# Patient Record
Sex: FEMALE | Race: WHITE | NOT HISPANIC OR LATINO | Employment: OTHER | URBAN - METROPOLITAN AREA
[De-identification: names, ages, dates, MRNs, and addresses within clinical notes are randomized per-mention and may not be internally consistent; named-entity substitution may affect disease eponyms.]

---

## 2017-05-18 ENCOUNTER — GENERIC CONVERSION - ENCOUNTER (OUTPATIENT)
Dept: OTHER | Facility: OTHER | Age: 72
End: 2017-05-18

## 2017-06-02 ENCOUNTER — GENERIC CONVERSION - ENCOUNTER (OUTPATIENT)
Dept: INTERNAL MEDICINE CLINIC | Facility: CLINIC | Age: 72
End: 2017-06-02

## 2017-11-09 ENCOUNTER — GENERIC CONVERSION - ENCOUNTER (OUTPATIENT)
Dept: OTHER | Facility: OTHER | Age: 72
End: 2017-11-09

## 2017-12-01 ENCOUNTER — GENERIC CONVERSION - ENCOUNTER (OUTPATIENT)
Dept: INTERNAL MEDICINE CLINIC | Facility: CLINIC | Age: 72
End: 2017-12-01

## 2017-12-08 ENCOUNTER — ALLSCRIPTS OFFICE VISIT (OUTPATIENT)
Dept: OTHER | Facility: OTHER | Age: 72
End: 2017-12-08

## 2017-12-08 DIAGNOSIS — E78.5 HYPERLIPIDEMIA: ICD-10-CM

## 2017-12-08 DIAGNOSIS — R73.01 IMPAIRED FASTING GLUCOSE: ICD-10-CM

## 2017-12-08 DIAGNOSIS — E03.9 HYPOTHYROIDISM: ICD-10-CM

## 2017-12-08 DIAGNOSIS — N39.0 URINARY TRACT INFECTION: ICD-10-CM

## 2017-12-09 NOTE — PROGRESS NOTES
Assessment    1  Impaired fasting glucose (790 21) (R73 01)   2  Poor short-term memory (780 93) (R41 3)   3  Hyperlipidemia (272 4) (E78 5)   4  Hypertension (401 9) (I10)   5  Hypothyroidism (244 9) (E03 9)    Plan  Health Maintenance    · *VB - Fall Risk Assessment  (Dx Z13 89 Screen for Neurologic Disorder);Status:Complete - Retrospective Authorization;   Done: 32GXK2978 03:22PM   · *VB - Urinary Incontinence Screen (Dx Z13 89 Screen for UI); Status:Complete -Retrospective Authorization;   Done: 35EIK4133 03:23PM  Hyperlipidemia    · (1) LIPID PANEL, FASTING; Status:Active; Requested for:81Quh1513;   Hypertension    · Follow-up visit in 6 weeks Evaluation and Treatment  Follow-up  Status: Complete  Done:07Ied5447  Hypothyroidism    · (1) T4, FREE; Status:Active; Requested for:44Hjw9416;    · (1) TSH; Status:Active; Requested for:74Sqd0860;   Impaired fasting glucose    · (1) COMPREHENSIVE METABOLIC PANEL; Status:Active; Requested for:84Ekf7053;    · (1) HEMOGLOBIN A1C; Status:Active; Requested for:70Jhg8794;   Urinary tract infection    · (1) URINE CULTURE; Source:Urine, Clean Catch; Status:Active; Requestedfor:29Wdv6414; Unlinked    · Phenazopyridine HCl TABS    Discussion/Summary  Discussion Summary:   Will check labs  Continue current medications at this time  Have repeat urine culture done  Will try to obtain old records  Follow up in 6 weeks  Counseling Documentation With Imm: The patient, patient's family was counseled regarding diagnostic results,-- instructions for management,-- risk factor reductions,-- impressions,-- risks and benefits of treatment options,-- importance of compliance with treatment  Medication SE Review and Pt Understands Tx: Possible side effects of new medications were reviewed with the patient/guardian today  The treatment plan was reviewed with the patient/guardian   The patient/guardian understands and agrees with the treatment plan   Self Referrals:   Self Referrals: No Chief Complaint  Chief Complaint Free Text Note Form: Pt in office to establish with practice  History of Present Illness  HPI: 24-year-old white female presents accompanied with her  to establish with this practice  She is stating she is just unhappy with her previous provider  The patient describes having a history of frequent urinary tract infections  Apparently she has had a recent infection and has been on antibiotic  She states she continues to not feel just right  Does not have any burning, but still has pressure  She is not aware of what antibiotic she had taken but thinks she has the bottle at home and will call us with the information  blood work done in November was available to review  Noted an elevated fasting blood sugar  is noting that her short-term memory does not seem to be as good as it had been, but her long-term memory is very good  as documented in the EMR  Review of Systems  Complete-Female:  Constitutional: no fever,-- not feeling poorly,-- no chills-- and-- not feeling tired  Eyes: no eyesight problems  ENT: no complaints of earache, no loss of hearing, no nose bleeds, no nasal discharge, no sore throat, no hoarseness  Cardiovascular: No complaints of slow heart rate, no fast heart rate, no chest pain, no palpitations, no leg claudication, no lower extremity edema  Respiratory: No complaints of shortness of breath, no wheezing, no cough, no SOB on exertion, no orthopnea, no PND  Gastrointestinal: no abdominal pain,-- no nausea,-- no vomiting,-- no constipation,-- no diarrhea-- and-- no blood in stools  Genitourinary: incontinence, but-- no dysuria,-- no pelvic pain-- and-- no vaginal discharge  Musculoskeletal: no arthralgias-- and-- no myalgias  Integumentary: no rashes  Neurological: no headache,-- no dizziness,-- no fainting-- and-- no difficulty walking  Psychiatric: not suicidal,-- no anxiety-- and-- no depression    Hematologic/Lymphatic: No complaints of swollen glands, no swollen glands in the neck, does not bleed easily, does not bruise easily  Active Problems  1  Depression (311) (F32 9)   2  Esophageal reflux (530 81) (K21 9)   3  Hyperlipidemia (272 4) (E78 5)   4  Hypertension (401 9) (I10)   5  Hypothyroidism (244 9) (E03 9)   6  Lumbago (724 2) (M54 5)   7  Lymphoma (202 80) (C85 90)   8  Urinary tract infection (599 0) (N39 0)    Past Medical History    1  History of Abdominal pain (789 00) (R10 9)   2  History of Abdominal pain, left upper quadrant (789 02) (R10 12)   3  History of Acute Lymphoma (V10 7)   4  History of Anxiety disorder (300 00) (F41 9)   5  History of Bronchitis (490) (J40)   6  History of Depression (311) (F32 9)   7  History of Encounter for screening mammogram for malignant neoplasm of breast (V76 12) (Z12 31)   8  History of Fainting (780 2) (R55)   9  History of abdominal pain (V13 89) (Z87 898)   10  History of aortic valve disorder (V12 59) (Z86 79)   11  History of bursitis (V13 59) (Z87 39)   12  History of gastroesophageal reflux (GERD) (V12 79) (Z87 19)   13  History of gastroesophageal reflux (GERD) (V12 79) (Z87 19)   14  History of hyperlipidemia (V12 29) (Z86 39)   15  History of hypertension (V12 59) (Z86 79)   16  History of hypothyroidism (V12 29) (Z86 39)   17  History of low back pain (V13 59) (Z87 39)   18  History of syncope (V15 89) (Z87 898)   19  History of Kidney Cancer (V10 52)   20  History of Lymphoma in remission (202 80) (C85 90)  Active Problems And Past Medical History Reviewed: The active problems and past medical history were reviewed and updated today  Surgical History  1  History of Esophagogastric Fundoplasty Nissen Fundoplication  Surgical History Reviewed: The surgical history was reviewed and updated today  Family History  Father    1  Family history of cerebrovascular accident (CVA) (V17 1) (Z82 3)  Sister    2  Family history of leukemia (V16 6) (Z80 6)  Brother    3   Family history of alcoholism (V17 0) (Z81 1)   4  Family history of malignant neoplasm of kidney (V16 51) (Z80 51)  Family History    5  Denied: Family history of mental disorder  Family History Reviewed: The family history was reviewed and updated today  Social History     ·    · Never A Smoker   · No alcohol use   · No illicit drug use   · Two children  Social History Reviewed: The social history was reviewed and updated today  The social history was reviewed and is unchanged  Current Meds   1  AmLODIPine Besylate 2 5 MG Oral Tablet; TAKE 1 TABLET DAILY (WITH 5MG TO TOTAL 7 5MG DAILY); Therapy: 20UJJ3513 to (Evaluate:30Jun2017)  Requested for: 76SLM8943; Last KN:06ZOC2277 Ordered   2  AmLODIPine Besylate 5 MG Oral Tablet; take 1 tablet every day; Therapy: 02Tzy1089 to (Evaluate:30Jun2017)  Requested for: 24NAA2436; Last QJ:55ZGD5565; Status: ACTIVE - Renewal Denied Ordered   3  Cranberry Concentrate CAPS; Take as directed Recorded   4  Levothyroxine Sodium 100 MCG Oral Tablet; TAKE 1 TABLET DAILY; Therapy: 02EIX3400 to ([facility])  Requested for: 07UVR3215; Last Rx:22Gfu5631; Status: ACTIVE - Renewal Denied Ordered   5  Myrbetriq 25 MG Oral Tablet Extended Release 24 Hour; Therapy: (Recorded:98Tbr6785) to Recorded   6  Omeprazole 20 MG Oral Capsule Delayed Release; TAKE 1 CAPSULE EVERY DAY AS NEEDED; Therapy: 00JPA3360 to (Evaluate:30Apr2018)  Requested for: 23IXE7858; Last Rx:01Nov2017 Ordered   7  PARoxetine HCl - 20 MG Oral Tablet; take 1 tablet by mouth every day; Therapy: 05BKH7907 to (Evaluate:30Jun2017)  Requested for: 94VNC3974; Last CH:29HIP9574; Status: ACTIVE - Renewal Denied Ordered   8  Phenazopyridine HCl TABS; TAKE 1 TABLET 3 TIMES DAILY AFTER MEALS Recorded    Allergies    1  Clindamycin HCl CAPS   2  Penicillins   3   Sulfacetamide Sodium OINT    Vitals  Signs   Recorded: 76DBU3072 02:57PM   Heart Rate: 86  Respiration: 16  Systolic: 362  Diastolic: 86  Height: 5 ft 1 in  Weight: 165 lb 2 oz  BMI Calculated: 31 2  BSA Calculated: 1 74  O2 Saturation: 97    Physical Exam   Constitutional  General appearance: No acute distress, well appearing and well nourished  Head and Face  Head and face: Normal    Palpation of the face and sinuses: No sinus tenderness  Eyes  Conjunctiva and lids: No swelling, erythema or discharge  Pupils and irises: Equal, round, reactive to light  Ears, Nose, Mouth, and Throat  External inspection of ears and nose: Normal    Otoscopic examination: Tympanic membranes translucent with normal light reflex  Canals patent without erythema  Hearing: Normal    Nasal mucosa, septum, and turbinates: Normal without edema or erythema  Lips, teeth, and gums: Normal, good dentition  Oropharynx: Normal with no erythema, edema, exudate or lesions  Neck  Neck: Supple, symmetric, trachea midline, no masses  Thyroid: Normal, no thyromegaly  Pulmonary  Respiratory effort: No increased work of breathing or signs of respiratory distress  Auscultation of lungs: Clear to auscultation  Cardiovascular  Auscultation of heart: Normal rate and rhythm, normal S1 and S2, no murmurs  Carotid pulses: 2+ bilaterally  Abdominal aorta: Normal    Femoral pulses: 2+ bilaterally  Pedal pulses: 2+ bilaterally  Examination of extremities for edema and/or varicosities: Normal    Abdomen  Abdomen: Non-tender, no masses  Liver and spleen: No hepatomegaly or splenomegaly  Lymphatic  Palpation of lymph nodes in neck: No lymphadenopathy  Palpation of lymph nodes in axillae: No lymphadenopathy  Palpation of lymph nodes in groin: No lymphadenopathy  Palpation of lymph nodes in other areas: No lymphadenopathy  Musculoskeletal  Gait and station: Normal    Digits and nails: Normal without clubbing or cyanosis     Joints, bones, and muscles: Normal    Range of motion: Normal    Stability: Normal    Muscle strength/tone: Normal    Skin  Skin and subcutaneous tissue: Normal without rashes or lesions  Palpation of skin and subcutaneous tissue: Normal turgor  Neurologic  Cranial nerves: Cranial nerves II-XII intact  Reflexes: 2+ and symmetric  Sensation: No sensory loss  Coordination: Normal finger to nose and heel to shin  Psychiatric  Judgment and insight: Normal    Orientation to person, place, and time: Normal    Recent and remote memory: Intact     Mood and affect: Normal        Results/Data  *VB - Urinary Incontinence Screen (Dx Z13 89 Screen for UI) 50TNI1395 03:23PM Benito Calender     Test Name Result Flag Reference   Urinary Incontinence Assessment 39BQO4229       PHQ-9 Adult Depression Screening 34ZTN0521 03:22PM Benito Calender     Test Name Result Flag Reference   PHQ-9 Adult Depression Score 0     PHQ-9 Adult Depression Screening Negative       *VB - Fall Risk Assessment  (Dx Z13 89 Screen for Neurologic Disorder) 06ZTP9919 03:22PM Benito Calender     Test Name Result Flag Reference   Falls Risk      No falls in the past year         Signatures   Electronically signed by : Arias Castillo, [facility]; Dec  8 2017  3:45PM EST                       (Author)    Electronically signed by : Yenny Moreno MD; Dec  8 2017  4:35PM EST

## 2018-01-19 ENCOUNTER — ALLSCRIPTS OFFICE VISIT (OUTPATIENT)
Dept: OTHER | Facility: OTHER | Age: 73
End: 2018-01-19

## 2018-01-19 DIAGNOSIS — E03.9 HYPOTHYROIDISM: ICD-10-CM

## 2018-01-19 DIAGNOSIS — M79.10 MYALGIA: ICD-10-CM

## 2018-01-20 NOTE — PROGRESS NOTES
Assessment   1  Hypothyroidism (244 9) (E03 9)   2  Hypertension (401 9) (I10)   3  Hyperlipidemia (272 4) (E78 5)    Plan   Hypertension, Hypothyroidism    · Follow-up visit in 4 Months Evaluation and Treatment  Follow-up  Status: Complete  Done: 40WJD9985  Hypothyroidism    · (1) T4, FREE; Status:Active; Requested ECB:14XSW9689;    · (1) TSH; Status:Active; Requested ISE:40OEO0807;   Myalgia    · (1) VITAMIN D 25-HYDROXY; Status:Active; Requested MSR:81IDK6806;   Poor short-term memory    · 1 Radha Gonzalez MD, [facility]  (Neurology) Co-Management  *  Status: Active  Requested for: 65AHH2118  Care Summary provided  : Yes  Urinary tract infection    · Ciprofloxacin HCl - 250 MG Oral Tablet    Discussion/Summary   Discussion Summary:    Will check thyroid hormone levels again  Also check Vit D level  Follow up in 4 months  Will discuss results when available and adjust medications as needed  Continue current medications at this time  Counseling Documentation With Imm: The patient, patient's family was counseled regarding diagnostic results,-- instructions for management,-- risk factor reductions,-- impressions,-- risks and benefits of treatment options,-- importance of compliance with treatment  Medication SE Review and Pt Understands Tx: Possible side effects of new medications were reviewed with the patient/guardian today  The treatment plan was reviewed with the patient/guardian  The patient/guardian understands and agrees with the treatment plan      Chief Complaint   Chief Complaint Free Text Note Form: Patient here for follow up of UTI  History of Present Illness   HPI: Follow-up did do her labs in mid December  Blood sugar was normal as was her hemoglobin A1c of 5 5  Lipids show good control with high HDLs on no medication  on replacement  Her most recent thyroid levels done in December show elevated TSH and low free T4   Patient is not sure if she had been taking her thyroid medicine regularly at the same time  did obtain results of CT of chest abdomen and pelvis done by her hematologist Oncology team  Report does indicate there are hypodense lesions in both kidneys  Patient is aware of this and in the past patient states the had been found to be renal cysts  again discussed her poor short-term memory  We discussed neurologic evaluation and both patient and  feel this would be a good idea  secondary to large hiatal hernia  Symptoms controlled with omeprazole  Review of Systems   Complete-Female:      Constitutional: no fever,-- not feeling poorly,-- no chills-- and-- not feeling tired  Cardiovascular: No complaints of slow heart rate, no fast heart rate, no chest pain, no palpitations, no leg claudication, no lower extremity edema  Respiratory: No complaints of shortness of breath, no wheezing, no cough, no SOB on exertion, no orthopnea, no PND  Gastrointestinal: no abdominal pain  Genitourinary: no dysuria  Musculoskeletal: myalgias, but-- no arthralgias  Integumentary: no rashes  Neurological: as noted in HPI,-- no headache,-- no dizziness-- and-- no fainting  Hematologic/Lymphatic: No complaints of swollen glands, no swollen glands in the neck, does not bleed easily, does not bruise easily  Active Problems   1  Depression (311) (F32 9)   2  Esophageal reflux (530 81) (K21 9)   3  Hyperlipidemia (272 4) (E78 5)   4  Hypertension (401 9) (I10)   5  Hypothyroidism (244 9) (E03 9)   6  Impaired fasting glucose (790 21) (R73 01)   7  Lumbago (724 2) (M54 5)   8  Lymphoma (202 80) (C85 90)   9  Poor short-term memory (780 93) (R41 3)   10  Recurrent UTI (599 0) (N39 0)   11  Urinary tract infection (599 0) (N39 0)    Past Medical History   1  History of Abdominal pain (789 00) (R10 9)   2  History of Abdominal pain, left upper quadrant (789 02) (R10 12)   3  History of Acute Lymphoma (V10 7)   4  History of Anxiety disorder (300 00) (F41 9)   5   History of Bronchitis (490) (J40)   6  History of Depression (311) (F32 9)   7  History of Encounter for screening mammogram for malignant neoplasm of breast (V76 12) (Z12 31)   8  History of Fainting (780 2) (R55)   9  History of abdominal pain (V13 89) (Z87 898)   10  History of aortic valve disorder (V12 59) (Z86 79)   11  History of bursitis (V13 59) (Z87 39)   12  History of gastroesophageal reflux (GERD) (V12 79) (Z87 19)   13  History of gastroesophageal reflux (GERD) (V12 79) (Z87 19)   14  History of hyperlipidemia (V12 29) (Z86 39)   15  History of hypertension (V12 59) (Z86 79)   16  History of hypothyroidism (V12 29) (Z86 39)   17  History of low back pain (V13 59) (Z87 39)   18  History of syncope (V15 89) (Z87 898)   19  History of Kidney Cancer (V10 52)   20  History of Lymphoma in remission (202 80) (C85 90)  Active Problems And Past Medical History Reviewed: The active problems and past medical history were reviewed and updated today  Surgical History   1  History of Esophagogastric Fundoplasty Nissen Fundoplication  Surgical History Reviewed: The surgical history was reviewed and updated today  Family History   Father    1  Family history of cerebrovascular accident (CVA) (V17 1) (Z82 3)  Sister    2  Family history of leukemia (V16 6) (Z80 6)  Brother    3  Family history of alcoholism (V17 0) (Z81 1)   4  Family history of malignant neoplasm of kidney (V16 51) (Z80 51)  Family History    5  Denied: Family history of mental disorder  Family History Reviewed: The family history was reviewed and updated today  Social History    ·    · Never A Smoker   · No alcohol use   · No illicit drug use   · Two children  Social History Reviewed: The social history was reviewed and updated today  The social history was reviewed and is unchanged  Current Meds    1  AmLODIPine Besylate 2 5 MG Oral Tablet; TAKE 1 TABLET DAILY (WITH 5MG TO TOTAL 7 5MG     DAILY);      Therapy: 08SBY7691 to (Evaluate:2017)  Requested for: 22KJS3578; Last E66CIC2845     Ordered   2  AmLODIPine Besylate 5 MG Oral Tablet; take 1 tablet every day; Therapy: 28Ldp4675 to (Evaluate:2017)  Requested for: 01PBV0158; Last YH:90NVH1910;     Status: ACTIVE - Renewal Denied Ordered   3  Ciprofloxacin HCl - 250 MG Oral Tablet; TAKE 1 TABLET EVERY 12 HOURS DAILY; Therapy: 65WGF8695 to (Evaluate:65Tte1690)  Requested for: 18CWQ1691; Last Rx:11Rol3733     Ordered   4  Cranberry Concentrate CAPS; Take as directed Recorded   5  Levothyroxine Sodium 100 MCG Oral Tablet; TAKE 1 TABLET DAILY; Therapy: 44GIG2865 to ([facility])  Requested for: 21UFZ6560; Last Rx:69Wuw1124;     Status: ACTIVE - Renewal Denied Ordered   6  Myrbetriq 25 MG Oral Tablet Extended Release 24 Hour; Therapy: (Recorded:07Pfi7345) to Recorded   7  Omeprazole 20 MG Oral Capsule Delayed Release; TAKE 1 CAPSULE EVERY DAY AS NEEDED; Therapy: 15RMF8639 to (Evaluate:2018)  Requested for: 66VOM5732; Last Rx:2017     Ordered   8  PARoxetine HCl - 20 MG Oral Tablet; take 1 tablet by mouth every day; Therapy: 82EWE1942 to (Evaluate:2017)  Requested for: 24ALO5923; Last UW:38QTO8331;     Status: ACTIVE - Renewal Denied Ordered  Medication List Reviewed: The medication list was reviewed and updated today  Allergies   1  Clindamycin HCl CAPS   2  Penicillins   3  Sulfacetamide Sodium OINT    Vitals   Vital Signs    Recorded: 86ZUM9209 01:56PM   Temperature 97 7 F   Heart Rate 76   Systolic 840   Diastolic 88   Height 5 ft 1 in   Weight 166 lb    BMI Calculated 31 37   BSA Calculated 1 75   O2 Saturation 97     Physical Exam        Constitutional      General appearance: No acute distress, well appearing and well nourished  obese  Pulmonary      Respiratory effort: No increased work of breathing or signs of respiratory distress  Auscultation of lungs: Clear to auscultation         Cardiovascular Auscultation of heart: Normal rate and rhythm, normal S1 and S2, without murmurs  Examination of extremities for edema and/or varicosities: Normal        Lymphatic      Palpation of lymph nodes in neck: No lymphadenopathy  Musculoskeletal      Gait and station: Normal        Skin      Skin and subcutaneous tissue: Normal without rashes or lesions  Neurologic      Cranial nerves: Cranial nerves 2-12 intact  Psychiatric      Orientation to person, place, and time: Normal        Mood and affect: Normal           Health Management   Health Maintenance   Digital Bilateral Diagnostic Mammogram With CAD; every 1 year; Next Due: 56HPQ8698;  Overdue    Signatures    Electronically signed by : Omkar Thomson, [facility]; Jan 19 2018  2:47PM EST                       (Author)     Electronically signed by : Toby Galvez MD; Jan 19 2018  5:22PM EST

## 2018-01-23 VITALS
DIASTOLIC BLOOD PRESSURE: 86 MMHG | SYSTOLIC BLOOD PRESSURE: 138 MMHG | HEART RATE: 86 BPM | WEIGHT: 165.13 LBS | RESPIRATION RATE: 16 BRPM | BODY MASS INDEX: 31.18 KG/M2 | OXYGEN SATURATION: 97 % | HEIGHT: 61 IN

## 2018-01-23 VITALS
SYSTOLIC BLOOD PRESSURE: 138 MMHG | OXYGEN SATURATION: 97 % | TEMPERATURE: 97.7 F | DIASTOLIC BLOOD PRESSURE: 88 MMHG | HEIGHT: 61 IN | BODY MASS INDEX: 31.34 KG/M2 | WEIGHT: 166 LBS | HEART RATE: 76 BPM

## 2018-01-26 ENCOUNTER — TRANSCRIBE ORDERS (OUTPATIENT)
Dept: NEUROLOGY | Facility: CLINIC | Age: 73
End: 2018-01-26

## 2018-01-26 DIAGNOSIS — R41.3 MEMORY LOSS: Primary | ICD-10-CM

## 2018-02-20 ENCOUNTER — TELEPHONE (OUTPATIENT)
Dept: INTERNAL MEDICINE CLINIC | Facility: CLINIC | Age: 73
End: 2018-02-20

## 2018-02-23 ENCOUNTER — OFFICE VISIT (OUTPATIENT)
Dept: INTERNAL MEDICINE CLINIC | Facility: CLINIC | Age: 73
End: 2018-02-23
Payer: COMMERCIAL

## 2018-02-23 VITALS
WEIGHT: 170.6 LBS | HEIGHT: 60 IN | HEART RATE: 78 BPM | RESPIRATION RATE: 16 BRPM | BODY MASS INDEX: 33.49 KG/M2 | DIASTOLIC BLOOD PRESSURE: 76 MMHG | TEMPERATURE: 97.9 F | SYSTOLIC BLOOD PRESSURE: 142 MMHG | OXYGEN SATURATION: 94 %

## 2018-02-23 DIAGNOSIS — E03.9 HYPOTHYROIDISM, UNSPECIFIED TYPE: Primary | ICD-10-CM

## 2018-02-23 PROBLEM — R73.01 IMPAIRED FASTING GLUCOSE: Status: ACTIVE | Noted: 2017-12-08

## 2018-02-23 PROBLEM — N39.0 RECURRENT UTI: Status: ACTIVE | Noted: 2017-12-08

## 2018-02-23 PROCEDURE — 99213 OFFICE O/P EST LOW 20 MIN: CPT | Performed by: INTERNAL MEDICINE

## 2018-02-23 RX ORDER — LEVOTHYROXINE SODIUM 0.1 MG/1
1 TABLET ORAL DAILY
COMMUNITY
Start: 2014-03-03 | End: 2018-02-23 | Stop reason: SDUPTHER

## 2018-02-23 RX ORDER — HYDROCHLOROTHIAZIDE 25 MG/1
TABLET ORAL
COMMUNITY
Start: 2018-01-31 | End: 2018-03-28 | Stop reason: ALTCHOICE

## 2018-02-23 RX ORDER — OMEPRAZOLE 20 MG/1
CAPSULE, DELAYED RELEASE ORAL
COMMUNITY
Start: 2018-01-08 | End: 2018-03-28 | Stop reason: ALTCHOICE

## 2018-02-23 RX ORDER — LEVOTHYROXINE SODIUM 0.12 MG/1
125 TABLET ORAL DAILY
Qty: 30 TABLET | Refills: 5 | Status: SHIPPED | OUTPATIENT
Start: 2018-02-23 | End: 2018-08-12 | Stop reason: SDUPTHER

## 2018-02-23 RX ORDER — CIPROFLOXACIN 250 MG/1
250 TABLET, FILM COATED ORAL EVERY 12 HOURS
Refills: 0 | COMMUNITY
Start: 2017-12-19 | End: 2018-02-23 | Stop reason: ALTCHOICE

## 2018-02-23 RX ORDER — PAROXETINE HYDROCHLORIDE 20 MG/1
TABLET, FILM COATED ORAL
COMMUNITY
Start: 2018-01-08 | End: 2018-03-28 | Stop reason: SDUPTHER

## 2018-02-23 RX ORDER — AMLODIPINE BESYLATE 2.5 MG/1
2.5 TABLET ORAL DAILY
Refills: 3 | COMMUNITY
Start: 2017-12-31 | End: 2018-03-28 | Stop reason: ALTCHOICE

## 2018-02-23 NOTE — PROGRESS NOTES
Assessment/Plan:     I told her she could continue to take her thyroid medicine with her other medications  Something is probably interfering with the absorption partly  Will increase her dose and recheck levels in 6 weeks  Diagnoses and all orders for this visit:    Hypothyroidism, unspecified type  -     levothyroxine 125 mcg tablet; Take 1 tablet (125 mcg total) by mouth daily for 180 days  -     T4, free; Future  -     TSH, 3rd generation; Future    Other orders  -     amLODIPine (NORVASC) 2 5 mg tablet; Take 2 5 mg by mouth daily  -     hydrochlorothiazide (HYDRODIURIL) 25 mg tablet;   -     Discontinue: ciprofloxacin (CIPRO) 250 mg tablet; Take 250 mg by mouth every 12 (twelve) hours  -     Discontinue: Cranberry-Vitamin C-Vitamin E (CRANBERRY CONCENTRATE) 140-100-3 MG-MG-UNIT CAPS; Take by mouth  -     Discontinue: levothyroxine 100 mcg tablet; Take 1 tablet by mouth daily  -     Mirabegron ER (MYRBETRIQ) 25 MG TB24; Take by mouth  -     omeprazole (PriLOSEC) 20 mg delayed release capsule;   -     PARoxetine (PAXIL) 20 mg tablet;           Subjective:     Patient ID: Trent Saunders is a 67 y o  female  Patient comes in today for follow-up with her  because her TSH has been going up  She is taking her medicine  She takes her thyroid medicine with her other medications when she 1st gets up in the morning but she has always done this  Last year her levels were normal and she was taking it that way as well  She does not think she is taking any new medicines or vitamins  Her  has noticed some more fatigue lately  So the level probably is correct  Patient's current medications and problem list were reviewed and updated today  Review of Systems   Respiratory: Negative for shortness of breath  Cardiovascular: Negative for chest pain  Gastrointestinal: Negative for abdominal pain           Objective:     Physical Exam   Constitutional: She appears well-developed and well-nourished  Nursing note and vitals reviewed

## 2018-03-06 ENCOUNTER — OFFICE VISIT (OUTPATIENT)
Dept: INTERNAL MEDICINE CLINIC | Facility: CLINIC | Age: 73
End: 2018-03-06
Payer: COMMERCIAL

## 2018-03-06 VITALS
BODY MASS INDEX: 33.38 KG/M2 | HEART RATE: 80 BPM | WEIGHT: 170 LBS | OXYGEN SATURATION: 97 % | TEMPERATURE: 97.9 F | DIASTOLIC BLOOD PRESSURE: 82 MMHG | HEIGHT: 60 IN | SYSTOLIC BLOOD PRESSURE: 128 MMHG

## 2018-03-06 DIAGNOSIS — R68.83 CHILLS: ICD-10-CM

## 2018-03-06 DIAGNOSIS — R41.0 MENTAL CONFUSION: ICD-10-CM

## 2018-03-06 DIAGNOSIS — R06.02 SHORTNESS OF BREATH: Primary | ICD-10-CM

## 2018-03-06 DIAGNOSIS — I10 ESSENTIAL HYPERTENSION: ICD-10-CM

## 2018-03-06 PROCEDURE — 99213 OFFICE O/P EST LOW 20 MIN: CPT | Performed by: PHYSICIAN ASSISTANT

## 2018-03-06 RX ORDER — AMLODIPINE BESYLATE 5 MG/1
5 TABLET ORAL DAILY
Qty: 30 TABLET | Refills: 5
Start: 2018-03-06 | End: 2018-03-28 | Stop reason: ALTCHOICE
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
## 2018-03-08 ENCOUNTER — HOSPITAL ENCOUNTER (EMERGENCY)
Facility: HOSPITAL | Age: 73
Discharge: HOME | End: 2018-03-08
Attending: EMERGENCY MEDICINE | Admitting: EMERGENCY MEDICINE
Payer: COMMERCIAL

## 2018-03-08 ENCOUNTER — TELEPHONE (OUTPATIENT)
Dept: INTERNAL MEDICINE CLINIC | Facility: CLINIC | Age: 73
End: 2018-03-08

## 2018-03-08 ENCOUNTER — APPOINTMENT (EMERGENCY)
Dept: RADIOLOGY | Facility: HOSPITAL | Age: 73
End: 2018-03-08
Payer: COMMERCIAL

## 2018-03-08 VITALS
HEART RATE: 79 BPM | SYSTOLIC BLOOD PRESSURE: 145 MMHG | BODY MASS INDEX: 33.2 KG/M2 | TEMPERATURE: 98 F | RESPIRATION RATE: 16 BRPM | OXYGEN SATURATION: 94 % | DIASTOLIC BLOOD PRESSURE: 74 MMHG | WEIGHT: 170 LBS

## 2018-03-08 DIAGNOSIS — R06.02 SHORTNESS OF BREATH: Primary | ICD-10-CM

## 2018-03-08 LAB
ALBUMIN SERPL BCP-MCNC: 3.2 G/DL (ref 3.5–5)
ALP SERPL-CCNC: 70 U/L (ref 46–116)
ALT SERPL W P-5'-P-CCNC: 17 U/L (ref 12–78)
ANION GAP SERPL CALCULATED.3IONS-SCNC: 8 MMOL/L (ref 4–13)
AST SERPL W P-5'-P-CCNC: 16 U/L (ref 5–45)
ATRIAL RATE: 77 BPM
BASOPHILS # BLD AUTO: 0.05 THOUSANDS/ΜL (ref 0–0.1)
BASOPHILS NFR BLD AUTO: 1 % (ref 0–1)
BILIRUB SERPL-MCNC: 0.3 MG/DL (ref 0.2–1)
BUN SERPL-MCNC: 17 MG/DL (ref 5–25)
CALCIUM SERPL-MCNC: 8.7 MG/DL (ref 8.3–10.1)
CHLORIDE SERPL-SCNC: 106 MMOL/L (ref 100–108)
CO2 SERPL-SCNC: 27 MMOL/L (ref 21–32)
CREAT SERPL-MCNC: 0.68 MG/DL (ref 0.6–1.3)
DEPRECATED D DIMER PPP: 326 NG/ML (FEU) (ref 0–424)
EOSINOPHIL # BLD AUTO: 0.26 THOUSAND/ΜL (ref 0–0.61)
EOSINOPHIL NFR BLD AUTO: 4 % (ref 0–6)
ERYTHROCYTE [DISTWIDTH] IN BLOOD BY AUTOMATED COUNT: 12.9 % (ref 11.6–15.1)
GFR SERPL CREATININE-BSD FRML MDRD: 87 ML/MIN/1.73SQ M
GLUCOSE SERPL-MCNC: 97 MG/DL (ref 65–140)
HCT VFR BLD AUTO: 36 % (ref 34.8–46.1)
HGB BLD-MCNC: 12.2 G/DL (ref 11.5–15.4)
LYMPHOCYTES # BLD AUTO: 0.93 THOUSANDS/ΜL (ref 0.6–4.47)
LYMPHOCYTES NFR BLD AUTO: 16 % (ref 14–44)
MCH RBC QN AUTO: 33.9 PG (ref 26.8–34.3)
MCHC RBC AUTO-ENTMCNC: 33.9 G/DL (ref 31.4–37.4)
MCV RBC AUTO: 100 FL (ref 82–98)
MONOCYTES # BLD AUTO: 0.59 THOUSAND/ΜL (ref 0.17–1.22)
MONOCYTES NFR BLD AUTO: 10 % (ref 4–12)
NEUTROPHILS # BLD AUTO: 4.02 THOUSANDS/ΜL (ref 1.85–7.62)
NEUTS SEG NFR BLD AUTO: 68 % (ref 43–75)
NRBC BLD AUTO-RTO: 0 /100 WBCS
NT-PROBNP SERPL-MCNC: 81 PG/ML
P AXIS: 38 DEGREES
PLATELET # BLD AUTO: 207 THOUSANDS/UL (ref 149–390)
PMV BLD AUTO: 11.4 FL (ref 8.9–12.7)
POTASSIUM SERPL-SCNC: 3.7 MMOL/L (ref 3.5–5.3)
PR INTERVAL: 156 MS
PROT SERPL-MCNC: 6.8 G/DL (ref 6.4–8.2)
QRS AXIS: 20 DEGREES
QRSD INTERVAL: 72 MS
QT INTERVAL: 370 MS
QTC INTERVAL: 418 MS
RBC # BLD AUTO: 3.6 MILLION/UL (ref 3.81–5.12)
SODIUM SERPL-SCNC: 141 MMOL/L (ref 136–145)
T WAVE AXIS: 22 DEGREES
TROPONIN I SERPL-MCNC: <0.02 NG/ML
VENTRICULAR RATE: 77 BPM
WBC # BLD AUTO: 5.89 THOUSAND/UL (ref 4.31–10.16)

## 2018-03-08 PROCEDURE — 93005 ELECTROCARDIOGRAM TRACING: CPT

## 2018-03-08 PROCEDURE — 84484 ASSAY OF TROPONIN QUANT: CPT | Performed by: EMERGENCY MEDICINE

## 2018-03-08 PROCEDURE — 85379 FIBRIN DEGRADATION QUANT: CPT | Performed by: EMERGENCY MEDICINE

## 2018-03-08 PROCEDURE — 83880 ASSAY OF NATRIURETIC PEPTIDE: CPT | Performed by: EMERGENCY MEDICINE

## 2018-03-08 PROCEDURE — 93010 ELECTROCARDIOGRAM REPORT: CPT | Performed by: INTERNAL MEDICINE

## 2018-03-08 PROCEDURE — 36415 COLL VENOUS BLD VENIPUNCTURE: CPT | Performed by: EMERGENCY MEDICINE

## 2018-03-08 PROCEDURE — 99285 EMERGENCY DEPT VISIT HI MDM: CPT

## 2018-03-08 PROCEDURE — 85025 COMPLETE CBC W/AUTO DIFF WBC: CPT | Performed by: EMERGENCY MEDICINE

## 2018-03-08 PROCEDURE — 71046 X-RAY EXAM CHEST 2 VIEWS: CPT

## 2018-03-08 PROCEDURE — 80053 COMPREHEN METABOLIC PANEL: CPT | Performed by: EMERGENCY MEDICINE

## 2018-03-08 NOTE — ED NOTES
Pt ambulated around entire emergency department  Pt starting pulse ox 94% HR 89   Pt pulse ox steady 95% while ambulating  [address], [address] [place] [address]  03/08/18 6850

## 2018-03-08 NOTE — TELEPHONE ENCOUNTER
Patient left a message that she is still experiencing SOB and not feeling any better from when she saw you a few days ago   She would like you to be aware

## 2018-03-08 NOTE — DISCHARGE INSTRUCTIONS
Shortness of Breath   WHAT YOU NEED TO KNOW:   Shortness of breath is a feeling that you cannot get enough air when you breathe in  You may have this feeling only during activity, or all the time  Your symptoms can range from mild to severe  Shortness of breath may be a sign of a serious health condition that needs immediate care  DISCHARGE INSTRUCTIONS:   Return to the emergency department if:   · Your signs and symptoms are the same or worse within 24 hours of treatment  · The skin over your ribs or on your neck sinks in when you breathe  · You feel confused or dizzy  Contact your healthcare provider if:   · You have new or worsening symptoms  · You have questions or concerns about your condition or care  Medicines:   · Medicines  may be used to treat the cause of your symptoms  You may need medicine to treat a bacterial infection or reduce anxiety  Other medicines may be used to open your airway, reduce swelling, or remove extra fluid  If you have a heart condition, you may need medicine to help your heart beat more strongly or regularly  · Take your medicine as directed  Contact your healthcare provider if you think your medicine is not helping or if you have side effects  Tell him or her if you are allergic to any medicine  Keep a list of the medicines, vitamins, and herbs you take  Include the amounts, and when and why you take them  Bring the list or the pill bottles to follow-up visits  Carry your medicine list with you in case of an emergency  Manage shortness of breath:   · Create an action plan  You and your healthcare provider can work together to create a plan for how to handle shortness of breath  The plan can include daily activities, treatment changes, and what to do if you have severe breathing problems  · Lean forward on your elbows when you sit  This helps your lungs expand and may make it easier to breathe  · Use pursed-lip breathing any time you feel short of breath  Breathe in through your nose and then slowly breathe out through your mouth with your lips slightly puckered  It should take you twice as long to breathe out as it did to breathe in  · Do not smoke  Nicotine and other chemicals in cigarettes and cigars can cause lung damage and make shortness of breath worse  Ask your healthcare provider for information if you currently smoke and need help to quit  E-cigarettes or smokeless tobacco still contain nicotine  Talk to your healthcare provider before you use these products  · Reach or maintain a healthy weight  Your healthcare provider can help you create a safe weight loss plan if you are overweight  · Exercise as directed  Exercise can help your lungs work more easily  Exercise can also help you lose weight if needed  Try to get at least 30 minutes of exercise most days of the week  Follow up with your healthcare provider or specialist as directed:  Write down your questions so you remember to ask them during your visits  © 2017 [address] [employer] Information is for End User's use only and may not be sold, redistributed or otherwise used for commercial purposes  All illustrations and images included in CareNotes® are the copyrighted property of [employer]  or [employer]  The above information is an  only  It is not intended as medical advice for individual conditions or treatments  Talk to your doctor, nurse or pharmacist before following any medical regimen to see if it is safe and effective for you
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
## 2018-03-12 ENCOUNTER — TELEPHONE (OUTPATIENT)
Dept: INTERNAL MEDICINE CLINIC | Facility: CLINIC | Age: 73
End: 2018-03-12

## 2018-03-12 NOTE — TELEPHONE ENCOUNTER
Pt called to state she went to the ER for her symptoms and she said they didn't find any dx or answers for her  She stating she is still experiencing anxiety and SOB at night time and doesn't know what else to do

## 2018-03-14 ENCOUNTER — VBI (OUTPATIENT)
Dept: ADMINISTRATIVE | Facility: OTHER | Age: 73
End: 2018-03-14

## 2018-03-14 NOTE — TELEPHONE ENCOUNTER
Alden Hernandez    ED Visit Information     Ed visit date: 3/8/18  Diagnosis Description: Shortness of breath  In Network? Yes [facility]  Discharge status: Home  Discharged with meds ? NA  Number of ED visits to date: 1  ED Severity:3     Outreach Information    Outreach successful: N/A  Date letter mailed:3/13/18  Date Finalized:3/13/18    Care Coordination    Follow up appointment with pcp: yes 3/12/18  Transportation issues ?  NA    [facility] [employer] type:  3 Day Outreach  [facility] PCP:  Yes  Practice Contacted Patient ?:  Yes  Pt had ED follow up with pcp/staff ?:  Yes Call with NO visit scheduled

## 2018-03-20 NOTE — TELEPHONE ENCOUNTER
She should make an appointment with us, to review her admission there, as what I can see in the EMR she had several studies done  And it looks as if she is seen Neurology today

## 2018-03-20 NOTE — TELEPHONE ENCOUNTER
Patients daughter called saying she was in the [facility] this past week and was just discharged on Saturday  They told her that Ashley Werner should be on 100mg on levothyroxine instead of the 125mg

## 2018-03-28 ENCOUNTER — OFFICE VISIT (OUTPATIENT)
Dept: INTERNAL MEDICINE CLINIC | Facility: CLINIC | Age: 73
End: 2018-03-28
Payer: COMMERCIAL

## 2018-03-28 VITALS
OXYGEN SATURATION: 96 % | DIASTOLIC BLOOD PRESSURE: 88 MMHG | BODY MASS INDEX: 33.22 KG/M2 | TEMPERATURE: 98 F | RESPIRATION RATE: 18 BRPM | HEIGHT: 60 IN | HEART RATE: 87 BPM | WEIGHT: 169.2 LBS | SYSTOLIC BLOOD PRESSURE: 130 MMHG

## 2018-03-28 DIAGNOSIS — E03.9 ACQUIRED HYPOTHYROIDISM: ICD-10-CM

## 2018-03-28 DIAGNOSIS — I67.1 CEREBRAL ANEURYSM, NONRUPTURED: Primary | ICD-10-CM

## 2018-03-28 DIAGNOSIS — R53.83 FATIGUE, UNSPECIFIED TYPE: ICD-10-CM

## 2018-03-28 DIAGNOSIS — E87.6 HYPOKALEMIA: ICD-10-CM

## 2018-03-28 PROCEDURE — 1101F PT FALLS ASSESS-DOCD LE1/YR: CPT | Performed by: INTERNAL MEDICINE

## 2018-03-28 PROCEDURE — 99214 OFFICE O/P EST MOD 30 MIN: CPT | Performed by: INTERNAL MEDICINE

## 2018-03-28 PROCEDURE — 3725F SCREEN DEPRESSION PERFORMED: CPT | Performed by: INTERNAL MEDICINE

## 2018-03-28 RX ORDER — POTASSIUM CHLORIDE 1.5 G/1.77G
20 POWDER, FOR SOLUTION ORAL
COMMUNITY
End: 2018-03-28 | Stop reason: ALTCHOICE

## 2018-03-28 RX ORDER — FUROSEMIDE 20 MG/1
TABLET ORAL
COMMUNITY
Start: 2018-03-12 | End: 2018-03-28 | Stop reason: ALTCHOICE

## 2018-03-28 RX ORDER — DIPHENOXYLATE HYDROCHLORIDE AND ATROPINE SULFATE 2.5; .025 MG/1; MG/1
1 TABLET ORAL
COMMUNITY

## 2018-03-28 RX ORDER — PAROXETINE HYDROCHLORIDE 20 MG/1
20 TABLET, FILM COATED ORAL
COMMUNITY
Start: 2018-03-19 | End: 2018-04-27 | Stop reason: SDUPTHER

## 2018-03-28 RX ORDER — ASPIRIN 325 MG
325 TABLET ORAL DAILY
COMMUNITY
Start: 2018-03-19

## 2018-03-28 RX ORDER — MIRABEGRON 50 MG/1
TABLET, FILM COATED, EXTENDED RELEASE ORAL
COMMUNITY
Start: 2018-03-13 | End: 2018-03-28

## 2018-03-28 RX ORDER — NICOTINE POLACRILEX 4 MG/1
20 GUM, CHEWING ORAL
COMMUNITY
Start: 2018-03-12 | End: 2018-06-25

## 2018-03-28 NOTE — PROGRESS NOTES
Assessment/Plan:     Plan will be to continue current medications  Repeat labs in 2 weeks for the potassium and thyroid  Suggested monitoring her weight daily and if the weight goes up suddenly, contact her cardiologist   With the fatigue, we are going to try and lower her Paxil dose slightly  Suggested she take a full tablet, alternating with a half tablet daily  Followup scheduled per orders  No problem-specific Assessment & Plan notes found for this encounter  Diagnoses and all orders for this visit:    Cerebral aneurysm, nonruptured    Fatigue, unspecified type    Hypokalemia  -     Basic metabolic panel; Future    Acquired hypothyroidism  -     TSH, 3rd generation; Future  -     T4, free; Future    Other orders  -     Discontinue: potassium chloride (KLOR-CON) 20 mEq packet; Take 20 mEq by mouth  -     aspirin 325 mg tablet; Take 325 mg by mouth  -     Discontinue: MYRBETRIQ 50 MG TB24;   -     multivitamin (THERAGRAN) TABS; Take 1 tablet by mouth  -     Omeprazole 20 MG TBEC; Take 20 mg by mouth  -     Discontinue: furosemide (LASIX) 20 mg tablet;   -     PARoxetine (PAXIL) 20 mg tablet; Take 20 mg by mouth  -     Mirabegron ER 50 MG TB24; Take by mouth          Subjective:      Patient ID: Aleks Hernández is a 67 y o  female  Patient comes in today for hospital follow-up with her  and daughter  She had passed out and was taken to the hospital   She was found to have very low potassium which was corrected  Other testing in the hospital was normal except for incidental finding of a brain aneurysm  She has seen neurosurgery in follow-up and they are only going to monitor it at this point  There is no intervention required  She has done okay since coming home from the hospital   She has had repeat labs which show her potassium is now normal   Her diuretics are on hold because of the potassium    She has already seen her cardiologist and if she needs the water pills back, she will probably be placed on spironolactone or similar  They note that she still has the fatigue  Even after correcting her thyroid levels  Reviewing her medications  She is on Paxil  She has been on this a long time  ALLERGIES:  Allergies   Allergen Reactions    Ciprofloxacin     Other     Sulfa Antibiotics     Penicillins Rash     Category: Allergy;     Sulfacetamide Rash     Category:  Allergy;        CURRENT MEDICATIONS:    Current Outpatient Prescriptions:     aspirin 325 mg tablet, Take 325 mg by mouth, Disp: , Rfl:     levothyroxine 125 mcg tablet, Take 1 tablet (125 mcg total) by mouth daily for 180 days, Disp: 30 tablet, Rfl: 5    Mirabegron ER 50 MG TB24, Take by mouth, Disp: , Rfl:     multivitamin (THERAGRAN) TABS, Take 1 tablet by mouth, Disp: , Rfl:     Omeprazole 20 MG TBEC, Take 20 mg by mouth, Disp: , Rfl:     PARoxetine (PAXIL) 20 mg tablet, Take 20 mg by mouth, Disp: , Rfl:     ACTIVE PROBLEM LIST:  Patient Active Problem List   Diagnosis    Depression    Esophageal reflux    Mixed hyperlipidemia    Essential hypertension    Acquired hypothyroidism    Impaired fasting glucose    Lymphoma (HCC)    Recurrent UTI    Cerebral aneurysm, nonruptured       PAST MEDICAL HISTORY:  Past Medical History:   Diagnosis Date    Anxiety disorder     Aortic valve disorder     Depression     History of gastroesophageal reflux (GERD)     History of kidney cancer 03/06/2014    Hyperlipidemia 03/06/2014    Hypertension 03/06/2014    Hypothyroidism 03/06/2014    Shortness of breath        PAST SURGICAL HISTORY:  Past Surgical History:   Procedure Laterality Date    ESOPHAGOGASTRIC FUNDOPLASTY      Nissen Fundoplication       FAMILY HISTORY:  Family History   Problem Relation Age of Onset    Stroke Father     Leukemia Sister     Alcohol abuse Brother     Kidney cancer Brother        SOCIAL HISTORY:  Social History     Social History    Marital status: /Civil Union     Spouse name: N/A    Number of children: 2    Years of education: N/A     Occupational History    Not on file  Social History Main Topics    Smoking status: Never Smoker    Smokeless tobacco: Never Used    Alcohol use No    Drug use: No    Sexual activity: Not on file     Other Topics Concern    Not on file     Social History Narrative    No narrative on file       Review of Systems   Respiratory: Negative for shortness of breath  Cardiovascular: Negative for chest pain  Gastrointestinal: Negative for abdominal pain  Objective:  Vitals:    03/28/18 1140   BP: 130/88   BP Location: Left arm   Patient Position: Sitting   Cuff Size: Adult   Pulse: 87   Resp: 18   Temp: 98 °F (36 7 °C)   TempSrc: Oral   SpO2: 96%   Weight: 76 7 kg (169 lb 3 2 oz)   Height: 5' (1 524 m)        Physical Exam   Constitutional: She is oriented to person, place, and time  She appears well-developed and well-nourished  Cardiovascular: Normal rate, regular rhythm and normal heart sounds  Pulmonary/Chest: Effort normal and breath sounds normal    Abdominal: Soft  There is no tenderness  Neurological: She is alert and oriented to person, place, and time  Nursing note and vitals reviewed          RESULTS:    Recent Results (from the past 1008 hour(s))   ECG 12 lead    Collection Time: 03/08/18 11:21 AM   Result Value Ref Range    Ventricular Rate 77 BPM    Atrial Rate 77 BPM    WV Interval 156 ms    QRSD Interval 72 ms    QT Interval 370 ms    QTC Interval 418 ms    P Axis 38 degrees    QRS Axis 20 degrees    T Wave Axis 22 degrees   CBC and differential    Collection Time: 03/08/18 11:24 AM   Result Value Ref Range    WBC 5 89 4 31 - 10 16 Thousand/uL    RBC 3 60 (L) 3 81 - 5 12 Million/uL    Hemoglobin 12 2 11 5 - 15 4 g/dL    Hematocrit 36 0 34 8 - 46 1 %     (H) 82 - 98 fL    MCH 33 9 26 8 - 34 3 pg    MCHC 33 9 31 4 - 37 4 g/dL    RDW 12 9 11 6 - 15 1 %    MPV 11 4 8 9 - 12 7 fL    Platelets 416 177 - 619 Thousands/uL    nRBC 0 /100 WBCs    Neutrophils Relative 68 43 - 75 %    Lymphocytes Relative 16 14 - 44 %    Monocytes Relative 10 4 - 12 %    Eosinophils Relative 4 0 - 6 %    Basophils Relative 1 0 - 1 %    Neutrophils Absolute 4 02 1 85 - 7 62 Thousands/µL    Lymphocytes Absolute 0 93 0 60 - 4 47 Thousands/µL    Monocytes Absolute 0 59 0 17 - 1 22 Thousand/µL    Eosinophils Absolute 0 26 0 00 - 0 61 Thousand/µL    Basophils Absolute 0 05 0 00 - 0 10 Thousands/µL   B-type natriuretic peptide    Collection Time: 03/08/18 11:24 AM   Result Value Ref Range    NT-proBNP 81 <125 pg/mL   Comprehensive metabolic panel    Collection Time: 03/08/18 11:24 AM   Result Value Ref Range    Sodium 141 136 - 145 mmol/L    Potassium 3 7 3 5 - 5 3 mmol/L    Chloride 106 100 - 108 mmol/L    CO2 27 21 - 32 mmol/L    Anion Gap 8 4 - 13 mmol/L    BUN 17 5 - 25 mg/dL    Creatinine 0 68 0 60 - 1 30 mg/dL    Glucose 97 65 - 140 mg/dL    Calcium 8 7 8 3 - 10 1 mg/dL    AST 16 5 - 45 U/L    ALT 17 12 - 78 U/L    Alkaline Phosphatase 70 46 - 116 U/L    Total Protein 6 8 6 4 - 8 2 g/dL    Albumin 3 2 (L) 3 5 - 5 0 g/dL    Total Bilirubin 0 30 0 20 - 1 00 mg/dL    eGFR 87 ml/min/1 73sq m   Troponin I    Collection Time: 03/08/18 11:24 AM   Result Value Ref Range    Troponin I <0 02 <=0 04 ng/mL   D-Dimer    Collection Time: 03/08/18 12:29 PM   Result Value Ref Range    D-Dimer, Quant 326 0 - 424 ng/ml (FEU)

## 2018-04-18 ENCOUNTER — TELEPHONE (OUTPATIENT)
Dept: INTERNAL MEDICINE CLINIC | Facility: CLINIC | Age: 73
End: 2018-04-18

## 2018-04-18 DIAGNOSIS — N30.00 ACUTE CYSTITIS WITHOUT HEMATURIA: Primary | ICD-10-CM

## 2018-04-18 RX ORDER — NITROFURANTOIN 25; 75 MG/1; MG/1
100 CAPSULE ORAL 2 TIMES DAILY
Qty: 10 CAPSULE | Refills: 0 | Status: SHIPPED | OUTPATIENT
Start: 2018-04-18 | End: 2018-04-23

## 2018-04-18 NOTE — TELEPHONE ENCOUNTER
Patient called and thinks she has a UTI  Can you prescribe her something for a UTI? If not I Will let her know       If you can pharmacy in chart

## 2018-04-19 ENCOUNTER — TELEPHONE (OUTPATIENT)
Dept: INTERNAL MEDICINE CLINIC | Facility: CLINIC | Age: 73
End: 2018-04-19

## 2018-04-19 NOTE — TELEPHONE ENCOUNTER
Spoke to Patient daughter Abran Jesus and advised that Pearl Zhong does not see any medication that can cause urinary track infections

## 2018-04-27 ENCOUNTER — OFFICE VISIT (OUTPATIENT)
Dept: INTERNAL MEDICINE CLINIC | Facility: CLINIC | Age: 73
End: 2018-04-27
Payer: COMMERCIAL

## 2018-04-27 VITALS
SYSTOLIC BLOOD PRESSURE: 122 MMHG | HEART RATE: 100 BPM | WEIGHT: 172 LBS | HEIGHT: 60 IN | DIASTOLIC BLOOD PRESSURE: 78 MMHG | OXYGEN SATURATION: 95 % | BODY MASS INDEX: 33.77 KG/M2

## 2018-04-27 DIAGNOSIS — F32.A DEPRESSION, UNSPECIFIED DEPRESSION TYPE: ICD-10-CM

## 2018-04-27 DIAGNOSIS — E55.9 VITAMIN D DEFICIENCY: ICD-10-CM

## 2018-04-27 DIAGNOSIS — E03.9 ACQUIRED HYPOTHYROIDISM: ICD-10-CM

## 2018-04-27 DIAGNOSIS — I10 ESSENTIAL HYPERTENSION: Primary | ICD-10-CM

## 2018-04-27 PROBLEM — N30.00 ACUTE CYSTITIS WITHOUT HEMATURIA: Status: RESOLVED | Noted: 2018-04-18 | Resolved: 2018-04-27

## 2018-04-27 PROCEDURE — 3074F SYST BP LT 130 MM HG: CPT | Performed by: INTERNAL MEDICINE

## 2018-04-27 PROCEDURE — 3078F DIAST BP <80 MM HG: CPT | Performed by: INTERNAL MEDICINE

## 2018-04-27 PROCEDURE — 99214 OFFICE O/P EST MOD 30 MIN: CPT | Performed by: INTERNAL MEDICINE

## 2018-04-27 RX ORDER — PAROXETINE HYDROCHLORIDE 20 MG/1
20 TABLET, FILM COATED ORAL DAILY
Qty: 90 TABLET | Refills: 1 | Status: SHIPPED | OUTPATIENT
Start: 2018-04-27 | End: 2018-12-22 | Stop reason: SDUPTHER

## 2018-04-27 NOTE — PROGRESS NOTES
Assessment/Plan:     Will keep her off the blood pressure medicine  Call for any problems  Recheck thyroid levels  It does not appear that was done  Her daughter will add vitamin D supplementation to mom's meds and continue to slowly wean the Paxil  Follow up with Neurology as planned  Followup scheduled per orders  No problem-specific Assessment & Plan notes found for this encounter  Diagnoses and all orders for this visit:    Essential hypertension    Acquired hypothyroidism  -     T4, free; Future  -     TSH, 3rd generation; Future    Vitamin D deficiency    Depression, unspecified depression type  -     PARoxetine (PAXIL) 20 mg tablet; Take 1 tablet (20 mg total) by mouth daily          Subjective:      Patient ID: Otto Sutton is a 67 y o  female  Patient comes in today for follow-up with her daughter  Her blood work shows her potassium remains normal but it does not appear the lab did the thyroid testing  Her blood pressure is controlled off of the medicine  She seems to be doing okay with the continued taper of the Paxil  There were also lab results in there that showed her vitamin-D level was low as well  ALLERGIES:  Allergies   Allergen Reactions    Ciprofloxacin     Other     Sulfa Antibiotics     Penicillins Rash     Category: Allergy;     Sulfacetamide Rash     Category:  Allergy;        CURRENT MEDICATIONS:    Current Outpatient Prescriptions:     aspirin 325 mg tablet, Take 325 mg by mouth, Disp: , Rfl:     levothyroxine 125 mcg tablet, Take 1 tablet (125 mcg total) by mouth daily for 180 days, Disp: 30 tablet, Rfl: 5    Mirabegron ER 50 MG TB24, Take by mouth, Disp: , Rfl:     multivitamin (THERAGRAN) TABS, Take 1 tablet by mouth, Disp: , Rfl:     Omeprazole 20 MG TBEC, Take 20 mg by mouth, Disp: , Rfl:     PARoxetine (PAXIL) 20 mg tablet, Take 1 tablet (20 mg total) by mouth daily, Disp: 90 tablet, Rfl: 1    ACTIVE PROBLEM LIST:  Patient Active Problem List Diagnosis    Depression    Esophageal reflux    Mixed hyperlipidemia    Essential hypertension    Acquired hypothyroidism    Impaired fasting glucose    Lymphoma (HCC)    Recurrent UTI    Cerebral aneurysm, nonruptured       PAST MEDICAL HISTORY:  Past Medical History:   Diagnosis Date    Anxiety disorder     Aortic valve disorder     Depression     History of gastroesophageal reflux (GERD)     History of kidney cancer 03/06/2014    Hyperlipidemia 03/06/2014    Hypertension 03/06/2014    Hypothyroidism 03/06/2014    Shortness of breath        PAST SURGICAL HISTORY:  Past Surgical History:   Procedure Laterality Date    ESOPHAGOGASTRIC FUNDOPLASTY      Nissen Fundoplication       FAMILY HISTORY:  Family History   Problem Relation Age of Onset    Stroke Father     Leukemia Sister     Alcohol abuse Brother     Kidney cancer Brother        SOCIAL HISTORY:  Social History     Social History    Marital status: /Civil Union     Spouse name: N/A    Number of children: 2    Years of education: N/A     Occupational History    Not on file  Social History Main Topics    Smoking status: Never Smoker    Smokeless tobacco: Never Used    Alcohol use No    Drug use: No    Sexual activity: Not on file     Other Topics Concern    Not on file     Social History Narrative    No narrative on file       Review of Systems   Respiratory: Negative for shortness of breath  Cardiovascular: Negative for chest pain  Gastrointestinal: Negative for abdominal pain  Objective:  Vitals:    04/27/18 1143   BP: 122/78   BP Location: Left arm   Patient Position: Sitting   Pulse: 100   SpO2: 95%   Weight: 78 kg (172 lb)   Height: 5' (1 524 m)        Physical Exam   Constitutional: She is oriented to person, place, and time  She appears well-developed and well-nourished  Cardiovascular: Normal rate, regular rhythm and normal heart sounds      Pulmonary/Chest: Effort normal and breath sounds normal    Abdominal: Soft  There is no tenderness  Neurological: She is alert and oriented to person, place, and time  Nursing note and vitals reviewed  RESULTS:    No results found for this or any previous visit (from the past 1008 hour(s))  This note was created with voice recognition software  Phonic, grammatical and spelling errors may be present within the note as a result

## 2018-05-16 DIAGNOSIS — N39.0 RECURRENT UTI: ICD-10-CM

## 2018-05-17 RX ORDER — MIRABEGRON 50 MG/1
TABLET, FILM COATED, EXTENDED RELEASE ORAL
Qty: 30 TABLET | Refills: 7 | Status: SHIPPED | OUTPATIENT
Start: 2018-05-17 | End: 2019-01-21 | Stop reason: SDUPTHER

## 2018-05-18 ENCOUNTER — OFFICE VISIT (OUTPATIENT)
Dept: INTERNAL MEDICINE CLINIC | Facility: CLINIC | Age: 73
End: 2018-05-18
Payer: COMMERCIAL

## 2018-05-18 VITALS
DIASTOLIC BLOOD PRESSURE: 84 MMHG | WEIGHT: 171 LBS | BODY MASS INDEX: 33.57 KG/M2 | HEIGHT: 60 IN | OXYGEN SATURATION: 95 % | HEART RATE: 93 BPM | SYSTOLIC BLOOD PRESSURE: 124 MMHG

## 2018-05-18 DIAGNOSIS — E03.9 ACQUIRED HYPOTHYROIDISM: ICD-10-CM

## 2018-05-18 DIAGNOSIS — I10 ESSENTIAL HYPERTENSION: Primary | ICD-10-CM

## 2018-05-18 DIAGNOSIS — R41.3 MEMORY LOSS: ICD-10-CM

## 2018-05-18 PROCEDURE — 99214 OFFICE O/P EST MOD 30 MIN: CPT | Performed by: INTERNAL MEDICINE

## 2018-05-18 NOTE — PROGRESS NOTES
Assessment/Plan:      Pressure and thyroid are controlled  Continue current medications  Ordered blood work for next visit  Patient Instructions   Consider ARICEPT for the memory loss  Followup scheduled per orders  No problem-specific Assessment & Plan notes found for this encounter  Diagnoses and all orders for this visit:    Essential hypertension  -     CBC and differential; Future  -     Comprehensive metabolic panel; Future    Acquired hypothyroidism  -     T4, free; Future  -     TSH, 3rd generation; Future    Memory loss          Subjective:      Patient ID: Fei Chicas is a 67 y o  female  Patient comes in today for follow-up with her daughter  Her pressure is still controlled off of medication  Thyroid levels are also still controlled with the present dosing  Her daughter continues to try and taper her Paxil down  They are asking today about medication for memory loss  ALLERGIES:  Allergies   Allergen Reactions    Ciprofloxacin     Other     Sulfa Antibiotics     Penicillins Rash     Category: Allergy;     Sulfacetamide Rash     Category:  Allergy;        CURRENT MEDICATIONS:    Current Outpatient Prescriptions:     aspirin 325 mg tablet, Take 325 mg by mouth, Disp: , Rfl:     levothyroxine 125 mcg tablet, Take 1 tablet (125 mcg total) by mouth daily for 180 days, Disp: 30 tablet, Rfl: 5    multivitamin (THERAGRAN) TABS, Take 1 tablet by mouth, Disp: , Rfl:     MYRBETRIQ 50 MG TB24, 1 TAB(S) ONCE A DAY ORALLY 30 DAYS, Disp: 30 tablet, Rfl: 7    Omeprazole 20 MG TBEC, Take 20 mg by mouth, Disp: , Rfl:     PARoxetine (PAXIL) 20 mg tablet, Take 1 tablet (20 mg total) by mouth daily, Disp: 90 tablet, Rfl: 1    ACTIVE PROBLEM LIST:  Patient Active Problem List   Diagnosis    Depression    Esophageal reflux    Mixed hyperlipidemia    Essential hypertension    Acquired hypothyroidism    Impaired fasting glucose    Lymphoma (HCC)    Recurrent UTI    Cerebral aneurysm, nonruptured    Memory loss       PAST MEDICAL HISTORY:  Past Medical History:   Diagnosis Date    Anxiety disorder     Aortic valve disorder     Depression     History of gastroesophageal reflux (GERD)     History of kidney cancer 03/06/2014    Hyperlipidemia 03/06/2014    Hypertension 03/06/2014    Hypothyroidism 03/06/2014    Shortness of breath        PAST SURGICAL HISTORY:  Past Surgical History:   Procedure Laterality Date    ESOPHAGOGASTRIC FUNDOPLASTY      Nissen Fundoplication       FAMILY HISTORY:  Family History   Problem Relation Age of Onset    Stroke Father     Leukemia Sister     Alcohol abuse Brother     Kidney cancer Brother        SOCIAL HISTORY:  Social History     Social History    Marital status: /Civil Union     Spouse name: N/A    Number of children: 2    Years of education: N/A     Occupational History    Not on file  Social History Main Topics    Smoking status: Never Smoker    Smokeless tobacco: Never Used    Alcohol use No    Drug use: No    Sexual activity: Not on file     Other Topics Concern    Not on file     Social History Narrative    No narrative on file       Review of Systems   Respiratory: Negative for shortness of breath  Cardiovascular: Negative for chest pain  Gastrointestinal: Negative for abdominal pain  Objective:  Vitals:    05/18/18 1432   BP: 124/84   BP Location: Left arm   Patient Position: Sitting   Pulse: 93   SpO2: 95%   Weight: 77 6 kg (171 lb)   Height: 5' (1 524 m)        Physical Exam   Constitutional: She is oriented to person, place, and time  She appears well-developed and well-nourished  Cardiovascular: Normal rate, regular rhythm and normal heart sounds  Pulmonary/Chest: Effort normal and breath sounds normal    Abdominal: Soft  There is no tenderness  Neurological: She is alert and oriented to person, place, and time  Nursing note and vitals reviewed          RESULTS:    No results found for this or any previous visit (from the past 1008 hour(s))  This note was created with voice recognition software  Phonic, grammatical and spelling errors may be present within the note as a result

## 2018-05-30 ENCOUNTER — TELEPHONE (OUTPATIENT)
Dept: INTERNAL MEDICINE CLINIC | Facility: CLINIC | Age: 73
End: 2018-05-30

## 2018-05-30 DIAGNOSIS — R41.3 MEMORY LOSS: Primary | ICD-10-CM

## 2018-05-30 RX ORDER — DONEPEZIL HYDROCHLORIDE 5 MG/1
5 TABLET, FILM COATED ORAL
Qty: 30 TABLET | Refills: 3 | Status: SHIPPED | OUTPATIENT
Start: 2018-05-30 | End: 2018-07-17 | Stop reason: SDUPTHER

## 2018-05-30 NOTE — TELEPHONE ENCOUNTER
Pt's daughter called office stating that they will try the Aricept for the memory loss RX can be sent to [facility] in [place]

## 2018-06-23 DIAGNOSIS — K21.9 GASTROESOPHAGEAL REFLUX DISEASE WITHOUT ESOPHAGITIS: Primary | ICD-10-CM

## 2018-06-25 RX ORDER — OMEPRAZOLE 20 MG/1
CAPSULE, DELAYED RELEASE ORAL
Qty: 30 CAPSULE | Refills: 5 | Status: SHIPPED | OUTPATIENT
Start: 2018-06-25 | End: 2018-12-22 | Stop reason: SDUPTHER

## 2018-07-17 ENCOUNTER — APPOINTMENT (OUTPATIENT)
Dept: LAB | Facility: HOSPITAL | Age: 73
End: 2018-07-17
Attending: INTERNAL MEDICINE
Payer: COMMERCIAL

## 2018-07-17 ENCOUNTER — HOSPITAL ENCOUNTER (OUTPATIENT)
Dept: RADIOLOGY | Facility: HOSPITAL | Age: 73
Discharge: HOME | End: 2018-07-17
Attending: INTERNAL MEDICINE
Payer: COMMERCIAL

## 2018-07-17 ENCOUNTER — OFFICE VISIT (OUTPATIENT)
Dept: INTERNAL MEDICINE CLINIC | Facility: CLINIC | Age: 73
End: 2018-07-17
Payer: COMMERCIAL

## 2018-07-17 VITALS
WEIGHT: 173 LBS | OXYGEN SATURATION: 95 % | HEIGHT: 60 IN | HEART RATE: 93 BPM | BODY MASS INDEX: 33.96 KG/M2 | SYSTOLIC BLOOD PRESSURE: 120 MMHG | DIASTOLIC BLOOD PRESSURE: 88 MMHG

## 2018-07-17 DIAGNOSIS — E03.9 ACQUIRED HYPOTHYROIDISM: ICD-10-CM

## 2018-07-17 DIAGNOSIS — R06.00 DYSPNEA ON EXERTION: ICD-10-CM

## 2018-07-17 DIAGNOSIS — R41.3 MEMORY LOSS: ICD-10-CM

## 2018-07-17 DIAGNOSIS — F32.A DEPRESSION, UNSPECIFIED DEPRESSION TYPE: Primary | ICD-10-CM

## 2018-07-17 DIAGNOSIS — I10 ESSENTIAL HYPERTENSION: ICD-10-CM

## 2018-07-17 DIAGNOSIS — E87.6 HYPOKALEMIA: ICD-10-CM

## 2018-07-17 LAB
ALBUMIN SERPL BCP-MCNC: 3.5 G/DL (ref 3.5–5)
ALP SERPL-CCNC: 76 U/L (ref 46–116)
ALT SERPL W P-5'-P-CCNC: 17 U/L (ref 12–78)
ANION GAP SERPL CALCULATED.3IONS-SCNC: 7 MMOL/L (ref 4–13)
AST SERPL W P-5'-P-CCNC: 18 U/L (ref 5–45)
BASOPHILS # BLD AUTO: 0.06 THOUSANDS/ΜL (ref 0–0.1)
BASOPHILS NFR BLD AUTO: 1 % (ref 0–1)
BILIRUB SERPL-MCNC: 0.3 MG/DL (ref 0.2–1)
BUN SERPL-MCNC: 22 MG/DL (ref 5–25)
CALCIUM SERPL-MCNC: 9.8 MG/DL (ref 8.3–10.1)
CHLORIDE SERPL-SCNC: 104 MMOL/L (ref 100–108)
CO2 SERPL-SCNC: 30 MMOL/L (ref 21–32)
CREAT SERPL-MCNC: 0.9 MG/DL (ref 0.6–1.3)
EOSINOPHIL # BLD AUTO: 0.18 THOUSAND/ΜL (ref 0–0.61)
EOSINOPHIL NFR BLD AUTO: 4 % (ref 0–6)
ERYTHROCYTE [DISTWIDTH] IN BLOOD BY AUTOMATED COUNT: 12.9 % (ref 11.6–15.1)
GFR SERPL CREATININE-BSD FRML MDRD: 64 ML/MIN/1.73SQ M
GLUCOSE SERPL-MCNC: 141 MG/DL (ref 65–140)
HCT VFR BLD AUTO: 39.8 % (ref 34.8–46.1)
HGB BLD-MCNC: 13.4 G/DL (ref 11.5–15.4)
IMM GRANULOCYTES # BLD AUTO: 0.07 THOUSAND/UL (ref 0–0.2)
IMM GRANULOCYTES NFR BLD AUTO: 1 % (ref 0–2)
LYMPHOCYTES # BLD AUTO: 0.91 THOUSANDS/ΜL (ref 0.6–4.47)
LYMPHOCYTES NFR BLD AUTO: 18 % (ref 14–44)
MCH RBC QN AUTO: 34.3 PG (ref 26.8–34.3)
MCHC RBC AUTO-ENTMCNC: 33.7 G/DL (ref 31.4–37.4)
MCV RBC AUTO: 102 FL (ref 82–98)
MONOCYTES # BLD AUTO: 0.42 THOUSAND/ΜL (ref 0.17–1.22)
MONOCYTES NFR BLD AUTO: 8 % (ref 4–12)
NEUTROPHILS # BLD AUTO: 3.53 THOUSANDS/ΜL (ref 1.85–7.62)
NEUTS SEG NFR BLD AUTO: 68 % (ref 43–75)
NRBC BLD AUTO-RTO: 0 /100 WBCS
PLATELET # BLD AUTO: 257 THOUSANDS/UL (ref 149–390)
PMV BLD AUTO: 11.6 FL (ref 8.9–12.7)
POTASSIUM SERPL-SCNC: 4.2 MMOL/L (ref 3.5–5.3)
PROT SERPL-MCNC: 7.4 G/DL (ref 6.4–8.2)
RBC # BLD AUTO: 3.91 MILLION/UL (ref 3.81–5.12)
SODIUM SERPL-SCNC: 141 MMOL/L (ref 136–145)
T4 FREE SERPL-MCNC: 1.29 NG/DL (ref 0.76–1.46)
TSH SERPL DL<=0.05 MIU/L-ACNC: 5.03 UIU/ML (ref 0.36–3.74)
WBC # BLD AUTO: 5.23 THOUSAND/UL (ref 4.31–10.16)

## 2018-07-17 PROCEDURE — 99214 OFFICE O/P EST MOD 30 MIN: CPT | Performed by: INTERNAL MEDICINE

## 2018-07-17 PROCEDURE — 84439 ASSAY OF FREE THYROXINE: CPT

## 2018-07-17 PROCEDURE — 36415 COLL VENOUS BLD VENIPUNCTURE: CPT

## 2018-07-17 PROCEDURE — 80053 COMPREHEN METABOLIC PANEL: CPT

## 2018-07-17 PROCEDURE — 71046 X-RAY EXAM CHEST 2 VIEWS: CPT

## 2018-07-17 PROCEDURE — 85025 COMPLETE CBC W/AUTO DIFF WBC: CPT

## 2018-07-17 PROCEDURE — 84443 ASSAY THYROID STIM HORMONE: CPT

## 2018-07-17 RX ORDER — DONEPEZIL HYDROCHLORIDE 10 MG/1
10 TABLET, FILM COATED ORAL
Qty: 30 TABLET | Refills: 3 | Status: SHIPPED | OUTPATIENT
Start: 2018-07-17 | End: 2018-10-01 | Stop reason: SDUPTHER

## 2018-07-17 NOTE — PROGRESS NOTES
Assessment/Plan: We are going to make several adjustments  She is going to take the Paxil daily at a full tablet  Go for her blood work now with a chest x-ray  Suggested Flonase nasal spray in case the headaches are sinus related  Continue the Claritin  Also increase the Aricept to 10 mg   Keep her regular appointment in a week or 2  Return for Next scheduled follow up  No problem-specific Assessment & Plan notes found for this encounter  Diagnoses and all orders for this visit:    Depression, unspecified depression type    Memory loss  -     donepezil (ARICEPT) 10 mg tablet; Take 1 tablet (10 mg total) by mouth daily at bedtime    Dyspnea on exertion  -     XR chest pa & lateral; Future          Subjective:      Patient ID: Conchita Mooney is a 67 y o  female  Patient comes in today with her  complaining of 1-2 weeks of frontal headache  Usually starts in the morning  She is taking all of her medicine  Her blood pressure appears controlled  They have not been checking it at home  They have been continuing the wean of the Paxil but she is not sure if she thinks she should be back on the full tablet  Family reports that she is sleeping a lot  Memory seems to be worsening  When I was asking her about allergies, she admitted that she hears some gurgling when she lays down at night  She then admitted to some dyspnea on exertion and her  states he noticed that as well  No chest pain  ALLERGIES:  Allergies   Allergen Reactions    Ciprofloxacin     Other     Sulfa Antibiotics     Penicillins Rash     Category: Allergy;     Sulfacetamide Rash     Category:  Allergy;        CURRENT MEDICATIONS:    Current Outpatient Prescriptions:     aspirin 325 mg tablet, Take 325 mg by mouth, Disp: , Rfl:     donepezil (ARICEPT) 10 mg tablet, Take 1 tablet (10 mg total) by mouth daily at bedtime, Disp: 30 tablet, Rfl: 3    levothyroxine 125 mcg tablet, Take 1 tablet (125 mcg total) by mouth daily for 180 days, Disp: 30 tablet, Rfl: 5    multivitamin (THERAGRAN) TABS, Take 1 tablet by mouth, Disp: , Rfl:     MYRBETRIQ 50 MG TB24, 1 TAB(S) ONCE A DAY ORALLY 30 DAYS, Disp: 30 tablet, Rfl: 7    omeprazole (PriLOSEC) 20 mg delayed release capsule, TAKE 1 CAPSULE EVERY DAY AS NEEDED, Disp: 30 capsule, Rfl: 5    PARoxetine (PAXIL) 20 mg tablet, Take 1 tablet (20 mg total) by mouth daily, Disp: 90 tablet, Rfl: 1    ACTIVE PROBLEM LIST:  Patient Active Problem List   Diagnosis    Depression    Esophageal reflux    Mixed hyperlipidemia    Essential hypertension    Acquired hypothyroidism    Impaired fasting glucose    Lymphoma (HCC)    Recurrent UTI    Cerebral aneurysm, nonruptured    Memory loss       PAST MEDICAL HISTORY:  Past Medical History:   Diagnosis Date    Anxiety disorder     Aortic valve disorder     Depression     History of gastroesophageal reflux (GERD)     History of kidney cancer 03/06/2014    Hyperlipidemia 03/06/2014    Hypertension 03/06/2014    Hypothyroidism 03/06/2014    Shortness of breath        PAST SURGICAL HISTORY:  Past Surgical History:   Procedure Laterality Date    ESOPHAGOGASTRIC FUNDOPLASTY      Nissen Fundoplication       FAMILY HISTORY:  Family History   Problem Relation Age of Onset    Stroke Father     Leukemia Sister     Alcohol abuse Brother     Kidney cancer Brother        SOCIAL HISTORY:  Social History     Social History    Marital status: /Civil Union     Spouse name: N/A    Number of children: 2    Years of education: N/A     Occupational History    Not on file  Social History Main Topics    Smoking status: Never Smoker    Smokeless tobacco: Never Used    Alcohol use No    Drug use: No    Sexual activity: Not on file     Other Topics Concern    Not on file     Social History Narrative    No narrative on file       Review of Systems   Respiratory: Positive for shortness of breath      Cardiovascular: Negative for chest pain  Gastrointestinal: Negative for abdominal pain  Objective:  Vitals:    07/17/18 1059   BP: 120/88   BP Location: Left arm   Patient Position: Sitting   Pulse: 93   SpO2: 95%   Weight: 78 5 kg (173 lb)   Height: 5' (1 524 m)        Physical Exam   Constitutional: She is oriented to person, place, and time  She appears well-developed and well-nourished  Cardiovascular: Normal rate, regular rhythm and normal heart sounds  Pulmonary/Chest: Effort normal and breath sounds normal    Abdominal: Soft  There is no tenderness  Neurological: She is alert and oriented to person, place, and time  Nursing note and vitals reviewed  RESULTS:    No results found for this or any previous visit (from the past 1008 hour(s))  This note was created with voice recognition software  Phonic, grammatical and spelling errors may be present within the note as a result

## 2018-07-30 ENCOUNTER — HOSPITAL ENCOUNTER (EMERGENCY)
Facility: HOSPITAL | Age: 73
Discharge: HOME | End: 2018-07-30
Attending: EMERGENCY MEDICINE | Admitting: EMERGENCY MEDICINE
Payer: COMMERCIAL

## 2018-07-30 ENCOUNTER — OFFICE VISIT (OUTPATIENT)
Dept: INTERNAL MEDICINE CLINIC | Facility: CLINIC | Age: 73
End: 2018-07-30
Payer: COMMERCIAL

## 2018-07-30 ENCOUNTER — APPOINTMENT (EMERGENCY)
Dept: RADIOLOGY | Facility: HOSPITAL | Age: 73
End: 2018-07-30
Payer: COMMERCIAL

## 2018-07-30 VITALS
HEIGHT: 60 IN | SYSTOLIC BLOOD PRESSURE: 124 MMHG | BODY MASS INDEX: 34.2 KG/M2 | HEART RATE: 68 BPM | WEIGHT: 174.2 LBS | OXYGEN SATURATION: 95 % | DIASTOLIC BLOOD PRESSURE: 68 MMHG

## 2018-07-30 VITALS
OXYGEN SATURATION: 94 % | BODY MASS INDEX: 34.96 KG/M2 | WEIGHT: 179.01 LBS | HEART RATE: 80 BPM | SYSTOLIC BLOOD PRESSURE: 165 MMHG | TEMPERATURE: 97.8 F | RESPIRATION RATE: 17 BRPM | DIASTOLIC BLOOD PRESSURE: 75 MMHG

## 2018-07-30 DIAGNOSIS — R06.02 SHORTNESS OF BREATH: Primary | ICD-10-CM

## 2018-07-30 DIAGNOSIS — R06.00 DYSPNEA, UNSPECIFIED TYPE: Primary | ICD-10-CM

## 2018-07-30 LAB
ANION GAP SERPL CALCULATED.3IONS-SCNC: 10 MMOL/L (ref 4–13)
ATRIAL RATE: 78 BPM
BASOPHILS # BLD MANUAL: 0 THOUSAND/UL (ref 0–0.1)
BASOPHILS NFR MAR MANUAL: 0 % (ref 0–1)
BUN SERPL-MCNC: 19 MG/DL (ref 5–25)
CALCIUM SERPL-MCNC: 9.3 MG/DL (ref 8.3–10.1)
CHLORIDE SERPL-SCNC: 104 MMOL/L (ref 100–108)
CO2 SERPL-SCNC: 26 MMOL/L (ref 21–32)
CREAT SERPL-MCNC: 0.88 MG/DL (ref 0.6–1.3)
EOSINOPHIL # BLD MANUAL: 0.07 THOUSAND/UL (ref 0–0.4)
EOSINOPHIL NFR BLD MANUAL: 1 % (ref 0–6)
ERYTHROCYTE [DISTWIDTH] IN BLOOD BY AUTOMATED COUNT: 13 % (ref 11.6–15.1)
GFR SERPL CREATININE-BSD FRML MDRD: 65 ML/MIN/1.73SQ M
GLUCOSE SERPL-MCNC: 100 MG/DL (ref 65–140)
HCT VFR BLD AUTO: 38.4 % (ref 34.8–46.1)
HGB BLD-MCNC: 12.8 G/DL (ref 11.5–15.4)
LG PLATELETS BLD QL SMEAR: PRESENT
LYMPHOCYTES # BLD AUTO: 1.1 THOUSAND/UL (ref 0.6–4.47)
LYMPHOCYTES # BLD AUTO: 15 % (ref 14–44)
MCH RBC QN AUTO: 33.3 PG (ref 26.8–34.3)
MCHC RBC AUTO-ENTMCNC: 33.3 G/DL (ref 31.4–37.4)
MCV RBC AUTO: 100 FL (ref 82–98)
MONOCYTES # BLD AUTO: 0.51 THOUSAND/UL (ref 0–1.22)
MONOCYTES NFR BLD: 7 % (ref 4–12)
NEUTROPHILS # BLD MANUAL: 5.63 THOUSAND/UL (ref 1.85–7.62)
NEUTS BAND NFR BLD MANUAL: 1 % (ref 0–8)
NEUTS SEG NFR BLD AUTO: 76 % (ref 43–75)
NRBC BLD AUTO-RTO: 0 /100 WBCS
NT-PROBNP SERPL-MCNC: 114 PG/ML
P AXIS: 34 DEGREES
PLATELET # BLD AUTO: 231 THOUSANDS/UL (ref 149–390)
PLATELET BLD QL SMEAR: ADEQUATE
PMV BLD AUTO: 11.7 FL (ref 8.9–12.7)
POTASSIUM SERPL-SCNC: 4.2 MMOL/L (ref 3.5–5.3)
PR INTERVAL: 144 MS
QRS AXIS: 15 DEGREES
QRSD INTERVAL: 76 MS
QT INTERVAL: 376 MS
QTC INTERVAL: 428 MS
RBC # BLD AUTO: 3.84 MILLION/UL (ref 3.81–5.12)
SODIUM SERPL-SCNC: 140 MMOL/L (ref 136–145)
T WAVE AXIS: 34 DEGREES
TOTAL CELLS COUNTED SPEC: 100
TROPONIN I SERPL-MCNC: <0.02 NG/ML
VENTRICULAR RATE: 78 BPM
WBC # BLD AUTO: 7.31 THOUSAND/UL (ref 4.31–10.16)

## 2018-07-30 PROCEDURE — 99214 OFFICE O/P EST MOD 30 MIN: CPT | Performed by: INTERNAL MEDICINE

## 2018-07-30 PROCEDURE — 80048 BASIC METABOLIC PNL TOTAL CA: CPT | Performed by: EMERGENCY MEDICINE

## 2018-07-30 PROCEDURE — 85027 COMPLETE CBC AUTOMATED: CPT | Performed by: EMERGENCY MEDICINE

## 2018-07-30 PROCEDURE — 71046 X-RAY EXAM CHEST 2 VIEWS: CPT

## 2018-07-30 PROCEDURE — 83880 ASSAY OF NATRIURETIC PEPTIDE: CPT | Performed by: EMERGENCY MEDICINE

## 2018-07-30 PROCEDURE — 85007 BL SMEAR W/DIFF WBC COUNT: CPT | Performed by: EMERGENCY MEDICINE

## 2018-07-30 PROCEDURE — 36415 COLL VENOUS BLD VENIPUNCTURE: CPT | Performed by: EMERGENCY MEDICINE

## 2018-07-30 PROCEDURE — 93005 ELECTROCARDIOGRAM TRACING: CPT

## 2018-07-30 PROCEDURE — 99285 EMERGENCY DEPT VISIT HI MDM: CPT

## 2018-07-30 PROCEDURE — 93010 ELECTROCARDIOGRAM REPORT: CPT | Performed by: INTERNAL MEDICINE

## 2018-07-30 PROCEDURE — 84484 ASSAY OF TROPONIN QUANT: CPT | Performed by: EMERGENCY MEDICINE

## 2018-07-30 NOTE — DISCHARGE INSTRUCTIONS
Continue to take your medications as prescribed  Follow up with her primary care doctor for further evaluation of your symptoms, as well as with the pulmonologist for further evaluation of your recurrent trouble breathing  Return for worsening or changing symptoms, or for any other concerns  Shortness of Breath   WHAT YOU NEED TO KNOW:   Shortness of breath is a feeling that you cannot get enough air when you breathe in  You may have this feeling only during activity, or all the time  Your symptoms can range from mild to severe  Shortness of breath may be a sign of a serious health condition that needs immediate care  DISCHARGE INSTRUCTIONS:   Return to the emergency department if:   · Your signs and symptoms are the same or worse within 24 hours of treatment  · The skin over your ribs or on your neck sinks in when you breathe  · You feel confused or dizzy  Contact your healthcare provider if:   · You have new or worsening symptoms  · You have questions or concerns about your condition or care  Medicines:   · Medicines  may be used to treat the cause of your symptoms  You may need medicine to treat a bacterial infection or reduce anxiety  Other medicines may be used to open your airway, reduce swelling, or remove extra fluid  If you have a heart condition, you may need medicine to help your heart beat more strongly or regularly  · Take your medicine as directed  Contact your healthcare provider if you think your medicine is not helping or if you have side effects  Tell him or her if you are allergic to any medicine  Keep a list of the medicines, vitamins, and herbs you take  Include the amounts, and when and why you take them  Bring the list or the pill bottles to follow-up visits  Carry your medicine list with you in case of an emergency  Manage shortness of breath:   · Create an action plan    You and your healthcare provider can work together to create a plan for how to handle shortness of breath  The plan can include daily activities, treatment changes, and what to do if you have severe breathing problems  · Lean forward on your elbows when you sit  This helps your lungs expand and may make it easier to breathe  · Use pursed-lip breathing any time you feel short of breath  Breathe in through your nose and then slowly breathe out through your mouth with your lips slightly puckered  It should take you twice as long to breathe out as it did to breathe in  · Do not smoke  Nicotine and other chemicals in cigarettes and cigars can cause lung damage and make shortness of breath worse  Ask your healthcare provider for information if you currently smoke and need help to quit  E-cigarettes or smokeless tobacco still contain nicotine  Talk to your healthcare provider before you use these products  · Reach or maintain a healthy weight  Your healthcare provider can help you create a safe weight loss plan if you are overweight  · Exercise as directed  Exercise can help your lungs work more easily  Exercise can also help you lose weight if needed  Try to get at least 30 minutes of exercise most days of the week  Follow up with your healthcare provider or specialist as directed:  Write down your questions so you remember to ask them during your visits  © 2017 [address] [employer] Information is for End User's use only and may not be sold, redistributed or otherwise used for commercial purposes  All illustrations and images included in CareNotes® are the copyrighted property of [employer]  or [employer]  The above information is an  only  It is not intended as medical advice for individual conditions or treatments  Talk to your doctor, nurse or pharmacist before following any medical regimen to see if it is safe and effective for you

## 2018-07-30 NOTE — ED PROVIDER NOTES
History  Chief Complaint   Patient presents with    Shortness of Breath     Pt presents to ER on advice of her PCP where she went today for c/o's shortness of breath that started yesterday  Healthy appearing elderly female presents in mild respiratory distress - dyspnea at rest & tachypneic  HPI    Prior to Admission Medications   Prescriptions Last Dose Informant Patient Reported? Taking? MYRBETRIQ 50 MG TB24   No No   Si TAB(S) ONCE A DAY ORALLY 30 DAYS   PARoxetine (PAXIL) 20 mg tablet   No No   Sig: Take 1 tablet (20 mg total) by mouth daily   aspirin 325 mg tablet   Yes No   Sig: Take 325 mg by mouth   donepezil (ARICEPT) 10 mg tablet   No No   Sig: Take 1 tablet (10 mg total) by mouth daily at bedtime   levothyroxine 125 mcg tablet   No No   Sig: Take 1 tablet (125 mcg total) by mouth daily for 180 days   multivitamin (THERAGRAN) TABS   Yes No   Sig: Take 1 tablet by mouth   omeprazole (PriLOSEC) 20 mg delayed release capsule   No No   Sig: TAKE 1 CAPSULE EVERY DAY AS NEEDED      Facility-Administered Medications: None       Past Medical History:   Diagnosis Date    Anxiety disorder     Aortic valve disorder     Depression     History of gastroesophageal reflux (GERD)     History of kidney cancer 2014    Hyperlipidemia 2014    Hypertension 2014    Hypothyroidism 2014    Shortness of breath        Past Surgical History:   Procedure Laterality Date    ESOPHAGOGASTRIC FUNDOPLASTY      Nissen Fundoplication       Family History   Problem Relation Age of Onset    Stroke Father     Leukemia Sister     Alcohol abuse Brother     Kidney cancer Brother      I have reviewed and agree with the history as documented  Social History   Substance Use Topics    Smoking status: Never Smoker    Smokeless tobacco: Never Used    Alcohol use No        Review of Systems    Physical Exam  Physical Exam   Constitutional: She is oriented to person, place, and time   She appears well-developed and well-nourished  No distress  HENT:   Head: Normocephalic and atraumatic  Mouth/Throat: Oropharynx is clear and moist    Eyes: Conjunctivae are normal  Pupils are equal, round, and reactive to light  Neck: Normal range of motion  No tracheal deviation present  Cardiovascular: Normal rate, regular rhythm, normal heart sounds and intact distal pulses  Pulmonary/Chest: Effort normal and breath sounds normal  No respiratory distress  Abdominal: Soft  Bowel sounds are normal  She exhibits no distension  There is no tenderness  Musculoskeletal: She exhibits no edema  Neurological: She is alert and oriented to person, place, and time  She has normal strength  GCS eye subscore is 4  GCS verbal subscore is 5  GCS motor subscore is 6  Skin: Skin is warm and dry  Psychiatric: She has a normal mood and affect  Her behavior is normal    Nursing note and vitals reviewed        Vital Signs  ED Triage Vitals [07/30/18 1424]   Temperature Pulse Respirations Blood Pressure SpO2   97 8 °F (36 6 °C) 73 (!) 24 (!) 141/103 97 %      Temp Source Heart Rate Source Patient Position - Orthostatic VS BP Location FiO2 (%)   Oral Monitor Lying Right arm --      Pain Score       --           Vitals:    07/30/18 1446 07/30/18 1530 07/30/18 1600 07/30/18 1700   BP: 152/80 151/85 165/84 165/75   Pulse: 75 75 77 80   Patient Position - Orthostatic VS: Sitting Lying Lying Lying       Visual Acuity      ED Medications  Medications - No data to display    Diagnostic Studies  Results Reviewed     Procedure Component Value Units Date/Time    CBC and differential [[id number]]  (Abnormal) Collected:  07/30/18 1456    Lab Status:  Final result Specimen:  Blood from Arm, Left Updated:  07/30/18 1546     WBC 7 31 Thousand/uL      RBC 3 84 Million/uL      Hemoglobin 12 8 g/dL      Hematocrit 38 4 %       (H) fL      MCH 33 3 pg      MCHC 33 3 g/dL      RDW 13 0 %      MPV 11 7 fL      Platelets 464 Thousands/uL nRBC 0 /100 WBCs     Basic metabolic panel [[id number]] Collected:  07/30/18 1456    Lab Status:  Final result Specimen:  Blood from Arm, Left Updated:  07/30/18 1527     Sodium 140 mmol/L      Potassium 4 2 mmol/L      Chloride 104 mmol/L      CO2 26 mmol/L      Anion Gap 10 mmol/L      BUN 19 mg/dL      Creatinine 0 88 mg/dL      Glucose 100 mg/dL      Calcium 9 3 mg/dL      eGFR 65 ml/min/1 73sq m     Narrative:         National Kidney Disease Education Program recommendations are as follows:  GFR calculation is accurate only with a steady state creatinine  Chronic Kidney disease less than 60 ml/min/1 73 sq  meters  Kidney failure less than 15 ml/min/1 73 sq  meters  B-type natriuretic peptide [[id number]]  (Normal) Collected:  07/30/18 1456    Lab Status:  Final result Specimen:  Blood from Arm, Left Updated:  07/30/18 1527     NT-proBNP 114 pg/mL     Troponin I [[id number]]  (Normal) Collected:  07/30/18 1456    Lab Status:  Final result Specimen:  Blood from Arm, Left Updated:  07/30/18 1523     Troponin I <0 02 ng/mL                  XR chest 2 views   Final Result by Richmond Kim MD (07/30 1553)      No acute cardiopulmonary disease              Workstation performed: [id number]                    Procedures  ECG 12 Lead Documentation  Date/Time: 7/30/2018 3:01 PM  Performed by: Bairon Hoover  Authorized by: Bairon Hoover     Indications / Diagnosis:  SOB  ECG reviewed by me, the ED Provider: yes    Patient location:  ED  Previous ECG:     Previous ECG:  Compared to current    Comparison ECG info:  3/8/18    Similarity:  No change  Interpretation:     Interpretation: normal    Rate:     ECG rate:  78    ECG rate assessment: normal    Rhythm:     Rhythm: sinus rhythm    Ectopy:     Ectopy: none    QRS:     QRS axis:  Normal    QRS intervals:  Normal  Conduction:     Conduction: normal    ST segments:     ST segments:  Normal  T waves:     T waves: non-specific and inverted Inverted:  III             Phone Contacts  ED Phone Contact    ED Course           Identification of Seniors at Risk      Most Recent Value   (ISAR) Identification of Seniors at Risk   Before the illness or injury that brought you to the Emergency, did you need someone to help you on a regular basis? 0 Filed at: 07/30/2018 1426   In the last 24 hours, have you needed more help than usual?  1 Filed at: 07/30/2018 1426   Have you been hospitalized for one or more nights during the past 6 months? 1 Filed at: 07/30/2018 1426   In general, do you see well?  0 Filed at: 07/30/2018 1426   In general, do you have serious problems with your memory? 0 Filed at: 07/30/2018 1426   Do you take more than three different medications every day? 1 Filed at: 07/30/2018 1426   ISAR Score  3 Filed at: 07/30/2018 1426                          MDM  Number of Diagnoses or Management Options  Shortness of breath: new and requires workup  Diagnosis management comments: This is a 69-year-old female who presents here today with shortness of breath  The patient states it started yesterday, and is worse today  She states she is more short of breath when walking around the normal  She was unable toSleep last night because she cannot breathe while lying flat  She normally only sleeps with one pillow  She has had a cough recently, which is nonproductive, though she did have an episode of posttussive emesis  She denies any nasal congestion or fevers  She denies any lower extremity edema or weight gain  She denies any known sick contacts  She denies any chest pain or palpitations  Her  states she has actually been short of breath at home for the past couple of days, and has been gradually getting worse  She has not been able to sleep the past few nights, because of her trouble breathing  They went to the PCP today, who sent her here for evaluation    The patient does have a history of shortness of breath listed in her past medical history, without any specific diagnosis  She has had several visits for this previously, without an identifiable etiology  She denies any known underlying problems with her lungs, including COPD or asthma, or any problems with her heart, including CHF     ROS: Otherwise negative, unless stated as above  She is well-appearing, in no acute distress  She is speaking easily without respiratory distress  She has mild rhonchi the bilateral bases  She has mild confusion, but this is at her baseline per family  The remainder of her exam is unremarkable  She has been seen for shortness of breath previously, but there is no obvious etiology for this  We will check chest x-ray and lab work to evaluate for underlying signs of ACS, CHF, pneumonia contributing to her symptoms  The family does state that she has had occasional problems lying flat before in the setting of shortness of breath, but this usually lasts for a day or two and resolves  Her chest x-ray is unremarkable  Her lab work shows no acute abnormalities  She feels fine while being here  She was able to ambulate around the department  At one point, she was reportedly complaining of worsening shortness of breath and did have to slow down slightly, though did not appear in distress, and her oxygen saturations increased as she was complaining of worsening symptoms  She had no hypoxia either at rest or with ambulation  I discussed with patient and family findings, possible underlying chronic lung disease that has not yet been diagnosed, treatment at home, follow-up with her primary care doctor and with pulmonology, and indications for return, and they expressed understanding with this plan         Amount and/or Complexity of Data Reviewed  Clinical lab tests: ordered and reviewed  Tests in the radiology section of CPT®: reviewed and ordered  Obtain history from someone other than the patient: yes  Independent visualization of images, tracings, or specimens: yes      CritCare Time    Disposition  Final diagnoses:   Shortness of breath     Time reflects when diagnosis was documented in both MDM as applicable and the Disposition within this note     Time User Action Codes Description Comment    7/30/2018  4:55 PM Camron Sonya Eye Add [R06 02] Shortness of breath       ED Disposition     ED Disposition Condition Comment    Discharge  Josiane Mcneill discharge to home/self care  Condition at discharge: Good        Follow-up Information     Follow up With Specialties Details Why Juliann Quinones MD Internal Medicine Schedule an appointment as soon as possible for a visit in 3 days to follow up on your symptoms [address]      Tracy Eller MD Pulmonology, Neurology, Pulmonary Disease, Sleep Medicine Schedule an appointment as soon as possible for a visit to follow up with a lung specialist for your recurrent symptoms [address] [id number] [phone]            Discharge Medication List as of 7/30/2018  5:00 PM      CONTINUE these medications which have NOT CHANGED    Details   aspirin 325 mg tablet Take 325 mg by mouth, Starting Mon 3/19/2018, Historical Med      donepezil (ARICEPT) 10 mg tablet Take 1 tablet (10 mg total) by mouth daily at bedtime, Starting Tue 7/17/2018, Normal      levothyroxine 125 mcg tablet Take 1 tablet (125 mcg total) by mouth daily for 180 days, Starting Fri 2/23/2018, Until Wed 8/22/2018, Normal      multivitamin (THERAGRAN) TABS Take 1 tablet by mouth, Historical Med      MYRBETRIQ 50 MG TB24 1 TAB(S) ONCE A DAY ORALLY 30 DAYS, Normal      omeprazole (PriLOSEC) 20 mg delayed release capsule TAKE 1 CAPSULE EVERY DAY AS NEEDED, Normal      PARoxetine (PAXIL) 20 mg tablet Take 1 tablet (20 mg total) by mouth daily, Starting Fri 4/27/2018, Normal           No discharge procedures on file      ED Provider  Electronically Signed by           Pastor Payal MD  07/31/18 0002

## 2018-07-30 NOTE — PROGRESS NOTES
Assessment/Plan:      We discussed this at length with the patient and her family  She seemed to get more short of breath and anxious, the more we discussed things  Her  then added that this is what happened last time  She became gradually more short of breath over 3 days and then was hospitalized  The last time though, it was from low-potassium from a water pill  Staff here noted she looks short of breath walking back to the exam room  She had a normal chest x-ray on the 17th  I did order a chest x-ray but her daughter is most likely going to take her to the emergency room instead  Total time, 25 minutes, greater than 50% spent face to face in counseling and coordination of care  No Follow-up on file  No problem-specific Assessment & Plan notes found for this encounter  Diagnoses and all orders for this visit:    Dyspnea, unspecified type  -     XR chest pa & lateral; Future          Subjective:      Patient ID: Cisco Avila is a 67 y o  female  Patient comes in today for follow-up with her  and daughter  She moved her appointment up because she has not been feeling well  They note that she appears short of breath for the last 2 days  Having trouble sleeping at night  But her daughter also notes head congestion and the patient was afraid to use the nasal spray  She is also getting upset because of her memory loss  She did increase the Aricept as instructed  No fevers  She denies cough but family said she is coughing  Not productive  ALLERGIES:  Allergies   Allergen Reactions    Ciprofloxacin     Other     Sulfa Antibiotics     Penicillins Rash     Category: Allergy;     Sulfacetamide Rash     Category:  Allergy;        CURRENT MEDICATIONS:    Current Outpatient Prescriptions:     aspirin 325 mg tablet, Take 325 mg by mouth, Disp: , Rfl:     donepezil (ARICEPT) 10 mg tablet, Take 1 tablet (10 mg total) by mouth daily at bedtime, Disp: 30 tablet, Rfl: 3   levothyroxine 125 mcg tablet, Take 1 tablet (125 mcg total) by mouth daily for 180 days, Disp: 30 tablet, Rfl: 5    multivitamin (THERAGRAN) TABS, Take 1 tablet by mouth, Disp: , Rfl:     MYRBETRIQ 50 MG TB24, 1 TAB(S) ONCE A DAY ORALLY 30 DAYS, Disp: 30 tablet, Rfl: 7    omeprazole (PriLOSEC) 20 mg delayed release capsule, TAKE 1 CAPSULE EVERY DAY AS NEEDED, Disp: 30 capsule, Rfl: 5    PARoxetine (PAXIL) 20 mg tablet, Take 1 tablet (20 mg total) by mouth daily, Disp: 90 tablet, Rfl: 1    ACTIVE PROBLEM LIST:  Patient Active Problem List   Diagnosis    Depression    Esophageal reflux    Mixed hyperlipidemia    Essential hypertension    Acquired hypothyroidism    Impaired fasting glucose    Lymphoma (HCC)    Recurrent UTI    Cerebral aneurysm, nonruptured    Memory loss       PAST MEDICAL HISTORY:  Past Medical History:   Diagnosis Date    Anxiety disorder     Aortic valve disorder     Depression     History of gastroesophageal reflux (GERD)     History of kidney cancer 03/06/2014    Hyperlipidemia 03/06/2014    Hypertension 03/06/2014    Hypothyroidism 03/06/2014    Shortness of breath        PAST SURGICAL HISTORY:  Past Surgical History:   Procedure Laterality Date    ESOPHAGOGASTRIC FUNDOPLASTY      Nissen Fundoplication       FAMILY HISTORY:  Family History   Problem Relation Age of Onset    Stroke Father     Leukemia Sister     Alcohol abuse Brother     Kidney cancer Brother        SOCIAL HISTORY:  Social History     Social History    Marital status: /Civil Union     Spouse name: N/A    Number of children: 2    Years of education: N/A     Occupational History    Not on file       Social History Main Topics    Smoking status: Never Smoker    Smokeless tobacco: Never Used    Alcohol use No    Drug use: No    Sexual activity: Not on file     Other Topics Concern    Not on file     Social History Narrative    No narrative on file       Review of Systems   Constitutional: Negative for fever  Respiratory: Positive for shortness of breath  Cardiovascular: Negative for chest pain  Gastrointestinal: Negative for abdominal pain  Objective:  Vitals:    07/30/18 1317   BP: 124/68   BP Location: Left arm   Patient Position: Sitting   Pulse: 68   SpO2: 95%   Weight: 79 kg (174 lb 3 2 oz)   Height: 5' (1 524 m)        Physical Exam   Constitutional: She appears well-developed and well-nourished  Cardiovascular: Normal rate, regular rhythm and normal heart sounds  Pulmonary/Chest: Effort normal    Questionable rales left base   Abdominal: Soft  There is no tenderness  Neurological: She is alert  Psychiatric:   Anxious   Nursing note and vitals reviewed          RESULTS:    Recent Results (from the past 1008 hour(s))   TSH, 3rd generation    Collection Time: 07/17/18 12:27 PM   Result Value Ref Range    TSH 3RD GENERATON 5 029 (H) 0 358 - 3 740 uIU/mL   T4, free    Collection Time: 07/17/18 12:27 PM   Result Value Ref Range    Free T4 1 29 0 76 - 1 46 ng/dL   CBC and differential    Collection Time: 07/17/18 12:27 PM   Result Value Ref Range    WBC 5 23 4 31 - 10 16 Thousand/uL    RBC 3 91 3 81 - 5 12 Million/uL    Hemoglobin 13 4 11 5 - 15 4 g/dL    Hematocrit 39 8 34 8 - 46 1 %     (H) 82 - 98 fL    MCH 34 3 26 8 - 34 3 pg    MCHC 33 7 31 4 - 37 4 g/dL    RDW 12 9 11 6 - 15 1 %    MPV 11 6 8 9 - 12 7 fL    Platelets 692 015 - 177 Thousands/uL    nRBC 0 /100 WBCs    Neutrophils Relative 68 43 - 75 %    Immat GRANS % 1 0 - 2 %    Lymphocytes Relative 18 14 - 44 %    Monocytes Relative 8 4 - 12 %    Eosinophils Relative 4 0 - 6 %    Basophils Relative 1 0 - 1 %    Neutrophils Absolute 3 53 1 85 - 7 62 Thousands/µL    Immature Grans Absolute 0 07 0 00 - 0 20 Thousand/uL    Lymphocytes Absolute 0 91 0 60 - 4 47 Thousands/µL    Monocytes Absolute 0 42 0 17 - 1 22 Thousand/µL    Eosinophils Absolute 0 18 0 00 - 0 61 Thousand/µL    Basophils Absolute 0 06 0 00 - 0 10 Thousands/µL   Comprehensive metabolic panel    Collection Time: 07/17/18 12:27 PM   Result Value Ref Range    Sodium 141 136 - 145 mmol/L    Potassium 4 2 3 5 - 5 3 mmol/L    Chloride 104 100 - 108 mmol/L    CO2 30 21 - 32 mmol/L    Anion Gap 7 4 - 13 mmol/L    BUN 22 5 - 25 mg/dL    Creatinine 0 90 0 60 - 1 30 mg/dL    Glucose 141 (H) 65 - 140 mg/dL    Calcium 9 8 8 3 - 10 1 mg/dL    AST 18 5 - 45 U/L    ALT 17 12 - 78 U/L    Alkaline Phosphatase 76 46 - 116 U/L    Total Protein 7 4 6 4 - 8 2 g/dL    Albumin 3 5 3 5 - 5 0 g/dL    Total Bilirubin 0 30 0 20 - 1 00 mg/dL    eGFR 64 ml/min/1 73sq m       This note was created with voice recognition software  Phonic, grammatical and spelling errors may be present within the note as a result

## 2018-07-31 ENCOUNTER — APPOINTMENT (OUTPATIENT)
Dept: LAB | Facility: CLINIC | Age: 73
End: 2018-07-31
Payer: COMMERCIAL

## 2018-07-31 ENCOUNTER — OFFICE VISIT (OUTPATIENT)
Dept: PULMONOLOGY | Facility: CLINIC | Age: 73
End: 2018-07-31
Payer: COMMERCIAL

## 2018-07-31 VITALS
WEIGHT: 180 LBS | HEART RATE: 92 BPM | SYSTOLIC BLOOD PRESSURE: 128 MMHG | BODY MASS INDEX: 35.34 KG/M2 | HEIGHT: 60 IN | DIASTOLIC BLOOD PRESSURE: 80 MMHG | OXYGEN SATURATION: 94 %

## 2018-07-31 DIAGNOSIS — E78.5 HYPERLIPIDEMIA: ICD-10-CM

## 2018-07-31 DIAGNOSIS — R73.01 IMPAIRED FASTING GLUCOSE: ICD-10-CM

## 2018-07-31 DIAGNOSIS — E66.9 OBESITY (BMI 30-39.9): ICD-10-CM

## 2018-07-31 DIAGNOSIS — M79.10 MYALGIA: ICD-10-CM

## 2018-07-31 DIAGNOSIS — R06.02 SOB (SHORTNESS OF BREATH): Primary | ICD-10-CM

## 2018-07-31 DIAGNOSIS — J45.30 MILD PERSISTENT REACTIVE AIRWAY DISEASE WITHOUT COMPLICATION: ICD-10-CM

## 2018-07-31 DIAGNOSIS — N39.0 URINARY TRACT INFECTION: ICD-10-CM

## 2018-07-31 DIAGNOSIS — R06.02 SOB (SHORTNESS OF BREATH): ICD-10-CM

## 2018-07-31 LAB
25(OH)D3 SERPL-MCNC: 30.1 NG/ML (ref 30–100)
CHOLEST SERPL-MCNC: 197 MG/DL (ref 50–200)
EST. AVERAGE GLUCOSE BLD GHB EST-MCNC: 120 MG/DL
HBA1C MFR BLD: 5.8 % (ref 4.2–6.3)
HDLC SERPL-MCNC: 74 MG/DL (ref 40–60)
LDLC SERPL CALC-MCNC: 106 MG/DL (ref 0–100)
NONHDLC SERPL-MCNC: 123 MG/DL
TRIGL SERPL-MCNC: 87 MG/DL

## 2018-07-31 PROCEDURE — 99203 OFFICE O/P NEW LOW 30 MIN: CPT | Performed by: INTERNAL MEDICINE

## 2018-07-31 PROCEDURE — 87086 URINE CULTURE/COLONY COUNT: CPT

## 2018-07-31 PROCEDURE — 82306 VITAMIN D 25 HYDROXY: CPT

## 2018-07-31 PROCEDURE — 86003 ALLG SPEC IGE CRUDE XTRC EA: CPT

## 2018-07-31 PROCEDURE — 82785 ASSAY OF IGE: CPT

## 2018-07-31 PROCEDURE — 80061 LIPID PANEL: CPT

## 2018-07-31 PROCEDURE — 36415 COLL VENOUS BLD VENIPUNCTURE: CPT

## 2018-07-31 PROCEDURE — 83036 HEMOGLOBIN GLYCOSYLATED A1C: CPT

## 2018-07-31 NOTE — PROGRESS NOTES
Assessment/Plan:   Diagnoses and all orders for this visit:    SOB (shortness of breath)  -     [facility], Adult; Future  -     Complete pulmonary function test; Future    Mild persistent reactive airway disease without complication    Obesity (BMI 30-39  9)        Shortness of breath and dyspnea on exertion likely multifactorial secondary to reactive airway disease  We will get respiratory allergy panel with IgE  Complete PFT with bronchodilators lung volumes and DLCO reviewed recent chest x-ray done in the ER which was normal   Also discussed with the patient to follow-up with cardiology for ruling out cardiac causes for the shortness of breath  Will follow up after the above testing and see if he needs an echocardiogram to see if there is any evidence of pulmonary hypertension as a likely cause for the shortness of breath and dyspnea on exertion    Return in about 3 weeks (around 8/21/2018)  All questions are answered to the patient's satisfaction and understanding  She verbalizes understanding  She is encouraged to call with any further questions or concerns  Portions of the record may have been created with voice recognition software  Occasional wrong word or "sound a like" substitutions may have occurred due to the inherent limitations of voice recognition software  Read the chart carefully and recognize, using context, where substitutions have occurred  Electronically Signed by Ricky Farah MD    ______________________________________________________________________    Chief Complaint: No chief complaint on file  Patient ID: Cici Maynard is a 67 y o  y o  female has a past medical history of Anxiety disorder; Aortic valve disorder; Depression; History of gastroesophageal reflux (GERD); History of kidney cancer (03/06/2014); Hyperlipidemia (03/06/2014); Hypertension (03/06/2014);  Hypothyroidism (03/06/2014); and Shortness of breath     7/31/2018  The patient is a very pleasant 58-year-old lady, who has never smoked, with history of shortness of Breath for which she was recently in the ER  EKG and troponins were her have all been negative  She does complain of significant cough with seasonal allergies, chest tightness shortness of breath and dyspnea on exertion and wheezing  Especially at night  They have 3-4 cats at home she states, and 2 cats are in the bedroom at night  Review of Systems   Constitutional: Negative for appetite change, chills, diaphoresis, fever and unexpected weight change  HENT: Positive for postnasal drip  Negative for congestion, ear discharge, ear pain, nosebleeds, rhinorrhea, sinus pain, sore throat and voice change  Eyes: Negative for pain, discharge and visual disturbance  Respiratory: Positive for cough, shortness of breath and wheezing  Negative for apnea, choking, chest tightness and stridor  Cardiovascular: Negative for chest pain, palpitations and leg swelling  Gastrointestinal: Negative for abdominal pain, blood in stool, constipation, diarrhea and vomiting  Endocrine: Negative for cold intolerance, heat intolerance, polydipsia, polyphagia and polyuria  Genitourinary: Negative for difficulty urinating and dysuria  Musculoskeletal: Negative for arthralgias and neck pain  Skin: Negative for pallor and rash  Allergic/Immunologic: Negative for environmental allergies and food allergies  Neurological: Negative for dizziness, speech difficulty, weakness and light-headedness  Hematological: Negative for adenopathy  Does not bruise/bleed easily  Psychiatric/Behavioral: Negative for agitation, confusion and sleep disturbance  The patient is not nervous/anxious  Smoking history: She reports that she has never smoked   She has never used smokeless tobacco     The following portions of the patient's history were reviewed and updated as appropriate: allergies, current medications, past family history, past medical history, past social history, past surgical history and problem list     Immunization History   Administered Date(s) Administered    Influenza 11/03/2010, 11/12/2012    Influenza Split High Dose Preservative Free IM 01/19/2018    Pneumococcal Polysaccharide PPV23 09/16/2011     Current Outpatient Prescriptions   Medication Sig Dispense Refill    aspirin 325 mg tablet Take 325 mg by mouth      donepezil (ARICEPT) 10 mg tablet Take 1 tablet (10 mg total) by mouth daily at bedtime 30 tablet 3    levothyroxine 125 mcg tablet Take 1 tablet (125 mcg total) by mouth daily for 180 days 30 tablet 5    multivitamin (THERAGRAN) TABS Take 1 tablet by mouth      MYRBETRIQ 50 MG TB24 1 TAB(S) ONCE A DAY ORALLY 30 DAYS 30 tablet 7    omeprazole (PriLOSEC) 20 mg delayed release capsule TAKE 1 CAPSULE EVERY DAY AS NEEDED 30 capsule 5    PARoxetine (PAXIL) 20 mg tablet Take 1 tablet (20 mg total) by mouth daily 90 tablet 1     No current facility-administered medications for this visit  Allergies: Ciprofloxacin; Other; Sulfa antibiotics; Penicillins; and Sulfacetamide    Objective:  Vitals:    07/31/18 1150   BP: 128/80   Pulse: 92   SpO2: 94%   Weight: 81 6 kg (180 lb)   Height: 5' (1 524 m)   Oxygen Therapy  SpO2: 94 %    Wt Readings from Last 3 Encounters:   07/31/18 81 6 kg (180 lb)   07/30/18 81 2 kg (179 lb 0 2 oz)   07/30/18 79 kg (174 lb 3 2 oz)     Body mass index is 35 15 kg/m²  Physical Exam   Constitutional: She is oriented to person, place, and time  She appears well-developed and well-nourished  HENT:   Head: Normocephalic and atraumatic  Eyes: Conjunctivae are normal  Pupils are equal, round, and reactive to light  Neck: Normal range of motion  Neck supple  No JVD present  No thyromegaly present  Cardiovascular: Normal rate, regular rhythm and normal heart sounds  Exam reveals no gallop and no friction rub  No murmur heard  Pulmonary/Chest: Effort normal  No respiratory distress   She has no wheezes  She has no rales  She exhibits no tenderness  Diminished breath sounds bilaterally no rhonchi   Abdominal: Soft  Bowel sounds are normal    Musculoskeletal: Normal range of motion  She exhibits no edema, tenderness or deformity  Lymphadenopathy:     She has no cervical adenopathy  Neurological: She is alert and oriented to person, place, and time  Skin: Skin is warm and dry  Psychiatric: She has a normal mood and affect  Nursing note and vitals reviewed  ESS:    Xr Chest 2 Views    Result Date: 7/30/2018  Narrative: CHEST INDICATION:   progressive SOB  COMPARISON:  7/17/2018 EXAM PERFORMED/VIEWS:  XR CHEST PA & LATERAL FINDINGS: Cardiomediastinal silhouette appears unremarkable  The lungs are clear  No pneumothorax or pleural effusion  Osseous structures appear within normal limits for patient age  Stable hiatal hernia  Impression: No acute cardiopulmonary disease  Workstation performed: [id number]     Xr Chest Pa & Lateral    Result Date: 7/18/2018  Narrative: CHEST INDICATION:   R06 09: Other forms of dyspnea  COMPARISON:  Chest x-ray dated March 8, 2018  EXAM PERFORMED/VIEWS:  XR CHEST PA & LATERAL FINDINGS: Cardiomediastinal silhouette appears unremarkable  Again noted is a moderate to large sized hiatal hernia  The lungs are clear  No pneumothorax or pleural effusion  Age-appropriate degenerative changes are noted in the spine  Impression: No acute cardiopulmonary disease   Workstation performed: [id number]

## 2018-08-02 LAB
A ALTERNATA IGE QN: <0.1 KUA/I
A FUMIGATUS IGE QN: <0.1 KUA/I
ALLERGEN COMMENT: NORMAL
BACTERIA UR CULT: NORMAL
BERMUDA GRASS IGE QN: <0.1 KUA/I
BOXELDER IGE QN: <0.1 KUA/I
C HERBARUM IGE QN: <0.1 KUA/I
CAT DANDER IGE QN: <0.1 KUA/I
CMN PIGWEED IGE QN: <0.1 KUA/I
COMMON RAGWEED IGE QN: <0.1 KUA/I
COTTONWOOD IGE QN: <0.1 KUA/I
D FARINAE IGE QN: <0.1 KUA/I
D PTERONYSS IGE QN: <0.1 KUA/I
DOG DANDER IGE QN: <0.1 KUA/I
LONDON PLANE IGE QN: <0.1 KUA/I
MOUSE URINE PROT IGE QN: <0.1 KUA/I
MT JUNIPER IGE QN: <0.1 KUA/I
MUGWORT IGE QN: <0.1 KUA/I
P NOTATUM IGE QN: <0.1 KUA/I
ROACH IGE QN: <0.1 KUA/I
SHEEP SORREL IGE QN: <0.1 KUA/I
SILVER BIRCH IGE QN: <0.1 KUA/I
TIMOTHY IGE QN: <0.1 KUA/I
TOTAL IGE SMQN RAST: 35.3 KU/L (ref 0–113)
WALNUT IGE QN: <0.1 KUA/I
WHITE ASH IGE QN: <0.1 KUA/I
WHITE ELM IGE QN: <0.1 KUA/I
WHITE MULBERRY IGE QN: <0.1 KUA/I
WHITE OAK IGE QN: <0.1 KUA/I

## 2018-08-03 DIAGNOSIS — N39.0 URINARY TRACT INFECTION WITHOUT HEMATURIA, SITE UNSPECIFIED: Primary | ICD-10-CM

## 2018-08-08 ENCOUNTER — OFFICE VISIT (OUTPATIENT)
Dept: INTERNAL MEDICINE CLINIC | Facility: CLINIC | Age: 73
End: 2018-08-08
Payer: COMMERCIAL

## 2018-08-08 VITALS
HEIGHT: 60 IN | WEIGHT: 167 LBS | RESPIRATION RATE: 18 BRPM | DIASTOLIC BLOOD PRESSURE: 80 MMHG | SYSTOLIC BLOOD PRESSURE: 140 MMHG | BODY MASS INDEX: 32.79 KG/M2 | HEART RATE: 88 BPM

## 2018-08-08 DIAGNOSIS — M54.50 ACUTE LEFT-SIDED LOW BACK PAIN WITHOUT SCIATICA: Primary | ICD-10-CM

## 2018-08-08 PROCEDURE — 3008F BODY MASS INDEX DOCD: CPT | Performed by: PHYSICIAN ASSISTANT

## 2018-08-08 PROCEDURE — 99213 OFFICE O/P EST LOW 20 MIN: CPT | Performed by: PHYSICIAN ASSISTANT

## 2018-08-08 NOTE — PROGRESS NOTES
Assessment/Plan:   Patient Instructions   Instructed patient to apply ice packs topically to painful area for 20 min at a time 3-4 times daily for next 2 days  After that start with topical moist heat or a heating pad area again on 20 min for 3-4 times daily  Also can try over-the-counter Advil 200 mg 2 tablets after breakfast, after lunch, after dinner for only 5 days  Also instructed they can obtain over-the-counter salon pause with lidocaine and apply the patch directly over painful area and follow instructions per package  Return for Next scheduled follow up  Diagnoses and all orders for this visit:    Acute left-sided low back pain without sciatica          Subjective:      Patient ID: Silvina Cuevas is a 67 y o  female  Acute visit    Low back pain:  Started yesterday, not sure of any precipitating event  Located lower left side and moved slightly into her left hip  Hurts to the point that she is using a cane to help her ambulate  Hurt sitting, standing, or walking  Patient has history of frequent urinary tract infections however she states that this time it does not feel like this  No dysuria, no cloudy urine or other UTI symptoms  Denies GI symptomatology  ALLERGIES:  Allergies   Allergen Reactions    Ciprofloxacin     Other     Sulfa Antibiotics     Penicillins Rash     Category: Allergy;     Sulfacetamide Rash     Category:  Allergy;        CURRENT MEDICATIONS:    Current Outpatient Prescriptions:     aspirin 325 mg tablet, Take 325 mg by mouth, Disp: , Rfl:     donepezil (ARICEPT) 10 mg tablet, Take 1 tablet (10 mg total) by mouth daily at bedtime, Disp: 30 tablet, Rfl: 3    levothyroxine 125 mcg tablet, Take 1 tablet (125 mcg total) by mouth daily for 180 days, Disp: 30 tablet, Rfl: 5    multivitamin (THERAGRAN) TABS, Take 1 tablet by mouth, Disp: , Rfl:     MYRBETRIQ 50 MG TB24, 1 TAB(S) ONCE A DAY ORALLY 30 DAYS, Disp: 30 tablet, Rfl: 7    omeprazole (PriLOSEC) 20 mg delayed release capsule, TAKE 1 CAPSULE EVERY DAY AS NEEDED, Disp: 30 capsule, Rfl: 5    PARoxetine (PAXIL) 20 mg tablet, Take 1 tablet (20 mg total) by mouth daily, Disp: 90 tablet, Rfl: 1    ACTIVE PROBLEM LIST:  Patient Active Problem List   Diagnosis    Depression    Esophageal reflux    Mixed hyperlipidemia    Essential hypertension    Acquired hypothyroidism    Impaired fasting glucose    Lymphoma (HCC)    Recurrent UTI    Cerebral aneurysm, nonruptured    Memory loss       PAST MEDICAL HISTORY:  Past Medical History:   Diagnosis Date    Anxiety disorder     Aortic valve disorder     Depression     History of gastroesophageal reflux (GERD)     History of kidney cancer 03/06/2014    Hyperlipidemia 03/06/2014    Hypertension 03/06/2014    Hypothyroidism 03/06/2014    Shortness of breath        PAST SURGICAL HISTORY:  Past Surgical History:   Procedure Laterality Date    ESOPHAGOGASTRIC FUNDOPLASTY      Nissen Fundoplication       FAMILY HISTORY:  Family History   Problem Relation Age of Onset    Stroke Father     Leukemia Sister     Alcohol abuse Brother     Kidney cancer Brother        SOCIAL HISTORY:  Social History     Social History    Marital status: /Civil Union     Spouse name: N/A    Number of children: 2    Years of education: N/A     Occupational History    Not on file  Social History Main Topics    Smoking status: Never Smoker    Smokeless tobacco: Never Used    Alcohol use No    Drug use: No    Sexual activity: Not on file     Other Topics Concern    Not on file     Social History Narrative    No narrative on file       Review of Systems   Constitutional: Negative for activity change, chills, fatigue and fever  HENT: Negative for congestion  Eyes: Negative for discharge  Respiratory: Negative for cough, chest tightness and shortness of breath  Cardiovascular: Negative for chest pain, palpitations and leg swelling  Gastrointestinal: Negative for abdominal pain  Genitourinary: Negative for difficulty urinating  Musculoskeletal: Positive for back pain and gait problem  Negative for arthralgias and myalgias  Skin: Negative for rash  Allergic/Immunologic: Negative for immunocompromised state  Neurological: Negative for dizziness, syncope, weakness, light-headedness and headaches  Hematological: Negative for adenopathy  Does not bruise/bleed easily  Psychiatric/Behavioral: Negative for dysphoric mood  The patient is not nervous/anxious  Objective:  Vitals:    08/08/18 1251 08/08/18 1323   BP: 168/94 140/80   BP Location: Left arm Left arm   Patient Position: Sitting Sitting   Cuff Size: Adult    Pulse: 88    Resp: 18    Weight: 75 8 kg (167 lb)    Height: 5' (1 524 m)         Physical Exam   Constitutional: She is oriented to person, place, and time  She appears well-developed and well-nourished  No distress  Neck: Neck supple  Cardiovascular: Normal rate, regular rhythm and normal heart sounds  Pulmonary/Chest: Effort normal and breath sounds normal    Abdominal: There is no CVA tenderness  Musculoskeletal: She exhibits no edema  Back:  Palpable tenderness and spasm left para lumbar and sacral area  Negative straight leg raising, strength and reflexes are equal   Complained of tenderness on active flexion and extension of left hip  Limitation motion to forward flexion with complaint of tenderness, limitation motion of extension with complaint of tenderness  Tenderness on right lateral flexion and bilateral rotation  No CVA tenderness   Neurological: She is alert and oriented to person, place, and time  Nursing note and vitals reviewed          RESULTS:    Recent Results (from the past 1008 hour(s))   TSH, 3rd generation    Collection Time: 07/17/18 12:27 PM   Result Value Ref Range    TSH 3RD GENERATON 5 029 (H) 0 358 - 3 740 uIU/mL   T4, free    Collection Time: 07/17/18 12:27 PM   Result Value Ref Range    Free T4 1 29 0 76 - 1 46 ng/dL   CBC and differential    Collection Time: 07/17/18 12:27 PM   Result Value Ref Range    WBC 5 23 4 31 - 10 16 Thousand/uL    RBC 3 91 3 81 - 5 12 Million/uL    Hemoglobin 13 4 11 5 - 15 4 g/dL    Hematocrit 39 8 34 8 - 46 1 %     (H) 82 - 98 fL    MCH 34 3 26 8 - 34 3 pg    MCHC 33 7 31 4 - 37 4 g/dL    RDW 12 9 11 6 - 15 1 %    MPV 11 6 8 9 - 12 7 fL    Platelets 304 318 - 406 Thousands/uL    nRBC 0 /100 WBCs    Neutrophils Relative 68 43 - 75 %    Immat GRANS % 1 0 - 2 %    Lymphocytes Relative 18 14 - 44 %    Monocytes Relative 8 4 - 12 %    Eosinophils Relative 4 0 - 6 %    Basophils Relative 1 0 - 1 %    Neutrophils Absolute 3 53 1 85 - 7 62 Thousands/µL    Immature Grans Absolute 0 07 0 00 - 0 20 Thousand/uL    Lymphocytes Absolute 0 91 0 60 - 4 47 Thousands/µL    Monocytes Absolute 0 42 0 17 - 1 22 Thousand/µL    Eosinophils Absolute 0 18 0 00 - 0 61 Thousand/µL    Basophils Absolute 0 06 0 00 - 0 10 Thousands/µL   Comprehensive metabolic panel    Collection Time: 07/17/18 12:27 PM   Result Value Ref Range    Sodium 141 136 - 145 mmol/L    Potassium 4 2 3 5 - 5 3 mmol/L    Chloride 104 100 - 108 mmol/L    CO2 30 21 - 32 mmol/L    Anion Gap 7 4 - 13 mmol/L    BUN 22 5 - 25 mg/dL    Creatinine 0 90 0 60 - 1 30 mg/dL    Glucose 141 (H) 65 - 140 mg/dL    Calcium 9 8 8 3 - 10 1 mg/dL    AST 18 5 - 45 U/L    ALT 17 12 - 78 U/L    Alkaline Phosphatase 76 46 - 116 U/L    Total Protein 7 4 6 4 - 8 2 g/dL    Albumin 3 5 3 5 - 5 0 g/dL    Total Bilirubin 0 30 0 20 - 1 00 mg/dL    eGFR 64 ml/min/1 73sq m   ECG 12 lead    Collection Time: 07/30/18  2:26 PM   Result Value Ref Range    Ventricular Rate 78 BPM    Atrial Rate 78 BPM    VA Interval 144 ms    QRSD Interval 76 ms    QT Interval 376 ms    QTC Interval 428 ms    P Axis 34 degrees    QRS Axis 15 degrees    T Wave Axis 34 degrees   CBC and differential    Collection Time: 07/30/18  2:56 PM   Result Value Ref Range    WBC 7 31 4 31 - 10 16 Thousand/uL    RBC 3 84 3 81 - 5 12 Million/uL    Hemoglobin 12 8 11 5 - 15 4 g/dL    Hematocrit 38 4 34 8 - 46 1 %     (H) 82 - 98 fL    MCH 33 3 26 8 - 34 3 pg    MCHC 33 3 31 4 - 37 4 g/dL    RDW 13 0 11 6 - 15 1 %    MPV 11 7 8 9 - 12 7 fL    Platelets 501 435 - 676 Thousands/uL    nRBC 0 /100 WBCs   Basic metabolic panel    Collection Time: 07/30/18  2:56 PM   Result Value Ref Range    Sodium 140 136 - 145 mmol/L    Potassium 4 2 3 5 - 5 3 mmol/L    Chloride 104 100 - 108 mmol/L    CO2 26 21 - 32 mmol/L    Anion Gap 10 4 - 13 mmol/L    BUN 19 5 - 25 mg/dL    Creatinine 0 88 0 60 - 1 30 mg/dL    Glucose 100 65 - 140 mg/dL    Calcium 9 3 8 3 - 10 1 mg/dL    eGFR 65 ml/min/1 73sq m   B-type natriuretic peptide    Collection Time: 07/30/18  2:56 PM   Result Value Ref Range    NT-proBNP 114 <125 pg/mL   Troponin I    Collection Time: 07/30/18  2:56 PM   Result Value Ref Range    Troponin I <0 02 <=0 04 ng/mL   Manual Differential(PHLEBS Do Not Order)    Collection Time: 07/30/18  2:56 PM   Result Value Ref Range    Segmented % 76 (H) 43 - 75 %    Bands % 1 0 - 8 %    Lymphocytes % 15 14 - 44 %    Monocytes % 7 4 - 12 %    Eosinophils % 1 0 - 6 %    Basophils % 0 0 - 1 %    Absolute Neutrophils 5 63 1 85 - 7 62 Thousand/uL    Lymphocytes Absolute 1 10 0 60 - 4 47 Thousand/uL    Monocytes Absolute 0 51 0 00 - 1 22 Thousand/uL    Eosinophils Absolute 0 07 0 00 - 0 40 Thousand/uL    Basophils Absolute 0 00 0 00 - 0 10 Thousand/uL    Total Counted 100     Platelet Estimate Adequate Adequate    Large Platelet Present    [facility] Allergy Panel, Adult    Collection Time: 07/31/18 12:55 PM   Result Value Ref Range    A  ALTERNATA <0 10 0 00 - 0 09 kUA/I    A  FUMIGATUS <0 10 0 00 - 0 09 kUA/I    [place] Grass <0 10 0 00 - 0 09 kUA/I    Price  <0 10 0 00 - 0 09 kUA/I    Cat Epithellium-Dander <0 10 0 00 - 0 09 kUA/I    C HERBARUM <0 10 0 00 - 0 09 kUA/I    Cockroach <0 10 0 00 - 0 09 kUA/I    Common Silver Jihan Corrente <0 10 0 00 - 0 09 kUA/I    [place] <0 10 0 00 - 0 09 kUA/I    D  farinae <0 10 0 00 - 0 09 kUA/I    D  pteronyssinus <0 10 0 00 - 0 09 kUA/I    Dog Dander <0 10 0 00 - 0 09 kUA/I    Elm IgE <0 10 0 00 - 0 09 kUA/I    [place] Tree <0 10 0 00 - 0 09 kUA/I    Mugwort <0 10 0 00 - 0 09 kUA/I    [place] Tree <0 10 0 00 - 0 09 kUA/I    Oak <0 10 0 00 - 0 09 kUA/I    P CHRYSOGENUM <0 10 0 00 - 0 09 kUA/I    Rough Pigweed  IgE <0 10 0 00 - 0 09 kUA/I    Common Ragweed <0 10 0 00 - 0 09 kUA/I    Sheep Sorrel IgE <0 10 0 00 - 0 09 kUA/I    [place] Tree <0 10 0 00 - 0 09 kUA/I    [place] Grass <0 10 0 00 - 0 09 kUA/I    [place] Tree <0 10 0 00 - 0 09 kUA/I    White Edy Tree <0 10 0 00 - 0 09 kUA/I    IgE 35 3 0 - 113 kU/l    Allergen Comment See Below     MOUSE URINE <0 10 0 00 - 0 09 kUA/I   Hemoglobin A1c    Collection Time: 07/31/18 12:55 PM   Result Value Ref Range    Hemoglobin A1C 5 8 4 2 - 6 3 %     mg/dl   Lipid panel    Collection Time: 07/31/18 12:55 PM   Result Value Ref Range    Cholesterol 197 50 - 200 mg/dL    Triglycerides 87 <=150 mg/dL    HDL, Direct 74 (H) 40 - 60 mg/dL    LDL Calculated 106 (H) 0 - 100 mg/dL    Non-HDL-Chol (CHOL-HDL) 123 mg/dl   Urine culture    Collection Time: 07/31/18 12:55 PM   Result Value Ref Range    Urine Culture >100,000 cfu/ml     Vitamin D 25 hydroxy    Collection Time: 07/31/18 12:55 PM   Result Value Ref Range    Vit D, 25-Hydroxy 30 1 30 0 - 100 0 ng/mL       This note was created with voice recognition software  Phonic, grammatical and spelling errors may be present within the note as a result

## 2018-08-08 NOTE — PATIENT INSTRUCTIONS
Instructed patient to apply ice packs topically to painful area for 20 min at a time 3-4 times daily for next 2 days  After that start with topical moist heat or a heating pad area again on 20 min for 3-4 times daily  Also can try over-the-counter Advil 200 mg 2 tablets after breakfast, after lunch, after dinner for only 5 days  Also instructed they can obtain over-the-counter salon pause with lidocaine and apply the patch directly over painful area and follow instructions per package  Status call in 1 week if not steadily improving

## 2018-08-12 DIAGNOSIS — E03.9 HYPOTHYROIDISM, UNSPECIFIED TYPE: ICD-10-CM

## 2018-08-12 RX ORDER — LEVOTHYROXINE SODIUM 0.12 MG/1
125 TABLET ORAL DAILY
Qty: 30 TABLET | Refills: 5 | Status: SHIPPED | OUTPATIENT
Start: 2018-08-12 | End: 2018-08-20 | Stop reason: SDUPTHER

## 2018-08-17 ENCOUNTER — HOSPITAL ENCOUNTER (OUTPATIENT)
Dept: PULMONOLOGY | Facility: HOSPITAL | Age: 73
Discharge: HOME | End: 2018-08-17
Attending: INTERNAL MEDICINE
Payer: COMMERCIAL

## 2018-08-17 DIAGNOSIS — R06.02 SOB (SHORTNESS OF BREATH): ICD-10-CM

## 2018-08-17 PROCEDURE — 94726 PLETHYSMOGRAPHY LUNG VOLUMES: CPT | Performed by: INTERNAL MEDICINE

## 2018-08-17 PROCEDURE — 94060 EVALUATION OF WHEEZING: CPT | Performed by: INTERNAL MEDICINE

## 2018-08-17 PROCEDURE — 94729 DIFFUSING CAPACITY: CPT | Performed by: INTERNAL MEDICINE

## 2018-08-17 PROCEDURE — 94060 EVALUATION OF WHEEZING: CPT

## 2018-08-17 PROCEDURE — 94729 DIFFUSING CAPACITY: CPT

## 2018-08-17 PROCEDURE — 94760 N-INVAS EAR/PLS OXIMETRY 1: CPT

## 2018-08-17 PROCEDURE — 94727 GAS DIL/WSHOT DETER LNG VOL: CPT

## 2018-08-17 RX ORDER — ALBUTEROL SULFATE 2.5 MG/3ML
2.5 SOLUTION RESPIRATORY (INHALATION) ONCE
Status: COMPLETED | OUTPATIENT
Start: 2018-08-17 | End: 2018-08-17

## 2018-08-17 RX ADMIN — ALBUTEROL SULFATE 2.5 MG: 2.5 SOLUTION RESPIRATORY (INHALATION) at 10:42

## 2018-08-18 DIAGNOSIS — E03.9 HYPOTHYROIDISM, UNSPECIFIED TYPE: ICD-10-CM

## 2018-08-19 RX ORDER — LEVOTHYROXINE SODIUM 0.12 MG/1
125 TABLET ORAL DAILY
Qty: 30 TABLET | Refills: 5 | Status: SHIPPED | OUTPATIENT
Start: 2018-08-19 | End: 2019-01-21 | Stop reason: SDUPTHER

## 2018-08-20 ENCOUNTER — OFFICE VISIT (OUTPATIENT)
Dept: PULMONOLOGY | Facility: CLINIC | Age: 73
End: 2018-08-20
Payer: COMMERCIAL

## 2018-08-20 VITALS
HEIGHT: 60 IN | WEIGHT: 167 LBS | BODY MASS INDEX: 32.79 KG/M2 | DIASTOLIC BLOOD PRESSURE: 80 MMHG | SYSTOLIC BLOOD PRESSURE: 130 MMHG | HEART RATE: 87 BPM | OXYGEN SATURATION: 96 %

## 2018-08-20 DIAGNOSIS — R06.02 SOB (SHORTNESS OF BREATH): Primary | ICD-10-CM

## 2018-08-20 DIAGNOSIS — M25.552 LEFT HIP PAIN: ICD-10-CM

## 2018-08-20 PROCEDURE — 99213 OFFICE O/P EST LOW 20 MIN: CPT | Performed by: PHYSICIAN ASSISTANT

## 2018-08-20 NOTE — PROGRESS NOTES
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 in 2012 who presents for follow-up of her blood work/breathing test       1   Shortness of breath - reviewed PFTs in detail with patient and   No evidence of obstruction or restriction on spirometry  Normal lung volumes  Mildly decreased DLCO of 75%  Discussed obtaining echocardiogram to assess for any pulmonary hypertension and assess RV/LV function  However, patient's  states that she recently had an echocardiogram within the last year  Will attempt to obtain records from cardiologist at [facility]  If not done, could consider obtaining at this time  However, patient does state that shortness of breath has resolved and she is feeling 100% of normal  Allergy testing was negative  Unclear etiology for acute period of shortness of Breath  Reactive Airway? Fluid retention? 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 completed in 2012 who presents for follow-up of her blood work/breathing test   Patient states that her breathing has gotten 100 % better  She is no longer having wheezing when she lies down flat  She is no longer having dyspnea on exertion  She denies any cough, fever, chills, chest pain, mucus production  She states that her biggest limiting factor is now hip pain on the left that started over the last several weeks    She states that this prevents her from walking long distances  Patient went and saw her PCP recently and was told to treat with ice packs/heat and use Advil  Patient states this is not helped significantly and is planning on following up again with PCP   also expressed concern for lack of interest in activities  Patient spends most of her time in a chair at home or in bed  She is currently on Paxil for depression  Review of Systems  Review of Systems   Constitution: Positive for malaise/fatigue  Negative for chills, decreased appetite, fever and weight gain  HENT: Negative for congestion  Eyes: Negative for visual disturbance  Cardiovascular: Negative for dyspnea on exertion, leg swelling and orthopnea  Respiratory: Negative for cough, shortness of breath, sleep disturbances due to breathing and wheezing  Hematologic/Lymphatic: Negative for adenopathy  Skin: Negative for rash  Musculoskeletal: Positive for joint pain  Negative for muscle weakness  Gastrointestinal: Negative for abdominal pain, diarrhea, nausea and vomiting  Neurological: Negative for excessive daytime sleepiness  Psychiatric/Behavioral: Negative for altered mental status and hallucinations  Allergic/Immunologic: Negative for environmental allergies  Objective:  /80   Pulse 87   Ht 5' (1 524 m)   Wt 75 8 kg (167 lb)   SpO2 96%   BMI 32 61 kg/m²     Physical Exam   Constitutional: She is oriented to person, place, and time  She appears well-developed and well-nourished  No distress  HENT:   Head: Normocephalic and atraumatic  Neck: Normal range of motion  Cardiovascular: Normal rate and regular rhythm  No murmur heard  Pulmonary/Chest: Effort normal and breath sounds normal  No respiratory distress  She has no wheezes  She has no rales  She exhibits no tenderness  Abdominal: Soft  There is no tenderness  Musculoskeletal: Normal range of motion  She exhibits no edema or tenderness     Lymphadenopathy:     She has no cervical adenopathy  Neurological: She is alert and oriented to person, place, and time  Skin: Skin is warm and dry  She is not diaphoretic  Psychiatric: She has a normal mood and affect  Her behavior is normal    Nursing note and vitals reviewed  Lab/Image Review: Reviewed all pertinent labs/radiology results       Past Medical History:   Diagnosis Date    Anxiety disorder     Aortic valve disorder     Depression     History of gastroesophageal reflux (GERD)     History of kidney cancer 03/06/2014    Hyperlipidemia 03/06/2014    Hypertension 03/06/2014    Hypothyroidism 03/06/2014    Shortness of breath        Social History   Substance Use Topics    Smoking status: Never Smoker    Smokeless tobacco: Never Used    Alcohol use No       Family History   Problem Relation Age of Onset    Stroke Father     Leukemia Sister     Alcohol abuse Brother     Kidney cancer Brother        Patient Active Problem List   Diagnosis    Depression    Esophageal reflux    Mixed hyperlipidemia    Essential hypertension    Acquired hypothyroidism    Impaired fasting glucose    Lymphoma (HCC)    Recurrent UTI    Cerebral aneurysm, nonruptured    Memory loss    SOB (shortness of breath)       Current Outpatient Prescriptions on File Prior to Visit   Medication Sig Dispense Refill    aspirin 325 mg tablet Take 325 mg by mouth      donepezil (ARICEPT) 10 mg tablet Take 1 tablet (10 mg total) by mouth daily at bedtime 30 tablet 3    levothyroxine 125 mcg tablet TAKE 1 TABLET (125 MCG TOTAL) BY MOUTH DAILY  DAYS 30 tablet 5    multivitamin (THERAGRAN) TABS Take 1 tablet by mouth      MYRBETRIQ 50 MG TB24 1 TAB(S) ONCE A DAY ORALLY 30 DAYS 30 tablet 7    omeprazole (PriLOSEC) 20 mg delayed release capsule TAKE 1 CAPSULE EVERY DAY AS NEEDED 30 capsule 5    PARoxetine (PAXIL) 20 mg tablet Take 1 tablet (20 mg total) by mouth daily 90 tablet 1    [DISCONTINUED] levothyroxine 125 mcg tablet TAKE 1 TABLET (125 MCG TOTAL) BY MOUTH DAILY  DAYS 30 tablet 5     No current facility-administered medications on file prior to visit  Allergies   Allergen Reactions    Ciprofloxacin     Other     Sulfa Antibiotics     Penicillins Rash     Category: Allergy;     Sulfacetamide Rash     Category:  Allergy;

## 2018-10-01 DIAGNOSIS — R41.3 MEMORY LOSS: ICD-10-CM

## 2018-10-01 RX ORDER — DONEPEZIL HYDROCHLORIDE 10 MG/1
10 TABLET, FILM COATED ORAL
Qty: 30 TABLET | Refills: 3 | Status: SHIPPED | OUTPATIENT
Start: 2018-10-01 | End: 2018-11-10

## 2018-11-10 ENCOUNTER — HOSPITAL ENCOUNTER (INPATIENT)
Facility: HOSPITAL | Age: 73
LOS: 5 days | Discharge: HOME | DRG: 603 | End: 2018-11-15
Attending: EMERGENCY MEDICINE | Admitting: INTERNAL MEDICINE
Payer: COMMERCIAL

## 2018-11-10 ENCOUNTER — APPOINTMENT (EMERGENCY)
Dept: RADIOLOGY | Facility: HOSPITAL | Age: 73
DRG: 603 | End: 2018-11-10
Payer: COMMERCIAL
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
## 2018-11-10 DIAGNOSIS — M65.9 FLEXOR TENOSYNOVITIS OF FINGER: Primary | ICD-10-CM

## 2018-11-10 DIAGNOSIS — S61.452A CAT BITE OF LEFT HAND: ICD-10-CM
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
## 2018-11-10 DIAGNOSIS — L03.114 CELLULITIS OF LEFT HAND: ICD-10-CM

## 2018-11-10 DIAGNOSIS — S61.459A CAT BITE OF HAND: ICD-10-CM
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
## 2018-11-10 DIAGNOSIS — S61.452A CAT BITE OF LEFT HAND, INITIAL ENCOUNTER: ICD-10-CM

## 2018-11-10 DIAGNOSIS — E03.9 ACQUIRED HYPOTHYROIDISM: ICD-10-CM

## 2018-11-10 PROBLEM — S61.419A HAND LACERATION: Status: ACTIVE | Noted: 2018-11-10

## 2018-11-10 LAB
ABO GROUP BLD: NORMAL
ANION GAP SERPL CALCULATED.3IONS-SCNC: 12 MMOL/L (ref 4–13)
APTT PPP: 30 SECONDS (ref 24–36)
BASOPHILS # BLD AUTO: 0.09 THOUSANDS/ΜL (ref 0–0.1)
BASOPHILS NFR BLD AUTO: 1 % (ref 0–1)
BLD GP AB SCN SERPL QL: NEGATIVE
BUN SERPL-MCNC: 17 MG/DL (ref 5–25)
CALCIUM SERPL-MCNC: 9.8 MG/DL (ref 8.3–10.1)
CHLORIDE SERPL-SCNC: 102 MMOL/L (ref 100–108)
CO2 SERPL-SCNC: 26 MMOL/L (ref 21–32)
CREAT SERPL-MCNC: 0.87 MG/DL (ref 0.6–1.3)
EOSINOPHIL # BLD AUTO: 0.08 THOUSAND/ΜL (ref 0–0.61)
EOSINOPHIL NFR BLD AUTO: 1 % (ref 0–6)
ERYTHROCYTE [DISTWIDTH] IN BLOOD BY AUTOMATED COUNT: 13.6 % (ref 11.6–15.1)
GFR SERPL CREATININE-BSD FRML MDRD: 66 ML/MIN/1.73SQ M
GLUCOSE SERPL-MCNC: 99 MG/DL (ref 65–140)
HCT VFR BLD AUTO: 39.2 % (ref 34.8–46.1)
HGB BLD-MCNC: 13.6 G/DL (ref 11.5–15.4)
IMM GRANULOCYTES # BLD AUTO: 0.05 THOUSAND/UL (ref 0–0.2)
IMM GRANULOCYTES NFR BLD AUTO: 1 % (ref 0–2)
INR PPP: 0.94 (ref 0.86–1.17)
LYMPHOCYTES # BLD AUTO: 1.26 THOUSANDS/ΜL (ref 0.6–4.47)
LYMPHOCYTES NFR BLD AUTO: 14 % (ref 14–44)
MCH RBC QN AUTO: 35 PG (ref 26.8–34.3)
MCHC RBC AUTO-ENTMCNC: 34.7 G/DL (ref 31.4–37.4)
MCV RBC AUTO: 101 FL (ref 82–98)
MONOCYTES # BLD AUTO: 0.99 THOUSAND/ΜL (ref 0.17–1.22)
MONOCYTES NFR BLD AUTO: 11 % (ref 4–12)
NEUTROPHILS # BLD AUTO: 6.7 THOUSANDS/ΜL (ref 1.85–7.62)
NEUTS SEG NFR BLD AUTO: 72 % (ref 43–75)
NRBC BLD AUTO-RTO: 0 /100 WBCS
PLATELET # BLD AUTO: 270 THOUSANDS/UL (ref 149–390)
PMV BLD AUTO: 11.9 FL (ref 8.9–12.7)
POTASSIUM SERPL-SCNC: 3.7 MMOL/L (ref 3.5–5.3)
PROTHROMBIN TIME: 12.5 SECONDS (ref 11.8–14.2)
RBC # BLD AUTO: 3.89 MILLION/UL (ref 3.81–5.12)
RH BLD: POSITIVE
SODIUM SERPL-SCNC: 140 MMOL/L (ref 136–145)
SPECIMEN EXPIRATION DATE: NORMAL
WBC # BLD AUTO: 9.17 THOUSAND/UL (ref 4.31–10.16)

## 2018-11-10 PROCEDURE — 85730 THROMBOPLASTIN TIME PARTIAL: CPT | Performed by: EMERGENCY MEDICINE

## 2018-11-10 PROCEDURE — 90715 TDAP VACCINE 7 YRS/> IM: CPT | Performed by: EMERGENCY MEDICINE

## 2018-11-10 PROCEDURE — 36415 COLL VENOUS BLD VENIPUNCTURE: CPT | Performed by: EMERGENCY MEDICINE

## 2018-11-10 PROCEDURE — 87040 BLOOD CULTURE FOR BACTERIA: CPT | Performed by: EMERGENCY MEDICINE

## 2018-11-10 PROCEDURE — 90675 RABIES VACCINE IM: CPT | Performed by: EMERGENCY MEDICINE

## 2018-11-10 PROCEDURE — 85025 COMPLETE CBC W/AUTO DIFF WBC: CPT | Performed by: EMERGENCY MEDICINE

## 2018-11-10 PROCEDURE — 90471 IMMUNIZATION ADMIN: CPT

## 2018-11-10 PROCEDURE — 96372 THER/PROPH/DIAG INJ SC/IM: CPT

## 2018-11-10 PROCEDURE — 99222 1ST HOSP IP/OBS MODERATE 55: CPT | Performed by: INTERNAL MEDICINE

## 2018-11-10 PROCEDURE — 86901 BLOOD TYPING SEROLOGIC RH(D): CPT | Performed by: EMERGENCY MEDICINE

## 2018-11-10 PROCEDURE — 73130 X-RAY EXAM OF HAND: CPT

## 2018-11-10 PROCEDURE — 85610 PROTHROMBIN TIME: CPT | Performed by: EMERGENCY MEDICINE

## 2018-11-10 PROCEDURE — 90376 RABIES IG HEAT TREATED: CPT | Performed by: EMERGENCY MEDICINE

## 2018-11-10 PROCEDURE — 80048 BASIC METABOLIC PNL TOTAL CA: CPT | Performed by: EMERGENCY MEDICINE

## 2018-11-10 PROCEDURE — 90472 IMMUNIZATION ADMIN EACH ADD: CPT

## 2018-11-10 PROCEDURE — 86900 BLOOD TYPING SEROLOGIC ABO: CPT | Performed by: EMERGENCY MEDICINE

## 2018-11-10 PROCEDURE — 86850 RBC ANTIBODY SCREEN: CPT | Performed by: EMERGENCY MEDICINE

## 2018-11-10 PROCEDURE — 99284 EMERGENCY DEPT VISIT MOD MDM: CPT

## 2018-11-10 RX ORDER — CLINDAMYCIN PHOSPHATE 600 MG/50ML
600 INJECTION INTRAVENOUS ONCE
Status: COMPLETED | OUTPATIENT
Start: 2018-11-10 | End: 2018-11-10

## 2018-11-10 RX ORDER — LEVOTHYROXINE SODIUM 0.12 MG/1
125 TABLET ORAL DAILY
Status: DISCONTINUED | OUTPATIENT
Start: 2018-11-11 | End: 2018-11-15 | Stop reason: HOSPADM

## 2018-11-10 RX ORDER — SODIUM CHLORIDE 9 MG/ML
100 INJECTION, SOLUTION INTRAVENOUS ONCE
Status: COMPLETED | OUTPATIENT
Start: 2018-11-10 | End: 2018-11-10

## 2018-11-10 RX ORDER — DIPHENHYDRAMINE HYDROCHLORIDE 50 MG/ML
12.5 INJECTION INTRAMUSCULAR; INTRAVENOUS ONCE
Status: COMPLETED | OUTPATIENT
Start: 2018-11-10 | End: 2018-11-10

## 2018-11-10 RX ORDER — ASPIRIN 325 MG
325 TABLET ORAL DAILY
Status: DISCONTINUED | OUTPATIENT
Start: 2018-11-11 | End: 2018-11-15 | Stop reason: HOSPADM

## 2018-11-10 RX ORDER — ACETAMINOPHEN 325 MG/1
650 TABLET ORAL EVERY 6 HOURS PRN
Status: DISCONTINUED | OUTPATIENT
Start: 2018-11-10 | End: 2018-11-15 | Stop reason: HOSPADM

## 2018-11-10 RX ORDER — CLINDAMYCIN PHOSPHATE 600 MG/50ML
600 INJECTION INTRAVENOUS EVERY 8 HOURS
Status: DISCONTINUED | OUTPATIENT
Start: 2018-11-11 | End: 2018-11-10

## 2018-11-10 RX ORDER — PANTOPRAZOLE SODIUM 20 MG/1
20 TABLET, DELAYED RELEASE ORAL
Status: DISCONTINUED | OUTPATIENT
Start: 2018-11-11 | End: 2018-11-15 | Stop reason: HOSPADM

## 2018-11-10 RX ORDER — PAROXETINE HYDROCHLORIDE 20 MG/1
20 TABLET, FILM COATED ORAL DAILY
Status: DISCONTINUED | OUTPATIENT
Start: 2018-11-11 | End: 2018-11-15 | Stop reason: HOSPADM

## 2018-11-10 RX ADMIN — RABIES IMMUNE GLOBULIN (HUMAN) 1560 UNITS: 300 INJECTION, SOLUTION INFILTRATION; INTRAMUSCULAR at 14:08

## 2018-11-10 RX ADMIN — RABIES VIRUS STRAIN PM-1503-3M ANTIGEN (PROPIOLACTONE INACTIVATED) AND WATER 1 ML: KIT at 13:51

## 2018-11-10 RX ADMIN — SODIUM CHLORIDE 100 ML/HR: 0.9 INJECTION, SOLUTION INTRAVENOUS at 18:49

## 2018-11-10 RX ADMIN — DOXYCYCLINE 100 MG: 100 INJECTION, POWDER, LYOPHILIZED, FOR SOLUTION INTRAVENOUS at 15:55

## 2018-11-10 RX ADMIN — DIPHENHYDRAMINE HYDROCHLORIDE 12.5 MG: 50 INJECTION, SOLUTION INTRAMUSCULAR; INTRAVENOUS at 19:47

## 2018-11-10 RX ADMIN — CLINDAMYCIN IN 5 PERCENT DEXTROSE 600 MG: 12 INJECTION, SOLUTION INTRAVENOUS at 15:28

## 2018-11-10 RX ADMIN — VANCOMYCIN HYDROCHLORIDE 1250 MG: 5 INJECTION, POWDER, LYOPHILIZED, FOR SOLUTION INTRAVENOUS at 23:47

## 2018-11-10 RX ADMIN — TETANUS TOXOID, REDUCED DIPHTHERIA TOXOID AND ACELLULAR PERTUSSIS VACCINE, ADSORBED 0.5 ML: 5; 2.5; 8; 8; 2.5 SUSPENSION INTRAMUSCULAR at 13:52

## 2018-11-10 NOTE — H&P
History and Physical - [facility]    Patient Information: Elisa Elliott 67 y o  female MRN: [id number]  Unit/Bed#: ED 17 Encounter: [id number]  Admitting Physician: David Tomlinson MD  PCP: Andres Lyon MD  Date of Admission:  11/10/18    Assessment/Plan:    Hospital Problem List:     Principal Problem:    Hand laceration  Active Problems:    Depression    Essential hypertension    Acquired hypothyroidism    Lymphoma ([facility] [address] )      Plan for the Primary Problem(s):  · 1  Hand cellulitis secondary to stray cat bite- will place on clindamycin  Hand surgery consulted  · 2  Depression- on paxil  · 3  Hypothyroidism- on levothyroxine  ·     Plan for Additional Problems:   ·     VTE Prophylaxis: Enoxaparin (Lovenox)  / reason for no mechanical VTE prophylaxis declined   Code Status: Full  POLST: There is no POLST form on file for this patient (pre-hospital)    Anticipated Length of Stay:  Patient will be admitted on an Inpatient basis with an anticipated length of stay of  More than 2 midnights  Justification for Hospital Stay: Cat bite to hand    Total Time for Visit, including Counseling / Coordination of Care: 30 minutes  Greater than 50% of this total time spent on direct patient counseling and coordination of care  Chief Complaint:   Hand swelling    History of Present Illness:    Elisa Elliott is a 67 y o  female who presents with pmhx of depression, hypothyroidism come with complaints of increased left hand swelling and erythema  Patient states that a stray cat came into her house yesterday and it bit her and when she tried to get it out  Patient states had her hand has gotten more swollen  It hurts when she tries to close her fists  She has some erythema  Patient denies any fevers or chills  Patient will be admitted or further evaluation  Review of Systems:    Review of Systems   Constitutional: Negative  HENT: Negative  Respiratory: Negative  Cardiovascular: Negative  Gastrointestinal: Negative  Genitourinary: Negative  Musculoskeletal: Positive for joint swelling  Negative for arthralgias, back pain, gait problem, myalgias, neck pain and neck stiffness  Skin: Positive for color change and wound  Negative for pallor and rash  Neurological: Negative  Past Medical and Surgical History:     Past Medical History:   Diagnosis Date    Anxiety disorder     Aortic valve disorder     Depression     History of gastroesophageal reflux (GERD)     History of kidney cancer 03/06/2014    Hyperlipidemia 03/06/2014    Hypertension 03/06/2014    Hypothyroidism 03/06/2014    Shortness of breath     TIA (transient ischemic attack)        Past Surgical History:   Procedure Laterality Date    ESOPHAGOGASTRIC FUNDOPLASTY      Nissen Fundoplication       Meds/Allergies:    Prior to Admission medications    Medication Sig Start Date End Date Taking? Authorizing Provider   aspirin 325 mg tablet Take 325 mg by mouth 3/19/18  Yes Historical Provider, MD   levothyroxine 125 mcg tablet TAKE 1 TABLET (125 MCG TOTAL) BY MOUTH DAILY  DAYS 8/19/18 2/15/19 Yes Rafael Espinal PA-C   multivitamin (THERAGRAN) TABS Take 1 tablet by mouth   Yes Historical Provider, MD   MYRBETRIQ 50 MG TB24 1 TAB(S) ONCE A DAY ORALLY 30 DAYS 5/17/18  Yes Frederic Ramon MD   omeprazole (PriLOSEC) 20 mg delayed release capsule TAKE 1 CAPSULE EVERY DAY AS NEEDED 6/25/18  Yes Frederic Ramon MD   PARoxetine (PAXIL) 20 mg tablet Take 1 tablet (20 mg total) by mouth daily 4/27/18  Yes Frederic Ramon MD   donepezil (ARICEPT) 10 mg tablet Take 1 tablet (10 mg total) by mouth daily at bedtime 10/1/18 11/10/18  Frederic Ramon MD     I have reviewed home medications with patient personally  Allergies: Allergies   Allergen Reactions    Ciprofloxacin     Other     Sulfa Antibiotics     Penicillins Rash     Category: Allergy;     Sulfacetamide Rash     Category:  Allergy;        Social History:     Marital Status: /Civil [employer]   Occupation:   Patient Pre-hospital Living Situation: home  Patient Pre-hospital Level of Mobility: walks  Patient Pre-hospital Diet Restrictions: none  Substance Use History:   History   Alcohol Use No     History   Smoking Status    Never Smoker   Smokeless Tobacco    Never Used     History   Drug Use No       Family History:    non-contributory    Physical Exam:     Vitals:   Blood Pressure: 154/84 (11/10/18 1241)  Pulse: 75 (11/10/18 1241)  Temperature: 98 5 °F (36 9 °C) (11/10/18 1241)  Temp Source: Oral (11/10/18 1241)  Respirations: 16 (11/10/18 1241)  Weight - Scale: 78 7 kg (173 lb 8 oz) (11/10/18 1241)  SpO2: 93 % (11/10/18 1241)    Physical Exam   Constitutional: She is oriented to person, place, and time  She appears well-developed and well-nourished  HENT:   Head: Normocephalic and atraumatic  Eyes: Pupils are equal, round, and reactive to light  Conjunctivae and EOM are normal    Neck: Normal range of motion  Neck supple  No JVD present  No tracheal deviation present  No thyromegaly present  Cardiovascular: Normal rate, regular rhythm and normal heart sounds  Exam reveals no gallop and no friction rub  No murmur heard  Pulmonary/Chest: Effort normal and breath sounds normal  No respiratory distress  She has no wheezes  She has no rales  Abdominal: Soft  Bowel sounds are normal  She exhibits no distension  There is no tenderness  There is no rebound  Musculoskeletal: She exhibits no edema  Neurological: She is alert and oriented to person, place, and time  Skin: There is erythema  Left hand erythema and swelling   Vitals reviewed  Additional Data:     Lab Results: I have personally reviewed pertinent reports          Results from last 7 days  Lab Units 11/10/18  1511   WBC Thousand/uL 9 17   HEMOGLOBIN g/dL 13 6   HEMATOCRIT % 39 2   PLATELETS Thousands/uL 270   NEUTROS PCT % 72   LYMPHS PCT % 14   MONOS PCT % 11   EOS PCT % 1       Results from last 7 days  Lab Units 11/10/18  1511   POTASSIUM mmol/L 3 7   CHLORIDE mmol/L 102   CO2 mmol/L 26   BUN mg/dL 17   CREATININE mg/dL 0 87   CALCIUM mg/dL 9 8       Results from last 7 days  Lab Units 11/10/18  1511   INR  0 94       Imaging: I have personally reviewed pertinent reports  No results found  EKG, Pathology, and Other Studies Reviewed on Admission:   · EKG:     Allscripts / Epic Records Reviewed: Yes     ** Please Note: This note has been constructed using a voice recognition system   **

## 2018-11-10 NOTE — ED PROVIDER NOTES
History  Chief Complaint   Patient presents with    Cat Bite     pt w/ L hand stray car bites and scratches, occured yesterday, + swelling, redness      69-year-old female history of hypothyroid hand dominant presenting with chief complaint of cat bite to her left hand  A stray cat bit her left 3rd finger and hand it also grabbed onto her right medial elbow, she washed out the wound woke up this morning with significant swelling and pain localized to her left hand and presents today for further evaluation  Patient's area of concern is primarily the dorsum of her left hand as well as her left 3rd finger, she notes that her left 3rd finger is difficulty swollen painful there is a large amount of redness and swelling localized to her distal hand she notes that her rings are difficulty get off of her 4th finger, she also notes a small bruise to her right elbow  Otherwise she has no history of poor wound healing diabetes easy bleeding or bruising her tetanus is not up-to-date she has no other injuries complaints or concerns otherwise denies a complete review systems is noted            Prior to Admission Medications   Prescriptions Last Dose Informant Patient Reported? Taking?    MYRBETRIQ 50 MG TB24   No Yes   Si TAB(S) ONCE A DAY ORALLY 30 DAYS   PARoxetine (PAXIL) 20 mg tablet   No Yes   Sig: Take 1 tablet (20 mg total) by mouth daily   aspirin 325 mg tablet   Yes Yes   Sig: Take 325 mg by mouth   levothyroxine 125 mcg tablet   No Yes   Sig: TAKE 1 TABLET (125 MCG TOTAL) BY MOUTH DAILY  DAYS   multivitamin (THERAGRAN) TABS   Yes Yes   Sig: Take 1 tablet by mouth   omeprazole (PriLOSEC) 20 mg delayed release capsule   No Yes   Sig: TAKE 1 CAPSULE EVERY DAY AS NEEDED      Facility-Administered Medications: None       Past Medical History:   Diagnosis Date    Anxiety disorder     Aortic valve disorder     Depression     History of gastroesophageal reflux (GERD)     History of kidney cancer 2014  Hyperlipidemia 03/06/2014    Hypertension 03/06/2014    Hypothyroidism 03/06/2014    Shortness of breath     TIA (transient ischemic attack)        Past Surgical History:   Procedure Laterality Date    ESOPHAGOGASTRIC FUNDOPLASTY      Nissen Fundoplication       Family History   Problem Relation Age of Onset    Stroke Father     Leukemia Sister     Alcohol abuse Brother     Kidney cancer Brother      I have reviewed and agree with the history as documented  Social History   Substance Use Topics    Smoking status: Never Smoker    Smokeless tobacco: Never Used    Alcohol use No        Review of Systems   Constitutional: Negative for activity change, appetite change, fatigue and fever  Gastrointestinal: Negative for nausea and vomiting  Endocrine: Negative for polydipsia and polyphagia  Musculoskeletal: Positive for joint swelling  Negative for arthralgias and myalgias  Right elbow pain left hand pain   Skin: Positive for color change and wound  Negative for pallor  Allergic/Immunologic: Negative for immunocompromised state  Neurological: Negative for weakness and numbness  Hematological: Negative for adenopathy  Does not bruise/bleed easily  Psychiatric/Behavioral: Negative for agitation and behavioral problems  All other systems reviewed and are negative  Physical Exam  Physical Exam   Constitutional: She is oriented to person, place, and time  She appears well-developed and well-nourished  No distress  Well-appearing in no acute distress   HENT:   Head: Normocephalic and atraumatic  Eyes: Pupils are equal, round, and reactive to light  EOM are normal    Neck: Normal range of motion  Neck supple  No tracheal deviation present  Cardiovascular: Normal rate, regular rhythm and normal heart sounds  Exam reveals no gallop and no friction rub  No murmur heard  Pulmonary/Chest: Effort normal and breath sounds normal  She has no wheezes  She has no rales     Abdominal: Soft  Bowel sounds are normal  She exhibits no distension  There is no tenderness  There is no rebound and no guarding  Musculoskeletal:   Patient has moderate circumferential swelling of her entire left 3rd finger there are 2 small puncture wounds on the flexor surface her fingers held partially flex with pain with passive extension, she is tender over the flexor sheath she has significant swelling proximally with erythema tenderness, she also has small ecchymosis on her right medial elbow does not appear secondarily infection there are no puncture wounds, clinical exam is concerning for flexor tenosynovitis   Neurological: She is alert and oriented to person, place, and time  No cranial nerve deficit  She exhibits normal muscle tone  Coordination normal    Skin: Skin is warm and dry  No rash noted  Psychiatric: She has a normal mood and affect  Her behavior is normal    Nursing note and vitals reviewed        Vital Signs  ED Triage Vitals [11/10/18 1241]   Temperature Pulse Respirations Blood Pressure SpO2   98 5 °F (36 9 °C) 75 16 154/84 93 %      Temp Source Heart Rate Source Patient Position - Orthostatic VS BP Location FiO2 (%)   Oral Monitor Sitting Right arm --      Pain Score       5           Vitals:    11/10/18 1614 11/10/18 2338 11/11/18 0700 11/11/18 1508   BP: 143/76 160/90 123/68 125/60   Pulse: 78 83 78 65   Patient Position - Orthostatic VS: Lying Lying Lying Lying       Visual Acuity      ED Medications  Medications   aspirin tablet 325 mg (325 mg Oral Given 11/11/18 1011)   levothyroxine tablet 125 mcg (125 mcg Oral Given 11/11/18 0602)   pantoprazole (PROTONIX) EC tablet 20 mg (20 mg Oral Given 11/11/18 0602)   PARoxetine (PAXIL) tablet 20 mg (20 mg Oral Given 11/11/18 1011)   enoxaparin (LOVENOX) subcutaneous injection 40 mg (40 mg Subcutaneous Given 11/11/18 1012)   acetaminophen (TYLENOL) tablet 650 mg (650 mg Oral Given 11/11/18 0602)   vancomycin (VANCOCIN) 1,250 mg in sodium chloride 0 9 % 250 mL IVPB (1,250 mg Intravenous New Bag 11/10/18 2347)   docusate sodium (COLACE) capsule 100 mg (100 mg Oral Given 11/11/18 1300)   rabies vaccine, human diploid (IMOVAX RABIES) IM injection 1 mL (1 mL Intramuscular Given 11/10/18 1351)   rabies immune globulin, human (HyperRAB) injection 1,560 Units (1,560 Units Infiltration Given 11/10/18 1408)   tetanus-diphtheria-acellular pertussis (BOOSTRIX) IM injection 0 5 mL (0 5 mL Intramuscular Given 11/10/18 1352)   clindamycin (CLEOCIN) IVPB (premix) 600 mg (0 mg Intravenous Stopped 11/10/18 1555)   doxycycline (VIBRAMYCIN) 100 mg in sodium chloride 0 9 % 100 mL IVPB (100 mg Intravenous New Bag 11/10/18 1555)   sodium chloride 0 9 % infusion (100 mL/hr Intravenous New Bag 11/10/18 1849)   diphenhydrAMINE (BENADRYL) injection 12 5 mg (12 5 mg Intravenous Given 11/10/18 1947)       Diagnostic Studies  Results Reviewed     Procedure Component Value Units Date/Time    Protime-INR [[id number]]  (Normal) Collected:  11/10/18 1511    Lab Status:  Final result Specimen:  Blood from Arm, Left Updated:  11/10/18 1533     Protime 12 5 seconds      INR 0 94    APTT [[id number]]  (Normal) Collected:  11/10/18 1511    Lab Status:  Final result Specimen:  Blood from Arm, Left Updated:  11/10/18 1533     PTT 30 seconds     Basic metabolic panel [[id number]] Collected:  11/10/18 1511    Lab Status:  Final result Specimen:  Blood from Arm, Left Updated:  11/10/18 1533     Sodium 140 mmol/L      Potassium 3 7 mmol/L      Chloride 102 mmol/L      CO2 26 mmol/L      ANION GAP 12 mmol/L      BUN 17 mg/dL      Creatinine 0 87 mg/dL      Glucose 99 mg/dL      Calcium 9 8 mg/dL      eGFR 66 ml/min/1 73sq m     Narrative:         National Kidney Disease Education Program recommendations are as follows:  GFR calculation is accurate only with a steady state creatinine  Chronic Kidney disease less than 60 ml/min/1 73 sq  meters  Kidney failure less than 15 ml/min/1 73 sq  meters      Blood culture #2 [[id number]] Collected:  11/10/18 1528    Lab Status: In process Specimen:  Blood from Arm, Right Updated:  11/10/18 1530    CBC and differential [[id number]]  (Abnormal) Collected:  11/10/18 1511    Lab Status:  Final result Specimen:  Blood from Arm, Left Updated:  11/10/18 1522     WBC 9 17 Thousand/uL      RBC 3 89 Million/uL      Hemoglobin 13 6 g/dL      Hematocrit 39 2 %       (H) fL      MCH 35 0 (H) pg      MCHC 34 7 g/dL      RDW 13 6 %      MPV 11 9 fL      Platelets 524 Thousands/uL      nRBC 0 /100 WBCs      Neutrophils Relative 72 %      Immat GRANS % 1 %      Lymphocytes Relative 14 %      Monocytes Relative 11 %      Eosinophils Relative 1 %      Basophils Relative 1 %      Neutrophils Absolute 6 70 Thousands/µL      Immature Grans Absolute 0 05 Thousand/uL      Lymphocytes Absolute 1 26 Thousands/µL      Monocytes Absolute 0 99 Thousand/µL      Eosinophils Absolute 0 08 Thousand/µL      Basophils Absolute 0 09 Thousands/µL     Blood culture #1 [[id number]] Collected:  11/10/18 1511    Lab Status: In process Specimen:  Blood from Arm, Left Updated:  11/10/18 1517                 XR hand 3+ views LEFT   ED Interpretation by Fredi Medrano DO (11/10 1440)   Soft tissue swelling no acute bony abnormality      Final Result by Elmira Finch DO (11/11 0587)      No acute osseous abnormality  Soft tissue swelling dorsal to the distal metacarpals           Workstation performed: [id number]                    Procedures  Procedures       Phone Contacts  ED Phone Contact    ED Course  ED Course as of Nov 11 1817   Sat Nov 10, 2018   1450 Patient clinically has circumferential swelling for left 3rd finger she is significantly swollen, tender along the flexor surface, held in passive flexion pain with passive extension she is all 4 Knaeval signs concerning for flexor tenosynovitis, 2 puncture wounds over the flexor sheath, with surrounding cellulitis rapidly progressive over 12 hours, discussed with Dr Sasha Crump on for Hand surgery, recommended admission for IV antibiotics if not improving will take to the operating room on Tuesday            Identification of Seniors at Risk      Most Recent Value   (ISAR) Identification of Seniors at Risk   Before the illness or injury that brought you to the Emergency, did you need someone to help you on a regular basis? 0 Filed at: 11/10/2018 1243   In the last 24 hours, have you needed more help than usual?  0 Filed at: 11/10/2018 1243   Have you been hospitalized for one or more nights during the past 6 months? 0 Filed at: 11/10/2018 1243   In general, do you see well?  0 Filed at: 11/10/2018 1243   In general, do you have serious problems with your memory? 0 Filed at: 11/10/2018 1243   Do you take more than three different medications every day?   1 Filed at: 11/10/2018 1243   ISAR Score  1 Filed at: 11/10/2018 1243                          MDM  Number of Diagnoses or Management Options  Cat bite of hand:   Cellulitis of left hand:   Flexor tenosynovitis of finger:   Diagnosis management comments: 71-year-old female with history of hypothyroid bit by a straight cat yesterday evening woke up with significant swelling to her left hand and 3rd finger, significant pain, she has no history of poor wound healing easy bleeding or bruising, she requires tetanus shot rabies prophylaxis, the cat cannot be observed, reporting paperwork, patient clinically has findings concerning for flexor tenosynovitis with 2 puncture wounds on the flexor surface, will discuss with Hand surgery, disposition pending further evaluation reassessment    CritCare Time    Disposition  Final diagnoses:   Flexor tenosynovitis of finger   Cat bite of hand   Cellulitis of left hand     Time reflects when diagnosis was documented in both MDM as applicable and the Disposition within this note     Time User Action Codes Description Comment    11/10/2018  2:59 PM Juice Phlegm Add [M65 9] Flexor tenosynovitis of finger     11/10/2018  2:59 PM Jossie Diaz Add [B96 [id number],  W55 01XA] Cat bite of hand     11/10/2018  2:59 PM Jimena Ambrocio Add [L03 114] Cellulitis of left hand       ED Disposition     ED Disposition Condition Comment    Admit  Case was discussed with Carmelo Umanzor and the patient's admission status was agreed to be Admission Status: inpatient status to the service of Dr Carmelo Umanzor   Follow-up Information    None         Current Discharge Medication List      CONTINUE these medications which have NOT CHANGED    Details   aspirin 325 mg tablet Take 325 mg by mouth      levothyroxine 125 mcg tablet TAKE 1 TABLET (125 MCG TOTAL) BY MOUTH DAILY  DAYS  Qty: 30 tablet, Refills: 5    Associated Diagnoses: Hypothyroidism, unspecified type      multivitamin (THERAGRAN) TABS Take 1 tablet by mouth      MYRBETRIQ 50 MG TB24 1 TAB(S) ONCE A DAY ORALLY 30 DAYS  Qty: 30 tablet, Refills: 7    Associated Diagnoses: Recurrent UTI      omeprazole (PriLOSEC) 20 mg delayed release capsule TAKE 1 CAPSULE EVERY DAY AS NEEDED  Qty: 30 capsule, Refills: 5    Associated Diagnoses: Gastroesophageal reflux disease without esophagitis      PARoxetine (PAXIL) 20 mg tablet Take 1 tablet (20 mg total) by mouth daily  Qty: 90 tablet, Refills: 1    Associated Diagnoses: Depression, unspecified depression type           No discharge procedures on file      ED Provider  Electronically Signed by           Jovi Sales DO  11/11/18 2192

## 2018-11-11 LAB
ANION GAP SERPL CALCULATED.3IONS-SCNC: 9 MMOL/L (ref 4–13)
BUN SERPL-MCNC: 16 MG/DL (ref 5–25)
CALCIUM SERPL-MCNC: 8.2 MG/DL (ref 8.3–10.1)
CHLORIDE SERPL-SCNC: 105 MMOL/L (ref 100–108)
CO2 SERPL-SCNC: 24 MMOL/L (ref 21–32)
CREAT SERPL-MCNC: 0.74 MG/DL (ref 0.6–1.3)
ERYTHROCYTE [DISTWIDTH] IN BLOOD BY AUTOMATED COUNT: 14 % (ref 11.6–15.1)
GFR SERPL CREATININE-BSD FRML MDRD: 81 ML/MIN/1.73SQ M
GLUCOSE SERPL-MCNC: 117 MG/DL (ref 65–140)
HCT VFR BLD AUTO: 37.7 % (ref 34.8–46.1)
HGB BLD-MCNC: 13 G/DL (ref 11.5–15.4)
MCH RBC QN AUTO: 34.8 PG (ref 26.8–34.3)
MCHC RBC AUTO-ENTMCNC: 34.5 G/DL (ref 31.4–37.4)
MCV RBC AUTO: 101 FL (ref 82–98)
PLATELET # BLD AUTO: 231 THOUSANDS/UL (ref 149–390)
PMV BLD AUTO: 12.4 FL (ref 8.9–12.7)
POTASSIUM SERPL-SCNC: 3.7 MMOL/L (ref 3.5–5.3)
RBC # BLD AUTO: 3.74 MILLION/UL (ref 3.81–5.12)
SODIUM SERPL-SCNC: 138 MMOL/L (ref 136–145)
WBC # BLD AUTO: 9.51 THOUSAND/UL (ref 4.31–10.16)

## 2018-11-11 PROCEDURE — 85027 COMPLETE CBC AUTOMATED: CPT | Performed by: INTERNAL MEDICINE

## 2018-11-11 PROCEDURE — 80048 BASIC METABOLIC PNL TOTAL CA: CPT | Performed by: INTERNAL MEDICINE

## 2018-11-11 PROCEDURE — 99232 SBSQ HOSP IP/OBS MODERATE 35: CPT | Performed by: INTERNAL MEDICINE

## 2018-11-11 RX ORDER — DOCUSATE SODIUM 100 MG/1
100 CAPSULE, LIQUID FILLED ORAL 2 TIMES DAILY
Status: DISCONTINUED | OUTPATIENT
Start: 2018-11-11 | End: 2018-11-15 | Stop reason: HOSPADM

## 2018-11-11 RX ADMIN — DOCUSATE SODIUM 100 MG: 100 CAPSULE, LIQUID FILLED ORAL at 13:00

## 2018-11-11 RX ADMIN — PANTOPRAZOLE SODIUM 20 MG: 20 TABLET, DELAYED RELEASE ORAL at 06:02

## 2018-11-11 RX ADMIN — ENOXAPARIN SODIUM 40 MG: 40 INJECTION SUBCUTANEOUS at 10:12

## 2018-11-11 RX ADMIN — ACETAMINOPHEN 650 MG: 325 TABLET, FILM COATED ORAL at 06:02

## 2018-11-11 RX ADMIN — PAROXETINE HYDROCHLORIDE 20 MG: 20 TABLET, FILM COATED ORAL at 10:11

## 2018-11-11 RX ADMIN — ASPIRIN 325 MG: 325 TABLET ORAL at 10:11

## 2018-11-11 RX ADMIN — DOCUSATE SODIUM 100 MG: 100 CAPSULE, LIQUID FILLED ORAL at 18:26

## 2018-11-11 RX ADMIN — VANCOMYCIN HYDROCHLORIDE 1250 MG: 5 INJECTION, POWDER, LYOPHILIZED, FOR SOLUTION INTRAVENOUS at 23:03

## 2018-11-11 RX ADMIN — LEVOTHYROXINE SODIUM 125 MCG: 125 TABLET ORAL at 06:02

## 2018-11-11 NOTE — PROGRESS NOTES
Was paged regarding patient with erythema and pruritis after administration of clindamycin vs  doxycycline, will give dose of IV benadryl for now and monitor  May need to consider different antibiotic choice  Addendum: Pt was seen and evaluated at bedside, does have pruritis and erythema with hives on the left arm and part of the left chest  No rash on the abdomen  As we are unable to determine if the reaction was from the clindamycin or doxycycline would d/c both abx  Discussed with pharmacy, only option at this point would be to trial vancomycin  Will place order, monitor patient very closely with administration   Pt denies any previous reaction or being given vancomycin in the past      Sanju Eldridge PA-C

## 2018-11-11 NOTE — PROGRESS NOTES
[facility] Progress Note  Patient: Flores Ceils 67 y o  female   MRN: [id number]  PCP: Tyrese Spencer MD  Unit/Bed#: [facility] Encounter: [id number]  Date Of Visit: 18    Assessment:    Principal Problem:    Hand laceration  Active Problems:    Depression    Essential hypertension    Acquired hypothyroidism    Lymphoma ([facility] [address] )      Plan:  Hand cellulitis   Clinically improving as per patient  Continue IV vancomycin  Patient did have an allergic reaction to clindamycin  Id consult pending  Monitor CBC and temps    Hypothyroidism  Continue levothyroxine    Depression  Continue Paxil    VTE Pharmacologic Prophylaxis:   Pharmacologic: Heparin  Mechanical VTE Prophylaxis in Place: Yes    Patient Centered Rounds: I have performed bedside rounds with nursing staff today  Discussions with Specialists or Other Care Team Provider:     Education and Discussions with Family / Patient:patient    Time Spent for Care: 30 minutes  More than 50% of total time spent on counseling and coordination of care as described above  Current Length of Stay: 1 day(s)    Current Patient Status: Inpatient   Certification Statement: The patient will continue to require additional inpatient hospital stay due to Antibiotics, id consult    Discharge Plan:  Likely tomorrow    Code Status: Level 1 - Full Code      Subjective:   Patient seen and examined  No complaints at this time  Notes her swelling has improved  Objective:     Vitals:   Temp (24hrs), Av 6 °F (37 °C), Min:97 5 °F (36 4 °C), Max:99 9 °F (37 7 °C)    Temp:  [97 5 °F (36 4 °C)-99 9 °F (37 7 °C)] 98 4 °F (36 9 °C)  HR:  [78-83] 78  Resp:  [15-18] 18  BP: (123-160)/(68-90) 123/68  SpO2:  [91 %] 91 %  Body mass index is 35 31 kg/m²  Input and Output Summary (last 24 hours):        Intake/Output Summary (Last 24 hours) at 18 1425  Last data filed at 11/10/18 1555   Gross per 24 hour   Intake               50 ml   Output                0 ml   Net 50 ml       Physical Exam:   Alert, oriented x3  Mucous membranes are moist  Lungs are clear to auscultation  Heart sounds are regular S1-S2  Abdomen soft nontender  Extremities left hand area of cellulitis seen  Slightly tender to touch  Swelling seen more in middle finger and index finge extending to the palm  Additional Data:     Labs:      Results from last 7 days  Lab Units 11/11/18  0532 11/10/18  1511   WBC Thousand/uL 9 51 9 17   HEMOGLOBIN g/dL 13 0 13 6   HEMATOCRIT % 37 7 39 2   PLATELETS Thousands/uL 231 270   NEUTROS PCT %  --  72   LYMPHS PCT %  --  14   MONOS PCT %  --  11   EOS PCT %  --  1       Results from last 7 days  Lab Units 11/11/18  0532   POTASSIUM mmol/L 3 7   CHLORIDE mmol/L 105   CO2 mmol/L 24   BUN mg/dL 16   CREATININE mg/dL 0 74   CALCIUM mg/dL 8 2*       Results from last 7 days  Lab Units 11/10/18  1511   INR  0 94       * I Have Reviewed All Lab Data Listed Above  * Additional Pertinent Lab Tests Reviewed: All Labs Within Last 24 Hours Reviewed    Imaging:    Imaging Reports Reviewed Today Include:   Imaging Personally Reviewed by Myself Includes:      Recent Cultures (last 7 days):           Last 24 Hours Medication List:     Current Facility-Administered Medications:  acetaminophen 650 mg Oral Q6H PRN Johann Carcamo MD    aspirin 325 mg Oral Daily Johann Carcamo MD    docusate sodium 100 mg Oral BID Shelbi Mclaughlin MD    enoxaparin 40 mg Subcutaneous Daily Johann Carcamo MD    levothyroxine 125 mcg Oral Daily Johann Carcamo MD    pantoprazole 20 mg Oral Early Morning Johann Carcamo MD    PARoxetine 20 mg Oral Daily Johann Carcamo MD    vancomycin 20 mg/kg (Adjusted) Intravenous Q24H Bluford Hashimoto, PA-C Last Rate: 1,250 mg (11/10/18 4367)        Today, Patient Was Seen By: Shelbi Mclaughlin MD    ** Please Note: Dragon 360 Dictation voice to text software may have been used in the creation of this document   **

## 2018-11-11 NOTE — SOCIAL WORK
LOS: 1 CM met with pt at bedside  Pt lives in [facility] with her  and daughter  [place]  Pt uses cane and completes ADL's independently  Pt denies hx of rehab and had Michelle 78 years ago but cannot recall agency  Pt uses [facility]  Pt denies hx of MH and SA  Pt reports  and daughter are POA and she is unsure if she has AD  Pt reports she can drive but her  and daughter usually do instead  CM reviewed discharge planning process including the following: identifying help at home, patient preference for discharge planning needs, pharmacy preference, and availability of treatment team to discuss questions or concerns patient and/or family may have regarding understanding medications and recognizing signs and symptoms once discharged  CM also encouraged patient to follow up with all recommended appointments after discharge  Patient advised of importance for patient and family to participate in managing patients medical well being  CM name and role reviewed  Discharge Checklist reviewed and CM will continue to monitor for progress toward discharge goals in nursing and provider rounds  CM discussed HRR  Pt is open to late morning appointment with PCP, Dr Cristhian Driscoll  Pt has no other needs at this time  CM department will follow pt through dc and schedule HRR

## 2018-11-11 NOTE — PROGRESS NOTES
Pt with red itchy rash to l arm, torso and b/l LE  Pt states "I felt itchy after anitibiotics"  SLIM notified, benadryl given as ordered

## 2018-11-11 NOTE — UTILIZATION REVIEW
Initial Clinical Review    Admission: Date/Time/Statement: 11/10/18 @ 1501     Orders Placed This Encounter   Procedures    Inpatient Admission (expected length of stay for this patient is greater than two midnights)     Standing Status:   Standing     Number of Occurrences:   1     Order Specific Question:   Admitting Physician     Answer:   Alyssa Shin [[id number]]     Order Specific Question:   Level of Care     Answer:   Med Surg [[id number]]     Order Specific Question:   Estimated length of stay     Answer:   More than 2 Midnights     Order Specific Question:   Certification     Answer:   I certify that inpatient services are medically necessary for this patient for a duration of greater than two midnights  See H&P and MD Progress Notes for additional information about the patient's course of treatment  ED: Date/Time/Mode of Arrival:   ED Arrival Information     Expected Arrival Acuity Means of Arrival Escorted By Service Admission Type    - 11/10/2018 12:22 Less Urgent Walk-In Family Member General Medicine Urgent    Arrival Complaint    CAT BITE            Chief Complaint   Patient presents with    Cat Bite     pt w/ L hand stray car bites and scratches, occured yesterday, + swelling, redness        History of Illness:     67year old female  Presents to ed with increasd left hand swelling and erythema  A stray cat entered her home yesterday and bit her when she tried to shoo it outdoors  Her hand is got more swollen  Erythema developing          ED Triage Vitals [11/10/18 1241]   98 5 °F (36 9 °C) 75 16 154/84 93 %      Pain Score       5        Wt Readings from Last 1 Encounters:   11/10/18 82 kg (180 lb 12 4 oz)       Vital Signs (abnormal):     11/10/18 2338  99 9 °F (37 7 °C)  83  15  160/90         Abnormal Labs/Diagnostic Test Results:     Blood culture x 2      ED Treatment:   Medication Administration from 11/10/2018 1222 to 11/10/2018 1606       Date/Time Order Dose Route     11/10/2018 1351 rabies vaccine, human diploid (IMOVAX RABIES) IM injection 1 mL 1 mL Intramuscular     11/10/2018 1408 rabies immune globulin, human (HyperRAB) injection 1,560 Units 1,560 Units Infiltration     11/10/2018 1352 tetanus-diphtheria-acellular pertussis (BOOSTRIX) IM injection 0 5 mL 0 5 mL Intramuscular     11/10/2018 1528 clindamycin (CLEOCIN) IVPB (premix) 600 mg 600 mg Intravenous     11/10/2018 1555 doxycycline (VIBRAMYCIN) 100 mg in sodium chloride 0 9 % 100 mL IVPB 100 mg Intravenous          Past Medical/Surgical History:      Anxiety disorder     Aortic valve disorder     Depression     History of gastroesophageal reflux (GERD)     History of kidney cancer 03/06/2014    Hyperlipidemia 03/06/2014    Hypertension 03/06/2014    Hypothyroidism 03/06/2014    Shortness of breath     TIA (transient ischemic attack)        Admitting Diagnosis: Hand laceration [S61 419A]  Flexor tenosynovitis of finger [M65 9]  Cellulitis of left hand [L03 114]  Cat bite of hand [S61 459A, W55 01XA]    Age/Sex: 67 y o  female  does have pruritis and erythema with hives on the left arm and part of the left chest  No rash on the abdomen  As we are unable to determine if the reaction was from the clindamycin or doxycycline would d/c both abx  Discussed with pharmacy, only option at this point would be to trial vancomycin  Will place order, monitor patient very closely with administration        Assessment/Plan:  · 1  Hand cellulitis secondary to stray cat bite- will place on clindamycin    Hand surgery consulted      Admission Orders:    Consult acute care surgery  Cconsult hand surgery         Scheduled Meds:     acetaminophen 650 mg Oral Q6H PRN   aspirin 325 mg Oral Daily   docusate sodium 100 mg Oral BID   enoxaparin 40 mg Subcutaneous Daily   levothyroxine 125 mcg Oral Daily   pantoprazole 20 mg Oral Early Morning   PARoxetine 20 mg Oral Daily   vancomycin 20 mg/kg (Adjusted) Intravenous Q24H

## 2018-11-12 PROCEDURE — 99222 1ST HOSP IP/OBS MODERATE 55: CPT | Performed by: PHYSICIAN ASSISTANT

## 2018-11-12 PROCEDURE — 99223 1ST HOSP IP/OBS HIGH 75: CPT | Performed by: INTERNAL MEDICINE

## 2018-11-12 PROCEDURE — 99232 SBSQ HOSP IP/OBS MODERATE 35: CPT | Performed by: INTERNAL MEDICINE

## 2018-11-12 RX ADMIN — SODIUM CHLORIDE 1.5 G: 9 INJECTION, SOLUTION INTRAVENOUS at 23:22

## 2018-11-12 RX ADMIN — SODIUM CHLORIDE 1.5 G: 9 INJECTION, SOLUTION INTRAVENOUS at 16:48

## 2018-11-12 RX ADMIN — SODIUM CHLORIDE 1.5 G: 9 INJECTION, SOLUTION INTRAVENOUS at 13:30

## 2018-11-12 RX ADMIN — ASPIRIN 325 MG: 325 TABLET ORAL at 08:15

## 2018-11-12 RX ADMIN — DOCUSATE SODIUM 100 MG: 100 CAPSULE, LIQUID FILLED ORAL at 08:15

## 2018-11-12 RX ADMIN — ENOXAPARIN SODIUM 40 MG: 40 INJECTION SUBCUTANEOUS at 08:15

## 2018-11-12 RX ADMIN — LEVOTHYROXINE SODIUM 125 MCG: 125 TABLET ORAL at 06:05

## 2018-11-12 RX ADMIN — PANTOPRAZOLE SODIUM 20 MG: 20 TABLET, DELAYED RELEASE ORAL at 06:06

## 2018-11-12 RX ADMIN — PAROXETINE HYDROCHLORIDE 20 MG: 20 TABLET, FILM COATED ORAL at 08:15

## 2018-11-12 NOTE — CONSULTS
Consultation - Infectious Disease   Tomas Chicas 67 y o  female MRN: [id number]  Unit/Bed#: -01 Encounter: [id number]    IMPRESSION & RECOMMENDATIONS:   1  Cellulitis of the left hand  Secondary to cat bite  Patient reports the cat was a stray and she is unaware if it has any chronic disease  She received the rabies vaccine and a tetanus shot in the ED  I reviewed patient's x-ray of the left hand from 11/11/2018 which showed soft tissue swelling dorsal to the distal metacarpals  She may need additional MRI imaging to search for an abscess if she does not improve  Despite the pain and swelling in her left hand the patient has remained clinically stable and nontoxic  Her blood cultures are negative after 24 hours  She may require I and D, a surgical consult is pending  Patient has been started on IV vancomycin given her extensive list of antibiotic allergies  Discussed with patient that pasteurella may be the offensive organism in this situation and that it will likely respond well to IV Unasyn  Patient is willing to go try the Unasyn as she reports her reaction to penicillin was only a rash in the past   Will monitor patient closely and administer Benadryl for rash as needed   -stop IV vancomycin  -start IV Unasyn, 1 5g q6 hours  -monitor for side effects and adverse reaction from antibiotics  -monitor CBCD and BMP  -follow up blood cultures  -monitor vitals  -consider MRI imaging if patient does not have clinical improvement  -serial skin exams  -supportive care    2  Cat bite  Patient with bite from a stray cat on 11/09/2018  Since her admission she has received rabies vaccine as well as Tetanus shot  No osseous abnormality noted on x-ray of the left hand  Blood cultures are negative after 24 hours  Patient with only mild improvement in pain and swelling since admission but fortunately she is clinically stable and nontoxic   Patient may need I&D of the hand, surgery is consult is pending   -antibiotic as above  -monitor CBCD and BMP  -follow up blood cultures  -serial skin exams  -local wound care as needed  -follow up surgery consult    3  Multiple antibiotic allergies  Patient with history of rash to multiple antibiotics  She once again experienced rash after administration of clindamycin and doxycycline  It is unclear which medication lead the rash  Discussed likely bacterial organisms from cat bites  Patient willing to try Augmentin again while in the hospital so that she can be treated right away if a rash occurs    -antibiotic as above  -monitor patient closely for side effects and adverse reaction    Discussed in detail with [facility] attending, Dr Hidalgo Husbands  HISTORY OF PRESENT ILLNESS:  Reason for Consult:  Flexor tenosynovitis of finger, cellulitis of left hand  HPI: Tracy Kathleen is a 67y o  year old female who presented to the [facility] Emergency Department on 11/10/2018 after getting a CAT bite from a stray cat  She reports the cat bit the left dorsal hand as well as her right medial elbow  She cleaned the wounds at home and went to bed but woke up in the morning with a very swollen left 3rd finger  She reports very difficult to move her left hand  Upon arrival to the ED the patient's temperature was 98 5° with a heart rate of 75  Patient's white blood cell count was 9 17  Her creatinine was 0 87 with a GFR of 66  Patient was taken for an x-ray of her left hand which showed no bony abnormalities, soft tissue swelling to the to the dorsal hand towards the distal metacarpals  Patient was given a rabies shot and tetanus vaccine  Blood cultures were sent  She was given a dose of IV vancomycin, clindamycin, and doxycycline  She was admitted for additional medical management  Shortly after admission the patient broke out in hives  It is unclear if the reaction was to the clindamycin and the doxycycline    Since then she has only continued on IV vancomycin has been tolerating without difficulty  Internal Medicine has consulted with a hand surgeon and she will possibly go to the OR on Tuesday for washout  We have been asked for formal consult for flexor tenosynovitis of the finger and cellulitis of the left hand  Patient has a past medical and surgical history significant for shortness of breath, hyperlipidemia, TIA, anxiety, hypertension, kidney cancer, hypothyroid, GERD, aortic valve disorder, depression, and Nissen fundoplication  Patient has allergies to Cipro, sulfa, penicillins, and sulfacetamide  There is now concern she may have allergy to clindamycin or doxycycline as well  REVIEW OF SYSTEMS:  Patient reports that other than her swollen hand she is feeling very well  She notes that she has only noticed a small change in the pain and swelling since her arrival   She is not sure for antibiotics are working  She denies nausea, vomiting, diarrhea, chest pain, shortness of breath, cough, dizziness, neck stiffness, headache  She reports that the pain is only across the top of her left hand and upper 3rd finger  She reports it is very hard for her to bend her 3rd finger  The pain is not worse when people press on the hand, it is only worse when she tries to move the finger  A complete 12 point system-based review of systems is otherwise negative      PAST MEDICAL HISTORY:  Past Medical History:   Diagnosis Date    Anxiety disorder     Aortic valve disorder     Depression     History of gastroesophageal reflux (GERD)     History of kidney cancer 03/06/2014    Hyperlipidemia 03/06/2014    Hypertension 03/06/2014    Hypothyroidism 03/06/2014    Shortness of breath     TIA (transient ischemic attack)      Past Surgical History:   Procedure Laterality Date    ESOPHAGOGASTRIC FUNDOPLASTY      Nissen Fundoplication     FAMILY HISTORY:  Non-contributory    SOCIAL HISTORY:  Social History   History   Alcohol Use No     History   Drug Use No     History Smoking Status    Never Smoker   Smokeless Tobacco    Never Used     ALLERGIES:  Allergies   Allergen Reactions    Ciprofloxacin     Other     Sulfa Antibiotics     Penicillins Rash     Category: Allergy;     Sulfacetamide Rash     Category: Allergy;      MEDICATIONS:  All current active medications have been reviewed  ANTIBIOTICS:  Vancomycin 3  Antibiotics 3    PHYSICAL EXAM:  Temp:  [97 7 °F (36 5 °C)-98 4 °F (36 9 °C)] 98 4 °F (36 9 °C)  HR:  [65-72] 68  Resp:  [18] 18  BP: (123-128)/(57-62) 123/62  SpO2:  [91 %-95 %] 91 %  Temp (24hrs), Av 1 °F (36 7 °C), Min:97 7 °F (36 5 °C), Max:98 4 °F (36 9 °C)  Current: Temperature: 98 4 °F (36 9 °C)    Intake/Output Summary (Last 24 hours) at 18 0950  Last data filed at 18 1807   Gross per 24 hour   Intake              240 ml   Output                0 ml   Net              240 ml     General Appearance:  Appearing well, nontoxic, and in no distress   Head:  Normocephalic, without obvious abnormality, atraumatic   Eyes:  Conjunctiva pink and sclera anicteric, both eyes   Nose: Nares normal, mucosa normal, no drainage   Throat: Oropharynx moist without lesions   Neck: Supple, symmetrical, no adenopathy, no tenderness/mass/nodules   Back:   Symmetric, no curvature, ROM normal, no CVA tenderness   Lungs:   Clear to auscultation bilaterally, respirations unlabored   Chest Wall:  No tenderness or deformity   Heart:  RRR; no murmur, rub or gallop   Abdomen:   Soft, non-tender, non-distended, positive bowel sounds    Extremities: No cyanosis or clubbing; mild edema of the left hand with moderate edema to the left 3rd finger   Skin: No rashes or lesions   Patient with erythema and purplish discoloration to the left 3rd finger with a trace discoloration across the left distal hand, nail is intact, there is slow but present capillary refill   Lymph nodes: Cervical, supraclavicular nodes normal   Neurologic: Alert and oriented times 3, follows all commands but left 3rd hand grasp is limited by pain and swelling of the left 3rd finger     LABS, IMAGING, & OTHER STUDIES:  Lab Results:  I have personally reviewed pertinent labs  Results from last 7 days  Lab Units 11/11/18  0532 11/10/18  1511   WBC Thousand/uL 9 51 9 17   HEMOGLOBIN g/dL 13 0 13 6   PLATELETS Thousands/uL 231 270       Results from last 7 days  Lab Units 11/11/18  0532   POTASSIUM mmol/L 3 7   CHLORIDE mmol/L 105   CO2 mmol/L 24   BUN mg/dL 16   CREATININE mg/dL 0 74   EGFR ml/min/1 73sq m 81   CALCIUM mg/dL 8 2*       Results from last 7 days  Lab Units 11/10/18  1528 11/10/18  1511   BLOOD CULTURE  No Growth at 24 hrs  No Growth at 24 hrs  Imaging Studies:   I have personally reviewed pertinent imaging study reports and images in PACS  XR HAND 3+ VIEWS LEFT 11/11/8  FINDINGS:  There is no acute fracture or dislocation  Mild osteoarthritis of the DIP joints  No lytic or blastic lesions are seen  Soft tissue swelling dorsal to the distal metacarpals   No discernible subcutaneous gas or radiopaque foreign bodies  Impression:     No acute osseous abnormality  Soft tissue swelling dorsal to the distal metacarpals

## 2018-11-12 NOTE — PROGRESS NOTES
[address] Internal Medicine Progress Note  Patient: Savanah Junior 67 y o  female   MRN: [id number]  PCP: Caty Honeycutt MD  Unit/Bed#: -01 Encounter: [id number]  Date Of Visit: 18    Assessment:    Principal Problem:    Hand laceration  Active Problems:    Depression    Essential hypertension    Acquired hypothyroidism    Lymphoma ([address] )      Plan:  Hand cellulitis secondary to stray cat bite  Patient developed allergy to clindamycin/doxycycline  Continue IV vancomycin  Questionable coverage for cat bite  X-ray of the hand showed no acute osseous abnormality  Soft tissue swelling dorsal to distal metacarpals  Discussed with ID, pending consult  Hand surgery consult pending  Monitor CBC and temps     Hypothyroidism  Continue levothyroxine     Depression  Continue Paxil     VTE Pharmacologic Prophylaxis:   Pharmacologic: Heparin  Mechanical VTE Prophylaxis in Place: Yes    Patient Centered Rounds: I have performed bedside rounds with nursing staff today  Discussions with Specialists or Other Care Team Provider:  Id    Education and Discussions with Family / Patient:  Patient    Time Spent for Care: 30 minutes  More than 50% of total time spent on counseling and coordination of care as described above  Current Length of Stay: 2 day(s)    Current Patient Status: Inpatient   Certification Statement: The patient will continue to require additional inpatient hospital stay due to Id consult    Discharge Plan:  Id consult pending    Code Status: Level 1 - Full Code      Subjective:   Patient seen and examined  Continues to have swelling and pain in the left hand  Objective:     Vitals:   Temp (24hrs), Av 1 °F (36 7 °C), Min:97 7 °F (36 5 °C), Max:98 4 °F (36 9 °C)    Temp:  [97 7 °F (36 5 °C)-98 4 °F (36 9 °C)] 98 4 °F (36 9 °C)  HR:  [65-72] 68  Resp:  [18] 18  BP: (123-128)/(57-62) 123/62  SpO2:  [91 %-95 %] 91 %  Body mass index is 35 31 kg/m²       Input and Output Summary (last 24 hours): Intake/Output Summary (Last 24 hours) at 11/12/18 1119  Last data filed at 11/11/18 1807   Gross per 24 hour   Intake              240 ml   Output                0 ml   Net              240 ml       Physical Exam:     Alert, oriented x3  Mucous membranes are moist  Lungs are clear to auscultation  Heart sounds are regular S1-S2  Abdomen soft nontender  Extremities-left hand swollen slightly tender to touch  Swelling seen in middle and index finger as well    Additional Data:     Labs:      Results from last 7 days  Lab Units 11/11/18  0532 11/10/18  1511   WBC Thousand/uL 9 51 9 17   HEMOGLOBIN g/dL 13 0 13 6   HEMATOCRIT % 37 7 39 2   PLATELETS Thousands/uL 231 270   NEUTROS PCT %  --  72   LYMPHS PCT %  --  14   MONOS PCT %  --  11   EOS PCT %  --  1       Results from last 7 days  Lab Units 11/11/18  0532   POTASSIUM mmol/L 3 7   CHLORIDE mmol/L 105   CO2 mmol/L 24   BUN mg/dL 16   CREATININE mg/dL 0 74   CALCIUM mg/dL 8 2*       Results from last 7 days  Lab Units 11/10/18  1511   INR  0 94       * I Have Reviewed All Lab Data Listed Above  * Additional Pertinent Lab Tests Reviewed: [address] Admission Reviewed    Imaging:    Imaging Reports Reviewed Today Include:  Imaging Personally Reviewed by Myself Includes:      Recent Cultures (last 7 days):       Results from last 7 days  Lab Units 11/10/18  1528 11/10/18  1511   BLOOD CULTURE  No Growth at 24 hrs  No Growth at 24 hrs         Last 24 Hours Medication List:     Current Facility-Administered Medications:  acetaminophen 650 mg Oral Q6H PRN Gayathri Burk MD    aspirin 325 mg Oral Daily Gayathri Burk MD    docusate sodium 100 mg Oral BID Jesika Márquez MD    enoxaparin 40 mg Subcutaneous Daily Gayathri Burk MD    levothyroxine 125 mcg Oral Daily Gayathri Burk MD    pantoprazole 20 mg Oral Early Morning Gayathri Burk MD    PARoxetine 20 mg Oral Daily Gayathri Burk MD    vancomycin 20 mg/kg (Adjusted) Intravenous Q24H Marilou Landaverde BUBBA Mcwilliams Last Rate: 1,250 mg (11/11/18 8551)        Today, Patient Was Seen By: Byron Velez MD    ** Please Note: Dragon 360 Dictation voice to text software may have been used in the creation of this document   **

## 2018-11-12 NOTE — CONSULTS
Orthopedics   Sanju Joseluis 67 y o  female MRN: [id number]  Unit/Bed#: -01      Chief Complaint:   left long finger pain and swelling    HPI:   67 y  o female complaining of left long finger erythema and pain  Patient had cat bite this past Saturday  Was admitted for IV antibiotics and hand surgery was consulted  Since being on antibiotics there has been some improvement clinically  Patient in bed in no acute distress  Feels her pain is at a 5-6  On pain scale of 1-10  Denies any numbness in upper extremity  She has limited ROM left middle finger secondary to swelling and some pain  Most of her pain is dorsal aspect of the long finger  Patient is actively able to extend finger and flexion is limited mail  y from swelling       Review Of Systems:   · Skin: See HPI  · Neuro: See HPI  · Musculoskeletal: See HPI  · 14 point review of systems negative except as stated above     Past Medical History:   Past Medical History:   Diagnosis Date    Anxiety disorder     Aortic valve disorder     Depression     History of gastroesophageal reflux (GERD)     History of kidney cancer 03/06/2014    Hyperlipidemia 03/06/2014    Hypertension 03/06/2014    Hypothyroidism 03/06/2014    Shortness of breath     TIA (transient ischemic attack)        Past Surgical History:   Past Surgical History:   Procedure Laterality Date    ESOPHAGOGASTRIC FUNDOPLASTY      Nissen Fundoplication       Family History:  Family history reviewed and non-contributory  Family History   Problem Relation Age of Onset    Stroke Father     Leukemia Sister     Alcohol abuse Brother     Kidney cancer Brother        Social History:  Social History     Social History    Marital status: /Civil Union     Spouse name: N/A    Number of children: 2    Years of education: N/A     Social History Main Topics    Smoking status: Never Smoker    Smokeless tobacco: Never Used    Alcohol use No    Drug use: No    Sexual activity: Not Asked Other Topics Concern    None     Social History Narrative    None       Allergies: Allergies   Allergen Reactions    Ciprofloxacin     Other     Sulfa Antibiotics     Penicillins Rash     Category: Allergy;     Sulfacetamide Rash     Category: Allergy;            Labs:    0  Lab Value Date/Time   HCT 37 7 11/11/2018 0532   HCT 39 2 11/10/2018 1511   HCT 38 4 07/30/2018 1456   HCT 42 3 07/17/2014 0840   HGB 13 0 11/11/2018 0532   HGB 13 6 11/10/2018 1511   HGB 12 8 07/30/2018 1456   HGB 14 2 07/17/2014 0840   INR 0 94 11/10/2018 1511   WBC 9 51 11/11/2018 0532   WBC 9 17 11/10/2018 1511   WBC 7 31 07/30/2018 1456   WBC 4 6 07/17/2014 0840       Meds:    Current Facility-Administered Medications:     acetaminophen (TYLENOL) tablet 650 mg, 650 mg, Oral, Q6H PRN, Hillary Marie MD, 650 mg at 11/11/18 0602    ampicillin-sulbactam (UNASYN) 1 5 g in sodium chloride 0 9 % 50 mL IVPB, 1 5 g, Intravenous, Q6H, Patricia Whalen DO    aspirin tablet 325 mg, 325 mg, Oral, Daily, Hillary Marie MD, 325 mg at 11/12/18 0815    docusate sodium (COLACE) capsule 100 mg, 100 mg, Oral, BID, Kaya Espinoza MD, 100 mg at 11/12/18 0815    enoxaparin (LOVENOX) subcutaneous injection 40 mg, 40 mg, Subcutaneous, Daily, Hillary Marie MD, 40 mg at 11/12/18 0815    levothyroxine tablet 125 mcg, 125 mcg, Oral, Daily, Hillary Marie MD, 125 mcg at 11/12/18 0605    pantoprazole (PROTONIX) EC tablet 20 mg, 20 mg, Oral, Early Morning, Hillary Marie MD, 20 mg at 11/12/18 0606    PARoxetine (PAXIL) tablet 20 mg, 20 mg, Oral, Daily, Hillary Marie MD, 20 mg at 11/12/18 0815    Blood Culture:   Lab Results   Component Value Date    BLOODCX No Growth at 24 hrs  11/10/2018       Wound Culture:   No results found for: WOUNDCULT    Ins and Outs:  I/O last 24 hours:   In: 440 [P O :440]  Out: 600 [Urine:600]          Physical Exam:   /62 (BP Location: Right arm)   Pulse 68   Temp 98 4 °F (36 9 °C) (Oral)   Resp 18   Ht 5' (1 524 m)   Wt 82 kg (180 lb 12 4 oz)   SpO2 91%   BMI 35 31 kg/m²   Gen: Alert and oriented to person, place, time  HEENT: EOMI, eyes clear, moist mucus membranes, hearing intact  Respiratory: Bilateral chest rise  No audible wheezing found  Cardiovascular: Regular Rate and Rhythm  Abdomen: soft nontender/nondistended  · Musculoskeletal: left hand   · Skin: Erythema over the long finger dorsal and valderrama aspect  · Full ROM of the wrist with no pain  Full pain free ROM of all fingers with exception long finger  · Limited ROM long finger secondary to swelling and some pain  · patient able to actively extend middle finger with little discomfort, limited active flexion secondary to swelling   · Sensation intact Radial, Ulna and Median  · 5/5 motor to Radial, Ulna and Median                    Radiology:   I personally reviewed the films  X-rays of left hand show no fracture ST swelling noted dorsal aspect just distal to metacarpals     _*_*_*_*_*_*_*_*_*_*_*_*_*_*_*_*_*_*_*_*_*_*_*_*_*_*_*_*_*_*_*_*_*_*_*_*_*_*_*_*_*    Assessment:  67 y  o female with  left long finger cellulitis  Patient currently has no leukocytosis and is afebrile  Seems to be responding to the conservative treatment of Abx  Dr Jossie Pacheco will see if patient keeps improving tomorrow and if not patient understands she may need I and D  Plan:   · Cont  abx per ID recommendations  · Analgesics for pain  · WBAT left hand  · No surgical intervention needed at this time, will re-evaluate tomorrow  Dispo: Hand surgery will follow patient      [employer] BUBBA Husain

## 2018-11-13 ENCOUNTER — APPOINTMENT (INPATIENT)
Dept: MRI IMAGING | Facility: HOSPITAL | Age: 73
DRG: 603 | End: 2018-11-13
Payer: COMMERCIAL

## 2018-11-13 PROBLEM — L03.114 CELLULITIS OF LEFT HAND: Status: ACTIVE | Noted: 2018-11-10

## 2018-11-13 PROCEDURE — 73220 MRI UPPR EXTREMITY W/O&W/DYE: CPT

## 2018-11-13 PROCEDURE — 99232 SBSQ HOSP IP/OBS MODERATE 35: CPT | Performed by: INTERNAL MEDICINE

## 2018-11-13 PROCEDURE — 99232 SBSQ HOSP IP/OBS MODERATE 35: CPT | Performed by: PHYSICIAN ASSISTANT

## 2018-11-13 PROCEDURE — A9585 GADOBUTROL INJECTION: HCPCS | Performed by: PHYSICIAN ASSISTANT

## 2018-11-13 RX ADMIN — PANTOPRAZOLE SODIUM 20 MG: 20 TABLET, DELAYED RELEASE ORAL at 06:01

## 2018-11-13 RX ADMIN — SODIUM CHLORIDE 1.5 G: 9 INJECTION, SOLUTION INTRAVENOUS at 06:01

## 2018-11-13 RX ADMIN — GADOBUTROL 8 ML: 604.72 INJECTION INTRAVENOUS at 19:47

## 2018-11-13 RX ADMIN — ENOXAPARIN SODIUM 40 MG: 40 INJECTION SUBCUTANEOUS at 08:19

## 2018-11-13 RX ADMIN — DOCUSATE SODIUM 100 MG: 100 CAPSULE, LIQUID FILLED ORAL at 08:20

## 2018-11-13 RX ADMIN — PAROXETINE HYDROCHLORIDE 20 MG: 20 TABLET, FILM COATED ORAL at 08:19

## 2018-11-13 RX ADMIN — ASPIRIN 325 MG: 325 TABLET ORAL at 08:19

## 2018-11-13 RX ADMIN — SODIUM CHLORIDE 1.5 G: 9 INJECTION, SOLUTION INTRAVENOUS at 12:14

## 2018-11-13 RX ADMIN — LEVOTHYROXINE SODIUM 125 MCG: 125 TABLET ORAL at 06:01

## 2018-11-13 RX ADMIN — SODIUM CHLORIDE 1.5 G: 9 INJECTION, SOLUTION INTRAVENOUS at 23:47

## 2018-11-13 RX ADMIN — SODIUM CHLORIDE 1.5 G: 9 INJECTION, SOLUTION INTRAVENOUS at 16:59

## 2018-11-13 NOTE — PROGRESS NOTES
Progress Note - Infectious Disease   Haritha Duarte 67 y o  female MRN: [id number]  Unit/Bed#: -01 Encounter: [id number]      Impression/Plan:  1  Cellulitis of the left hand  Secondary to cat bite  Patient reports the cat was a stray and she is unaware if it has any chronic disease  She received the rabies vaccine and a tetanus shot in the ED  Xray of the left hand from 11/11/2018 showed soft tissue swelling dorsal to the distal metacarpals  She may need additional MRI imaging to search for an abscess if she does not improve  Despite the pain and swelling in her left hand the patient has remained clinically stable and nontoxic  Her blood cultures are negative after 48 hours  She has made some minor progress but still may require I and D  She is following closely with Orthopedic surgery  Patient was transitioned to IV Unasyn yesterday and is tolerating without difficulty  -continue IV Unasyn  -monitor for side effects and adverse reaction from antibiotics  -monitor CBCD and BMP  -follow up blood cultures  -monitor vitals  -consider MRI imaging if patient does not have clinical improvement  -serial skin exams  -supportive care  -continue close follow-up with Orthopedic surgery     2  Cat bite  Patient with bite from a stray cat on 11/09/2018  Since her admission she has received rabies vaccine as well as Tetanus shot  No osseous abnormality noted on x-ray of the left hand  Blood cultures are negative after 48 hours  Patient continues to have only mild improvement in pain and swelling but fortunately she is clinically stable and nontoxic  Patient may need I&D of the hand  She is following with Orthopedic surgery   -antibiotic as above  -monitor CBCD and BMP  -follow up blood cultures  -serial skin exams  -local wound care as needed  -continue close follow-up with Orthopedic surgery     3  Multiple antibiotic allergies  Patient with history of rash to multiple antibiotics   She once again experienced rash after administration of clindamycin and doxycycline  It is unclear which medication lead the rash  Discussed pasteurella as a likely bacterial organism and a cat bite  Patient was willing to try IV Unasyn yesterday as her previous reaction was not anaphylaxis  She is tolerating without difficulty   -antibiotic as above  -monitor patient closely for side effects and adverse reaction    4  Depression  Discussed in detail with JULIANNA attending, Dr Elvis Cueva  Antibiotics:  Unasyn 2  Antibiotics 4    Subjective:  Patient has no fever, chills, sweats; no nausea, vomiting, diarrhea; no cough, shortness of breath; she is still having a lot of pain in L hand and stiffness in L 3rd finger but feels it is slightly better than yesterday  No new symptoms  Objective:  Vitals:  Temp:  [98 °F (36 7 °C)-98 7 °F (37 1 °C)] 98 1 °F (36 7 °C)  HR:  [69-71] 71  Resp:  [18] 18  BP: (121-147)/(63-81) 136/63  SpO2:  [91 %-93 %] 91 %  Temp (24hrs), Av 3 °F (36 8 °C), Min:98 °F (36 7 °C), Max:98 7 °F (37 1 °C)  Current: Temperature: 98 1 °F (36 7 °C)    Physical Exam:   General Appearance:  Alert, interactive, nontoxic, no acute distress  Throat: Oropharynx moist without lesions  Lungs:   Clear to auscultation bilaterally; no wheezes, rhonchi or rales; respirations unlabored   Heart:  RRR; no murmur, rub or gallop   Abdomen:   Soft, non-tender, non-distended, positive bowel sounds  Extremities: No clubbing or cyanosis; mild left hand edema, moderate in L 3rd finger   Skin: No new rashes or lesions   Erythema and purplish discoloration of the left 3rd finger is less dense today, trace erythema remains on the the left dorsal distal hand, patient's left 3rd fingernail remains intact with brisk capillary refill     Labs, Imaging, & Other studies:   All pertinent labs and imaging studies were personally reviewed    Results from last 7 days  Lab Units 18  0532 11/10/18  1511   WBC Thousand/uL 9 51 9 17   HEMOGLOBIN g/dL 13 0 13 6   PLATELETS Thousands/uL 231 270       Results from last 7 days  Lab Units 11/11/18  0532   POTASSIUM mmol/L 3 7   CHLORIDE mmol/L 105   CO2 mmol/L 24   BUN mg/dL 16   CREATININE mg/dL 0 74   EGFR ml/min/1 73sq m 81   CALCIUM mg/dL 8 2*       Results from last 7 days  Lab Units 11/10/18  1528 11/10/18  1511   BLOOD CULTURE  No Growth at 48 hrs  No Growth at 48 hrs

## 2018-11-13 NOTE — PLAN OF CARE
Potential for Falls     Patient will remain free of falls Adequate for Discharge        SAFETY ADULT     Maintain or return to baseline ADL function Adequate for Discharge     Maintain or return mobility status to optimal level Adequate for Discharge          DISCHARGE PLANNING     Discharge to home or other facility with appropriate resources Progressing        DISCHARGE PLANNING - CARE MANAGEMENT     Discharge to post-acute care or home with appropriate resources Progressing        INFECTION - ADULT     Absence or prevention of progression during hospitalization Progressing     Absence of fever/infection during neutropenic period Progressing        Knowledge Deficit     Patient/family/caregiver demonstrates understanding of disease process, treatment plan, medications, and discharge instructions Progressing        PAIN - ADULT     Verbalizes/displays adequate comfort level or baseline comfort level Progressing        SAFETY ADULT     Patient will remain free of falls Progressing        SKIN/TISSUE INTEGRITY - ADULT     Skin integrity remains intact Progressing     Incision(s), wounds(s) or drain site(s) healing without S/S of infection Progressing

## 2018-11-13 NOTE — PROGRESS NOTES
[facility] Progress Note  Patient: Jaime Field 67 y o  female   MRN: [id number]  PCP: Iesha Cleveland MD  Unit/Bed#: [facility] Encounter: [id number]  Date Of Visit: 18    Assessment:    Principal Problem:    Hand laceration  Active Problems:    Depression    Essential hypertension    Acquired hypothyroidism    Lymphoma ([address] )      Plan:    Left Hand cellulitis secondary to stray cat bite  Patient developed allergy to clindamycin/doxycycline  X-ray of the hand showed no acute osseous abnormality  Soft tissue swelling dorsal to distal metacarpals  ID recommendations appreciated  Continue IV Unasyn  Hand surgery following, recommendations appreciated no surgical intervention at this time  Monitor CBC and temps     Hypothyroidism  Continue levothyroxine     Depression  Continue Paxil    VTE Pharmacologic Prophylaxis:   Pharmacologic: Heparin  Mechanical VTE Prophylaxis in Place: Yes    Patient Centered Rounds: I have performed bedside rounds with nursing staff today  Discussions with Specialists or Other Care Team Provider:  Infectious Disease    Education and Discussions with Family / Patient:  Patient  Time Spent for Care: 20 minutes  More than 50% of total time spent on counseling and coordination of care as described above  Current Length of Stay: 3 day(s)    Current Patient Status: Inpatient   Certification Statement: The patient will continue to require additional inpatient hospital stay due to Cellulitis    Discharge Plan:  Likely discharge tomorrow    Code Status: Level 1 - Full Code      Subjective:   Patient seen and examined  She feels subjectively better    Denies any complaints at this    Objective:     Vitals:   Temp (24hrs), Av 2 °F (36 8 °C), Min:97 9 °F (36 6 °C), Max:98 7 °F (37 1 °C)    Temp:  [97 9 °F (36 6 °C)-98 7 °F (37 1 °C)] 97 9 °F (36 6 °C)  HR:  [68-71] 68  Resp:  [18] 18  BP: (132-147)/(63-69) 132/65  SpO2:  [91 %-95 %] 95 %  Body mass index is 35 31 kg/m²  Input and Output Summary (last 24 hours): Intake/Output Summary (Last 24 hours) at 11/13/18 1629  Last data filed at 11/13/18 1500   Gross per 24 hour   Intake             1080 ml   Output              850 ml   Net              230 ml       Physical Exam:     Alert, oriented x3  Mucous membranes are moist  Lungs are clear to auscultation  Heart sounds are regular S1-S2  Abdomen soft nontender  Extremities-area of cellulitis left hand improved    Additional Data:     Labs:      Results from last 7 days  Lab Units 11/11/18  0532 11/10/18  1511   WBC Thousand/uL 9 51 9 17   HEMOGLOBIN g/dL 13 0 13 6   HEMATOCRIT % 37 7 39 2   PLATELETS Thousands/uL 231 270   NEUTROS PCT %  --  72   LYMPHS PCT %  --  14   MONOS PCT %  --  11   EOS PCT %  --  1       Results from last 7 days  Lab Units 11/11/18  0532   POTASSIUM mmol/L 3 7   CHLORIDE mmol/L 105   CO2 mmol/L 24   BUN mg/dL 16   CREATININE mg/dL 0 74   CALCIUM mg/dL 8 2*       Results from last 7 days  Lab Units 11/10/18  1511   INR  0 94       * I Have Reviewed All Lab Data Listed Above  * Additional Pertinent Lab Tests Reviewed: [address] Admission Reviewed    Imaging:    Imaging Reports Reviewed Today Include:   Imaging Personally Reviewed by Myself Includes:      Recent Cultures (last 7 days):       Results from last 7 days  Lab Units 11/10/18  1528 11/10/18  1511   BLOOD CULTURE  No Growth at 48 hrs  No Growth at 48 hrs         Last 24 Hours Medication List:     Current Facility-Administered Medications:  acetaminophen 650 mg Oral Q6H PRN Erick Mckay MD    ampicillin-sulbactam 1 5 g Intravenous Q6H Patricia Whalen DO Last Rate: 1 5 g (11/13/18 1214)   aspirin 325 mg Oral Daily Erick Mckay MD    docusate sodium 100 mg Oral BID Lino Musa MD    enoxaparin 40 mg Subcutaneous Daily Erick Mckay MD    levothyroxine 125 mcg Oral Daily Erick Mckay MD    pantoprazole 20 mg Oral Early Morning Erick Mckay MD    PARoxetine 20 mg Oral Daily Jordi Soler MD         Today, Patient Was Seen By: Byron Velez MD    ** Please Note: Dragon 360 Dictation voice to text software may have been used in the creation of this document   **

## 2018-11-13 NOTE — PROGRESS NOTES
Progress Note - Orthopedics   Cyndi Schlatter 67 y o  female MRN: [id number]  Unit/Bed#: -01      Subjective:    67 y  o female left hand cellulitis of her long finger after a cat bite  Patient denies any new complaints  She does state she is able to slightly flex her finger but notes some pain  Pain controlled  Denies fevers chills, CP, SOB  New pictures were added to the media page for comparison from yesterday to today's evaluation      Labs:    0  Lab Value Date/Time   HCT 37 7 11/11/2018 0532   HCT 39 2 11/10/2018 1511   HCT 38 4 07/30/2018 1456   HCT 42 3 07/17/2014 0840   HGB 13 0 11/11/2018 0532   HGB 13 6 11/10/2018 1511   HGB 12 8 07/30/2018 1456   HGB 14 2 07/17/2014 0840   INR 0 94 11/10/2018 1511   WBC 9 51 11/11/2018 0532   WBC 9 17 11/10/2018 1511   WBC 7 31 07/30/2018 1456   WBC 4 6 07/17/2014 0840       Meds:    Current Facility-Administered Medications:     acetaminophen (TYLENOL) tablet 650 mg, 650 mg, Oral, Q6H PRN, Jesika Conway MD, 650 mg at 11/11/18 0602    ampicillin-sulbactam (UNASYN) 1 5 g in sodium chloride 0 9 % 50 mL IVPB, 1 5 g, Intravenous, Q6H, Patricia Whalen DO, Last Rate: 100 mL/hr at 11/13/18 1659, 1 5 g at 11/13/18 1659    aspirin tablet 325 mg, 325 mg, Oral, Daily, Jesika Conway MD, 325 mg at 11/13/18 0819    docusate sodium (COLACE) capsule 100 mg, 100 mg, Oral, BID, Kaya Espinoza MD, 100 mg at 11/13/18 0820    enoxaparin (LOVENOX) subcutaneous injection 40 mg, 40 mg, Subcutaneous, Daily, Jesika Conway MD, 40 mg at 11/13/18 1823    levothyroxine tablet 125 mcg, 125 mcg, Oral, Daily, Jesika Conway MD, 125 mcg at 11/13/18 0601    pantoprazole (PROTONIX) EC tablet 20 mg, 20 mg, Oral, Early Morning, Jesika Conway MD, 20 mg at 11/13/18 0601    PARoxetine (PAXIL) tablet 20 mg, 20 mg, Oral, Daily, Jesika Conway MD, 20 mg at 11/13/18 9667    Blood Culture:   Lab Results   Component Value Date    BLOODCX No Growth at 48 hrs  11/10/2018       Wound Culture:   No results found for: WOUNDCULT    Ins and Outs:  I/O last 24 hours: In: 1560 [P O :1560]  Out: 1050 [Urine:1050]          Physical:  Vitals:    11/13/18 1511   BP: 132/65   Pulse: 68   Resp: 18   Temp: 97 9 °F (36 6 °C)   SpO2: 95%       Heart:  Regular rate and rhythm  Pulmonary:  Clear to auscultation  Musculoskeletal: left Upper Extremity  · Skin erythema noted around the long finger and 2 small puncture wounds appreciated:  1 on the dorsal aspect of the hand the other on the volar aspect of the hand  · TTP diffusely over the long finger  · Pain with active extension and flexion of the long finger  · SILT m/r/u  Motor intact ain/pin/m/r/u, 2+ rad pulse    _*_*_*_*_*_*_*_*_*_*_*_*_*_*_*_*_*_*_*_*_*_*_*_*_*_*_*_*_*_*_*_*_*_*_*_*_*_*_*_*_*    Assessment:    67 y  o female left hand long finger cellulitis     Plan:  · Nonweightbearing left upper extremity  · Pain control  · DVT ppx  · Dispo: Ortho will follow   · Case was discussed with Dr Juan Diego Jameson, we will order an MRI of the left hand to see if there is any abscess or fluid collection  The patient will be NPO at midnight tonight  We will get her scheduled for a left hand long finger I and D  Patricia Hall PA-C

## 2018-11-14 ENCOUNTER — ANESTHESIA EVENT (OUTPATIENT)
Dept: MEDSURG UNIT | Facility: HOSPITAL | Age: 73
End: 2018-11-14

## 2018-11-14 ENCOUNTER — ANESTHESIA (OUTPATIENT)
Dept: MEDSURG UNIT | Facility: HOSPITAL | Age: 73
End: 2018-11-14

## 2018-11-14 PROCEDURE — 99232 SBSQ HOSP IP/OBS MODERATE 35: CPT | Performed by: INTERNAL MEDICINE

## 2018-11-14 PROCEDURE — 99231 SBSQ HOSP IP/OBS SF/LOW 25: CPT | Performed by: PHYSICIAN ASSISTANT

## 2018-11-14 RX ADMIN — SODIUM CHLORIDE 1.5 G: 9 INJECTION, SOLUTION INTRAVENOUS at 12:56

## 2018-11-14 RX ADMIN — SODIUM CHLORIDE 1.5 G: 9 INJECTION, SOLUTION INTRAVENOUS at 23:30

## 2018-11-14 RX ADMIN — LEVOTHYROXINE SODIUM 125 MCG: 125 TABLET ORAL at 05:47

## 2018-11-14 RX ADMIN — DEXTRAN 70 AND HYPROMELLOSE 2910 1 DROP: 1; 3 SOLUTION/ DROPS OPHTHALMIC at 17:35

## 2018-11-14 RX ADMIN — DOCUSATE SODIUM 100 MG: 100 CAPSULE, LIQUID FILLED ORAL at 08:59

## 2018-11-14 RX ADMIN — DOCUSATE SODIUM 100 MG: 100 CAPSULE, LIQUID FILLED ORAL at 17:35

## 2018-11-14 RX ADMIN — SODIUM CHLORIDE 1.5 G: 9 INJECTION, SOLUTION INTRAVENOUS at 05:47

## 2018-11-14 RX ADMIN — SODIUM CHLORIDE 1.5 G: 9 INJECTION, SOLUTION INTRAVENOUS at 17:38

## 2018-11-14 RX ADMIN — ASPIRIN 325 MG: 325 TABLET ORAL at 08:59

## 2018-11-14 RX ADMIN — PAROXETINE HYDROCHLORIDE 20 MG: 20 TABLET, FILM COATED ORAL at 08:59

## 2018-11-14 RX ADMIN — PANTOPRAZOLE SODIUM 20 MG: 20 TABLET, DELAYED RELEASE ORAL at 05:47

## 2018-11-14 RX ADMIN — ENOXAPARIN SODIUM 40 MG: 40 INJECTION SUBCUTANEOUS at 08:58

## 2018-11-14 NOTE — PLAN OF CARE
SAFETY ADULT     Patient will remain free of falls Adequate for Discharge     Maintain or return to baseline ADL function Adequate for Discharge     Maintain or return mobility status to optimal level Adequate for Discharge          DISCHARGE PLANNING     Discharge to home or other facility with appropriate resources Progressing        DISCHARGE PLANNING - CARE MANAGEMENT     Discharge to post-acute care or home with appropriate resources Progressing        HEMATOLOGIC - ADULT     Maintains hematologic stability Progressing        INFECTION - ADULT     Absence or prevention of progression during hospitalization Progressing     Absence of fever/infection during neutropenic period Progressing        Knowledge Deficit     Patient/family/caregiver demonstrates understanding of disease process, treatment plan, medications, and discharge instructions Progressing        PAIN - ADULT     Verbalizes/displays adequate comfort level or baseline comfort level Progressing        Potential for Falls     Patient will remain free of falls Progressing        SKIN/TISSUE INTEGRITY - ADULT     Skin integrity remains intact Progressing     Incision(s), wounds(s) or drain site(s) healing without S/S of infection Progressing

## 2018-11-14 NOTE — PROGRESS NOTES
Progress Note - Infectious Disease   Autumn Brooks 67 y o  female MRN: [id number]  Unit/Bed#: -01 Encounter: [id number]      Impression/Plan:  1   Cellulitis of the left hand   Secondary to cat bite   Patient reports the cat was a stray and she is unaware if it has any chronic disease  She received the rabies vaccine and a tetanus shot in the ED  Xray of the left hand from 11/11/2018 showed soft tissue swelling dorsal to the distal metacarpals  MRI of the hand from 11/14/2018 showed a minimal fluid collection with thick enhancement of the 3rd flexor tendon sheath compatible with tenosynovitis  She will be having an I and D in the OR later today  Despite the pain and swelling in her left hand the patient has remained clinically stable and nontoxic  Her blood cultures are negative after 72 hours  Patient is currently taking IV Unasyn and is tolerating without difficulty  -continue IV Unasyn  -monitor for side effects and adverse reaction from antibiotics  -monitor CBCD and BMP  -follow up blood cultures  -monitor vitals  -serial skin exams  -supportive care  -continue close follow-up with Orthopedic surgery     2   Cat bite   Patient with bite from a stray cat on 11/09/2018  [facility] her admission she has received rabies vaccine as well as Tetanus shot  Blood cultures are negative after 72 hours  Patient continues to have only mild improvement in pain and swelling but fortunately she is clinically stable and nontoxic    No osseous abnormality noted on x-ray of the left hand  I reviewed patient's new MRI of the left hand from 11/14/2018 which showed minimal fluid collection, no osteomyelitis, and thick enhancement 3rd flexor tendon sheath compatible with tenosynovitis  Patient be going to the OR later today for I and D   She continues following closely with the orthopedic surgery team   -antibiotic as above  -monitor CBCD and BMP  -follow up blood cultures  -serial skin exams  -local wound care as needed  -continue close follow-up with Orthopedic surgery     3   Multiple antibiotic allergies  Patient with history of rash to multiple antibiotics  She again experienced rash after administration of clindamycin and doxycycline after admission  It is unclear which medication lead the rash  Discussed pasteurella as a likely bacterial organism from a cat bite  Patient was willing to try IV Unasyn as her previous reaction was not anaphylaxis  She continues to tolerate the IV Unasyn without difficulty   -antibiotic as above  -monitor patient closely for side effects and adverse reaction     4  Depression  Antibiotics:  Unasyn 3  Antibiotics 5    Subjective:  Patient reports that she does not feel her left hand has gotten any better since yesterday  She continues to have some pain in the L distal hand but it is worst in the L 3rd finger  She is moving the finger more than yesterday  The patient tells me that she will be going to the operating room sometime today because her surgeon wants to wash her finger out  Patient has no fever, chills, sweats; no nausea, vomiting, diarrhea; no cough, shortness of breath; No new symptoms  Objective:  Vitals:  Temp:  [97 9 °F (36 6 °C)-98 6 °F (37 °C)] 98 6 °F (37 °C)  HR:  [68-74] 74  Resp:  [18] 18  BP: (132-174)/(65-74) 174/74  SpO2:  [91 %-98 %] 91 %  Temp (24hrs), Av 3 °F (36 8 °C), Min:97 9 °F (36 6 °C), Max:98 6 °F (37 °C)  Current: Temperature: 98 6 °F (37 °C)    Physical Exam:   General Appearance:  Alert, interactive, nontoxic, no acute distress  Throat: Oropharynx moist without lesions  Lungs:   Clear to auscultation bilaterally; no wheezes, rhonchi or rales; respirations unlabored   Heart:  RRR; no murmur, rub or gallop   Abdomen:   Soft, non-tender, non-distended, positive bowel sounds  Extremities: No clubbing or cyanosis; mild edema of the left distal hand, moderate edema in the left 3rd finger   Skin: No new rashes or lesions   No draining wounds noted  Purplish discoloration to the left 3rd finger is slightly less dense today, a trace discoloration remains on the left distal dorsal hand; left 3rd finger with intact fingernail and brisk capillary refill     Labs, Imaging, & Other studies:   All pertinent labs and imaging studies were personally reviewed    Results from last 7 days  Lab Units 11/11/18  0532 11/10/18  1511   WBC Thousand/uL 9 51 9 17   HEMOGLOBIN g/dL 13 0 13 6   PLATELETS Thousands/uL 231 270       Results from last 7 days  Lab Units 11/11/18  0532   POTASSIUM mmol/L 3 7   CHLORIDE mmol/L 105   CO2 mmol/L 24   BUN mg/dL 16   CREATININE mg/dL 0 74   EGFR ml/min/1 73sq m 81   CALCIUM mg/dL 8 2*       Results from last 7 days  Lab Units 11/10/18  1528 11/10/18  1511   BLOOD CULTURE  No Growth at 72 hrs  No Growth at 72 hrs  IMAGING:  MRI HAND LEFT W WO CONTRAST 1/14/18  FINDINGS:  SUBCUTANEOUS TISSUES: There is soft tissue edema at the dorsum of the hand suggests a cellulitis    A small fluid collection is seen in the volar soft tissue at the level of the mid palm, seen in image 23 of series 10, measuring 3 mm  FLEXOR/EXTENSOR TENDONS: Extensor tendons appear unremarkable   There is thick enhancement of the 3rd flexor tendon tendon sheath on the postcontrast images compatible with tenosynovitis     MUSCULATURE: Normal   ARTICULAR SURFACES: Small amount of for fluid noted within the pisotriquetral recess which is on arthritic basis, nonspecific  BONES: There is no medullary marrow edema to suggest any osteomyelitis   SOFT TISSUES: Normal    Impression:     No osteomyelitis seen cellulitis at the dorsal aspect of the hand  3rd flexor tendon tenosynovitis with thick enhancement of the tendon sheath  Small superficial 3 mm fluid collection in the volar aspect of the midpalm, seen in image 23 of series 10

## 2018-11-14 NOTE — PROGRESS NOTES
[facility] Progress Note  Patient: Falguni Hernandez 67 y o  female   MRN: [id number]  PCP: Yuli Larson MD  Unit/Bed#: [facility] Encounter: [id number]  Date Of Visit: 18    Assessment:    Principal Problem:    Hand laceration  Active Problems:    Depression    Essential hypertension    Acquired hypothyroidism    Lymphoma ([address] )    Flexor tenosynovitis of finger    Cellulitis of left hand      Plan:    Left Hand cellulitis secondary to stray cat bite  MRI revealed small superficial fluid collection  No joint involvement  Orthopedic surgery following, patient may require local I and D  X-ray of the hand showed no acute osseous abnormality   Soft tissue swelling dorsal to distal metacarpals  ID recommendations appreciated  Continue IV Unasyn  Monitor CBC and temps     Hypothyroidism  Continue levothyroxine     Depression  Continue Paxil       VTE Pharmacologic Prophylaxis:   Pharmacologic: Heparin  Mechanical VTE Prophylaxis in Place: Yes    Patient Centered Rounds: I have performed bedside rounds with nursing staff today  Discussions with Specialists or Other Care Team Provider:  Id    Education and Discussions with Family / Patient:  Patient    Time Spent for Care: 30 minutes  More than 50% of total time spent on counseling and coordination of care as described above  Current Length of Stay: 4 day(s)    Current Patient Status: Inpatient   Certification Statement: The patient will continue to require additional inpatient hospital stay due to I&D    Discharge Plan likely tomorrow if improving    Code Status: Level 1 - Full Code      Subjective:   Patient seen and examined  Denies any acute complaints  She feels slightly better    Still continues to have swelling    Objective:     Vitals:   Temp (24hrs), Av 3 °F (36 8 °C), Min:97 9 °F (36 6 °C), Max:98 6 °F (37 °C)    Temp:  [97 9 °F (36 6 °C)-98 6 °F (37 °C)] 98 6 °F (37 °C)  HR:  [68-74] 74  Resp:  [18] 18  BP: (132-174)/(65-74) 174/74  SpO2:  [91 %-98 %] 91 %  Body mass index is 35 31 kg/m²  Input and Output Summary (last 24 hours): Intake/Output Summary (Last 24 hours) at 11/14/18 0910  Last data filed at 11/13/18 2201   Gross per 24 hour   Intake              680 ml   Output              650 ml   Net               30 ml       Physical Exam:     Alert, oriented x3  Mucous membranes are moist  Lungs are clear to auscultation  Heart sounds are regular S1-S2  Abdomen soft nontender  Extremities left hand tender to touch over the long finger  Erythema and swelling present on the long finger as well as index finger and volar aspect of the hand    Additional Data:     Labs:      Results from last 7 days  Lab Units 11/11/18  0532 11/10/18  1511   WBC Thousand/uL 9 51 9 17   HEMOGLOBIN g/dL 13 0 13 6   HEMATOCRIT % 37 7 39 2   PLATELETS Thousands/uL 231 270   NEUTROS PCT %  --  72   LYMPHS PCT %  --  14   MONOS PCT %  --  11   EOS PCT %  --  1       Results from last 7 days  Lab Units 11/11/18  0532   POTASSIUM mmol/L 3 7   CHLORIDE mmol/L 105   CO2 mmol/L 24   BUN mg/dL 16   CREATININE mg/dL 0 74   CALCIUM mg/dL 8 2*       Results from last 7 days  Lab Units 11/10/18  1511   INR  0 94       * I Have Reviewed All Lab Data Listed Above  * Additional Pertinent Lab Tests Reviewed: [address] Admission Reviewed    Imaging:    Imaging Reports Reviewed Today Include:   Imaging Personally Reviewed by Myself Includes:     Recent Cultures (last 7 days):       Results from last 7 days  Lab Units 11/10/18  1528 11/10/18  1511   BLOOD CULTURE  No Growth at 72 hrs  No Growth at 72 hrs         Last 24 Hours Medication List:     Current Facility-Administered Medications:  acetaminophen 650 mg Oral Q6H PRN Tyson Ling MD    ampicillin-sulbactam 1 5 g Intravenous Q6H Patricia Whalen DO Last Rate: 1 5 g (11/14/18 0547)   aspirin 325 mg Oral Daily Tyson Ling MD    bacitracin 53544 units in sodium chloride 0 9% 500 mL  Irrigation Once Meagan Pichardo MD    dextran 70-hypromellose 1 drop Both Eyes PRN Cheri Osman MD    docusate sodium 100 mg Oral BID Cheri Osman MD    enoxaparin 40 mg Subcutaneous Daily Ester Leigh MD    levothyroxine 125 mcg Oral Daily Ester Leigh MD    pantoprazole 20 mg Oral Early Morning Ester Leigh MD    PARoxetine 20 mg Oral Daily Ester Leigh MD         Today, Patient Was Seen By: Cheri Osman MD    ** Please Note: Dragon 360 Dictation voice to text software may have been used in the creation of this document   **

## 2018-11-14 NOTE — PLAN OF CARE
DISCHARGE PLANNING     Discharge to home or other facility with appropriate resources Progressing        DISCHARGE PLANNING - CARE MANAGEMENT     Discharge to post-acute care or home with appropriate resources Progressing        HEMATOLOGIC - ADULT     Maintains hematologic stability Progressing        INFECTION - ADULT     Absence or prevention of progression during hospitalization Progressing     Absence of fever/infection during neutropenic period Progressing        Knowledge Deficit     Patient/family/caregiver demonstrates understanding of disease process, treatment plan, medications, and discharge instructions Progressing        PAIN - ADULT     Verbalizes/displays adequate comfort level or baseline comfort level Progressing        Potential for Falls     Patient will remain free of falls Progressing        SAFETY ADULT     Patient will remain free of falls Progressing     Maintain or return to baseline ADL function Progressing     Maintain or return mobility status to optimal level Progressing        SKIN/TISSUE INTEGRITY - ADULT     Skin integrity remains intact Progressing     Incision(s), wounds(s) or drain site(s) healing without S/S of infection Progressing

## 2018-11-14 NOTE — PROGRESS NOTES
Orthopedics   Raul Burgess 67 y o  female MRN: [id number]  Unit/Bed#: -01          HPI:   67 y  o female complaining of left long finger erythema and pain after a cat bite  The patient was seen early this morning  She noted improvement in the swelling  Pain is only with motion  She has been receiving IV antibiotics  Denies fever or chills  Denies CP or SOB  Review Of Systems:   · Skin: Normal  · Neuro: See HPI  · Musculoskeletal: See HPI  · 14 point review of systems negative except as stated above     Past Medical History:   Past Medical History:   Diagnosis Date    Anxiety disorder     Aortic valve disorder     Depression     History of gastroesophageal reflux (GERD)     History of kidney cancer 03/06/2014    Hyperlipidemia 03/06/2014    Hypertension 03/06/2014    Hypothyroidism 03/06/2014    Shortness of breath     TIA (transient ischemic attack)        Past Surgical History:   Past Surgical History:   Procedure Laterality Date    ESOPHAGOGASTRIC FUNDOPLASTY      Nissen Fundoplication       Family History:  Family history reviewed and non-contributory  Family History   Problem Relation Age of Onset    Stroke Father     Leukemia Sister     Alcohol abuse Brother     Kidney cancer Brother        Social History:  Social History     Social History    Marital status: /Civil Union     Spouse name: N/A    Number of children: 2    Years of education: N/A     Social History Main Topics    Smoking status: Never Smoker    Smokeless tobacco: Never Used    Alcohol use No    Drug use: No    Sexual activity: Not Asked     Other Topics Concern    None     Social History Narrative    None       Allergies: Allergies   Allergen Reactions    Ciprofloxacin     Other     Sulfa Antibiotics     Penicillins Rash     Category: Allergy;     Sulfacetamide Rash     Category:  Allergy;            Labs:    0  Lab Value Date/Time   HCT 37 7 11/11/2018 0532   HCT 39 2 11/10/2018 1511   HCT 38 4 07/30/2018 1456   HCT 42 3 07/17/2014 0840   HGB 13 0 11/11/2018 0532   HGB 13 6 11/10/2018 1511   HGB 12 8 07/30/2018 1456   HGB 14 2 07/17/2014 0840   INR 0 94 11/10/2018 1511   WBC 9 51 11/11/2018 0532   WBC 9 17 11/10/2018 1511   WBC 7 31 07/30/2018 1456   WBC 4 6 07/17/2014 0840       Meds:    Current Facility-Administered Medications:     acetaminophen (TYLENOL) tablet 650 mg, 650 mg, Oral, Q6H PRN, Shilpa Ferraro MD, 650 mg at 11/11/18 0602    ampicillin-sulbactam (UNASYN) 1 5 g in sodium chloride 0 9 % 50 mL IVPB, 1 5 g, Intravenous, Q6H, Patricia Whalen DO, Last Rate: 100 mL/hr at 11/14/18 1256, 1 5 g at 11/14/18 1256    aspirin tablet 325 mg, 325 mg, Oral, Daily, Shilpa Ferraro MD, 325 mg at 11/14/18 0859    bacitracin 50,000 Units in sodium chloride 0 9 % 500 mL irrigation bag, , Irrigation, Once, Mannie Jerome MD    dextran 70-hypromellose (GENTEAL TEARS) 0 1-0 3 % ophthalmic solution 1 drop, 1 drop, Both Eyes, PRN, Jeff Herron MD    docusate sodium (COLACE) capsule 100 mg, 100 mg, Oral, BID, Kaya Espinoza MD, 100 mg at 11/14/18 0859    enoxaparin (LOVENOX) subcutaneous injection 40 mg, 40 mg, Subcutaneous, Daily, Shilpa Ferraro MD, 40 mg at 11/14/18 9592    levothyroxine tablet 125 mcg, 125 mcg, Oral, Daily, Shilpa Ferraro MD, 125 mcg at 11/14/18 0547    pantoprazole (PROTONIX) EC tablet 20 mg, 20 mg, Oral, Early Morning, Shilpa Ferraro MD, 20 mg at 11/14/18 0547    PARoxetine (PAXIL) tablet 20 mg, 20 mg, Oral, Daily, Shilpa Ferraro MD, 20 mg at 11/14/18 0859    Blood Culture:   Lab Results   Component Value Date    BLOODCX No Growth at 72 hrs  11/10/2018       Wound Culture:   No results found for: WOUNDCULT    Ins and Outs:  I/O last 24 hours:   In: 1250 [P O :1250]  Out: 850 [Urine:850]          Physical Exam:   /61 (BP Location: Left arm)   Pulse 67   Temp 98 °F (36 7 °C) (Oral)   Resp 16   Ht 5' (1 524 m)   Wt 82 kg (180 lb 12 4 oz)   SpO2 91%   BMI 35 31 kg/m²   Gen: Alert and oriented to person, place, time  HEENT: EOMI, eyes clear, moist mucus membranes, hearing intact  Respiratory: Bilateral chest rise  No audible wheezing found  Cardiovascular: Regular Rate and Rhythm  Abdomen: soft nontender/nondistended  · Musculoskeletal: leftUpper Extremity: left long finger  · Skin: Mild erythema over the volar and dorsal aspect of the finger  One puncture wound on the volar side and one of the dorsal side  · Mildly tender to palpation over both the volar and dorsal side  Patient is able to fully extend all digits without difficulty  No significant pain over the flexor tendon  · Sensation intact throughout the left hand      MRI of the left hand showed no signs of osteomyelitis  The 3rd flexor tendon was thick  There was a small superficial fluid collection of the volar aspect midpalm       _*_*_*_*_*_*_*_*_*_*_*_*_*_*_*_*_*_*_*_*_*_*_*_*_*_*_*_*_*_*_*_*_*_*_*_*_*_*_*_*_*    Assessment:  67 y  o female with left long finger cellulitis    Plan:   · Cont  abx per primary team   She has had improvement of her symptoms and swelling  · Pictures taken today and uploaded in media  · I discussed the case with Dr Phylicia Smith and showed him the media images  Dr Phylicia Smith spoke to the patient today and examined her  No surgery is indicated at this time, but we will closely monitor  We will give the patient and diet and let her eat today  NPO after midnight tonight  We will check on her in the morning to see if we need to schedule her for an I&D  · Analgesics for pain  · MRI was obtained yesterday      · Dispo: Ortho will follow      Dave Carter PA-C

## 2018-11-15 ENCOUNTER — TELEPHONE (OUTPATIENT)
Dept: OBGYN CLINIC | Facility: CLINIC | Age: 73
End: 2018-11-15

## 2018-11-15 VITALS
HEIGHT: 60 IN | SYSTOLIC BLOOD PRESSURE: 159 MMHG | BODY MASS INDEX: 35.49 KG/M2 | RESPIRATION RATE: 18 BRPM | WEIGHT: 180.78 LBS | HEART RATE: 72 BPM | TEMPERATURE: 97.6 F | OXYGEN SATURATION: 94 % | DIASTOLIC BLOOD PRESSURE: 88 MMHG

## 2018-11-15 LAB
BACTERIA BLD CULT: NORMAL
BACTERIA BLD CULT: NORMAL
ERYTHROCYTE [DISTWIDTH] IN BLOOD BY AUTOMATED COUNT: 13.7 % (ref 11.6–15.1)
HCT VFR BLD AUTO: 33.5 % (ref 34.8–46.1)
HGB BLD-MCNC: 11.3 G/DL (ref 11.5–15.4)
MCH RBC QN AUTO: 34.5 PG (ref 26.8–34.3)
MCHC RBC AUTO-ENTMCNC: 33.7 G/DL (ref 31.4–37.4)
MCV RBC AUTO: 102 FL (ref 82–98)
PLATELET # BLD AUTO: 233 THOUSANDS/UL (ref 149–390)
PMV BLD AUTO: 11.7 FL (ref 8.9–12.7)
RBC # BLD AUTO: 3.28 MILLION/UL (ref 3.81–5.12)
WBC # BLD AUTO: 5.53 THOUSAND/UL (ref 4.31–10.16)

## 2018-11-15 PROCEDURE — 85027 COMPLETE CBC AUTOMATED: CPT | Performed by: INTERNAL MEDICINE

## 2018-11-15 PROCEDURE — 99232 SBSQ HOSP IP/OBS MODERATE 35: CPT | Performed by: INTERNAL MEDICINE

## 2018-11-15 PROCEDURE — 99231 SBSQ HOSP IP/OBS SF/LOW 25: CPT | Performed by: PHYSICIAN ASSISTANT

## 2018-11-15 PROCEDURE — 99239 HOSP IP/OBS DSCHRG MGMT >30: CPT | Performed by: INTERNAL MEDICINE

## 2018-11-15 RX ORDER — AMOXICILLIN AND CLAVULANATE POTASSIUM 875; 125 MG/1; MG/1
1 TABLET, FILM COATED ORAL EVERY 12 HOURS SCHEDULED
Qty: 20 TABLET | Refills: 0 | Status: SHIPPED | OUTPATIENT
Start: 2018-11-15 | End: 2018-11-25

## 2018-11-15 RX ORDER — AMOXICILLIN AND CLAVULANATE POTASSIUM 875; 125 MG/1; MG/1
1 TABLET, FILM COATED ORAL EVERY 12 HOURS SCHEDULED
Status: DISCONTINUED | OUTPATIENT
Start: 2018-11-15 | End: 2018-11-15 | Stop reason: HOSPADM

## 2018-11-15 RX ORDER — DOCUSATE SODIUM 100 MG/1
100 CAPSULE, LIQUID FILLED ORAL 2 TIMES DAILY
Qty: 10 CAPSULE | Refills: 0 | Status: SHIPPED | OUTPATIENT
Start: 2018-11-15 | End: 2018-12-17 | Stop reason: ALTCHOICE

## 2018-11-15 RX ADMIN — ENOXAPARIN SODIUM 40 MG: 40 INJECTION SUBCUTANEOUS at 10:41

## 2018-11-15 RX ADMIN — PAROXETINE HYDROCHLORIDE 20 MG: 20 TABLET, FILM COATED ORAL at 10:41

## 2018-11-15 RX ADMIN — AMOXICILLIN AND CLAVULANATE POTASSIUM 1 TABLET: 875; 125 TABLET, FILM COATED ORAL at 10:43

## 2018-11-15 RX ADMIN — DOCUSATE SODIUM 100 MG: 100 CAPSULE, LIQUID FILLED ORAL at 10:41

## 2018-11-15 RX ADMIN — LEVOTHYROXINE SODIUM 125 MCG: 125 TABLET ORAL at 06:13

## 2018-11-15 RX ADMIN — PANTOPRAZOLE SODIUM 20 MG: 20 TABLET, DELAYED RELEASE ORAL at 06:13

## 2018-11-15 RX ADMIN — SODIUM CHLORIDE 1.5 G: 9 INJECTION, SOLUTION INTRAVENOUS at 06:13

## 2018-11-15 RX ADMIN — ASPIRIN 325 MG: 325 TABLET ORAL at 10:41

## 2018-11-15 NOTE — DISCHARGE SUMMARY
Discharge Summary - [address] [facility]    Patient Information: Moris Mendoza 67 y o  female MRN: [id number]  Unit/Bed#: -01 Encounter: [id number]    Discharging Physician / Practitioner: Javid Henderson MD  PCP: Diogo Edmond MD  Admission Date: 11/10/2018  Discharge Date: 11/15/18    Disposition:     Home    Reason for Admission:     Discharge Diagnoses:     Principal Problem:    Left Hand Cellulitis with Flexor tendon Tenosynoviotis  Active Problems:    Depression    Essential hypertension    Acquired hypothyroidism    Lymphoma ([facility] [address] )    Cat bite of left hand  Resolved Problems:    * No resolved hospital problems  *      Consultations During Hospital Stay:  · Orthopedics  · Infectious Diseases    Procedures Performed:   None    Significant Findings / Test Results:   MRI of the head  No osteomyelitis seen cellulitis at the dorsal aspect of the hand   3rd flexor tendon tenosynovitis with thick enhancement of the tendon sheath   Small superficial 3 mm fluid collection in the volar aspect of the midpalm, seen in image 23 of series 10     Incidental Findings:   · none    Test Results Pending at Discharge (will require follow up):   · none     Outpatient Tests Requested:  · none    Complications:  None    Hospital Course:     Moris Mendoza is a 67 y o  female patient who originally presented to the hospital on 11/10/2018 due to concern for left 3rd swollen finger after suffering a cat bite  Patient was admitted for management of left hand cellulitis and was initially started on clindamycin and doxycycline, but she had an episode of hives on the chest and antibiotic was changed to IV Vancomycin due to multiple antibiotic allergies  ID was consulted to assist in antibiotic management and it was later changed to Unasyn  MRI left hand showed a superficial small fluid collection in mid palm and thickening of tendon sheath  Ortho evaluated patient as well, recommended no surgical intervention at this time  Patient improved clinically, was discharged home with Augmentin to complete the course and recommended to have close follow up with ortho after discharge  Condition at Discharge: stable     Discharge Day Visit / Exam:     Subjective:  Patient seen and examined  No complaints at this time  He is better  Vitals: Blood Pressure: 159/88 (11/15/18 0700)  Pulse: 72 (11/15/18 0700)  Temperature: 97 6 °F (36 4 °C) (11/15/18 0700)  Temp Source: Oral (11/15/18 0700)  Respirations: 18 (11/15/18 0700)  Height: 5' (152 4 cm) (11/10/18 1614)  Weight - Scale: 82 kg (180 lb 12 4 oz) (11/10/18 1614)  SpO2: 94 % (11/15/18 0700)  Exam:   Physical Exam   Constitutional: She is oriented to person, place, and time  She appears well-developed and well-nourished  HENT:   Head: Normocephalic and atraumatic  Eyes: Pupils are equal, round, and reactive to light  EOM are normal    Neck: Normal range of motion  Neck supple  Pulmonary/Chest: Effort normal and breath sounds normal    Abdominal: Soft  Bowel sounds are normal    Musculoskeletal: Normal range of motion  She exhibits edema  Neurological: She is alert and oriented to person, place, and time  Skin: Skin is warm and dry  Discussion with Family: patient    Discharge instructions/Information to patient and family:   See after visit summary for information provided to patient and family  Provisions for Follow-Up Care:  See after visit summary for information related to follow-up care and any pertinent home health orders  Planned Readmission: none     Discharge Statement:  I spent 35 minutes discharging the patient  This time was spent on the day of discharge  I had direct contact with the patient on the day of discharge  Greater than 50% of the total time was spent examining patient, answering all patient questions, arranging and discussing plan of care with patient as well as directly providing post-discharge instructions    Additional time then spent on discharge activities  Discharge Medications:  See after visit summary for reconciled discharge medications provided to patient and family        ** Please Note: This note has been constructed using a voice recognition system **

## 2018-11-15 NOTE — PROGRESS NOTES
Progress Note - Infectious Disease   Sanju Huggins 67 y o  female MRN: [id number]  Unit/Bed#: -01 Encounter: [id number]    Impression/Plan:  1   Cellulitis of the left hand   Secondary to cat bite   Patient reports the cat was a stray and she is unaware if it has any chronic disease  She received the rabies vaccine and a tetanus shot in the ED  Xray of the left hand from 11/11/2018 showed soft tissue swelling dorsal to the distal metacarpals  MRI of the hand from 11/14/2018 showed a minimal fluid collection with thick enhancement of the 3rd flexor tendon sheath compatible with tenosynovitis  Patient was initially scheduled for I and D but this is currently on hold  She is once again and NPO and will be assessed by the orthopedic surgery team later today for possible surgery later today  However, given that patient has made significant clinical improvement, I do not suspect she will need surgical intervention at this time  Patient has remained clinically stable and nontoxic  Her blood cultures are negative after five days  Will transition patient to PO antibiotics today   -stop IV Unasyn  -transition to p o  Augmentin, 825/125mg BID through 11/25/18 for a total of 14 days of antibiotic treatment  -monitor for side effects and adverse reaction from antibiotics  -monitor CBCD and BMP  -monitor vitals  -serial skin exams  -continue close follow-up with Orthopedic surgery, will also follow up with them as an outpatient once discharged     2   Cat bite   Patient with bite from a stray cat on 11/09/2018  [facility] her admission she has received rabies vaccine as well as Tetanus shot  Blood cultures are negative after five days  Patient has made good improvement with both the pain and swelling and has continued improvement in ROM of her left 3rd finger  No osseous abnormality noted on x-ray of the left hand   MRI of the left hand from 11/14/2018 showed minimal fluid collection, no osteomyelitis, and thick enhancement 3rd flexor tendon sheath compatible with tenosynovitis     -antibiotic as above  -monitor CBCD and BMP  -serial skin exams  -continue close follow-up with Orthopedic surgery     3   Multiple antibiotic allergies  Patient with history of rash to multiple antibiotics  She again experienced rash after administration of clindamycin and doxycycline after admission  It is unclear which medication lead the rash  Discussed pasteurella as a likely bacterial organism from a cat bite  Patient was willing to try IV Unasyn as her previous reaction was not anaphylaxis  Lennox Moon has tolerated the IV Unasyn without difficulty and will transition to PO augmentin today   -antibiotic as above  -monitor patient closely for side effects and adverse reaction     4  Depression  Discussed in detail with [facility] attending, Dr Conrad Kilpatrick  Antibiotics:  Unasyn 4  Antibiotics 6    Subjective:  Patient reports she has still having pain and tightness of her left 3rd finger but she is able to bend it further than before  She feels the discoloration of her left hand is much better today  Patient denies fever, chills, sweats; no nausea, vomiting, diarrhea; no cough, shortness of breath; No new symptoms  Objective:  Vitals:  Temp:  [97 6 °F (36 4 °C)-98 °F (36 7 °C)] 97 6 °F (36 4 °C)  HR:  [67-72] 72  Resp:  [16-18] 18  BP: (115-171)/(61-88) 159/88  SpO2:  [91 %-94 %] 94 %  Temp (24hrs), Av 9 °F (36 6 °C), Min:97 6 °F (36 4 °C), Max:98 °F (36 7 °C)  Current: Temperature: 97 6 °F (36 4 °C)    Physical Exam:   General Appearance:  Alert, interactive, nontoxic, no acute distress  Throat: Oropharynx moist without lesions  Lungs:   Clear to auscultation bilaterally; no wheezes, rhonchi or rales; respirations unlabored   Heart:  RRR; no murmur, rub or gallop   Abdomen:   Soft, non-tender, non-distended, positive bowel sounds       Extremities: No clubbing or cyanosis; no remaining edema to the left distal hand, mild edema of the left 3rd finger Skin: No new rashes or lesions  No draining wounds noted  Purplish discoloration of the left 3rd finger is much less dense today, trace erythema remains of the left distal dorsal hand; left 3rd finger with intact fingernail and brisk capillary refill     Labs, Imaging, & Other studies:   All pertinent labs and imaging studies were personally reviewed    Results from last 7 days  Lab Units 11/15/18  0438 11/11/18  0532 11/10/18  1511   WBC Thousand/uL 5 53 9 51 9 17   HEMOGLOBIN g/dL 11 3* 13 0 13 6   PLATELETS Thousands/uL 233 231 270       Results from last 7 days  Lab Units 11/11/18  0532   POTASSIUM mmol/L 3 7   CHLORIDE mmol/L 105   CO2 mmol/L 24   BUN mg/dL 16   CREATININE mg/dL 0 74   EGFR ml/min/1 73sq m 81   CALCIUM mg/dL 8 2*       Results from last 7 days  Lab Units 11/10/18  1528 11/10/18  1511   BLOOD CULTURE  No Growth After 4 Days  No Growth After 4 Days

## 2018-11-15 NOTE — TELEPHONE ENCOUNTER
----- Message from Ana Rosa Rubin PA-C sent at 11/15/2018  9:16 AM EST -----  Please schedule this patient for a follow up on Tuesday 11-20 for left long finger cat bite  No xrays needed  She had an infection so we really need to see her Tuesday    Thank you

## 2018-11-15 NOTE — PROGRESS NOTES
Progress Note - Orthopedics   Lu Counts 67 y o  female MRN: [id number]  Unit/Bed#: -01      Subjective:    67 y  o female we are following for an infection in the left long finger due to a cat bite  She has been on IV antibiotics and reports that she feels better and has improved motion of the finger  No acute events, no complaints  Pt doing well  Pain controlled  She only has a small amount of pain with palpation and motion     Denies fevers chills, CP, SOB    Labs:    0  Lab Value Date/Time   HCT 33 5 (L) 11/15/2018 0438   HCT 37 7 11/11/2018 0532   HCT 39 2 11/10/2018 1511   HCT 42 3 07/17/2014 0840   HGB 11 3 (L) 11/15/2018 0438   HGB 13 0 11/11/2018 0532   HGB 13 6 11/10/2018 1511   HGB 14 2 07/17/2014 0840   INR 0 94 11/10/2018 1511   WBC 5 53 11/15/2018 0438   WBC 9 51 11/11/2018 0532   WBC 9 17 11/10/2018 1511   WBC 4 6 07/17/2014 0840       Meds:    Current Facility-Administered Medications:     acetaminophen (TYLENOL) tablet 650 mg, 650 mg, Oral, Q6H PRN, Luisa Crump MD, 650 mg at 11/11/18 0602    ampicillin-sulbactam (UNASYN) 1 5 g in sodium chloride 0 9 % 50 mL IVPB, 1 5 g, Intravenous, Q6H, Patricia Whalen DO, Last Rate: 100 mL/hr at 11/15/18 0613, 1 5 g at 11/15/18 8232    aspirin tablet 325 mg, 325 mg, Oral, Daily, Luisa Crump MD, 325 mg at 11/14/18 0859    bacitracin 50,000 Units in sodium chloride 0 9 % 500 mL irrigation bag, , Irrigation, Once, Eddie Carlisle MD    dextran 70-hypromellose (GENTEAL TEARS) 0 1-0 3 % ophthalmic solution 1 drop, 1 drop, Both Eyes, PRN, Saundra Salgado MD, 1 drop at 11/14/18 1735    docusate sodium (COLACE) capsule 100 mg, 100 mg, Oral, BID, Kaya Espinoza MD, 100 mg at 11/14/18 1735    enoxaparin (LOVENOX) subcutaneous injection 40 mg, 40 mg, Subcutaneous, Daily, Luisa Crump MD, 40 mg at 11/14/18 0858    levothyroxine tablet 125 mcg, 125 mcg, Oral, Daily, Luisa Crump MD, 125 mcg at 11/15/18 0613    pantoprazole (PROTONIX) EC tablet 20 mg, 20 mg, Oral, Early Morning, Eldon Arellano MD, 20 mg at 11/15/18 1136    PARoxetine (PAXIL) tablet 20 mg, 20 mg, Oral, Daily, Eldon Arellano MD, 20 mg at 11/14/18 0859    Blood Culture:   Lab Results   Component Value Date    BLOODCX No Growth After 4 Days  11/10/2018       Wound Culture:   No results found for: WOUNDCULT    Ins and Outs:  I/O last 24 hours: In: 450 [P O :450]  Out: -           Physical:  Vitals:    11/15/18 0700   BP: 159/88   Pulse: 72   Resp: 18   Temp: 97 6 °F (36 4 °C)   SpO2: 94%     · Musculoskeletal:left long finger  · Skin: erythema almost resolved  Swelling continues to improve   · Patient has full extension and flexion is improving  · TTP on both the volar and dorsal aspect of the finger, which are both equal and only mild in tenderness  · Images were uploaded into media      Imaging  MRI of the left hand showed a superficial small fluid collection in the midpalm and thickening of the tendon sheath  MRI was reviewed with Dr Kvng Delatorre         _*_*_*_*_*_*_*_*_*_*_*_*_*_*_*_*_*_*_*_*_*_*_*_*_*_*_*_*_*_*_*_*_*_*_*_*_*_*_*_*_*    Assessment:    67 y  o female with a left long finger infection due to a cat bite with IV antibiotics since Monday 11/12/18    Plan:  · Continue antibiotics as per ID  · No surgical intervention needed at this time  I will place an order for diet  · Patient will need outpatient OT  This order was placed  · She will need a f/u with Dr Kvng Delatorre next week (Tuesday) for re-evaluation  Call us if anything worsens over the weekend  · Pain control  · Dispo: Ortho will sign off  Ok to d/c today        Marysol Ames PA-C

## 2018-11-20 ENCOUNTER — OFFICE VISIT (OUTPATIENT)
Dept: OBGYN CLINIC | Facility: CLINIC | Age: 73
End: 2018-11-20
Payer: COMMERCIAL

## 2018-11-20 VITALS
HEART RATE: 69 BPM | BODY MASS INDEX: 35.34 KG/M2 | HEIGHT: 60 IN | WEIGHT: 180 LBS | SYSTOLIC BLOOD PRESSURE: 130 MMHG | DIASTOLIC BLOOD PRESSURE: 82 MMHG

## 2018-11-20 DIAGNOSIS — L03.012 CELLULITIS OF FINGER OF LEFT HAND: Primary | ICD-10-CM

## 2018-11-20 PROCEDURE — 99212 OFFICE O/P EST SF 10 MIN: CPT | Performed by: ORTHOPAEDIC SURGERY

## 2018-11-20 PROCEDURE — 1111F DSCHRG MED/CURRENT MED MERGE: CPT | Performed by: ORTHOPAEDIC SURGERY

## 2018-11-20 NOTE — PROGRESS NOTES
CHIEF COMPLAINT:  Chief Complaint   Patient presents with    Left Middle Finger - Follow-up       SUBJECTIVE:  Joan Zhong is a 67y o  year old RHD female who presents for follow up to left long finger cat bite with cellulitis  The cat bite occurred on 11/9/18 and we was seen in the ED the following day  She was admitted for IV antibitoics and we rounded on her in the hospital, as well as Infectious disease  She is taking Augmentin, as per Infectious disease  She is on it until the end of the week  She has not started hand therapy yet  She states that her finger is not hurting her and the swelling has improved a lot          PAST MEDICAL HISTORY:  Past Medical History:   Diagnosis Date    Anxiety disorder     Aortic valve disorder     Depression     History of gastroesophageal reflux (GERD)     History of kidney cancer 03/06/2014    Hyperlipidemia 03/06/2014    Hypertension 03/06/2014    Hypothyroidism 03/06/2014    Shortness of breath     TIA (transient ischemic attack)        PAST SURGICAL HISTORY:  Past Surgical History:   Procedure Laterality Date    ESOPHAGOGASTRIC FUNDOPLASTY      Nissen Fundoplication       FAMILY HISTORY:  Family History   Problem Relation Age of Onset    Stroke Father     Leukemia Sister     Alcohol abuse Brother     Kidney cancer Brother        SOCIAL HISTORY:  Social History   Substance Use Topics    Smoking status: Never Smoker    Smokeless tobacco: Never Used    Alcohol use No       MEDICATIONS:    Current Outpatient Prescriptions:     amoxicillin-clavulanate (AUGMENTIN) 875-125 mg per tablet, Take 1 tablet by mouth every 12 (twelve) hours for 10 days, Disp: 20 tablet, Rfl: 0    aspirin 325 mg tablet, Take 325 mg by mouth, Disp: , Rfl:     docusate sodium (COLACE) 100 mg capsule, Take 1 capsule (100 mg total) by mouth 2 (two) times a day, Disp: 10 capsule, Rfl: 0    levothyroxine 125 mcg tablet, TAKE 1 TABLET (125 MCG TOTAL) BY MOUTH DAILY  DAYS, Disp: 30 tablet, Rfl: 5    multivitamin (THERAGRAN) TABS, Take 1 tablet by mouth, Disp: , Rfl:     MYRBETRIQ 50 MG TB24, 1 TAB(S) ONCE A DAY ORALLY 30 DAYS, Disp: 30 tablet, Rfl: 7    omeprazole (PriLOSEC) 20 mg delayed release capsule, TAKE 1 CAPSULE EVERY DAY AS NEEDED, Disp: 30 capsule, Rfl: 5    PARoxetine (PAXIL) 20 mg tablet, Take 1 tablet (20 mg total) by mouth daily, Disp: 90 tablet, Rfl: 1    ALLERGIES:  Allergies   Allergen Reactions    Ciprofloxacin     Other     Sulfa Antibiotics     Penicillins Rash     Category: Allergy;   --  Pt received unasyn 1 5 mg IV 11-12-18 to 11-15-18    Sulfacetamide Rash     Category: Allergy;        REVIEW OF SYSTEMS:  Review of Systems   Constitutional: Negative for chills, fever and unexpected weight change  HENT: Negative for hearing loss, nosebleeds and sore throat  Eyes: Negative for pain, redness and visual disturbance  Respiratory: Negative for cough, shortness of breath and wheezing  Cardiovascular: Negative for chest pain, palpitations and leg swelling  Gastrointestinal: Negative for abdominal pain, nausea and vomiting  Endocrine: Negative for polydipsia and polyuria  Genitourinary: Negative for dysuria and hematuria  Musculoskeletal: Positive for arthralgias and joint swelling  Negative for myalgias  Skin: Negative for rash and wound  Neurological: Negative for dizziness, numbness and headaches  Psychiatric/Behavioral: Negative for decreased concentration and suicidal ideas  The patient is not nervous/anxious          VITALS:  Vitals:    11/20/18 1004   BP: 130/82   Pulse: 69       LABS:  HgA1c:   Lab Results   Component Value Date    HGBA1C 5 8 07/31/2018     BMP:   Lab Results   Component Value Date    GLUCOSE 85 07/17/2014    CALCIUM 8 2 (L) 11/11/2018     07/17/2014    K 3 7 11/11/2018    CO2 24 11/11/2018     11/11/2018    BUN 16 11/11/2018    CREATININE 0 74 11/11/2018 _____________________________________________________  PHYSICAL EXAMINATION:  General: well developed and well nourished, alert, oriented times 3 and appears comfortable  Psychiatric: Normal  HEENT: Trachea Midline, No torticollis  Pulmonary: No audible wheezing or respiratory distress   Skin: No masses, erthema, lacerations, fluctation, ulcerations  Neurovascular: Sensation Intact to the Median, Ulnar, Radial Nerve, Motor Intact to the Median, Ulnar, Radial Nerve and Pulses Intact    MUSCULOSKELETAL EXAMINATION:  Left long finger  Mild swelling, but greatly improved  No erythema or warmth  No drainage  Mild stiffness with flexion at the MCP, PIP and DIP joints     ___________________________________________________  STUDIES REVIEWED:  No studies reviewed  PROCEDURES PERFORMED:  Procedures  No Procedures performed today    _____________________________________________________  ASSESSMENT/PLAN:    Left long finger cellulitis due to a cat bite  * Continue antibiotics   * Watch for any worsening symptoms after the antibiotics are completed  If the symptoms get worse, she should call the ortho on call over the weekend  We will re-evaluate early next week  * HEP with ROM was encouraged  We will help her schedule her hand therapy appointment  Follow Up:  Return in about 7 days (around 11/27/2018)        To Do Next Visit:  Re-evaluation of current issue    Scribe Attestation    I,:   Zoë Andrade PA-C am acting as a scribe while in the presence of the attending physician :        I,:   Sydnee Tran MD personally performed the services described in this documentation    as scribed in my presence :

## 2018-11-27 ENCOUNTER — OFFICE VISIT (OUTPATIENT)
Dept: OBGYN CLINIC | Facility: CLINIC | Age: 73
End: 2018-11-27
Payer: COMMERCIAL

## 2018-11-27 VITALS
WEIGHT: 180 LBS | HEIGHT: 60 IN | DIASTOLIC BLOOD PRESSURE: 79 MMHG | SYSTOLIC BLOOD PRESSURE: 142 MMHG | BODY MASS INDEX: 35.34 KG/M2 | HEART RATE: 79 BPM

## 2018-11-27 DIAGNOSIS — L03.012 CELLULITIS OF FINGER OF LEFT HAND: Primary | ICD-10-CM

## 2018-11-27 PROCEDURE — 99213 OFFICE O/P EST LOW 20 MIN: CPT | Performed by: ORTHOPAEDIC SURGERY

## 2018-11-27 NOTE — PROGRESS NOTES
CHIEF COMPLAINT:  Chief Complaint   Patient presents with    Left Middle Finger - Follow-up       SUBJECTIVE:  Eugene Gomez is a 67y o  year old RHD female who presents to the office for follow-up with her left long finger following a cat bite with cellulitis that occurred on 11/09/2018  Patient was seen in the emergency department the following day  Patient was admitted for IV antibiotics and we rounded on her in the hospital as well as Infectious Disease  Patient has finished her Augmentin that was prescribed by Infectious Disease  Patient has been doing home exercise program with range of motion  Pt has OT evaluation scheduled for today but they might have to reschedule  Today pt states that she is not having any pain  Pt's only complaint is stiffness at the PIP joint         PAST MEDICAL HISTORY:  Past Medical History:   Diagnosis Date    Anxiety disorder     Aortic valve disorder     Depression     History of gastroesophageal reflux (GERD)     History of kidney cancer 03/06/2014    Hyperlipidemia 03/06/2014    Hypertension 03/06/2014    Hypothyroidism 03/06/2014    Shortness of breath     TIA (transient ischemic attack)        PAST SURGICAL HISTORY:  Past Surgical History:   Procedure Laterality Date    ESOPHAGOGASTRIC FUNDOPLASTY      Nissen Fundoplication       FAMILY HISTORY:  Family History   Problem Relation Age of Onset    Stroke Father     Leukemia Sister     Alcohol abuse Brother     Kidney cancer Brother        SOCIAL HISTORY:  Social History   Substance Use Topics    Smoking status: Never Smoker    Smokeless tobacco: Never Used    Alcohol use No       MEDICATIONS:    Current Outpatient Prescriptions:     aspirin 325 mg tablet, Take 325 mg by mouth, Disp: , Rfl:     docusate sodium (COLACE) 100 mg capsule, Take 1 capsule (100 mg total) by mouth 2 (two) times a day, Disp: 10 capsule, Rfl: 0    levothyroxine 125 mcg tablet, TAKE 1 TABLET (125 MCG TOTAL) BY MOUTH DAILY  DAYS, Disp: 30 tablet, Rfl: 5    multivitamin (THERAGRAN) TABS, Take 1 tablet by mouth, Disp: , Rfl:     MYRBETRIQ 50 MG TB24, 1 TAB(S) ONCE A DAY ORALLY 30 DAYS, Disp: 30 tablet, Rfl: 7    omeprazole (PriLOSEC) 20 mg delayed release capsule, TAKE 1 CAPSULE EVERY DAY AS NEEDED, Disp: 30 capsule, Rfl: 5    PARoxetine (PAXIL) 20 mg tablet, Take 1 tablet (20 mg total) by mouth daily, Disp: 90 tablet, Rfl: 1    ALLERGIES:  Allergies   Allergen Reactions    Ciprofloxacin     Other     Sulfa Antibiotics     Penicillins Rash     Category: Allergy;   --  Pt received unasyn 1 5 mg IV 11-12-18 to 11-15-18    Sulfacetamide Rash     Category: Allergy;        REVIEW OF SYSTEMS:  Review of Systems   Constitutional: Negative for chills, fever and unexpected weight change  HENT: Negative for hearing loss, nosebleeds and sore throat  Eyes: Negative for pain, redness and visual disturbance  Respiratory: Negative for cough, shortness of breath and wheezing  Cardiovascular: Negative for chest pain, palpitations and leg swelling  Gastrointestinal: Negative for abdominal pain, nausea and vomiting  Endocrine: Negative for polydipsia and polyuria  Genitourinary: Negative for dysuria and hematuria  Musculoskeletal: Negative for arthralgias, joint swelling and myalgias  Skin: Negative for rash and wound  Neurological: Negative for dizziness, numbness and headaches  Psychiatric/Behavioral: Negative for decreased concentration, dysphoric mood and suicidal ideas  The patient is not nervous/anxious          VITALS:  Vitals:    11/27/18 0909   BP: 142/79   Pulse: 79       LABS:  HgA1c:   Lab Results   Component Value Date    HGBA1C 5 8 07/31/2018     BMP:   Lab Results   Component Value Date    GLUCOSE 85 07/17/2014    CALCIUM 8 2 (L) 11/11/2018     07/17/2014    K 3 7 11/11/2018    CO2 24 11/11/2018     11/11/2018    BUN 16 11/11/2018    CREATININE 0 74 11/11/2018 _____________________________________________________  PHYSICAL EXAMINATION:  General: well developed and well nourished, alert, oriented times 3 and appears comfortable  Psychiatric: Normal  HEENT: Trachea Midline, No torticollis  Pulmonary: No audible wheezing or respiratory distress   Skin: No masses, erthema, lacerations, fluctation, ulcerations  Neurovascular: Sensation Intact to the Median, Ulnar, Radial Nerve, Motor Intact to the Median, Ulnar, Radial Nerve and Pulses Intact    MUSCULOSKELETAL EXAMINATION:  Left long finger  Fullness at pip joint  No erythema or ecchymosis  Skin intact  Stiffness noted at PIP joint  ___________________________________________________  STUDIES REVIEWED:  No studies reviewed  PROCEDURES PERFORMED:  Procedures  No Procedures performed today    _____________________________________________________  ASSESSMENT/PLAN:    Left long finger cellulitis (resolved) due to cat bite  * Pt was advised to continue to work on ROM with her HEP, pt does not need to attend formal therapy  * Pt was advised to monitor her finger for recurrence (redness or increased pain)  * Pt will call the office if she has a return of any of these symptoms or any concers        Follow Up:  Return if symptoms worsen or fail to improve        To Do Next Visit:  Re-evaluation of current issue      Scribe Attestation    I,:   Enoc Li am acting as a scribe while in the presence of the attending physician :        I,:   Sydnee Tran MD personally performed the services described in this documentation    as scribed in my presence :

## 2018-12-17 ENCOUNTER — OFFICE VISIT (OUTPATIENT)
Dept: INTERNAL MEDICINE CLINIC | Facility: CLINIC | Age: 73
End: 2018-12-17
Payer: COMMERCIAL

## 2018-12-17 VITALS
WEIGHT: 180 LBS | SYSTOLIC BLOOD PRESSURE: 140 MMHG | HEIGHT: 60 IN | OXYGEN SATURATION: 98 % | BODY MASS INDEX: 35.34 KG/M2 | HEART RATE: 76 BPM | DIASTOLIC BLOOD PRESSURE: 92 MMHG

## 2018-12-17 DIAGNOSIS — I10 ESSENTIAL HYPERTENSION: ICD-10-CM

## 2018-12-17 DIAGNOSIS — M65.9 FLEXOR TENOSYNOVITIS OF FINGER: Primary | ICD-10-CM

## 2018-12-17 DIAGNOSIS — G31.84 MILD COGNITIVE IMPAIRMENT: ICD-10-CM

## 2018-12-17 PROCEDURE — 1160F RVW MEDS BY RX/DR IN RCRD: CPT | Performed by: INTERNAL MEDICINE

## 2018-12-17 PROCEDURE — 3008F BODY MASS INDEX DOCD: CPT | Performed by: INTERNAL MEDICINE

## 2018-12-17 PROCEDURE — 4040F PNEUMOC VAC/ADMIN/RCVD: CPT | Performed by: INTERNAL MEDICINE

## 2018-12-17 PROCEDURE — 99214 OFFICE O/P EST MOD 30 MIN: CPT | Performed by: INTERNAL MEDICINE

## 2018-12-17 RX ORDER — RIVASTIGMINE 4.6 MG/24H
1 PATCH, EXTENDED RELEASE TRANSDERMAL DAILY
Qty: 30 PATCH | Refills: 0 | Status: SHIPPED | OUTPATIENT
Start: 2018-12-17 | End: 2019-01-21 | Stop reason: SDUPTHER

## 2018-12-17 RX ORDER — DONEPEZIL HYDROCHLORIDE 10 MG/1
10 TABLET, FILM COATED ORAL
Refills: 0
Start: 2018-12-17 | End: 2019-01-21 | Stop reason: SDUPTHER

## 2018-12-17 NOTE — PROGRESS NOTES
Assessment/Plan:      Continue follow-up with Ortho for her finger but it looks like that has healed up  Blood pressure seems okay  She is due for labs  She is probably still depressed but understandable with her brother's illness  We also discussed adding more medicine for her memory loss  She was willing to try that so an Exelon patch was added  Her daughter will watch  Also consider increasing the Paxil if the depression is not clear  Return in about 4 weeks (around 1/14/2019)  No problem-specific Assessment & Plan notes found for this encounter  Diagnoses and all orders for this visit:    Flexor tenosynovitis of finger    Essential hypertension  -     CBC and differential; Future  -     Comprehensive metabolic panel; Future  -     TSH, 3rd generation; Future  -     T4, free; Future    Mild cognitive impairment  -     donepezil (ARICEPT) 10 mg tablet; Take 1 tablet (10 mg total) by mouth daily at bedtime  -     rivastigmine (EXELON) 4 6 mg/24 hr TD 24 hr patch; Place 1 patch on the skin daily          Subjective:      Patient ID: Artur Timmons is a 75 y o  female  Patient comes in today for follow-up with her daughter  She was recently in the hospital with a cat scratch/bite on her finger that became infected  Luckily, she responded to the antibiotics and did not require surgical intervention  Her blood pressure has been doing okay  They think her depression is a little worse but 2 of her older brothers are ill and probably slowly dying  She has 9 siblings and if her brothers go, that will just leave her and 1 sister  That has been weighing on her mind  She is tolerating the Aricept but the mild dementia continues  ALLERGIES:  Allergies   Allergen Reactions    Ciprofloxacin     Other     Sulfa Antibiotics     Penicillins Rash     Category: Allergy;   --  Pt received unasyn 1 5 mg IV 11-12-18 to 11-15-18    Sulfacetamide Rash     Category:  Allergy;        CURRENT MEDICATIONS:    Current Outpatient Prescriptions:     aspirin 325 mg tablet, Take 325 mg by mouth, Disp: , Rfl:     levothyroxine 125 mcg tablet, TAKE 1 TABLET (125 MCG TOTAL) BY MOUTH DAILY  DAYS, Disp: 30 tablet, Rfl: 5    multivitamin (THERAGRAN) TABS, Take 1 tablet by mouth, Disp: , Rfl:     MYRBETRIQ 50 MG TB24, 1 TAB(S) ONCE A DAY ORALLY 30 DAYS, Disp: 30 tablet, Rfl: 7    omeprazole (PriLOSEC) 20 mg delayed release capsule, TAKE 1 CAPSULE EVERY DAY AS NEEDED, Disp: 30 capsule, Rfl: 5    PARoxetine (PAXIL) 20 mg tablet, Take 1 tablet (20 mg total) by mouth daily, Disp: 90 tablet, Rfl: 1    donepezil (ARICEPT) 10 mg tablet, Take 1 tablet (10 mg total) by mouth daily at bedtime, Disp: , Rfl: 0    rivastigmine (EXELON) 4 6 mg/24 hr TD 24 hr patch, Place 1 patch on the skin daily, Disp: 30 patch, Rfl: 0    ACTIVE PROBLEM LIST:  Patient Active Problem List   Diagnosis    Depression    Esophageal reflux    Mixed hyperlipidemia    Essential hypertension    Acquired hypothyroidism    Impaired fasting glucose    Lymphoma (HCC)    Recurrent UTI    Cerebral aneurysm, nonruptured    Mild cognitive impairment    SOB (shortness of breath)    Hand laceration    Flexor tenosynovitis of finger    Cellulitis of left hand    Cat bite of left hand    Cellulitis of finger of left hand       PAST MEDICAL HISTORY:  Past Medical History:   Diagnosis Date    Anxiety disorder     Aortic valve disorder     Depression     History of gastroesophageal reflux (GERD)     History of kidney cancer 03/06/2014    Hyperlipidemia 03/06/2014    Hypertension 03/06/2014    Hypothyroidism 03/06/2014    Shortness of breath     TIA (transient ischemic attack)        PAST SURGICAL HISTORY:  Past Surgical History:   Procedure Laterality Date    ESOPHAGOGASTRIC FUNDOPLASTY      Nissen Fundoplication       FAMILY HISTORY:  Family History   Problem Relation Age of Onset    Stroke Father     Leukemia Sister    Logan Moyer Alcohol abuse Brother     Kidney cancer Brother        SOCIAL HISTORY:  Social History     Social History    Marital status: /Civil Union     Spouse name: N/A    Number of children: 2    Years of education: N/A     Occupational History    Not on file  Social History Main Topics    Smoking status: Never Smoker    Smokeless tobacco: Never Used    Alcohol use No    Drug use: No    Sexual activity: Not on file     Other Topics Concern    Not on file     Social History Narrative    No narrative on file       Review of Systems   Respiratory: Negative for shortness of breath  Cardiovascular: Negative for chest pain  Gastrointestinal: Negative for abdominal pain  Objective:  Vitals:    12/17/18 1544   BP: 140/92   BP Location: Left arm   Patient Position: Sitting   Pulse: 76   SpO2: 98%   Weight: 81 6 kg (180 lb)   Height: 5' (1 524 m)     Body mass index is 35 15 kg/m²  Physical Exam   Constitutional: She is oriented to person, place, and time  She appears well-developed and well-nourished  Cardiovascular: Normal rate, regular rhythm and normal heart sounds  Pulmonary/Chest: Effort normal and breath sounds normal    Abdominal: Soft  There is no tenderness  Neurological: She is alert and oriented to person, place, and time  Nursing note and vitals reviewed          RESULTS:    Recent Results (from the past 1008 hour(s))   CBC and differential    Collection Time: 11/10/18  3:11 PM   Result Value Ref Range    WBC 9 17 4 31 - 10 16 Thousand/uL    RBC 3 89 3 81 - 5 12 Million/uL    Hemoglobin 13 6 11 5 - 15 4 g/dL    Hematocrit 39 2 34 8 - 46 1 %     (H) 82 - 98 fL    MCH 35 0 (H) 26 8 - 34 3 pg    MCHC 34 7 31 4 - 37 4 g/dL    RDW 13 6 11 6 - 15 1 %    MPV 11 9 8 9 - 12 7 fL    Platelets 671 154 - 750 Thousands/uL    nRBC 0 /100 WBCs    Neutrophils Relative 72 43 - 75 %    Immat GRANS % 1 0 - 2 %    Lymphocytes Relative 14 14 - 44 %    Monocytes Relative 11 4 - 12 % Eosinophils Relative 1 0 - 6 %    Basophils Relative 1 0 - 1 %    Neutrophils Absolute 6 70 1 85 - 7 62 Thousands/µL    Immature Grans Absolute 0 05 0 00 - 0 20 Thousand/uL    Lymphocytes Absolute 1 26 0 60 - 4 47 Thousands/µL    Monocytes Absolute 0 99 0 17 - 1 22 Thousand/µL    Eosinophils Absolute 0 08 0 00 - 0 61 Thousand/µL    Basophils Absolute 0 09 0 00 - 0 10 Thousands/µL   Protime-INR    Collection Time: 11/10/18  3:11 PM   Result Value Ref Range    Protime 12 5 11 8 - 14 2 seconds    INR 0 94 0 86 - 1 17   APTT    Collection Time: 11/10/18  3:11 PM   Result Value Ref Range    PTT 30 24 - 36 seconds   Type and screen    Collection Time: 11/10/18  3:11 PM   Result Value Ref Range    ABO Grouping O     Rh Factor Positive     Antibody Screen Negative     Specimen Expiration Date [id number]    Blood culture #1    Collection Time: 11/10/18  3:11 PM   Result Value Ref Range    Blood Culture No Growth After 5 Days  Basic metabolic panel    Collection Time: 11/10/18  3:11 PM   Result Value Ref Range    Sodium 140 136 - 145 mmol/L    Potassium 3 7 3 5 - 5 3 mmol/L    Chloride 102 100 - 108 mmol/L    CO2 26 21 - 32 mmol/L    ANION GAP 12 4 - 13 mmol/L    BUN 17 5 - 25 mg/dL    Creatinine 0 87 0 60 - 1 30 mg/dL    Glucose 99 65 - 140 mg/dL    Calcium 9 8 8 3 - 10 1 mg/dL    eGFR 66 ml/min/1 73sq m   Blood culture #2    Collection Time: 11/10/18  3:28 PM   Result Value Ref Range    Blood Culture No Growth After 5 Days      Basic metabolic panel    Collection Time: 11/11/18  5:32 AM   Result Value Ref Range    Sodium 138 136 - 145 mmol/L    Potassium 3 7 3 5 - 5 3 mmol/L    Chloride 105 100 - 108 mmol/L    CO2 24 21 - 32 mmol/L    ANION GAP 9 4 - 13 mmol/L    BUN 16 5 - 25 mg/dL    Creatinine 0 74 0 60 - 1 30 mg/dL    Glucose 117 65 - 140 mg/dL    Calcium 8 2 (L) 8 3 - 10 1 mg/dL    eGFR 81 ml/min/1 73sq m   CBC (With Platelets)    Collection Time: 11/11/18  5:32 AM   Result Value Ref Range    WBC 9 51 4 31 - 10 16 Thousand/uL    RBC 3 74 (L) 3 81 - 5 12 Million/uL    Hemoglobin 13 0 11 5 - 15 4 g/dL    Hematocrit 37 7 34 8 - 46 1 %     (H) 82 - 98 fL    MCH 34 8 (H) 26 8 - 34 3 pg    MCHC 34 5 31 4 - 37 4 g/dL    RDW 14 0 11 6 - 15 1 %    Platelets 964 529 - 277 Thousands/uL    MPV 12 4 8 9 - 12 7 fL   CBC    Collection Time: 11/15/18  4:38 AM   Result Value Ref Range    WBC 5 53 4 31 - 10 16 Thousand/uL    RBC 3 28 (L) 3 81 - 5 12 Million/uL    Hemoglobin 11 3 (L) 11 5 - 15 4 g/dL    Hematocrit 33 5 (L) 34 8 - 46 1 %     (H) 82 - 98 fL    MCH 34 5 (H) 26 8 - 34 3 pg    MCHC 33 7 31 4 - 37 4 g/dL    RDW 13 7 11 6 - 15 1 %    Platelets 458 747 - 963 Thousands/uL    MPV 11 7 8 9 - 12 7 fL       This note was created with voice recognition software  Phonic, grammatical and spelling errors may be present within the note as a result

## 2018-12-22 DIAGNOSIS — K21.9 GASTROESOPHAGEAL REFLUX DISEASE WITHOUT ESOPHAGITIS: ICD-10-CM

## 2018-12-22 DIAGNOSIS — F32.A DEPRESSION, UNSPECIFIED DEPRESSION TYPE: ICD-10-CM

## 2018-12-23 RX ORDER — OMEPRAZOLE 20 MG/1
CAPSULE, DELAYED RELEASE ORAL
Qty: 90 CAPSULE | Refills: 1 | Status: SHIPPED | OUTPATIENT
Start: 2018-12-23 | End: 2019-01-21 | Stop reason: SDUPTHER

## 2018-12-23 RX ORDER — PAROXETINE HYDROCHLORIDE 20 MG/1
TABLET, FILM COATED ORAL
Qty: 90 TABLET | Refills: 1 | Status: SHIPPED | OUTPATIENT
Start: 2018-12-23 | End: 2019-01-21 | Stop reason: SDUPTHER

## 2019-01-21 ENCOUNTER — APPOINTMENT (OUTPATIENT)
Dept: LAB | Facility: CLINIC | Age: 74
End: 2019-01-21
Payer: COMMERCIAL

## 2019-01-21 ENCOUNTER — TRANSCRIBE ORDERS (OUTPATIENT)
Dept: LAB | Facility: CLINIC | Age: 74
End: 2019-01-21

## 2019-01-21 ENCOUNTER — OFFICE VISIT (OUTPATIENT)
Dept: INTERNAL MEDICINE CLINIC | Facility: CLINIC | Age: 74
End: 2019-01-21
Payer: COMMERCIAL

## 2019-01-21 VITALS
OXYGEN SATURATION: 95 % | TEMPERATURE: 97.5 F | RESPIRATION RATE: 16 BRPM | WEIGHT: 179 LBS | SYSTOLIC BLOOD PRESSURE: 128 MMHG | DIASTOLIC BLOOD PRESSURE: 80 MMHG | HEIGHT: 60 IN | BODY MASS INDEX: 35.14 KG/M2 | HEART RATE: 75 BPM

## 2019-01-21 DIAGNOSIS — E03.9 HYPOTHYROIDISM, UNSPECIFIED TYPE: ICD-10-CM

## 2019-01-21 DIAGNOSIS — G31.84 MILD COGNITIVE IMPAIRMENT: Primary | ICD-10-CM

## 2019-01-21 DIAGNOSIS — Z23 ENCOUNTER FOR IMMUNIZATION: ICD-10-CM

## 2019-01-21 DIAGNOSIS — F32.A DEPRESSION, UNSPECIFIED DEPRESSION TYPE: ICD-10-CM

## 2019-01-21 DIAGNOSIS — I10 ESSENTIAL HYPERTENSION: ICD-10-CM

## 2019-01-21 DIAGNOSIS — K21.9 GASTROESOPHAGEAL REFLUX DISEASE WITHOUT ESOPHAGITIS: ICD-10-CM

## 2019-01-21 DIAGNOSIS — N39.0 URINARY TRACT INFECTION WITHOUT HEMATURIA, SITE UNSPECIFIED: ICD-10-CM

## 2019-01-21 DIAGNOSIS — N39.0 RECURRENT UTI: ICD-10-CM

## 2019-01-21 LAB
ALBUMIN SERPL BCP-MCNC: 3.6 G/DL (ref 3.5–5)
ALP SERPL-CCNC: 72 U/L (ref 46–116)
ALT SERPL W P-5'-P-CCNC: 18 U/L (ref 12–78)
ANION GAP SERPL CALCULATED.3IONS-SCNC: 5 MMOL/L (ref 4–13)
AST SERPL W P-5'-P-CCNC: 15 U/L (ref 5–45)
BACTERIA UR QL AUTO: ABNORMAL /HPF
BASOPHILS # BLD MANUAL: 0.21 THOUSAND/UL (ref 0–0.1)
BASOPHILS NFR MAR MANUAL: 4 % (ref 0–1)
BILIRUB SERPL-MCNC: 0.38 MG/DL (ref 0.2–1)
BILIRUB UR QL STRIP: NEGATIVE
BUN SERPL-MCNC: 20 MG/DL (ref 5–25)
CALCIUM SERPL-MCNC: 9.3 MG/DL (ref 8.3–10.1)
CAOX CRY URNS QL MICRO: ABNORMAL /HPF
CHLORIDE SERPL-SCNC: 105 MMOL/L (ref 100–108)
CLARITY UR: ABNORMAL
CO2 SERPL-SCNC: 27 MMOL/L (ref 21–32)
COLOR UR: YELLOW
CREAT SERPL-MCNC: 0.79 MG/DL (ref 0.6–1.3)
EOSINOPHIL # BLD MANUAL: 0.11 THOUSAND/UL (ref 0–0.4)
EOSINOPHIL NFR BLD MANUAL: 2 % (ref 0–6)
ERYTHROCYTE [DISTWIDTH] IN BLOOD BY AUTOMATED COUNT: 13.5 % (ref 11.6–15.1)
GFR SERPL CREATININE-BSD FRML MDRD: 74 ML/MIN/1.73SQ M
GLUCOSE SERPL-MCNC: 94 MG/DL (ref 65–140)
GLUCOSE UR STRIP-MCNC: NEGATIVE MG/DL
HCT VFR BLD AUTO: 39.7 % (ref 34.8–46.1)
HGB BLD-MCNC: 13.5 G/DL (ref 11.5–15.4)
HGB UR QL STRIP.AUTO: NEGATIVE
KETONES UR STRIP-MCNC: NEGATIVE MG/DL
LEUKOCYTE ESTERASE UR QL STRIP: ABNORMAL
LYMPHOCYTES # BLD AUTO: 0.74 THOUSAND/UL (ref 0.6–4.47)
LYMPHOCYTES # BLD AUTO: 14 % (ref 14–44)
MACROCYTES BLD QL AUTO: PRESENT
MCH RBC QN AUTO: 34.9 PG (ref 26.8–34.3)
MCHC RBC AUTO-ENTMCNC: 34 G/DL (ref 31.4–37.4)
MCV RBC AUTO: 103 FL (ref 82–98)
METAMYELOCYTES NFR BLD MANUAL: 1 % (ref 0–1)
MONOCYTES # BLD AUTO: 0.53 THOUSAND/UL (ref 0–1.22)
MONOCYTES NFR BLD: 10 % (ref 4–12)
NEUTROPHILS # BLD MANUAL: 3.19 THOUSAND/UL (ref 1.85–7.62)
NEUTS SEG NFR BLD AUTO: 60 % (ref 43–75)
NITRITE UR QL STRIP: NEGATIVE
NON-SQ EPI CELLS URNS QL MICRO: ABNORMAL /HPF
NRBC BLD AUTO-RTO: 0 /100 WBCS
PH UR STRIP.AUTO: 6.5 [PH] (ref 4.5–8)
PLATELET # BLD AUTO: 251 THOUSANDS/UL (ref 149–390)
PLATELET BLD QL SMEAR: ADEQUATE
PMV BLD AUTO: 12.2 FL (ref 8.9–12.7)
POTASSIUM SERPL-SCNC: 3.9 MMOL/L (ref 3.5–5.3)
PROT SERPL-MCNC: 6.9 G/DL (ref 6.4–8.2)
PROT UR STRIP-MCNC: NEGATIVE MG/DL
RBC # BLD AUTO: 3.87 MILLION/UL (ref 3.81–5.12)
RBC #/AREA URNS AUTO: ABNORMAL /HPF
RBC MORPH BLD: PRESENT
SODIUM SERPL-SCNC: 137 MMOL/L (ref 136–145)
SP GR UR STRIP.AUTO: 1.02 (ref 1–1.03)
T4 FREE SERPL-MCNC: 1.24 NG/DL (ref 0.76–1.46)
TSH SERPL DL<=0.05 MIU/L-ACNC: 6.99 UIU/ML (ref 0.36–3.74)
UROBILINOGEN UR QL STRIP.AUTO: 0.2 E.U./DL
VARIANT LYMPHS # BLD AUTO: 9 %
WBC # BLD AUTO: 5.31 THOUSAND/UL (ref 4.31–10.16)
WBC #/AREA URNS AUTO: ABNORMAL /HPF

## 2019-01-21 PROCEDURE — 87086 URINE CULTURE/COLONY COUNT: CPT

## 2019-01-21 PROCEDURE — G0008 ADMIN INFLUENZA VIRUS VAC: HCPCS | Performed by: INTERNAL MEDICINE

## 2019-01-21 PROCEDURE — 85027 COMPLETE CBC AUTOMATED: CPT

## 2019-01-21 PROCEDURE — 84439 ASSAY OF FREE THYROXINE: CPT

## 2019-01-21 PROCEDURE — 36415 COLL VENOUS BLD VENIPUNCTURE: CPT

## 2019-01-21 PROCEDURE — 81001 URINALYSIS AUTO W/SCOPE: CPT

## 2019-01-21 PROCEDURE — 85007 BL SMEAR W/DIFF WBC COUNT: CPT

## 2019-01-21 PROCEDURE — 84443 ASSAY THYROID STIM HORMONE: CPT

## 2019-01-21 PROCEDURE — 80053 COMPREHEN METABOLIC PANEL: CPT

## 2019-01-21 PROCEDURE — 1160F RVW MEDS BY RX/DR IN RCRD: CPT | Performed by: INTERNAL MEDICINE

## 2019-01-21 PROCEDURE — 90662 IIV NO PRSV INCREASED AG IM: CPT | Performed by: INTERNAL MEDICINE

## 2019-01-21 PROCEDURE — 99214 OFFICE O/P EST MOD 30 MIN: CPT | Performed by: INTERNAL MEDICINE

## 2019-01-21 RX ORDER — PAROXETINE HYDROCHLORIDE 20 MG/1
20 TABLET, FILM COATED ORAL DAILY
Qty: 30 TABLET | Refills: 5 | Status: SHIPPED | OUTPATIENT
Start: 2019-01-21 | End: 2019-10-25 | Stop reason: SDUPTHER

## 2019-01-21 RX ORDER — LEVOTHYROXINE SODIUM 0.12 MG/1
125 TABLET ORAL DAILY
Qty: 30 TABLET | Refills: 5 | Status: SHIPPED | OUTPATIENT
Start: 2019-01-21 | End: 2020-02-02

## 2019-01-21 RX ORDER — OMEPRAZOLE 20 MG/1
20 CAPSULE, DELAYED RELEASE ORAL DAILY
Qty: 30 CAPSULE | Refills: 5 | Status: SHIPPED | OUTPATIENT
Start: 2019-01-21 | End: 2019-12-12 | Stop reason: SDUPTHER

## 2019-01-21 RX ORDER — DONEPEZIL HYDROCHLORIDE 10 MG/1
10 TABLET, FILM COATED ORAL
Qty: 30 TABLET | Refills: 5 | Status: SHIPPED | OUTPATIENT
Start: 2019-01-21 | End: 2019-10-25 | Stop reason: SDUPTHER

## 2019-01-21 RX ORDER — RIVASTIGMINE 4.6 MG/24H
1 PATCH, EXTENDED RELEASE TRANSDERMAL DAILY
Qty: 30 PATCH | Refills: 5 | Status: SHIPPED | OUTPATIENT
Start: 2019-01-21 | End: 2019-08-02 | Stop reason: SDUPTHER

## 2019-01-21 NOTE — PROGRESS NOTES
Assessment/Plan:          BMI Counseling: Body mass index is 34 96 kg/m²  Discussed the patient's BMI with her  The BMI is above average  No BMI follow-up plan is appropriate  Patient is 71 years old and weight reduction/weight gain would further complicate their underlying dementia  Return in about 3 months (around 4/21/2019)  No problem-specific Assessment & Plan notes found for this encounter  Diagnoses and all orders for this visit:    Mild cognitive impairment  -     rivastigmine (EXELON) 4 6 mg/24 hr TD 24 hr patch; Place 1 patch on the skin daily  -     donepezil (ARICEPT) 10 mg tablet; Take 1 tablet (10 mg total) by mouth daily at bedtime    Depression, unspecified depression type  -     PARoxetine (PAXIL) 20 mg tablet; Take 1 tablet (20 mg total) by mouth daily    Gastroesophageal reflux disease without esophagitis  -     omeprazole (PriLOSEC) 20 mg delayed release capsule; Take 1 capsule (20 mg total) by mouth daily    Hypothyroidism, unspecified type  -     levothyroxine 125 mcg tablet; Take 1 tablet (125 mcg total) by mouth daily for 180 days    Recurrent UTI  -     Mirabegron ER (MYRBETRIQ) 50 MG TB24; Take 1 tablet (50 mg total) by mouth daily    Encounter for immunization  -     influenza vaccine, [id number], high-dose, PF 0 5 mL, for patients 65 yr+ (FLUZONE HIGH-DOSE)          Subjective:      Patient ID: Haritha Duarte is a 75 y o  female  Patient comes in today for follow-up  Her daughter noted a difference on the Exelon patch  No side effects  They were actually quite happy with the results  She then ran out of the patches for a few days and her daughter saw her becoming more forgetful  She is also taking the Aricept  They did not get her blood work done yet because various issues  She can take her today  Taking all of her medicines as directed  But needs refills          ALLERGIES:  Allergies   Allergen Reactions    Ciprofloxacin     Other     Sulfa Antibiotics     Penicillins Rash     Category: Allergy;   --  Pt received unasyn 1 5 mg IV 11-12-18 to 11-15-18    Sulfacetamide Rash     Category:  Allergy;        CURRENT MEDICATIONS:    Current Outpatient Prescriptions:     aspirin 325 mg tablet, Take 325 mg by mouth, Disp: , Rfl:     donepezil (ARICEPT) 10 mg tablet, Take 1 tablet (10 mg total) by mouth daily at bedtime, Disp: 30 tablet, Rfl: 5    levothyroxine 125 mcg tablet, Take 1 tablet (125 mcg total) by mouth daily for 180 days, Disp: 30 tablet, Rfl: 5    Mirabegron ER (MYRBETRIQ) 50 MG TB24, Take 1 tablet (50 mg total) by mouth daily, Disp: 30 tablet, Rfl: 5    multivitamin (THERAGRAN) TABS, Take 1 tablet by mouth, Disp: , Rfl:     omeprazole (PriLOSEC) 20 mg delayed release capsule, Take 1 capsule (20 mg total) by mouth daily, Disp: 30 capsule, Rfl: 5    PARoxetine (PAXIL) 20 mg tablet, Take 1 tablet (20 mg total) by mouth daily, Disp: 30 tablet, Rfl: 5    rivastigmine (EXELON) 4 6 mg/24 hr TD 24 hr patch, Place 1 patch on the skin daily, Disp: 30 patch, Rfl: 5    ACTIVE PROBLEM LIST:  Patient Active Problem List   Diagnosis    Depression    Esophageal reflux    Mixed hyperlipidemia    Essential hypertension    Acquired hypothyroidism    Impaired fasting glucose    Lymphoma (HCC)    Recurrent UTI    Cerebral aneurysm, nonruptured    Mild cognitive impairment    SOB (shortness of breath)    Hand laceration    Flexor tenosynovitis of finger    Cellulitis of left hand    Cat bite of left hand    Cellulitis of finger of left hand       PAST MEDICAL HISTORY:  Past Medical History:   Diagnosis Date    Anxiety disorder     Aortic valve disorder     Depression     History of gastroesophageal reflux (GERD)     History of kidney cancer 03/06/2014    Hyperlipidemia 03/06/2014    Hypertension 03/06/2014    Hypothyroidism 03/06/2014    Shortness of breath     TIA (transient ischemic attack)        PAST SURGICAL HISTORY:  Past Surgical History: Procedure Laterality Date    ESOPHAGOGASTRIC FUNDOPLASTY      Nissen Fundoplication       FAMILY HISTORY:  Family History   Problem Relation Age of Onset    Stroke Father     Leukemia Sister     Alcohol abuse Brother     Kidney cancer Brother        SOCIAL HISTORY:  Social History     Social History    Marital status: /Civil Union     Spouse name: N/A    Number of children: 2    Years of education: N/A     Occupational History    Not on file  Social History Main Topics    Smoking status: Never Smoker    Smokeless tobacco: Never Used    Alcohol use No    Drug use: No    Sexual activity: Not on file     Other Topics Concern    Not on file     Social History Narrative    No narrative on file       Review of Systems   Respiratory: Negative for shortness of breath  Cardiovascular: Negative for chest pain  Gastrointestinal: Negative for abdominal pain  Objective:  Vitals:    01/21/19 1040   BP: 128/80   Pulse: 75   Resp: 16   Temp: 97 5 °F (36 4 °C)   SpO2: 95%   Weight: 81 2 kg (179 lb)   Height: 5' (1 524 m)     Body mass index is 34 96 kg/m²  Physical Exam   Constitutional: She appears well-developed and well-nourished  Cardiovascular: Normal rate, regular rhythm and normal heart sounds  Pulmonary/Chest: Effort normal and breath sounds normal    Abdominal: Soft  There is no tenderness  Neurological: She is alert  Psychiatric: Her behavior is normal    Nursing note and vitals reviewed  RESULTS:    No results found for this or any previous visit (from the past 1008 hour(s))  This note was created with voice recognition software  Phonic, grammatical and spelling errors may be present within the note as a result

## 2019-01-22 LAB — BACTERIA UR CULT: NORMAL

## 2019-01-23 DIAGNOSIS — R79.89 ABNORMAL CBC: Primary | ICD-10-CM

## 2019-04-08 ENCOUNTER — TELEPHONE (OUTPATIENT)
Dept: INTERNAL MEDICINE CLINIC | Facility: CLINIC | Age: 74
End: 2019-04-08

## 2019-04-08 DIAGNOSIS — F32.A DEPRESSION, UNSPECIFIED DEPRESSION TYPE: ICD-10-CM

## 2019-04-09 RX ORDER — PAROXETINE HYDROCHLORIDE 20 MG/1
TABLET, FILM COATED ORAL
Qty: 90 TABLET | Refills: 1 | Status: SHIPPED | OUTPATIENT
Start: 2019-04-09 | End: 2019-04-16 | Stop reason: SDUPTHER

## 2019-04-16 ENCOUNTER — OFFICE VISIT (OUTPATIENT)
Dept: INTERNAL MEDICINE CLINIC | Facility: CLINIC | Age: 74
End: 2019-04-16
Payer: COMMERCIAL

## 2019-04-16 VITALS
BODY MASS INDEX: 33.38 KG/M2 | HEART RATE: 84 BPM | DIASTOLIC BLOOD PRESSURE: 88 MMHG | TEMPERATURE: 98.8 F | SYSTOLIC BLOOD PRESSURE: 126 MMHG | OXYGEN SATURATION: 98 % | HEIGHT: 60 IN | RESPIRATION RATE: 16 BRPM | WEIGHT: 170 LBS

## 2019-04-16 DIAGNOSIS — F43.21 GRIEF REACTION: ICD-10-CM

## 2019-04-16 DIAGNOSIS — J01.00 ACUTE NON-RECURRENT MAXILLARY SINUSITIS: Primary | ICD-10-CM

## 2019-04-16 PROCEDURE — 3725F SCREEN DEPRESSION PERFORMED: CPT | Performed by: INTERNAL MEDICINE

## 2019-04-16 PROCEDURE — 99213 OFFICE O/P EST LOW 20 MIN: CPT | Performed by: INTERNAL MEDICINE

## 2019-04-16 PROCEDURE — 1101F PT FALLS ASSESS-DOCD LE1/YR: CPT | Performed by: INTERNAL MEDICINE

## 2019-04-16 RX ORDER — FLUTICASONE PROPIONATE 50 MCG
1 SPRAY, SUSPENSION (ML) NASAL DAILY
Qty: 16 G | Refills: 1 | Status: SHIPPED | OUTPATIENT
Start: 2019-04-16 | End: 2019-09-01 | Stop reason: SDUPTHER

## 2019-04-16 RX ORDER — AZITHROMYCIN 250 MG/1
TABLET, FILM COATED ORAL
Qty: 6 TABLET | Refills: 0 | Status: SHIPPED | OUTPATIENT
Start: 2019-04-16 | End: 2019-04-20

## 2019-06-06 ENCOUNTER — OFFICE VISIT (OUTPATIENT)
Dept: INTERNAL MEDICINE CLINIC | Facility: CLINIC | Age: 74
End: 2019-06-06
Payer: COMMERCIAL

## 2019-06-06 VITALS
DIASTOLIC BLOOD PRESSURE: 96 MMHG | SYSTOLIC BLOOD PRESSURE: 154 MMHG | RESPIRATION RATE: 16 BRPM | HEART RATE: 86 BPM | WEIGHT: 170 LBS | BODY MASS INDEX: 33.38 KG/M2 | OXYGEN SATURATION: 98 % | HEIGHT: 60 IN | TEMPERATURE: 98.9 F

## 2019-06-06 DIAGNOSIS — R31.9 HEMATURIA, UNSPECIFIED TYPE: ICD-10-CM

## 2019-06-06 DIAGNOSIS — R35.0 FREQUENT URINATION: Primary | ICD-10-CM

## 2019-06-06 DIAGNOSIS — N39.0 ACUTE URINARY TRACT INFECTION: ICD-10-CM

## 2019-06-06 LAB
SL AMB  POCT GLUCOSE, UA: ABNORMAL
SL AMB LEUKOCYTE ESTERASE,UA: 70
SL AMB POCT BILIRUBIN,UA: ABNORMAL
SL AMB POCT BLOOD,UA: ABNORMAL
SL AMB POCT CLARITY,UA: ABNORMAL
SL AMB POCT COLOR,UA: ABNORMAL
SL AMB POCT KETONES,UA: 1.5
SL AMB POCT NITRITE,UA: ABNORMAL
SL AMB POCT PH,UA: 6
SL AMB POCT SPECIFIC GRAVITY,UA: 1.02
SL AMB POCT URINE PROTEIN: 0.3
SL AMB POCT UROBILINOGEN: 3.2

## 2019-06-06 PROCEDURE — 1160F RVW MEDS BY RX/DR IN RCRD: CPT | Performed by: INTERNAL MEDICINE

## 2019-06-06 PROCEDURE — 1036F TOBACCO NON-USER: CPT | Performed by: INTERNAL MEDICINE

## 2019-06-06 PROCEDURE — 87086 URINE CULTURE/COLONY COUNT: CPT | Performed by: INTERNAL MEDICINE

## 2019-06-06 PROCEDURE — 81002 URINALYSIS NONAUTO W/O SCOPE: CPT | Performed by: INTERNAL MEDICINE

## 2019-06-06 PROCEDURE — 99213 OFFICE O/P EST LOW 20 MIN: CPT | Performed by: INTERNAL MEDICINE

## 2019-06-06 RX ORDER — NITROFURANTOIN 25; 75 MG/1; MG/1
100 CAPSULE ORAL 2 TIMES DAILY
Qty: 10 CAPSULE | Refills: 0 | Status: SHIPPED | OUTPATIENT
Start: 2019-06-06 | End: 2019-06-11

## 2019-06-07 LAB — BACTERIA UR CULT: NORMAL

## 2019-07-26 DIAGNOSIS — N39.0 RECURRENT UTI: ICD-10-CM

## 2019-07-26 RX ORDER — MIRABEGRON 50 MG/1
TABLET, FILM COATED, EXTENDED RELEASE ORAL
Qty: 90 TABLET | Refills: 1 | Status: SHIPPED | OUTPATIENT
Start: 2019-07-26 | End: 2020-01-28

## 2019-08-02 DIAGNOSIS — G31.84 MILD COGNITIVE IMPAIRMENT: ICD-10-CM

## 2019-08-02 RX ORDER — RIVASTIGMINE 4.6 MG/24H
PATCH, EXTENDED RELEASE TRANSDERMAL
Qty: 30 PATCH | Refills: 5 | Status: SHIPPED | OUTPATIENT
Start: 2019-08-02 | End: 2019-09-06 | Stop reason: SDUPTHER

## 2019-08-09 ENCOUNTER — OFFICE VISIT (OUTPATIENT)
Dept: INTERNAL MEDICINE CLINIC | Facility: CLINIC | Age: 74
End: 2019-08-09
Payer: COMMERCIAL

## 2019-08-09 VITALS
DIASTOLIC BLOOD PRESSURE: 74 MMHG | OXYGEN SATURATION: 98 % | RESPIRATION RATE: 16 BRPM | HEIGHT: 60 IN | TEMPERATURE: 98 F | BODY MASS INDEX: 32.98 KG/M2 | HEART RATE: 80 BPM | WEIGHT: 168 LBS | SYSTOLIC BLOOD PRESSURE: 126 MMHG

## 2019-08-09 DIAGNOSIS — J20.9 ACUTE BRONCHITIS WITH BRONCHOSPASM: Primary | ICD-10-CM

## 2019-08-09 DIAGNOSIS — D84.9 IMMUNOCOMPROMISED (HCC): ICD-10-CM

## 2019-08-09 DIAGNOSIS — Z12.39 SCREENING FOR BREAST CANCER: ICD-10-CM

## 2019-08-09 PROBLEM — L03.114 CELLULITIS OF LEFT HAND: Status: RESOLVED | Noted: 2018-11-10 | Resolved: 2019-08-09

## 2019-08-09 PROBLEM — L03.012 CELLULITIS OF FINGER OF LEFT HAND: Status: RESOLVED | Noted: 2018-11-20 | Resolved: 2019-08-09

## 2019-08-09 PROCEDURE — 1036F TOBACCO NON-USER: CPT | Performed by: PHYSICIAN ASSISTANT

## 2019-08-09 PROCEDURE — 99213 OFFICE O/P EST LOW 20 MIN: CPT | Performed by: PHYSICIAN ASSISTANT

## 2019-08-09 PROCEDURE — 1160F RVW MEDS BY RX/DR IN RCRD: CPT | Performed by: PHYSICIAN ASSISTANT

## 2019-08-09 RX ORDER — AZITHROMYCIN 250 MG/1
TABLET, FILM COATED ORAL
Qty: 6 TABLET | Refills: 0 | Status: SHIPPED | OUTPATIENT
Start: 2019-08-09 | End: 2019-08-13

## 2019-08-09 RX ORDER — BENZONATATE 200 MG/1
200 CAPSULE ORAL 3 TIMES DAILY PRN
COMMUNITY
End: 2019-09-18 | Stop reason: ALTCHOICE

## 2019-08-09 NOTE — PATIENT INSTRUCTIONS
Rest, increase fluids  Tylenol for fever or aches as per package instructions  Start and finished antibiotic  Always wise to take a probiotic and or eat yogurt daily when on an antibiotic  Can continue use of cough Perles as needed  Start your inhaler 1-2 puffs 3 to 4 times a day as needed for wheezing and coughing  Status call on Monday

## 2019-08-09 NOTE — PROGRESS NOTES
Assessment/Plan:   Patient Instructions   Rest, increase fluids  Tylenol for fever or aches as per package instructions  Start and finished antibiotic  Always wise to take a probiotic and or eat yogurt daily when on an antibiotic  Can continue use of cough Perles as needed  Start your inhaler 1-2 puffs 3 to 4 times a day as needed for wheezing and coughing  Status call on Monday  Patient and daughter were instructed that if there is any worsening of symptoms with fever, shortness of breath, or fatigue she should go to the ER  BMI Counseling: Body mass index is 32 81 kg/m²  Discussed the patient's BMI with her  The BMI is above average  BMI counseling and education was provided to the patient  Nutrition recommendations include reducing portion sizes and decreasing overall calorie intake  Exercise recommendations include exercising 3-5 times per week  Return for Next scheduled follow up  Diagnoses and all orders for this visit:    Acute bronchitis with bronchospasm  -     azithromycin (ZITHROMAX) 250 mg tablet; Take 2 tablets today then 1 tablet daily x 4 days    Immunocompromised ([address] )    Screening for breast cancer  -     Mammo screening bilateral w 3d & cad; Future    Other orders  -     PROAIR  (90 Base) MCG/ACT inhaler  -     benzonatate (TESSALON) 200 MG capsule; Take 200 mg by mouth 3 (three) times a day as needed for cough          Subjective:      Patient ID: Yong Zamora is a 75 y o  female  Acute visit    Patient started about 1 week ago with the onset of some nasal congestion, postnasal drip, then followed by the onset of a cough  She continues to have a cough which is definitely worse when she is supine  She has noted initially nocturnal wheezing but now notices wheezing during the day  She is starting to feel feverish but has not taken her temperature  She has had some mild chills the last 2 days    Two days ago she had gone to urgent care where an apparent x-ray was done, however the reading is not present  She was started on [employer] and an albuterol inhaler which she had not picked up as of this visit  She reports she is feeling worse  She is present today with her daughter  ALLERGIES:  Allergies   Allergen Reactions    Ciprofloxacin     Other     Sulfa Antibiotics     Penicillins Rash     Category: Allergy;   --  Pt received unasyn 1 5 mg IV 11-12-18 to 11-15-18    Sulfacetamide Rash     Category:  Allergy;        CURRENT MEDICATIONS:    Current Outpatient Medications:     aspirin 325 mg tablet, Take 325 mg by mouth, Disp: , Rfl:     benzonatate (TESSALON) 200 MG capsule, Take 200 mg by mouth 3 (three) times a day as needed for cough, Disp: , Rfl:     donepezil (ARICEPT) 10 mg tablet, Take 1 tablet (10 mg total) by mouth daily at bedtime, Disp: 30 tablet, Rfl: 5    fluticasone (FLONASE) 50 mcg/act nasal spray, 1 spray into each nostril daily, Disp: 16 g, Rfl: 1    multivitamin (THERAGRAN) TABS, Take 1 tablet by mouth, Disp: , Rfl:     MYRBETRIQ 50 MG TB24, TAKE 1 TABLET BY MOUTH EVERY DAY, Disp: 90 tablet, Rfl: 1    omeprazole (PriLOSEC) 20 mg delayed release capsule, Take 1 capsule (20 mg total) by mouth daily, Disp: 30 capsule, Rfl: 5    PARoxetine (PAXIL) 20 mg tablet, Take 1 tablet (20 mg total) by mouth daily, Disp: 30 tablet, Rfl: 5    rivastigmine (EXELON) 4 6 mg/24 hr TD 24 hr patch, APPLY 1 PATCH EVERY DAY, Disp: 30 patch, Rfl: 5    azithromycin (ZITHROMAX) 250 mg tablet, Take 2 tablets today then 1 tablet daily x 4 days, Disp: 6 tablet, Rfl: 0    levothyroxine 125 mcg tablet, Take 1 tablet (125 mcg total) by mouth daily for 180 days, Disp: 30 tablet, Rfl: 5    PROAIR  (90 Base) MCG/ACT inhaler, , Disp: , Rfl:     ACTIVE PROBLEM LIST:  Patient Active Problem List   Diagnosis    Depression    Esophageal reflux    Mixed hyperlipidemia    Essential hypertension    Acquired hypothyroidism    Impaired fasting glucose    [facility]oma [facility] [address]    Cerebral aneurysm, nonruptured    Mild cognitive impairment    SOB (shortness of breath)    Hand laceration    Flexor tenosynovitis of finger       PAST MEDICAL HISTORY:  Past Medical History:   Diagnosis Date    Anxiety disorder     Aortic valve disorder     Cellulitis of finger of left hand 11/20/2018    Cellulitis of left hand 11/10/2018    Added automatically from request for surgery [id number]    Depression     History of gastroesophageal reflux (GERD)     History of kidney cancer 03/06/2014    Hyperlipidemia 03/06/2014    Hypertension 03/06/2014    Hypothyroidism 03/06/2014    Shortness of breath     TIA (transient ischemic attack)        PAST SURGICAL HISTORY:  Past Surgical History:   Procedure Laterality Date    ESOPHAGOGASTRIC FUNDOPLASTY      Nissen Fundoplication       FAMILY HISTORY:  Family History   Problem Relation Age of Onset    Stroke Father     Leukemia Sister     Alcohol abuse Brother     Kidney cancer Brother        SOCIAL HISTORY:  Social History     Socioeconomic History    Marital status: /Civil Union     Spouse name: Not on file    Number of children: 2    Years of education: Not on file    Highest education level: Not on file   Occupational History    Not on file   Social Needs    Financial resource strain: Not on file    Food insecurity:     Worry: Not on file     Inability: Not on file    Transportation needs:     Medical: Not on file     Non-medical: Not on file   Tobacco Use    Smoking status: Never Smoker    Smokeless tobacco: Never Used   Substance and Sexual Activity    Alcohol use: No    Drug use: No    Sexual activity: Not on file   Lifestyle    Physical activity:     Days per week: Not on file     Minutes per session: Not on file    Stress: Not on file   Relationships    Social connections:     Talks on phone: Not on file     Gets together: Not on file     Attends Mandaen service: Not on file     Active member of club or organization: Not on file     Attends meetings of clubs or organizations: Not on file     Relationship status: Not on file    Intimate partner violence:     Fear of current or ex partner: Not on file     Emotionally abused: Not on file     Physically abused: Not on file     Forced sexual activity: Not on file   Other Topics Concern    Not on file   Social History Narrative    Not on file       Review of Systems   Constitutional: Positive for chills and fatigue  Negative for activity change and fever  HENT: Positive for postnasal drip, rhinorrhea, sinus pressure and sore throat  Negative for congestion, ear pain, sneezing and trouble swallowing  Eyes: Negative for discharge and redness  Respiratory: Positive for cough, chest tightness and wheezing  Negative for shortness of breath  Cardiovascular: Negative for chest pain, palpitations and leg swelling  Gastrointestinal: Negative for abdominal pain, blood in stool, constipation, diarrhea, nausea and vomiting  Genitourinary: Negative for difficulty urinating, dysuria, frequency, hematuria and urgency  Musculoskeletal: Negative for arthralgias and myalgias  Skin: Negative for rash  Allergic/Immunologic: Positive for immunocompromised state  Neurological: Negative for dizziness, syncope, weakness, light-headedness and headaches  Hematological: Negative for adenopathy  Does not bruise/bleed easily  Psychiatric/Behavioral: Negative for dysphoric mood  The patient is not nervous/anxious  Objective:  Vitals:    08/09/19 1455   BP: 126/74   BP Location: Right arm   Patient Position: Sitting   Cuff Size: Large   Pulse: 80   Resp: 16   Temp: 98 °F (36 7 °C)   TempSrc: Oral   SpO2: 98%   Weight: 76 2 kg (168 lb)   Height: 5' (1 524 m)     Body mass index is 32 81 kg/m²  Physical Exam   Constitutional: She is oriented to person, place, and time  She appears well-developed and well-nourished  No distress     HENT:   HEENT-injected conjunctivae, TMs dull with fluid behind, nasal mucosa hyperemic with yellow/green mucoid drainage, injected red posterior pharynx with yellow/green post nasal drainage, mildly tender frontal sinuses  Neck: Neck supple  Carotid bruit is not present  Cardiovascular: Normal rate, regular rhythm and normal heart sounds  Pulmonary/Chest: Effort normal  No respiratory distress  She has no wheezes  She exhibits no tenderness  Crackles audible in the right upper lobe and right upper base  Forced expiration causes coughing  Abdominal: Soft  There is no tenderness  Musculoskeletal: She exhibits no edema  Lymphadenopathy:     She has no cervical adenopathy  Neurological: She is alert and oriented to person, place, and time  She displays normal reflexes  No cranial nerve deficit  She exhibits normal muscle tone  Coordination normal    Skin: Skin is warm and dry  No rash noted  Psychiatric: She has a normal mood and affect  Her behavior is normal    Nursing note and vitals reviewed  RESULTS:    No results found for this or any previous visit (from the past 1008 hour(s))  This note was created with voice recognition software  Phonic, grammatical and spelling errors may be present within the note as a result

## 2019-08-12 ENCOUNTER — TELEPHONE (OUTPATIENT)
Dept: OTHER | Facility: OTHER | Age: 74
End: 2019-08-12

## 2019-08-13 NOTE — TELEPHONE ENCOUNTER
Spoke with [facility], she says he mom is still taking the zpack and if starting to feel better  She will contact us if her symptoms get any worse

## 2019-08-28 DIAGNOSIS — E03.9 HYPOTHYROIDISM, UNSPECIFIED TYPE: ICD-10-CM

## 2019-08-28 RX ORDER — LEVOTHYROXINE SODIUM 0.12 MG/1
TABLET ORAL
Qty: 30 TABLET | Refills: 5 | Status: SHIPPED | OUTPATIENT
Start: 2019-08-28 | End: 2019-11-11 | Stop reason: SDUPTHER

## 2019-09-01 DIAGNOSIS — J01.00 ACUTE NON-RECURRENT MAXILLARY SINUSITIS: ICD-10-CM

## 2019-09-03 RX ORDER — FLUTICASONE PROPIONATE 50 MCG
SPRAY, SUSPENSION (ML) NASAL
Qty: 16 ML | Refills: 1 | Status: SHIPPED | OUTPATIENT
Start: 2019-09-03

## 2019-09-06 DIAGNOSIS — G31.84 MILD COGNITIVE IMPAIRMENT: ICD-10-CM

## 2019-09-06 RX ORDER — RIVASTIGMINE 4.6 MG/24H
1 PATCH, EXTENDED RELEASE TRANSDERMAL DAILY
Qty: 30 PATCH | Refills: 5 | Status: SHIPPED | OUTPATIENT
Start: 2019-09-06 | End: 2020-04-08

## 2019-09-16 ENCOUNTER — HOSPITAL ENCOUNTER (EMERGENCY)
Facility: HOSPITAL | Age: 74
Discharge: HOME | End: 2019-09-16
Attending: EMERGENCY MEDICINE
Payer: COMMERCIAL

## 2019-09-16 VITALS
BODY MASS INDEX: 32.98 KG/M2 | HEIGHT: 60 IN | SYSTOLIC BLOOD PRESSURE: 181 MMHG | RESPIRATION RATE: 20 BRPM | HEART RATE: 72 BPM | TEMPERATURE: 97.5 F | OXYGEN SATURATION: 95 % | WEIGHT: 167.99 LBS | DIASTOLIC BLOOD PRESSURE: 79 MMHG

## 2019-09-16 DIAGNOSIS — K14.8 TONGUE LESION: Primary | ICD-10-CM

## 2019-09-16 PROCEDURE — 99283 EMERGENCY DEPT VISIT LOW MDM: CPT | Performed by: EMERGENCY MEDICINE

## 2019-09-16 PROCEDURE — 99283 EMERGENCY DEPT VISIT LOW MDM: CPT

## 2019-09-17 NOTE — ED PROVIDER NOTES
History  Chief Complaint   Patient presents with    Tongue Swelling     Per Pt " my tongue is swollen and tender  I do not recall hurting it "      69-year-old female presents with a lesion on her tongue that she noticed earlier today  The patient and denies any trauma or biting of her tongue  Patient states that the lesion is tender but not painful  She denies any clear inciting event  Patient has a history of lymphoma that was treated successfully and is monitored by oncology  She denies any night sweats, fever, or unintentional weight loss  Impression and plan:  Lesion of the tongue with a broad differential   There is surrounding ecchymosis of the area so it is unclear if it is a traumatic lesions that patient does not recall the incident but considering patient's age and history, concerns for potential underlying malignancy which I discussed with the patient  Patient does not have systemic symptoms at this point so I recommended close follow-up with ENT and provided information on this for likely biopsy and evaluation further  Mouth Lesions   Location:  Tongue  Quality:  Red and blue  Onset quality:  Sudden  Severity:  Mild  Progression:  Unchanged  Relieved by:  None tried  Worsened by:  Nothing  Ineffective treatments:  None tried  Associated symptoms: no congestion, no dental pain, no ear pain, no fever, no malaise, no neck pain, no rash, no rhinorrhea, no sore throat and no swollen glands        Prior to Admission Medications   Prescriptions Last Dose Informant Patient Reported? Taking?    MYRBETRIQ 50 MG TB24   No No   Sig: TAKE 1 TABLET BY MOUTH EVERY DAY   PARoxetine (PAXIL) 20 mg tablet   No No   Sig: Take 1 tablet (20 mg total) by mouth daily   PROAIR  (90 Base) MCG/ACT inhaler   Yes No   aspirin 325 mg tablet  Self Yes No   Sig: Take 325 mg by mouth   benzonatate (TESSALON) 200 MG capsule   Yes No   Sig: Take 200 mg by mouth 3 (three) times a day as needed for cough   donepezil (ARICEPT) 10 mg tablet   No No   Sig: Take 1 tablet (10 mg total) by mouth daily at bedtime   fluticasone (FLONASE) 50 mcg/act nasal spray   No No   Sig: SPRAY 1 SPRAY INTO EACH NOSTRIL EVERY DAY   levothyroxine 125 mcg tablet   No No   Sig: Take 1 tablet (125 mcg total) by mouth daily for 180 days   levothyroxine 125 mcg tablet   No No   Sig: TAKE 1 TABLET BY MOUTH DAILY   multivitamin (THERAGRAN) TABS  Self Yes No   Sig: Take 1 tablet by mouth   omeprazole (PriLOSEC) 20 mg delayed release capsule   No No   Sig: Take 1 capsule (20 mg total) by mouth daily   rivastigmine (EXELON) 4 6 mg/24 hr TD 24 hr patch   No No   Sig: Place 1 patch on the skin daily      Facility-Administered Medications: None       Past Medical History:   Diagnosis Date    Anxiety disorder     Aortic valve disorder     Cellulitis of finger of left hand 11/20/2018    Cellulitis of left hand 11/10/2018    Added automatically from request for surgery [id number]    Depression     History of gastroesophageal reflux (GERD)     History of kidney cancer 03/06/2014    Hyperlipidemia 03/06/2014    Hypertension 03/06/2014    Hypothyroidism 03/06/2014    Shortness of breath     TIA (transient ischemic attack)        Past Surgical History:   Procedure Laterality Date    ESOPHAGOGASTRIC FUNDOPLASTY      Nissen Fundoplication       Family History   Problem Relation Age of Onset    Stroke Father     Leukemia Sister     Alcohol abuse Brother     Kidney cancer Brother      I have reviewed and agree with the history as documented  Social History     Tobacco Use    Smoking status: Never Smoker    Smokeless tobacco: Never Used   Substance Use Topics    Alcohol use: No    Drug use: No        Review of Systems   Constitutional: Negative for fever  HENT: Positive for mouth sores  Negative for congestion, ear pain, rhinorrhea and sore throat  Musculoskeletal: Negative for neck pain  Skin: Negative for rash     All other systems reviewed and are negative  Physical Exam  Physical Exam   Constitutional: She appears well-developed and well-nourished  No distress  HENT:   Head: Normocephalic and atraumatic  Mouth/Throat:       Eyes: Pupils are equal, round, and reactive to light  Neck: Neck supple  Cardiovascular: Normal rate  Pulmonary/Chest: Effort normal    Abdominal: Soft  She exhibits no distension  There is no tenderness  Musculoskeletal: She exhibits no deformity  Neurological: She is alert  Skin: Skin is warm and dry  She is not diaphoretic  Psychiatric: She has a normal mood and affect  Vitals reviewed  Vital Signs  ED Triage Vitals [09/16/19 2057]   Temperature Pulse Respirations Blood Pressure SpO2   97 5 °F (36 4 °C) 72 20 (!) 181/79 95 %      Temp src Heart Rate Source Patient Position - Orthostatic VS BP Location FiO2 (%)   -- Monitor Sitting Left arm --      Pain Score       --           Vitals:    09/16/19 2057   BP: (!) 181/79   Pulse: 72   Patient Position - Orthostatic VS: Sitting         Visual Acuity      ED Medications  Medications - No data to display    Diagnostic Studies  Results Reviewed     None                 No orders to display              Procedures  Procedures       ED Course                               MDM    Disposition  Final diagnoses:   Tongue lesion     Time reflects when diagnosis was documented in both MDM as applicable and the Disposition within this note     Time User Action Codes Description Comment    9/16/2019  9:31 PM Gatito Rojas Add [K14 8] Tongue lesion       ED Disposition     ED Disposition Condition Date/Time Comment    Discharge Stable Mon Sep 16, 2019  9:31 PM Sage Maza discharge to home/self care  Follow-up Information     Follow up With Specialties Details Why Contact Info Additional Information    Mauri Hodge MD Internal Medicine Schedule an appointment as soon as possible for a visit  if unable to follow closely with ENT   [address] [place] [address] 9[address] [facility] Th[facility]at Otolaryngology Schedule an appointment as soon as possible for a visit  Evaluate lesion on tongue  [facility], [address] Throat, [address], [address] [place] [address] Emergency Department Emergency Medicine Go to  If symptoms worsen [address] [facility] [address] ED, [address] [place], [place], [address]          Discharge Medication List as of 9/16/2019  9:33 PM      CONTINUE these medications which have NOT CHANGED    Details   aspirin 325 mg tablet Take 325 mg by mouth, Starting Mon 3/19/2018, Historical Med      benzonatate (TESSALON) 200 MG capsule Take 200 mg by mouth 3 (three) times a day as needed for cough, Historical Med      donepezil (ARICEPT) 10 mg tablet Take 1 tablet (10 mg total) by mouth daily at bedtime, Starting Mon 1/21/2019, Normal      fluticasone (FLONASE) 50 mcg/act nasal spray SPRAY 1 SPRAY INTO EACH NOSTRIL EVERY DAY, Normal      levothyroxine 125 mcg tablet TAKE 1 TABLET BY MOUTH DAILY, Normal      multivitamin (THERAGRAN) TABS Take 1 tablet by mouth, Historical Med      MYRBETRIQ 50 MG TB24 TAKE 1 TABLET BY MOUTH EVERY DAY, Normal      omeprazole (PriLOSEC) 20 mg delayed release capsule Take 1 capsule (20 mg total) by mouth daily, Starting Mon 1/21/2019, Normal      PARoxetine (PAXIL) 20 mg tablet Take 1 tablet (20 mg total) by mouth daily, Starting Mon 1/21/2019, Normal      PROAIR  (90 Base) MCG/ACT inhaler Starting Wed 8/7/2019, Historical Med      rivastigmine (EXELON) 4 6 mg/24 hr TD 24 hr patch Place 1 patch on the skin daily, Starting Fri 9/6/2019, Normal           No discharge procedures on file      ED Provider  Electronically Signed by           Danielle Jean MD  09/18/19 9052

## 2019-09-18 ENCOUNTER — OFFICE VISIT (OUTPATIENT)
Dept: INTERNAL MEDICINE CLINIC | Facility: CLINIC | Age: 74
End: 2019-09-18
Payer: COMMERCIAL

## 2019-09-18 ENCOUNTER — APPOINTMENT (OUTPATIENT)
Dept: LAB | Facility: CLINIC | Age: 74
End: 2019-09-18
Payer: COMMERCIAL

## 2019-09-18 VITALS
OXYGEN SATURATION: 94 % | DIASTOLIC BLOOD PRESSURE: 106 MMHG | RESPIRATION RATE: 18 BRPM | WEIGHT: 174 LBS | HEART RATE: 82 BPM | SYSTOLIC BLOOD PRESSURE: 180 MMHG | BODY MASS INDEX: 34.16 KG/M2 | HEIGHT: 60 IN

## 2019-09-18 DIAGNOSIS — C85.90 LYMPHOMA, UNSPECIFIED BODY REGION, UNSPECIFIED LYMPHOMA TYPE (HCC): ICD-10-CM

## 2019-09-18 DIAGNOSIS — R73.01 IMPAIRED FASTING GLUCOSE: ICD-10-CM

## 2019-09-18 DIAGNOSIS — E03.9 ACQUIRED HYPOTHYROIDISM: ICD-10-CM

## 2019-09-18 DIAGNOSIS — I10 ESSENTIAL HYPERTENSION: ICD-10-CM

## 2019-09-18 DIAGNOSIS — I10 ESSENTIAL HYPERTENSION: Primary | ICD-10-CM

## 2019-09-18 DIAGNOSIS — R79.89 ABNORMAL CBC: ICD-10-CM

## 2019-09-18 DIAGNOSIS — E78.2 MIXED HYPERLIPIDEMIA: ICD-10-CM

## 2019-09-18 LAB
ALBUMIN SERPL BCP-MCNC: 3.5 G/DL (ref 3.5–5)
ALP SERPL-CCNC: 85 U/L (ref 46–116)
ALT SERPL W P-5'-P-CCNC: 24 U/L (ref 12–78)
ANION GAP SERPL CALCULATED.3IONS-SCNC: 4 MMOL/L (ref 4–13)
AST SERPL W P-5'-P-CCNC: 20 U/L (ref 5–45)
BASOPHILS # BLD MANUAL: 0.15 THOUSAND/UL (ref 0–0.1)
BASOPHILS NFR MAR MANUAL: 3 % (ref 0–1)
BILIRUB SERPL-MCNC: 0.38 MG/DL (ref 0.2–1)
BUN SERPL-MCNC: 25 MG/DL (ref 5–25)
CALCIUM SERPL-MCNC: 9 MG/DL (ref 8.3–10.1)
CHLORIDE SERPL-SCNC: 105 MMOL/L (ref 100–108)
CHOLEST SERPL-MCNC: 222 MG/DL (ref 50–200)
CO2 SERPL-SCNC: 29 MMOL/L (ref 21–32)
CREAT SERPL-MCNC: 0.8 MG/DL (ref 0.6–1.3)
EOSINOPHIL # BLD MANUAL: 0.05 THOUSAND/UL (ref 0–0.4)
EOSINOPHIL NFR BLD MANUAL: 1 % (ref 0–6)
ERYTHROCYTE [DISTWIDTH] IN BLOOD BY AUTOMATED COUNT: 13.5 % (ref 11.6–15.1)
EST. AVERAGE GLUCOSE BLD GHB EST-MCNC: 117 MG/DL
GFR SERPL CREATININE-BSD FRML MDRD: 73 ML/MIN/1.73SQ M
GLUCOSE P FAST SERPL-MCNC: 87 MG/DL (ref 65–99)
HBA1C MFR BLD: 5.7 % (ref 4.2–6.3)
HCT VFR BLD AUTO: 40.2 % (ref 34.8–46.1)
HDLC SERPL-MCNC: 86 MG/DL (ref 40–60)
HGB BLD-MCNC: 13.1 G/DL (ref 11.5–15.4)
LDLC SERPL CALC-MCNC: 122 MG/DL (ref 0–100)
LYMPHOCYTES # BLD AUTO: 0.53 THOUSAND/UL (ref 0.6–4.47)
LYMPHOCYTES # BLD AUTO: 11 % (ref 14–44)
MACROCYTES BLD QL AUTO: PRESENT
MCH RBC QN AUTO: 34.4 PG (ref 26.8–34.3)
MCHC RBC AUTO-ENTMCNC: 32.6 G/DL (ref 31.4–37.4)
MCV RBC AUTO: 106 FL (ref 82–98)
MONOCYTES # BLD AUTO: 0.49 THOUSAND/UL (ref 0–1.22)
MONOCYTES NFR BLD: 10 % (ref 4–12)
NEUTROPHILS # BLD MANUAL: 3.64 THOUSAND/UL (ref 1.85–7.62)
NEUTS SEG NFR BLD AUTO: 75 % (ref 43–75)
NONHDLC SERPL-MCNC: 136 MG/DL
NRBC BLD AUTO-RTO: 0 /100 WBCS
PLATELET # BLD AUTO: 246 THOUSANDS/UL (ref 149–390)
PLATELET BLD QL SMEAR: ADEQUATE
PMV BLD AUTO: 11.8 FL (ref 8.9–12.7)
POIKILOCYTOSIS BLD QL SMEAR: PRESENT
POTASSIUM SERPL-SCNC: 3.7 MMOL/L (ref 3.5–5.3)
PROT SERPL-MCNC: 7.3 G/DL (ref 6.4–8.2)
RBC # BLD AUTO: 3.81 MILLION/UL (ref 3.81–5.12)
RBC MORPH BLD: PRESENT
SODIUM SERPL-SCNC: 138 MMOL/L (ref 136–145)
T4 FREE SERPL-MCNC: 1.14 NG/DL (ref 0.76–1.46)
TRIGL SERPL-MCNC: 68 MG/DL
TSH SERPL DL<=0.05 MIU/L-ACNC: 7.14 UIU/ML (ref 0.36–3.74)
WBC # BLD AUTO: 4.85 THOUSAND/UL (ref 4.31–10.16)

## 2019-09-18 PROCEDURE — 80061 LIPID PANEL: CPT

## 2019-09-18 PROCEDURE — 83036 HEMOGLOBIN GLYCOSYLATED A1C: CPT

## 2019-09-18 PROCEDURE — 85027 COMPLETE CBC AUTOMATED: CPT

## 2019-09-18 PROCEDURE — 84443 ASSAY THYROID STIM HORMONE: CPT

## 2019-09-18 PROCEDURE — 99214 OFFICE O/P EST MOD 30 MIN: CPT | Performed by: PHYSICIAN ASSISTANT

## 2019-09-18 PROCEDURE — 84439 ASSAY OF FREE THYROXINE: CPT

## 2019-09-18 PROCEDURE — 80053 COMPREHEN METABOLIC PANEL: CPT

## 2019-09-18 PROCEDURE — 85007 BL SMEAR W/DIFF WBC COUNT: CPT

## 2019-09-18 PROCEDURE — 36415 COLL VENOUS BLD VENIPUNCTURE: CPT

## 2019-09-18 RX ORDER — AMLODIPINE BESYLATE 2.5 MG/1
5 TABLET ORAL DAILY
Qty: 30 TABLET | Refills: 1 | Status: SHIPPED | OUTPATIENT
Start: 2019-09-18 | End: 2019-11-10 | Stop reason: SDUPTHER

## 2019-09-18 NOTE — PROGRESS NOTES
Assessment/Plan:      Patient Instructions   Will keep an eye on the tongue lesion  Blood pressure is again up so will restart low-dose amlodipine 2 5 mg daily  Will have patient do screening labs return in 2 weeks to reassess blood pressure and tongue lesion  BMI Counseling: Body mass index is 33 98 kg/m²  Discussed the patient's BMI with her  The BMI is above normal  Nutrition recommendations include reducing portion sizes and decreasing overall calorie intake  Return in about 2 weeks (around 10/2/2019) for Next scheduled follow up  Diagnoses and all orders for this visit:    Essential hypertension  -     CBC and differential; Future  -     Comprehensive metabolic panel; Future  -     Lipid panel; Future  -     amLODIPine (NORVASC) 2 5 mg tablet; Take 2 tablets (5 mg total) by mouth daily    Impaired fasting glucose  -     Hemoglobin A1C; Future    Acquired hypothyroidism  -     TSH, 3rd generation; Future  -     T4, free; Future    Lymphoma, unspecified body region, unspecified lymphoma type ([facility] [address] )  -     CBC and differential; Future          Subjective:      Patient ID: Elvie Day is a 75 y o  female  ER follow-up    Patient was seen in the emergency room on 09/16 for a swollen area on the right side of her tongue that was slightly tender  Patient denied any trauma to the area or could not remember biting her tongue  Since that ER visit patient is stating the the area has decreased in size and is not tender  History of hypertension: In past had been on Norvasc, and a diuretic  Diuretic had caused hypokalemia, her blood pressure had normalized and medications were stopped  Her blood pressure is now elevated again, she is complaining of some lightheadedness and at times feeling off balance  No chest pain or palpitations  Also history of hypothyroidism on replacement:  No recent labs  History of hyperlipidemia:  Will need to recheck lipids      Past history of lymphoma:  Had reported that she follows up with Oncology  ALLERGIES:  Allergies   Allergen Reactions    Ciprofloxacin     Other     Sulfa Antibiotics     Penicillins Rash     Category: Allergy;   --  Pt received unasyn 1 5 mg IV 11-12-18 to 11-15-18    Sulfacetamide Rash     Category:  Allergy;        CURRENT MEDICATIONS:    Current Outpatient Medications:     aspirin 325 mg tablet, Take 325 mg by mouth, Disp: , Rfl:     donepezil (ARICEPT) 10 mg tablet, Take 1 tablet (10 mg total) by mouth daily at bedtime, Disp: 30 tablet, Rfl: 5    fluticasone (FLONASE) 50 mcg/act nasal spray, SPRAY 1 SPRAY INTO EACH NOSTRIL EVERY DAY, Disp: 16 mL, Rfl: 1    levothyroxine 125 mcg tablet, TAKE 1 TABLET BY MOUTH DAILY, Disp: 30 tablet, Rfl: 5    multivitamin (THERAGRAN) TABS, Take 1 tablet by mouth, Disp: , Rfl:     MYRBETRIQ 50 MG TB24, TAKE 1 TABLET BY MOUTH EVERY DAY, Disp: 90 tablet, Rfl: 1    omeprazole (PriLOSEC) 20 mg delayed release capsule, Take 1 capsule (20 mg total) by mouth daily, Disp: 30 capsule, Rfl: 5    PARoxetine (PAXIL) 20 mg tablet, Take 1 tablet (20 mg total) by mouth daily, Disp: 30 tablet, Rfl: 5    PROAIR  (90 Base) MCG/ACT inhaler, , Disp: , Rfl:     rivastigmine (EXELON) 4 6 mg/24 hr TD 24 hr patch, Place 1 patch on the skin daily, Disp: 30 patch, Rfl: 5    amLODIPine (NORVASC) 2 5 mg tablet, Take 2 tablets (5 mg total) by mouth daily, Disp: 30 tablet, Rfl: 1    levothyroxine 125 mcg tablet, Take 1 tablet (125 mcg total) by mouth daily for 180 days, Disp: 30 tablet, Rfl: 5    ACTIVE PROBLEM LIST:  Patient Active Problem List   Diagnosis    Depression    Esophageal reflux    Mixed hyperlipidemia    Essential hypertension    Acquired hypothyroidism    Impaired fasting glucose    Lymphoma (HCC)    Cerebral aneurysm, nonruptured    Mild cognitive impairment    Hand laceration    Flexor tenosynovitis of finger       PAST MEDICAL HISTORY:  Past Medical History:   Diagnosis Date    Anxiety disorder     Aortic valve disorder     Cellulitis of finger of left hand 11/20/2018    Cellulitis of left hand 11/10/2018    Added automatically from request for surgery [id number]    Depression     History of gastroesophageal reflux (GERD)     History of kidney cancer 03/06/2014    Hyperlipidemia 03/06/2014    Hypertension 03/06/2014    Hypothyroidism 03/06/2014    Shortness of breath     TIA (transient ischemic attack)        PAST SURGICAL HISTORY:  Past Surgical History:   Procedure Laterality Date    ESOPHAGOGASTRIC FUNDOPLASTY      Nissen Fundoplication       FAMILY HISTORY:  Family History   Problem Relation Age of Onset    Stroke Father     Leukemia Sister     Alcohol abuse Brother     Kidney cancer Brother        SOCIAL HISTORY:  Social History     Socioeconomic History    Marital status: /Civil Union     Spouse name: Not on file    Number of children: 2    Years of education: Not on file    Highest education level: Not on file   Occupational History    Not on file   Social Needs    Financial resource strain: Not on file    Food insecurity:     Worry: Not on file     Inability: Not on file    Transportation needs:     Medical: Not on file     Non-medical: Not on file   Tobacco Use    Smoking status: Never Smoker    Smokeless tobacco: Never Used   Substance and Sexual Activity    Alcohol use: No    Drug use: No    Sexual activity: Not on file   Lifestyle    Physical activity:     Days per week: Not on file     Minutes per session: Not on file    Stress: Not on file   Relationships    Social connections:     Talks on phone: Not on file     Gets together: Not on file     Attends Religion service: Not on file     Active member of club or organization: Not on file     Attends meetings of clubs or organizations: Not on file     Relationship status: Not on file    Intimate partner violence:     Fear of current or ex partner: Not on file     Emotionally abused: Not on file     Physically abused: Not on file     Forced sexual activity: Not on file   Other Topics Concern    Not on file   Social History Narrative    Not on file       Review of Systems   Constitutional: Negative for activity change, chills, fatigue and fever  HENT: Negative for congestion  Eyes: Negative for discharge  Respiratory: Negative for cough, chest tightness and shortness of breath  Cardiovascular: Negative for chest pain, palpitations and leg swelling  Gastrointestinal: Negative for abdominal pain  Genitourinary: Negative for difficulty urinating  Musculoskeletal: Negative for arthralgias and myalgias  Skin: Negative for rash  Allergic/Immunologic: Negative for immunocompromised state  Neurological: Positive for dizziness  Negative for seizures, syncope, speech difficulty, weakness, light-headedness and headaches  Hematological: Negative for adenopathy  Does not bruise/bleed easily  Psychiatric/Behavioral: Negative for dysphoric mood, sleep disturbance and suicidal ideas  The patient is not nervous/anxious  Objective:  Vitals:    09/18/19 1129   BP: (!) 180/106   BP Location: Left arm   Patient Position: Sitting   Cuff Size: Adult   Pulse: 82   Resp: 18   SpO2: 94%   Weight: 78 9 kg (174 lb)   Height: 5' (1 524 m)     Body mass index is 33 98 kg/m²  Physical Exam   Constitutional: She is oriented to person, place, and time  She appears well-developed and well-nourished  No distress  HENT:   Small 4 millimeter slightly raised ecchymotic area right side of tongue  Looks smaller than picture provided from her ER encounter   Neck: Neck supple  No JVD present  No thyromegaly present  Cardiovascular: Normal rate, regular rhythm and normal heart sounds  Pulmonary/Chest: Effort normal and breath sounds normal    Musculoskeletal: She exhibits no edema  Lymphadenopathy:     She has no cervical adenopathy     Neurological: She is alert and oriented to person, place, and time    Skin: Skin is warm and dry  No rash noted  Psychiatric: She has a normal mood and affect  Her behavior is normal    Nursing note and vitals reviewed  RESULTS:    No results found for this or any previous visit (from the past 1008 hour(s))  This note was created with voice recognition software  Phonic, grammatical and spelling errors may be present within the note as a result

## 2019-09-18 NOTE — PATIENT INSTRUCTIONS
Will keep an eye on the tongue lesion  Blood pressure is again up so will restart low-dose amlodipine 2 5 mg daily  Will have patient do screening labs return in 2 weeks to reassess blood pressure and tongue lesion

## 2019-10-03 ENCOUNTER — APPOINTMENT (EMERGENCY)
Dept: CT IMAGING | Facility: HOSPITAL | Age: 74
End: 2019-10-03
Payer: COMMERCIAL

## 2019-10-03 ENCOUNTER — HOSPITAL ENCOUNTER (EMERGENCY)
Facility: HOSPITAL | Age: 74
Discharge: HOME | End: 2019-10-03
Attending: EMERGENCY MEDICINE
Payer: COMMERCIAL

## 2019-10-03 ENCOUNTER — APPOINTMENT (EMERGENCY)
Dept: RADIOLOGY | Facility: HOSPITAL | Age: 74
End: 2019-10-03
Payer: COMMERCIAL

## 2019-10-03 VITALS
TEMPERATURE: 98.2 F | DIASTOLIC BLOOD PRESSURE: 68 MMHG | HEART RATE: 71 BPM | RESPIRATION RATE: 18 BRPM | SYSTOLIC BLOOD PRESSURE: 158 MMHG | OXYGEN SATURATION: 95 %

## 2019-10-03 DIAGNOSIS — S90.30XA FOOT CONTUSION: ICD-10-CM
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
## 2019-10-03 PROBLEM — D75.89 MACROCYTOSIS WITHOUT ANEMIA: Status: ACTIVE | Noted: 2019-10-03

## 2019-10-03 PROCEDURE — 73610 X-RAY EXAM OF ANKLE: CPT

## 2019-10-03 PROCEDURE — 70450 CT HEAD/BRAIN W/O DYE: CPT

## 2019-10-03 PROCEDURE — 73630 X-RAY EXAM OF FOOT: CPT

## 2019-10-03 PROCEDURE — 72125 CT NECK SPINE W/O DYE: CPT

## 2019-10-03 PROCEDURE — 99284 EMERGENCY DEPT VISIT MOD MDM: CPT | Performed by: EMERGENCY MEDICINE

## 2019-10-03 PROCEDURE — 99284 EMERGENCY DEPT VISIT MOD MDM: CPT

## 2019-10-04 NOTE — ED PROVIDER NOTES
H&P Exam - Trauma   Farrukh Joaquin 75 y o  female MRN: [id number]  Unit/Bed#: ED 21/ED 21 Encounter: [id number]    Assessment/Plan   Trauma Alert: Trauma Acuity: Trauma Evaluation  Model of Arrival: Mode of Arrival: Other (Comment)(Patient arrived at ED with daughter) via    Trauma Team: Current Providers  Attending Provider: Ragini Sultana DO  ED Technician: Katia Lara  Registered Nurse: Mitchell Harp RN  Consultants: None    Trauma Active Problems:  69-year-old female patient with mild dementia presents to the emergency department for injuries sustained in a fall on Sunday  Trauma Plan:  CT of the head and cervical spine  X-rays of the right foot and ankle    Chief Complaint:   Chief Complaint   Patient presents with    Fall     pt states she fell down on her knees getting up from picnic table, denies hitting her haed or LOC, c/o right foot pain        History of Present Illness   HPI:  Farrukh Joaquin is a 75 y o  female who presents with injuries from a fall    Mechanism:Details of Incident: Patient states she fell down on her knees getting up from picnic table, denies hitting her haed or LOC, c/o right foot pain Injury Date: 10/03/19        HPI  Review of Systems    Historical Information     Immunizations:   Immunization History   Administered Date(s) Administered    INFLUENZA 11/03/2010, 11/12/2012    Influenza Split High Dose Preservative Free IM 01/19/2018    Influenza, high dose seasonal 0 5 mL 01/21/2019    Pneumococcal Polysaccharide PPV23 09/16/2011    Rabies-IM Human Diploid Cell Culture 11/10/2018    Tdap 11/10/2018       Past Medical History:   Diagnosis Date    Anxiety disorder     Aortic valve disorder     Cellulitis of finger of left hand 11/20/2018    Cellulitis of left hand 11/10/2018    Added automatically from request for surgery [id number]    Depression     History of gastroesophageal reflux (GERD)     History of kidney cancer 03/06/2014    Hyperlipidemia 03/06/2014    Hypertension 03/06/2014    Hypothyroidism 03/06/2014    Shortness of breath     TIA (transient ischemic attack)        Family History   Problem Relation Age of Onset    Stroke Father     Leukemia Sister     Alcohol abuse Brother     Kidney cancer Brother      Past Surgical History:   Procedure Laterality Date    ESOPHAGOGASTRIC FUNDOPLASTY      Nissen Fundoplication    HERNIA REPAIR         Social History     Socioeconomic History    Marital status: /Civil Union     Spouse name: None    Number of children: 2    Years of education: None    Highest education level: None   Occupational History    None   Social Needs    Financial resource strain: None    Food insecurity:     Worry: None     Inability: None    Transportation needs:     Medical: None     Non-medical: None   Tobacco Use    Smoking status: Never Smoker    Smokeless tobacco: Never Used   Substance and Sexual Activity    Alcohol use: No    Drug use: No    Sexual activity: None   Lifestyle    Physical activity:     Days per week: None     Minutes per session: None    Stress: None   Relationships    Social connections:     Talks on phone: None     Gets together: None     Attends Confucianist service: None     Active member of club or organization: None     Attends meetings of clubs or organizations: None     Relationship status: None    Intimate partner violence:     Fear of current or ex partner: None     Emotionally abused: None     Physically abused: None     Forced sexual activity: None   Other Topics Concern    None   Social History Narrative    None       Family History:   Family History   Problem Relation Age of Onset    Stroke Father     Leukemia Sister     Alcohol abuse Brother     Kidney cancer Brother        Meds/Allergies   Prior to Admission Medications   Prescriptions Last Dose Informant Patient Reported? Taking?    MYRBETRIQ 50 MG TB24   No No   Sig: TAKE 1 TABLET BY MOUTH EVERY DAY   PARoxetine (PAXIL) 20 mg tablet   No No   Sig: Take 1 tablet (20 mg total) by mouth daily   PROAIR  (90 Base) MCG/ACT inhaler   Yes No   amLODIPine (NORVASC) 2 5 mg tablet   No No   Sig: Take 2 tablets (5 mg total) by mouth daily   aspirin 325 mg tablet  Self Yes No   Sig: Take 325 mg by mouth   donepezil (ARICEPT) 10 mg tablet   No No   Sig: Take 1 tablet (10 mg total) by mouth daily at bedtime   fluticasone (FLONASE) 50 mcg/act nasal spray   No No   Sig: SPRAY 1 SPRAY INTO EACH NOSTRIL EVERY DAY   levothyroxine 125 mcg tablet   No No   Sig: Take 1 tablet (125 mcg total) by mouth daily for 180 days   levothyroxine 125 mcg tablet   No No   Sig: TAKE 1 TABLET BY MOUTH DAILY   multivitamin (THERAGRAN) TABS  Self Yes No   Sig: Take 1 tablet by mouth   omeprazole (PriLOSEC) 20 mg delayed release capsule   No No   Sig: Take 1 capsule (20 mg total) by mouth daily   rivastigmine (EXELON) 4 6 mg/24 hr TD 24 hr patch   No No   Sig: Place 1 patch on the skin daily      Facility-Administered Medications: None       Allergies   Allergen Reactions    Ciprofloxacin     Other     Sulfa Antibiotics     Penicillins Rash     Category: Allergy;   --  Pt received unasyn 1 5 mg IV 11-12-18 to 11-15-18    Sulfacetamide Rash     Category: Allergy;        PHYSICAL EXAM    PE limited by: nothing    Objective   Vitals:   First set: Temperature: 98 2 °F (36 8 °C) (10/03/19 2002)  Pulse: 87 (10/03/19 2002)  Respirations: 18 (10/03/19 2002)  Blood Pressure: 142/63 (10/03/19 2002)  SpO2: 96 % (10/03/19 2002)    Primary Survey:   (A) Airway:  Patent    (B) Breathing: without use of accessory muscles  (C) Circulation: Pulses:   normal  (D) Disabliity:  GCS Total:  15  (E) Expose:  Completed    Secondary Survey: (Click on Physical Exam tab above)  Physical Exam   Constitutional: She is oriented to person, place, and time  She appears well-developed and well-nourished  HENT:   Head: Normocephalic and atraumatic     Right Ear: External ear normal    Left Ear: External ear normal    Eyes: Conjunctivae and EOM are normal    Neck: No JVD present  No tracheal deviation present  No thyromegaly present  Cardiovascular: Normal rate  Pulmonary/Chest: Effort normal and breath sounds normal  No stridor  Abdominal: Soft  She exhibits no distension and no mass  There is no tenderness  There is no guarding  No hernia  Musculoskeletal: Normal range of motion  She exhibits no edema, tenderness or deformity  Lymphadenopathy:     She has no cervical adenopathy  Neurological: She is alert and oriented to person, place, and time  Skin: Skin is warm  No rash noted  No erythema  No pallor  Psychiatric: She has a normal mood and affect  Her behavior is normal    Nursing note and vitals reviewed  Invasive Devices     None                 Lab Results:   Results Reviewed     None                 Imaging Studies:   Direct to CT: Yes  CT head without contrast   Final Result by Isai Lozano MD (10/03 2120)      No acute intracranial CT abnormality                  Workstation performed: [id number]         CT spine cervical without contrast   Final Result by Isai Lozano MD (10/03 2123)      1  No acute osseous injury in the cervical spine  2   Degenerative changes as above  Workstation performed: [id number]         XR ankle 3+ views RIGHT   ED Interpretation by Giorgio Glez DO (10/03 2131)   No acute fracture seen on xrays for the foot or ankle  Final Result by Alonso Rinne, MD (10/04 8114)      No acute osseous abnormality  This report is in agreement with the preliminary interpretation  Workstation performed: [id number]         XR foot 3+ views RIGHT   Final Result by Alonso Rinne, MD (10/04 0372)      No acute osseous abnormality              Workstation performed: [id number]             Other Studies: none      Code Status: Prior  Advance Directive and Living Will:      Power of :    POLST: Procedures  Procedures         ED Course         MDM  Number of Diagnoses or Management Options  Fall, initial encounter: new and requires workup  Foot contusion: new and requires workup     Amount and/or Complexity of Data Reviewed  Tests in the radiology section of CPT®: reviewed and ordered  Decide to obtain previous medical records or to obtain history from someone other than the patient: yes  Review and summarize past medical records: yes    Patient Progress  Patient progress: stable      Disposition  [facility] Transfer: Yes  Final diagnoses:   Fall, initial encounter   Foot contusion     Time reflects when diagnosis was documented in both MDM as applicable and the Disposition within this note     Time User Action Codes Description Comment    10/3/2019  9:31 PM Lucila Bryan Add [N30  RIAT] Fall, initial encounter     10/3/2019  9:31 PM Carlyle Morse0 [address] contusion       ED Disposition     ED Disposition Condition Date/Time Comment    Discharge Stable Thu Oct 3, 2019  9:31 PM [facility] discharge to home/self care              Follow-up Information     Follow up With Specialties Details Why [address], MD Orthopedic Surgery   [address] [facility]  [address]  [place] [address]  [phone]          Discharge Medication List as of 10/3/2019  9:32 PM      CONTINUE these medications which have NOT CHANGED    Details   amLODIPine (NORVASC) 2 5 mg tablet Take 2 tablets (5 mg total) by mouth daily, Starting Wed 9/18/2019, Normal      aspirin 325 mg tablet Take 325 mg by mouth, Starting Mon 3/19/2018, Historical Med      donepezil (ARICEPT) 10 mg tablet Take 1 tablet (10 mg total) by mouth daily at bedtime, Starting Mon 1/21/2019, Normal      fluticasone (FLONASE) 50 mcg/act nasal spray SPRAY 1 SPRAY INTO EACH NOSTRIL EVERY DAY, Normal      levothyroxine 125 mcg tablet TAKE 1 TABLET BY MOUTH DAILY, Normal      multivitamin (THERAGRAN) TABS Take 1 tablet by mouth, Historical Med      MYRBETRIQ 50 MG TB24 TAKE 1 TABLET BY MOUTH EVERY DAY, Normal      omeprazole (PriLOSEC) 20 mg delayed release capsule Take 1 capsule (20 mg total) by mouth daily, Starting Mon 1/21/2019, Normal      PARoxetine (PAXIL) 20 mg tablet Take 1 tablet (20 mg total) by mouth daily, Starting Mon 1/21/2019, Normal      PROAIR  (90 Base) MCG/ACT inhaler Starting Wed 8/7/2019, Historical Med      rivastigmine (EXELON) 4 6 mg/24 hr TD 24 hr patch Place 1 patch on the skin daily, Starting Fri 9/6/2019, Normal           No discharge procedures on file        ED Provider  Electronically Signed by         Lisa Major DO  10/05/19 8736

## 2019-10-04 NOTE — ED NOTES
Discharged reviewed with pt  Pt verbalized understanding and has no further questions at this time  Pt ambulatory off unit with steady gait       Nelson Nickerson RN  10/03/19 4480

## 2019-10-04 NOTE — ED NOTES
X-ray at [facility], [address] [place] [address]  10/03/19 2022

## 2019-10-07 ENCOUNTER — VBI (OUTPATIENT)
Dept: ADMINISTRATIVE | Facility: OTHER | Age: 74
End: 2019-10-07

## 2019-10-07 NOTE — TELEPHONE ENCOUNTER
[facility]    ED Visit Information     Ed visit date: 10/3/19  Diagnosis Description: Fall, initial encounter; Foot contusion  In Network? Yes [place]  Discharge status: Home  Discharged with meds ? No  Number of ED visits to date: 2  ED Severity:4     Outreach Information    Outreach successful: Yes 1   letter mailed:0  Date Finalized:10/7/19    Care Coordination    Follow up appointment with pcp: no to f/u  with Ortho  Transportation issues ? No    [facility] [employer] type:  3 Day Outreach  Emergent necessity warranted by diagnosis:  Yes  [facility] PCP:  Yes  Transportation:  Friend/Family Transport  Called PCP first?:  No  Feels able to call PCP for urgent problems ?:  Yes  Understands what emergencies can be handled by PCP ?:  Yes  Ever any problems getting appointment with PCP for minor emergency/urgency problems?:  No  Practice Contacted Patient ?:  No  Pt had ED follow up with pcp/staff ?:  No    Reason Patient went to ED instead of Urgent Care or PCP?:  Perceived Severity of Illness  Urgent care Education?:  No  10/07/2019 12:30 PM Phone (Lor Richter) Paul Thomson (Self) [phone] (H) Remove  Att x1 lmom    By Abad Ellsworth to Luis Felipe Bradford today she stated she is doing ok since her recent fall, she declined follow up with her pcp, the Ed told her to f/u with the orthopedic which she hasn't called yet  Luis Felipe Bradford stated she has her daughter who takes her to her appt  No transportation issues, and is aware of St urgent care [facility] to her

## 2019-10-25 DIAGNOSIS — F32.A DEPRESSION, UNSPECIFIED DEPRESSION TYPE: ICD-10-CM

## 2019-10-25 DIAGNOSIS — G31.84 MILD COGNITIVE IMPAIRMENT: ICD-10-CM

## 2019-10-25 RX ORDER — DONEPEZIL HYDROCHLORIDE 10 MG/1
10 TABLET, FILM COATED ORAL
Qty: 30 TABLET | Refills: 5 | Status: SHIPPED | OUTPATIENT
Start: 2019-10-25 | End: 2020-04-15 | Stop reason: ALTCHOICE

## 2019-10-25 RX ORDER — PAROXETINE HYDROCHLORIDE 20 MG/1
TABLET, FILM COATED ORAL
Qty: 30 TABLET | Refills: 5 | Status: SHIPPED | OUTPATIENT
Start: 2019-10-25 | End: 2020-04-26 | Stop reason: SDUPTHER

## 2019-11-10 DIAGNOSIS — I10 ESSENTIAL HYPERTENSION: ICD-10-CM

## 2019-11-11 ENCOUNTER — OFFICE VISIT (OUTPATIENT)
Dept: INTERNAL MEDICINE CLINIC | Facility: CLINIC | Age: 74
End: 2019-11-11
Payer: COMMERCIAL

## 2019-11-11 VITALS
WEIGHT: 175 LBS | OXYGEN SATURATION: 95 % | BODY MASS INDEX: 34.36 KG/M2 | SYSTOLIC BLOOD PRESSURE: 134 MMHG | HEIGHT: 60 IN | HEART RATE: 64 BPM | DIASTOLIC BLOOD PRESSURE: 84 MMHG

## 2019-11-11 DIAGNOSIS — G31.84 MILD COGNITIVE IMPAIRMENT: ICD-10-CM

## 2019-11-11 DIAGNOSIS — F32.A DEPRESSION, UNSPECIFIED DEPRESSION TYPE: ICD-10-CM

## 2019-11-11 DIAGNOSIS — C85.90 LYMPHOMA, UNSPECIFIED BODY REGION, UNSPECIFIED LYMPHOMA TYPE (HCC): ICD-10-CM

## 2019-11-11 DIAGNOSIS — I10 ESSENTIAL HYPERTENSION: ICD-10-CM

## 2019-11-11 DIAGNOSIS — Z23 NEED FOR INFLUENZA VACCINATION: ICD-10-CM

## 2019-11-11 DIAGNOSIS — Z23 NEED FOR PNEUMOCOCCAL VACCINATION: ICD-10-CM

## 2019-11-11 DIAGNOSIS — E03.9 ACQUIRED HYPOTHYROIDISM: ICD-10-CM

## 2019-11-11 DIAGNOSIS — E78.2 MIXED HYPERLIPIDEMIA: ICD-10-CM

## 2019-11-11 DIAGNOSIS — Z00.00 ENCOUNTER FOR MEDICARE ANNUAL WELLNESS EXAM: Primary | ICD-10-CM

## 2019-11-11 PROCEDURE — 90662 IIV NO PRSV INCREASED AG IM: CPT | Performed by: PHYSICIAN ASSISTANT

## 2019-11-11 PROCEDURE — 4040F PNEUMOC VAC/ADMIN/RCVD: CPT | Performed by: PHYSICIAN ASSISTANT

## 2019-11-11 PROCEDURE — 3725F SCREEN DEPRESSION PERFORMED: CPT | Performed by: PHYSICIAN ASSISTANT

## 2019-11-11 PROCEDURE — 90732 PPSV23 VACC 2 YRS+ SUBQ/IM: CPT | Performed by: PHYSICIAN ASSISTANT

## 2019-11-11 PROCEDURE — 99214 OFFICE O/P EST MOD 30 MIN: CPT | Performed by: PHYSICIAN ASSISTANT

## 2019-11-11 PROCEDURE — G0009 ADMIN PNEUMOCOCCAL VACCINE: HCPCS | Performed by: PHYSICIAN ASSISTANT

## 2019-11-11 PROCEDURE — 1036F TOBACCO NON-USER: CPT | Performed by: PHYSICIAN ASSISTANT

## 2019-11-11 PROCEDURE — 1160F RVW MEDS BY RX/DR IN RCRD: CPT | Performed by: PHYSICIAN ASSISTANT

## 2019-11-11 PROCEDURE — G0439 PPPS, SUBSEQ VISIT: HCPCS | Performed by: PHYSICIAN ASSISTANT

## 2019-11-11 PROCEDURE — G0008 ADMIN INFLUENZA VIRUS VAC: HCPCS | Performed by: PHYSICIAN ASSISTANT

## 2019-11-11 RX ORDER — AMLODIPINE BESYLATE 2.5 MG/1
TABLET ORAL
Qty: 30 TABLET | Refills: 5 | Status: SHIPPED | OUTPATIENT
Start: 2019-11-11 | End: 2020-02-17

## 2019-11-11 NOTE — PATIENT INSTRUCTIONS
No change in current medications  Will schedule follow-up in 4 months with repeat labs at that time  Medicare Preventive Visit Patient Instructions  Thank you for completing your Welcome to Medicare Visit or Medicare Annual Wellness Visit today  Your next wellness visit will be due in one year (11/11/2020)  The screening/preventive services that you may require over the next 5-10 years are detailed below  Some tests may not apply to you based off risk factors and/or age  Screening tests ordered at today's visit but not completed yet may show as past due  Also, please note that scanned in results may not display below  Preventive Screenings:  Service Recommendations Previous Testing/Comments   Colorectal Cancer Screening  * Colonoscopy    * Fecal Occult Blood Test (FOBT)/Fecal Immunochemical Test (FIT)  * Fecal DNA/Cologuard Test  * Flexible Sigmoidoscopy Age: 54-65 years old   Colonoscopy: every 10 years (may be performed more frequently if at higher risk)  OR  FOBT/FIT: every 1 year  OR  Cologuard: every 3 years  OR  Sigmoidoscopy: every 5 years  Screening may be recommended earlier than age 48 if at higher risk for colorectal cancer  Also, an individualized decision between you and your healthcare provider will decide whether screening between the ages of 74-80 would be appropriate  Colonoscopy: 06/02/2017  FOBT/FIT: Not on file  Cologuard: Not on file  Sigmoidoscopy: Not on file    Screening Current     Breast Cancer Screening Age: 36 years old  Frequency: every 1-2 years  Not required if history of left and right mastectomy Mammogram: 12/01/2017    Screening Current   Cervical Cancer Screening Between the ages of 21-29, pap smear recommended once every 3 years  Between the ages of 33-67, can perform pap smear with HPV co-testing every 5 years     Recommendations may differ for women with a history of total hysterectomy, cervical cancer, or abnormal pap smears in past  Pap Smear: Not on file    Screening Not Indicated   Hepatitis C Screening Once for adults born between 1945 and 1965  More frequently in patients at high risk for Hepatitis C Hep C Antibody: Not on file       Diabetes Screening 1-2 times per year if you're at risk for diabetes or have pre-diabetes Fasting glucose: 87 mg/dL   A1C: 5 7 %    Screening Current   Cholesterol Screening Once every 5 years if you don't have a lipid disorder  May order more often based on risk factors  Lipid panel: 09/18/2019    Screening Not Indicated  History Lipid Disorder     Other Preventive Screenings Covered by Medicare:  1  Abdominal Aortic Aneurysm (AAA) Screening: covered once if your at risk  You're considered to be at risk if you have a family history of AAA  2  Lung Cancer Screening: covers low dose CT scan once per year if you meet all of the following conditions: (1) Age 50-69; (2) No signs or symptoms of lung cancer; (3) Current smoker or have quit smoking within the last 15 years; (4) You have a tobacco smoking history of at least 30 pack years (packs per day multiplied by number of years you smoked); (5) You get a written order from a healthcare provider  3  Glaucoma Screening: covered annually if you're considered high risk: (1) You have diabetes OR (2) Family history of glaucoma OR (3)  aged 48 and older OR (3)  American aged 72 and older  3  Osteoporosis Screening: covered every 2 years if you meet one of the following conditions: (1) You're estrogen deficient and at risk for osteoporosis based off medical history and other findings; (2) Have a vertebral abnormality; (3) On glucocorticoid therapy for more than 3 months; (4) Have primary hyperparathyroidism; (5) On osteoporosis medications and need to assess response to drug therapy  · Last bone density test (DXA Scan): 03/18/2016   5  HIV Screening: covered annually if you're between the age of 15-65   Also covered annually if you are younger than 13 and older than 65 with risk factors for HIV infection  For pregnant patients, it is covered up to 3 times per pregnancy  Immunizations:  Immunization Recommendations   Influenza Vaccine Annual influenza vaccination during flu season is recommended for all persons aged >= 6 months who do not have contraindications   Pneumococcal Vaccine (Prevnar and Pneumovax)  * Prevnar = PCV13  * Pneumovax = PPSV23   Adults 25-60 years old: 1-3 doses may be recommended based on certain risk factors  Adults 72 years old: Prevnar (PCV13) vaccine recommended followed by Pneumovax (PPSV23) vaccine  If already received PPSV23 since turning 65, then PCV13 recommended at least one year after PPSV23 dose  Hepatitis B Vaccine 3 dose series if at intermediate or high risk (ex: diabetes, end stage renal disease, liver disease)   Tetanus (Td) Vaccine - COST NOT COVERED BY MEDICARE PART B Following completion of primary series, a booster dose should be given every 10 years to maintain immunity against tetanus  Td may also be given as tetanus wound prophylaxis  Tdap Vaccine - COST NOT COVERED BY MEDICARE PART B Recommended at least once for all adults  For pregnant patients, recommended with each pregnancy  Shingles Vaccine (Shingrix) - COST NOT COVERED BY MEDICARE PART B  2 shot series recommended in those aged 48 and above     Health Maintenance Due:      Topic Date Due    MAMMOGRAM  12/01/2018    CRC Screening: Colonoscopy  06/02/2027     Immunizations Due:      Topic Date Due    Pneumococcal Vaccine: 65+ Years (2 of 2 - PCV13) 09/16/2012    INFLUENZA VACCINE  07/01/2019     Advance Directives   What are advance directives? Advance directives are legal documents that state your wishes and plans for medical care  These plans are made ahead of time in case you lose your ability to make decisions for yourself  Advance directives can apply to any medical decision, such as the treatments you want, and if you want to donate organs     What are the types of advance directives? There are many types of advance directives, and each state has rules about how to use them  You may choose a combination of any of the following:  · Living will: This is a written record of the treatment you want  You can also choose which treatments you do not want, which to limit, and which to stop at a certain time  This includes surgery, medicine, IV fluid, and tube feedings  · Durable power of  for healthcare [facility]): This is a written record that states who you want to make healthcare choices for you when you are unable to make them for yourself  This person, called a proxy, is usually a family member or a friend  You may choose more than 1 proxy  · Do not resuscitate (DNR) order:  A DNR order is used in case your heart stops beating or you stop breathing  It is a request not to have certain forms of treatment, such as CPR  A DNR order may be included in other types of advance directives  · Medical directive: This covers the care that you want if you are in a coma, near death, or unable to make decisions for yourself  You can list the treatments you want for each condition  Treatment may include pain medicine, surgery, blood transfusions, dialysis, IV or tube feedings, and a ventilator (breathing machine)  · Values history: This document has questions about your views, beliefs, and how you feel and think about life  This information can help others choose the care that you would choose  Why are advance directives important? An advance directive helps you control your care  Although spoken wishes may be used, it is better to have your wishes written down  Spoken wishes can be misunderstood, or not followed  Treatments may be given even if you do not want them  An advance directive may make it easier for your family to make difficult choices about your care  Fall Prevention    Fall prevention  includes ways to make your home and other areas safer   It also includes ways you can move more carefully to prevent a fall  Health conditions that cause changes in your blood pressure, vision, or muscle strength and coordination may increase your risk for falls  Medicines may also increase your risk for falls if they make you dizzy, weak, or sleepy  Fall prevention tips:   · Stand or sit up slowly  · Use assistive devices as directed  · Wear shoes that fit well and have soles that   · Wear a personal alarm  · Stay active  · Manage your medical conditions  Home Safety Tips:  · Add items to prevent falls in the bathroom  · Keep paths clear  · Install bright lights in your home  · Keep items you use often on shelves within reach  · Paint or place reflective tape on the edges of your stairs  Urinary Incontinence   Urinary incontinence (UI)  is when you lose control of your bladder  UI develops because your bladder cannot store or empty urine properly  The 3 most common types of UI are stress incontinence, urge incontinence, or both  Medicines:   · May be given to help strengthen your bladder control  Report any side effects of medication to your healthcare provider  Do pelvic muscle exercises often:  Your pelvic muscles help you stop urinating  Squeeze these muscles tight for 5 seconds, then relax for 5 seconds  Gradually work up to squeezing for 10 seconds  Do 3 sets of 15 repetitions a day, or as directed  This will help strengthen your pelvic muscles and improve bladder control  Train your bladder:  Go to the bathroom at set times, such as every 2 hours, even if you do not feel the urge to go  You can also try to hold your urine when you feel the urge to go  For example, hold your urine for 5 minutes when you feel the urge to go  As that becomes easier, hold your urine for 10 minutes  Self-care:   · Keep a UI record  Write down how often you leak urine and how much you leak   Make a note of what you were doing when you leaked urine   · Drink liquids as directed  You may need to limit the amount of liquid you drink to help control your urine leakage  Do not drink any liquid right before you go to bed  Limit or do not have drinks that contain caffeine or alcohol  · Prevent constipation  Eat a variety of high-fiber foods  Good examples are high-fiber cereals, beans, vegetables, and whole-grain breads  Walking is the best way to trigger your intestines to have a bowel movement  · Exercise regularly and maintain a healthy weight  Weight loss and exercise will decrease pressure on your bladder and help you control your leakage  · Use a catheter as directed  to help empty your bladder  A catheter is a tiny, plastic tube that is put into your bladder to drain your urine  · Go to behavior therapy as directed  Behavior therapy may be used to help you learn to control your urge to urinate  Weight Management   Why it is important to manage your weight:  Being overweight increases your risk of health conditions such as heart disease, high blood pressure, type 2 diabetes, and certain types of cancer  It can also increase your risk for osteoarthritis, sleep apnea, and other respiratory problems  Aim for a slow, steady weight loss  Even a small amount of weight loss can lower your risk of health problems  How to lose weight safely:  A safe and healthy way to lose weight is to eat fewer calories and get regular exercise  You can lose up about 1 pound a week by decreasing the number of calories you eat by 500 calories each day  Healthy meal plan for weight management:  A healthy meal plan includes a variety of foods, contains fewer calories, and helps you stay healthy  A healthy meal plan includes the following:  · Eat whole-grain foods more often  A healthy meal plan should contain fiber  Fiber is the part of grains, fruits, and vegetables that is not broken down by your body   Whole-grain foods are healthy and provide extra fiber in your diet  Some examples of whole-grain foods are whole-wheat breads and pastas, oatmeal, brown rice, and bulgur  · Eat a variety of vegetables every day  Include dark, leafy greens such as spinach, kale, neil greens, and mustard greens  Eat yellow and orange vegetables such as carrots, sweet potatoes, and winter squash  · Eat a variety of fruits every day  Choose fresh or canned fruit (canned in its own juice or light syrup) instead of juice  Fruit juice has very little or no fiber  · Eat low-fat dairy foods  Drink fat-free (skim) milk or 1% milk  Eat fat-free yogurt and low-fat cottage cheese  Try low-fat cheeses such as mozzarella and other reduced-fat cheeses  · Choose meat and other protein foods that are low in fat  Choose beans or other legumes such as split peas or lentils  Choose fish, skinless poultry (chicken or turkey), or lean cuts of red meat (beef or pork)  Before you cook meat or poultry, cut off any visible fat  · Use less fat and oil  Try baking foods instead of frying them  Add less fat, such as margarine, sour cream, regular salad dressing and mayonnaise to foods  Eat fewer high-fat foods  Some examples of high-fat foods include french fries, doughnuts, ice cream, and cakes  · Eat fewer sweets  Limit foods and drinks that are high in sugar  This includes candy, cookies, regular soda, and sweetened drinks  Exercise:  Exercise at least 30 minutes per day on most days of the week  Some examples of exercise include walking, biking, dancing, and swimming  You can also fit in more physical activity by taking the stairs instead of the elevator or parking farther away from stores  Ask your healthcare provider about the best exercise plan for you  © Copyright [employer] 2018 Information is for End User's use only and may not be sold, redistributed or otherwise used for commercial purposes   All illustrations and images included in CareNotes® are the copyrighted property of [employer]  or [address] [facility]

## 2019-11-11 NOTE — PROGRESS NOTES
Assessment/Plan:   No change in current medications  Will schedule follow-up in 4 months with repeat labs at that time  Quality Measures:       Return in about 4 months (around 3/11/2020) for Next scheduled follow up-Dr Todd Kendall  Diagnoses and all orders for this visit:    Encounter for Medicare annual wellness exam    Essential hypertension  -     Comprehensive metabolic panel; Future    Acquired hypothyroidism  -     TSH, 3rd generation; Future  -     T4, free; Future    Mixed hyperlipidemia  -     Lipid panel; Future    Need for influenza vaccination  -     influenza vaccine, 1009-1722, high-dose, PF 0 5 mL (FLUZONE HIGH-DOSE)    Lymphoma, unspecified body region, unspecified lymphoma type ([facility] [address] )  -     CBC and differential; Future    Need for pneumococcal vaccination  -     PNEUMOCOCCAL POLYSACCHARIDE VACCINE 23-VALENT =>1YO SQ IM    Other orders  -     diphenhydrAMINE HCl (BENADRYL ALLERGY PO); Take by mouth          Subjective:      Patient ID: Claudette Villegas is a 75 y o  female  Follow-up    Hypertension: At last visit low-dose amlodipine was added  Systolic blood pressure better today  Denies any swelling, or headache  Juani cp, palp, sob, edema, HA, dizziness, diaphoresis, syncope, visual disturbance  Hypothyroidism:  Elevated TSH with normal free T4  Asymptomatic  On replacement  Hyperlipidemia:  Discussed her ASCVD risk score  Discussed initiating a statin  She is not in disagreement  Past history of lymphoma:  Recent CBCs are stable  Patient reports she has not seen Hematology in many years  She is not noting any swollen nodes  Mild cognitive impairment:  Daughter has not noticed any progression  She is on Exelon and Aricept  Notes mild depression as holidays are approaching and this will be the 1st holiday without her   Her daughter is trying to keep her busy        ALLERGIES:  Allergies   Allergen Reactions    Ciprofloxacin     Other     Sulfa Antibiotics  Penicillins Rash     Category: Allergy;   --  Pt received unasyn 1 5 mg IV 11-12-18 to 11-15-18    Sulfacetamide Rash     Category:  Allergy;        CURRENT MEDICATIONS:    Current Outpatient Medications:     amLODIPine (NORVASC) 2 5 mg tablet, Take 2 tablets (5 mg total) by mouth daily, Disp: 30 tablet, Rfl: 1    aspirin 325 mg tablet, Take 325 mg by mouth, Disp: , Rfl:     diphenhydrAMINE HCl (BENADRYL ALLERGY PO), Take by mouth, Disp: , Rfl:     donepezil (ARICEPT) 10 mg tablet, TAKE 1 TABLET (10 MG TOTAL) BY MOUTH DAILY AT BEDTIME, Disp: 30 tablet, Rfl: 5    fluticasone (FLONASE) 50 mcg/act nasal spray, SPRAY 1 SPRAY INTO EACH NOSTRIL EVERY DAY, Disp: 16 mL, Rfl: 1    multivitamin (THERAGRAN) TABS, Take 1 tablet by mouth, Disp: , Rfl:     MYRBETRIQ 50 MG TB24, TAKE 1 TABLET BY MOUTH EVERY DAY, Disp: 90 tablet, Rfl: 1    omeprazole (PriLOSEC) 20 mg delayed release capsule, Take 1 capsule (20 mg total) by mouth daily, Disp: 30 capsule, Rfl: 5    PARoxetine (PAXIL) 20 mg tablet, TAKE 1 TABLET BY MOUTH EVERY DAY, Disp: 30 tablet, Rfl: 5    PROAIR  (90 Base) MCG/ACT inhaler, , Disp: , Rfl:     rivastigmine (EXELON) 4 6 mg/24 hr TD 24 hr patch, Place 1 patch on the skin daily, Disp: 30 patch, Rfl: 5    levothyroxine 125 mcg tablet, Take 1 tablet (125 mcg total) by mouth daily for 180 days, Disp: 30 tablet, Rfl: 5    ACTIVE PROBLEM LIST:  Patient Active Problem List   Diagnosis    Depression    Esophageal reflux    Mixed hyperlipidemia    Essential hypertension    Acquired hypothyroidism    Impaired fasting glucose    Lymphoma (HCC)    Cerebral aneurysm, nonruptured    Mild cognitive impairment    Flexor tenosynovitis of finger    Macrocytosis without anemia       PAST MEDICAL HISTORY:  Past Medical History:   Diagnosis Date    Anxiety disorder     Aortic valve disorder     Cellulitis of finger of left hand 11/20/2018    Cellulitis of left hand 11/10/2018    Added automatically from request for surgery [id number]    Depression     History of gastroesophageal reflux (GERD)     History of kidney cancer 03/06/2014    Hyperlipidemia 03/06/2014    Hypertension 03/06/2014    Hypothyroidism 03/06/2014    Shortness of breath     TIA (transient ischemic attack)        PAST SURGICAL HISTORY:  Past Surgical History:   Procedure Laterality Date    ESOPHAGOGASTRIC FUNDOPLASTY      Nissen Fundoplication    HERNIA REPAIR         FAMILY HISTORY:  Family History   Problem Relation Age of Onset    Stroke Father     Leukemia Sister     Alcohol abuse Brother     Kidney cancer Brother        SOCIAL HISTORY:  Social History     Socioeconomic History    Marital status: /Civil Union     Spouse name: Not on file    Number of children: 2    Years of education: Not on file    Highest education level: Not on file   Occupational History    Not on file   Social Needs    Financial resource strain: Not on file    Food insecurity:     Worry: Not on file     Inability: Not on file    Transportation needs:     Medical: Not on file     Non-medical: Not on file   Tobacco Use    Smoking status: Never Smoker    Smokeless tobacco: Never Used   Substance and Sexual Activity    Alcohol use: No    Drug use: No    Sexual activity: Not on file   Lifestyle    Physical activity:     Days per week: Not on file     Minutes per session: Not on file    Stress: Not on file   Relationships    Social connections:     Talks on phone: Not on file     Gets together: Not on file     Attends [employer] service: Not on file     Active member of club or organization: Not on file     Attends meetings of clubs or organizations: Not on file     Relationship status: Not on file    Intimate partner violence:     Fear of current or ex partner: Not on file     Emotionally abused: Not on file     Physically abused: Not on file     Forced sexual activity: Not on file   Other Topics Concern    Not on file   Social History Narrative    Not on file       Review of Systems   Constitutional: Negative for activity change, chills, fatigue and fever  HENT: Negative for congestion  Eyes: Negative for discharge  Respiratory: Negative for cough, chest tightness and shortness of breath  Cardiovascular: Negative for chest pain, palpitations and leg swelling  Gastrointestinal: Negative for abdominal pain  Endocrine: Negative for cold intolerance and heat intolerance  Genitourinary: Negative for difficulty urinating  Musculoskeletal: Negative for arthralgias and myalgias  Skin: Negative for rash  Allergic/Immunologic: Negative for immunocompromised state  Neurological: Negative for dizziness, syncope, weakness, light-headedness and headaches  Hematological: Negative for adenopathy  Does not bruise/bleed easily  Psychiatric/Behavioral: Positive for decreased concentration  Negative for dysphoric mood, sleep disturbance and suicidal ideas  The patient is not nervous/anxious  Objective:  Vitals:    11/11/19 1420   BP: 142/84   BP Location: Left arm   Patient Position: Sitting   Cuff Size: Standard   Pulse: 64   SpO2: 95%   Weight: 79 4 kg (175 lb)   Height: 5' (1 524 m)     Body mass index is 34 18 kg/m²  Physical Exam   Constitutional: She is oriented to person, place, and time  She appears well-developed and well-nourished  No distress  HENT:   Head: Normocephalic  Neck: No JVD present  Carotid bruit is not present  No thyromegaly present  Cardiovascular: Normal rate and regular rhythm  Murmur (Soft 1/6 systolic murmur best audible in the aortic area) heard  Pulmonary/Chest: Effort normal and breath sounds normal  No respiratory distress  She exhibits no tenderness  Abdominal: Soft  There is no tenderness  Musculoskeletal: She exhibits no edema  Lymphadenopathy:     She has no cervical adenopathy  Neurological: She is alert and oriented to person, place, and time     Skin: Skin is warm and dry  No rash noted  Psychiatric: She has a normal mood and affect  Her behavior is normal    Nursing note and vitals reviewed  RESULTS:    No results found for this or any previous visit (from the past 1008 hour(s))  This note was created with voice recognition software  Phonic, grammatical and spelling errors may be present within the note as a result

## 2019-11-11 NOTE — PROGRESS NOTES
Assessment and Plan:   As per office visit note same day  Problem List Items Addressed This Visit     None      Visit Diagnoses     Need for influenza vaccination    -  Primary    Relevant Orders    influenza vaccine, [id number], high-dose, PF 0 5 mL (FLUZONE HIGH-DOSE)           Preventive health issues were discussed with patient, and age appropriate screening tests were ordered as noted in patient's After Visit Summary  Personalized health advice and appropriate referrals for health education or preventive services given if needed, as noted in patient's After Visit Summary  History of Present Illness:     Patient presents for Medicare annual wellness visit  Questionnaire has been reviewed, clarified, and updated with the patient and her daughter who lives with her  Patient Care Team:  Ramila Pinon MD as PCP - General     Review of Systems:     Review of Systems    As per office visit note same day     Problem List:     Patient Active Problem List   Diagnosis    Depression    Esophageal reflux    Mixed hyperlipidemia    Essential hypertension    Acquired hypothyroidism    Impaired fasting glucose    Lymphoma (HCC)    Cerebral aneurysm, nonruptured    Mild cognitive impairment    Flexor tenosynovitis of finger    Macrocytosis without anemia      Past Medical and Surgical History:     Past Medical History:   Diagnosis Date    Anxiety disorder     Aortic valve disorder     Cellulitis of finger of left hand 11/20/2018    Cellulitis of left hand 11/10/2018    Added automatically from request for surgery [id number]    Depression     History of gastroesophageal reflux (GERD)     History of kidney cancer 03/06/2014    Hyperlipidemia 03/06/2014    Hypertension 03/06/2014    Hypothyroidism 03/06/2014    Shortness of breath     TIA (transient ischemic attack)      Past Surgical History:   Procedure Laterality Date    ESOPHAGOGASTRIC FUNDOPLASTY      Nissen Fundoplication    HERNIA REPAIR Family History:     Family History   Problem Relation Age of Onset    Stroke Father     Leukemia Sister     Alcohol abuse Brother     Kidney cancer Brother       Social History:     Social History     Socioeconomic History    Marital status: /Civil Union     Spouse name: None    Number of children: 2    Years of education: None    Highest education level: None   Occupational History    None   Social Needs    Financial resource strain: None    Food insecurity:     Worry: None     Inability: None    Transportation needs:     Medical: None     Non-medical: None   Tobacco Use    Smoking status: Never Smoker    Smokeless tobacco: Never Used   Substance and Sexual Activity    Alcohol use: No    Drug use: No    Sexual activity: None   Lifestyle    Physical activity:     Days per week: None     Minutes per session: None    Stress: None   Relationships    Social connections:     Talks on phone: None     Gets together: None     Attends Zoroastrianism service: None     Active member of club or organization: None     Attends meetings of clubs or organizations: None     Relationship status: None    Intimate partner violence:     Fear of current or ex partner: None     Emotionally abused: None     Physically abused: None     Forced sexual activity: None   Other Topics Concern    None   Social History Narrative    None      Medications and Allergies:     Current Outpatient Medications   Medication Sig Dispense Refill    amLODIPine (NORVASC) 2 5 mg tablet Take 2 tablets (5 mg total) by mouth daily 30 tablet 1    aspirin 325 mg tablet Take 325 mg by mouth      diphenhydrAMINE HCl (BENADRYL ALLERGY PO) Take by mouth      donepezil (ARICEPT) 10 mg tablet TAKE 1 TABLET (10 MG TOTAL) BY MOUTH DAILY AT BEDTIME 30 tablet 5    fluticasone (FLONASE) 50 mcg/act nasal spray SPRAY 1 SPRAY INTO EACH NOSTRIL EVERY DAY 16 mL 1    levothyroxine 125 mcg tablet TAKE 1 TABLET BY MOUTH DAILY 30 tablet 5    multivitamin (THERAGRAN) TABS Take 1 tablet by mouth      MYRBETRIQ 50 MG TB24 TAKE 1 TABLET BY MOUTH EVERY DAY 90 tablet 1    omeprazole (PriLOSEC) 20 mg delayed release capsule Take 1 capsule (20 mg total) by mouth daily 30 capsule 5    PARoxetine (PAXIL) 20 mg tablet TAKE 1 TABLET BY MOUTH EVERY DAY 30 tablet 5    PROAIR  (90 Base) MCG/ACT inhaler       rivastigmine (EXELON) 4 6 mg/24 hr TD 24 hr patch Place 1 patch on the skin daily 30 patch 5    levothyroxine 125 mcg tablet Take 1 tablet (125 mcg total) by mouth daily for 180 days 30 tablet 5     No current facility-administered medications for this visit  Allergies   Allergen Reactions    Ciprofloxacin     Other     Sulfa Antibiotics     Penicillins Rash     Category: Allergy;   --  Pt received unasyn 1 5 mg IV 11-12-18 to 11-15-18    Sulfacetamide Rash     Category: Allergy;       Immunizations:     Immunization History   Administered Date(s) Administered    INFLUENZA 11/03/2010, 11/12/2012    Influenza Split High Dose Preservative Free IM 01/19/2018    Influenza, high dose seasonal 0 5 mL 01/21/2019    Pneumococcal Polysaccharide PPV23 09/16/2011    Rabies-IM Human Diploid Cell Culture 11/10/2018    Tdap 11/10/2018      Health Maintenance:         Topic Date Due    MAMMOGRAM  12/01/2018    CRC Screening: Colonoscopy  06/02/2027         Topic Date Due    Pneumococcal Vaccine: 65+ Years (2 of 2 - PCV13) 09/16/2012    INFLUENZA VACCINE  07/01/2019      Medicare Screening Tests and Risk Assessments:     Issa Michel is here for her Subsequent Wellness visit  Health Risk Assessment:   Patient rates overall health as good  Patient feels that their physical health rating is same  Eyesight was rated as same  Hearing was rated as same  Patient feels that their emotional and mental health rating is slightly worse  Pain experienced in the last 7 days has been some  Patient's pain rating has been 6/10   Patient states that she has experienced no weight loss or gain in last 6 months  Recent loss of   Morning stiffness    Depression Screening:   PHQ-2 Score: 6      Fall Risk Screening: In the past year, patient has experienced: history of falling in past year    Number of falls: 1  Injured during fall?: No      Urinary Incontinence Screening:   Patient has leaked urine accidently in the last six months  Stress and urgency incontinence-wears pad    Home Safety:  Patient does not have trouble with stairs inside or outside of their home  Patient has working smoke alarms and has working carbon monoxide detector  Home safety hazards include: none  Nutrition:   Current diet is Regular and Frequent junk food  Medications:   Patient is currently taking over-the-counter supplements  OTC medications include: see medication list  Patient is able to manage medications  Activities of Daily Living (ADLs)/Instrumental Activities of Daily Living (IADLs):   Walk and transfer into and out of bed and chair?: Yes  Dress and groom yourself?: Yes    Bathe or shower yourself?: Yes    Feed yourself?  Yes  Do your laundry/housekeeping?: Yes  Manage your money, pay your bills and track your expenses?: Yes  Make your own meals?: Yes    Do your own shopping?: Yes    Previous Hospitalizations:   Any hospitalizations or ED visits within the last 12 months?: Yes    How many hospitalizations have you had in the last year?: 1-2    Hospitalization Comments: ER after a fall    Advance Care Planning:   Living will: No    Durable POA for healthcare: No    Advanced directive: No    Advanced directive counseling given: Yes    Five wishes given: Yes    Patient declined ACP directive: No    End of Life Decisions reviewed with patient: Yes    Provider agrees with end of life decisions: Yes      Cognitive Screening:   Provider or family/friend/caregiver concerned regarding cognition?: Yes    Cognition Comments: Patient already known to have mild cognitive impairment and is currently on Aricept and Exelon  PREVENTIVE SCREENINGS      Cardiovascular Screening:    General: Screening Not Indicated, History Lipid Disorder, Risks and Benefits Discussed and Screening Current      Diabetes Screening:     General: Screening Current and Risks and Benefits Discussed      Colorectal Cancer Screening:     General: Screening Current      Breast Cancer Screening:     General: Screening Current and Risks and Benefits Discussed      Cervical Cancer Screening:    General: Screening Not Indicated      Osteoporosis Screening:    General: Risks and Benefits Discussed      Abdominal Aortic Aneurysm (AAA) Screening:        General: Risks and Benefits Discussed and Screening Not Indicated      Lung Cancer Screening:     General: Risks and Benefits Discussed and Screening Not Indicated      Hepatitis C Screening:    General: Risks and Benefits Discussed    No exam data present     Physical Exam:     /84 (BP Location: Left arm, Patient Position: Sitting, Cuff Size: Standard)   Pulse 64   Ht 5' (1 524 m)   Wt 79 4 kg (175 lb)   SpO2 95%   BMI 34 18 kg/m²     Physical Exam  As per office visit note same day    Lyric Alexander PA-C

## 2019-12-12 DIAGNOSIS — K21.9 GASTROESOPHAGEAL REFLUX DISEASE WITHOUT ESOPHAGITIS: ICD-10-CM

## 2019-12-12 RX ORDER — OMEPRAZOLE 20 MG/1
CAPSULE, DELAYED RELEASE ORAL
Qty: 90 CAPSULE | Refills: 1 | Status: SHIPPED | OUTPATIENT
Start: 2019-12-12 | End: 2020-06-17

## 2020-01-22 ENCOUNTER — HOSPITAL ENCOUNTER (OUTPATIENT)
Dept: RADIOLOGY | Facility: HOSPITAL | Age: 75
Discharge: HOME | End: 2020-01-22
Payer: COMMERCIAL

## 2020-01-22 ENCOUNTER — TELEPHONE (OUTPATIENT)
Dept: OTHER | Facility: OTHER | Age: 75
End: 2020-01-22

## 2020-01-22 ENCOUNTER — OFFICE VISIT (OUTPATIENT)
Dept: INTERNAL MEDICINE CLINIC | Facility: CLINIC | Age: 75
End: 2020-01-22
Payer: COMMERCIAL

## 2020-01-22 VITALS
HEART RATE: 82 BPM | BODY MASS INDEX: 34.36 KG/M2 | OXYGEN SATURATION: 96 % | HEIGHT: 60 IN | DIASTOLIC BLOOD PRESSURE: 78 MMHG | WEIGHT: 175 LBS | SYSTOLIC BLOOD PRESSURE: 132 MMHG

## 2020-01-22 DIAGNOSIS — R06.00 DYSPNEA, UNSPECIFIED TYPE: ICD-10-CM

## 2020-01-22 DIAGNOSIS — H61.92 SKIN LESION OF LEFT EXTERNAL EAR: ICD-10-CM

## 2020-01-22 DIAGNOSIS — L73.9 FOLLICULITIS: Primary | ICD-10-CM

## 2020-01-22 PROCEDURE — 1036F TOBACCO NON-USER: CPT | Performed by: PHYSICIAN ASSISTANT

## 2020-01-22 PROCEDURE — 71046 X-RAY EXAM CHEST 2 VIEWS: CPT

## 2020-01-22 PROCEDURE — 99214 OFFICE O/P EST MOD 30 MIN: CPT | Performed by: PHYSICIAN ASSISTANT

## 2020-01-22 PROCEDURE — 1160F RVW MEDS BY RX/DR IN RCRD: CPT | Performed by: PHYSICIAN ASSISTANT

## 2020-01-22 PROCEDURE — 93000 ELECTROCARDIOGRAM COMPLETE: CPT | Performed by: PHYSICIAN ASSISTANT

## 2020-01-22 RX ORDER — DOXYCYCLINE 50 MG/1
50 TABLET ORAL 2 TIMES DAILY
Qty: 14 TABLET | Refills: 0 | Status: SHIPPED | OUTPATIENT
Start: 2020-01-22 | End: 2020-01-29

## 2020-01-22 NOTE — PROGRESS NOTES
Assessment/Plan:   Patient Instructions   Will treat local skin folliculitis with short course of antibiotics  Will refer to Dermatology for left ear lesion  Patient is to get in touch with her previous provider for lymphoma and get a follow-up visit  Otherwise follow-up here as scheduled with labs to be done prior to that visit  Will also have chest x-ray done due to complaint of dyspnea  Patient will initially try prune juice daily for mild constipation symptoms  Quality Measures: BMI Counseling: Body mass index is 34 18 kg/m²  The BMI is above normal  Nutrition recommendations include decreasing portion sizes, encouraging healthy choices of fruits and vegetables and moderation in carbohydrate intake  Exercise recommendations include exercising 3-5 times per week  No follow-ups on file  Diagnoses and all orders for this visit:    Folliculitis  -     doxycycline (ADOXA) 50 MG tablet; Take 1 tablet (50 mg total) by mouth 2 (two) times a day for 7 days    Skin lesion of left external ear  -     Ambulatory referral to Dermatology; Future    Dyspnea, unspecified type  -     POCT ECG          Subjective:      Patient ID: James Morse is a 75 y o  female  Acute visit    Patient initially presented accompanied by her daughter with complaint of rash on her face  Patient states starts out as little bumps, she then picks at them and then they turn red and crusted  No fever or chills  Also noted a lesion on her left ear which she has been picking  Patient then complained of shortness of breath on exertion and even on walking on the level  No complaint of chest pain, palpitation, lightheadedness, diaphoresis or visual disturbance  No cough for congestion symptoms  No leg swelling  Apparent history in the past of lymphoma for which she was seeing [facility] however has not been back in some time  She stopped going when her provider moved out of the area      Also complaining recently that her bowels are not moving is easily is a had in the past   She stills moving once a day but has to be more forceful  She and her daughter sure me that she is drinking plenty of fluids daily  Patient also complaining of intermittent tingling of her hands  States he can be present any time of the day  Generally does not awaken her at night  ALLERGIES:  Allergies   Allergen Reactions    Ciprofloxacin     Other     Sulfa Antibiotics     Penicillins Rash     Category: Allergy;   --  Pt received unasyn 1 5 mg IV 11-12-18 to 11-15-18    Sulfacetamide Rash     Category:  Allergy;        CURRENT MEDICATIONS:    Current Outpatient Medications:     amLODIPine (NORVASC) 2 5 mg tablet, TAKE 2 TABLETS BY MOUTH DAILY, Disp: 30 tablet, Rfl: 5    aspirin 325 mg tablet, Take 325 mg by mouth, Disp: , Rfl:     diphenhydrAMINE HCl (BENADRYL ALLERGY PO), Take by mouth, Disp: , Rfl:     donepezil (ARICEPT) 10 mg tablet, TAKE 1 TABLET (10 MG TOTAL) BY MOUTH DAILY AT BEDTIME, Disp: 30 tablet, Rfl: 5    fluticasone (FLONASE) 50 mcg/act nasal spray, SPRAY 1 SPRAY INTO EACH NOSTRIL EVERY DAY, Disp: 16 mL, Rfl: 1    levothyroxine 125 mcg tablet, Take 1 tablet (125 mcg total) by mouth daily for 180 days, Disp: 30 tablet, Rfl: 5    multivitamin (THERAGRAN) TABS, Take 1 tablet by mouth, Disp: , Rfl:     MYRBETRIQ 50 MG TB24, TAKE 1 TABLET BY MOUTH EVERY DAY, Disp: 90 tablet, Rfl: 1    omeprazole (PriLOSEC) 20 mg delayed release capsule, TAKE 1 CAPSULE BY MOUTH EVERY DAY, Disp: 90 capsule, Rfl: 1    PARoxetine (PAXIL) 20 mg tablet, TAKE 1 TABLET BY MOUTH EVERY DAY, Disp: 30 tablet, Rfl: 5    PROAIR  (90 Base) MCG/ACT inhaler, , Disp: , Rfl:     rivastigmine (EXELON) 4 6 mg/24 hr TD 24 hr patch, Place 1 patch on the skin daily, Disp: 30 patch, Rfl: 5    doxycycline (ADOXA) 50 MG tablet, Take 1 tablet (50 mg total) by mouth 2 (two) times a day for 7 days, Disp: 14 tablet, Rfl: 0    ACTIVE PROBLEM LIST:  Patient Active Problem List   Diagnosis    Depression    Esophageal reflux    Mixed hyperlipidemia    Essential hypertension    Acquired hypothyroidism    Impaired fasting glucose    Lymphoma (HCC)    Cerebral aneurysm, nonruptured    Mild cognitive impairment    Flexor tenosynovitis of finger    Macrocytosis without anemia       PAST MEDICAL HISTORY:  Past Medical History:   Diagnosis Date    Anxiety disorder     Aortic valve disorder     Cellulitis of finger of left hand 11/20/2018    Cellulitis of left hand 11/10/2018    Added automatically from request for surgery [id number]    Depression     History of gastroesophageal reflux (GERD)     History of kidney cancer 03/06/2014    Hyperlipidemia 03/06/2014    Hypertension 03/06/2014    Hypothyroidism 03/06/2014    Shortness of breath     TIA (transient ischemic attack)        PAST SURGICAL HISTORY:  Past Surgical History:   Procedure Laterality Date    ESOPHAGOGASTRIC FUNDOPLASTY      Nissen Fundoplication    HERNIA REPAIR         FAMILY HISTORY:  Family History   Problem Relation Age of Onset    Stroke Father     Leukemia Sister     Alcohol abuse Brother     Kidney cancer Brother        SOCIAL HISTORY:  Social History     Socioeconomic History    Marital status: /Civil Union     Spouse name: Not on file    Number of children: 2    Years of education: Not on file    Highest education level: Not on file   Occupational History    Not on file   Social Needs    Financial resource strain: Not on file    Food insecurity:     Worry: Not on file     Inability: Not on file    Transportation needs:     Medical: Not on file     Non-medical: Not on file   Tobacco Use    Smoking status: Never Smoker    Smokeless tobacco: Never Used   Substance and Sexual Activity    Alcohol use: No    Drug use: No    Sexual activity: Not on file   Lifestyle    Physical activity:     Days per week: Not on file     Minutes per session: Not on file    Stress: Not on file   Relationships    Social connections:     Talks on phone: Not on file     Gets together: Not on file     Attends Evangelical service: Not on file     Active member of club or organization: Not on file     Attends meetings of clubs or organizations: Not on file     Relationship status: Not on file    Intimate partner violence:     Fear of current or ex partner: Not on file     Emotionally abused: Not on file     Physically abused: Not on file     Forced sexual activity: Not on file   Other Topics Concern    Not on file   Social History Narrative    Not on file       Review of Systems   Constitutional: Negative for activity change, chills, fatigue and fever  HENT: Negative for congestion  Eyes: Negative for discharge  Respiratory: Positive for shortness of breath  Negative for cough, chest tightness and wheezing  Cardiovascular: Negative for chest pain, palpitations and leg swelling  Gastrointestinal: Negative for abdominal pain  Genitourinary: Negative for difficulty urinating  Musculoskeletal: Negative for arthralgias and myalgias  Skin: Positive for rash  Allergic/Immunologic: Negative for immunocompromised state  Neurological: Positive for numbness  Negative for dizziness, syncope, weakness, light-headedness and headaches  Hematological: Negative for adenopathy  Does not bruise/bleed easily  Psychiatric/Behavioral: Negative for dysphoric mood  The patient is not nervous/anxious  Objective:  Vitals:    01/22/20 1449   BP: 132/78   BP Location: Left arm   Patient Position: Sitting   Cuff Size: Adult   Pulse: 82   SpO2: 96%   Weight: 79 4 kg (175 lb)   Height: 5' (1 524 m)     Body mass index is 34 18 kg/m²  Physical Exam   Constitutional: She is oriented to person, place, and time  She appears well-developed and well-nourished  No distress  Obese     HENT:   Head: Normocephalic  Eyes: Pupils are equal, round, and reactive to light  Neck: Neck supple  No JVD present  Carotid bruit is not present  No thyromegaly present  Cardiovascular: Normal rate and regular rhythm  Murmur (2/6 systolic murmur in aortic area) heard  Pulmonary/Chest: Effort normal and breath sounds normal  No respiratory distress  She has no wheezes  She has no rales  She exhibits no tenderness  Musculoskeletal: She exhibits no edema  Lymphadenopathy:     She has no cervical adenopathy  Neurological: She is alert and oriented to person, place, and time  Skin: Skin is warm and dry  No rash noted  Psychiatric: She has a normal mood and affect  Her behavior is normal    Nursing note and vitals reviewed  RESULTS:    No results found for this or any previous visit (from the past 1008 hour(s))  This note was created with voice recognition software  Phonic, grammatical and spelling errors may be present within the note as a result

## 2020-01-22 NOTE — PATIENT INSTRUCTIONS
Will treat local skin folliculitis with short course of antibiotics  Will refer to Dermatology for left ear lesion  Patient is to get in touch with her previous provider for lymphoma and get a follow-up visit  Otherwise follow-up here as scheduled with labs to be done prior to that visit  Will also have chest x-ray done due to complaint of dyspnea

## 2020-01-28 DIAGNOSIS — N39.0 RECURRENT UTI: ICD-10-CM

## 2020-01-28 RX ORDER — MIRABEGRON 50 MG/1
TABLET, FILM COATED, EXTENDED RELEASE ORAL
Qty: 30 TABLET | Refills: 5 | Status: SHIPPED | OUTPATIENT
Start: 2020-01-28 | End: 2020-07-27

## 2020-02-02 DIAGNOSIS — E03.9 HYPOTHYROIDISM, UNSPECIFIED TYPE: ICD-10-CM

## 2020-02-02 RX ORDER — LEVOTHYROXINE SODIUM 0.12 MG/1
TABLET ORAL
Qty: 90 TABLET | Refills: 1 | Status: SHIPPED | OUTPATIENT
Start: 2020-02-02 | End: 2020-07-27

## 2020-02-17 DIAGNOSIS — I10 ESSENTIAL HYPERTENSION: ICD-10-CM

## 2020-02-17 RX ORDER — AMLODIPINE BESYLATE 2.5 MG/1
TABLET ORAL
Qty: 30 TABLET | Refills: 5 | Status: SHIPPED | OUTPATIENT
Start: 2020-02-17 | End: 2020-06-01

## 2020-03-05 ENCOUNTER — HOSPITAL ENCOUNTER (OUTPATIENT)
Facility: HOSPITAL | Age: 75
Setting detail: OBSERVATION
Discharge: HOME | End: 2020-03-07
Attending: EMERGENCY MEDICINE | Admitting: INTERNAL MEDICINE
Payer: COMMERCIAL

## 2020-03-05 ENCOUNTER — APPOINTMENT (EMERGENCY)
Dept: CT IMAGING | Facility: HOSPITAL | Age: 75
End: 2020-03-05
Payer: COMMERCIAL

## 2020-03-05 DIAGNOSIS — E87.6 HYPOKALEMIA: ICD-10-CM

## 2020-03-05 DIAGNOSIS — R41.0 CONFUSION: ICD-10-CM

## 2020-03-05 DIAGNOSIS — R20.2 PARESTHESIAS: Primary | ICD-10-CM

## 2020-03-05 PROBLEM — R19.7 DIARRHEA OF PRESUMED INFECTIOUS ORIGIN: Status: ACTIVE | Noted: 2020-03-05

## 2020-03-05 PROBLEM — R11.2 NON-INTRACTABLE VOMITING WITH NAUSEA: Status: ACTIVE | Noted: 2020-03-05

## 2020-03-05 LAB
ALBUMIN SERPL BCP-MCNC: 3.6 G/DL (ref 3.5–5)
ALP SERPL-CCNC: 78 U/L (ref 46–116)
ALT SERPL W P-5'-P-CCNC: 20 U/L (ref 12–78)
AMORPH PHOS CRY URNS QL MICRO: ABNORMAL /HPF
ANION GAP SERPL CALCULATED.3IONS-SCNC: 11 MMOL/L (ref 4–13)
AST SERPL W P-5'-P-CCNC: 18 U/L (ref 5–45)
BACTERIA UR QL AUTO: ABNORMAL /HPF
BASOPHILS # BLD MANUAL: 0 THOUSAND/UL (ref 0–0.1)
BASOPHILS NFR MAR MANUAL: 0 % (ref 0–1)
BILIRUB SERPL-MCNC: 0.4 MG/DL (ref 0.2–1)
BILIRUB UR QL STRIP: NEGATIVE
BUN SERPL-MCNC: 17 MG/DL (ref 5–25)
CALCIUM SERPL-MCNC: 9 MG/DL (ref 8.3–10.1)
CHLORIDE SERPL-SCNC: 104 MMOL/L (ref 100–108)
CLARITY UR: ABNORMAL
CO2 SERPL-SCNC: 26 MMOL/L (ref 21–32)
COLOR UR: YELLOW
CREAT SERPL-MCNC: 0.88 MG/DL (ref 0.6–1.3)
EOSINOPHIL # BLD MANUAL: 0.08 THOUSAND/UL (ref 0–0.4)
EOSINOPHIL NFR BLD MANUAL: 1 % (ref 0–6)
ERYTHROCYTE [DISTWIDTH] IN BLOOD BY AUTOMATED COUNT: 13.1 % (ref 11.6–15.1)
GFR SERPL CREATININE-BSD FRML MDRD: 64 ML/MIN/1.73SQ M
GLUCOSE SERPL-MCNC: 115 MG/DL (ref 65–140)
GLUCOSE UR STRIP-MCNC: NEGATIVE MG/DL
HCT VFR BLD AUTO: 41.6 % (ref 34.8–46.1)
HGB BLD-MCNC: 13.8 G/DL (ref 11.5–15.4)
HGB UR QL STRIP.AUTO: ABNORMAL
KETONES UR STRIP-MCNC: ABNORMAL MG/DL
LEUKOCYTE ESTERASE UR QL STRIP: NEGATIVE
LYMPHOCYTES # BLD AUTO: 0.83 THOUSAND/UL (ref 0.6–4.47)
LYMPHOCYTES # BLD AUTO: 11 % (ref 14–44)
MCH RBC QN AUTO: 34.7 PG (ref 26.8–34.3)
MCHC RBC AUTO-ENTMCNC: 33.2 G/DL (ref 31.4–37.4)
MCV RBC AUTO: 105 FL (ref 82–98)
MONOCYTES # BLD AUTO: 0.23 THOUSAND/UL (ref 0–1.22)
MONOCYTES NFR BLD: 3 % (ref 4–12)
NEUTROPHILS # BLD MANUAL: 6.38 THOUSAND/UL (ref 1.85–7.62)
NEUTS BAND NFR BLD MANUAL: 1 % (ref 0–8)
NEUTS SEG NFR BLD AUTO: 84 % (ref 43–75)
NITRITE UR QL STRIP: NEGATIVE
NON-SQ EPI CELLS URNS QL MICRO: ABNORMAL /HPF
NRBC BLD AUTO-RTO: 0 /100 WBCS
PH UR STRIP.AUTO: 7.5 [PH]
PLATELET # BLD AUTO: 256 THOUSANDS/UL (ref 149–390)
PLATELET BLD QL SMEAR: ADEQUATE
PMV BLD AUTO: 11.8 FL (ref 8.9–12.7)
POTASSIUM SERPL-SCNC: 3.3 MMOL/L (ref 3.5–5.3)
PROT SERPL-MCNC: 7.6 G/DL (ref 6.4–8.2)
PROT UR STRIP-MCNC: NEGATIVE MG/DL
RBC # BLD AUTO: 3.98 MILLION/UL (ref 3.81–5.12)
RBC #/AREA URNS AUTO: ABNORMAL /HPF
SODIUM SERPL-SCNC: 141 MMOL/L (ref 136–145)
SP GR UR STRIP.AUTO: <=1.005 (ref 1–1.03)
TOTAL CELLS COUNTED SPEC: 100
TROPONIN I SERPL-MCNC: <0.02 NG/ML
UROBILINOGEN UR QL STRIP.AUTO: 0.2 E.U./DL
WBC # BLD AUTO: 7.51 THOUSAND/UL (ref 4.31–10.16)
WBC #/AREA URNS AUTO: ABNORMAL /HPF

## 2020-03-05 PROCEDURE — 80053 COMPREHEN METABOLIC PANEL: CPT | Performed by: PHYSICIAN ASSISTANT

## 2020-03-05 PROCEDURE — 85027 COMPLETE CBC AUTOMATED: CPT | Performed by: PHYSICIAN ASSISTANT

## 2020-03-05 PROCEDURE — 93005 ELECTROCARDIOGRAM TRACING: CPT

## 2020-03-05 PROCEDURE — 74177 CT ABD & PELVIS W/CONTRAST: CPT

## 2020-03-05 PROCEDURE — 81001 URINALYSIS AUTO W/SCOPE: CPT | Performed by: PHYSICIAN ASSISTANT

## 2020-03-05 PROCEDURE — 36415 COLL VENOUS BLD VENIPUNCTURE: CPT | Performed by: PHYSICIAN ASSISTANT

## 2020-03-05 PROCEDURE — 99285 EMERGENCY DEPT VISIT HI MDM: CPT

## 2020-03-05 PROCEDURE — 70450 CT HEAD/BRAIN W/O DYE: CPT

## 2020-03-05 PROCEDURE — 99285 EMERGENCY DEPT VISIT HI MDM: CPT | Performed by: PHYSICIAN ASSISTANT

## 2020-03-05 PROCEDURE — 85007 BL SMEAR W/DIFF WBC COUNT: CPT | Performed by: PHYSICIAN ASSISTANT

## 2020-03-05 PROCEDURE — 84484 ASSAY OF TROPONIN QUANT: CPT | Performed by: PHYSICIAN ASSISTANT

## 2020-03-05 RX ORDER — POTASSIUM CHLORIDE 20 MEQ/1
40 TABLET, EXTENDED RELEASE ORAL ONCE
Status: COMPLETED | OUTPATIENT
Start: 2020-03-05 | End: 2020-03-05

## 2020-03-05 RX ORDER — ONDANSETRON 2 MG/ML
4 INJECTION INTRAMUSCULAR; INTRAVENOUS ONCE
Status: DISCONTINUED | OUTPATIENT
Start: 2020-03-05 | End: 2020-03-06

## 2020-03-05 RX ADMIN — IOHEXOL 100 ML: 350 INJECTION, SOLUTION INTRAVENOUS at 21:39

## 2020-03-05 RX ADMIN — POTASSIUM CHLORIDE 40 MEQ: 1500 TABLET, EXTENDED RELEASE ORAL at 20:25

## 2020-03-06 ENCOUNTER — APPOINTMENT (OUTPATIENT)
Dept: ULTRASOUND IMAGING | Facility: HOSPITAL | Age: 75
End: 2020-03-06
Payer: COMMERCIAL

## 2020-03-06 ENCOUNTER — APPOINTMENT (OUTPATIENT)
Dept: MRI IMAGING | Facility: HOSPITAL | Age: 75
End: 2020-03-06
Payer: COMMERCIAL

## 2020-03-06 LAB
ANION GAP SERPL CALCULATED.3IONS-SCNC: 8 MMOL/L (ref 4–13)
ATRIAL RATE: 74 BPM
BASOPHILS # BLD AUTO: 0.05 THOUSANDS/ΜL (ref 0–0.1)
BASOPHILS NFR BLD AUTO: 1 % (ref 0–1)
BUN SERPL-MCNC: 21 MG/DL (ref 5–25)
CALCIUM SERPL-MCNC: 8.9 MG/DL (ref 8.3–10.1)
CHLORIDE SERPL-SCNC: 105 MMOL/L (ref 100–108)
CHOLEST SERPL-MCNC: 187 MG/DL (ref 50–200)
CO2 SERPL-SCNC: 27 MMOL/L (ref 21–32)
CREAT SERPL-MCNC: 0.86 MG/DL (ref 0.6–1.3)
EOSINOPHIL # BLD AUTO: 0.07 THOUSAND/ΜL (ref 0–0.61)
EOSINOPHIL NFR BLD AUTO: 1 % (ref 0–6)
ERYTHROCYTE [DISTWIDTH] IN BLOOD BY AUTOMATED COUNT: 13.3 % (ref 11.6–15.1)
GFR SERPL CREATININE-BSD FRML MDRD: 66 ML/MIN/1.73SQ M
GLUCOSE SERPL-MCNC: 102 MG/DL (ref 65–140)
HCT VFR BLD AUTO: 37.1 % (ref 34.8–46.1)
HDLC SERPL-MCNC: 77 MG/DL
HGB BLD-MCNC: 12.4 G/DL (ref 11.5–15.4)
IMM GRANULOCYTES # BLD AUTO: 0.07 THOUSAND/UL (ref 0–0.2)
IMM GRANULOCYTES NFR BLD AUTO: 1 % (ref 0–2)
LDLC SERPL CALC-MCNC: 88 MG/DL (ref 0–100)
LYMPHOCYTES # BLD AUTO: 1.16 THOUSANDS/ΜL (ref 0.6–4.47)
LYMPHOCYTES NFR BLD AUTO: 20 % (ref 14–44)
MAGNESIUM SERPL-MCNC: 2 MG/DL (ref 1.6–2.6)
MCH RBC QN AUTO: 35 PG (ref 26.8–34.3)
MCHC RBC AUTO-ENTMCNC: 33.4 G/DL (ref 31.4–37.4)
MCV RBC AUTO: 105 FL (ref 82–98)
MONOCYTES # BLD AUTO: 0.92 THOUSAND/ΜL (ref 0.17–1.22)
MONOCYTES NFR BLD AUTO: 16 % (ref 4–12)
NEUTROPHILS # BLD AUTO: 3.57 THOUSANDS/ΜL (ref 1.85–7.62)
NEUTS SEG NFR BLD AUTO: 61 % (ref 43–75)
NONHDLC SERPL-MCNC: 110 MG/DL
NRBC BLD AUTO-RTO: 0 /100 WBCS
P AXIS: 35 DEGREES
PLATELET # BLD AUTO: 226 THOUSANDS/UL (ref 149–390)
PMV BLD AUTO: 11.9 FL (ref 8.9–12.7)
POTASSIUM SERPL-SCNC: 3.3 MMOL/L (ref 3.5–5.3)
PR INTERVAL: 140 MS
QRS AXIS: 4 DEGREES
QRSD INTERVAL: 84 MS
QT INTERVAL: 410 MS
QTC INTERVAL: 455 MS
RBC # BLD AUTO: 3.54 MILLION/UL (ref 3.81–5.12)
SODIUM SERPL-SCNC: 140 MMOL/L (ref 136–145)
T WAVE AXIS: 25 DEGREES
T4 FREE SERPL-MCNC: 1.31 NG/DL (ref 0.76–1.46)
TRIGL SERPL-MCNC: 112 MG/DL
TSH SERPL DL<=0.05 MIU/L-ACNC: 4.45 UIU/ML (ref 0.36–3.74)
VENTRICULAR RATE: 74 BPM
WBC # BLD AUTO: 5.84 THOUSAND/UL (ref 4.31–10.16)

## 2020-03-06 PROCEDURE — 85025 COMPLETE CBC W/AUTO DIFF WBC: CPT | Performed by: PHYSICIAN ASSISTANT

## 2020-03-06 PROCEDURE — 97166 OT EVAL MOD COMPLEX 45 MIN: CPT

## 2020-03-06 PROCEDURE — 99204 OFFICE O/P NEW MOD 45 MIN: CPT | Performed by: PSYCHIATRY & NEUROLOGY

## 2020-03-06 PROCEDURE — 99220 PR INITIAL OBSERVATION CARE/DAY 70 MINUTES: CPT | Performed by: INTERNAL MEDICINE

## 2020-03-06 PROCEDURE — 70551 MRI BRAIN STEM W/O DYE: CPT

## 2020-03-06 PROCEDURE — 97163 PT EVAL HIGH COMPLEX 45 MIN: CPT

## 2020-03-06 PROCEDURE — 93010 ELECTROCARDIOGRAM REPORT: CPT | Performed by: INTERNAL MEDICINE

## 2020-03-06 PROCEDURE — 80048 BASIC METABOLIC PNL TOTAL CA: CPT | Performed by: PHYSICIAN ASSISTANT

## 2020-03-06 PROCEDURE — 80061 LIPID PANEL: CPT | Performed by: PHYSICIAN ASSISTANT

## 2020-03-06 PROCEDURE — 83735 ASSAY OF MAGNESIUM: CPT | Performed by: PHYSICIAN ASSISTANT

## 2020-03-06 PROCEDURE — 84443 ASSAY THYROID STIM HORMONE: CPT | Performed by: PHYSICIAN ASSISTANT

## 2020-03-06 PROCEDURE — 84439 ASSAY OF FREE THYROXINE: CPT | Performed by: PHYSICIAN ASSISTANT

## 2020-03-06 PROCEDURE — 93880 EXTRACRANIAL BILAT STUDY: CPT

## 2020-03-06 PROCEDURE — 87505 NFCT AGENT DETECTION GI: CPT | Performed by: INTERNAL MEDICINE

## 2020-03-06 RX ORDER — ASPIRIN 325 MG
325 TABLET ORAL DAILY
Status: DISCONTINUED | OUTPATIENT
Start: 2020-03-06 | End: 2020-03-07 | Stop reason: HOSPADM

## 2020-03-06 RX ORDER — POTASSIUM CHLORIDE 20 MEQ/1
40 TABLET, EXTENDED RELEASE ORAL ONCE
Status: COMPLETED | OUTPATIENT
Start: 2020-03-06 | End: 2020-03-06

## 2020-03-06 RX ORDER — POTASSIUM CHLORIDE 20 MEQ/1
20 TABLET, EXTENDED RELEASE ORAL DAILY
Status: DISCONTINUED | OUTPATIENT
Start: 2020-03-07 | End: 2020-03-07 | Stop reason: HOSPADM

## 2020-03-06 RX ORDER — RIVASTIGMINE 4.6 MG/24H
1 PATCH, EXTENDED RELEASE TRANSDERMAL DAILY
Status: DISCONTINUED | OUTPATIENT
Start: 2020-03-06 | End: 2020-03-06

## 2020-03-06 RX ORDER — ACETAMINOPHEN 325 MG/1
650 TABLET ORAL EVERY 6 HOURS PRN
Status: DISCONTINUED | OUTPATIENT
Start: 2020-03-06 | End: 2020-03-07 | Stop reason: HOSPADM

## 2020-03-06 RX ORDER — LEVOTHYROXINE SODIUM 0.12 MG/1
125 TABLET ORAL DAILY
Status: DISCONTINUED | OUTPATIENT
Start: 2020-03-06 | End: 2020-03-07 | Stop reason: HOSPADM

## 2020-03-06 RX ORDER — ONDANSETRON 2 MG/ML
4 INJECTION INTRAMUSCULAR; INTRAVENOUS EVERY 6 HOURS PRN
Status: DISCONTINUED | OUTPATIENT
Start: 2020-03-06 | End: 2020-03-07 | Stop reason: HOSPADM

## 2020-03-06 RX ORDER — SODIUM CHLORIDE, SODIUM GLUCONATE, SODIUM ACETATE, POTASSIUM CHLORIDE, MAGNESIUM CHLORIDE, SODIUM PHOSPHATE, DIBASIC, AND POTASSIUM PHOSPHATE .53; .5; .37; .037; .03; .012; .00082 G/100ML; G/100ML; G/100ML; G/100ML; G/100ML; G/100ML; G/100ML
75 INJECTION, SOLUTION INTRAVENOUS CONTINUOUS
Status: DISPENSED | OUTPATIENT
Start: 2020-03-06 | End: 2020-03-06

## 2020-03-06 RX ORDER — OXYBUTYNIN CHLORIDE 5 MG/1
10 TABLET, EXTENDED RELEASE ORAL DAILY
Status: DISCONTINUED | OUTPATIENT
Start: 2020-03-06 | End: 2020-03-07 | Stop reason: HOSPADM

## 2020-03-06 RX ORDER — BISACODYL 10 MG
10 SUPPOSITORY, RECTAL RECTAL DAILY PRN
Status: DISCONTINUED | OUTPATIENT
Start: 2020-03-06 | End: 2020-03-06

## 2020-03-06 RX ORDER — LOPERAMIDE HYDROCHLORIDE 2 MG/1
2 CAPSULE ORAL EVERY 4 HOURS PRN
Status: DISCONTINUED | OUTPATIENT
Start: 2020-03-06 | End: 2020-03-07 | Stop reason: HOSPADM

## 2020-03-06 RX ORDER — PANTOPRAZOLE SODIUM 20 MG/1
20 TABLET, DELAYED RELEASE ORAL
Status: DISCONTINUED | OUTPATIENT
Start: 2020-03-06 | End: 2020-03-07 | Stop reason: HOSPADM

## 2020-03-06 RX ORDER — AMLODIPINE BESYLATE 5 MG/1
5 TABLET ORAL DAILY
Status: DISCONTINUED | OUTPATIENT
Start: 2020-03-06 | End: 2020-03-07 | Stop reason: HOSPADM

## 2020-03-06 RX ORDER — PAROXETINE HYDROCHLORIDE 20 MG/1
20 TABLET, FILM COATED ORAL DAILY
Status: DISCONTINUED | OUTPATIENT
Start: 2020-03-06 | End: 2020-03-07 | Stop reason: HOSPADM

## 2020-03-06 RX ORDER — DONEPEZIL HYDROCHLORIDE 5 MG/1
10 TABLET, FILM COATED ORAL
Status: DISCONTINUED | OUTPATIENT
Start: 2020-03-06 | End: 2020-03-07 | Stop reason: HOSPADM

## 2020-03-06 RX ORDER — CALCIUM CARBONATE 200(500)MG
1000 TABLET,CHEWABLE ORAL DAILY PRN
Status: DISCONTINUED | OUTPATIENT
Start: 2020-03-06 | End: 2020-03-07 | Stop reason: HOSPADM

## 2020-03-06 RX ORDER — DIPHENHYDRAMINE HCL 25 MG
12.5 TABLET ORAL
Status: DISCONTINUED | OUTPATIENT
Start: 2020-03-06 | End: 2020-03-07 | Stop reason: HOSPADM

## 2020-03-06 RX ADMIN — OXYBUTYNIN CHLORIDE 10 MG: 5 TABLET, EXTENDED RELEASE ORAL at 08:30

## 2020-03-06 RX ADMIN — SODIUM CHLORIDE, SODIUM GLUCONATE, SODIUM ACETATE, POTASSIUM CHLORIDE AND MAGNESIUM CHLORIDE 75 ML/HR: 526; 502; 368; 37; 30 INJECTION, SOLUTION INTRAVENOUS at 01:53

## 2020-03-06 RX ADMIN — PANTOPRAZOLE SODIUM 20 MG: 20 TABLET, DELAYED RELEASE ORAL at 05:34

## 2020-03-06 RX ADMIN — DIPHENHYDRAMINE HCL 12.5 MG: 25 TABLET, COATED ORAL at 01:56

## 2020-03-06 RX ADMIN — RIVASTIGMINE 1 PATCH: 4.6 PATCH TRANSDERMAL at 08:32

## 2020-03-06 RX ADMIN — PAROXETINE 20 MG: 20 TABLET, FILM COATED ORAL at 08:30

## 2020-03-06 RX ADMIN — LEVOTHYROXINE SODIUM 125 MCG: 125 TABLET ORAL at 05:34

## 2020-03-06 RX ADMIN — LOPERAMIDE HYDROCHLORIDE 2 MG: 2 CAPSULE ORAL at 15:21

## 2020-03-06 RX ADMIN — AMLODIPINE BESYLATE 5 MG: 5 TABLET ORAL at 08:29

## 2020-03-06 RX ADMIN — DONEPEZIL HYDROCHLORIDE 10 MG: 5 TABLET ORAL at 01:53

## 2020-03-06 RX ADMIN — ASPIRIN 325 MG ORAL TABLET 325 MG: 325 PILL ORAL at 08:30

## 2020-03-06 RX ADMIN — ENOXAPARIN SODIUM 40 MG: 40 INJECTION SUBCUTANEOUS at 08:30

## 2020-03-06 RX ADMIN — DIPHENHYDRAMINE HCL 12.5 MG: 25 TABLET, COATED ORAL at 21:52

## 2020-03-06 RX ADMIN — DONEPEZIL HYDROCHLORIDE 10 MG: 5 TABLET ORAL at 21:51

## 2020-03-06 RX ADMIN — POTASSIUM CHLORIDE 40 MEQ: 1500 TABLET, EXTENDED RELEASE ORAL at 08:29

## 2020-03-06 NOTE — ASSESSMENT & PLAN NOTE
· States was having N/V and diarrhea last night with generalized abdominal pains  This has now resolved  · CT abd/pelvis obtained in ED revealed "Moderate hiatal hernia  Mild diffuse thickening of the colon may be due to underdistention versus colitis    No bowel obstruction "  · Zofran PRN  · Isolyte @ 75mL/hr x 12 hours

## 2020-03-06 NOTE — ED NOTES
Pt ambulated to the restroom with the use of her cane and accompanied by an ED Tech   Pt had a steady gait      Ariel Fong RN  03/05/20 [id number]

## 2020-03-06 NOTE — PLAN OF CARE
Problem: Potential for Falls  Goal: Patient will remain free of falls  Description  INTERVENTIONS:  - Assess patient frequently for physical needs  -  Identify cognitive and physical deficits and behaviors that affect risk of falls    -  [place] fall precautions as indicated by assessment   - Educate patient/family on patient safety including physical limitations  - Instruct patient to call for assistance with activity based on assessment  - Modify environment to reduce risk of injury  - Consider OT/PT consult to assist with strengthening/mobility  Outcome: Progressing     Problem: PAIN - ADULT  Goal: Verbalizes/displays adequate comfort level or baseline comfort level  Description  Interventions:  - Encourage patient to monitor pain and request assistance  - Assess pain using appropriate pain scale  - Administer analgesics based on type and severity of pain and evaluate response  - Implement non-pharmacological measures as appropriate and evaluate response  - Consider cultural and social influences on pain and pain management  - Notify physician/advanced practitioner if interventions unsuccessful or patient reports new pain  Outcome: Progressing     Problem: INFECTION - ADULT  Goal: Absence or prevention of progression during hospitalization  Description  INTERVENTIONS:  - Assess and monitor for signs and symptoms of infection  - Monitor lab/diagnostic results  - Monitor all insertion sites, i e  indwelling lines, tubes, and drains  - Monitor endotracheal if appropriate and nasal secretions for changes in amount and color  - [place] appropriate cooling/warming therapies per order  - Administer medications as ordered  - Instruct and encourage patient and family to use good hand hygiene technique  - Identify and instruct in appropriate isolation precautions for identified infection/condition  Outcome: Progressing  Goal: Absence of fever/infection during neutropenic period  Description  INTERVENTIONS:  - Monitor WBC    Outcome: Progressing     Problem: SAFETY ADULT  Goal: Maintain or return to baseline ADL function  Description  INTERVENTIONS:  -  Assess patient's ability to carry out ADLs; assess patient's baseline for ADL function and identify physical deficits which impact ability to perform ADLs (bathing, care of mouth/teeth, toileting, grooming, dressing, etc )  - Assess/evaluate cause of self-care deficits   - Assess range of motion  - Assess patient's mobility; develop plan if impaired  - Assess patient's need for assistive devices and provide as appropriate  - Encourage maximum independence but intervene and supervise when necessary  - Involve family in performance of ADLs  - Assess for home care needs following discharge   - Consider OT consult to assist with ADL evaluation and planning for discharge  - Provide patient education as appropriate  Outcome: Progressing  Goal: Maintain or return mobility status to optimal level  Description  INTERVENTIONS:  - Assess patient's baseline mobility status (ambulation, transfers, stairs, etc )    - Identify cognitive and physical deficits and behaviors that affect mobility  - Identify mobility aids required to assist with transfers and/or ambulation (gait belt, sit-to-stand, lift, walker, cane, etc )  - [place] fall precautions as indicated by assessment  - Record patient progress and toleration of activity level on Mobility SBAR; progress patient to next Phase/Stage  - Instruct patient to call for assistance with activity based on assessment  - Consider rehabilitation consult to assist with strengthening/weightbearing, etc   Outcome: Progressing     Problem: DISCHARGE PLANNING  Goal: Discharge to home or other facility with appropriate resources  Description  INTERVENTIONS:  - Identify barriers to discharge w/patient and caregiver  - Arrange for needed discharge resources and transportation as appropriate  - Identify discharge learning needs (meds, wound care, etc )  - Arrange for interpretive services to assist at discharge as needed  - Refer to Case Management Department for coordinating discharge planning if the patient needs post-hospital services based on physician/advanced practitioner order or complex needs related to functional status, cognitive ability, or social support system  Outcome: Progressing     Problem: Knowledge Deficit  Goal: Patient/family/caregiver demonstrates understanding of disease process, treatment plan, medications, and discharge instructions  Description  Complete learning assessment and assess knowledge base    Interventions:  - Provide teaching at level of understanding  - Provide teaching via preferred learning methods  Outcome: Progressing     Problem: CARDIOVASCULAR - ADULT  Goal: Maintains optimal cardiac output and hemodynamic stability  Description  INTERVENTIONS:  - Monitor I/O, vital signs and rhythm  - Monitor for S/S and trends of decreased cardiac output  - Administer and titrate ordered vasoactive medications to optimize hemodynamic stability  - Assess quality of pulses, skin color and temperature  - Assess for signs of decreased coronary artery perfusion  - Instruct patient to report change in severity of symptoms  Outcome: Progressing  Goal: Absence of cardiac dysrhythmias or at baseline rhythm  Description  INTERVENTIONS:  - Continuous cardiac monitoring, vital signs, obtain 12 lead EKG if ordered  - Administer antiarrhythmic and heart rate control medications as ordered  - Monitor electrolytes and administer replacement therapy as ordered  Outcome: Progressing     Problem: MUSCULOSKELETAL - ADULT  Goal: Maintain or return mobility to safest level of function  Description  INTERVENTIONS:  - Assess patient's ability to carry out ADLs; assess patient's baseline for ADL function and identify physical deficits which impact ability to perform ADLs (bathing, care of mouth/teeth, toileting, grooming, dressing, etc )  - Assess/evaluate cause of self-care deficits   - Assess range of motion  - Assess patient's mobility  - Assess patient's need for assistive devices and provide as appropriate  - Encourage maximum independence but intervene and supervise when necessary  - Involve family in performance of ADLs  - Assess for home care needs following discharge   - Consider OT consult to assist with ADL evaluation and planning for discharge  - Provide patient education as appropriate  Outcome: Progressing  Goal: Maintain proper alignment of affected body part  Description  INTERVENTIONS:  - Support, maintain and protect limb and body alignment  - Provide patient/ family with appropriate education  Outcome: Progressing

## 2020-03-06 NOTE — PLAN OF CARE
Problem: Potential for Falls  Goal: Patient will remain free of falls  Description  INTERVENTIONS:  - Assess patient frequently for physical needs  -  Identify cognitive and physical deficits and behaviors that affect risk of falls    -  [place] fall precautions as indicated by assessment   - Educate patient/family on patient safety including physical limitations  - Instruct patient to call for assistance with activity based on assessment  - Modify environment to reduce risk of injury  - Consider OT/PT consult to assist with strengthening/mobility  Outcome: Progressing     Problem: PAIN - ADULT  Goal: Verbalizes/displays adequate comfort level or baseline comfort level  Description  Interventions:  - Encourage patient to monitor pain and request assistance  - Assess pain using appropriate pain scale  - Administer analgesics based on type and severity of pain and evaluate response  - Implement non-pharmacological measures as appropriate and evaluate response  - Consider cultural and social influences on pain and pain management  - Notify physician/advanced practitioner if interventions unsuccessful or patient reports new pain  Outcome: Progressing     Problem: INFECTION - ADULT  Goal: Absence or prevention of progression during hospitalization  Description  INTERVENTIONS:  - Assess and monitor for signs and symptoms of infection  - Monitor lab/diagnostic results  - Monitor all insertion sites, i e  indwelling lines, tubes, and drains  - Monitor endotracheal if appropriate and nasal secretions for changes in amount and color  - [place] appropriate cooling/warming therapies per order  - Administer medications as ordered  - Instruct and encourage patient and family to use good hand hygiene technique  - Identify and instruct in appropriate isolation precautions for identified infection/condition  Outcome: Progressing  Goal: Absence of fever/infection during neutropenic period  Description  INTERVENTIONS:  - Monitor WBC    Outcome: Progressing     Problem: SAFETY ADULT  Goal: Maintain or return to baseline ADL function  Description  INTERVENTIONS:  -  Assess patient's ability to carry out ADLs; assess patient's baseline for ADL function and identify physical deficits which impact ability to perform ADLs (bathing, care of mouth/teeth, toileting, grooming, dressing, etc )  - Assess/evaluate cause of self-care deficits   - Assess range of motion  - Assess patient's mobility; develop plan if impaired  - Assess patient's need for assistive devices and provide as appropriate  - Encourage maximum independence but intervene and supervise when necessary  - Involve family in performance of ADLs  - Assess for home care needs following discharge   - Consider OT consult to assist with ADL evaluation and planning for discharge  - Provide patient education as appropriate  Outcome: Progressing  Goal: Maintain or return mobility status to optimal level  Description  INTERVENTIONS:  - Assess patient's baseline mobility status (ambulation, transfers, stairs, etc )    - Identify cognitive and physical deficits and behaviors that affect mobility  - Identify mobility aids required to assist with transfers and/or ambulation (gait belt, sit-to-stand, lift, walker, cane, etc )  - [employer] fall precautions as indicated by assessment  - Record patient progress and toleration of activity level on Mobility SBAR; progress patient to next Phase/Stage  - Instruct patient to call for assistance with activity based on assessment  - Consider rehabilitation consult to assist with strengthening/weightbearing, etc   Outcome: Progressing     Problem: DISCHARGE PLANNING  Goal: Discharge to home or other facility with appropriate resources  Description  INTERVENTIONS:  - Identify barriers to discharge w/patient and caregiver  - Arrange for needed discharge resources and transportation as appropriate  - Identify discharge learning needs (meds, wound care, etc )  - Arrange for interpretive services to assist at discharge as needed  - Refer to Case Management Department for coordinating discharge planning if the patient needs post-hospital services based on physician/advanced practitioner order or complex needs related to functional status, cognitive ability, or social support system  Outcome: Progressing     Problem: Knowledge Deficit  Goal: Patient/family/caregiver demonstrates understanding of disease process, treatment plan, medications, and discharge instructions  Description  Complete learning assessment and assess knowledge base    Interventions:  - Provide teaching at level of understanding  - Provide teaching via preferred learning methods  Outcome: Progressing     Problem: CARDIOVASCULAR - ADULT  Goal: Maintains optimal cardiac output and hemodynamic stability  Description  INTERVENTIONS:  - Monitor I/O, vital signs and rhythm  - Monitor for S/S and trends of decreased cardiac output  - Administer and titrate ordered vasoactive medications to optimize hemodynamic stability  - Assess quality of pulses, skin color and temperature  - Assess for signs of decreased coronary artery perfusion  - Instruct patient to report change in severity of symptoms  Outcome: Progressing  Goal: Absence of cardiac dysrhythmias or at baseline rhythm  Description  INTERVENTIONS:  - Continuous cardiac monitoring, vital signs, obtain 12 lead EKG if ordered  - Administer antiarrhythmic and heart rate control medications as ordered  - Monitor electrolytes and administer replacement therapy as ordered  Outcome: Progressing     Problem: MUSCULOSKELETAL - ADULT  Goal: Maintain or return mobility to safest level of function  Description  INTERVENTIONS:  - Assess patient's ability to carry out ADLs; assess patient's baseline for ADL function and identify physical deficits which impact ability to perform ADLs (bathing, care of mouth/teeth, toileting, grooming, dressing, etc )  - Assess/evaluate cause of self-care deficits   - Assess range of motion  - Assess patient's mobility  - Assess patient's need for assistive devices and provide as appropriate  - Encourage maximum independence but intervene and supervise when necessary  - Involve family in performance of ADLs  - Assess for home care needs following discharge   - Consider OT consult to assist with ADL evaluation and planning for discharge  - Provide patient education as appropriate  Outcome: Progressing  Goal: Maintain proper alignment of affected body part  Description  INTERVENTIONS:  - Support, maintain and protect limb and body alignment  - Provide patient/ family with appropriate education  Outcome: Progressing

## 2020-03-06 NOTE — ASSESSMENT & PLAN NOTE
On cognitive screening examination is done today at the bedside the patient was grossly intact  She certainly can be monitored for any cognitive deterioration  We discussed with this patient stain physically as well as cognitively active emphasizing sleep and rest as well as good nutrition

## 2020-03-06 NOTE — ASSESSMENT & PLAN NOTE
Her paresthesias/dysesthesia certainly may have been induced by the nausea vomiting diarrhea and the subsequent hypokalemia that this patient experience  Her imaging at this point is negative for any acute pathology specifically stroke  Her carotids are pending yet, and she does have a moderate amount of small vessel disease appreciated on her MRI  She is free to follow up with us in the office in a few weeks

## 2020-03-06 NOTE — SOCIAL WORK
CM met with pt at bedside  OBS already done  Pt lives with her daughter Jonathan Caro in a one story house with a ramp present to enter  She is independent with ADL's and uses a cane or walker to ambulate  She has never been in rehab or used [place] services  Denies substance abuse or mental health issues  She quit smoking "many many years ago "  Dr Jaquelin Alamo is her PCP  She uses [facility] and has no problem with co-pays  She has an Advanced Directive and her POAs are her daughters Jonathan Caro and Jake Deem  She does not work and she can drive, but Kirk Jackson does most of the driving and will transport home when she is medically cleared  CM discussed d/c needs including [place] services, but pt does not feel this will be needed  CM will continue to follow through hospitalization  CM reviewed discharge planning process including the following: identifying help at home, patient preference for discharge planning needs, pharmacy preference, and availability of treatment team to discuss questions or concerns patient and/or family may have regarding understanding medications and recognizing signs and symptoms once discharged  CM also encouraged patient to follow up with all recommended appointments after discharge  Patient advised of importance for patient and family to participate in managing patients medical well being  CM name and role reviewed  Discharge Checklist reviewed and CM will continue to monitor for progress toward discharge goals in nursing and provider rounds

## 2020-03-06 NOTE — ASSESSMENT & PLAN NOTE
· ED provider reported paresthesias to  Face and BLE, however pt states paresthesias were generalized and resolved once in ED  Also with intermittent confusion/memory issues during the day  Reports (+) hx of TIA  Denies headache or vision changes  Denies weakness  · CT head w/o (-)  · UA (-)  · Potassium 3 3 on admission   Repleted in ED  · Unfortunately unable to obtain CTA head/neck as contrast study of chest/abdomen was already completed while in ED  · Carotid doppler pending  · MRI brain pending  · Obtain orthostatic VS  · Neuro checks q4hrs  · Consult neurology  · Check TSH, FLP and free T4 in am

## 2020-03-06 NOTE — ASSESSMENT & PLAN NOTE
· BP adequately controlled on current regimen  · Continue home dose Norvasc  · Monitor BP per unit protocol

## 2020-03-06 NOTE — CONSULTS
Neurology Consult- Ramsey Johnson 1945, 75 y o  female   MRN: [id number] Unit/Bed#: -01 Encounter: [id number]      Inpatient consult to Neurology  Consult performed by: JULIO C Blackman  Consult ordered by: Cristina Hidalgo PA-C      Reason for Consult / Principal Problem:  Paresthesias  Hx and PE limited by:  None  Review of previous medical records was completed  Family, was not present at the bedside for history and examination  The patient was deemed to be a reliable historian  Hypokalemia  Assessment & Plan  Her hypokalemia although mild certainly may have been responsible for her dysesthesias  Mild cognitive impairment  Assessment & Plan  On cognitive screening examination is done today at the bedside the patient was grossly intact  She certainly can be monitored for any cognitive deterioration  We discussed with this patient stain physically as well as cognitively active emphasizing sleep and rest as well as good nutrition  * Paresthesias  Assessment & Plan  Her paresthesias/dysesthesia certainly may have been induced by the nausea vomiting diarrhea and the subsequent hypokalemia that this patient experience  Her imaging at this point is negative for any acute pathology specifically stroke  Her carotids are pending yet, and she does have a moderate amount of small vessel disease appreciated on her MRI  She is free to follow up with us in the office in a few weeks  HPI: Ramsey Johnson is a right handed  75 y o  female who  neurology is asked to see after she presented to the hospital yesterday with a 2 day complaint of nausea vomiting and diarrhea  Patient reports that she is unaware of what to trigger of her gastrointestinal distress was  She was brought in by her family for the vomiting and diarrhea    However they also reported that she had numbness and tingling in her face and the patient reported that she thought she half it in her 1 side as well as we discussed with her here today but she is unable to report which side it was that felt dysesthetic yesterday  Today during exam in the early morning she reported that she felt fine everything has resolved and she has had no further numbness or tingling and she has had no further diarrhea or nausea  At the time of her admission yesterday she was normotensive she was afebrile however she was noted to have some metabolic this arrangements         ROS: 12 system cued query:  The patient reports that she stays active at home  She reports she slept well last night and is generally a good sleeper at home  No visual disturbances with this or prior  No chest pain no shortness of breath  The gastrointestinal dis-ease she experienced in the past 2 days she reports has resolved  She has no lateralizing weakness  The remainder of her cued query was negative  Historical Information     Past Medical History:   Diagnosis Date    Anxiety disorder     Aortic valve disorder     Cellulitis of finger of left hand 11/20/2018    Cellulitis of left hand 11/10/2018    Added automatically from request for surgery [id number]    Depression     History of gastroesophageal reflux (GERD)     History of kidney cancer 03/06/2014    Hyperlipidemia 03/06/2014    Hypertension 03/06/2014    Hypothyroidism 03/06/2014    Shortness of breath     TIA (transient ischemic attack)      Past Surgical History:   Procedure Laterality Date    ESOPHAGOGASTRIC FUNDOPLASTY      Nissen Fundoplication    HERNIA REPAIR         Social History : retired , non-smoker, no ETOH no recreationals,        Family History: non-contributory      Allergies   Allergen Reactions    Ciprofloxacin     Other     Sulfa Antibiotics     Penicillins Rash     Category: Allergy;   --  Pt received unasyn 1 5 mg IV 11-12-18 to 11-15-18    Sulfacetamide Rash     Category:  Allergy;      Meds:all current active meds have been reviewed and compliant w meds by her report    Scheduled Meds:  Current Facility-Administered Medications:  acetaminophen 650 mg Oral Q6H PRN   amLODIPine 5 mg Oral Daily   aspirin 325 mg Oral Daily   bisacodyl 10 mg Rectal Daily PRN   calcium carbonate 1,000 mg Oral Daily PRN   diphenhydrAMINE 12 5 mg Oral HS   donepezil 10 mg Oral HS   enoxaparin 40 mg Subcutaneous Daily   levothyroxine 125 mcg Oral Daily   multi-electrolyte 75 mL/hr Intravenous Continuous   ondansetron 4 mg Intravenous Q6H PRN   oxybutynin 10 mg Oral Daily   pantoprazole 20 mg Oral Early Morning   PARoxetine 20 mg Oral Daily   rivastigmine 1 patch Transdermal Daily     PRN Meds:   acetaminophen    bisacodyl    calcium carbonate    ondansetron      Physical Exam:   Objective   Vitals:Blood pressure (!) 178/95, pulse 82, temperature (!) 97 2 °F (36 2 °C), resp  rate 20, height 5' 2" (1 575 m), weight 77 1 kg (170 lb), SpO2 96 %, not currently breastfeeding  ,Body mass index is 31 09 kg/m²  Patient was examined in bedside chair No family present  General: alert, appears stated age and cooperative  Head: Normocephalic, without obvious abnormality, atraumatic  Oral exam: lips, mucosa, and tongue moist;   Neck: no carotid bruit,   Lungs: clear to auscultation ant  bilaterally  Heart: regular rate and rhythm, S1, S2 normal, no murmur appreciated,   Abdomen: soft, +BS    Extremities: atraumatic, no cyanosis or edema    Neurologic:   Mental status: Alert, oriented, thought content appropriate, w/out aphasia or dysarthria  CN Exam: KAITLIN, EOM's I, VF full, Gaze conjugate No sensory or motor lateralizations (No PP on face), Hearing I B, CNIX-XII I B  Motor: full power, age appropriate x 4 limbs  Sensory: intact  X 4 limbs, 4 mod inc lt, temp, and PP tested)  Cerebellar: no past pointing or drift from trad Romberg position, no ataxia w maneuvers, Fine tremor R w pronation  DTR's: Age appropriate, WNL;  Plantars: downgoing  Gait: Fairly fluid smooth, age-appropriate no LOB w cadance change  Lab Results:   I have personally reviewed pertinent reports  , CBC:   Results from last 7 days   Lab Units 03/06/20  0433 03/05/20  1935   WBC Thousand/uL 5 84 7 51   RBC Million/uL 3 54* 3 98   HEMOGLOBIN g/dL 12 4 13 8   HEMATOCRIT % 37 1 41 6   MCV fL 105* 105*   PLATELETS Thousands/uL 226 256   , BMP/CMP:   Results from last 7 days   Lab Units 03/06/20  0433 03/05/20  1935   SODIUM mmol/L 140 141   POTASSIUM mmol/L 3 3* 3 3*   CHLORIDE mmol/L 105 104   CO2 mmol/L 27 26   BUN mg/dL 21 17   CREATININE mg/dL 0 86 0 88   CALCIUM mg/dL 8 9 9 0   AST U/L  --  18   ALT U/L  --  20   ALK PHOS U/L  --  78   EGFR ml/min/1 73sq m 66 64   , Vitamin B12:   , HgBA1C:   , TSH:   Results from last 7 days   Lab Units 03/06/20  0433   TSH 3RD GENERATON uIU/mL 4 448*   , Coagulation:   , Lipid Profile:   Results from last 7 days   Lab Units 03/06/20  0433   HDL mg/dL 77   LDL CALC mg/dL 88   TRIGLYCERIDES mg/dL 112        Imaging Studies: I have personally reviewed pertinent films in PACS and W negative MRI on my review Carotids pending      Counseling / Coordination of Care  Total time spent today Approximately 30 minutes  Greater than 50% of total time was spent with the patient and / or family counseling and / or coordination of care  A description of the counseling / coordination of care: All of the above was discussed with the patient in detail  She had a few questions I believe they were all answered to her satisfaction  Dictation voice to text software has been used in the creation of this document  Please consider this in light of any contextual or grammatical errors

## 2020-03-06 NOTE — ASSESSMENT & PLAN NOTE
· Potassium 3 3 on admission  · Likely 2/2 recent N/V/D as above  · Received 40meq K dur PO x 1 in ED  · Repeat BMP with Mag in am

## 2020-03-06 NOTE — PHYSICAL THERAPY NOTE
Physical Therapy Evaluation     Patient's Name: Kaiser Hammond    Admitting Diagnosis  Numbness [R20 0]  Confusion [R41 0]  Vomiting [R11 10]  Paresthesias [R20 2]  Light headed [R42]    Problem List  Patient Active Problem List   Diagnosis    Depression    Gastroesophageal reflux disease without esophagitis    Mixed hyperlipidemia    Essential hypertension    Acquired hypothyroidism    Impaired fasting glucose    Lymphoma ([facility] [address] )    Cerebral aneurysm, nonruptured    Mild cognitive impairment    Flexor tenosynovitis of finger    Macrocytosis without anemia    Paresthesias    Non-intractable vomiting with nausea    Diarrhea of presumed infectious origin, resolved    Hypokalemia       Past Medical History  Past Medical History:   Diagnosis Date    Anxiety disorder     Aortic valve disorder     Cellulitis of finger of left hand 11/20/2018    Cellulitis of left hand 11/10/2018    Added automatically from request for surgery [id number]    Depression     History of gastroesophageal reflux (GERD)     History of kidney cancer 03/06/2014    Hyperlipidemia 03/06/2014    Hypertension 03/06/2014    Hypothyroidism 03/06/2014    Shortness of breath     TIA (transient ischemic attack)        Past Surgical History  Past Surgical History:   Procedure Laterality Date    ESOPHAGOGASTRIC FUNDOPLASTY      Nissen Fundoplication    HERNIA REPAIR            03/06/20 0808   Note Type   Note type Eval only   Pain Assessment   Pain Assessment No/denies pain   Pain Score No Pain   Home Living   Type of [address] One level;Ramped entrance   Bathroom Shower/Tub Tub/shower unit   Bathroom Toilet Raised   Bathroom Equipment Grab bars in shower; Shower chair   Bathroom Accessibility Accessible   Home Equipment Cane;Walker   Additional Comments ambulatory with [facility]   Prior Function   Level of [place] Independent with ADLs and functional mobility   Lives With Daughter   Receives Help From Family   ADL Assistance Independent   IADLs Independent   Falls in the last 6 months 0   Vocational Retired   Comments pt drives, dtr does most of the driving   Restrictions/Precautions   Weight Bearing Precautions Per Order No   Braces or Orthoses Other (Comment)  (none at baseline)   Other Precautions Telemetry; Fall Risk;Multiple lines   General   Family/Caregiver Present No   Cognition   Overall Cognitive Status Impaired   Arousal/Participation Cooperative   Attention Within functional limits   Orientation Level Oriented to person;Oriented to place;Oriented to time  (Forgetful; inconsistent to situation)   Memory Decreased recall of recent events   Following Commands Follows all commands and directions without difficulty   Comments pt agreeable to therapy eval; pt ID verified via first/last name and    RUE Assessment   RUE Assessment WFL  (grossly assessed w/ functional mobility)   LUE Assessment   LUE Assessment WFL  (grossly assessed w/ functional mobility)   RLE Assessment   RLE Assessment WFL   Strength RLE   RLE Overall Strength 3+/5  (grossly assessed w/ functional mobility)   LLE Assessment   LLE Assessment WFL   Strength LLE   LLE Overall Strength 3+/5  (grossly assessed w/ functional mobility)   Coordination   Movements are Fluid and Coordinated 1   Sensation WFL   Light Touch   RLE Light Touch Grossly intact   LLE Light Touch Grossly intact   Bed Mobility   Supine to Sit 5  Supervision   Additional items HOB elevated; Increased time required;Verbal cues   Transfers   Sit to Stand 5  Supervision   Additional items Verbal cues; Assist x 1; Increased time required  (SBA)   Stand to Sit 5  Supervision   Additional items Armrests; Verbal cues; Assist x 1   Ambulation/Elevation   Gait pattern Improper Weight shift;Decreased foot clearance; Inconsistent cleve;Narrow DURGA; Short stride   Gait Assistance 5  Supervision  (SBA)   Additional items Assist x 1;Verbal cues   Assistive Device [facility]   Distance 110'x2   Stair Management Assistance Not tested   Balance   Static Sitting Good   Dynamic Sitting Good   Static Standing Fair +   Dynamic Standing Fair +   Ambulatory Fair   Endurance Deficit   Endurance Deficit Yes   Activity Tolerance   Activity Tolerance Patient tolerated treatment well   Medical Staff Made Aware PT KARINA Higgins Aas   Nurse Made Aware TATIANA Belcher confirmed pt appropriate for therapy eval; post session pt OOB in recliner chair w/ chair alarm on and all needs w/in reach   Assessment   Prognosis Good   Problem List Decreased strength;Decreased endurance; Impaired balance;Decreased mobility; Decreased cognition   Assessment Pt is 75 y o  female seen for PT evaluation s/p admit to [facility] on 3/5/2020 w/ Paresthesias  PT consulted to assess pt's functional mobility and d/c needs  Order placed for PT eval and tx, w/ activity as tolerated order  Comorbidities affecting pt's physical performance at time of assessment include: depression, HTN, mild cognitive impairment, hypothyroidism, non-intractable vomiting w/ nausea, and hypokalemia  PTA, pt was independent w/ all functional mobility w/ SPC, ambulates community distances and elevations, lives w/ dtr in one level house and retired  Personal factors affecting pt at time of IE include: ambulating w/ assistive device, inability to navigate community distances, inability to perform dynamic tasks in the community and decreased cognition  Please find objective findings from PT assessment regarding body systems outlined above with impairments and limitations including weakness, impaired balance, decreased endurance, gait deviations, decreased activity tolerance, decreased functional mobility tolerance, fall risk and decreased cognition  The following objective measures performed on IE also reveal limitations: Barthel Index: 65/100 and Modified Kristine: 3 (moderate disability)   Pt's clinical presentation is currently unstable/unpredictable seen in pt's presentation of abnormal lab values, impaired cognition, and ongoing medical management/monitoring  Pt to benefit from continued PT tx to address deficits as defined above and maximize level of functional independent mobility and consistency  From PT/mobility standpoint, recommendation at time of d/c would be Home PT and home with family support pending progress in order to facilitate return to PLOF  Barriers to Discharge Decreased caregiver support   Goals   Patient Goals "to take my great-granddaughter to the [employer]   STG Expiration Date 03/16/20   Short Term Goal #1 STG to be met in 7-10 days: 1  Perform bed mobility w/ mod (I) to decrease caregiver burden, 2  Perform functional transfers w/ mod (I) to increase functional activity, 3  Increase bilateral LE strength by 1/2 level to increase pt's tolerance to functional activity, 4  Increase all balance grades by one level to decrease pt risk of falls, 5  Ambulate > 350' w/ least restrictive device w/ mod (I) to allow pt to increase ambulation tolerance  PT Treatment Day 0   Plan   Treatment/Interventions Functional transfer training;LE strengthening/ROM; Therapeutic exercise; Endurance training;Patient/family training;Bed mobility;Gait training;Spoke to nursing;OT;Equipment eval/education   PT Frequency 2-3x/wk   Recommendation   Recommendation Home PT; Home with family support   PT - OK to Discharge Yes  (when medically cleared)   Modified Caldwell Scale   Modified Kristine Scale 3   Barthel Index   Feeding 10   Bathing 0   Grooming Score 5   Dressing Score 5   Bladder Score 10   Bowels Score 10   Toilet Use Score 5   Transfers (Bed/Chair) Score 10   Mobility (Level Surface) Score 10   Stairs Score 0   Barthel Index Score 65         Kacey Durant, SPT

## 2020-03-06 NOTE — H&P
H&P- [place] 1945, 75 y o  female MRN: [id number]    Unit/Bed#: ED 15 Encounter: [id number]    Primary Care Provider: Madelin Mayo MD   Date and time admitted to hospital: 3/5/2020  7:13 PM        * Paresthesias  Assessment & Plan  · ED provider reported paresthesias to  Face and BLE, however pt states paresthesias were generalized and resolved once in ED  Also with intermittent confusion/memory issues during the day  Reports (+) hx of TIA  Denies headache or vision changes  Denies weakness  · CT head w/o (-)  · UA (-)  · Potassium 3 3 on admission  Repleted in ED  · Unfortunately unable to obtain CTA head/neck as contrast study of chest/abdomen was already completed while in ED  · Carotid doppler pending  · MRI brain pending  · Obtain orthostatic VS  · Neuro checks q4hrs  · Consult neurology  · Check TSH, FLP and free T4 in am    Non-intractable vomiting with nausea  Assessment & Plan  · States was having N/V and diarrhea last night with generalized abdominal pains  This has now resolved  · CT abd/pelvis obtained in ED revealed "Moderate hiatal hernia  Mild diffuse thickening of the colon may be due to underdistention versus colitis    No bowel obstruction "  · Zofran PRN  · Isolyte @ 75mL/hr x 12 hours     Hypokalemia  Assessment & Plan  · Potassium 3 3 on admission  · Likely 2/2 recent N/V/D as above  · Received 40meq K dur PO x 1 in ED  · Repeat BMP with Mag in am    Diarrhea of presumed infectious origin, resolved  Assessment & Plan  · See AP above    Essential hypertension  Assessment & Plan  · BP adequately controlled on current regimen  · Continue home dose Norvasc  · Monitor BP per unit protocol    Acquired hypothyroidism  Assessment & Plan  · Most recent TSH 7 140 on 9/18/19  · Continue home dose Levothyroxine  · TSH w/ free T4 in am    Gastroesophageal reflux disease without esophagitis  Assessment & Plan  · Continue home dose Prilosec (equivalent)  · PRN TUMS    Mild cognitive impairment  Assessment & Plan  · Continue home dose Aricept and Exelon patch    Depression  Assessment & Plan  · Continue home dose Paxil      VTE Prophylaxis: Enoxaparin (Lovenox)  / sequential compression device   Code Status: Level 1 Full Code  POLST: POLST form is not discussed and not completed at this time  Discussion with family: NA    Anticipated Length of Stay:  Patient will be admitted on an Observation basis with an anticipated length of stay of  Less than 2 midnights  Justification for Hospital Stay: See AP above    Total Time for Visit, including Counseling / Coordination of Care: 45 minutes  Greater than 50% of this total time spent on direct patient counseling and coordination of care  Chief Complaint:   Generalized numbness/tingling, increased memory issues/confusion today  N/V/D yesterday    History of Present Illness:    Matteo Hightower is a 75 y o  female who presents with generalized paresthesias throughout the day that resolved after arriving in ED  Denies HA, dizziness, vision changes or weakness  Denies increased ambulatory dysfunction  States walks with a cane at baseline  Reports was having N/V/D and generalized abdominal pain last night that has now resolved  Reports alternating flushed/chilled today  Reports (+) hx of TIA and memory issues  Review of Systems:    Review of Systems   Constitutional: Positive for chills  Negative for fever  HENT: Negative for congestion, ear pain, sinus pressure and sore throat  Eyes: Negative for visual disturbance  Respiratory: Negative for cough, shortness of breath and wheezing  Cardiovascular: Negative for chest pain, palpitations and leg swelling  Gastrointestinal: Positive for abdominal pain, diarrhea, nausea and vomiting  Negative for constipation  Genitourinary: Negative for difficulty urinating, dysuria, frequency and urgency  Musculoskeletal: Negative for neck pain and neck stiffness     Neurological: Positive for numbness  Negative for dizziness, syncope, weakness, light-headedness and headaches  Psychiatric/Behavioral: Positive for confusion  Past Medical and Surgical History:     Past Medical History:   Diagnosis Date    Anxiety disorder     Aortic valve disorder     Cellulitis of finger of left hand 11/20/2018    Cellulitis of left hand 11/10/2018    Added automatically from request for surgery [id number]    Depression     History of gastroesophageal reflux (GERD)     History of kidney cancer 03/06/2014    Hyperlipidemia 03/06/2014    Hypertension 03/06/2014    Hypothyroidism 03/06/2014    Shortness of breath     TIA (transient ischemic attack)        Past Surgical History:   Procedure Laterality Date    ESOPHAGOGASTRIC FUNDOPLASTY      Nissen Fundoplication    HERNIA REPAIR         Meds/Allergies:    Prior to Admission medications    Medication Sig Start Date End Date Taking?  Authorizing Provider   amLODIPine (NORVASC) 2 5 mg tablet TAKE 2 TABLETS BY MOUTH DAILY 2/17/20   Yesenia Forrester PA-C   aspirin 325 mg tablet Take 325 mg by mouth 3/19/18   Historical Provider, MD   diphenhydrAMINE HCl (BENADRYL ALLERGY PO) Take by mouth    Historical Provider, MD   donepezil (ARICEPT) 10 mg tablet TAKE 1 TABLET (10 MG TOTAL) BY MOUTH DAILY AT BEDTIME 10/25/19   Nidhi Serrano MD   fluticasone [facility]) 50 mcg/act nasal spray SPRAY 1 SPRAY INTO EACH NOSTRIL EVERY DAY 9/3/19   Nidhi Serrano MD   levothyroxine 125 mcg tablet TAKE 1 TABLET BY MOUTH DAILY 2/2/20   Yesenia Forrester PA-C   multivitamin [facility]) TABS Take 1 tablet by mouth    Historical Provider, MD   MYRBETRIQ 50 MG TB24 TAKE 1 TABLET BY MOUTH EVERY DAY 1/28/20   Nidhi Serrano MD   omeprazole (PriLOSEC) 20 mg delayed release capsule TAKE 1 CAPSULE BY MOUTH EVERY DAY 12/12/19   Nidhi Serrano MD   PARoxetine (PAXIL) 20 mg tablet TAKE 1 TABLET BY MOUTH EVERY DAY 10/25/19   Nidhi Serrano MD   PROAIR  (05 Base) MCG/ACT inhaler  8/7/19   Historical Provider, MD   rivastigmine (EXELON) 4 6 mg/24 hr TD 24 hr patch Place 1 patch on the skin daily 9/6/19   Quincy Gomez MD     I have reviewed home medications with patient personally  Allergies: Allergies   Allergen Reactions    Ciprofloxacin     Other     Sulfa Antibiotics     Penicillins Rash     Category: Allergy;   --  Pt received unasyn 1 5 mg IV 11-12-18 to 11-15-18    Sulfacetamide Rash     Category: Allergy;        Social History:     Marital Status: /Civil Union   Patient Pre-hospital Living Situation: Lives w/ daughter  Patient Pre-hospital Level of Mobility: Ambulatory w/ cane  Patient Pre-hospital Diet Restrictions: None  Substance Use History:   Social History     Substance and Sexual Activity   Alcohol Use Yes    Frequency: Monthly or less    Drinks per session: 1 or 2    Comment: social-rare     Social History     Tobacco Use   Smoking Status Never Smoker   Smokeless Tobacco Never Used     Social History     Substance and Sexual Activity   Drug Use No       Family History:    Family History   Problem Relation Age of Onset    Stroke Father     Leukemia Sister     Alcohol abuse Brother     Kidney cancer Brother        Physical Exam:     Vitals:   Blood Pressure: 143/86 (03/05/20 2315)  Pulse: 80 (03/05/20 2315)  Temperature: (!) 97 3 °F (36 3 °C) (03/05/20 1848)  Temp Source: Oral (03/05/20 1848)  Respirations: 19 (03/05/20 2315)  Height: 5' 2" (157 5 cm) (03/05/20 1848)  Weight - Scale: 77 1 kg (170 lb) (03/05/20 1848)  SpO2: 95 % (03/05/20 2315)    Physical Exam   Constitutional: She is oriented to person, place, and time  She appears well-developed and well-nourished  No distress  HENT:   Head: Normocephalic and atraumatic  Eyes: Pupils are equal, round, and reactive to light  EOM are normal    Neck: Normal range of motion  Neck supple  Cardiovascular: Normal rate, regular rhythm, normal heart sounds and intact distal pulses  Exam reveals no gallop and no friction rub     No murmur heard   Pulmonary/Chest: Effort normal and breath sounds normal  No respiratory distress  She has no wheezes  She has no rales  Abdominal: Soft  Bowel sounds are normal  There is no tenderness  There is no rebound and no guarding  Musculoskeletal: Normal range of motion  She exhibits no edema or tenderness  Neurological: She is alert and oriented to person, place, and time  Skin: Skin is warm and dry  Psychiatric: She has a normal mood and affect  Her behavior is normal  Thought content normal        Additional Data:     Lab Results: I have personally reviewed pertinent reports  Results from last 7 days   Lab Units 03/05/20  1935   WBC Thousand/uL 7 51   HEMOGLOBIN g/dL 13 8   HEMATOCRIT % 41 6   PLATELETS Thousands/uL 256   BANDS PCT % 1   LYMPHO PCT % 11*   MONO PCT % 3*   EOS PCT % 1     Results from last 7 days   Lab Units 03/05/20 1935   SODIUM mmol/L 141   POTASSIUM mmol/L 3 3*   CHLORIDE mmol/L 104   CO2 mmol/L 26   BUN mg/dL 17   CREATININE mg/dL 0 88   ANION GAP mmol/L 11   CALCIUM mg/dL 9 0   ALBUMIN g/dL 3 6   TOTAL BILIRUBIN mg/dL 0 40   ALK PHOS U/L 78   ALT U/L 20   AST U/L 18   GLUCOSE RANDOM mg/dL 115                       Imaging: I have personally reviewed pertinent reports  CT head without contrast   Final Result by Sendy Kumar MD (03/05 2243)      No acute intracranial hemorrhage, midline shift, or mass effect  Workstation performed: [id number]         CT abdomen pelvis with contrast   Final Result by Sendy Kumar MD (03/05 2243)      1  No bowel obstruction  Mild diffuse thickening of the colon may be due to underdistention versus colitis  2   Mild bladder wall thickening, correlate with urinalysis for cystitis   3  Moderate hiatal hernia  Additional 4 3 cm fat-containing left diaphragmatic hernia              Workstation performed: [id number]         MRI inpatient order    (Results Pending)       EKG, Pathology, and Other Studies Reviewed on Admission:   · EKG: NA    Allscripts / Epic Records Reviewed: Yes     ** Please Note: This note has been constructed using a voice recognition system   **

## 2020-03-06 NOTE — PLAN OF CARE
Problem: OCCUPATIONAL THERAPY ADULT  Goal: Performs self-care activities at highest level of function for planned discharge setting  See evaluation for individualized goals  Description  Treatment Interventions: ADL retraining, Compensatory technique education, Continued evaluation, Energy conservation, Activityengagement          See flowsheet documentation for full assessment, interventions and recommendations  Note:   Limitation: Decreased ADL status, Decreased high-level ADLs, Mood limitation  Prognosis: Good  Assessment: Patient is a 75 y o  female seen for OT evaluation s/p admit to [address] [place] [address] on 3/5/2020 w/Paresthesias  Commorbidities affecting patient's functional performance at time of assessment include: essential hypertension, mild cognitive impairment, depression, presented to ED with   Generalized numbness/tingling, increased memory issues/confusion   Orders placed for OT evaluation and treatment  Performed at least two patient identifiers during session including name and wristband  Prior to admission, Patient reported independent with ADLs/ IADLs, ambulates with SPC  Patient lives  with her daughter in a one story house with ramped entrance  Patient ambulates with a SPC and manages her medication independently  Patient is a retired , enjoys crossword puzzles and stained glass art  Personal factors affecting patient at time of initial evaluation include: limited caregiver support, limited insight into deficits and difficulty performing IADLs  Upon evaluation, patient requires modified independent assist for UB ADLs, supervision and minimal  assist for LB ADLs, transfers and functional ambulation in room and bathroom with supervision and contact guard assist, with the use of SPC  Therapist complted the short blessed test, patient obtained a score of  5 (5-9) indicative of questionable impairment (evaluate for early dementing disorder)   Patient presents with sensory processing deficits, with difficulty concentrating on multi step tasks  A formal evaluation for cognitive deficits is recommended  by OP Neurology  Occupational performance is affected by the following deficits: decreased activity tolerance and impaired memory  Therapist completed expanded review of medical records and additional review of physical, cognitive or psychosocial history, clinical examination identifying 3-5 performance deficits, clinical decision making of a moderate complexity, consistent with moderate complexity level evaluation  Patient to benefit from continued Occupational Therapy treatment while in the hospital to address deficits as defined above and maximize level of functional independence with ADLs and functional mobility  Occupational Performance areas to address include: bathing/ shower, health maintenance, medication routine/ management, Leisure Participation and Social participation  From OT standpoint, recommendation at time of d/c would be Home with family support, [address] and Home OT, further cognitive evaluation/ OP neurology  OT Discharge Recommendation: Home OT  OT - OK to Discharge:  Yes

## 2020-03-06 NOTE — PLAN OF CARE
Problem: PHYSICAL THERAPY ADULT  Goal: Performs mobility at highest level of function for planned discharge setting  See evaluation for individualized goals  Description  Treatment/Interventions: Functional transfer training, LE strengthening/ROM, Therapeutic exercise, Endurance training, Patient/family training, Bed mobility, Gait training, Spoke to nursing, OT, Equipment eval/education          See flowsheet documentation for full assessment, interventions and recommendations  Note:   Prognosis: Good  Problem List: Decreased strength, Decreased endurance, Impaired balance, Decreased mobility, Decreased cognition  Assessment: Pt is 75 y o  female seen for PT evaluation s/p admit to [facility] on 3/5/2020 w/ Paresthesias  PT consulted to assess pt's functional mobility and d/c needs  Order placed for PT eval and tx, w/ activity as tolerated order  Comorbidities affecting pt's physical performance at time of assessment include: depression, HTN, mild cognitive impairment, hypothyroidism, non-intractable vomiting w/ nausea, and hypokalemia  PTA, pt was independent w/ all functional mobility w/ SPC, ambulates community distances and elevations, lives w/ dtr in one level house and retired  Personal factors affecting pt at time of IE include: ambulating w/ assistive device, inability to navigate community distances, inability to perform dynamic tasks in the community and decreased cognition  Please find objective findings from PT assessment regarding body systems outlined above with impairments and limitations including weakness, impaired balance, decreased endurance, gait deviations, decreased activity tolerance, decreased functional mobility tolerance, fall risk and decreased cognition  The following objective measures performed on IE also reveal limitations: Barthel Index: 65/100 and Modified Kristine: 3 (moderate disability)   Pt's clinical presentation is currently unstable/unpredictable seen in pt's presentation of abnormal lab values, impaired cognition, and ongoing medical management/monitoring  Pt to benefit from continued PT tx to address deficits as defined above and maximize level of functional independent mobility and consistency  From PT/mobility standpoint, recommendation at time of d/c would be Home PT and home with family support pending progress in order to facilitate return to PLOF  Barriers to Discharge: Decreased caregiver support     Recommendation: Home PT, Home with family support     PT - OK to Discharge: Yes(when medically cleared)    See flowsheet documentation for full assessment

## 2020-03-06 NOTE — PROGRESS NOTES
Spoke to admitted physician regarding patient, only Q4H neuro checks at this time, stroke pathway not necessary  Patient resting comfortably in bed  Will continue to monitor       Elvi Bangura RN  03/06/20  3:23 AM

## 2020-03-06 NOTE — UTILIZATION REVIEW
Initial Clinical Review    Admission: Date/Time/Statement: Admission Orders (From admission, onward)     Ordered        03/05/20 2327  Place in Observation  Once                   Orders Placed This Encounter   Procedures    Place in Observation     Standing Status:   Standing     Number of Occurrences:   1     Order Specific Question:   Admitting Physician     Answer:   Abraham Mehta [[id number]]     Order Specific Question:   Level of Care     Answer:   Med Surg [[id number]]     ED Arrival Information     Expected Arrival Acuity Means of Arrival Escorted By Service Admission Type    - 3/5/2020 18:44 Emergent Walk-In Family Member General Medicine Emergency    Arrival Complaint    numbness, lightheaded, vomiting        Chief Complaint   Patient presents with    Numbness     Pt presents with numbness/tingling in her face along with some memory problems and vomiting  Symptoms started yesterday, tingling started today, shes had it all day  Has had these symptoms before      Assessment/Plan: 75year old female to the ED from home with complaints of numbness and tingling of face and bilateral lower extremities, confusion  ADmitted under observation for paresthesia, hypokalemia  She reports having N/V/D the night prior to arrival which has resolved  Generalized paresthesia throughout the day resolved upon arrival to the ED  H/O TIA with memory issues  CT head - for acute changes  UA normal   Potassium 3 3, repleted and recheck  Check Carotid doppler,  MRI, neuro consult  Neuro checks every 4 hours  GCS=15   CT abd/pelvis obtained in ED revealed "Moderate hiatal hernia   Mild diffuse thickening of the colon may be due to underdistention versus colitis   IV fluids initiated in ED  Abdomen is soft, nontender, non distended  3/6 Neurology Consult:  Imaging so far is negative for stroke  Paresthesias/dysesthesia may have been induced by nausea, vomiting, diarrhea and subsequent hypokalemia     ED Triage Vitals [03/05/20 1848]   Temperature Pulse Respirations Blood Pressure SpO2   (!) 97 3 °F (36 3 °C) 95 16 151/80 93 %      Temp Source Heart Rate Source Patient Position - Orthostatic VS BP Location FiO2 (%)   Oral Monitor Sitting Left arm --      Pain Score       No Pain        Wt Readings from Last 1 Encounters:   03/05/20 77 1 kg (170 lb)     Additional Vital Signs:  Date/Time  Temp  Pulse  Resp  BP  MAP (mmHg)  SpO2  O2 Device  Patient Position - Orthostatic VS   03/06/20 08:31:37    82    178/95Abnormal   123  96 %       03/06/20 02:53:14  97 2 °F (36 2 °C)Abnormal       146/80  102         03/06/20 0200    72  20  151/68  98  94 %  None (Room air)     03/06/20 0100    83  22  163/78  112  94 %  None (Room air)     03/06/20 0000    74  22  151/81  110  95 %  None (Room air)     03/05/20 2315    80  19  143/86  108  95 %  None (Room air)     03/05/20 2215    74  22  131/80  100  96 %  None (Room air)     03/05/20 2100    85  22  128/62  88  95 %  None (Room air)     03/05/20 2030    74  22  145/70  101  97 %  None (Room air)     03/05/20 1939              None (Room air)     03/05/20 1930    75  20  143/66             Pertinent Labs/Diagnostic Test Results:   3/6 MRI brain:  No acute intracranial abnormality   Essentially normal MRI of the brain  3/5 CT head: No acute intracranial hemorrhage, midline shift, or mass effect  3/5 CT A/P:No bowel obstruction   Mild diffuse thickening of the colon may be due to underdistention versus colitis     Mild bladder wall thickening, correlate with urinalysis for cystitis  Moderate hiatal hernia   Additional 4 3 cm fat-containing left diaphragmatic hernia  3/5 EKG:   Interpretation:     Interpretation: normal    Quality:     Tracing quality:  Limited by artifact  Rate:     ECG rate:  74    ECG rate assessment: normal    Rhythm:     Rhythm: sinus rhythm    Ectopy:     Ectopy: none    QRS:     QRS axis:  Normal    QRS intervals:  Normal  Conduction: Conduction: normal    ST segments:     ST segments:  Normal  T waves:     T waves: non-specific       Results from last 7 days   Lab Units 03/06/20 0433 03/05/20 1935   WBC Thousand/uL 5 84 7 51   HEMOGLOBIN g/dL 12 4 13 8   HEMATOCRIT % 37 1 41 6   PLATELETS Thousands/uL 226 256   NEUTROS ABS Thousands/µL 3 57  --    BANDS PCT %  --  1         Results from last 7 days   Lab Units 03/06/20 0433 03/05/20 1935   SODIUM mmol/L 140 141   POTASSIUM mmol/L 3 3* 3 3*   CHLORIDE mmol/L 105 104   CO2 mmol/L 27 26   ANION GAP mmol/L 8 11   BUN mg/dL 21 17   CREATININE mg/dL 0 86 0 88   EGFR ml/min/1 73sq m 66 64   CALCIUM mg/dL 8 9 9 0   MAGNESIUM mg/dL 2 0  --      Results from last 7 days   Lab Units 03/05/20 1935   AST U/L 18   ALT U/L 20   ALK PHOS U/L 78   TOTAL PROTEIN g/dL 7 6   ALBUMIN g/dL 3 6   TOTAL BILIRUBIN mg/dL 0 40         Results from last 7 days   Lab Units 03/06/20 0433 03/05/20 1935   GLUCOSE RANDOM mg/dL 102 115       Results from last 7 days   Lab Units 03/05/20 1935   TROPONIN I ng/mL <0 02     Results from last 7 days   Lab Units 03/06/20 0433   TSH 3RD GENERATON uIU/mL 4 448*     Results from last 7 days   Lab Units 03/05/20  2303   CLARITY UA  Slightly Cloudy   COLOR UA  Yellow   SPEC GRAV UA  <=1 005   PH UA  7 5   GLUCOSE UA mg/dl Negative   KETONES UA mg/dl 40 (2+)*   BLOOD UA  Trace-Intact*   PROTEIN UA mg/dl Negative   NITRITE UA  Negative   BILIRUBIN UA  Negative   UROBILINOGEN UA E U /dl 0 2   LEUKOCYTES UA  Negative   WBC UA /hpf 1-2*   RBC UA /hpf 2-4*   BACTERIA UA /hpf Occasional   EPITHELIAL CELLS WET PREP /hpf Moderate*     Results from last 7 days   Lab Units 03/05/20 1935   TOTAL COUNTED  100         ED Treatment:   Medication Administration from 03/05/2020 1844 to 03/06/2020 0250       Date/Time Order Dose Route Action     03/05/2020 2025 potassium chloride (K-DUR,KLOR-CON) CR tablet 40 mEq 40 mEq Oral Given     03/06/2020 0156 diphenhydrAMINE (BENADRYL) tablet 12 5 mg 12 5 mg Oral Given     03/06/2020 0153 donepezil (ARICEPT) tablet 10 mg 10 mg Oral Given     03/06/2020 0153 multi-electrolyte (PLASMALYTE-A/ISOLYTE-S PH 7 4) IV solution 75 mL/hr Intravenous New Bag        Past Medical History:   Diagnosis Date    Anxiety disorder     Aortic valve disorder     Cellulitis of finger of left hand 11/20/2018    Cellulitis of left hand 11/10/2018    Added automatically from request for surgery [id number]    Depression     History of gastroesophageal reflux (GERD)     History of kidney cancer 03/06/2014    Hyperlipidemia 03/06/2014    Hypertension 03/06/2014    Hypothyroidism 03/06/2014    Shortness of breath     TIA (transient ischemic attack)      Admitting Diagnosis: Numbness [R20 0]  Confusion [R41 0]  Vomiting [R11 10]  Paresthesias [R20 2]  Light headed [R42]  Age/Sex: 75 y o  female  Admission Orders:  Tele  Neuro checks every 4 hours  Orthostatic bp  Scheduled Medications:    Medications:  amLODIPine 5 mg Oral Daily   aspirin 325 mg Oral Daily   diphenhydrAMINE 12 5 mg Oral HS   donepezil 10 mg Oral HS   enoxaparin 40 mg Subcutaneous Daily   levothyroxine 125 mcg Oral Daily   oxybutynin 10 mg Oral Daily   pantoprazole 20 mg Oral Early Morning   PARoxetine 20 mg Oral Daily   rivastigmine 1 patch Transdermal Daily     Continuous IV Infusions:    multi-electrolyte 75 mL/hr Intravenous Continuous     PRN Meds:    acetaminophen 650 mg Oral Q6H PRN   bisacodyl 10 mg Rectal Daily PRN   calcium carbonate 1,000 mg Oral Daily PRN   ondansetron 4 mg Intravenous Q6H PRN       IP CONSULT TO NEUROLOGY    Network Utilization Review Department  [email or web address]  ATTENTION: Please call with any questions or concerns to [phone] and carefully listen to the prompts so that you are directed to the right person   All voicemails are confidential   Sergey Grant all requests for admission clinical reviews, approved or denied determinations and any other requests to dedicated fax number below belonging to the campus where the patient is receiving treatment   List of dedicated fax numbers for the Facilities:  [address] DENIALS (Administrative/Medical Necessity) [phone]   [address]  (Maternity/NICU/Pediatrics) [phone]   Santos Baltazar [phone]   Rashawn Monzon [phone]   [facility] [phone]   Cory Wilslon [phone]   [address] [place] [address] [phone]   [facility]  [phone]   [address] [facility]  [address] [facility] [address] [facility] [phone]

## 2020-03-06 NOTE — OCCUPATIONAL THERAPY NOTE
Occupational Therapy Evaluation        Patient Name: Kita Bhakta  RASBJ'D Date: 3/6/2020     03/06/20 7618   Note Type   Note type Eval only   Restrictions/Precautions   Weight Bearing Precautions Per Order No   Braces or Orthoses Other (Comment)  (none at baseline)   Other Precautions Multiple lines;Telemetry; Fall Risk   Pain Assessment   Pain Assessment No/denies pain   Pain Score No Pain   Home Living   Type of [facility] entrance; One level   Bathroom Shower/Tub Tub/shower unit   Bathroom Toilet Raised   Bathroom Equipment Grab bars in shower; Shower chair   Bathroom Accessibility Accessible   Home Equipment Cane;Walker   Additional Comments ambulatory with [facility]   Prior Function   Level of [place] Independent with ADLs and functional mobility   Lives With Daughter   Receives Help From Family   ADL Assistance Independent   IADLs Independent   Falls in the last 6 months 0   Vocational Retired   Comments patient drives/ daughter does most of the driving   Lifestyle   Autonomy Patient reported independent with ADLs/ IADLs, ambulates with SPC  Patient lives  with her daughter in a one story house with ramped entrance  Patient ambulates with a SPC and manages her medication independently  Patient is a retired , enjoys crossword puzzles and stained glass art      Reciprocal Relationships Supportive family   Service to Others Retired    [facility] [address] ([address]) [address] [place]  Independent   Grooming Assistance 7  [address]  Supervision/Setup   [address] 5  [address]  Modified independent   [address] 5  Supervision/Setup   Bed Mobility   Supine to [address]   Additional items [facility] elevated; Increased time required;Verbal cues   Transfers   Sit to Stand 5  Supervision   Additional items Other;Verbal cues  (contact guard)   Stand to Sit 5  Supervision   Additional items Armrests; Verbal cues   Functional Mobility   Functional Mobility 5  Supervision   Additional items SPC   Balance   Static Sitting Good   Dynamic Sitting Good   Static Standing Fair +   Dynamic Standing Fair +   Activity Tolerance   Activity Tolerance Patient tolerated treatment well   Nurse Made Aware RN verbalized patient appropriate for therapy   RUE Assessment   RUE Assessment WFL   LUE Assessment   LUE Assessment WFL   Hand Function   Gross Motor Coordination Functional   Fine Motor Coordination Functional   Sensation   Light Touch No apparent deficits  (BUEs)   Vision-Basic Assessment   Current Vision Wears glasses all the time   Patient Visual Report   (No significant changes reported)   Cognition   Overall Cognitive Status WFL   Arousal/Participation Alert; Responsive   Attention Within functional limits   Orientation Level Oriented X4   Memory Within functional limits   Following Commands Follows all commands and directions without difficulty   Comments Therapist complted the short blessed test, patient obtained a score of  5 (5-9) indicative of questionable impairment (evaluate for early dementing disorder)  Patient presents with sensory processing deficits, with difficulty concentrating on multi step tasks  A formal evaluation for cognitive deficits is recommended  by OP Neurology  Cognition Assessment Tools Other (Comment)  (Short Blessed test)   Score 5   Assessment   Limitation Decreased ADL status; Decreased high-level ADLs;Mood limitation   Prognosis Good   Assessment Patient is a 75 y o  female seen for OT evaluation s/p admit to [address] on 3/5/2020 w/Paresthesias   Commorbidities affecting patient's functional performance at time of assessment include: essential hypertension, mild cognitive impairment, depression, presented to ED with   Generalized numbness/tingling, increased memory issues/confusion   Orders placed for OT evaluation and treatment  Performed at least two patient identifiers during session including name and wristband  Prior to admission, Patient reported independent with ADLs/ IADLs, ambulates with SPC  Patient lives  with her daughter in a one story house with ramped entrance  Patient ambulates with a SPC and manages her medication independently  Patient is a retired , enjoys crossword puzzles and stained glass art  Personal factors affecting patient at time of initial evaluation include: limited caregiver support, limited insight into deficits and difficulty performing IADLs  Upon evaluation, patient requires modified independent assist for UB ADLs, supervision and minimal  assist for LB ADLs, transfers and functional ambulation in room and bathroom with supervision and contact guard assist, with the use of SPC  Therapist complted the short blessed test, patient obtained a score of  5 (5-9) indicative of questionable impairment (evaluate for early dementing disorder)  Patient presents with sensory processing deficits, with difficulty concentrating on multi step tasks  A formal evaluation for cognitive deficits is recommended  by OP Neurology  Occupational performance is affected by the following deficits: decreased activity tolerance and impaired memory  Therapist completed expanded review of medical records and additional review of physical, cognitive or psychosocial history, clinical examination identifying 3-5 performance deficits, clinical decision making of a moderate complexity, consistent with moderate complexity level evaluation  Patient to benefit from continued Occupational Therapy treatment while in the hospital to address deficits as defined above and maximize level of functional independence with ADLs and functional mobility   Occupational Performance areas to address include: bathing/ shower, health maintenance, medication routine/ management, Leisure Participation and Social participation  From OT standpoint, recommendation at time of d/c would be Home with family support, [address] [facility] and Home OT, further cognitive evaluation/ OP neurology  Plan   Treatment Interventions ADL retraining; Compensatory technique education;Continued evaluation; Energy conservation; Activityengagement   Goal Expiration Date 03/09/20   OT Frequency 2-3x/wk   Recommendation   OT Discharge Recommendation Home OT   OT - OK to Discharge Yes   Barthel Index   Feeding 10   Bathing 0   Grooming Score 5   Dressing Score 5   Bladder Score 10   Bowels Score 10   Toilet Use Score 10   Transfers (Bed/Chair) Score 10   Mobility (Level Surface) Score 10   Stairs Score 0   Barthel Index Score 70   Modified Coleman Scale   Modified Coleman Scale 2       Patient  will engage in ongoing cognitive assessment  to assist with safe d/c planning/recommendations  Patient will identify 3 positive coping strategies to assist with emotional regulation and management  Patient will engage in depression screen/ leisure interest check list to identify s/s of depression and facilitate patients involvement in play/ leisure tasks  Patient will engage in ongoing visual perceptual assessments, screens and activities to r/o visual perceptual impairments affecting functional performance  Patient  will engage in activity configuration activity with good participation and mod I to increase time management skills and improve participation in a structured routine to improve overall quality of life

## 2020-03-06 NOTE — ED PROVIDER NOTES
History  Chief Complaint   Patient presents with    Numbness     Pt presents with numbness/tingling in her face along with some memory problems and vomiting  Symptoms started yesterday, tingling started today, shes had it all day  Has had these symptoms before      76 yo with facial numbness  Started today upon waking up  Bilateral  Occasionally involving upper extremities as well  No headache or visual disturbances  No headache  No neck pain or stiffness  States at one point in the past she had similar symptoms and was told she had a "mini stroke"  No difficulty ambulating  Family note at times she seems confused  Symptoms now resolved       History provided by:  Patient   used: No    CVA/TIA-like Symptoms   Presenting symptoms: no headaches and no weakness    Presenting symptoms comment:  Facial tingling    Date/time of last known well:  3/4/2020 10:00 PM  Onset quality:  Gradual  Duration:  16 hours  Timing:  Intermittent  Progression:  Waxing and waning  Similar to previous episodes: yes    Associated symptoms: no chest pain, no trouble swallowing, no dizziness, no fever, no nausea, no neck pain and no vomiting        Prior to Admission Medications   Prescriptions Last Dose Informant Patient Reported? Taking?    MYRBETRIQ 50 MG TB24   No No   Sig: TAKE 1 TABLET BY MOUTH EVERY DAY   PARoxetine (PAXIL) 20 mg tablet   No No   Sig: TAKE 1 TABLET BY MOUTH EVERY DAY   PROAIR  (90 Base) MCG/ACT inhaler   Yes No   amLODIPine (NORVASC) 2 5 mg tablet   No No   Sig: TAKE 2 TABLETS BY MOUTH DAILY   aspirin 325 mg tablet  Self Yes No   Sig: Take 325 mg by mouth   diphenhydrAMINE HCl (BENADRYL ALLERGY PO)   Yes No   Sig: Take by mouth   donepezil (ARICEPT) 10 mg tablet   No No   Sig: TAKE 1 TABLET (10 MG TOTAL) BY MOUTH DAILY AT BEDTIME   fluticasone (FLONASE) 50 mcg/act nasal spray   No No   Sig: SPRAY 1 SPRAY INTO EACH NOSTRIL EVERY DAY   levothyroxine 125 mcg tablet   No No   Sig: TAKE 1 TABLET BY MOUTH DAILY   multivitamin (THERAGRAN) TABS  Self Yes No   Sig: Take 1 tablet by mouth   omeprazole (PriLOSEC) 20 mg delayed release capsule   No No   Sig: TAKE 1 CAPSULE BY MOUTH EVERY DAY   rivastigmine (EXELON) 4 6 mg/24 hr TD 24 hr patch   No No   Sig: Place 1 patch on the skin daily      Facility-Administered Medications: None       Past Medical History:   Diagnosis Date    Anxiety disorder     Aortic valve disorder     Cellulitis of finger of left hand 11/20/2018    Cellulitis of left hand 11/10/2018    Added automatically from request for surgery [id number]    Depression     History of gastroesophageal reflux (GERD)     History of kidney cancer 03/06/2014    Hyperlipidemia 03/06/2014    Hypertension 03/06/2014    Hypothyroidism 03/06/2014    Shortness of breath     TIA (transient ischemic attack)        Past Surgical History:   Procedure Laterality Date    ESOPHAGOGASTRIC FUNDOPLASTY      Nissen Fundoplication    HERNIA REPAIR         Family History   Problem Relation Age of Onset    Stroke Father     Leukemia Sister     Alcohol abuse Brother     Kidney cancer Brother      I have reviewed and agree with the history as documented  E-Cigarette/Vaping    E-Cigarette Use Never User      E-Cigarette/Vaping Substances    Nicotine No     THC No     CBD No     Flavoring No     Other No     Unknown No      Social History     Tobacco Use    Smoking status: Never Smoker    Smokeless tobacco: Never Used   Substance Use Topics    Alcohol use: Yes     Frequency: Monthly or less     Drinks per session: 1 or 2     Comment: social-rare    Drug use: No       Review of Systems   Constitutional: Negative for activity change, appetite change, chills, diaphoresis, fatigue, fever and unexpected weight change  HENT: Negative for congestion, rhinorrhea, sinus pressure, sore throat and trouble swallowing  Eyes: Negative for photophobia and visual disturbance     Respiratory: Negative for apnea, cough, choking, chest tightness, shortness of breath, wheezing and stridor  Cardiovascular: Negative for chest pain, palpitations and leg swelling  Gastrointestinal: Negative for abdominal distention, abdominal pain, blood in stool, constipation, diarrhea, nausea and vomiting  Genitourinary: Negative for decreased urine volume, difficulty urinating, dysuria, enuresis, flank pain, frequency, hematuria and urgency  Musculoskeletal: Negative for arthralgias, myalgias, neck pain and neck stiffness  Skin: Negative for color change, pallor, rash and wound  Allergic/Immunologic: Negative  Neurological: Negative for dizziness, tremors, syncope, weakness, light-headedness, numbness and headaches  Hematological: Negative  Psychiatric/Behavioral: Negative  All other systems reviewed and are negative  Physical Exam  Physical Exam   Constitutional: She is oriented to person, place, and time  She appears well-developed and well-nourished  Non-toxic appearance  She does not have a sickly appearance  She does not appear ill  No distress  HENT:   Head: Normocephalic and atraumatic  Eyes: Pupils are equal, round, and reactive to light  EOM and lids are normal    Neck: Normal range of motion  Neck supple  Cardiovascular: Normal rate, regular rhythm, S1 normal, S2 normal, normal heart sounds, intact distal pulses and normal pulses  Exam reveals no gallop, no distant heart sounds, no friction rub and no decreased pulses  No murmur heard  Pulses:       Radial pulses are 2+ on the right side, and 2+ on the left side  Pulmonary/Chest: Effort normal and breath sounds normal  No accessory muscle usage  No apnea, no tachypnea and no bradypnea  No respiratory distress  She has no decreased breath sounds  She has no wheezes  She has no rhonchi  She has no rales  Abdominal: Soft  Normal appearance  She exhibits no distension  There is no tenderness  There is no rigidity, no rebound and no guarding  Musculoskeletal: Normal range of motion  She exhibits no edema, tenderness or deformity  Neurological: She is alert and oriented to person, place, and time  No cranial nerve deficit  GCS eye subscore is 4  GCS verbal subscore is 5  GCS motor subscore is 6  GCS 15  AAOx3  No focal neuro deficits  CN II-XII grossly intact  Speech normal, no aphasia or dysarthria  No pronator drift  Cerebellar function normal  Finger to nose normal  PERRL  EOMI  Peripheral vision intact  No nystagmus  Upper and lower extremity strength 5/5 through   strength 5/5 b/l  Gross sensation intact b/l  Skin: Skin is warm, dry and intact  No rash noted  She is not diaphoretic  No erythema  No pallor  Psychiatric: Her speech is normal    Nursing note and vitals reviewed        Vital Signs  ED Triage Vitals [03/05/20 1848]   Temperature Pulse Respirations Blood Pressure SpO2   (!) 97 3 °F (36 3 °C) 95 16 151/80 93 %      Temp Source Heart Rate Source Patient Position - Orthostatic VS BP Location FiO2 (%)   Oral Monitor Sitting Left arm --      Pain Score       No Pain           Vitals:    03/06/20 1131 03/06/20 1544 03/06/20 2349 03/07/20 0801   BP: 140/75 143/78 142/78 145/76   Pulse: 83 81  71   Patient Position - Orthostatic VS:             Visual Acuity  Visual Acuity      Most Recent Value   L Pupil Size (mm)  2   R Pupil Size (mm)  2   L Pupil Shape  Round   R Pupil Shape  Round          ED Medications  Medications   multi-electrolyte (PLASMALYTE-A/ISOLYTE-S PH 7 4) IV solution (0 mL/hr Intravenous Stopped 3/6/20 1519)   potassium chloride (K-DUR,KLOR-CON) CR tablet 40 mEq (40 mEq Oral Given 3/5/20 2025)   iohexol (OMNIPAQUE) 350 MG/ML injection (MULTI-DOSE) 100 mL (100 mL Intravenous Given 3/5/20 2139)   potassium chloride (K-DUR,KLOR-CON) CR tablet 40 mEq (40 mEq Oral Given 3/6/20 6232)       Diagnostic Studies  Results Reviewed     Procedure Component Value Units Date/Time    TSH, 3rd generation with Free T4 reflex [[id number]]  (Abnormal) Collected:  03/06/20 0433    Lab Status:  Final result Specimen:  Blood from Arm, Right Updated:  03/06/20 0515     TSH 3RD GENERATON 4 448 uIU/mL     Narrative:       Patients undergoing fluorescein dye angiography may retain small amounts of fluorescein in the body for 48-72 hours post procedure  Samples containing fluorescein can produce falsely depressed TSH values  If the patient had this procedure,a specimen should be resubmitted post fluorescein clearance        Basic metabolic panel [[id number]]  (Abnormal) Collected:  03/06/20 0433    Lab Status:  Final result Specimen:  Blood from Arm, Right Updated:  03/06/20 0515     Sodium 140 mmol/L      Potassium 3 3 mmol/L      Chloride 105 mmol/L      CO2 27 mmol/L      ANION GAP 8 mmol/L      BUN 21 mg/dL      Creatinine 0 86 mg/dL      Glucose 102 mg/dL      Calcium 8 9 mg/dL      eGFR 66 ml/min/1 73sq m     Narrative:       [place] guidelines for Chronic Kidney Disease (CKD):     Stage 1 with normal or high GFR (GFR > 90 mL/min/1 73 square meters)    Stage 2 Mild CKD (GFR = 60-89 mL/min/1 73 square meters)    Stage 3A Moderate CKD (GFR = 45-59 mL/min/1 73 square meters)    Stage 3B Moderate CKD (GFR = 30-44 mL/min/1 73 square meters)    Stage 4 Severe CKD (GFR = 15-29 mL/min/1 73 square meters)    Stage 5 End Stage CKD (GFR <15 mL/min/1 73 square meters)  Note: GFR calculation is accurate only with a steady state creatinine    Lipid panel [[id number]] Collected:  03/06/20 0433    Lab Status:  Final result Specimen:  Blood from Arm, Right Updated:  03/06/20 0515     Cholesterol 187 mg/dL      Triglycerides 112 mg/dL      HDL, Direct 77 mg/dL      LDL Calculated 88 mg/dL      Non-HDL-Chol (CHOL-HDL) 110 mg/dl     Magnesium [[id number]]  (Normal) Collected:  03/06/20 0433    Lab Status:  Final result Specimen:  Blood from Arm, Right Updated:  03/06/20 0515     Magnesium 2 0 mg/dL     CBC and differential [[id number]]  (Abnormal) Collected:  03/06/20 0433    Lab Status:  Final result Specimen:  Blood from Arm, Right Updated:  03/06/20 0452     WBC 5 84 Thousand/uL      RBC 3 54 Million/uL      Hemoglobin 12 4 g/dL      Hematocrit 37 1 %       fL      MCH 35 0 pg      MCHC 33 4 g/dL      RDW 13 3 %      MPV 11 9 fL      Platelets 269 Thousands/uL      nRBC 0 /100 WBCs      Neutrophils Relative 61 %      Immat GRANS % 1 %      Lymphocytes Relative 20 %      Monocytes Relative 16 %      Eosinophils Relative 1 %      Basophils Relative 1 %      Neutrophils Absolute 3 57 Thousands/µL      Immature Grans Absolute 0 07 Thousand/uL      Lymphocytes Absolute 1 16 Thousands/µL      Monocytes Absolute 0 92 Thousand/µL      Eosinophils Absolute 0 07 Thousand/µL      Basophils Absolute 0 05 Thousands/µL     Urine Microscopic [[id number]]  (Abnormal) Collected:  03/05/20 2303    Lab Status:  Final result Specimen:  Urine, Clean Catch Updated:  03/05/20 2333     RBC, UA 2-4 /hpf      WBC, UA 1-2 /hpf      Epithelial Cells Moderate /hpf      Bacteria, UA Occasional /hpf      AMORPH PHOSPATES Occasional /hpf     UA w Reflex to Microscopic w Reflex to Culture [[id number]]  (Abnormal) Collected:  03/05/20 2303    Lab Status:  Final result Specimen:  Urine, Clean Catch Updated:  03/05/20 2314     Color, UA Yellow     Clarity, UA Slightly Cloudy     Specific Gravity, UA <=1 005     pH, UA 7 5     Leukocytes, UA Negative     Nitrite, UA Negative     Protein, UA Negative mg/dl      Glucose, UA Negative mg/dl      Ketones, UA 40 (2+) mg/dl      Urobilinogen, UA 0 2 E U /dl      Bilirubin, UA Negative     Blood, UA Trace-Intact    CBC and differential [[id number]]  (Abnormal) Collected:  03/05/20 1935    Lab Status:  Final result Specimen:  Blood from Arm, Left Updated:  03/05/20 2039     WBC 7 51 Thousand/uL      RBC 3 98 Million/uL      Hemoglobin 13 8 g/dL      Hematocrit 41 6 %       fL      MCH 34 7 pg      MCHC 33 2 g/dL RDW 13 1 %      MPV 11 8 fL      Platelets 092 Thousands/uL      nRBC 0 /100 WBCs     Troponin I [[id number]]  (Normal) Collected:  03/05/20 1935    Lab Status:  Final result Specimen:  Blood from Arm, Left Updated:  03/05/20 2002     Troponin I <0 02 ng/mL     Comprehensive metabolic panel [[id number]]  (Abnormal) Collected:  03/05/20 1935    Lab Status:  Final result Specimen:  Blood from Arm, Left Updated:  03/05/20 2000     Sodium 141 mmol/L      Potassium 3 3 mmol/L      Chloride 104 mmol/L      CO2 26 mmol/L      ANION GAP 11 mmol/L      BUN 17 mg/dL      Creatinine 0 88 mg/dL      Glucose 115 mg/dL      Calcium 9 0 mg/dL      AST 18 U/L      ALT 20 U/L      Alkaline Phosphatase 78 U/L      Total Protein 7 6 g/dL      Albumin 3 6 g/dL      Total Bilirubin 0 40 mg/dL      eGFR 64 ml/min/1 73sq m     Narrative:       [place] guidelines for Chronic Kidney Disease (CKD):     Stage 1 with normal or high GFR (GFR > 90 mL/min/1 73 square meters)    Stage 2 Mild CKD (GFR = 60-89 mL/min/1 73 square meters)    Stage 3A Moderate CKD (GFR = 45-59 mL/min/1 73 square meters)    Stage 3B Moderate CKD (GFR = 30-44 mL/min/1 73 square meters)    Stage 4 Severe CKD (GFR = 15-29 mL/min/1 73 square meters)    Stage 5 End Stage CKD (GFR <15 mL/min/1 73 square meters)  Note: GFR calculation is accurate only with a steady state creatinine                 VAS carotid complete study   Final Result by Damion Fleming MD (03/07 1018)      MRI brain wo contrast   Final Result by Nettie Dean DO (03/06 9927)      No acute intracranial abnormality  Essentially normal MRI of the brain  Workstation performed: [id number]         CT head without contrast   Final Result by Sendy Kumar MD (03/05 9693)      No acute intracranial hemorrhage, midline shift, or mass effect                    Workstation performed: [id number]         CT abdomen pelvis with contrast   Final Result by Sendy Kumar MD (03/05 2243)      1  No bowel obstruction  Mild diffuse thickening of the colon may be due to underdistention versus colitis  2   Mild bladder wall thickening, correlate with urinalysis for cystitis   3  Moderate hiatal hernia  Additional 4 3 cm fat-containing left diaphragmatic hernia  Workstation performed: [id number]                    Procedures  ECG 12 Lead Documentation Only  Date/Time: 3/5/2020 11:36 PM  Performed by: Pretty Browne PA-C  Authorized by: Pretty Browne PA-C     Indications / Diagnosis:  TIA  ECG reviewed by me, the ED Provider: yes    Patient location:  ED  Previous ECG:     Previous ECG:  Compared to current    Similarity:  No change    Comparison to cardiac monitor: Yes    Interpretation:     Interpretation: normal    Quality:     Tracing quality:  Limited by artifact  Rate:     ECG rate:  74    ECG rate assessment: normal    Rhythm:     Rhythm: sinus rhythm    Ectopy:     Ectopy: none    QRS:     QRS axis:  Normal    QRS intervals:  Normal  Conduction:     Conduction: normal    ST segments:     ST segments:  Normal  T waves:     T waves: non-specific               ED Course         HEART Risk Score      Most Recent Value   Heart Score Risk Calculator   History  0 Filed at: 03/05/2020 2300   ECG  1 Filed at: 03/05/2020 2300   Age  2 Filed at: 03/05/2020 2300   Risk Factors  2 Filed at: 03/05/2020 2300   Troponin  0 Filed at: 03/05/2020 2300   HEART Score  5 Filed at: 03/05/2020 2300                            MDM  Number of Diagnoses or Management Options  Confusion: new and requires workup  Paresthesias: new and requires workup  Diagnosis management comments: DDX including but not limited to: TIA, CVA, metabolic abnormality, cardiac etiology, atypical migraine, seizure, tumor, thyroid disease  Plan: cardiac workup   Ct  dispo pending       Amount and/or Complexity of Data Reviewed  Clinical lab tests: ordered and reviewed  Tests in the radiology section of CPT®: reviewed and ordered  Independent visualization of images, tracings, or specimens: yes    Risk of Complications, Morbidity, and/or Mortality  Presenting problems: moderate  Management options: low  General comments: 76 yo with facial tingling and confusion  Previously told she had a TIA when these symptoms occurred  Symptoms now resolved  Labs reveal hypokalemia  Otherwise unremarkable  CT negative  Discussed admission for neuro evaluation  Patient agrees  Etiology is unclear  May be tia  Could be related to hypokalemia  Stable at time of admission  Patient Progress  Patient progress: stable        Disposition  Final diagnoses:   Paresthesias   Confusion     Time reflects when diagnosis was documented in both MDM as applicable and the Disposition within this note     Time User Action Codes Description Comment    3/5/2020 11:27 PM Davide Zimmer Add [R20 2] Paresthesias     3/5/2020 11:27 PM Amando Disla DONA Add [R41 0] Confusion     3/5/2020 11:59 PM Trever Penny Modify [R20 2] Paresthesias     3/7/2020 10:48 AM Rizwan Pond Add [E87 6] Hypokalemia     3/7/2020 10:48 AM Gayla Floyd Modify [E87 6] Hypokalemia       ED Disposition     ED Disposition Condition Date/Time Comment    Admit Stable Thu Mar 5, 2020 11:27 PM Case was discussed with Ashlyn Garcia and the patient's admission status was agreed to be Admission Status: observation status to the service of Dr Shon Anna           Follow-up Information     Follow up With Specialties Details Why Kita Newman MD Internal Medicine Follow up in 1 week(s)  [address]  [place] [address] [address]      Mk Rivera MD Neurology Follow up in 4 day(s) IF NEEDED [address] [facility]  [phone]            Discharge Medication List as of 3/7/2020 10:58 AM      START taking these medications    Details   potassium chloride (K-DUR,KLOR-CON) 20 mEq tablet Take 1 tablet (20 mEq total) by mouth daily for 5 days, Starting Sat 3/7/2020, Until Thu 3/12/2020, Normal         CONTINUE these medications which have NOT CHANGED    Details   amLODIPine (NORVASC) 2 5 mg tablet TAKE 2 TABLETS BY MOUTH DAILY, Normal      aspirin 325 mg tablet Take 325 mg by mouth, Starting Mon 3/19/2018, Historical Med      diphenhydrAMINE HCl (BENADRYL ALLERGY PO) Take by mouth, Historical Med      donepezil (ARICEPT) 10 mg tablet TAKE 1 TABLET (10 MG TOTAL) BY MOUTH DAILY AT BEDTIME, Starting Fri 10/25/2019, Normal      fluticasone (FLONASE) 50 mcg/act nasal spray SPRAY 1 SPRAY INTO EACH NOSTRIL EVERY DAY, Normal      levothyroxine 125 mcg tablet TAKE 1 TABLET BY MOUTH DAILY, Normal      multivitamin (THERAGRAN) TABS Take 1 tablet by mouth, Historical Med      MYRBETRIQ 50 MG TB24 TAKE 1 TABLET BY MOUTH EVERY DAY, Normal      omeprazole (PriLOSEC) 20 mg delayed release capsule TAKE 1 CAPSULE BY MOUTH EVERY DAY, Normal      PARoxetine (PAXIL) 20 mg tablet TAKE 1 TABLET BY MOUTH EVERY DAY, Normal      PROAIR  (90 Base) MCG/ACT inhaler Starting Wed 8/7/2019, Historical Med      rivastigmine (EXELON) 4 6 mg/24 hr TD 24 hr patch Place 1 patch on the skin daily, Starting Fri 9/6/2019, Normal           Outpatient Discharge Orders   Basic metabolic panel   Standing Status: Future Standing Exp   Date: 03/06/21       PDMP Review     None          ED Provider  Electronically Signed by           Claudean Sine, PA-C  03/14/20 3587

## 2020-03-06 NOTE — ED NOTES
1  CC: Numbness and tingling   2  Orientation status: A/O X4   3  Abnormal labs/vitals/assessment: Potassium 3 3  4  Iv/drains/etc : 20 G left AC   5  Last time narcotics given: none   6  Medications/drips: isolyte at 75mL/hr   7  Ambulation status: independent with cane   8  Isolation status: none   9  Skin: not assessed   10  Trauma: none   11   ED phone number: [phone]       Shawn Perry RN  03/06/20 5917

## 2020-03-07 VITALS
RESPIRATION RATE: 18 BRPM | HEIGHT: 62 IN | OXYGEN SATURATION: 90 % | SYSTOLIC BLOOD PRESSURE: 145 MMHG | BODY MASS INDEX: 31.28 KG/M2 | DIASTOLIC BLOOD PRESSURE: 76 MMHG | TEMPERATURE: 97.9 F | WEIGHT: 170 LBS | HEART RATE: 71 BPM

## 2020-03-07 LAB
ANION GAP SERPL CALCULATED.3IONS-SCNC: 9 MMOL/L (ref 4–13)
BUN SERPL-MCNC: 20 MG/DL (ref 5–25)
CALCIUM SERPL-MCNC: 8.5 MG/DL (ref 8.3–10.1)
CAMPYLOBACTER DNA SPEC NAA+PROBE: NORMAL
CHLORIDE SERPL-SCNC: 106 MMOL/L (ref 100–108)
CO2 SERPL-SCNC: 23 MMOL/L (ref 21–32)
CREAT SERPL-MCNC: 0.71 MG/DL (ref 0.6–1.3)
ERYTHROCYTE [DISTWIDTH] IN BLOOD BY AUTOMATED COUNT: 13.5 % (ref 11.6–15.1)
GFR SERPL CREATININE-BSD FRML MDRD: 84 ML/MIN/1.73SQ M
GLUCOSE P FAST SERPL-MCNC: 96 MG/DL (ref 65–99)
GLUCOSE SERPL-MCNC: 96 MG/DL (ref 65–140)
HCT VFR BLD AUTO: 36 % (ref 34.8–46.1)
HGB BLD-MCNC: 12 G/DL (ref 11.5–15.4)
MCH RBC QN AUTO: 35.6 PG (ref 26.8–34.3)
MCHC RBC AUTO-ENTMCNC: 33.3 G/DL (ref 31.4–37.4)
MCV RBC AUTO: 107 FL (ref 82–98)
NRBC BLD AUTO-RTO: 0 /100 WBCS
PLATELET # BLD AUTO: 205 THOUSANDS/UL (ref 149–390)
PMV BLD AUTO: 12 FL (ref 8.9–12.7)
POTASSIUM SERPL-SCNC: 3.6 MMOL/L (ref 3.5–5.3)
RBC # BLD AUTO: 3.37 MILLION/UL (ref 3.81–5.12)
SALMONELLA DNA SPEC QL NAA+PROBE: NORMAL
SHIGA TOXIN STX GENE SPEC NAA+PROBE: NORMAL
SHIGELLA DNA SPEC QL NAA+PROBE: NORMAL
SODIUM SERPL-SCNC: 138 MMOL/L (ref 136–145)
WBC # BLD AUTO: 4.39 THOUSAND/UL (ref 4.31–10.16)

## 2020-03-07 PROCEDURE — 99217 PR OBSERVATION CARE DISCHARGE MANAGEMENT: CPT | Performed by: INTERNAL MEDICINE

## 2020-03-07 PROCEDURE — 93880 EXTRACRANIAL BILAT STUDY: CPT | Performed by: SURGERY

## 2020-03-07 PROCEDURE — 80048 BASIC METABOLIC PNL TOTAL CA: CPT | Performed by: INTERNAL MEDICINE

## 2020-03-07 PROCEDURE — 85027 COMPLETE CBC AUTOMATED: CPT | Performed by: INTERNAL MEDICINE

## 2020-03-07 RX ORDER — POTASSIUM CHLORIDE 20 MEQ/1
20 TABLET, EXTENDED RELEASE ORAL DAILY
Qty: 5 TABLET | Refills: 0 | Status: SHIPPED | OUTPATIENT
Start: 2020-03-07 | End: 2020-04-15 | Stop reason: ALTCHOICE

## 2020-03-07 RX ADMIN — POTASSIUM CHLORIDE 20 MEQ: 1500 TABLET, EXTENDED RELEASE ORAL at 09:02

## 2020-03-07 RX ADMIN — AMLODIPINE BESYLATE 5 MG: 5 TABLET ORAL at 09:02

## 2020-03-07 RX ADMIN — LEVOTHYROXINE SODIUM 125 MCG: 125 TABLET ORAL at 05:39

## 2020-03-07 RX ADMIN — ASPIRIN 325 MG ORAL TABLET 325 MG: 325 PILL ORAL at 09:02

## 2020-03-07 RX ADMIN — PAROXETINE 20 MG: 20 TABLET, FILM COATED ORAL at 09:02

## 2020-03-07 RX ADMIN — PANTOPRAZOLE SODIUM 20 MG: 20 TABLET, DELAYED RELEASE ORAL at 05:39

## 2020-03-07 RX ADMIN — ENOXAPARIN SODIUM 40 MG: 40 INJECTION SUBCUTANEOUS at 09:02

## 2020-03-07 RX ADMIN — OXYBUTYNIN CHLORIDE 10 MG: 5 TABLET, EXTENDED RELEASE ORAL at 09:02

## 2020-03-07 NOTE — ASSESSMENT & PLAN NOTE
· Patient was evaluated by Neurology  MRI negative  · Her symptoms likely from hypokalemia  Supplementation given  Symptoms resolved  · Will give potassium supplement for 5 more days and repeat BMP in 1 week and follow up with PCP

## 2020-03-07 NOTE — ASSESSMENT & PLAN NOTE
· Supplementation given  Potassium 3 6 today  Will give supplement for 5 more days and repeat BMP in 1 week  Follow-up with PCP

## 2020-03-07 NOTE — UTILIZATION REVIEW
Continued Stay Review    Date: 3/7/20                        Current Patient Class: Observation  Current Level of Care: Med Surg    HPI:74 y o  female initially admitted on 3/5/20 for evaluation and treatment of numbness and tingling of face and bilateral lower extremities, confusion  ADmitted under observation for paresthesia, hypokalemia  She reports having N/V/D the night prior to arrival which has resolved  Generalized paresthesia throughout the day resolved upon arrival to the ED  H/O TIA with memory issues  CT head - for acute changes  UA normal   Potassium 3 3, repleted and recheck  Check Carotid doppler,  MRI, neuro consult  Neuro checks every 4 hours  GCS=15   CT abd/pelvis obtained in ED revealed "Moderate hiatal hernia   Mild diffuse thickening of the colon may be due to underdistention versus colitis   IV fluids initiated in ED  Abdomen is soft, nontender, non distended       3/6 Neurology Consult:  Imaging so far is negative for stroke  Paresthesias/dysesthesia may have been induced by nausea, vomiting, diarrhea and subsequent hypokalemia      Pertinent Labs/Diagnostic Results:   Results from last 7 days   Lab Units 03/07/20  0621 03/06/20  0433 03/05/20  1935   WBC Thousand/uL 4 39 5 84 7 51   HEMOGLOBIN g/dL 12 0 12 4 13 8   HEMATOCRIT % 36 0 37 1 41 6   PLATELETS Thousands/uL 205 226 256   NEUTROS ABS Thousands/µL  --  3 57  --    BANDS PCT %  --   --  1         Results from last 7 days   Lab Units 03/07/20  0621 03/06/20  0433 03/05/20  1935   SODIUM mmol/L 138 140 141   POTASSIUM mmol/L 3 6 3 3* 3 3*   CHLORIDE mmol/L 106 105 104   CO2 mmol/L 23 27 26   ANION GAP mmol/L 9 8 11   BUN mg/dL 20 21 17   CREATININE mg/dL 0 71 0 86 0 88   EGFR ml/min/1 73sq m 84 66 64   CALCIUM mg/dL 8 5 8 9 9 0   MAGNESIUM mg/dL  --  2 0  --      Results from last 7 days   Lab Units 03/05/20  1935   AST U/L 18   ALT U/L 20   ALK PHOS U/L 78   TOTAL PROTEIN g/dL 7 6   ALBUMIN g/dL 3 6   TOTAL BILIRUBIN mg/dL 0 40 Results from last 7 days   Lab Units 03/07/20  0621 03/06/20  0433 03/05/20  1935   GLUCOSE RANDOM mg/dL 96 102 115         Results from last 7 days   Lab Units 03/05/20  1935   TROPONIN I ng/mL <0 02             Results from last 7 days   Lab Units 03/06/20  0433   TSH 3RD GENERATON uIU/mL 4 448*       Results from last 7 days   Lab Units 03/05/20  2303   CLARITY UA  Slightly Cloudy   COLOR UA  Yellow   SPEC GRAV UA  <=1 005   PH UA  7 5   GLUCOSE UA mg/dl Negative   KETONES UA mg/dl 40 (2+)*   BLOOD UA  Trace-Intact*   PROTEIN UA mg/dl Negative   NITRITE UA  Negative   BILIRUBIN UA  Negative   UROBILINOGEN UA E U /dl 0 2   LEUKOCYTES UA  Negative   WBC UA /hpf 1-2*   RBC UA /hpf 2-4*   BACTERIA UA /hpf Occasional   EPITHELIAL CELLS WET PREP /hpf Moderate*       Results from last 7 days   Lab Units 03/06/20  1424   SALMONELLA SP PCR  None Detected   SHIGELLA SP/ENTEROINVASIVE E  COLI (EIEC)  None Detected   CAMPYLOBACTER SP (JEJUNI AND COLI)  None Detected   SHIGA TOXIN 1/SHIGA TOXIN 2  None Detected       3/6 Carotid doppler: RIGHT:There is no evidence of disease throughout the extracranial carotid arteries  Vertebral artery flow is antegrade  There is no significant subclavian artery disease  LEFT: There is no evidence of disease throughout the extracranial carotid arteries  Vertebral artery flow is antegrade  There is no significant subclavian artery disease         Vital Signs:     Date/Time  Temp  Pulse  Resp  BP  MAP (mmHg)  SpO2    03/07/20 08:01:50  97 9 °F (36 6 °C)  71  18  145/76  99  90 %    03/06/20 23:49:05  98 2 °F (36 8 °C)    20  142/78  99      03/06/20 15:44:49  97 5 °F (36 4 °C)  81  18  143/78  100  93 %        Date and Time Eye Opening Best Verbal Response Best Motor Response [place]oughton Coma Scale Score   03/07/20 0916 4 5 6 15   03/07/20 0900 4 5 6 15   03/07/20 0700 4 5 6 15   03/07/20 0300 4 5 6 15   03/06/20 2300 4 5 6 15   03/06/20 1900 4 5 6 15   03/06/20 1500 4 5 6 15   03/06/20 1100 4 5 6 15   03/06/20 0753 4 5 6 15   03/06/20 0700 4 5 6 15   03/06/20 0305 4 5 6 15   03/06/20 0200 4 5 6 15   03/06/20 0100 4 5 6 15   03/06/20 0000 4 5 6 15   03/05/20 1939 4 5 6 15       Medications:   Scheduled Medications:    Medications:  amLODIPine 5 mg Oral Daily   aspirin 325 mg Oral Daily   diphenhydrAMINE 12 5 mg Oral HS   donepezil 10 mg Oral HS   enoxaparin 40 mg Subcutaneous Daily   levothyroxine 125 mcg Oral Daily   oxybutynin 10 mg Oral Daily   pantoprazole 20 mg Oral Early Morning   PARoxetine 20 mg Oral Daily   potassium chloride 20 mEq Oral Daily     Continuous IV Infusions: None     PRN Meds:    acetaminophen 650 mg Oral Q6H PRN   calcium carbonate 1,000 mg Oral Daily PRN   loperamide 2 mg Oral Q4H PRN   ondansetron 4 mg Intravenous Q6H PRN       Discharge Plan: TBD          Network Utilization Review Department  [email or web address]  ATTENTION: Please call with any questions or concerns to [phone] and carefully listen to the prompts so that you are directed to the right person  All voicemails are confidential   Liana Hanna all requests for admission clinical reviews, approved or denied determinations and any other requests to dedicated fax number below belonging to the campus where the patient is receiving treatment   List of dedicated fax numbers for the Facilities:  [address] DENIALS (Administrative/Medical Necessity) [phone]   [address] (Maternity/NICU/Pediatrics) [phone]   [facility] [phone]   Alfredo Abreu [phone]   [facility] [phone]   Gigi Martin [phone]   [address] [phone]   [facility]  [phone]   [address] [facility] [address]  [address] [facility] [address] [facility] [phone]

## 2020-03-07 NOTE — DISCHARGE INSTR - AVS FIRST PAGE
FOLLOW-UP WITH PCP IN 1 WEEK  BMP in 1 week and follow up with PCP  Follow-up with neurology if needed as outpatient    Come back to the emergency room if condition worsen or recur

## 2020-03-07 NOTE — DISCHARGE SUMMARY
Discharge- Matteo Hightower 1945, 75 y o  female MRN: [id number]    Unit/Bed#: -01 Encounter: [id number]    Primary Care Provider: Marco Perez MD   Date and time admitted to hospital: 3/5/2020  7:13 PM        Hypokalemia  Assessment & Plan  · Supplementation given  Potassium 3 6 today  Will give supplement for 5 more days and repeat BMP in 1 week  Follow-up with PCP  Non-intractable vomiting with nausea  Assessment & Plan  · Resolved  Likely from viral gastroenteritis  Mild cognitive impairment  Assessment & Plan  · Continue home dose Aricept and Exelon patch    Acquired hypothyroidism  Assessment & Plan  · Continue home dose    Essential hypertension  Assessment & Plan  · BP adequately controlled on current regimen  · Continue home dose Norvasc  · Monitor BP per unit protocol    Gastroesophageal reflux disease without esophagitis  Assessment & Plan  · Continue home dose Prilosec (equivalent)  · PRN TUMS    Depression  Assessment & Plan  · Continue home dose Paxil    * Paresthesias  Assessment & Plan  · Patient was evaluated by Neurology  MRI negative  · Her symptoms likely from hypokalemia  Supplementation given  Symptoms resolved  · Will give potassium supplement for 5 more days and repeat BMP in 1 week and follow up with PCP  Discharging Physician / Practitioner: Arturo Benitez MD  PCP: Marco Perez MD  Admission Date:   Admission Orders (From admission, onward)     Ordered        03/05/20 2327  Place in Observation  Once                   Discharge Date: 03/07/20    Resolved Problems  Date Reviewed: 3/7/2020    None          Consultations During Hospital Stay:  · Neurology    Procedures Performed:   · MRI brain    Significant Findings / Test Results:   · MRI brain  IMPRESSION:     No acute intracranial abnormality  Essentially normal MRI of the brain      Carotid Doppler  CONCLUSION:  Impression  RIGHT:  There is no evidence of disease throughout the extracranial carotid arteries  Vertebral artery flow is antegrade  There is no significant subclavian artery disease  LEFT:  There is no evidence of disease throughout the extracranial carotid arteries  Vertebral artery flow is antegrade  There is no significant subclavian artery disease  CT abdomen  IMPRESSION:     1  No bowel obstruction  Mild diffuse thickening of the colon may be due to underdistention versus colitis  2   Mild bladder wall thickening, correlate with urinalysis for cystitis  3  Moderate hiatal hernia  Additional 4 3 cm fat-containing left diaphragmatic hernia  CT head  IMPRESSION:     No acute intracranial hemorrhage, midline shift, or mass effect  Incidental Findings:   ·      Test Results Pending at Discharge (will require follow up): · BMP in 1 week     Outpatient Tests Requested:  · Follow-up with PCP in 1 and neurology if needed  · BMP in 1 week and follow up with PCP    Complications:      Reason for Admission: Generalized numbness/tingling, increased memory issues/confusion today  N/V/D yesterday    Hospital Course:     HPI on admission-  Hermann Jay is a 75 y o  female who presents with generalized paresthesias throughout the day that resolved after arriving in ED  Denies HA, dizziness, vision changes or weakness  Denies increased ambulatory dysfunction  States walks with a cane at baseline  Reports was having N/V/D and generalized abdominal pain last night that has now resolved  Reports alternating flushed/chilled today  Reports (+) hx of TIA and memory issues  Hospital course-  Patient was evaluated by Neurology  MRI done which was negative  Carotid Doppler was negative  Her symptoms resolved  Symptoms was most likely from hypokalemia  Supplementation given  She feels better  She was having diarrhea  Stool bacteria panel was negative  Diarrhea resolved this morning  Patient feels much better and wants to go home    Currently she is clinically stable to be discharged home  Will give potassium supplement for 5 more days, as hypokalemia causing her symptoms  Repeat BMP in 1 week and follow up with PCP  Please see above list of diagnoses and related plan for additional information  Condition at Discharge: good     Discharge Day Visit / Exam:     Subjective:    Vitals: Blood Pressure: 145/76 (03/07/20 0801)  Pulse: 71 (03/07/20 0801)  Temperature: 97 9 °F (36 6 °C) (03/07/20 0801)  Temp Source: Oral (03/05/20 1848)  Respirations: 18 (03/07/20 0801)  Height: 5' 2" (157 5 cm) (03/05/20 1848)  Weight - Scale: 77 1 kg (170 lb) (03/05/20 1848)  SpO2: 90 % (03/07/20 0801)  Exam:   Physical Exam  General- Awake, alert and oriented x 3, looks comfortable  HEENT- Normocephalic, atraumatic, oral mucosa- moist  Neck- Supple,  no JVD  CVS- Normal S1/ S2, Regular rate and rhythm  Respiratory system- B/L clear breath sounds, no wheezing  Abdomen- Soft, Non distended, no tenderness, Bowel sound- present  CNS- No acute focal neurologic deficit noted    Discussion with Family:     Discharge instructions/Information to patient and family:   See after visit summary for information provided to patient and family  Provisions for Follow-Up Care:  See after visit summary for information related to follow-up care and any pertinent home health orders  Disposition:     Home    For Discharges to [facility]:   · Not Applicable to this Patient - Not Applicable to this Patient    Planned Readmission:      Discharge Statement:  I spent 25 minutes discharging the patient  This time was spent on the day of discharge  I had direct contact with the patient on the day of discharge  Greater than 50% of the total time was spent examining patient, answering all patient questions, arranging and discussing plan of care with patient as well as directly providing post-discharge instructions  Additional time then spent on discharge activities      Discharge Medications:  See after visit summary for reconciled discharge medications provided to patient and family        ** Please Note: This note has been constructed using a voice recognition system **

## 2020-03-07 NOTE — DISCHARGE INSTRUCTIONS
Hypokalemia   WHAT YOU NEED TO KNOW:   Hypokalemia is a low level of potassium in your blood  Potassium helps control how your muscles, heart, and digestive system work  Hypokalemia occurs when your body loses too much potassium or does not absorb enough from food  DISCHARGE INSTRUCTIONS:   Return to the emergency department if:   · You cannot move your arm or leg  · You have a fast or irregular heartbeat  · You are too tired or weak to stand up  Contact your healthcare provider if:   · You are vomiting, or you have diarrhea  · You have numbness or tingling in your arms or legs  · Your symptoms do not go away or they get worse  · You have questions or concerns about your condition or care  Medicines:   · Potassium  will be given to bring your potassium levels back to normal     · Take your medicine as directed  Contact your healthcare provider if you think your medicine is not helping or if you have side effects  Tell him of her if you are allergic to any medicine  Keep a list of the medicines, vitamins, and herbs you take  Include the amounts, and when and why you take them  Bring the list or the pill bottles to follow-up visits  Carry your medicine list with you in case of an emergency  Eat foods that are high in potassium:  Foods that are high in potassium include bananas, oranges, tomatoes, potatoes, and avocado  Martinez beans, turkey, salmon, lean beef, yogurt, and milk are also high in potassium  Ask your healthcare provider or dietitian for more information about foods that are high in potassium  Follow up with your healthcare provider as directed:  Write down your questions so you remember to ask them during your visits  © 2017 [employer]  Information is for End User's use only and may not be sold, redistributed or otherwise used for commercial purposes   All illustrations and images included in CareNotes® are the copyrighted property of [employer]  or [employer]  The above information is an  only  It is not intended as medical advice for individual conditions or treatments  Talk to your doctor, nurse or pharmacist before following any medical regimen to see if it is safe and effective for you  Potassium Chloride (By mouth)   Potassium Chloride (gxn-LKE-vu-um KLOR-colleen)  Prevents and treats low potassium levels in the blood  Brand Name(s): K-Sol, K-Tab, [address], Klor-Con, Klor-Con 10, Klor-Con 8, Klor-Con M10, Klor-Con M15, Klor-Con M20, Klor-Con Sprinkle   There may be other brand names for this medicine  When This Medicine Should Not Be Used: This medicine is not right for everyone  Do not use if you had an allergic reaction to potassium  How to Use This Medicine:   Tablet, Long Acting Capsule, Powder, Liquid, Long Acting Tablet, Granule  · Your doctor will tell you how much medicine to use  Do not use more than directed  · Take this medicine with food or right after eating, to avoid stomach upset  · Powder or oral liquid:  You must mix with at least one-half cup (4 ounces) of water or juice  You could damage your stomach if you take the medicine without mixing it with water or juice  · Tablet or capsule: Do not chew, crush, or break  Swallow it whole with full glass of water  · Extended-release capsule: Swallow whole with a full glass of water  If you cannot swallow the extended-release capsule, you may open it and pour the medicine into a small amount of soft food such as pudding, yogurt, or applesauce  Stir this mixture well and swallow it without chewing  · Extended-release tablet:  Swallow whole with a full glass of water  If you have trouble swallowing, ask your doctor or pharmacist if you may crush the tablet  Some brands of this medicine must be swallowed whole, but other brands may be crushed  · If you mix the medicine in water or soft food, do not mix until you are ready to take your dose   Do not save mixed medicine for later use  · Carefully follow your doctor's instructions about any special diet  · Missed dose: Take a dose as soon as you remember  If it is almost time for your next dose, wait until then and take a regular dose  Do not take extra medicine to make up for a missed dose  · Store the medicine in a closed container at room temperature, away from heat, moisture, and direct light  Drugs and Foods to Avoid:   Ask your doctor or pharmacist before using any other medicine, including over-the-counter medicines, vitamins, and herbal products  · Some foods and medicines can affect how potassium chloride works  Tell you doctor if you are using any of the following:  ¨ Potassium-sparing diuretic (water pill)  ¨ ACE inhibitor blood pressure medicine  ¨ Salt substitute  ¨ Digoxin  Warnings While Using This Medicine:   · Tell your doctor if you are pregnant or breastfeeding or if you have kidney disease, heart disease, or problems with your digestive system  · This medicine may cause the following problems:  ¨ Bleeding or ulcers in the digestive system  ¨ Potassium levels that are too high  · Your doctor will do lab tests at regular visits to check on the effects of this medicine  Keep all appointments  · Keep all medicine out of the reach of children  Never share your medicine with anyone    Possible Side Effects While Using This Medicine:   Call your doctor right away if you notice any of these side effects:  · Allergic reaction: Itching or hives, swelling in your face or hands, swelling or tingling in your mouth or throat, chest tightness, trouble breathing  · Bloody or black, tarry stools  · Confusion, weakness, uneven heartbeat, trouble breathing, numbness in your hands, feet, or lips  · Severe stomach pain or vomiting  · Throat pain, feeling as if pill is stuck in the throat  If you notice these less serious side effects, talk with your doctor:   · Mild nausea, diarrhea, gas  If you notice other side effects that you think are caused by this medicine, tell your doctor  Call your doctor for medical advice about side effects  You may report side effects to FDA at 4-345-FDA-4296  © 2017 [employer] Information is for End User's use only and may not be sold, redistributed or otherwise used for commercial purposes  The above information is an  only  It is not intended as medical advice for individual conditions or treatments  Talk to your doctor, nurse or pharmacist before following any medical regimen to see if it is safe and effective for you  Stroke   WHAT YOU NEED TO KNOW:   A stroke happens when blood flow to part of the brain is interrupted  This can cause serious brain damage from a lack of oxygen  Brain function may be affected depending on where the stroke happens  A stroke caused by a blood clot is called an ischemic stroke  Ischemic stroke is the most common type  A stroke caused by a burst or torn blood vessel is called a hemorrhagic stroke  When stroke symptoms go away completely within minutes to hours and do not cause damage, it is called a transient ischemic attack (TIA)  A TIA is a warning sign that you are at risk of soon having a stroke  DISCHARGE INSTRUCTIONS:   Call 911 for any of the following:   · You have any of the following signs of a stroke:      ¨ Numbness or drooping on one side of your face     ¨ Weakness in an arm or leg    ¨ Confusion or difficulty speaking    ¨ Dizziness, a severe headache, or vision loss    ·            · You have a seizure  · You feel lightheaded, short of breath, and have chest pain  Seek care immediately if:   · Your blood sugar level or blood pressure is higher or lower than usual     · You have trouble swallowing  · You fall  Contact your healthcare provider if:   · You have trouble having a bowel movement or urinating  · You have questions or concerns about your condition or care    Medicines:  Medicines will depend on the kind of stroke you had  You may need any of the following:  · Medicines  may be given to treat high cholesterol, high blood pressure, or diabetes  · Antiplatelets , such as aspirin, help prevent blood clots  Take your antiplatelet medicine exactly as directed  These medicines make it more likely for you to bleed or bruise  If you are told to take aspirin, do not take acetaminophen or ibuprofen instead  · Blood thinners    help prevent blood clots  Examples of blood thinners include heparin and warfarin  Clots can cause strokes, heart attacks, and death  The following are general safety guidelines to follow while you are taking a blood thinner:    ¨ Watch for bleeding and bruising while you take blood thinners  Watch for bleeding from your gums or nose  Watch for blood in your urine and bowel movements  Use a soft washcloth on your skin, and a soft toothbrush to brush your teeth  This can keep your skin and gums from bleeding  If you shave, use an electric shaver  Do not play contact sports  ¨ Tell your dentist and other healthcare providers that you take anticoagulants  Wear a bracelet or necklace that says you take this medicine  ¨ Do not start or stop any medicines unless your healthcare provider tells you to  Many medicines cannot be used with blood thinners  ¨ Tell your healthcare provider right away if you forget to take the medicine, or if you take too much  ¨ Warfarin  is a blood thinner that you may need to take  The following are things you should be aware of if you take warfarin  § Foods and medicines can affect the amount of warfarin in your blood  Do not make major changes to your diet while you take warfarin  Warfarin works best when you eat about the same amount of vitamin K every day  Vitamin K is found in green leafy vegetables and certain other foods  Ask for more information about what to eat when you are taking warfarin      § You will need to see your healthcare provider for follow-up visits when you are on warfarin  You will need regular blood tests  These tests are used to decide how much medicine you need  · Take your medicine as directed  Contact your healthcare provider if you think your medicine is not helping or if you have side effects  Tell him or her if you are allergic to any medicine  Keep a list of the medicines, vitamins, and herbs you take  Include the amounts, and when and why you take them  Bring the list or the pill bottles to follow-up visits  Carry your medicine list with you in case of an emergency  Know the warning signs of a stroke: The word F A S T  can help you remember and recognize warning signs of a stroke  · F = Face:  One side of the face droops  · A = Arms:  One arm starts to drop when both arms are raised  · S = Speech:  Speech is slurred or sounds different than usual     · T = Time:  A person who is having a stroke needs to be seen immediately  A stroke is a medical emergency that needs immediate treatment  Most medicines and treatments work best the sooner they are given  ·        Follow up with your healthcare provider as directed: You may need to come in for regular tests of your brain function  If you are taking warfarin, you will need to come in for regular blood tests  Your INR levels will also need to be checked  These tests help make sure you are taking the right amount of warfarin  Write down your questions so you remember to ask them during your visits  Go to rehabilitation (rehab) as directed:  Rehab is an important part of treatment  A speech therapist can help you relearn or improve your ability to talk and swallow  You may start slowly and start doing more difficult tasks over time  Physical therapists can help you gain strength and build endurance  Occupational therapists can teach you new ways to do daily activities, such as getting dressed  Therapy can help you improve your ability to walk or keep your balance  Your therapy may include tasks or movements you will need to do for everyday activities  An example is being able to raise or lower yourself from a chair  Care for yourself after a stroke:   · Make your home safe  Remove anything you might trip over  Tape electrical cords down  Keep paths clear throughout your home  Make sure your home is well lighted  Put nonslip materials on surfaces that might be slippery  An example is your bathtub or shower floor  · Use assistive devices  A cane or walker may help you keep your balance as you walk  Prevent another stroke:   · Manage health conditions  Conditions such as atrial fibrillation and diabetes can increase your risk for a stroke  Control your glucose carefully if you have hyperglycemia or diabetes  · Check your blood pressure as directed  High blood pressure can increase your risk for a stroke  If you have high blood pressure, follow your healthcare provider's directions for controlling your blood pressure  · Do not smoke  Nicotine and other chemicals in cigarettes and cigars can increase your risk for another stroke and cause lung damage  Ask your healthcare provider for information if you currently smoke and need help to quit  E-cigarettes or smokeless tobacco still contain nicotine  Talk to your healthcare provider before you use these products  · Do not drink alcohol  Alcohol can increase your risk for a stroke  Alcohol may also increase your blood pressure or thin your blood  Blood thinning can increase your risk for hemorrhagic stroke  · Eat a variety of healthy foods  Healthy foods include whole-grain breads, low-fat dairy products, beans, lean meats, and fish  Eat at least 5 servings of fruits and vegetables each day  Choose foods that are low in fat, cholesterol, salt, and sugar  Eat foods that are high in potassium, such as potatoes and bananas  · Exercise as directed  Activity is important for preventing another stroke  You may need to work with an exercise therapist to learn how to exercise safely  Exercise may help you be able to do your normal activities more easily  Exercise also helps control your blood pressure and weight  · Maintain a healthy weight  Ask your healthcare provider how much you should weigh  Ask him or her to help you create a weight loss plan if you are overweight  Ask about the best exercise plan for you  · Manage stress  Stress can increase your blood pressure  Find new ways to relax, such as deep breathing or listening to music  What you need to know about depression:  Depression can happen after a stroke  Talk to your healthcare provider if you have depression that continues or is getting worse  Your provider may be able to help treat your depression  Your provider can also recommend support groups for you to join  A support group is a place to talk with others who have had a stroke  It may also help to talk to friends and family members about how you are feeling  Tell your family and friends that if they see these signs, to let your healthcare provider know  You may show any of the following signs of depression:  · Extreme sadness    · Avoiding social interaction with family or friends    · A lack of interest in things you once enjoyed    · Irritability    · Trouble sleeping    · Low energy levels    · A change in eating habits or sudden weight gain or loss  For support and more information:   · National Stroke Association  [address] , [place] [address]  Phone: 8- 531 - 086-2060  Web Address: [employer]  org  © 2017 [employer] Information is for End User's use only and may not be sold, redistributed or otherwise used for commercial purposes  All illustrations and images included in CareNotes® are the copyrighted property of [employer]  or [employer]  The above information is an  only   It is not intended as medical advice for individual conditions or treatments  Talk to your doctor, nurse or pharmacist before following any medical regimen to see if it is safe and effective for you

## 2020-03-09 ENCOUNTER — TRANSITIONAL CARE MANAGEMENT (OUTPATIENT)
Dept: INTERNAL MEDICINE CLINIC | Facility: CLINIC | Age: 75
End: 2020-03-09

## 2020-03-18 ENCOUNTER — OFFICE VISIT (OUTPATIENT)
Dept: INTERNAL MEDICINE CLINIC | Facility: CLINIC | Age: 75
End: 2020-03-18
Payer: COMMERCIAL

## 2020-03-18 VITALS
OXYGEN SATURATION: 94 % | SYSTOLIC BLOOD PRESSURE: 132 MMHG | BODY MASS INDEX: 32.02 KG/M2 | DIASTOLIC BLOOD PRESSURE: 70 MMHG | HEIGHT: 62 IN | WEIGHT: 174 LBS | HEART RATE: 88 BPM

## 2020-03-18 DIAGNOSIS — K52.9 GASTROENTERITIS: ICD-10-CM

## 2020-03-18 DIAGNOSIS — E87.6 HYPOKALEMIA: Primary | ICD-10-CM

## 2020-03-18 DIAGNOSIS — R20.2 PARESTHESIA: ICD-10-CM

## 2020-03-18 PROCEDURE — 99495 TRANSJ CARE MGMT MOD F2F 14D: CPT | Performed by: INTERNAL MEDICINE

## 2020-03-18 PROCEDURE — 1111F DSCHRG MED/CURRENT MED MERGE: CPT | Performed by: INTERNAL MEDICINE

## 2020-03-18 NOTE — PROGRESS NOTES
Assessment/Plan:     Appears to be doing better  Her symptoms appear to have all been related to her gastroenteritis  Repeat potassium as planned   Follow-up with blood test as planned   With Neurology as planned     Quality Measures:       Return in about 3 months (around 6/18/2020)  No problem-specific Assessment & Plan notes found for this encounter  Diagnoses and all orders for this visit:    Hypokalemia    Paresthesia          Subjective:      Patient ID: Kita Bhakta is a 75 y o  female  Patient comes in today for hospital follow-up with her daughter  She had developed gastroenteritis and then started developing paresthesias so her daughter took her to the hospital   Patient was dehydrated and her potassium was low  That was corrected  She was hydrated  Her GI symptoms went away  She was seen by Neurology  No evidence of any significant neurologic issue  Scanning was negative  Upon discharge her potassium was 3 6  She is still finishing up a course of supplementation  Has felt fine since discharge  No paresthesias  Just tired  TCM Call (since 2/16/2020)     Date and time call was made  3/11/2020  2:35 PM    Hospital care reviewed  Records reviewed    Patient was hospitialized at  [facility]    Date of Admission  03/05/20    Date of discharge  03/07/20    Diagnosis  Paresthesias    Disposition  Home    Were the patients medications reviewed and updated  No    Current Symptoms  None      TCM Call (since 2/16/2020)     Post hospital issues  None    Should patient be enrolled in anticoag monitoring? No    Scheduled for follow up?   Yes    Did you obtain your prescribed medications  Yes    Do you need help managing your prescriptions or medications  No    Is transportation to your appointment needed  No    I have advised the patient to call PCP with any new or worsening symptoms    godwin gonzalez ma    Living Arrangements  Alone    Support System  Family    The type of support provided  Emotional; Financial; Physical    Do you have social support  Yes, as much as I need    Are you recieving any outpatient services  No    Are you recieving home care services  No    Are you using any community resources  No    Current waiver services  No    Have you fallen in the last 12 months  No    Interperter language line needed  No    Counseling  Family          ALLERGIES:  Allergies   Allergen Reactions    Ciprofloxacin     Other     Sulfa Antibiotics     Penicillins Rash     Category: Allergy;   --  Pt received unasyn 1 5 mg IV 11-12-18 to 11-15-18    Sulfacetamide Rash     Category:  Allergy;        CURRENT MEDICATIONS:    Current Outpatient Medications:     amLODIPine (NORVASC) 2 5 mg tablet, TAKE 2 TABLETS BY MOUTH DAILY, Disp: 30 tablet, Rfl: 5    aspirin 325 mg tablet, Take 325 mg by mouth, Disp: , Rfl:     diphenhydrAMINE HCl (BENADRYL ALLERGY PO), Take by mouth, Disp: , Rfl:     donepezil (ARICEPT) 10 mg tablet, TAKE 1 TABLET (10 MG TOTAL) BY MOUTH DAILY AT BEDTIME, Disp: 30 tablet, Rfl: 5    fluticasone (FLONASE) 50 mcg/act nasal spray, SPRAY 1 SPRAY INTO EACH NOSTRIL EVERY DAY, Disp: 16 mL, Rfl: 1    levothyroxine 125 mcg tablet, TAKE 1 TABLET BY MOUTH DAILY, Disp: 90 tablet, Rfl: 1    multivitamin (THERAGRAN) TABS, Take 1 tablet by mouth, Disp: , Rfl:     MYRBETRIQ 50 MG TB24, TAKE 1 TABLET BY MOUTH EVERY DAY, Disp: 30 tablet, Rfl: 5    omeprazole (PriLOSEC) 20 mg delayed release capsule, TAKE 1 CAPSULE BY MOUTH EVERY DAY, Disp: 90 capsule, Rfl: 1    PARoxetine (PAXIL) 20 mg tablet, TAKE 1 TABLET BY MOUTH EVERY DAY, Disp: 30 tablet, Rfl: 5    PROAIR  (90 Base) MCG/ACT inhaler, , Disp: , Rfl:     rivastigmine (EXELON) 4 6 mg/24 hr TD 24 hr patch, Place 1 patch on the skin daily, Disp: 30 patch, Rfl: 5    potassium chloride (K-DUR,KLOR-CON) 20 mEq tablet, Take 1 tablet (20 mEq total) by mouth daily for 5 days (Patient not taking: Reported on 3/18/2020), Disp: 5 tablet, Rfl: 0    ACTIVE PROBLEM LIST:  Patient Active Problem List   Diagnosis    Depression    Gastroesophageal reflux disease without esophagitis    Mixed hyperlipidemia    Essential hypertension    Acquired hypothyroidism    Impaired fasting glucose    Lymphoma (HCC)    Cerebral aneurysm, nonruptured    Mild cognitive impairment    Flexor tenosynovitis of finger    Macrocytosis without anemia    Paresthesias    Non-intractable vomiting with nausea    Diarrhea of presumed infectious origin, resolved    Hypokalemia       PAST MEDICAL HISTORY:  Past Medical History:   Diagnosis Date    Anxiety disorder     Aortic valve disorder     Cellulitis of finger of left hand 11/20/2018    Cellulitis of left hand 11/10/2018    Added automatically from request for surgery [id number]    Depression     History of gastroesophageal reflux (GERD)     History of kidney cancer 03/06/2014    Hyperlipidemia 03/06/2014    Hypertension 03/06/2014    Hypothyroidism 03/06/2014    Shortness of breath     TIA (transient ischemic attack)        PAST SURGICAL HISTORY:  Past Surgical History:   Procedure Laterality Date    ESOPHAGOGASTRIC FUNDOPLASTY      Nissen Fundoplication    HERNIA REPAIR         FAMILY HISTORY:  Family History   Problem Relation Age of Onset    Stroke Father     Leukemia Sister     Alcohol abuse Brother     Kidney cancer Brother        SOCIAL HISTORY:  Social History     Socioeconomic History    Marital status: /Civil Union     Spouse name: Not on file    Number of children: 2    Years of education: Not on file    Highest education level: Not on file   Occupational History    Not on file   Social Needs    Financial resource strain: Not on file    Food insecurity:     Worry: Not on file     Inability: Not on file    Transportation needs:     Medical: Not on file     Non-medical: Not on file   Tobacco Use    Smoking status: Never Smoker    Smokeless tobacco: Never Used Substance and Sexual Activity    Alcohol use: Yes     Frequency: Monthly or less     Drinks per session: 1 or 2     Comment: social-rare    Drug use: No    Sexual activity: Not on file   Lifestyle    Physical activity:     Days per week: Not on file     Minutes per session: Not on file    Stress: Not on file   Relationships    Social connections:     Talks on phone: Not on file     Gets together: Not on file     Attends Synagogue service: Not on file     Active member of club or organization: Not on file     Attends meetings of clubs or organizations: Not on file     Relationship status: Not on file    Intimate partner violence:     Fear of current or ex partner: Not on file     Emotionally abused: Not on file     Physically abused: Not on file     Forced sexual activity: Not on file   Other Topics Concern    Not on file   Social History Narrative    Not on file       Review of Systems   Respiratory: Negative for shortness of breath  Cardiovascular: Negative for chest pain  Gastrointestinal: Negative for abdominal pain  Objective:  Vitals:    03/18/20 1410   BP: 132/70   BP Location: Left arm   Patient Position: Sitting   Cuff Size: Large   Pulse: 88   SpO2: 94%   Weight: 78 9 kg (174 lb)   Height: 5' 2" (1 575 m)     Body mass index is 31 83 kg/m²  Physical Exam   Constitutional: She appears well-developed and well-nourished  Cardiovascular: Normal rate, regular rhythm and normal heart sounds  Pulmonary/Chest: Effort normal and breath sounds normal    Neurological: She is alert  Nursing note and vitals reviewed          RESULTS:    Recent Results (from the past 1008 hour(s))   ECG 12 lead    Collection Time: 03/05/20  7:32 PM   Result Value Ref Range    Ventricular Rate 74 BPM    Atrial Rate 74 BPM    FL Interval 140 ms    QRSD Interval 84 ms    QT Interval 410 ms    QTC Interval 455 ms    P Axis 35 degrees    QRS Axis 4 degrees    T Wave Axis 25 degrees   CBC and differential Collection Time: 03/05/20  7:35 PM   Result Value Ref Range    WBC 7 51 4 31 - 10 16 Thousand/uL    RBC 3 98 3 81 - 5 12 Million/uL    Hemoglobin 13 8 11 5 - 15 4 g/dL    Hematocrit 41 6 34 8 - 46 1 %     (H) 82 - 98 fL    MCH 34 7 (H) 26 8 - 34 3 pg    MCHC 33 2 31 4 - 37 4 g/dL    RDW 13 1 11 6 - 15 1 %    MPV 11 8 8 9 - 12 7 fL    Platelets 191 448 - 823 Thousands/uL    nRBC 0 /100 WBCs   Comprehensive metabolic panel    Collection Time: 03/05/20  7:35 PM   Result Value Ref Range    Sodium 141 136 - 145 mmol/L    Potassium 3 3 (L) 3 5 - 5 3 mmol/L    Chloride 104 100 - 108 mmol/L    CO2 26 21 - 32 mmol/L    ANION GAP 11 4 - 13 mmol/L    BUN 17 5 - 25 mg/dL    Creatinine 0 88 0 60 - 1 30 mg/dL    Glucose 115 65 - 140 mg/dL    Calcium 9 0 8 3 - 10 1 mg/dL    AST 18 5 - 45 U/L    ALT 20 12 - 78 U/L    Alkaline Phosphatase 78 46 - 116 U/L    Total Protein 7 6 6 4 - 8 2 g/dL    Albumin 3 6 3 5 - 5 0 g/dL    Total Bilirubin 0 40 0 20 - 1 00 mg/dL    eGFR 64 ml/min/1 73sq m   Troponin I    Collection Time: 03/05/20  7:35 PM   Result Value Ref Range    Troponin I <0 02 <=0 04 ng/mL   Manual Differential(PHLEBS Do Not Order)    Collection Time: 03/05/20  7:35 PM   Result Value Ref Range    Segmented % 84 (H) 43 - 75 %    Bands % 1 0 - 8 %    Lymphocytes % 11 (L) 14 - 44 %    Monocytes % 3 (L) 4 - 12 %    Eosinophils, % 1 0 - 6 %    Basophils % 0 0 - 1 %    Absolute Neutrophils 6 38 1 85 - 7 62 Thousand/uL    Lymphocytes Absolute 0 83 0 60 - 4 47 Thousand/uL    Monocytes Absolute 0 23 0 00 - 1 22 Thousand/uL    Eosinophils Absolute 0 08 0 00 - 0 40 Thousand/uL    Basophils Absolute 0 00 0 00 - 0 10 Thousand/uL    Total Counted 100     Platelet Estimate Adequate Adequate   UA w Reflex to Microscopic w Reflex to Culture    Collection Time: 03/05/20 11:03 PM   Result Value Ref Range    Color, UA Yellow     Clarity, UA Slightly Cloudy     Specific Gravity, UA <=1 005 1 003 - 1 030    pH, UA 7 5 4 5, 5 0, 5 5, 6 0, 6 5, 7  0, 7 5, 8 0    Leukocytes, UA Negative Negative    Nitrite, UA Negative Negative    Protein, UA Negative Negative mg/dl    Glucose, UA Negative Negative mg/dl    Ketones, UA 40 (2+) (A) Negative mg/dl    Urobilinogen, UA 0 2 0 2, 1 0 E U /dl E U /dl    Bilirubin, UA Negative Negative    Blood, UA Trace-Intact (A) Negative   Urine Microscopic    Collection Time: 03/05/20 11:03 PM   Result Value Ref Range    RBC, UA 2-4 (A) None Seen, 0-5 /hpf    WBC, UA 1-2 (A) None Seen, 0-5, 5-55, 5-65 /hpf    Epithelial Cells Moderate (A) None Seen, Occasional /hpf    Bacteria, UA Occasional None Seen, Occasional /hpf    AMORPH PHOSPATES Occasional /hpf   TSH, 3rd generation with Free T4 reflex    Collection Time: 03/06/20  4:33 AM   Result Value Ref Range    TSH 3RD GENERATON 4 448 (H) 0 358 - 3 740 uIU/mL   Basic metabolic panel    Collection Time: 03/06/20  4:33 AM   Result Value Ref Range    Sodium 140 136 - 145 mmol/L    Potassium 3 3 (L) 3 5 - 5 3 mmol/L    Chloride 105 100 - 108 mmol/L    CO2 27 21 - 32 mmol/L    ANION GAP 8 4 - 13 mmol/L    BUN 21 5 - 25 mg/dL    Creatinine 0 86 0 60 - 1 30 mg/dL    Glucose 102 65 - 140 mg/dL    Calcium 8 9 8 3 - 10 1 mg/dL    eGFR 66 ml/min/1 73sq m   Magnesium    Collection Time: 03/06/20  4:33 AM   Result Value Ref Range    Magnesium 2 0 1 6 - 2 6 mg/dL   CBC and differential    Collection Time: 03/06/20  4:33 AM   Result Value Ref Range    WBC 5 84 4 31 - 10 16 Thousand/uL    RBC 3 54 (L) 3 81 - 5 12 Million/uL    Hemoglobin 12 4 11 5 - 15 4 g/dL    Hematocrit 37 1 34 8 - 46 1 %     (H) 82 - 98 fL    MCH 35 0 (H) 26 8 - 34 3 pg    MCHC 33 4 31 4 - 37 4 g/dL    RDW 13 3 11 6 - 15 1 %    MPV 11 9 8 9 - 12 7 fL    Platelets 447 628 - 655 Thousands/uL    nRBC 0 /100 WBCs    Neutrophils Relative 61 43 - 75 %    Immat GRANS % 1 0 - 2 %    Lymphocytes Relative 20 14 - 44 %    Monocytes Relative 16 (H) 4 - 12 %    Eosinophils Relative 1 0 - 6 %    Basophils Relative 1 0 - 1 % Neutrophils Absolute 3 57 1 85 - 7 62 Thousands/µL    Immature Grans Absolute 0 07 0 00 - 0 20 Thousand/uL    Lymphocytes Absolute 1 16 0 60 - 4 47 Thousands/µL    Monocytes Absolute 0 92 0 17 - 1 22 Thousand/µL    Eosinophils Absolute 0 07 0 00 - 0 61 Thousand/µL    Basophils Absolute 0 05 0 00 - 0 10 Thousands/µL   Lipid panel    Collection Time: 03/06/20  4:33 AM   Result Value Ref Range    Cholesterol 187 50 - 200 mg/dL    Triglycerides 112 <=150 mg/dL    HDL, Direct 77 >=40 mg/dL    LDL Calculated 88 0 - 100 mg/dL    Non-HDL-Chol (CHOL-HDL) 110 mg/dl   T4, free    Collection Time: 03/06/20  4:33 AM   Result Value Ref Range    Free T4 1 31 0 76 - 1 46 ng/dL   Stool Enteric Bacterial Panel by PCR    Collection Time: 03/06/20  2:24 PM   Result Value Ref Range    Salmonella sp PCR None Detected None Detected    Shigella sp/Enteroinvasive E  coli (EIEC) PCR None Detected None Detected    Campylobacter sp (jejuni and coli) PCR None Detected None Detected    Shiga toxin 1/Shiga toxin 2 genes PCR None Detected None Detected   CBC and differential    Collection Time: 03/07/20  6:21 AM   Result Value Ref Range    WBC 4 39 4 31 - 10 16 Thousand/uL    RBC 3 37 (L) 3 81 - 5 12 Million/uL    Hemoglobin 12 0 11 5 - 15 4 g/dL    Hematocrit 36 0 34 8 - 46 1 %     (H) 82 - 98 fL    MCH 35 6 (H) 26 8 - 34 3 pg    MCHC 33 3 31 4 - 37 4 g/dL    RDW 13 5 11 6 - 15 1 %    MPV 12 0 8 9 - 12 7 fL    Platelets 446 926 - 386 Thousands/uL    nRBC 0 /100 WBCs   Basic metabolic panel    Collection Time: 03/07/20  6:21 AM   Result Value Ref Range    Sodium 138 136 - 145 mmol/L    Potassium 3 6 3 5 - 5 3 mmol/L    Chloride 106 100 - 108 mmol/L    CO2 23 21 - 32 mmol/L    ANION GAP 9 4 - 13 mmol/L    BUN 20 5 - 25 mg/dL    Creatinine 0 71 0 60 - 1 30 mg/dL    Glucose 96 65 - 140 mg/dL    Glucose, Fasting 96 65 - 99 mg/dL    Calcium 8 5 8 3 - 10 1 mg/dL    eGFR 84 ml/min/1 73sq m       This note was created with voice recognition software  Phonic, grammatical and spelling errors may be present within the note as a result

## 2020-04-08 DIAGNOSIS — G31.84 MILD COGNITIVE IMPAIRMENT: ICD-10-CM

## 2020-04-08 RX ORDER — RIVASTIGMINE 4.6 MG/24H
PATCH, EXTENDED RELEASE TRANSDERMAL
Qty: 90 PATCH | Refills: 1 | Status: SHIPPED | OUTPATIENT
Start: 2020-04-08 | End: 2020-04-15 | Stop reason: DRUGHIGH

## 2020-04-15 ENCOUNTER — TELEPHONE (OUTPATIENT)
Dept: NEUROLOGY | Facility: CLINIC | Age: 75
End: 2020-04-15

## 2020-04-15 ENCOUNTER — APPOINTMENT (OUTPATIENT)
Dept: LAB | Facility: HOSPITAL | Age: 75
End: 2020-04-15
Payer: COMMERCIAL

## 2020-04-15 ENCOUNTER — OFFICE VISIT (OUTPATIENT)
Dept: NEUROLOGY | Facility: CLINIC | Age: 75
End: 2020-04-15
Payer: COMMERCIAL

## 2020-04-15 VITALS
WEIGHT: 173.8 LBS | HEART RATE: 66 BPM | DIASTOLIC BLOOD PRESSURE: 88 MMHG | SYSTOLIC BLOOD PRESSURE: 146 MMHG | BODY MASS INDEX: 31.98 KG/M2 | RESPIRATION RATE: 18 BRPM | HEIGHT: 62 IN

## 2020-04-15 DIAGNOSIS — G30.1 LATE ONSET ALZHEIMER'S DISEASE WITHOUT BEHAVIORAL DISTURBANCE (HCC): ICD-10-CM

## 2020-04-15 DIAGNOSIS — F02.80 LATE ONSET ALZHEIMER'S DISEASE WITHOUT BEHAVIORAL DISTURBANCE (HCC): ICD-10-CM

## 2020-04-15 DIAGNOSIS — F02.80 LATE ONSET ALZHEIMER'S DISEASE WITHOUT BEHAVIORAL DISTURBANCE (HCC): Primary | ICD-10-CM

## 2020-04-15 DIAGNOSIS — I10 ESSENTIAL HYPERTENSION: ICD-10-CM

## 2020-04-15 DIAGNOSIS — E78.2 MIXED HYPERLIPIDEMIA: ICD-10-CM

## 2020-04-15 DIAGNOSIS — E03.9 ACQUIRED HYPOTHYROIDISM: ICD-10-CM

## 2020-04-15 DIAGNOSIS — G30.1 LATE ONSET ALZHEIMER'S DISEASE WITHOUT BEHAVIORAL DISTURBANCE (HCC): Primary | ICD-10-CM

## 2020-04-15 LAB
FOLATE SERPL-MCNC: >20 NG/ML (ref 3.1–17.5)
T4 FREE SERPL-MCNC: 1.84 NG/DL (ref 0.76–1.46)
TSH SERPL DL<=0.05 MIU/L-ACNC: 1.99 UIU/ML (ref 0.36–3.74)
VIT B12 SERPL-MCNC: 955 PG/ML (ref 100–900)

## 2020-04-15 PROCEDURE — 3077F SYST BP >= 140 MM HG: CPT | Performed by: PSYCHIATRY & NEUROLOGY

## 2020-04-15 PROCEDURE — 84439 ASSAY OF FREE THYROXINE: CPT

## 2020-04-15 PROCEDURE — 1160F RVW MEDS BY RX/DR IN RCRD: CPT | Performed by: PSYCHIATRY & NEUROLOGY

## 2020-04-15 PROCEDURE — 86592 SYPHILIS TEST NON-TREP QUAL: CPT

## 2020-04-15 PROCEDURE — 86618 LYME DISEASE ANTIBODY: CPT

## 2020-04-15 PROCEDURE — 99215 OFFICE O/P EST HI 40 MIN: CPT | Performed by: PSYCHIATRY & NEUROLOGY

## 2020-04-15 PROCEDURE — 84443 ASSAY THYROID STIM HORMONE: CPT

## 2020-04-15 PROCEDURE — 1036F TOBACCO NON-USER: CPT | Performed by: PSYCHIATRY & NEUROLOGY

## 2020-04-15 PROCEDURE — 3079F DIAST BP 80-89 MM HG: CPT | Performed by: PSYCHIATRY & NEUROLOGY

## 2020-04-15 PROCEDURE — 3008F BODY MASS INDEX DOCD: CPT | Performed by: PSYCHIATRY & NEUROLOGY

## 2020-04-15 PROCEDURE — 4040F PNEUMOC VAC/ADMIN/RCVD: CPT | Performed by: PSYCHIATRY & NEUROLOGY

## 2020-04-15 PROCEDURE — 82607 VITAMIN B-12: CPT

## 2020-04-15 PROCEDURE — 82746 ASSAY OF FOLIC ACID SERUM: CPT

## 2020-04-15 PROCEDURE — 36415 COLL VENOUS BLD VENIPUNCTURE: CPT

## 2020-04-15 RX ORDER — RIVASTIGMINE 9.5 MG/24H
1 PATCH, EXTENDED RELEASE TRANSDERMAL DAILY
Qty: 30 PATCH | Refills: 5 | Status: SHIPPED | OUTPATIENT
Start: 2020-04-15 | End: 2020-10-08

## 2020-04-16 LAB
B BURGDOR IGG+IGM SER-ACNC: <0.91 ISR (ref 0–0.9)
RPR SER QL: NORMAL

## 2020-04-26 DIAGNOSIS — F32.A DEPRESSION, UNSPECIFIED DEPRESSION TYPE: ICD-10-CM

## 2020-04-26 DIAGNOSIS — G31.84 MILD COGNITIVE IMPAIRMENT: ICD-10-CM

## 2020-04-26 RX ORDER — PAROXETINE HYDROCHLORIDE 20 MG/1
TABLET, FILM COATED ORAL
Qty: 90 TABLET | Refills: 1 | Status: SHIPPED | OUTPATIENT
Start: 2020-04-26 | End: 2020-10-23

## 2020-04-27 RX ORDER — DONEPEZIL HYDROCHLORIDE 10 MG/1
10 TABLET, FILM COATED ORAL
Qty: 90 TABLET | Refills: 1 | OUTPATIENT
Start: 2020-04-27

## 2020-04-28 ENCOUNTER — HOSPITAL ENCOUNTER (OUTPATIENT)
Facility: HOSPITAL | Age: 75
Setting detail: OBSERVATION
Discharge: HOME | End: 2020-04-29
Attending: EMERGENCY MEDICINE | Admitting: INTERNAL MEDICINE
Payer: COMMERCIAL

## 2020-04-28 ENCOUNTER — APPOINTMENT (EMERGENCY)
Dept: RADIOLOGY | Facility: HOSPITAL | Age: 75
End: 2020-04-28
Payer: COMMERCIAL

## 2020-04-28 DIAGNOSIS — R53.1 GENERALIZED WEAKNESS: ICD-10-CM

## 2020-04-28 DIAGNOSIS — R55 NEAR SYNCOPE: Primary | ICD-10-CM

## 2020-04-28 DIAGNOSIS — E87.6 HYPOKALEMIA: ICD-10-CM

## 2020-04-28 DIAGNOSIS — R06.02 SHORTNESS OF BREATH: ICD-10-CM

## 2020-04-28 PROBLEM — R07.9 CHEST PAIN: Status: ACTIVE | Noted: 2020-04-28

## 2020-04-28 LAB
ALBUMIN SERPL BCP-MCNC: 3.6 G/DL (ref 3.5–5)
ALP SERPL-CCNC: 78 U/L (ref 46–116)
ALT SERPL W P-5'-P-CCNC: 20 U/L (ref 12–78)
ANION GAP SERPL CALCULATED.3IONS-SCNC: 13 MMOL/L (ref 4–13)
AST SERPL W P-5'-P-CCNC: 23 U/L (ref 5–45)
BACTERIA UR QL AUTO: ABNORMAL /HPF
BASOPHILS # BLD AUTO: 0.06 THOUSANDS/ΜL (ref 0–0.1)
BASOPHILS NFR BLD AUTO: 1 % (ref 0–1)
BILIRUB SERPL-MCNC: 0.2 MG/DL (ref 0.2–1)
BILIRUB UR QL STRIP: NEGATIVE
BUN SERPL-MCNC: 16 MG/DL (ref 5–25)
CALCIUM SERPL-MCNC: 9.2 MG/DL (ref 8.3–10.1)
CAOX CRY URNS QL MICRO: ABNORMAL /HPF
CHLORIDE SERPL-SCNC: 101 MMOL/L (ref 100–108)
CLARITY UR: ABNORMAL
CO2 SERPL-SCNC: 25 MMOL/L (ref 21–32)
COLOR UR: YELLOW
CREAT SERPL-MCNC: 1 MG/DL (ref 0.6–1.3)
EOSINOPHIL # BLD AUTO: 0.11 THOUSAND/ΜL (ref 0–0.61)
EOSINOPHIL NFR BLD AUTO: 2 % (ref 0–6)
ERYTHROCYTE [DISTWIDTH] IN BLOOD BY AUTOMATED COUNT: 12.8 % (ref 11.6–15.1)
GFR SERPL CREATININE-BSD FRML MDRD: 55 ML/MIN/1.73SQ M
GLUCOSE SERPL-MCNC: 110 MG/DL (ref 65–140)
GLUCOSE UR STRIP-MCNC: NEGATIVE MG/DL
HCT VFR BLD AUTO: 41.6 % (ref 34.8–46.1)
HGB BLD-MCNC: 13.8 G/DL (ref 11.5–15.4)
HGB UR QL STRIP.AUTO: NEGATIVE
IMM GRANULOCYTES # BLD AUTO: 0.05 THOUSAND/UL (ref 0–0.2)
IMM GRANULOCYTES NFR BLD AUTO: 1 % (ref 0–2)
KETONES UR STRIP-MCNC: ABNORMAL MG/DL
LEUKOCYTE ESTERASE UR QL STRIP: ABNORMAL
LYMPHOCYTES # BLD AUTO: 1.4 THOUSANDS/ΜL (ref 0.6–4.47)
LYMPHOCYTES NFR BLD AUTO: 25 % (ref 14–44)
MCH RBC QN AUTO: 34.8 PG (ref 26.8–34.3)
MCHC RBC AUTO-ENTMCNC: 33.2 G/DL (ref 31.4–37.4)
MCV RBC AUTO: 105 FL (ref 82–98)
MONOCYTES # BLD AUTO: 0.65 THOUSAND/ΜL (ref 0.17–1.22)
MONOCYTES NFR BLD AUTO: 12 % (ref 4–12)
MUCOUS THREADS UR QL AUTO: ABNORMAL
NEUTROPHILS # BLD AUTO: 3.32 THOUSANDS/ΜL (ref 1.85–7.62)
NEUTS SEG NFR BLD AUTO: 59 % (ref 43–75)
NITRITE UR QL STRIP: NEGATIVE
NON-SQ EPI CELLS URNS QL MICRO: ABNORMAL /HPF
NRBC BLD AUTO-RTO: 0 /100 WBCS
PH UR STRIP.AUTO: 6 [PH]
PLATELET # BLD AUTO: 255 THOUSANDS/UL (ref 149–390)
PMV BLD AUTO: 11.9 FL (ref 8.9–12.7)
POTASSIUM SERPL-SCNC: 3.3 MMOL/L (ref 3.5–5.3)
PROT SERPL-MCNC: 7.4 G/DL (ref 6.4–8.2)
PROT UR STRIP-MCNC: ABNORMAL MG/DL
RBC # BLD AUTO: 3.97 MILLION/UL (ref 3.81–5.12)
RBC #/AREA URNS AUTO: ABNORMAL /HPF
SARS-COV-2 RNA RESP QL NAA+PROBE: NEGATIVE
SODIUM SERPL-SCNC: 139 MMOL/L (ref 136–145)
SP GR UR STRIP.AUTO: 1.02 (ref 1–1.03)
TROPONIN I SERPL-MCNC: <0.02 NG/ML
UROBILINOGEN UR QL STRIP.AUTO: 0.2 E.U./DL
WBC # BLD AUTO: 5.59 THOUSAND/UL (ref 4.31–10.16)
WBC #/AREA URNS AUTO: ABNORMAL /HPF

## 2020-04-28 PROCEDURE — 96360 HYDRATION IV INFUSION INIT: CPT

## 2020-04-28 PROCEDURE — 71045 X-RAY EXAM CHEST 1 VIEW: CPT

## 2020-04-28 PROCEDURE — 99285 EMERGENCY DEPT VISIT HI MDM: CPT | Performed by: EMERGENCY MEDICINE

## 2020-04-28 PROCEDURE — 36415 COLL VENOUS BLD VENIPUNCTURE: CPT | Performed by: EMERGENCY MEDICINE

## 2020-04-28 PROCEDURE — 87635 SARS-COV-2 COVID-19 AMP PRB: CPT | Performed by: EMERGENCY MEDICINE

## 2020-04-28 PROCEDURE — 93005 ELECTROCARDIOGRAM TRACING: CPT

## 2020-04-28 PROCEDURE — 84484 ASSAY OF TROPONIN QUANT: CPT | Performed by: EMERGENCY MEDICINE

## 2020-04-28 PROCEDURE — 99285 EMERGENCY DEPT VISIT HI MDM: CPT

## 2020-04-28 PROCEDURE — 81001 URINALYSIS AUTO W/SCOPE: CPT | Performed by: EMERGENCY MEDICINE

## 2020-04-28 PROCEDURE — 85025 COMPLETE CBC W/AUTO DIFF WBC: CPT | Performed by: EMERGENCY MEDICINE

## 2020-04-28 PROCEDURE — 80053 COMPREHEN METABOLIC PANEL: CPT | Performed by: EMERGENCY MEDICINE

## 2020-04-28 RX ORDER — POTASSIUM CHLORIDE 20 MEQ/1
20 TABLET, EXTENDED RELEASE ORAL ONCE
Status: COMPLETED | OUTPATIENT
Start: 2020-04-28 | End: 2020-04-28

## 2020-04-28 RX ADMIN — SODIUM CHLORIDE 500 ML: 0.9 INJECTION, SOLUTION INTRAVENOUS at 20:28

## 2020-04-28 RX ADMIN — POTASSIUM CHLORIDE 20 MEQ: 1500 TABLET, EXTENDED RELEASE ORAL at 21:44

## 2020-04-29 ENCOUNTER — APPOINTMENT (OUTPATIENT)
Dept: NON INVASIVE DIAGNOSTICS | Facility: HOSPITAL | Age: 75
End: 2020-04-29
Payer: COMMERCIAL

## 2020-04-29 VITALS
TEMPERATURE: 98 F | DIASTOLIC BLOOD PRESSURE: 60 MMHG | OXYGEN SATURATION: 94 % | HEART RATE: 65 BPM | BODY MASS INDEX: 34.36 KG/M2 | WEIGHT: 175 LBS | SYSTOLIC BLOOD PRESSURE: 123 MMHG | RESPIRATION RATE: 20 BRPM | HEIGHT: 60 IN

## 2020-04-29 LAB
ANION GAP SERPL CALCULATED.3IONS-SCNC: 10 MMOL/L (ref 4–13)
ATRIAL RATE: 78 BPM
BUN SERPL-MCNC: 13 MG/DL (ref 5–25)
CALCIUM SERPL-MCNC: 8.7 MG/DL (ref 8.3–10.1)
CHLORIDE SERPL-SCNC: 106 MMOL/L (ref 100–108)
CO2 SERPL-SCNC: 26 MMOL/L (ref 21–32)
CREAT SERPL-MCNC: 0.74 MG/DL (ref 0.6–1.3)
GFR SERPL CREATININE-BSD FRML MDRD: 80 ML/MIN/1.73SQ M
GLUCOSE P FAST SERPL-MCNC: 87 MG/DL (ref 65–99)
GLUCOSE SERPL-MCNC: 87 MG/DL (ref 65–140)
P AXIS: 35 DEGREES
PLATELET # BLD AUTO: 228 THOUSANDS/UL (ref 149–390)
PMV BLD AUTO: 11.4 FL (ref 8.9–12.7)
POTASSIUM SERPL-SCNC: 3.7 MMOL/L (ref 3.5–5.3)
PR INTERVAL: 144 MS
QRS AXIS: 9 DEGREES
QRSD INTERVAL: 88 MS
QT INTERVAL: 374 MS
QTC INTERVAL: 426 MS
SODIUM SERPL-SCNC: 142 MMOL/L (ref 136–145)
T WAVE AXIS: 56 DEGREES
TROPONIN I SERPL-MCNC: <0.02 NG/ML
TROPONIN I SERPL-MCNC: <0.02 NG/ML
VENTRICULAR RATE: 78 BPM

## 2020-04-29 PROCEDURE — 84484 ASSAY OF TROPONIN QUANT: CPT | Performed by: PHYSICIAN ASSISTANT

## 2020-04-29 PROCEDURE — 93306 TTE W/DOPPLER COMPLETE: CPT | Performed by: INTERNAL MEDICINE

## 2020-04-29 PROCEDURE — 93010 ELECTROCARDIOGRAM REPORT: CPT | Performed by: INTERNAL MEDICINE

## 2020-04-29 PROCEDURE — 99235 HOSP IP/OBS SAME DATE MOD 70: CPT | Performed by: INTERNAL MEDICINE

## 2020-04-29 PROCEDURE — 80048 BASIC METABOLIC PNL TOTAL CA: CPT | Performed by: PHYSICIAN ASSISTANT

## 2020-04-29 PROCEDURE — 93306 TTE W/DOPPLER COMPLETE: CPT

## 2020-04-29 PROCEDURE — 85049 AUTOMATED PLATELET COUNT: CPT | Performed by: PHYSICIAN ASSISTANT

## 2020-04-29 RX ORDER — POTASSIUM CHLORIDE 750 MG/1
10 CAPSULE, EXTENDED RELEASE ORAL DAILY
Qty: 30 CAPSULE | Refills: 0 | Status: SHIPPED | OUTPATIENT
Start: 2020-04-29

## 2020-04-29 RX ORDER — DOCUSATE SODIUM 100 MG/1
100 CAPSULE, LIQUID FILLED ORAL 2 TIMES DAILY
Status: DISCONTINUED | OUTPATIENT
Start: 2020-04-29 | End: 2020-04-29 | Stop reason: HOSPADM

## 2020-04-29 RX ORDER — ASPIRIN 325 MG
325 TABLET ORAL DAILY
Status: DISCONTINUED | OUTPATIENT
Start: 2020-04-29 | End: 2020-04-29 | Stop reason: HOSPADM

## 2020-04-29 RX ORDER — LEVOTHYROXINE SODIUM 0.12 MG/1
125 TABLET ORAL DAILY
Status: DISCONTINUED | OUTPATIENT
Start: 2020-04-29 | End: 2020-04-29 | Stop reason: HOSPADM

## 2020-04-29 RX ORDER — PAROXETINE HYDROCHLORIDE 20 MG/1
20 TABLET, FILM COATED ORAL DAILY
Status: DISCONTINUED | OUTPATIENT
Start: 2020-04-29 | End: 2020-04-29 | Stop reason: HOSPADM

## 2020-04-29 RX ORDER — PAROXETINE HYDROCHLORIDE 20 MG/1
20 TABLET, FILM COATED ORAL DAILY
Status: DISCONTINUED | OUTPATIENT
Start: 2020-04-29 | End: 2020-04-29

## 2020-04-29 RX ORDER — AMLODIPINE BESYLATE 5 MG/1
5 TABLET ORAL DAILY
Status: DISCONTINUED | OUTPATIENT
Start: 2020-04-29 | End: 2020-04-29 | Stop reason: HOSPADM

## 2020-04-29 RX ORDER — RIVASTIGMINE 9.5 MG/24H
1 PATCH, EXTENDED RELEASE TRANSDERMAL DAILY
Status: DISCONTINUED | OUTPATIENT
Start: 2020-04-29 | End: 2020-04-29 | Stop reason: HOSPADM

## 2020-04-29 RX ORDER — LANOLIN ALCOHOL/MO/W.PET/CERES
6 CREAM (GRAM) TOPICAL
Status: DISCONTINUED | OUTPATIENT
Start: 2020-04-29 | End: 2020-04-29 | Stop reason: HOSPADM

## 2020-04-29 RX ORDER — ALBUTEROL SULFATE 90 UG/1
1 AEROSOL, METERED RESPIRATORY (INHALATION) EVERY 6 HOURS PRN
Status: DISCONTINUED | OUTPATIENT
Start: 2020-04-29 | End: 2020-04-29 | Stop reason: HOSPADM

## 2020-04-29 RX ORDER — ACETAMINOPHEN 325 MG/1
650 TABLET ORAL EVERY 6 HOURS PRN
Status: DISCONTINUED | OUTPATIENT
Start: 2020-04-29 | End: 2020-04-29 | Stop reason: HOSPADM

## 2020-04-29 RX ORDER — ONDANSETRON 2 MG/ML
4 INJECTION INTRAMUSCULAR; INTRAVENOUS EVERY 6 HOURS PRN
Status: DISCONTINUED | OUTPATIENT
Start: 2020-04-29 | End: 2020-04-29 | Stop reason: HOSPADM

## 2020-04-29 RX ORDER — PANTOPRAZOLE SODIUM 40 MG/1
40 TABLET, DELAYED RELEASE ORAL
Status: DISCONTINUED | OUTPATIENT
Start: 2020-04-29 | End: 2020-04-29 | Stop reason: HOSPADM

## 2020-04-29 RX ORDER — OXYBUTYNIN CHLORIDE 5 MG/1
10 TABLET, EXTENDED RELEASE ORAL DAILY
Status: DISCONTINUED | OUTPATIENT
Start: 2020-04-29 | End: 2020-04-29 | Stop reason: HOSPADM

## 2020-04-29 RX ADMIN — ENOXAPARIN SODIUM 40 MG: 40 INJECTION SUBCUTANEOUS at 09:09

## 2020-04-29 RX ADMIN — ASPIRIN 325 MG ORAL TABLET 325 MG: 325 PILL ORAL at 09:09

## 2020-04-29 RX ADMIN — MELATONIN 6 MG: at 02:27

## 2020-04-29 RX ADMIN — OXYBUTYNIN CHLORIDE 10 MG: 5 TABLET, EXTENDED RELEASE ORAL at 09:08

## 2020-04-29 RX ADMIN — RIVASTIGMINE 1 PATCH: 9.5 PATCH TRANSDERMAL at 09:10

## 2020-04-29 RX ADMIN — DOCUSATE SODIUM 100 MG: 100 CAPSULE, LIQUID FILLED ORAL at 09:08

## 2020-04-29 RX ADMIN — LEVOTHYROXINE SODIUM 125 MCG: 125 TABLET ORAL at 05:06

## 2020-04-29 RX ADMIN — PAROXETINE 20 MG: 20 TABLET, FILM COATED ORAL at 02:34

## 2020-04-29 RX ADMIN — AMLODIPINE BESYLATE 5 MG: 5 TABLET ORAL at 09:08

## 2020-04-29 RX ADMIN — PANTOPRAZOLE SODIUM 40 MG: 40 TABLET, DELAYED RELEASE ORAL at 05:06

## 2020-04-30 ENCOUNTER — TRANSITIONAL CARE MANAGEMENT (OUTPATIENT)
Dept: INTERNAL MEDICINE CLINIC | Facility: CLINIC | Age: 75
End: 2020-04-30

## 2020-05-01 ENCOUNTER — TELEMEDICINE (OUTPATIENT)
Dept: INTERNAL MEDICINE CLINIC | Facility: CLINIC | Age: 75
End: 2020-05-01
Payer: COMMERCIAL

## 2020-05-01 DIAGNOSIS — R55 NEAR SYNCOPE: Primary | ICD-10-CM

## 2020-05-01 DIAGNOSIS — E87.6 HYPOKALEMIA: ICD-10-CM

## 2020-05-01 PROBLEM — R19.7 DIARRHEA OF PRESUMED INFECTIOUS ORIGIN: Status: RESOLVED | Noted: 2020-03-05 | Resolved: 2020-05-01

## 2020-05-01 PROCEDURE — 1111F DSCHRG MED/CURRENT MED MERGE: CPT | Performed by: INTERNAL MEDICINE

## 2020-05-01 PROCEDURE — 99495 TRANSJ CARE MGMT MOD F2F 14D: CPT | Performed by: INTERNAL MEDICINE

## 2020-05-05 ENCOUNTER — TELEPHONE (OUTPATIENT)
Dept: INTERNAL MEDICINE CLINIC | Facility: CLINIC | Age: 75
End: 2020-05-05

## 2020-05-21 DIAGNOSIS — E87.6 HYPOKALEMIA: ICD-10-CM

## 2020-05-22 RX ORDER — POTASSIUM CHLORIDE 750 MG/1
CAPSULE, EXTENDED RELEASE ORAL
Qty: 30 CAPSULE | Refills: 0 | OUTPATIENT
Start: 2020-05-22

## 2020-05-30 DIAGNOSIS — I10 ESSENTIAL HYPERTENSION: ICD-10-CM

## 2020-05-30 DIAGNOSIS — E87.6 HYPOKALEMIA: ICD-10-CM

## 2020-05-30 RX ORDER — POTASSIUM CHLORIDE 750 MG/1
CAPSULE, EXTENDED RELEASE ORAL
Qty: 30 CAPSULE | Refills: 0 | OUTPATIENT
Start: 2020-05-30

## 2020-06-01 ENCOUNTER — OFFICE VISIT (OUTPATIENT)
Dept: INTERNAL MEDICINE CLINIC | Facility: CLINIC | Age: 75
End: 2020-06-01
Payer: COMMERCIAL

## 2020-06-01 VITALS
HEIGHT: 60 IN | BODY MASS INDEX: 34.16 KG/M2 | SYSTOLIC BLOOD PRESSURE: 140 MMHG | TEMPERATURE: 98.4 F | DIASTOLIC BLOOD PRESSURE: 84 MMHG | OXYGEN SATURATION: 94 % | WEIGHT: 174 LBS | HEART RATE: 100 BPM

## 2020-06-01 DIAGNOSIS — F02.80 LATE ONSET ALZHEIMER'S DISEASE WITHOUT BEHAVIORAL DISTURBANCE (HCC): ICD-10-CM

## 2020-06-01 DIAGNOSIS — G30.1 LATE ONSET ALZHEIMER'S DISEASE WITHOUT BEHAVIORAL DISTURBANCE (HCC): ICD-10-CM

## 2020-06-01 DIAGNOSIS — I10 ESSENTIAL HYPERTENSION: Primary | ICD-10-CM

## 2020-06-01 DIAGNOSIS — R19.7 DIARRHEA, UNSPECIFIED TYPE: ICD-10-CM

## 2020-06-01 DIAGNOSIS — E03.9 HYPOTHYROIDISM, UNSPECIFIED TYPE: ICD-10-CM

## 2020-06-01 PROCEDURE — 4040F PNEUMOC VAC/ADMIN/RCVD: CPT | Performed by: INTERNAL MEDICINE

## 2020-06-01 PROCEDURE — 3008F BODY MASS INDEX DOCD: CPT | Performed by: INTERNAL MEDICINE

## 2020-06-01 PROCEDURE — 99214 OFFICE O/P EST MOD 30 MIN: CPT | Performed by: INTERNAL MEDICINE

## 2020-06-01 PROCEDURE — 3079F DIAST BP 80-89 MM HG: CPT | Performed by: INTERNAL MEDICINE

## 2020-06-01 PROCEDURE — 1160F RVW MEDS BY RX/DR IN RCRD: CPT | Performed by: INTERNAL MEDICINE

## 2020-06-01 PROCEDURE — 1036F TOBACCO NON-USER: CPT | Performed by: INTERNAL MEDICINE

## 2020-06-01 PROCEDURE — 3077F SYST BP >= 140 MM HG: CPT | Performed by: INTERNAL MEDICINE

## 2020-06-01 RX ORDER — AMLODIPINE BESYLATE 2.5 MG/1
TABLET ORAL
Qty: 180 TABLET | Refills: 0 | Status: SHIPPED | OUTPATIENT
Start: 2020-06-01 | End: 2020-08-23

## 2020-06-08 ENCOUNTER — APPOINTMENT (OUTPATIENT)
Dept: LAB | Facility: HOSPITAL | Age: 75
End: 2020-06-08
Attending: INTERNAL MEDICINE
Payer: COMMERCIAL

## 2020-06-08 DIAGNOSIS — R19.7 DIARRHEA, UNSPECIFIED TYPE: ICD-10-CM

## 2020-06-08 DIAGNOSIS — I10 ESSENTIAL HYPERTENSION: ICD-10-CM

## 2020-06-08 DIAGNOSIS — E03.9 HYPOTHYROIDISM, UNSPECIFIED TYPE: ICD-10-CM

## 2020-06-08 DIAGNOSIS — E87.6 HYPOKALEMIA: ICD-10-CM

## 2020-06-08 LAB
ALBUMIN SERPL BCP-MCNC: 3.6 G/DL (ref 3.5–5)
ALP SERPL-CCNC: 73 U/L (ref 46–116)
ALT SERPL W P-5'-P-CCNC: 17 U/L (ref 12–78)
AMYLASE SERPL-CCNC: 53 IU/L (ref 25–115)
ANION GAP SERPL CALCULATED.3IONS-SCNC: 11 MMOL/L (ref 4–13)
AST SERPL W P-5'-P-CCNC: 16 U/L (ref 5–45)
BASOPHILS # BLD AUTO: 0.07 THOUSANDS/ΜL (ref 0–0.1)
BASOPHILS NFR BLD AUTO: 1 % (ref 0–1)
BILIRUB SERPL-MCNC: 0.4 MG/DL (ref 0.2–1)
BUN SERPL-MCNC: 14 MG/DL (ref 5–25)
CALCIUM SERPL-MCNC: 9.1 MG/DL (ref 8.3–10.1)
CHLORIDE SERPL-SCNC: 106 MMOL/L (ref 100–108)
CO2 SERPL-SCNC: 27 MMOL/L (ref 21–32)
CREAT SERPL-MCNC: 0.95 MG/DL (ref 0.6–1.3)
EOSINOPHIL # BLD AUTO: 0.13 THOUSAND/ΜL (ref 0–0.61)
EOSINOPHIL NFR BLD AUTO: 3 % (ref 0–6)
ERYTHROCYTE [DISTWIDTH] IN BLOOD BY AUTOMATED COUNT: 13.1 % (ref 11.6–15.1)
GFR SERPL CREATININE-BSD FRML MDRD: 59 ML/MIN/1.73SQ M
GLUCOSE P FAST SERPL-MCNC: 111 MG/DL (ref 65–99)
HCT VFR BLD AUTO: 40.4 % (ref 34.8–46.1)
HGB BLD-MCNC: 13.4 G/DL (ref 11.5–15.4)
IMM GRANULOCYTES # BLD AUTO: 0.01 THOUSAND/UL (ref 0–0.2)
IMM GRANULOCYTES NFR BLD AUTO: 0 % (ref 0–2)
LIPASE SERPL-CCNC: 58 U/L (ref 73–393)
LYMPHOCYTES # BLD AUTO: 1.22 THOUSANDS/ΜL (ref 0.6–4.47)
LYMPHOCYTES NFR BLD AUTO: 24 % (ref 14–44)
MCH RBC QN AUTO: 35.1 PG (ref 26.8–34.3)
MCHC RBC AUTO-ENTMCNC: 33.2 G/DL (ref 31.4–37.4)
MCV RBC AUTO: 106 FL (ref 82–98)
MONOCYTES # BLD AUTO: 0.63 THOUSAND/ΜL (ref 0.17–1.22)
MONOCYTES NFR BLD AUTO: 12 % (ref 4–12)
NEUTROPHILS # BLD AUTO: 3.13 THOUSANDS/ΜL (ref 1.85–7.62)
NEUTS SEG NFR BLD AUTO: 60 % (ref 43–75)
NRBC BLD AUTO-RTO: 0 /100 WBCS
PLATELET # BLD AUTO: 261 THOUSANDS/UL (ref 149–390)
PMV BLD AUTO: 11.9 FL (ref 8.9–12.7)
POTASSIUM SERPL-SCNC: 3 MMOL/L (ref 3.5–5.3)
PROT SERPL-MCNC: 7.5 G/DL (ref 6.4–8.2)
RBC # BLD AUTO: 3.82 MILLION/UL (ref 3.81–5.12)
SODIUM SERPL-SCNC: 144 MMOL/L (ref 136–145)
T4 FREE SERPL-MCNC: 1.5 NG/DL (ref 0.76–1.46)
TSH SERPL DL<=0.05 MIU/L-ACNC: 2.24 UIU/ML (ref 0.36–3.74)
WBC # BLD AUTO: 5.19 THOUSAND/UL (ref 4.31–10.16)

## 2020-06-08 PROCEDURE — 83690 ASSAY OF LIPASE: CPT

## 2020-06-08 PROCEDURE — 85025 COMPLETE CBC W/AUTO DIFF WBC: CPT

## 2020-06-08 PROCEDURE — 80053 COMPREHEN METABOLIC PANEL: CPT

## 2020-06-08 PROCEDURE — 84443 ASSAY THYROID STIM HORMONE: CPT

## 2020-06-08 PROCEDURE — 82150 ASSAY OF AMYLASE: CPT

## 2020-06-08 PROCEDURE — 36415 COLL VENOUS BLD VENIPUNCTURE: CPT

## 2020-06-08 PROCEDURE — 84439 ASSAY OF FREE THYROXINE: CPT

## 2020-06-17 DIAGNOSIS — K21.9 GASTROESOPHAGEAL REFLUX DISEASE WITHOUT ESOPHAGITIS: ICD-10-CM

## 2020-06-17 RX ORDER — OMEPRAZOLE 20 MG/1
CAPSULE, DELAYED RELEASE ORAL
Qty: 90 CAPSULE | Refills: 1 | Status: SHIPPED | OUTPATIENT
Start: 2020-06-17 | End: 2020-12-07

## 2020-07-01 ENCOUNTER — OFFICE VISIT (OUTPATIENT)
Dept: INTERNAL MEDICINE CLINIC | Facility: CLINIC | Age: 75
End: 2020-07-01
Payer: COMMERCIAL

## 2020-07-01 VITALS
BODY MASS INDEX: 32.39 KG/M2 | OXYGEN SATURATION: 98 % | HEART RATE: 71 BPM | DIASTOLIC BLOOD PRESSURE: 82 MMHG | TEMPERATURE: 97.9 F | HEIGHT: 60 IN | WEIGHT: 165 LBS | SYSTOLIC BLOOD PRESSURE: 120 MMHG | RESPIRATION RATE: 16 BRPM

## 2020-07-01 DIAGNOSIS — R39.11 URINARY HESITANCY: ICD-10-CM

## 2020-07-01 DIAGNOSIS — E03.9 HYPOTHYROIDISM, UNSPECIFIED TYPE: Primary | ICD-10-CM

## 2020-07-01 DIAGNOSIS — E87.6 HYPOKALEMIA: ICD-10-CM

## 2020-07-01 DIAGNOSIS — I10 ESSENTIAL HYPERTENSION: ICD-10-CM

## 2020-07-01 LAB
SL AMB  POCT GLUCOSE, UA: NEGATIVE
SL AMB LEUKOCYTE ESTERASE,UA: ABNORMAL
SL AMB POCT BILIRUBIN,UA: ABNORMAL
SL AMB POCT BLOOD,UA: NEGATIVE
SL AMB POCT CLARITY,UA: ABNORMAL
SL AMB POCT COLOR,UA: ABNORMAL
SL AMB POCT KETONES,UA: 1.5
SL AMB POCT NITRITE,UA: ABNORMAL
SL AMB POCT PH,UA: 6
SL AMB POCT SPECIFIC GRAVITY,UA: 1.03
SL AMB POCT URINE PROTEIN: 1
SL AMB POCT UROBILINOGEN: 16

## 2020-07-01 PROCEDURE — 3079F DIAST BP 80-89 MM HG: CPT | Performed by: INTERNAL MEDICINE

## 2020-07-01 PROCEDURE — 1160F RVW MEDS BY RX/DR IN RCRD: CPT | Performed by: INTERNAL MEDICINE

## 2020-07-01 PROCEDURE — 3074F SYST BP LT 130 MM HG: CPT | Performed by: INTERNAL MEDICINE

## 2020-07-01 PROCEDURE — 4040F PNEUMOC VAC/ADMIN/RCVD: CPT | Performed by: INTERNAL MEDICINE

## 2020-07-01 PROCEDURE — 87086 URINE CULTURE/COLONY COUNT: CPT | Performed by: INTERNAL MEDICINE

## 2020-07-01 PROCEDURE — 99214 OFFICE O/P EST MOD 30 MIN: CPT | Performed by: INTERNAL MEDICINE

## 2020-07-01 PROCEDURE — 1036F TOBACCO NON-USER: CPT | Performed by: INTERNAL MEDICINE

## 2020-07-01 PROCEDURE — 81002 URINALYSIS NONAUTO W/O SCOPE: CPT | Performed by: INTERNAL MEDICINE

## 2020-07-01 PROCEDURE — 3008F BODY MASS INDEX DOCD: CPT | Performed by: INTERNAL MEDICINE

## 2020-07-01 NOTE — PROGRESS NOTES
Assessment/Plan:     Will recheck thyroid and potassium next week  Continue current medications  For her urinary symptoms, will obtain urine culture  Her daughter will start giving her cranberry tablets in case  Quality Measures:       Return in about 3 months (around 10/1/2020)  No problem-specific Assessment & Plan notes found for this encounter  Diagnoses and all orders for this visit:    Hypothyroidism, unspecified type  -     T4, free; Future  -     TSH, 3rd generation; Future    Essential hypertension    Hypokalemia  -     Basic metabolic panel; Future    Urinary hesitancy  -     Urine culture  -     POCT urine dip          Subjective:      Patient ID: Flores Celis is a 75 y o  female  Patient comes in today for follow-up with her daughter  Her blood pressure is controlled  Her T4 is just slightly off  But TSH is normal   She is taking that medicine as directed  She is taking the potassium again  Today, she notes pressure after urinating  She states this has been going on for a few weeks  This is the 1st her daughter is hearing of it  No other complaints today  Taking her medicines as directed  ALLERGIES:  Allergies   Allergen Reactions    Ciprofloxacin     Other     Sulfa Antibiotics     Penicillins Rash     Category: Allergy;   --  Pt received unasyn 1 5 mg IV 11-12-18 to 11-15-18    Sulfacetamide Rash     Category:  Allergy;        CURRENT MEDICATIONS:    Current Outpatient Medications:     amLODIPine (NORVASC) 2 5 mg tablet, TAKE 2 TABLETS BY MOUTH EVERY DAY, Disp: 180 tablet, Rfl: 0    aspirin 325 mg tablet, Take 325 mg by mouth as needed , Disp: , Rfl:     fluticasone (FLONASE) 50 mcg/act nasal spray, SPRAY 1 SPRAY INTO EACH NOSTRIL EVERY DAY (Patient taking differently: as needed ), Disp: 16 mL, Rfl: 1    levothyroxine 125 mcg tablet, TAKE 1 TABLET BY MOUTH DAILY, Disp: 90 tablet, Rfl: 1    multivitamin (THERAGRAN) TABS, Take 1 tablet by mouth, Disp: , Rfl:    MYRBETRIQ 50 MG TB24, TAKE 1 TABLET BY MOUTH EVERY DAY, Disp: 30 tablet, Rfl: 5    omeprazole (PriLOSEC) 20 mg delayed release capsule, TAKE 1 CAPSULE BY MOUTH EVERY DAY, Disp: 90 capsule, Rfl: 1    PARoxetine (PAXIL) 20 mg tablet, TAKE 1 TABLET BY MOUTH EVERY DAY, Disp: 90 tablet, Rfl: 1    potassium chloride (MICRO-K) 10 MEQ CR capsule, Take 1 capsule (10 mEq total) by mouth daily, Disp: 30 capsule, Rfl: 0    PROAIR  (90 Base) MCG/ACT inhaler, as needed , Disp: , Rfl:     rivastigmine (EXELON) 9 5 mg/24 hr TD 24 hr patch, Place 1 patch on the skin daily, Disp: 30 patch, Rfl: 5    ACTIVE PROBLEM LIST:  Patient Active Problem List   Diagnosis    Depression    Gastroesophageal reflux disease without esophagitis    Mixed hyperlipidemia    Essential hypertension    Hypothyroid    Impaired fasting glucose    Lymphoma (HCC)    Cerebral aneurysm, nonruptured    Mild cognitive impairment    Flexor tenosynovitis of finger    Macrocytosis without anemia    Paresthesias    Non-intractable vomiting with nausea    Hypokalemia    Late onset Alzheimer's disease without behavioral disturbance (HCC)    Chest pain    Near syncope       PAST MEDICAL HISTORY:  Past Medical History:   Diagnosis Date    Anxiety disorder     Aortic valve disorder     Breast cancer (HCC)     Cellulitis of finger of left hand 11/20/2018    Cellulitis of left hand 11/10/2018    Added automatically from request for surgery [id number]    Depression     History of gastroesophageal reflux (GERD)     History of kidney cancer 03/06/2014    Hyperlipidemia 03/06/2014    Hypertension 03/06/2014    Hypothyroidism 03/06/2014    Shortness of breath     TIA (transient ischemic attack)        PAST SURGICAL HISTORY:  Past Surgical History:   Procedure Laterality Date    ESOPHAGOGASTRIC FUNDOPLASTY      Nissen Fundoplication    HERNIA REPAIR         FAMILY HISTORY:  Family History   Problem Relation Age of Onset    Stroke Father  Leukemia Sister     Alcohol abuse Brother     Kidney cancer Brother        SOCIAL HISTORY:  Social History     Socioeconomic History    Marital status: /Civil Union     Spouse name: Not on file    Number of children: 2    Years of education: Not on file    Highest education level: Not on file   Occupational History    Not on file   Social Needs    Financial resource strain: Not on file    Food insecurity:     Worry: Not on file     Inability: Not on file    Transportation needs:     Medical: Not on file     Non-medical: Not on file   Tobacco Use    Smoking status: Never Smoker    Smokeless tobacco: Never Used   Substance and Sexual Activity    Alcohol use: Yes     Frequency: Monthly or less     Drinks per session: 1 or 2     Comment: social-rare    Drug use: No    Sexual activity: Not on file   Lifestyle    Physical activity:     Days per week: Not on file     Minutes per session: Not on file    Stress: Not on file   Relationships    Social connections:     Talks on phone: Not on file     Gets together: Not on file     Attends Yarsanism service: Not on file     Active member of club or organization: Not on file     Attends meetings of clubs or organizations: Not on file     Relationship status: Not on file    Intimate partner violence:     Fear of current or ex partner: Not on file     Emotionally abused: Not on file     Physically abused: Not on file     Forced sexual activity: Not on file   Other Topics Concern    Not on file   Social History Narrative    Not on file       Review of Systems   Respiratory: Negative for shortness of breath  Cardiovascular: Negative for chest pain  Gastrointestinal: Negative for abdominal pain           Objective:  Vitals:    07/01/20 1423   BP: 120/82   BP Location: Left arm   Patient Position: Sitting   Cuff Size: Extra-Large   Pulse: 71   Resp: 16   Temp: 97 9 °F (36 6 °C)   TempSrc: Tympanic   SpO2: 98%   Weight: 74 8 kg (165 lb)   Height: 5' (1 524 m)     Body mass index is 32 22 kg/m²  Physical Exam   Constitutional: She is oriented to person, place, and time  She appears well-developed and well-nourished  Cardiovascular: Normal rate, regular rhythm and normal heart sounds  Pulmonary/Chest: Effort normal and breath sounds normal    Abdominal: Soft  There is no tenderness  Neurological: She is alert and oriented to person, place, and time  Nursing note and vitals reviewed          RESULTS:    Recent Results (from the past 1008 hour(s))   CBC and differential    Collection Time: 06/08/20 12:52 PM   Result Value Ref Range    WBC 5 19 4 31 - 10 16 Thousand/uL    RBC 3 82 3 81 - 5 12 Million/uL    Hemoglobin 13 4 11 5 - 15 4 g/dL    Hematocrit 40 4 34 8 - 46 1 %     (H) 82 - 98 fL    MCH 35 1 (H) 26 8 - 34 3 pg    MCHC 33 2 31 4 - 37 4 g/dL    RDW 13 1 11 6 - 15 1 %    MPV 11 9 8 9 - 12 7 fL    Platelets 337 898 - 884 Thousands/uL    nRBC 0 /100 WBCs    Neutrophils Relative 60 43 - 75 %    Immat GRANS % 0 0 - 2 %    Lymphocytes Relative 24 14 - 44 %    Monocytes Relative 12 4 - 12 %    Eosinophils Relative 3 0 - 6 %    Basophils Relative 1 0 - 1 %    Neutrophils Absolute 3 13 1 85 - 7 62 Thousands/µL    Immature Grans Absolute 0 01 0 00 - 0 20 Thousand/uL    Lymphocytes Absolute 1 22 0 60 - 4 47 Thousands/µL    Monocytes Absolute 0 63 0 17 - 1 22 Thousand/µL    Eosinophils Absolute 0 13 0 00 - 0 61 Thousand/µL    Basophils Absolute 0 07 0 00 - 0 10 Thousands/µL   Comprehensive metabolic panel    Collection Time: 06/08/20 12:52 PM   Result Value Ref Range    Sodium 144 136 - 145 mmol/L    Potassium 3 0 (L) 3 5 - 5 3 mmol/L    Chloride 106 100 - 108 mmol/L    CO2 27 21 - 32 mmol/L    ANION GAP 11 4 - 13 mmol/L    BUN 14 5 - 25 mg/dL    Creatinine 0 95 0 60 - 1 30 mg/dL    Glucose, Fasting 111 (H) 65 - 99 mg/dL    Calcium 9 1 8 3 - 10 1 mg/dL    AST 16 5 - 45 U/L    ALT 17 12 - 78 U/L    Alkaline Phosphatase 73 46 - 116 U/L    Total Protein 7 5 6 4 - 8 2 g/dL    Albumin 3 6 3 5 - 5 0 g/dL    Total Bilirubin 0 40 0 20 - 1 00 mg/dL    eGFR 59 ml/min/1 73sq m   T4, free    Collection Time: 06/08/20 12:52 PM   Result Value Ref Range    Free T4 1 50 (H) 0 76 - 1 46 ng/dL   TSH, 3rd generation    Collection Time: 06/08/20 12:52 PM   Result Value Ref Range    TSH 3RD GENERATON 2 237 0 358 - 3 740 uIU/mL   Amylase    Collection Time: 06/08/20 12:52 PM   Result Value Ref Range    Amylase 53 25 - 115 IU/L   Lipase    Collection Time: 06/08/20 12:52 PM   Result Value Ref Range    Lipase 58 (L) 73 - 393 u/L   POCT urine dip    Collection Time: 07/01/20  2:47 PM   Result Value Ref Range    LEUKOCYTE ESTERASE,UA neg     NITRITE,UA neg     SL AMB POCT UROBILINOGEN 16     POCT URINE PROTEIN 1 0      PH,UA 6 0     BLOOD,UA negative     SPECIFIC GRAVITY,UA 1 030     KETONES,UA 1 5     BILIRUBIN,UA small     GLUCOSE, UA negative      COLOR,UA n/a     CLARITY,UA n/a        This note was created with voice recognition software  Phonic, grammatical and spelling errors may be present within the note as a result

## 2020-07-03 LAB — BACTERIA UR CULT: NORMAL

## 2020-07-06 ENCOUNTER — TELEPHONE (OUTPATIENT)
Dept: INTERNAL MEDICINE CLINIC | Facility: CLINIC | Age: 75
End: 2020-07-06

## 2020-07-06 DIAGNOSIS — R39.11 URINARY HESITANCY: Primary | ICD-10-CM

## 2020-07-06 RX ORDER — NITROFURANTOIN 25; 75 MG/1; MG/1
100 CAPSULE ORAL 2 TIMES DAILY
Qty: 10 CAPSULE | Refills: 0 | Status: SHIPPED | OUTPATIENT
Start: 2020-07-06 | End: 2020-07-11

## 2020-07-06 NOTE — TELEPHONE ENCOUNTER
----- Message from Carlos Soto sent at 7/6/2020 10:00 AM EDT -----  Yes she is still having symptoms, daughter would like the antibiotic

## 2020-07-06 NOTE — TELEPHONE ENCOUNTER
----- Message from Trista Purvis sent at 7/6/2020 10:00 AM EDT -----  Yes she is still having symptoms, daughter would like the antibiotic

## 2020-07-27 DIAGNOSIS — E03.9 HYPOTHYROIDISM, UNSPECIFIED TYPE: ICD-10-CM

## 2020-07-27 DIAGNOSIS — N39.0 RECURRENT UTI: ICD-10-CM

## 2020-07-27 RX ORDER — MIRABEGRON 50 MG/1
TABLET, FILM COATED, EXTENDED RELEASE ORAL
Qty: 30 TABLET | Refills: 5 | Status: SHIPPED | OUTPATIENT
Start: 2020-07-27 | End: 2021-02-08 | Stop reason: SDUPTHER

## 2020-07-27 RX ORDER — LEVOTHYROXINE SODIUM 0.12 MG/1
TABLET ORAL
Qty: 90 TABLET | Refills: 1 | Status: SHIPPED | OUTPATIENT
Start: 2020-07-27 | End: 2020-12-06

## 2020-08-05 ENCOUNTER — TELEPHONE (OUTPATIENT)
Dept: INTERNAL MEDICINE CLINIC | Facility: CLINIC | Age: 75
End: 2020-08-05

## 2020-08-05 NOTE — TELEPHONE ENCOUNTER
Patient called and said that she thinks she has another UTI and wanted to know if you could send in a script or does she have to be seen? Patient has been taking the Cranberry Pills that you suggested she start taking  Please advise        [facility]

## 2020-08-06 DIAGNOSIS — N39.0 RECURRENT UTI: Primary | ICD-10-CM

## 2020-08-08 ENCOUNTER — NURSE TRIAGE (OUTPATIENT)
Dept: OTHER | Facility: OTHER | Age: 75
End: 2020-08-08

## 2020-08-08 DIAGNOSIS — N39.0 URINARY TRACT INFECTION WITHOUT HEMATURIA, SITE UNSPECIFIED: Primary | ICD-10-CM

## 2020-08-08 RX ORDER — NITROFURANTOIN 25; 75 MG/1; MG/1
100 CAPSULE ORAL 2 TIMES DAILY
Qty: 10 CAPSULE | Refills: 0 | Status: SHIPPED | OUTPATIENT
Start: 2020-08-08 | End: 2020-08-08 | Stop reason: CLARIF

## 2020-08-08 RX ORDER — NITROFURANTOIN 25; 75 MG/1; MG/1
100 CAPSULE ORAL 2 TIMES DAILY
Qty: 10 CAPSULE | Refills: 0 | Status: SHIPPED | OUTPATIENT
Start: 2020-08-08 | End: 2020-09-18

## 2020-08-08 NOTE — TELEPHONE ENCOUNTER
PATIENT CALLED WITH UTI SYMPTOMS  STATES A MED WAS SUPPOSED TO BE PUT IN YESTERDAY BUT IT WAS NEVER PUT IN  [facility] TEXT SENT TO DR Baldomero Alston TO REQUEST PRESCRIPTION  PER DR ORTIZ PLEASE ENTER MACROBID PO BID FOR 5 DAYS  PATIENT IS AWARE PRESCRIPTON CALLED IN

## 2020-08-08 NOTE — TELEPHONE ENCOUNTER
Reason for Disposition   Age > 50 years    Answer Assessment - Initial Assessment Questions  1  SEVERITY: "How bad is the pain?"  (e g , Scale 1-10; mild, moderate, or severe)    - MILD (1-3): complains slightly about urination hurting    - MODERATE (4-7): interferes with normal activities      - SEVERE (8-10): excruciating, unwilling or unable to urinate because of the pain       7  2  FREQUENCY: "How many times have you had painful urination today?"       yes  3  PATTERN: "Is pain present every time you urinate or just sometimes?"       yes  4  ONSET: "When did the painful urination start?"       yesterday  5  FEVER: "Do you have a fever?" If so, ask: "What is your temperature, how was it measured, and when did it start?"      DENIES  6  PAST UTI: "Have you had a urine infection before?" If so, ask: "When was the last time?" and "What happened that time?"       YES  7  CAUSE: "What do you think is causing the painful urination?"  (e g , UTI, scratch, Herpes sore)      UTI  8  OTHER SYMPTOMS: "Do you have any other symptoms?" (e g , flank pain, vaginal discharge, genital sores, urgency, blood in urine)     NO  9  PREGNANCY: "Is there any chance you are pregnant?" "When was your last menstrual period? "NO    Protocols used: URINATION PAIN [facility]

## 2020-08-08 NOTE — TELEPHONE ENCOUNTER
Regarding: macrobid not at pharmacy  ----- Message from Jaime Lance sent at 8/8/2020  6:34 PM EDT -----  " My mothers macrobid medication was supposed to be at the pharmacy and it is not  "

## 2020-08-08 NOTE — TELEPHONE ENCOUNTER
PRESCRIPTION WAS PUT IN AS PHONE IN   ADJUSTED TO NORMAL AND SENT AGAIN  CALL PLACED TO PATIENT  SPOKE WITH DAUGHTER - PRESCRIPTION NOW AVAILABLE      NO TRIAGE NEEDED

## 2020-08-08 NOTE — TELEPHONE ENCOUNTER
Regarding: UTI  ----- Message from Paulina Whyte sent at 8/8/2020  2:30 PM EDT -----  "My mom has a UTI    We thought Dr Laron Pickard called in a script to the pharmacy, but nothing was called in "

## 2020-08-23 DIAGNOSIS — I10 ESSENTIAL HYPERTENSION: ICD-10-CM

## 2020-08-23 RX ORDER — AMLODIPINE BESYLATE 2.5 MG/1
TABLET ORAL
Qty: 180 TABLET | Refills: 0 | Status: SHIPPED | OUTPATIENT
Start: 2020-08-23 | End: 2020-11-28

## 2020-08-27 ENCOUNTER — OFFICE VISIT (OUTPATIENT)
Dept: NEUROLOGY | Facility: CLINIC | Age: 75
End: 2020-08-27
Payer: COMMERCIAL

## 2020-08-27 VITALS
WEIGHT: 170 LBS | SYSTOLIC BLOOD PRESSURE: 130 MMHG | DIASTOLIC BLOOD PRESSURE: 70 MMHG | HEART RATE: 88 BPM | BODY MASS INDEX: 33.2 KG/M2 | TEMPERATURE: 98.6 F

## 2020-08-27 DIAGNOSIS — E78.2 MIXED HYPERLIPIDEMIA: ICD-10-CM

## 2020-08-27 DIAGNOSIS — G30.1 LATE ONSET ALZHEIMER'S DISEASE WITHOUT BEHAVIORAL DISTURBANCE (HCC): Primary | ICD-10-CM

## 2020-08-27 DIAGNOSIS — I10 ESSENTIAL HYPERTENSION: ICD-10-CM

## 2020-08-27 DIAGNOSIS — F02.80 LATE ONSET ALZHEIMER'S DISEASE WITHOUT BEHAVIORAL DISTURBANCE (HCC): Primary | ICD-10-CM

## 2020-08-27 PROCEDURE — 3075F SYST BP GE 130 - 139MM HG: CPT | Performed by: PSYCHIATRY & NEUROLOGY

## 2020-08-27 PROCEDURE — 99214 OFFICE O/P EST MOD 30 MIN: CPT | Performed by: PSYCHIATRY & NEUROLOGY

## 2020-08-27 PROCEDURE — 1036F TOBACCO NON-USER: CPT | Performed by: PSYCHIATRY & NEUROLOGY

## 2020-08-27 PROCEDURE — 1160F RVW MEDS BY RX/DR IN RCRD: CPT | Performed by: PSYCHIATRY & NEUROLOGY

## 2020-08-27 PROCEDURE — 4040F PNEUMOC VAC/ADMIN/RCVD: CPT | Performed by: PSYCHIATRY & NEUROLOGY

## 2020-08-27 PROCEDURE — 3078F DIAST BP <80 MM HG: CPT | Performed by: PSYCHIATRY & NEUROLOGY

## 2020-08-27 NOTE — PROGRESS NOTES
Savanah Junior is a 75 y o  female  Chief Complaint   Patient presents with    Alzheimer's       Assessment:  1  Late onset Alzheimer's disease without behavioral disturbance ([address] )    2  Essential hypertension    3  Mixed hyperlipidemia        Plan:  Continue with Exelon patch 9 5 mg per 24 hour  Follow-up in 4 months  Discussion:   patient's MRI scan of the brain carotid ultrasound and blood work results were reviewed with her, there were essentially unremarkable, she was advised to continue with Exelon patch at 9 5 mg per 24 hours, she is tolerating it well, her [place] is slightly improved from last time it is 19/30 it was 18/30 in the past,  we discussed regarding Namenda and its side effects, she would like to hold off on that for now, she was advised to continue with mentally stimulating exercises, to keep her blood pressure cholesterol and sugar under, to be under constant supervision, she does not drive, to go to the hospital if has any worsening symptoms and call me otherwise to see me back in 4 months and follow up with other physicians  Subjective:    HPI   Patient is here in follow-up for her memory difficulty, since her last visit she seems to be doing good, she denies having any worsening of the memory issues, no headache, no vision or speech difficulty, no motor or sensory symptoms in upper or lower extremities, no dizziness, no focal weakness, she is tolerating the Exelon patch well, she is under constant supervision, she ambulates with a cane for safety, she has not had any falls, no seizures, no other neurological complaints      Vitals:    08/27/20 1255   BP: 130/70   BP Location: Left arm   Patient Position: Sitting   Cuff Size: Adult   Pulse: 88   Temp: 98 6 °F (37 °C)   TempSrc: Tympanic   Weight: 77 1 kg (170 lb)       Current Medications    Current Outpatient Medications:     amLODIPine (NORVASC) 2 5 mg tablet, TAKE 2 TABLETS BY MOUTH EVERY DAY, Disp: 180 tablet, Rfl: 0    aspirin 325 mg tablet, Take 325 mg by mouth as needed , Disp: , Rfl:     fluticasone (FLONASE) 50 mcg/act nasal spray, SPRAY 1 SPRAY INTO EACH NOSTRIL EVERY DAY (Patient taking differently: as needed ), Disp: 16 mL, Rfl: 1    levothyroxine 125 mcg tablet, TAKE 1 TABLET BY MOUTH EVERY DAY, Disp: 90 tablet, Rfl: 1    multivitamin (THERAGRAN) TABS, Take 1 tablet by mouth, Disp: , Rfl:     MYRBETRIQ 50 MG TB24, TAKE 1 TABLET BY MOUTH EVERY DAY, Disp: 30 tablet, Rfl: 5    omeprazole (PriLOSEC) 20 mg delayed release capsule, TAKE 1 CAPSULE BY MOUTH EVERY DAY, Disp: 90 capsule, Rfl: 1    PARoxetine (PAXIL) 20 mg tablet, TAKE 1 TABLET BY MOUTH EVERY DAY, Disp: 90 tablet, Rfl: 1    potassium chloride (MICRO-K) 10 MEQ CR capsule, Take 1 capsule (10 mEq total) by mouth daily, Disp: 30 capsule, Rfl: 0    PROAIR  (90 Base) MCG/ACT inhaler, as needed , Disp: , Rfl:     rivastigmine (EXELON) 9 5 mg/24 hr TD 24 hr patch, Place 1 patch on the skin daily, Disp: 30 patch, Rfl: 5      Allergies  Ciprofloxacin; Other; Sulfa antibiotics;  Penicillins; and Sulfacetamide    Past Medical History  Past Medical History:   Diagnosis Date    Anxiety disorder     Aortic valve disorder     [facility] [address] [address]     Cellulitis of finger of left hand 11/20/2018    Cellulitis of left hand 11/10/2018    Added automatically from request for surgery [id number]    Depression     History of gastroesophageal reflux (GERD)     History of kidney cancer 03/06/2014    Hyperlipidemia 03/06/2014    Hypertension 03/06/2014    Hypothyroidism 03/06/2014    Shortness of breath     TIA (transient ischemic attack)          Past Surgical History:  Past Surgical History:   Procedure Laterality Date    ESOPHAGOGASTRIC FUNDOPLASTY      Nissen Fundoplication    HERNIA REPAIR           Family History:  Family History   Problem Relation Age of Onset    Stroke Father     Leukemia Sister     Alcohol abuse Brother     Kidney cancer Brother        Social History:   reports that she has never smoked  She has never used smokeless tobacco  She reports current alcohol use  She reports that she does not use drugs  I have reviewed the past medical history, surgical history, social and family history, current medications, allergies vitals, review of systems, and updated this information as appropriate today  Objective:    Physical Exam    Neurological Exam      GENERAL:  Cooperative in no acute distress  Well-developed and well-nourished    HEAD and NECK   Head is atraumatic normocephalic with no lesions or masses  Neck is supple with full range of motion    CARDIOVASCULAR  Carotid Arteries-no carotid bruits  NEUROLOGIC:  Mental Status-the patient is awake alert and oriented without aphasia or apraxia, [place]ce is 19/30  Cranial Nerves: Visual fields are full to confrontation  Extraocular movements are full without nystagmus  Pupils are 2-1/2 mm and reactive  Face is symmetrical to light touch  Movements of facial expression move symmetrically  Hearing is normal to finger rub bilaterally  Soft palate lifts symmetrically  Shoulder shrug is symmetrical  Tongue is midline without atrophy  Patient did remove the mask for the exam  Motor: No drift is noted on arm extension  Strength is full in the upper and lower extremities with normal bulk and tone  Sensory: Intact to temperature and vibratory sensation in the upper and lower extremities bilaterally  Cortical function is intact  Coordination: Finger to nose testing is performed accurately  Ambulates with a cane for safety  Reflexes:   2+ and symmetrical        ROS:  Review of Systems   Constitutional: Negative  Negative for appetite change and fever  HENT: Negative  Negative for hearing loss, tinnitus, trouble swallowing and voice change  Eyes: Negative  Negative for photophobia and pain  Respiratory: Negative  Negative for shortness of breath  Cardiovascular: Negative  Negative for palpitations  Gastrointestinal: Negative  Negative for nausea and vomiting  Endocrine: Negative  Negative for cold intolerance  Genitourinary: Negative  Negative for dysuria, frequency and urgency  Musculoskeletal: Negative  Negative for myalgias and neck pain  Skin: Negative  Negative for rash  Neurological: Negative  Negative for dizziness, tremors, seizures, syncope, facial asymmetry, speech difficulty, weakness, light-headedness, numbness and headaches  Hematological: Negative  Does not bruise/bleed easily  Psychiatric/Behavioral: Negative  Negative for confusion, hallucinations and sleep disturbance

## 2020-08-31 ENCOUNTER — HOSPITAL ENCOUNTER (EMERGENCY)
Facility: HOSPITAL | Age: 75
Discharge: HOME | End: 2020-08-31
Attending: EMERGENCY MEDICINE | Admitting: EMERGENCY MEDICINE
Payer: COMMERCIAL

## 2020-08-31 VITALS
HEART RATE: 89 BPM | BODY MASS INDEX: 33.28 KG/M2 | RESPIRATION RATE: 20 BRPM | TEMPERATURE: 97.8 F | WEIGHT: 170.42 LBS | OXYGEN SATURATION: 97 % | SYSTOLIC BLOOD PRESSURE: 130 MMHG | DIASTOLIC BLOOD PRESSURE: 78 MMHG
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
## 2020-08-31 PROCEDURE — 99283 EMERGENCY DEPT VISIT LOW MDM: CPT

## 2020-08-31 PROCEDURE — 99284 EMERGENCY DEPT VISIT MOD MDM: CPT | Performed by: EMERGENCY MEDICINE

## 2020-08-31 RX ORDER — AMOXICILLIN AND CLAVULANATE POTASSIUM 875; 125 MG/1; MG/1
1 TABLET, FILM COATED ORAL ONCE
Status: COMPLETED | OUTPATIENT
Start: 2020-08-31 | End: 2020-08-31

## 2020-08-31 RX ORDER — AMOXICILLIN AND CLAVULANATE POTASSIUM 875; 125 MG/1; MG/1
1 TABLET, FILM COATED ORAL EVERY 12 HOURS
Qty: 20 TABLET | Refills: 0 | Status: SHIPPED | OUTPATIENT
Start: 2020-08-31 | End: 2020-09-10

## 2020-08-31 RX ORDER — DOXYCYCLINE HYCLATE 100 MG/1
100 CAPSULE ORAL ONCE
Status: DISCONTINUED | OUTPATIENT
Start: 2020-08-31 | End: 2020-08-31

## 2020-08-31 RX ADMIN — AMOXICILLIN AND CLAVULANATE POTASSIUM 1 TABLET: 875; 125 TABLET, FILM COATED ORAL at 21:25

## 2020-09-01 NOTE — ED PROVIDER NOTES
History  Chief Complaint   Patient presents with    Cat Bite     Pt states she was bit by her cat in the left hand earlier today        Cat Bite   Contact animal:  Cat  Location:  Hand  Hand injury location:  [place] of L hand  Time since incident:  3 hours  Pain details:     Quality:  Aching    Severity:  Mild    Timing:  Constant    Progression:  Worsening  Incident location:  Home  Provoked: provoked (She was trying to move the cat off her lap and he got irritated and bit at her)    Notifications:  None  Animal's rabies vaccination status:  Up to date  Animal in possession: yes    Tetanus status:  Up to date  Relieved by:  Nothing  Worsened by:  Nothing  Ineffective treatments:  None tried  Associated symptoms: swelling (mild swelling to area of puncture)    Associated symptoms: no fever, no numbness and no rash        Prior to Admission Medications   Prescriptions Last Dose Informant Patient Reported? Taking?    MYRBETRIQ 50 MG TB24   No No   Sig: TAKE 1 TABLET BY MOUTH EVERY DAY   PARoxetine (PAXIL) 20 mg tablet   No No   Sig: TAKE 1 TABLET BY MOUTH EVERY DAY   PROAIR  (90 Base) MCG/ACT inhaler   Yes No   Sig: as needed    amLODIPine (NORVASC) 2 5 mg tablet   No No   Sig: TAKE 2 TABLETS BY MOUTH EVERY DAY   aspirin 325 mg tablet  Self Yes No   Sig: Take 325 mg by mouth as needed    fluticasone (FLONASE) 50 mcg/act nasal spray   No No   Sig: SPRAY 1 SPRAY INTO EACH NOSTRIL EVERY DAY   Patient taking differently: as needed    levothyroxine 125 mcg tablet   No No   Sig: TAKE 1 TABLET BY MOUTH EVERY DAY   multivitamin (THERAGRAN) TABS  Self Yes No   Sig: Take 1 tablet by mouth   omeprazole (PriLOSEC) 20 mg delayed release capsule   No No   Sig: TAKE 1 CAPSULE BY MOUTH EVERY DAY   potassium chloride (MICRO-K) 10 MEQ CR capsule   No No   Sig: Take 1 capsule (10 mEq total) by mouth daily   rivastigmine (EXELON) 9 5 mg/24 hr TD 24 hr patch   No No   Sig: Place 1 patch on the skin daily      Facility-Administered Medications: None       Past Medical History:   Diagnosis Date    Anxiety disorder     Aortic valve disorder     Breast cancer ([address] )     Cellulitis of finger of left hand 11/20/2018    Cellulitis of left hand 11/10/2018    Added automatically from request for surgery [id number]    Depression     History of gastroesophageal reflux (GERD)     History of kidney cancer 03/06/2014    Hyperlipidemia 03/06/2014    Hypertension 03/06/2014    Hypothyroidism 03/06/2014    Shortness of breath     TIA (transient ischemic attack)        Past Surgical History:   Procedure Laterality Date    ESOPHAGOGASTRIC FUNDOPLASTY      Nissen Fundoplication    HERNIA REPAIR         Family History   Problem Relation Age of Onset    Stroke Father     Leukemia Sister     Alcohol abuse Brother     Kidney cancer Brother      I have reviewed and agree with the history as documented  E-Cigarette/Vaping    E-Cigarette Use Never User      E-Cigarette/Vaping Substances    Nicotine No     THC No     CBD No     Flavoring No     Other No     Unknown No      Social History     Tobacco Use    Smoking status: Never Smoker    Smokeless tobacco: Never Used   Substance Use Topics    Alcohol use: Yes     Frequency: Monthly or less     Drinks per session: 1 or 2     Comment: social-rare    Drug use: No       Review of Systems   Constitutional: Negative for fever  Skin: Negative for rash  Neurological: Negative for numbness  All other systems reviewed and are negative  Physical Exam  Physical Exam  Vitals signs reviewed  HENT:      Head: Normocephalic  Cardiovascular:      Rate and Rhythm: Normal rate  Pulmonary:      Effort: Pulmonary effort is normal    Musculoskeletal:      Left hand: She exhibits tenderness and swelling (2 small puncture marks to dorsum of left hand, one with mild swelling and tenderness)  She exhibits normal capillary refill and no deformity          Hands:    Neurological:      Mental Status: She is alert  Vital Signs  ED Triage Vitals   Temperature Pulse Respirations Blood Pressure SpO2   08/31/20 2043 08/31/20 2043 08/31/20 2043 08/31/20 2043 08/31/20 2120   97 8 °F (36 6 °C) 89 20 130/78 97 %      Temp Source Heart Rate Source Patient Position - Orthostatic VS BP Location FiO2 (%)   08/31/20 2043 08/31/20 2043 08/31/20 2043 08/31/20 2043 --   Oral Monitor Sitting Left arm       Pain Score       --                  Vitals:    08/31/20 2043   BP: 130/78   Pulse: 89   Patient Position - Orthostatic VS: Sitting         Visual Acuity      ED Medications  Medications   amoxicillin-clavulanate (AUGMENTIN) 875-125 mg per tablet 1 tablet (1 tablet Oral Given 8/31/20 2125)       Diagnostic Studies  Results Reviewed     None                 No orders to display              Procedures  Procedures         ED Course       US AUDIT      Most Recent Value   Initial Alcohol Screen: US AUDIT-C    1  How often do you have a drink containing alcohol?  0 Filed at: 08/31/2020 2053   2  How many drinks containing alcohol do you have on a typical day you are drinking? 0 Filed at: 08/31/2020 2053   3a  Male UNDER 65: How often do you have five or more drinks on one occasion? 0 Filed at: 08/31/2020 2053   3b  FEMALE Any Age, or MALE 65+: How often do you have 4 or more drinks on one occassion? 0 Filed at: 08/31/2020 2053   Audit-C Score  0 Filed at: 08/31/2020 2053                  RENZO/DAST-10      Most Recent Value   How many times in the past year have you    Used an illegal drug or used a prescription medication for non-medical reasons?   Never Filed at: 08/31/2020 2053                                MDM  Number of Diagnoses or Management Options  Cat bite, initial encounter: new and does not require workup     Amount and/or Complexity of Data Reviewed  Review and summarize past medical records: yes (Pt got doxy and clinda last time and had episode of hives, so was given unasyn and discharged on augmentin so will prescribe augmentin)    Patient Progress  Patient progress: stable        Disposition  Final diagnoses:   Cat bite, initial encounter     Time reflects when diagnosis was documented in both MDM as applicable and the Disposition within this note     Time User Action Codes Description Comment    8/31/2020  9:21 PM Maeve Epstein Cat bite, initial encounter       ED Disposition     ED Disposition Condition Date/Time Comment    Discharge Stable Mon Aug 31, 2020  9:21 PM Narinder Buckner discharge to home/self care              Follow-up Information     Follow up With Specialties Details Why Contact Info Additional Information    Ta Oliver MD Internal Medicine Call  If symptoms worsen [address]  [place] [address]       [facility] Go to  If symptoms worsen [address] [facility] [address] [place], [place], [address]          Discharge Medication List as of 8/31/2020  9:22 PM      START taking these medications    Details   amoxicillin-clavulanate (AUGMENTIN) 875-125 mg per tablet Take 1 tablet by mouth every 12 (twelve) hours for 10 days, Starting Mon 8/31/2020, Until Thu 9/10/2020, Print         CONTINUE these medications which have NOT CHANGED    Details   amLODIPine (NORVASC) 2 5 mg tablet TAKE 2 TABLETS BY MOUTH EVERY DAY, Normal      aspirin 325 mg tablet Take 325 mg by mouth as needed , Starting Mon 3/19/2018, Historical Med      fluticasone (FLONASE) 50 mcg/act nasal spray SPRAY 1 SPRAY INTO EACH NOSTRIL EVERY DAY, Normal      levothyroxine 125 mcg tablet TAKE 1 TABLET BY MOUTH EVERY DAY, Normal      multivitamin (THERAGRAN) TABS Take 1 tablet by mouth, Historical Med      MYRBETRIQ 50 MG TB24 TAKE 1 TABLET BY MOUTH EVERY DAY, Normal      omeprazole (PriLOSEC) 20 mg delayed release capsule TAKE 1 CAPSULE BY MOUTH EVERY DAY, Normal PARoxetine (PAXIL) 20 mg tablet TAKE 1 TABLET BY MOUTH EVERY DAY, Normal      potassium chloride (MICRO-K) 10 MEQ CR capsule Take 1 capsule (10 mEq total) by mouth daily, Starting Wed 4/29/2020, Normal      PROAIR  (90 Base) MCG/ACT inhaler as needed , Starting Wed 8/7/2019, Historical Med      rivastigmine (EXELON) 9 5 mg/24 hr TD 24 hr patch Place 1 patch on the skin daily, Starting Wed 4/15/2020, Normal           No discharge procedures on file      PDMP Review     None          ED Provider  Electronically Signed by           Hilario Sutton MD  08/31/20 3165

## 2020-09-02 ENCOUNTER — VBI (OUTPATIENT)
Dept: ADMINISTRATIVE | Facility: OTHER | Age: 75
End: 2020-09-02

## 2020-09-02 NOTE — TELEPHONE ENCOUNTER
Flores Celis    ED Visit Information     Ed visit date: 8/31/20  Diagnosis Description: Cat Bite  In Network? Yes Margarito Naik  Discharge status: Home  Discharged with meds ? Yes  Number of ED visits to date: 1  ED Severity:4     Outreach Information    Outreach successful: No 2  Date letter mailed:0 C-19 na  Date Finalized:9/3/20    Care Coordination    Follow up appointment with pcp: no 0  Transportation issues ?  NA    Value Base Outreach    Outreach type:  3 Day Outreach  Emergent necessity warranted by diagnosis:  No  [facility] PCP:  Yes  Practice Contacted Patient ?:  No  Pt had ED follow up with pcp/staff ?:  No    09/02/2020 08:58 AM Phone (VBI) Osiris Juarez (Self) [phone] (H) Remove  Not Available - on behalf of [facility] Medicine- Mail box full att x1    By Phil Fernandez    09/03/2020 09:27 AM Phone (VBI) Osiris Juarez (Self) [phone] (H) Remove  Not Available - on behalf of [facility] Internal Medicine  att x2 mail box full    By Phil Fernandez

## 2020-09-06 NOTE — PATIENT INSTRUCTIONS
Rest,  Get fresh air regularly  Stay away from [address]  Continue current medications  Status call on Thursday or Friday  If symptoms worsen go to ER  No

## 2020-09-17 ENCOUNTER — TELEPHONE (OUTPATIENT)
Dept: INTERNAL MEDICINE CLINIC | Facility: CLINIC | Age: 75
End: 2020-09-17

## 2020-09-18 DIAGNOSIS — N39.0 URINARY TRACT INFECTION WITHOUT HEMATURIA, SITE UNSPECIFIED: ICD-10-CM

## 2020-09-18 RX ORDER — NITROFURANTOIN 25; 75 MG/1; MG/1
CAPSULE ORAL
Qty: 10 CAPSULE | Refills: 0 | Status: SHIPPED | OUTPATIENT
Start: 2020-09-18 | End: 2020-10-20 | Stop reason: SDUPTHER

## 2020-09-18 NOTE — TELEPHONE ENCOUNTER
This antibiotic came through as a refill request?  Please clarify with her daughter  Is she having UTI symptoms?

## 2020-10-01 ENCOUNTER — CONSULT (OUTPATIENT)
Dept: UROLOGY | Facility: CLINIC | Age: 75
End: 2020-10-01
Payer: COMMERCIAL

## 2020-10-01 VITALS
SYSTOLIC BLOOD PRESSURE: 150 MMHG | DIASTOLIC BLOOD PRESSURE: 100 MMHG | HEIGHT: 60 IN | HEART RATE: 96 BPM | BODY MASS INDEX: 34.04 KG/M2 | WEIGHT: 173.4 LBS | TEMPERATURE: 97.5 F

## 2020-10-01 DIAGNOSIS — N39.0 RECURRENT UTI: Primary | ICD-10-CM

## 2020-10-01 LAB
POST-VOID RESIDUAL VOLUME, ML POC: 7 ML
SL AMB  POCT GLUCOSE, UA: NORMAL
SL AMB LEUKOCYTE ESTERASE,UA: NORMAL
SL AMB POCT BILIRUBIN,UA: NORMAL
SL AMB POCT BLOOD,UA: NORMAL
SL AMB POCT CLARITY,UA: CLEAR
SL AMB POCT COLOR,UA: YELLOW
SL AMB POCT KETONES,UA: NORMAL
SL AMB POCT NITRITE,UA: NORMAL
SL AMB POCT PH,UA: 6.5
SL AMB POCT SPECIFIC GRAVITY,UA: 1.01
SL AMB POCT URINE PROTEIN: NORMAL
SL AMB POCT UROBILINOGEN: NORMAL

## 2020-10-01 PROCEDURE — 3077F SYST BP >= 140 MM HG: CPT | Performed by: PHYSICIAN ASSISTANT

## 2020-10-01 PROCEDURE — 87086 URINE CULTURE/COLONY COUNT: CPT | Performed by: PHYSICIAN ASSISTANT

## 2020-10-01 PROCEDURE — 99203 OFFICE O/P NEW LOW 30 MIN: CPT | Performed by: PHYSICIAN ASSISTANT

## 2020-10-01 PROCEDURE — 51798 US URINE CAPACITY MEASURE: CPT | Performed by: PHYSICIAN ASSISTANT

## 2020-10-01 PROCEDURE — 81002 URINALYSIS NONAUTO W/O SCOPE: CPT | Performed by: PHYSICIAN ASSISTANT

## 2020-10-01 PROCEDURE — 3080F DIAST BP >= 90 MM HG: CPT | Performed by: PHYSICIAN ASSISTANT

## 2020-10-02 LAB — BACTERIA UR CULT: NORMAL

## 2020-10-05 ENCOUNTER — OFFICE VISIT (OUTPATIENT)
Dept: INTERNAL MEDICINE CLINIC | Facility: CLINIC | Age: 75
End: 2020-10-05
Payer: COMMERCIAL

## 2020-10-05 ENCOUNTER — APPOINTMENT (OUTPATIENT)
Dept: LAB | Facility: HOSPITAL | Age: 75
End: 2020-10-05
Attending: INTERNAL MEDICINE
Payer: COMMERCIAL

## 2020-10-05 VITALS
BODY MASS INDEX: 34.36 KG/M2 | HEIGHT: 60 IN | WEIGHT: 175 LBS | HEART RATE: 83 BPM | DIASTOLIC BLOOD PRESSURE: 74 MMHG | SYSTOLIC BLOOD PRESSURE: 122 MMHG | OXYGEN SATURATION: 95 % | TEMPERATURE: 97.8 F

## 2020-10-05 DIAGNOSIS — K21.9 GASTROESOPHAGEAL REFLUX DISEASE WITHOUT ESOPHAGITIS: ICD-10-CM

## 2020-10-05 DIAGNOSIS — I10 ESSENTIAL HYPERTENSION: Primary | ICD-10-CM

## 2020-10-05 DIAGNOSIS — F02.80 LATE ONSET ALZHEIMER'S DISEASE WITHOUT BEHAVIORAL DISTURBANCE (HCC): ICD-10-CM

## 2020-10-05 DIAGNOSIS — Z23 NEED FOR INFLUENZA VACCINATION: ICD-10-CM

## 2020-10-05 DIAGNOSIS — Z23 NEED FOR SHINGLES VACCINE: ICD-10-CM

## 2020-10-05 DIAGNOSIS — E87.6 HYPOKALEMIA: ICD-10-CM

## 2020-10-05 DIAGNOSIS — E03.9 HYPOTHYROIDISM, UNSPECIFIED TYPE: ICD-10-CM

## 2020-10-05 DIAGNOSIS — D84.9 IMMUNOCOMPROMISED (HCC): ICD-10-CM

## 2020-10-05 DIAGNOSIS — N39.0 RECURRENT UTI: ICD-10-CM

## 2020-10-05 DIAGNOSIS — G30.1 LATE ONSET ALZHEIMER'S DISEASE WITHOUT BEHAVIORAL DISTURBANCE (HCC): ICD-10-CM

## 2020-10-05 LAB
ANION GAP SERPL CALCULATED.3IONS-SCNC: 8 MMOL/L (ref 4–13)
BUN SERPL-MCNC: 16 MG/DL (ref 5–25)
CALCIUM SERPL-MCNC: 9 MG/DL (ref 8.3–10.1)
CHLORIDE SERPL-SCNC: 104 MMOL/L (ref 100–108)
CO2 SERPL-SCNC: 29 MMOL/L (ref 21–32)
CREAT SERPL-MCNC: 0.78 MG/DL (ref 0.6–1.3)
GFR SERPL CREATININE-BSD FRML MDRD: 75 ML/MIN/1.73SQ M
GLUCOSE P FAST SERPL-MCNC: 116 MG/DL (ref 65–99)
POTASSIUM SERPL-SCNC: 3.8 MMOL/L (ref 3.5–5.3)
SODIUM SERPL-SCNC: 141 MMOL/L (ref 136–145)
T4 FREE SERPL-MCNC: 1.37 NG/DL (ref 0.76–1.46)
TSH SERPL DL<=0.05 MIU/L-ACNC: 2.23 UIU/ML (ref 0.36–3.74)

## 2020-10-05 PROCEDURE — G0008 ADMIN INFLUENZA VIRUS VAC: HCPCS | Performed by: INTERNAL MEDICINE

## 2020-10-05 PROCEDURE — 80048 BASIC METABOLIC PNL TOTAL CA: CPT

## 2020-10-05 PROCEDURE — 36415 COLL VENOUS BLD VENIPUNCTURE: CPT

## 2020-10-05 PROCEDURE — 1160F RVW MEDS BY RX/DR IN RCRD: CPT | Performed by: INTERNAL MEDICINE

## 2020-10-05 PROCEDURE — 1036F TOBACCO NON-USER: CPT | Performed by: INTERNAL MEDICINE

## 2020-10-05 PROCEDURE — 84443 ASSAY THYROID STIM HORMONE: CPT

## 2020-10-05 PROCEDURE — 84439 ASSAY OF FREE THYROXINE: CPT

## 2020-10-05 PROCEDURE — 99214 OFFICE O/P EST MOD 30 MIN: CPT | Performed by: INTERNAL MEDICINE

## 2020-10-05 PROCEDURE — 90662 IIV NO PRSV INCREASED AG IM: CPT | Performed by: INTERNAL MEDICINE

## 2020-10-05 RX ORDER — ZOSTER VACCINE RECOMBINANT, ADJUVANTED 50 MCG/0.5
0.5 KIT INTRAMUSCULAR ONCE
Qty: 1 EACH | Refills: 1 | Status: SHIPPED | OUTPATIENT
Start: 2020-10-05 | End: 2020-10-05

## 2020-10-08 DIAGNOSIS — F02.80 LATE ONSET ALZHEIMER'S DISEASE WITHOUT BEHAVIORAL DISTURBANCE (HCC): ICD-10-CM

## 2020-10-08 DIAGNOSIS — G30.1 LATE ONSET ALZHEIMER'S DISEASE WITHOUT BEHAVIORAL DISTURBANCE (HCC): ICD-10-CM

## 2020-10-08 RX ORDER — RIVASTIGMINE 9.5 MG/24H
PATCH, EXTENDED RELEASE TRANSDERMAL
Qty: 90 PATCH | Refills: 1 | Status: SHIPPED | OUTPATIENT
Start: 2020-10-08 | End: 2021-01-25

## 2020-10-19 ENCOUNTER — TELEPHONE (OUTPATIENT)
Dept: INTERNAL MEDICINE CLINIC | Facility: CLINIC | Age: 75
End: 2020-10-19

## 2020-10-20 ENCOUNTER — TELEPHONE (OUTPATIENT)
Dept: INTERNAL MEDICINE CLINIC | Facility: CLINIC | Age: 75
End: 2020-10-20

## 2020-10-20 DIAGNOSIS — N39.0 URINARY TRACT INFECTION WITHOUT HEMATURIA, SITE UNSPECIFIED: ICD-10-CM

## 2020-10-20 DIAGNOSIS — Z23 NEED FOR ZOSTER VACCINATION: Primary | ICD-10-CM

## 2020-10-20 DIAGNOSIS — N39.0 ACUTE URINARY TRACT INFECTION: Primary | ICD-10-CM

## 2020-10-20 RX ORDER — NITROFURANTOIN 25; 75 MG/1; MG/1
100 CAPSULE ORAL 2 TIMES DAILY
Qty: 10 CAPSULE | Refills: 0 | Status: SHIPPED | OUTPATIENT
Start: 2020-10-20 | End: 2020-10-25

## 2020-10-23 DIAGNOSIS — F32.A DEPRESSION, UNSPECIFIED DEPRESSION TYPE: ICD-10-CM

## 2020-10-23 RX ORDER — PAROXETINE HYDROCHLORIDE 20 MG/1
TABLET, FILM COATED ORAL
Qty: 90 TABLET | Refills: 1 | Status: SHIPPED | OUTPATIENT
Start: 2020-10-23 | End: 2021-02-08 | Stop reason: SDUPTHER

## 2020-10-28 ENCOUNTER — TELEPHONE (OUTPATIENT)
Dept: INTERNAL MEDICINE CLINIC | Facility: CLINIC | Age: 75
End: 2020-10-28

## 2020-11-28 DIAGNOSIS — I10 ESSENTIAL HYPERTENSION: ICD-10-CM

## 2020-11-28 RX ORDER — AMLODIPINE BESYLATE 2.5 MG/1
TABLET ORAL
Qty: 180 TABLET | Refills: 0 | Status: SHIPPED | OUTPATIENT
Start: 2020-11-28 | End: 2021-02-08 | Stop reason: SDUPTHER

## 2020-12-04 ENCOUNTER — TELEPHONE (OUTPATIENT)
Dept: INTERNAL MEDICINE CLINIC | Facility: CLINIC | Age: 75
End: 2020-12-04

## 2020-12-04 DIAGNOSIS — N39.0 ACUTE URINARY TRACT INFECTION: Primary | ICD-10-CM

## 2020-12-04 RX ORDER — NITROFURANTOIN 25; 75 MG/1; MG/1
100 CAPSULE ORAL 2 TIMES DAILY
Qty: 10 CAPSULE | Refills: 0 | Status: SHIPPED | OUTPATIENT
Start: 2020-12-04 | End: 2021-01-06 | Stop reason: SDUPTHER

## 2020-12-05 DIAGNOSIS — E03.9 HYPOTHYROIDISM, UNSPECIFIED TYPE: ICD-10-CM

## 2020-12-05 DIAGNOSIS — K21.9 GASTROESOPHAGEAL REFLUX DISEASE WITHOUT ESOPHAGITIS: ICD-10-CM

## 2020-12-06 RX ORDER — LEVOTHYROXINE SODIUM 0.12 MG/1
TABLET ORAL
Qty: 90 TABLET | Refills: 1 | Status: SHIPPED | OUTPATIENT
Start: 2020-12-06 | End: 2021-02-08 | Stop reason: SDUPTHER

## 2020-12-07 RX ORDER — OMEPRAZOLE 20 MG/1
CAPSULE, DELAYED RELEASE ORAL
Qty: 90 CAPSULE | Refills: 1 | Status: SHIPPED | OUTPATIENT
Start: 2020-12-07 | End: 2021-02-08 | Stop reason: SDUPTHER

## 2021-01-05 ENCOUNTER — OFFICE VISIT (OUTPATIENT)
Dept: NEUROLOGY | Facility: CLINIC | Age: 76
End: 2021-01-05
Payer: COMMERCIAL

## 2021-01-05 VITALS
SYSTOLIC BLOOD PRESSURE: 140 MMHG | HEART RATE: 76 BPM | WEIGHT: 169 LBS | HEIGHT: 60 IN | BODY MASS INDEX: 33.18 KG/M2 | DIASTOLIC BLOOD PRESSURE: 90 MMHG

## 2021-01-05 DIAGNOSIS — E78.2 MIXED HYPERLIPIDEMIA: ICD-10-CM

## 2021-01-05 DIAGNOSIS — G30.1 LATE ONSET ALZHEIMER'S DISEASE WITHOUT BEHAVIORAL DISTURBANCE (HCC): Primary | ICD-10-CM

## 2021-01-05 DIAGNOSIS — F02.80 LATE ONSET ALZHEIMER'S DISEASE WITHOUT BEHAVIORAL DISTURBANCE (HCC): Primary | ICD-10-CM

## 2021-01-05 DIAGNOSIS — G31.84 MILD COGNITIVE IMPAIRMENT: ICD-10-CM

## 2021-01-05 PROCEDURE — 1036F TOBACCO NON-USER: CPT | Performed by: PSYCHIATRY & NEUROLOGY

## 2021-01-05 PROCEDURE — 99214 OFFICE O/P EST MOD 30 MIN: CPT | Performed by: PSYCHIATRY & NEUROLOGY

## 2021-01-05 PROCEDURE — 1160F RVW MEDS BY RX/DR IN RCRD: CPT | Performed by: PSYCHIATRY & NEUROLOGY

## 2021-01-05 PROCEDURE — 3077F SYST BP >= 140 MM HG: CPT | Performed by: PSYCHIATRY & NEUROLOGY

## 2021-01-05 PROCEDURE — 3080F DIAST BP >= 90 MM HG: CPT | Performed by: PSYCHIATRY & NEUROLOGY

## 2021-01-05 NOTE — PROGRESS NOTES
Radha Saucedo is a 67 y o  female  Chief Complaint   Patient presents with    Alzheimer's Disease       Assessment:  1  Late onset Alzheimer's disease without behavioral disturbance ([address] )    2  Mild cognitive impairment    3  Mixed hyperlipidemia        Plan:  Continue with Exelon patch 9 5 mg per 24 hour  Continue with mentally stimulating exercises  Follow-up in 6 months    Discussion:  Patient is doing reasonably well and her [place] is improved from before is 23/30, it was 19/30, she was advised to continue with Exelon patch at 9 5 mg per 24 hour, also was advised to continue with mentally stimulating exercises and to take safety precautions, to keep her blood pressure cholesterol and sugar under control, she was advised to continue with mentally stimulating exercises, to keep blood pressure cholesterol and sugar under control, to go to the hospital if has any worsening symptoms and call me otherwise to see me back in 6 months and follow up with other physicians  Subjective:    HPI   Patient is here in follow-up for memory difficulty, since her last visit her memory seems to be doing reasonably well, she lost her grandson by an accident overdosage and she is sad about that, denies having any headaches no vision or speech difficulty she lives with her daughter, no suicidal or homicidal thoughts, she is under constant supervision she ambulates with a cane for safety, she has not had any falls no seizures no other complaints      Vitals:    01/05/21 1200   BP: 140/90   BP Location: Left arm   Patient Position: Sitting   Cuff Size: Adult   Pulse: 76   Weight: 76 7 kg (169 lb)   Height: 5' (1 524 m)       Current Medications    Current Outpatient Medications:     amLODIPine (NORVASC) 2 5 mg tablet, TAKE 2 TABLETS BY MOUTH EVERY DAY, Disp: 180 tablet, Rfl: 0    aspirin 325 mg tablet, Take 325 mg by mouth as needed , Disp: , Rfl:     fluticasone (FLONASE) 50 mcg/act nasal spray, SPRAY 1 SPRAY INTO EACH NOSTRIL EVERY DAY (Patient taking differently: as needed ), Disp: 16 mL, Rfl: 1    levothyroxine 125 mcg tablet, TAKE 1 TABLET BY MOUTH EVERY DAY, Disp: 90 tablet, Rfl: 1    multivitamin (THERAGRAN) TABS, Take 1 tablet by mouth, Disp: , Rfl:     MYRBETRIQ 50 MG TB24, TAKE 1 TABLET BY MOUTH EVERY DAY, Disp: 30 tablet, Rfl: 5    omeprazole (PriLOSEC) 20 mg delayed release capsule, TAKE 1 CAPSULE BY MOUTH EVERY DAY, Disp: 90 capsule, Rfl: 1    PARoxetine (PAXIL) 20 mg tablet, TAKE 1 TABLET BY MOUTH EVERY DAY, Disp: 90 tablet, Rfl: 1    potassium chloride (MICRO-K) 10 MEQ CR capsule, Take 1 capsule (10 mEq total) by mouth daily, Disp: 30 capsule, Rfl: 0    PROAIR  (90 Base) MCG/ACT inhaler, as needed , Disp: , Rfl:     rivastigmine (EXELON) 9 5 mg/24 hr TD 24 hr patch, APPLY 1 PATCH EVERY DAY, Disp: 90 patch, Rfl: 1    nitrofurantoin (MACROBID) 100 mg capsule, Take 1 capsule (100 mg total) by mouth 2 (two) times a day (Patient not taking: Reported on 1/5/2021), Disp: 10 capsule, Rfl: 0      Allergies  Ciprofloxacin, Other, Sulfa antibiotics, Penicillins, and Sulfacetamide    Past Medical History  Past Medical History:   Diagnosis Date    Anxiety disorder     Aortic valve disorder     Breast cancer ([facility] [address] )     Cellulitis of finger of left hand 11/20/2018    Cellulitis of left hand 11/10/2018    Added automatically from request for surgery [id number]    Depression     History of gastroesophageal reflux (GERD)     History of kidney cancer 03/06/2014    Hyperlipidemia 03/06/2014    Hypertension 03/06/2014    Hypothyroidism 03/06/2014    Shortness of breath     TIA (transient ischemic attack)          Past Surgical History:  Past Surgical History:   Procedure Laterality Date    ESOPHAGOGASTRIC FUNDOPLASTY      Nissen Fundoplication    HERNIA REPAIR           Family History:  Family History   Problem Relation Age of Onset    Stroke Father     Leukemia Sister     Alcohol abuse Brother     Kidney cancer Brother        Social History:   reports that she has never smoked  She has never used smokeless tobacco  She reports current alcohol use  She reports that she does not use drugs  I have reviewed the past medical history, surgical history, social and family history, current medications, allergies vitals, review of systems, and updated this information as appropriate today  Objective:    Physical Exam    Neurological Exam    GENERAL:  Cooperative in no acute distress  Well-developed and well-nourished    HEAD and NECK   Head is atraumatic normocephalic with no lesions or masses  Neck is supple with full range of motion    CARDIOVASCULAR  Carotid Arteries-no carotid bruits  NEUROLOGIC:  Mental Status-the patient is awake alert and oriented without aphasia or apraxia, [place] is 23/30  Cranial Nerves: Visual fields are full to confrontation  Extraocular movements are full without nystagmus  Pupils are 2-1/2 mm and reactive  Face is symmetrical to light touch  Movements of facial expression move symmetrically  Hearing is normal to finger rub bilaterally  Soft palate lifts symmetrically  Shoulder shrug is symmetrical  Tongue is midline without atrophy  Motor: No drift is noted on arm extension  Strength is full in the upper and lower extremities with normal bulk and tone  Ambulates with a cane          ROS:  Review of Systems   Constitutional: Negative  Negative for appetite change and fever  HENT: Negative  Negative for hearing loss, tinnitus, trouble swallowing and voice change  Eyes: Negative  Negative for photophobia and pain  Respiratory: Negative  Negative for shortness of breath  Cardiovascular: Negative  Negative for palpitations  Gastrointestinal: Negative  Negative for nausea and vomiting  Endocrine: Negative  Negative for cold intolerance  Genitourinary: Negative  Negative for dysuria, frequency and urgency  Musculoskeletal: Positive for gait problem   Negative for back pain, myalgias and neck pain  Skin: Negative  Negative for rash  Neurological: Negative for dizziness, tremors, seizures, syncope, facial asymmetry, speech difficulty, weakness, light-headedness, numbness and headaches  Hematological: Negative  Does not bruise/bleed easily  Psychiatric/Behavioral: Negative  Negative for confusion, hallucinations and sleep disturbance

## 2021-01-06 ENCOUNTER — TELEPHONE (OUTPATIENT)
Dept: INTERNAL MEDICINE CLINIC | Facility: CLINIC | Age: 76
End: 2021-01-06

## 2021-01-06 DIAGNOSIS — N39.0 ACUTE URINARY TRACT INFECTION: ICD-10-CM

## 2021-01-06 RX ORDER — NITROFURANTOIN 25; 75 MG/1; MG/1
100 CAPSULE ORAL 2 TIMES DAILY
Qty: 10 CAPSULE | Refills: 0 | Status: SHIPPED | OUTPATIENT
Start: 2021-01-06 | End: 2021-06-02

## 2021-01-06 NOTE — TELEPHONE ENCOUNTER
Patient believes she is getting a UTI  She has been having painful and burning during urination since yesterday  She would like to know if something could be sent to pharmacy for this? She states she gets these frequently       [facility]    Call back #[phone]

## 2021-01-24 DIAGNOSIS — G30.1 LATE ONSET ALZHEIMER'S DISEASE WITHOUT BEHAVIORAL DISTURBANCE (HCC): ICD-10-CM

## 2021-01-24 DIAGNOSIS — F02.80 LATE ONSET ALZHEIMER'S DISEASE WITHOUT BEHAVIORAL DISTURBANCE (HCC): ICD-10-CM

## 2021-01-25 RX ORDER — RIVASTIGMINE 9.5 MG/24H
PATCH, EXTENDED RELEASE TRANSDERMAL
Qty: 90 PATCH | Refills: 1 | Status: SHIPPED | OUTPATIENT
Start: 2021-01-25 | End: 2021-05-26 | Stop reason: SDUPTHER

## 2021-02-08 ENCOUNTER — OFFICE VISIT (OUTPATIENT)
Dept: INTERNAL MEDICINE CLINIC | Facility: CLINIC | Age: 76
End: 2021-02-08
Payer: COMMERCIAL

## 2021-02-08 VITALS
DIASTOLIC BLOOD PRESSURE: 86 MMHG | BODY MASS INDEX: 32.61 KG/M2 | OXYGEN SATURATION: 95 % | TEMPERATURE: 98.6 F | HEART RATE: 97 BPM | WEIGHT: 167 LBS | SYSTOLIC BLOOD PRESSURE: 140 MMHG | RESPIRATION RATE: 16 BRPM

## 2021-02-08 DIAGNOSIS — K21.9 GASTROESOPHAGEAL REFLUX DISEASE WITHOUT ESOPHAGITIS: ICD-10-CM

## 2021-02-08 DIAGNOSIS — E03.9 HYPOTHYROIDISM, UNSPECIFIED TYPE: ICD-10-CM

## 2021-02-08 DIAGNOSIS — I10 ESSENTIAL HYPERTENSION: ICD-10-CM

## 2021-02-08 DIAGNOSIS — Z00.00 MEDICARE ANNUAL WELLNESS VISIT, SUBSEQUENT: Primary | ICD-10-CM

## 2021-02-08 DIAGNOSIS — N39.0 RECURRENT UTI: ICD-10-CM

## 2021-02-08 DIAGNOSIS — F32.A DEPRESSION, UNSPECIFIED DEPRESSION TYPE: ICD-10-CM

## 2021-02-08 DIAGNOSIS — K52.9 CHRONIC DIARRHEA: ICD-10-CM

## 2021-02-08 PROCEDURE — 3725F SCREEN DEPRESSION PERFORMED: CPT | Performed by: INTERNAL MEDICINE

## 2021-02-08 PROCEDURE — 99214 OFFICE O/P EST MOD 30 MIN: CPT | Performed by: INTERNAL MEDICINE

## 2021-02-08 PROCEDURE — 1036F TOBACCO NON-USER: CPT | Performed by: INTERNAL MEDICINE

## 2021-02-08 PROCEDURE — 1125F AMNT PAIN NOTED PAIN PRSNT: CPT | Performed by: INTERNAL MEDICINE

## 2021-02-08 PROCEDURE — 1170F FXNL STATUS ASSESSED: CPT | Performed by: INTERNAL MEDICINE

## 2021-02-08 PROCEDURE — 3077F SYST BP >= 140 MM HG: CPT | Performed by: INTERNAL MEDICINE

## 2021-02-08 PROCEDURE — 3079F DIAST BP 80-89 MM HG: CPT | Performed by: INTERNAL MEDICINE

## 2021-02-08 PROCEDURE — G0439 PPPS, SUBSEQ VISIT: HCPCS | Performed by: INTERNAL MEDICINE

## 2021-02-08 PROCEDURE — 1160F RVW MEDS BY RX/DR IN RCRD: CPT | Performed by: INTERNAL MEDICINE

## 2021-02-08 RX ORDER — CHOLESTYRAMINE 4 G/9G
1 POWDER, FOR SUSPENSION ORAL DAILY
Qty: 30 PACKET | Refills: 3 | Status: SHIPPED | OUTPATIENT
Start: 2021-02-08 | End: 2021-04-30

## 2021-02-08 NOTE — PROGRESS NOTES
Assessment and Plan:     Problem List Items Addressed This Visit     None        BMI Counseling: Body mass index is 32 61 kg/m²  The BMI is above normal  Nutrition recommendations include encouraging healthy choices of fruits and vegetables  Preventive health issues were discussed with patient, and age appropriate screening tests were ordered as noted in patient's After Visit Summary  Personalized health advice and appropriate referrals for health education or preventive services given if needed, as noted in patient's After Visit Summary  History of Present Illness:     Patient presents for Welcome to Medicare visit  Patient Care Team:  Madelin Mayo MD as PCP - General     Review of Systems:     Review of Systems   Respiratory: Negative for shortness of breath  Cardiovascular: Negative for chest pain  Gastrointestinal: Negative for abdominal pain        Problem List:     Patient Active Problem List   Diagnosis    Depression    Gastroesophageal reflux disease without esophagitis    Mixed hyperlipidemia    Essential hypertension    Hypothyroid    Impaired fasting glucose    Lymphoma (HCC)    Cerebral aneurysm, nonruptured    Mild cognitive impairment    Flexor tenosynovitis of finger    Macrocytosis without anemia    Paresthesias    Non-intractable vomiting with nausea    Hypokalemia    Late onset Alzheimer's disease without behavioral disturbance (HCC)    Chest pain    Near syncope    Immunocompromised [facility])      Past Medical and Surgical History:     Past Medical History:   Diagnosis Date    Anxiety disorder     Aortic valve disorder     Breast cancer ([facility] [address] )     Cellulitis of finger of left hand 11/20/2018    Cellulitis of left hand 11/10/2018    Added automatically from request for surgery [id number]    Depression     History of gastroesophageal reflux (GERD)     History of kidney cancer 03/06/2014    Hyperlipidemia 03/06/2014    Hypertension 03/06/2014    Hypothyroidism 03/06/2014    Shortness of breath     TIA (transient ischemic attack)      Past Surgical History:   Procedure Laterality Date    ESOPHAGOGASTRIC FUNDOPLASTY      Nissen Fundoplication    HERNIA REPAIR        Family History:     Family History   Problem Relation Age of Onset    Stroke Father     Leukemia Sister     Alcohol abuse Brother     Kidney cancer Brother       Social History:     E-Cigarette/Vaping    E-Cigarette Use Never User      E-Cigarette/Vaping Substances    Nicotine No     THC No     CBD No     Flavoring No     Other No     Unknown No      Social History     Socioeconomic History    Marital status: /Civil Union     Spouse name: None    Number of children: 2    Years of education: None    Highest education level: None   Occupational History    None   Social Needs    Financial resource strain: None    Food insecurity     Worry: None     Inability: None    Transportation needs     Medical: None     Non-medical: None   Tobacco Use    Smoking status: Never Smoker    Smokeless tobacco: Never Used   Substance and Sexual Activity    Alcohol use: Yes     Frequency: Monthly or less     Drinks per session: 1 or 2     Comment: social-rare    Drug use: No    Sexual activity: None   Lifestyle    Physical activity     Days per week: None     Minutes per session: None    Stress: None   Relationships    Social connections     Talks on phone: None     Gets together: None     Attends Mandaeism service: None     Active member of club or organization: None     Attends meetings of clubs or organizations: None     Relationship status: None    Intimate partner violence     Fear of current or ex partner: None     Emotionally abused: None     Physically abused: None     Forced sexual activity: None   Other Topics Concern    None   Social History Narrative    None      Medications and Allergies:     Current Outpatient Medications   Medication Sig Dispense Refill    amLODIPine (NORVASC) 2 5 mg tablet TAKE 2 TABLETS BY MOUTH EVERY  tablet 0    aspirin 325 mg tablet Take 325 mg by mouth as needed       fluticasone (FLONASE) 50 mcg/act nasal spray SPRAY 1 SPRAY INTO EACH NOSTRIL EVERY DAY (Patient taking differently: as needed ) 16 mL 1    levothyroxine 125 mcg tablet TAKE 1 TABLET BY MOUTH EVERY DAY 90 tablet 1    multivitamin (THERAGRAN) TABS Take 1 tablet by mouth      MYRBETRIQ 50 MG TB24 TAKE 1 TABLET BY MOUTH EVERY DAY 30 tablet 5    nitrofurantoin (MACROBID) 100 mg capsule Take 1 capsule (100 mg total) by mouth 2 (two) times a day 10 capsule 0    omeprazole (PriLOSEC) 20 mg delayed release capsule TAKE 1 CAPSULE BY MOUTH EVERY DAY 90 capsule 1    PARoxetine (PAXIL) 20 mg tablet TAKE 1 TABLET BY MOUTH EVERY DAY 90 tablet 1    potassium chloride (MICRO-K) 10 MEQ CR capsule Take 1 capsule (10 mEq total) by mouth daily 30 capsule 0    PROAIR  (90 Base) MCG/ACT inhaler as needed       rivastigmine (EXELON) 9 5 mg/24 hr TD 24 hr patch APPLY 1 PATCH EVERY DAY 90 patch 1     No current facility-administered medications for this visit  Allergies   Allergen Reactions    Ciprofloxacin     Other     Sulfa Antibiotics     Penicillins Rash     Category: Allergy;   --  Pt received unasyn 1 5 mg IV 11-12-18 to 11-15-18    Sulfacetamide Rash     Category: Allergy;       Immunizations:     Immunization History   Administered Date(s) Administered    INFLUENZA 11/03/2010, 11/12/2012    Influenza Split High Dose Preservative Free IM 01/19/2018    Influenza, high dose seasonal 0 7 mL 01/21/2019, 11/11/2019, 10/05/2020    Pneumococcal Polysaccharide PPV23 09/16/2011, 11/11/2019    Rabies-IM Human Diploid Cell Culture 11/10/2018    Tdap 11/10/2018      Health Maintenance: There are no preventive care reminders to display for this patient  There are no preventive care reminders to display for this patient     Medicare Screening Tests and Risk Assessments:     Sukh Chen is here for her Subsequent Wellness visit  Last Medicare Wellness visit information reviewed, patient interviewed and updates made to the record today  Health Risk Assessment:   Patient rates overall health as good  Patient feels that their physical health rating is same  Eyesight was rated as same  Hearing was rated as same  Pain experienced in the last 7 days has been none  Patient states that she has experienced no weight loss or gain in last 6 months  Depression Screening:   PHQ-2 Score: 3  PHQ-9 Score: 7      Fall Risk Screening: In the past year, patient has experienced: no history of falling in past year      Urinary Incontinence Screening:   Patient has leaked urine accidently in the last six months  Home Safety:  Patient does not have trouble with stairs inside or outside of their home  Patient has working smoke alarms and has working carbon monoxide detector  Home safety hazards include: none  Nutrition:   Current diet is Regular and Low Carb  Medications:   Patient is currently taking over-the-counter supplements  OTC medications include: see medication list  Patient is able to manage medications  Activities of Daily Living (ADLs)/Instrumental Activities of Daily Living (IADLs):   Walk and transfer into and out of bed and chair?: Yes  Dress and groom yourself?: Yes    Bathe or shower yourself?: Yes    Feed yourself? Yes  Do your laundry/housekeeping?: Yes  Manage your money, pay your bills and track your expenses?: Yes  Make your own meals?: Yes    Do your own shopping?: Yes    Previous Hospitalizations:   Any hospitalizations or ED visits within the last 12 months?: No      Advance Care Planning:   Living will: Yes    Durable POA for healthcare:  Yes    Advanced directive: Yes    Advanced directive counseling given: Yes    Five wishes given: Yes    Patient declined ACP directive: No      Cognitive Screening:   Provider or family/friend/caregiver concerned regarding cognition?: No    PREVENTIVE SCREENINGS      Cardiovascular Screening:    General: Screening Not Indicated and History Lipid Disorder      Diabetes Screening:     General: Screening Current      Colorectal Cancer Screening:     General: Screening Not Indicated      Breast Cancer Screening:     General: History Breast Cancer      Cervical Cancer Screening:    General: Screening Not Indicated      Osteoporosis Screening:    General: Screening Not Indicated      Abdominal Aortic Aneurysm (AAA) Screening:        General: Screening Not Indicated      Lung Cancer Screening:     General: Screening Not Indicated      Hepatitis C Screening:    General: Screening Not Indicated    No exam data present     Physical Exam:     /86   Pulse 97   Temp 98 6 °F (37 °C)   Resp 16   Wt 75 8 kg (167 lb)   SpO2 95%   BMI 32 61 kg/m²     Physical Exam  Vitals signs and nursing note reviewed  Constitutional:       Appearance: She is well-developed  Neurological:      Mental Status: She is alert and oriented to person, place, and time  Psychiatric:         Behavior: Behavior normal          Thought Content:  Thought content normal          Judgment: Judgment normal           Boubacar Crawford MD

## 2021-02-08 NOTE — PATIENT INSTRUCTIONS
Medicare Preventive Visit Patient Instructions  Thank you for completing your Welcome to Medicare Visit or Medicare Annual Wellness Visit today  Your next wellness visit will be due in one year (2/8/2022)  The screening/preventive services that you may require over the next 5-10 years are detailed below  Some tests may not apply to you based off risk factors and/or age  Screening tests ordered at today's visit but not completed yet may show as past due  Also, please note that scanned in results may not display below  Preventive Screenings:  Service Recommendations Previous Testing/Comments   Colorectal Cancer Screening  * Colonoscopy    * Fecal Occult Blood Test (FOBT)/Fecal Immunochemical Test (FIT)  * Fecal DNA/Cologuard Test  * Flexible Sigmoidoscopy Age: 54-65 years old   Colonoscopy: every 10 years (may be performed more frequently if at higher risk)  OR  FOBT/FIT: every 1 year  OR  Cologuard: every 3 years  OR  Sigmoidoscopy: every 5 years  Screening may be recommended earlier than age 48 if at higher risk for colorectal cancer  Also, an individualized decision between you and your healthcare provider will decide whether screening between the ages of 74-80 would be appropriate  Colonoscopy: 06/02/2017  FOBT/FIT: Not on file  Cologuard: Not on file  Sigmoidoscopy: Not on file         Breast Cancer Screening Age: 36 years old  Frequency: every 1-2 years  Not required if history of left and right mastectomy Mammogram: 12/01/2017    History Breast Cancer   Cervical Cancer Screening Between the ages of 21-29, pap smear recommended once every 3 years  Between the ages of 33-67, can perform pap smear with HPV co-testing every 5 years     Recommendations may differ for women with a history of total hysterectomy, cervical cancer, or abnormal pap smears in past  Pap Smear: Not on file    Screening Not Indicated   Hepatitis C Screening Once for adults born between 1945 and 1965  More frequently in patients at high risk for Hepatitis C Hep C Antibody: Not on file       Diabetes Screening 1-2 times per year if you're at risk for diabetes or have pre-diabetes Fasting glucose: 116 mg/dL   A1C: 5 7 %    Screening Current   Cholesterol Screening Once every 5 years if you don't have a lipid disorder  May order more often based on risk factors  Lipid panel: 03/06/2020    Screening Not Indicated  History Lipid Disorder     Other Preventive Screenings Covered by Medicare:  1  Abdominal Aortic Aneurysm (AAA) Screening: covered once if your at risk  You're considered to be at risk if you have a family history of AAA  2  Lung Cancer Screening: covers low dose CT scan once per year if you meet all of the following conditions: (1) Age 50-69; (2) No signs or symptoms of lung cancer; (3) Current smoker or have quit smoking within the last 15 years; (4) You have a tobacco smoking history of at least 30 pack years (packs per day multiplied by number of years you smoked); (5) You get a written order from a healthcare provider  3  Glaucoma Screening: covered annually if you're considered high risk: (1) You have diabetes OR (2) Family history of glaucoma OR (3)  aged 48 and older OR (3)  American aged 72 and older  3  Osteoporosis Screening: covered every 2 years if you meet one of the following conditions: (1) You're estrogen deficient and at risk for osteoporosis based off medical history and other findings; (2) Have a vertebral abnormality; (3) On glucocorticoid therapy for more than 3 months; (4) Have primary hyperparathyroidism; (5) On osteoporosis medications and need to assess response to drug therapy  · Last bone density test (DXA Scan): 03/18/2016   5  HIV Screening: covered annually if you're between the age of 15-65  Also covered annually if you are younger than 13 and older than 72 with risk factors for HIV infection   For pregnant patients, it is covered up to 3 times per pregnancy  Immunizations:  Immunization Recommendations   Influenza Vaccine Annual influenza vaccination during flu season is recommended for all persons aged >= 6 months who do not have contraindications   Pneumococcal Vaccine (Prevnar and Pneumovax)  * Prevnar = PCV13  * Pneumovax = PPSV23   Adults 25-60 years old: 1-3 doses may be recommended based on certain risk factors  Adults 72 years old: Prevnar (PCV13) vaccine recommended followed by Pneumovax (PPSV23) vaccine  If already received PPSV23 since turning 65, then PCV13 recommended at least one year after PPSV23 dose  Hepatitis B Vaccine 3 dose series if at intermediate or high risk (ex: diabetes, end stage renal disease, liver disease)   Tetanus (Td) Vaccine - COST NOT COVERED BY MEDICARE PART B Following completion of primary series, a booster dose should be given every 10 years to maintain immunity against tetanus  Td may also be given as tetanus wound prophylaxis  Tdap Vaccine - COST NOT COVERED BY MEDICARE PART B Recommended at least once for all adults  For pregnant patients, recommended with each pregnancy  Shingles Vaccine (Shingrix) - COST NOT COVERED BY MEDICARE PART B  2 shot series recommended in those aged 48 and above     Health Maintenance Due:  There are no preventive care reminders to display for this patient  Immunizations Due:  There are no preventive care reminders to display for this patient  Advance Directives   What are advance directives? Advance directives are legal documents that state your wishes and plans for medical care  These plans are made ahead of time in case you lose your ability to make decisions for yourself  Advance directives can apply to any medical decision, such as the treatments you want, and if you want to donate organs  What are the types of advance directives? There are many types of advance directives, and each state has rules about how to use them   You may choose a combination of any of the following:  · Living will: This is a written record of the treatment you want  You can also choose which treatments you do not want, which to limit, and which to stop at a certain time  This includes surgery, medicine, IV fluid, and tube feedings  · Durable power of  for healthcare [facility]): This is a written record that states who you want to make healthcare choices for you when you are unable to make them for yourself  This person, called a proxy, is usually a family member or a friend  You may choose more than 1 proxy  · Do not resuscitate (DNR) order:  A DNR order is used in case your heart stops beating or you stop breathing  It is a request not to have certain forms of treatment, such as CPR  A DNR order may be included in other types of advance directives  · Medical directive: This covers the care that you want if you are in a coma, near death, or unable to make decisions for yourself  You can list the treatments you want for each condition  Treatment may include pain medicine, surgery, blood transfusions, dialysis, IV or tube feedings, and a ventilator (breathing machine)  · Values history: This document has questions about your views, beliefs, and how you feel and think about life  This information can help others choose the care that you would choose  Why are advance directives important? An advance directive helps you control your care  Although spoken wishes may be used, it is better to have your wishes written down  Spoken wishes can be misunderstood, or not followed  Treatments may be given even if you do not want them  An advance directive may make it easier for your family to make difficult choices about your care  Urinary Incontinence   Urinary incontinence (UI)  is when you lose control of your bladder  UI develops because your bladder cannot store or empty urine properly  The 3 most common types of UI are stress incontinence, urge incontinence, or both    Medicines:   · May be given to help strengthen your bladder control  Report any side effects of medication to your healthcare provider  Do pelvic muscle exercises often:  Your pelvic muscles help you stop urinating  Squeeze these muscles tight for 5 seconds, then relax for 5 seconds  Gradually work up to squeezing for 10 seconds  Do 3 sets of 15 repetitions a day, or as directed  This will help strengthen your pelvic muscles and improve bladder control  Train your bladder:  Go to the bathroom at set times, such as every 2 hours, even if you do not feel the urge to go  You can also try to hold your urine when you feel the urge to go  For example, hold your urine for 5 minutes when you feel the urge to go  As that becomes easier, hold your urine for 10 minutes  Self-care:   · Keep a UI record  Write down how often you leak urine and how much you leak  Make a note of what you were doing when you leaked urine  · Drink liquids as directed  You may need to limit the amount of liquid you drink to help control your urine leakage  Do not drink any liquid right before you go to bed  Limit or do not have drinks that contain caffeine or alcohol  · Prevent constipation  Eat a variety of high-fiber foods  Good examples are high-fiber cereals, beans, vegetables, and whole-grain breads  Walking is the best way to trigger your intestines to have a bowel movement  · Exercise regularly and maintain a healthy weight  Weight loss and exercise will decrease pressure on your bladder and help you control your leakage  · Use a catheter as directed  to help empty your bladder  A catheter is a tiny, plastic tube that is put into your bladder to drain your urine  · Go to behavior therapy as directed  Behavior therapy may be used to help you learn to control your urge to urinate      Weight Management   Why it is important to manage your weight:  Being overweight increases your risk of health conditions such as heart disease, high blood pressure, type 2 diabetes, and certain types of cancer  It can also increase your risk for osteoarthritis, sleep apnea, and other respiratory problems  Aim for a slow, steady weight loss  Even a small amount of weight loss can lower your risk of health problems  How to lose weight safely:  A safe and healthy way to lose weight is to eat fewer calories and get regular exercise  You can lose up about 1 pound a week by decreasing the number of calories you eat by 500 calories each day  Healthy meal plan for weight management:  A healthy meal plan includes a variety of foods, contains fewer calories, and helps you stay healthy  A healthy meal plan includes the following:  · Eat whole-grain foods more often  A healthy meal plan should contain fiber  Fiber is the part of grains, fruits, and vegetables that is not broken down by your body  Whole-grain foods are healthy and provide extra fiber in your diet  Some examples of whole-grain foods are whole-wheat breads and pastas, oatmeal, brown rice, and bulgur  · Eat a variety of vegetables every day  Include dark, leafy greens such as spinach, kale, neil greens, and mustard greens  Eat yellow and orange vegetables such as carrots, sweet potatoes, and winter squash  · Eat a variety of fruits every day  Choose fresh or canned fruit (canned in its own juice or light syrup) instead of juice  Fruit juice has very little or no fiber  · Eat low-fat dairy foods  Drink fat-free (skim) milk or 1% milk  Eat fat-free yogurt and low-fat cottage cheese  Try low-fat cheeses such as mozzarella and other reduced-fat cheeses  · Choose meat and other protein foods that are low in fat  Choose beans or other legumes such as split peas or lentils  Choose fish, skinless poultry (chicken or turkey), or lean cuts of red meat (beef or pork)  Before you cook meat or poultry, cut off any visible fat  · Use less fat and oil  Try baking foods instead of frying them   Add less fat, such as margarine, sour cream, regular salad dressing and mayonnaise to foods  Eat fewer high-fat foods  Some examples of high-fat foods include french fries, doughnuts, ice cream, and cakes  · Eat fewer sweets  Limit foods and drinks that are high in sugar  This includes candy, cookies, regular soda, and sweetened drinks  Exercise:  Exercise at least 30 minutes per day on most days of the week  Some examples of exercise include walking, biking, dancing, and swimming  You can also fit in more physical activity by taking the stairs instead of the elevator or parking farther away from stores  Ask your healthcare provider about the best exercise plan for you  © Copyright [employer] 2018 Information is for End User's use only and may not be sold, redistributed or otherwise used for commercial purposes   All illustrations and images included in CareNotes® are the copyrighted property of A D A M , Inc  or [address] [place] [address]

## 2021-02-08 NOTE — PROGRESS NOTES
Assessment/Plan:      Chronic problems appear stable  For her ongoing diarrhea issues we can try cholestyramine once a day  Ordered labs to do in about 4-6 weeks on the cholestyramine to make sure it is not interfering with her thyroid levels  Quality Measures:       Return in about 4 months (around 6/8/2021)  No problem-specific Assessment & Plan notes found for this encounter  Diagnoses and all orders for this visit:    Medicare annual wellness visit, subsequent    Essential hypertension  -     CBC and differential; Future  -     Comprehensive metabolic panel; Future  -     Lipid panel; Future    Hypothyroidism, unspecified type  -     T4, free; Future  -     TSH, 3rd generation; Future    Chronic diarrhea  -     cholestyramine (QUESTRAN) 4 g packet; Take 1 packet (4 g total) by mouth daily          Subjective:      Patient ID: Steve Mirza is a 67 y o  female  Patient comes in today for routine follow-up and Medicare wellness with her daughter  Her blood pressure has been controlled  Her dementia appears stable  Thyroid levels have been controlled  Daughter has been pretty much keeping her at home during the pandemic  She is still having the trouble with diarrhea after meals  This is unchanged  ALLERGIES:  Allergies   Allergen Reactions    Ciprofloxacin     Other     Sulfa Antibiotics     Penicillins Rash     Category: Allergy;   --  Pt received unasyn 1 5 mg IV 11-12-18 to 11-15-18    Sulfacetamide Rash     Category:  Allergy;        CURRENT MEDICATIONS:    Current Outpatient Medications:     amLODIPine (NORVASC) 2 5 mg tablet, TAKE 2 TABLETS BY MOUTH EVERY DAY, Disp: 180 tablet, Rfl: 0    aspirin 325 mg tablet, Take 325 mg by mouth as needed , Disp: , Rfl:     fluticasone (FLONASE) 50 mcg/act nasal spray, SPRAY 1 SPRAY INTO EACH NOSTRIL EVERY DAY (Patient taking differently: as needed ), Disp: 16 mL, Rfl: 1    levothyroxine 125 mcg tablet, TAKE 1 TABLET BY MOUTH EVERY DAY, Disp: 90 tablet, Rfl: 1    multivitamin (THERAGRAN) TABS, Take 1 tablet by mouth, Disp: , Rfl:     MYRBETRIQ 50 MG TB24, TAKE 1 TABLET BY MOUTH EVERY DAY, Disp: 30 tablet, Rfl: 5    nitrofurantoin (MACROBID) 100 mg capsule, Take 1 capsule (100 mg total) by mouth 2 (two) times a day, Disp: 10 capsule, Rfl: 0    omeprazole (PriLOSEC) 20 mg delayed release capsule, TAKE 1 CAPSULE BY MOUTH EVERY DAY, Disp: 90 capsule, Rfl: 1    PARoxetine (PAXIL) 20 mg tablet, TAKE 1 TABLET BY MOUTH EVERY DAY, Disp: 90 tablet, Rfl: 1    potassium chloride (MICRO-K) 10 MEQ CR capsule, Take 1 capsule (10 mEq total) by mouth daily, Disp: 30 capsule, Rfl: 0    PROAIR  (90 Base) MCG/ACT inhaler, as needed , Disp: , Rfl:     rivastigmine (EXELON) 9 5 mg/24 hr TD 24 hr patch, APPLY 1 PATCH EVERY DAY, Disp: 90 patch, Rfl: 1    cholestyramine (QUESTRAN) 4 g packet, Take 1 packet (4 g total) by mouth daily, Disp: 30 packet, Rfl: 3    ACTIVE PROBLEM LIST:  Patient Active Problem List   Diagnosis    Depression    Gastroesophageal reflux disease without esophagitis    Mixed hyperlipidemia    Essential hypertension    Hypothyroid    Impaired fasting glucose    Lymphoma (HCC)    Cerebral aneurysm, nonruptured    Mild cognitive impairment    Flexor tenosynovitis of finger    Macrocytosis without anemia    Paresthesias    Non-intractable vomiting with nausea    Hypokalemia    Late onset Alzheimer's disease without behavioral disturbance (HCC)    Chest pain    Near syncope    Immunocompromised (HCC)       PAST MEDICAL HISTORY:  Past Medical History:   Diagnosis Date    Anxiety disorder     Aortic valve disorder     Breast cancer ([facility] [address] )     Cellulitis of finger of left hand 11/20/2018    Cellulitis of left hand 11/10/2018    Added automatically from request for surgery [id number]    Depression     History of gastroesophageal reflux (GERD)     History of kidney cancer 03/06/2014    Hyperlipidemia 03/06/2014    Hypertension 03/06/2014    Hypothyroidism 03/06/2014    Shortness of breath     TIA (transient ischemic attack)        PAST SURGICAL HISTORY:  Past Surgical History:   Procedure Laterality Date    ESOPHAGOGASTRIC FUNDOPLASTY      Nissen Fundoplication    HERNIA REPAIR         FAMILY HISTORY:  Family History   Problem Relation Age of Onset    Stroke Father     Leukemia Sister     Alcohol abuse Brother     Kidney cancer Brother        SOCIAL HISTORY:  Social History     Socioeconomic History    Marital status: /Civil Union     Spouse name: Not on file    Number of children: 2    Years of education: Not on file    Highest education level: Not on file   Occupational History    Not on file   Social Needs    Financial resource strain: Not on file    Food insecurity     Worry: Not on file     Inability: Not on file   [employer] needs     Medical: Not on file     Non-medical: Not on file   Tobacco Use    Smoking status: Never Smoker    Smokeless tobacco: Never Used   Substance and Sexual Activity    Alcohol use: Yes     Frequency: Monthly or less     Drinks per session: 1 or 2     Comment: social-rare    Drug use: No    Sexual activity: Not on file   Lifestyle    Physical activity     Days per week: Not on file     Minutes per session: Not on file    Stress: Not on file   Relationships    Social connections     Talks on phone: Not on file     Gets together: Not on file     Attends Mosque service: Not on file     Active member of club or organization: Not on file     Attends meetings of clubs or organizations: Not on file     Relationship status: Not on file    Intimate partner violence     Fear of current or ex partner: Not on file     Emotionally abused: Not on file     Physically abused: Not on file     Forced sexual activity: Not on file   Other Topics Concern    Not on file   Social History Narrative    Not on file       Review of Systems   Respiratory: Negative for shortness of breath  Cardiovascular: Negative for chest pain  Gastrointestinal: Negative for abdominal pain  Objective:  Vitals:    02/08/21 1342   BP: 140/86   Pulse: 97   Resp: 16   Temp: 98 6 °F (37 °C)   SpO2: 95%   Weight: 75 8 kg (167 lb)     Body mass index is 32 61 kg/m²  Physical Exam  Vitals signs and nursing note reviewed  Constitutional:       Appearance: She is well-developed  Cardiovascular:      Rate and Rhythm: Normal rate and regular rhythm  Heart sounds: Normal heart sounds  Pulmonary:      Effort: Pulmonary effort is normal       Breath sounds: Normal breath sounds  Abdominal:      Palpations: Abdomen is soft  Tenderness: There is no abdominal tenderness  Neurological:      Mental Status: She is alert and oriented to person, place, and time  RESULTS:    No results found for this or any previous visit (from the past 1008 hour(s))  This note was created with voice recognition software  Phonic, grammatical and spelling errors may be present within the note as a result

## 2021-02-09 RX ORDER — LEVOTHYROXINE SODIUM 0.12 MG/1
125 TABLET ORAL DAILY
Qty: 90 TABLET | Refills: 3 | Status: SHIPPED | OUTPATIENT
Start: 2021-02-09 | End: 2021-05-26 | Stop reason: SDUPTHER

## 2021-02-09 RX ORDER — MIRABEGRON 50 MG/1
1 TABLET, FILM COATED, EXTENDED RELEASE ORAL DAILY
Qty: 30 TABLET | Refills: 3 | Status: SHIPPED | OUTPATIENT
Start: 2021-02-09 | End: 2021-05-26 | Stop reason: SDUPTHER

## 2021-02-09 RX ORDER — PAROXETINE HYDROCHLORIDE 20 MG/1
20 TABLET, FILM COATED ORAL DAILY
Qty: 90 TABLET | Refills: 3 | Status: SHIPPED | OUTPATIENT
Start: 2021-02-09 | End: 2021-05-26 | Stop reason: SDUPTHER

## 2021-02-09 RX ORDER — OMEPRAZOLE 20 MG/1
20 CAPSULE, DELAYED RELEASE ORAL DAILY
Qty: 90 CAPSULE | Refills: 3 | Status: SHIPPED | OUTPATIENT
Start: 2021-02-09 | End: 2021-05-26 | Stop reason: SDUPTHER

## 2021-02-09 RX ORDER — AMLODIPINE BESYLATE 2.5 MG/1
5 TABLET ORAL DAILY
Qty: 180 TABLET | Refills: 3 | Status: SHIPPED | OUTPATIENT
Start: 2021-02-09 | End: 2021-05-26 | Stop reason: SDUPTHER

## 2021-02-10 ENCOUNTER — IMMUNIZATIONS (OUTPATIENT)
Dept: FAMILY MEDICINE CLINIC | Facility: HOSPITAL | Age: 76
End: 2021-02-10

## 2021-02-10 DIAGNOSIS — Z23 ENCOUNTER FOR IMMUNIZATION: Primary | ICD-10-CM

## 2021-02-10 PROCEDURE — 91301 SARS-COV-2 / COVID-19 MRNA VACCINE (MODERNA) 100 MCG: CPT

## 2021-02-10 PROCEDURE — 0011A SARS-COV-2 / COVID-19 MRNA VACCINE (MODERNA) 100 MCG: CPT

## 2021-03-08 ENCOUNTER — IMMUNIZATIONS (OUTPATIENT)
Dept: FAMILY MEDICINE CLINIC | Facility: HOSPITAL | Age: 76
End: 2021-03-08

## 2021-03-08 DIAGNOSIS — Z23 ENCOUNTER FOR IMMUNIZATION: Primary | ICD-10-CM

## 2021-03-08 PROCEDURE — 0012A SARS-COV-2 / COVID-19 MRNA VACCINE (MODERNA) 100 MCG: CPT

## 2021-03-08 PROCEDURE — 91301 SARS-COV-2 / COVID-19 MRNA VACCINE (MODERNA) 100 MCG: CPT

## 2021-04-30 DIAGNOSIS — K52.9 CHRONIC DIARRHEA: ICD-10-CM

## 2021-04-30 RX ORDER — CHOLESTYRAMINE 4 G/9G
1 POWDER, FOR SUSPENSION ORAL DAILY
Qty: 90 PACKET | Refills: 1 | Status: SHIPPED | OUTPATIENT
Start: 2021-04-30 | End: 2021-08-09

## 2021-05-26 DIAGNOSIS — K21.9 GASTROESOPHAGEAL REFLUX DISEASE WITHOUT ESOPHAGITIS: ICD-10-CM

## 2021-05-26 DIAGNOSIS — F02.80 LATE ONSET ALZHEIMER'S DISEASE WITHOUT BEHAVIORAL DISTURBANCE (HCC): ICD-10-CM

## 2021-05-26 DIAGNOSIS — F32.A DEPRESSION, UNSPECIFIED DEPRESSION TYPE: ICD-10-CM

## 2021-05-26 DIAGNOSIS — G30.1 LATE ONSET ALZHEIMER'S DISEASE WITHOUT BEHAVIORAL DISTURBANCE (HCC): ICD-10-CM

## 2021-05-26 DIAGNOSIS — I10 ESSENTIAL HYPERTENSION: ICD-10-CM

## 2021-05-26 DIAGNOSIS — N39.0 RECURRENT UTI: ICD-10-CM

## 2021-05-26 DIAGNOSIS — E03.9 HYPOTHYROIDISM, UNSPECIFIED TYPE: ICD-10-CM

## 2021-05-26 RX ORDER — AMLODIPINE BESYLATE 2.5 MG/1
5 TABLET ORAL DAILY
Qty: 180 TABLET | Refills: 1 | Status: SHIPPED | OUTPATIENT
Start: 2021-05-26 | End: 2022-02-02

## 2021-05-26 RX ORDER — LEVOTHYROXINE SODIUM 0.12 MG/1
125 TABLET ORAL DAILY
Qty: 90 TABLET | Refills: 1 | Status: SHIPPED | OUTPATIENT
Start: 2021-05-26 | End: 2022-02-02

## 2021-05-26 RX ORDER — MIRABEGRON 50 MG/1
1 TABLET, FILM COATED, EXTENDED RELEASE ORAL DAILY
Qty: 90 TABLET | Refills: 1 | Status: SHIPPED | OUTPATIENT
Start: 2021-05-26 | End: 2021-12-06

## 2021-05-26 RX ORDER — OMEPRAZOLE 20 MG/1
20 CAPSULE, DELAYED RELEASE ORAL DAILY
Qty: 90 CAPSULE | Refills: 1 | Status: SHIPPED | OUTPATIENT
Start: 2021-05-26 | End: 2021-12-06

## 2021-05-26 RX ORDER — PAROXETINE HYDROCHLORIDE 20 MG/1
20 TABLET, FILM COATED ORAL DAILY
Qty: 90 TABLET | Refills: 1 | Status: SHIPPED | OUTPATIENT
Start: 2021-05-26 | End: 2022-01-03

## 2021-05-26 RX ORDER — RIVASTIGMINE 9.5 MG/24H
1 PATCH, EXTENDED RELEASE TRANSDERMAL DAILY
Qty: 90 PATCH | Refills: 0 | Status: SHIPPED | OUTPATIENT
Start: 2021-05-26 | End: 2021-07-29

## 2021-05-28 ENCOUNTER — APPOINTMENT (OUTPATIENT)
Dept: LAB | Facility: HOSPITAL | Age: 76
End: 2021-05-28
Payer: COMMERCIAL

## 2021-05-28 DIAGNOSIS — E03.9 HYPOTHYROIDISM, UNSPECIFIED TYPE: ICD-10-CM

## 2021-05-28 DIAGNOSIS — I10 ESSENTIAL HYPERTENSION: ICD-10-CM

## 2021-05-28 LAB
ALBUMIN SERPL BCP-MCNC: 3.4 G/DL (ref 3.5–5)
ALP SERPL-CCNC: 78 U/L (ref 46–116)
ALT SERPL W P-5'-P-CCNC: 20 U/L (ref 12–78)
ANION GAP SERPL CALCULATED.3IONS-SCNC: 9 MMOL/L (ref 4–13)
AST SERPL W P-5'-P-CCNC: 18 U/L (ref 5–45)
BASOPHILS # BLD MANUAL: 0.09 THOUSAND/UL (ref 0–0.1)
BASOPHILS NFR MAR MANUAL: 2 % (ref 0–1)
BILIRUB SERPL-MCNC: 0.35 MG/DL (ref 0.2–1)
BUN SERPL-MCNC: 19 MG/DL (ref 5–25)
CALCIUM ALBUM COR SERPL-MCNC: 9.1 MG/DL (ref 8.3–10.1)
CALCIUM SERPL-MCNC: 8.6 MG/DL (ref 8.3–10.1)
CHLORIDE SERPL-SCNC: 105 MMOL/L (ref 100–108)
CHOLEST SERPL-MCNC: 229 MG/DL (ref 50–200)
CO2 SERPL-SCNC: 28 MMOL/L (ref 21–32)
CREAT SERPL-MCNC: 0.77 MG/DL (ref 0.6–1.3)
EOSINOPHIL # BLD MANUAL: 0.14 THOUSAND/UL (ref 0–0.4)
EOSINOPHIL NFR BLD MANUAL: 3 % (ref 0–6)
ERYTHROCYTE [DISTWIDTH] IN BLOOD BY AUTOMATED COUNT: 13.2 % (ref 11.6–15.1)
GFR SERPL CREATININE-BSD FRML MDRD: 75 ML/MIN/1.73SQ M
GLUCOSE P FAST SERPL-MCNC: 96 MG/DL (ref 65–99)
HCT VFR BLD AUTO: 39.1 % (ref 34.8–46.1)
HDLC SERPL-MCNC: 86 MG/DL
HGB BLD-MCNC: 12.9 G/DL (ref 11.5–15.4)
LDLC SERPL CALC-MCNC: 129 MG/DL (ref 0–100)
LG PLATELETS BLD QL SMEAR: PRESENT
LYMPHOCYTES # BLD AUTO: 1.16 THOUSAND/UL (ref 0.6–4.47)
LYMPHOCYTES # BLD AUTO: 25 % (ref 14–44)
MCH RBC QN AUTO: 34.8 PG (ref 26.8–34.3)
MCHC RBC AUTO-ENTMCNC: 33 G/DL (ref 31.4–37.4)
MCV RBC AUTO: 105 FL (ref 82–98)
MONOCYTES # BLD AUTO: 0.28 THOUSAND/UL (ref 0–1.22)
MONOCYTES NFR BLD: 6 % (ref 4–12)
NEUTROPHILS # BLD MANUAL: 2.97 THOUSAND/UL (ref 1.85–7.62)
NEUTS SEG NFR BLD AUTO: 64 % (ref 43–75)
NONHDLC SERPL-MCNC: 143 MG/DL
NRBC BLD AUTO-RTO: 0 /100 WBCS
PLATELET # BLD AUTO: 240 THOUSANDS/UL (ref 149–390)
PLATELET BLD QL SMEAR: ADEQUATE
PMV BLD AUTO: 11.6 FL (ref 8.9–12.7)
POTASSIUM SERPL-SCNC: 4 MMOL/L (ref 3.5–5.3)
PROT SERPL-MCNC: 7.1 G/DL (ref 6.4–8.2)
RBC # BLD AUTO: 3.71 MILLION/UL (ref 3.81–5.12)
SODIUM SERPL-SCNC: 142 MMOL/L (ref 136–145)
T4 FREE SERPL-MCNC: 1.28 NG/DL (ref 0.76–1.46)
TOTAL CELLS COUNTED SPEC: 100
TRIGL SERPL-MCNC: 72 MG/DL
TSH SERPL DL<=0.05 MIU/L-ACNC: 0.89 UIU/ML (ref 0.36–3.74)
WBC # BLD AUTO: 4.64 THOUSAND/UL (ref 4.31–10.16)

## 2021-05-28 PROCEDURE — 80053 COMPREHEN METABOLIC PANEL: CPT

## 2021-05-28 PROCEDURE — 84443 ASSAY THYROID STIM HORMONE: CPT

## 2021-05-28 PROCEDURE — 80061 LIPID PANEL: CPT

## 2021-05-28 PROCEDURE — 87086 URINE CULTURE/COLONY COUNT: CPT | Performed by: PHYSICIAN ASSISTANT

## 2021-05-28 PROCEDURE — 85007 BL SMEAR W/DIFF WBC COUNT: CPT

## 2021-05-28 PROCEDURE — 85027 COMPLETE CBC AUTOMATED: CPT

## 2021-05-28 PROCEDURE — 36415 COLL VENOUS BLD VENIPUNCTURE: CPT

## 2021-05-28 PROCEDURE — 84439 ASSAY OF FREE THYROXINE: CPT

## 2021-05-29 LAB — BACTERIA UR CULT: NORMAL

## 2021-06-01 ENCOUNTER — APPOINTMENT (OUTPATIENT)
Dept: LAB | Facility: HOSPITAL | Age: 76
End: 2021-06-01
Attending: INTERNAL MEDICINE
Payer: COMMERCIAL

## 2021-06-01 ENCOUNTER — RA CDI HCC (OUTPATIENT)
Dept: OTHER | Facility: HOSPITAL | Age: 76
End: 2021-06-01

## 2021-06-01 ENCOUNTER — TELEPHONE (OUTPATIENT)
Dept: INTERNAL MEDICINE CLINIC | Facility: CLINIC | Age: 76
End: 2021-06-01

## 2021-06-01 DIAGNOSIS — N39.0 RECURRENT UTI: Primary | ICD-10-CM

## 2021-06-01 LAB
BACTERIA UR QL AUTO: ABNORMAL /HPF
BILIRUB UR QL STRIP: NEGATIVE
CLARITY UR: ABNORMAL
COLOR UR: YELLOW
GLUCOSE UR STRIP-MCNC: NEGATIVE MG/DL
HGB UR QL STRIP.AUTO: ABNORMAL
KETONES UR STRIP-MCNC: NEGATIVE MG/DL
LEUKOCYTE ESTERASE UR QL STRIP: ABNORMAL
NITRITE UR QL STRIP: NEGATIVE
NON-SQ EPI CELLS URNS QL MICRO: ABNORMAL /HPF
PH UR STRIP.AUTO: 6.5 [PH]
PROT UR STRIP-MCNC: ABNORMAL MG/DL
RBC #/AREA URNS AUTO: ABNORMAL /HPF
SP GR UR STRIP.AUTO: 1.01 (ref 1–1.03)
UROBILINOGEN UR QL STRIP.AUTO: 0.2 E.U./DL
WBC #/AREA URNS AUTO: ABNORMAL /HPF

## 2021-06-01 PROCEDURE — 87086 URINE CULTURE/COLONY COUNT: CPT

## 2021-06-01 PROCEDURE — 81001 URINALYSIS AUTO W/SCOPE: CPT

## 2021-06-01 NOTE — TELEPHONE ENCOUNTER
Patient believes she has a UTI again and wants to know if you can order a urine test or medication  Patient was offered an appt but would like to see if you would order anything first being she gets these a lot? Please advise    Surinder Page

## 2021-06-01 NOTE — PROGRESS NOTES
Based on clinical documentation indicated in your record, it appears that the patient may have the following conditions:    D84 9 Immunocompromised    If this is correct, please document and assess at your next visit June 8th  [facility]  coding opportunities             Chart reviewed, (number of) suggestions sent to provider: 1           Patients insurance company: [employer] (Medicare Advantage and Commercial)             [facility]  coding opportunities             Chart reviewed, (number of) suggestions sent to provider: 1                  Patients insurance company: [employer] (Medicare Advantage and [employer])     Visit status: Patient arrived for their scheduled appointment     Provider never responded to [facility] [employer]  coding request     Not used

## 2021-06-02 ENCOUNTER — TELEPHONE (OUTPATIENT)
Dept: INTERNAL MEDICINE CLINIC | Facility: CLINIC | Age: 76
End: 2021-06-02

## 2021-06-02 DIAGNOSIS — N39.0 RECURRENT UTI: Primary | ICD-10-CM

## 2021-06-02 LAB — BACTERIA UR CULT: NORMAL

## 2021-06-02 RX ORDER — NITROFURANTOIN 25; 75 MG/1; MG/1
100 CAPSULE ORAL 2 TIMES DAILY
Qty: 10 CAPSULE | Refills: 0 | Status: SHIPPED | OUTPATIENT
Start: 2021-06-02 | End: 2021-06-07

## 2021-06-02 NOTE — TELEPHONE ENCOUNTER
Patient went for her urine tests  She is in a lot of pain and a lot of frequency  Is requesting an antibiotic be sent to pharmacy      [facility]    Call back #[phone]  Sarah/daughter

## 2021-06-03 ENCOUNTER — TELEPHONE (OUTPATIENT)
Dept: INTERNAL MEDICINE CLINIC | Facility: CLINIC | Age: 76
End: 2021-06-03

## 2021-06-03 NOTE — TELEPHONE ENCOUNTER
----- Message from Corrina Cartagena sent at 6/3/2021  7:27 AM EDT -----  Regarding: FW: Test Results Question  Contact: [phone]    ----- Message -----  From: Moris Mendoza  Sent: 6/2/2021   6:22 PM EDT  To: [facility] Internal Med Clinical  Subject: Test Results Question                            Is  mom going to get an antibiotic?

## 2021-06-08 ENCOUNTER — OFFICE VISIT (OUTPATIENT)
Dept: INTERNAL MEDICINE CLINIC | Facility: CLINIC | Age: 76
End: 2021-06-08
Payer: COMMERCIAL

## 2021-06-08 VITALS
WEIGHT: 166 LBS | BODY MASS INDEX: 32.59 KG/M2 | DIASTOLIC BLOOD PRESSURE: 72 MMHG | HEIGHT: 60 IN | TEMPERATURE: 97.9 F | SYSTOLIC BLOOD PRESSURE: 126 MMHG | HEART RATE: 84 BPM | OXYGEN SATURATION: 96 %

## 2021-06-08 DIAGNOSIS — K21.9 GASTROESOPHAGEAL REFLUX DISEASE WITHOUT ESOPHAGITIS: ICD-10-CM

## 2021-06-08 DIAGNOSIS — N39.0 RECURRENT UTI: ICD-10-CM

## 2021-06-08 DIAGNOSIS — R73.01 IMPAIRED FASTING GLUCOSE: ICD-10-CM

## 2021-06-08 DIAGNOSIS — E03.9 HYPOTHYROIDISM, UNSPECIFIED TYPE: ICD-10-CM

## 2021-06-08 DIAGNOSIS — I10 ESSENTIAL HYPERTENSION: Primary | ICD-10-CM

## 2021-06-08 PROCEDURE — 3288F FALL RISK ASSESSMENT DOCD: CPT | Performed by: INTERNAL MEDICINE

## 2021-06-08 PROCEDURE — 1101F PT FALLS ASSESS-DOCD LE1/YR: CPT | Performed by: INTERNAL MEDICINE

## 2021-06-08 PROCEDURE — 3725F SCREEN DEPRESSION PERFORMED: CPT | Performed by: INTERNAL MEDICINE

## 2021-06-08 PROCEDURE — 99214 OFFICE O/P EST MOD 30 MIN: CPT | Performed by: INTERNAL MEDICINE

## 2021-06-08 NOTE — PROGRESS NOTES
Assessment/Plan:       Chronic problems are stable  Continue present medications  Continue diet and exercise  Ordered labs for next visit  Follow-up with urology as planned  Quality Measures:       Return in about 4 months (around 10/8/2021) for Recheck  No problem-specific Assessment & Plan notes found for this encounter  Diagnoses and all orders for this visit:    Essential hypertension  -     Comprehensive metabolic panel; Future  -     CBC and differential; Future  -     Lipid panel; Future    Hypothyroidism, unspecified type  -     T4, free; Future  -     TSH, 3rd generation; Future    Gastroesophageal reflux disease without esophagitis    Impaired fasting glucose  -     Hemoglobin A1C; Future    Recurrent UTI          Subjective:      Patient ID: Bigg Ibarra is a 67 y o  female  Patient comes in today for routine follow-up with her daughter  Her blood pressure is controlled  Blood work shows her thyroid levels are controlled  Cholesterol is high again this time period much better last time  She admits she could do better with her diet  Reflux is controlled  Taking her medicines as directed  She had another UTI  She does have an appointment with urology tomorrow  Culture was mixed contaminants  But she got better with the Macrobid again  No other complaints today  No further additions to her history  ALLERGIES:  Allergies   Allergen Reactions    Ciprofloxacin     Other     Sulfa Antibiotics     Penicillins Rash     Category: Allergy;   --  Pt received unasyn 1 5 mg IV 11-12-18 to 11-15-18    Sulfacetamide Rash     Category:  Allergy;        CURRENT MEDICATIONS:    Current Outpatient Medications:     amLODIPine (NORVASC) 2 5 mg tablet, Take 2 tablets (5 mg total) by mouth daily, Disp: 180 tablet, Rfl: 1    aspirin 325 mg tablet, Take 325 mg by mouth as needed , Disp: , Rfl:     cholestyramine (QUESTRAN) 4 g packet, TAKE 1 PACKET (4 G TOTAL) BY MOUTH DAILY, Disp: 90 packet, Rfl: 1    fluticasone (FLONASE) 50 mcg/act nasal spray, SPRAY 1 SPRAY INTO EACH NOSTRIL EVERY DAY (Patient taking differently: as needed ), Disp: 16 mL, Rfl: 1    levothyroxine 125 mcg tablet, Take 1 tablet (125 mcg total) by mouth daily, Disp: 90 tablet, Rfl: 1    Mirabegron ER (Myrbetriq) 50 MG TB24, Take 1 tablet (50 mg total) by mouth daily, Disp: 90 tablet, Rfl: 1    multivitamin (THERAGRAN) TABS, Take 1 tablet by mouth, Disp: , Rfl:     omeprazole (PriLOSEC) 20 mg delayed release capsule, Take 1 capsule (20 mg total) by mouth daily, Disp: 90 capsule, Rfl: 1    PARoxetine (PAXIL) 20 mg tablet, Take 1 tablet (20 mg total) by mouth daily, Disp: 90 tablet, Rfl: 1    potassium chloride (MICRO-K) 10 MEQ CR capsule, Take 1 capsule (10 mEq total) by mouth daily, Disp: 30 capsule, Rfl: 0    PROAIR  (90 Base) MCG/ACT inhaler, as needed , Disp: , Rfl:     rivastigmine (EXELON) 9 5 mg/24 hr TD 24 hr patch, Place 1 patch on the skin daily, Disp: 90 patch, Rfl: 0    ACTIVE PROBLEM LIST:  Patient Active Problem List   Diagnosis    Depression    Gastroesophageal reflux disease without esophagitis    Mixed hyperlipidemia    Essential hypertension    Hypothyroid    Impaired fasting glucose    Lymphoma (HCC)    Cerebral aneurysm, nonruptured    Mild cognitive impairment    Flexor tenosynovitis of finger    Macrocytosis without anemia    Paresthesias    Non-intractable vomiting with nausea    Hypokalemia    Late onset Alzheimer's disease without behavioral disturbance (HCC)    Chest pain    Near syncope    Immunocompromised (HCC)       PAST MEDICAL HISTORY:  Past Medical History:   Diagnosis Date    Anxiety disorder     Aortic valve disorder     Breast cancer ([facility] [address]     Cellulitis of finger of left hand 11/20/2018    Cellulitis of left hand 11/10/2018    Added automatically from request for surgery [id number]    Depression     History of gastroesophageal reflux (GERD)     History of kidney cancer 03/06/2014    Hyperlipidemia 03/06/2014    Hypertension 03/06/2014    Hypothyroidism 03/06/2014    Shortness of breath     TIA (transient ischemic attack)        PAST SURGICAL HISTORY:  Past Surgical History:   Procedure Laterality Date    ESOPHAGOGASTRIC FUNDOPLASTY      Nissen Fundoplication    HERNIA REPAIR         FAMILY HISTORY:  Family History   Problem Relation Age of Onset    Stroke Father     Leukemia Sister     Alcohol abuse Brother     Kidney cancer Brother        SOCIAL HISTORY:  Social History     Socioeconomic History    Marital status: /Civil Union     Spouse name: Not on file    Number of children: 2    Years of education: Not on file    Highest education level: Not on file   Occupational History    Not on file   Social Needs    Financial resource strain: Not on file    Food insecurity     Worry: Not on file     Inability: Not on file   [employer] needs     Medical: Not on file     Non-medical: Not on file   Tobacco Use    Smoking status: Never Smoker    Smokeless tobacco: Never Used   Substance and Sexual Activity    Alcohol use: Yes     Frequency: Monthly or less     Drinks per session: 1 or 2     Comment: social-rare    Drug use: No    Sexual activity: Not on file   Lifestyle    Physical activity     Days per week: Not on file     Minutes per session: Not on file    Stress: Not on file   Relationships    Social connections     Talks on phone: Not on file     Gets together: Not on file     Attends [employer] service: Not on file     Active member of club or organization: Not on file     Attends meetings of clubs or organizations: Not on file     Relationship status: Not on file    Intimate partner violence     Fear of current or ex partner: Not on file     Emotionally abused: Not on file     Physically abused: Not on file     Forced sexual activity: Not on file   Other Topics Concern    Not on file   Social History Narrative    Not on file Review of Systems   Respiratory: Negative for shortness of breath  Cardiovascular: Negative for chest pain  Gastrointestinal: Negative for abdominal pain  Objective:  Vitals:    06/08/21 1324   BP: 126/72   BP Location: Left arm   Patient Position: Sitting   Cuff Size: Adult   Pulse: 84   Temp: 97 9 °F (36 6 °C)   TempSrc: Temporal   SpO2: 96%   Weight: 75 3 kg (166 lb)   Height: 5' (1 524 m)     Body mass index is 32 42 kg/m²  Physical Exam  Vitals signs and nursing note reviewed  Constitutional:       Appearance: She is well-developed  Cardiovascular:      Rate and Rhythm: Normal rate and regular rhythm  Heart sounds: Normal heart sounds  Pulmonary:      Effort: Pulmonary effort is normal       Breath sounds: Normal breath sounds  Abdominal:      Palpations: Abdomen is soft  Tenderness: There is no abdominal tenderness  Neurological:      Mental Status: She is alert and oriented to person, place, and time             RESULTS:    Recent Results (from the past 1008 hour(s))   Urine culture    Collection Time: 05/28/21  2:14 PM    Specimen: Urine, Clean Catch   Result Value Ref Range    Urine Culture 20,000-29,000 cfu/ml     CBC and differential    Collection Time: 05/28/21  2:14 PM   Result Value Ref Range    WBC 4 64 4 31 - 10 16 Thousand/uL    RBC 3 71 (L) 3 81 - 5 12 Million/uL    Hemoglobin 12 9 11 5 - 15 4 g/dL    Hematocrit 39 1 34 8 - 46 1 %     (H) 82 - 98 fL    MCH 34 8 (H) 26 8 - 34 3 pg    MCHC 33 0 31 4 - 37 4 g/dL    RDW 13 2 11 6 - 15 1 %    MPV 11 6 8 9 - 12 7 fL    Platelets 410 003 - 030 Thousands/uL    nRBC 0 /100 WBCs   Comprehensive metabolic panel    Collection Time: 05/28/21  2:14 PM   Result Value Ref Range    Sodium 142 136 - 145 mmol/L    Potassium 4 0 3 5 - 5 3 mmol/L    Chloride 105 100 - 108 mmol/L    CO2 28 21 - 32 mmol/L    ANION GAP 9 4 - 13 mmol/L    BUN 19 5 - 25 mg/dL    Creatinine 0 77 0 60 - 1 30 mg/dL    Glucose, Fasting 96 65 - 99 mg/dL    Calcium 8 6 8 3 - 10 1 mg/dL    Corrected Calcium 9 1 8 3 - 10 1 mg/dL    AST 18 5 - 45 U/L    ALT 20 12 - 78 U/L    Alkaline Phosphatase 78 46 - 116 U/L    Total Protein 7 1 6 4 - 8 2 g/dL    Albumin 3 4 (L) 3 5 - 5 0 g/dL    Total Bilirubin 0 35 0 20 - 1 00 mg/dL    eGFR 75 ml/min/1 73sq m   Lipid panel    Collection Time: 05/28/21  2:14 PM   Result Value Ref Range    Cholesterol 229 (H) 50 - 200 mg/dL    Triglycerides 72 <=150 mg/dL    HDL, Direct 86 >=40 mg/dL    LDL Calculated 129 (H) 0 - 100 mg/dL    Non-HDL-Chol (CHOL-HDL) 143 mg/dl   T4, free    Collection Time: 05/28/21  2:14 PM   Result Value Ref Range    Free T4 1 28 0 76 - 1 46 ng/dL   TSH, 3rd generation    Collection Time: 05/28/21  2:14 PM   Result Value Ref Range    TSH 3RD GENERATON 0 889 0 358 - 3 740 uIU/mL   Manual Differential(PHLEBS Do Not Order)    Collection Time: 05/28/21  2:14 PM   Result Value Ref Range    Segmented % 64 43 - 75 %    Lymphocytes % 25 14 - 44 %    Monocytes % 6 4 - 12 %    Eosinophils, % 3 0 - 6 %    Basophils % 2 (H) 0 - 1 %    Absolute Neutrophils 2 97 1 85 - 7 62 Thousand/uL    Lymphocytes Absolute 1 16 0 60 - 4 47 Thousand/uL    Monocytes Absolute 0 28 0 00 - 1 22 Thousand/uL    Eosinophils Absolute 0 14 0 00 - 0 40 Thousand/uL    Basophils Absolute 0 09 0 00 - 0 10 Thousand/uL    Total Counted 100     Platelet Estimate Adequate Adequate    Large Platelet Present    UA w Reflex to Microscopic w Reflex to Culture -Lab Collect    Collection Time: 06/01/21  4:24 PM    Specimen: Urine   Result Value Ref Range    Color, UA Yellow     Clarity, UA Turbid     Specific [place], UA 1 015 1 003 - 1 030    pH, UA 6 5 4 5, 5 0, 5 5, 6 0, 6 5, 7 0, 7 5, 8 0    Leukocytes, UA Large (A) Negative    Nitrite, UA Negative Negative    Protein, UA Trace (A) Negative mg/dl    Glucose, UA Negative Negative mg/dl    Ketones, UA Negative Negative mg/dl    Urobilinogen, UA 0 2 0 2, 1 0 E U /dl E U /dl    Bilirubin, UA Negative Negative Blood, UA Moderate (A) Negative   Urine Microscopic    Collection Time: 06/01/21  4:24 PM   Result Value Ref Range    RBC, UA 0-5 None Seen, 0-1, 1-2, 2-4, 0-5 /hpf    WBC, UA Innumerable (A) None Seen, 0-1, 1-2, 0-5, 2-4 /hpf    Epithelial Cells Occasional None Seen, Occasional /hpf    Bacteria, UA None Seen None Seen, Occasional /hpf   Urine culture    Collection Time: 06/01/21  4:24 PM    Specimen: Urine   Result Value Ref Range    Urine Culture >100,000 cfu/ml         This note was created with voice recognition software  Phonic, grammatical and spelling errors may be present within the note as a result

## 2021-06-09 ENCOUNTER — OFFICE VISIT (OUTPATIENT)
Dept: UROLOGY | Facility: CLINIC | Age: 76
End: 2021-06-09
Payer: COMMERCIAL

## 2021-06-09 VITALS
HEIGHT: 60 IN | WEIGHT: 167 LBS | BODY MASS INDEX: 32.79 KG/M2 | SYSTOLIC BLOOD PRESSURE: 130 MMHG | DIASTOLIC BLOOD PRESSURE: 82 MMHG

## 2021-06-09 DIAGNOSIS — N39.0 RECURRENT UTI: Primary | ICD-10-CM

## 2021-06-09 LAB
POST-VOID RESIDUAL VOLUME, ML POC: 10 ML
SL AMB  POCT GLUCOSE, UA: NORMAL
SL AMB LEUKOCYTE ESTERASE,UA: NORMAL
SL AMB POCT BILIRUBIN,UA: NORMAL
SL AMB POCT BLOOD,UA: NORMAL
SL AMB POCT CLARITY,UA: CLEAR
SL AMB POCT COLOR,UA: YELLOW
SL AMB POCT KETONES,UA: NORMAL
SL AMB POCT NITRITE,UA: NORMAL
SL AMB POCT PH,UA: 6.5
SL AMB POCT SPECIFIC GRAVITY,UA: 1.01
SL AMB POCT URINE PROTEIN: NORMAL
SL AMB POCT UROBILINOGEN: 0.2

## 2021-06-09 PROCEDURE — 1036F TOBACCO NON-USER: CPT | Performed by: PHYSICIAN ASSISTANT

## 2021-06-09 PROCEDURE — 3079F DIAST BP 80-89 MM HG: CPT | Performed by: PHYSICIAN ASSISTANT

## 2021-06-09 PROCEDURE — 99213 OFFICE O/P EST LOW 20 MIN: CPT | Performed by: PHYSICIAN ASSISTANT

## 2021-06-09 PROCEDURE — 81002 URINALYSIS NONAUTO W/O SCOPE: CPT | Performed by: PHYSICIAN ASSISTANT

## 2021-06-09 PROCEDURE — 1160F RVW MEDS BY RX/DR IN RCRD: CPT | Performed by: PHYSICIAN ASSISTANT

## 2021-06-09 PROCEDURE — 51798 US URINE CAPACITY MEASURE: CPT | Performed by: PHYSICIAN ASSISTANT

## 2021-06-09 PROCEDURE — 3075F SYST BP GE 130 - 139MM HG: CPT | Performed by: PHYSICIAN ASSISTANT

## 2021-06-09 RX ORDER — ESTRADIOL 0.1 MG/G
CREAM VAGINAL
Qty: 42.5 G | Refills: 1 | Status: SHIPPED | OUTPATIENT
Start: 2021-06-09 | End: 2021-08-11

## 2021-06-09 NOTE — PROGRESS NOTES
1  Recurrent UTI  POCT urine dip    POCT Measure PVR    estradiol (ESTRACE) 0 1 mg/g vaginal cream         Assessment and plan:       1  Recurrent urinary infections  -start topical estrogen cream  -patient was counseled on the importance of hydration, avoidance constipation, cranberry and probiotic supplementation   -she was provided with a standing urine culture order  She was encouraged to obtain urine testing should she be symptomatic the office  Antibiotics will be dispensed as needed basis at this time   -she will follow-up in 6 months for reassessment  2  Lower urinary tract symptoms  - demonstrating adequate bladder emptying in the office today  - continue Myrbetriq daily        Ralph Roche PA-C      Chief Complaint     recurrent urinary infection    History of Present Illness     Swathi Agosto is a 67 y o  female presenting for follow up of recurrent urinary infections  Patient states that she has had recurrent urinary infections majority of her life  Has never required any  surgical intervention for hospitalization IV antibiotics  Her classic symptoms dysuria urgency frequency, suprapubic pressure  Denies any gross hematuria  Patient has already been started on Myrbetriq 50 mg by primary care provider  Patient is overall happy with her lower urinary tract symptoms on medication  Patient had a CT of the abdomen pelvis 03/05/2020 which was negative for any urologic abnormality other than some mild bladder wall thickening  Recent UA with micro (6/1/2021) negative for microscopic hematuria  Medical comorbidities include GERD, impaired fasting glucose, hypothyroid, hypertension, cerebral aneurysm, Alzheimer's, lymphoma, and depression  Urine dip leukocyte positive, nitrite and blood negative    PVR 10mL    Laboratory     Lab Results   Component Value Date    CREATININE 0 77 05/28/2021       Review of Systems     Review of Systems   Constitutional: Negative for activity change, appetite change, chills, diaphoresis, fatigue, fever and unexpected weight change  Respiratory: Negative for chest tightness and shortness of breath  Cardiovascular: Negative for chest pain, palpitations and leg swelling  Gastrointestinal: Negative for abdominal distention, abdominal pain, constipation, diarrhea, nausea and vomiting  Genitourinary: Negative for decreased urine volume, difficulty urinating, dysuria, enuresis, flank pain, frequency, genital sores, hematuria and urgency  Musculoskeletal: Negative for back pain, gait problem and myalgias  Skin: Negative for color change, pallor, rash and wound  Psychiatric/Behavioral: Negative for behavioral problems  The patient is not nervous/anxious  Allergies     Allergies   Allergen Reactions    Ciprofloxacin     Other     Sulfa Antibiotics     Penicillins Rash     Category: Allergy;   --  Pt received unasyn 1 5 mg IV 11-12-18 to 11-15-18    Sulfacetamide Rash     Category: Allergy; Physical Exam     Physical Exam  Constitutional:       General: She is not in acute distress  Appearance: Normal appearance  She is normal weight  She is not ill-appearing, toxic-appearing or diaphoretic  HENT:      Head: Normocephalic and atraumatic  Eyes:      General:         Right eye: No discharge  Left eye: No discharge  Conjunctiva/sclera: Conjunctivae normal    Pulmonary:      Effort: Pulmonary effort is normal  No respiratory distress  Musculoskeletal:      Comments: Ambulates with cane assistance   Skin:     General: Skin is warm and dry  Neurological:      General: No focal deficit present  Mental Status: She is alert and oriented to person, place, and time  Psychiatric:         Mood and Affect: Mood normal          Behavior: Behavior normal          Thought Content:  Thought content normal          Judgment: Judgment normal            Vital Signs     Vitals:    06/09/21 1334   BP: 130/82   Weight: 75 8 kg (167 lb)   Height: 5' (1 524 m)         Current Medications       Current Outpatient Medications:     amLODIPine (NORVASC) 2 5 mg tablet, Take 2 tablets (5 mg total) by mouth daily, Disp: 180 tablet, Rfl: 1    aspirin 325 mg tablet, Take 325 mg by mouth as needed , Disp: , Rfl:     cholestyramine (QUESTRAN) 4 g packet, TAKE 1 PACKET (4 G TOTAL) BY MOUTH DAILY, Disp: 90 packet, Rfl: 1    fluticasone (FLONASE) 50 mcg/act nasal spray, SPRAY 1 SPRAY INTO EACH NOSTRIL EVERY DAY (Patient taking differently: as needed ), Disp: 16 mL, Rfl: 1    levothyroxine 125 mcg tablet, Take 1 tablet (125 mcg total) by mouth daily, Disp: 90 tablet, Rfl: 1    Mirabegron ER (Myrbetriq) 50 MG TB24, Take 1 tablet (50 mg total) by mouth daily, Disp: 90 tablet, Rfl: 1    multivitamin (THERAGRAN) TABS, Take 1 tablet by mouth, Disp: , Rfl:     omeprazole (PriLOSEC) 20 mg delayed release capsule, Take 1 capsule (20 mg total) by mouth daily, Disp: 90 capsule, Rfl: 1    PARoxetine (PAXIL) 20 mg tablet, Take 1 tablet (20 mg total) by mouth daily, Disp: 90 tablet, Rfl: 1    potassium chloride (MICRO-K) 10 MEQ CR capsule, Take 1 capsule (10 mEq total) by mouth daily, Disp: 30 capsule, Rfl: 0    PROAIR  (90 Base) MCG/ACT inhaler, as needed , Disp: , Rfl:     rivastigmine (EXELON) 9 5 mg/24 hr TD 24 hr patch, Place 1 patch on the skin daily, Disp: 90 patch, Rfl: 0    estradiol (ESTRACE) 0 1 mg/g vaginal cream, Apply small pea sized amount around urethra 3x weekly, Disp: 42 5 g, Rfl: 1      Active Problems     Patient Active Problem List   Diagnosis    Depression    Gastroesophageal reflux disease without esophagitis    Mixed hyperlipidemia    Essential hypertension    Hypothyroid    Impaired fasting glucose    Lymphoma (HCC)    Cerebral aneurysm, nonruptured    Mild cognitive impairment    Flexor tenosynovitis of finger    Macrocytosis without anemia    Paresthesias    Non-intractable vomiting with nausea    Hypokalemia    Late onset Alzheimer's disease without behavioral disturbance (HCC)    Chest pain    Near syncope    Immunocompromised [facility])         Past Medical History     Past Medical History:   Diagnosis Date    Anxiety disorder     Aortic valve disorder     Breast cancer ([address] )     Cellulitis of finger of left hand 11/20/2018    Cellulitis of left hand 11/10/2018    Added automatically from request for surgery [id number]    Depression     History of gastroesophageal reflux (GERD)     History of kidney cancer 03/06/2014    Hyperlipidemia 03/06/2014    Hypertension 03/06/2014    Hypothyroidism 03/06/2014    Shortness of breath     TIA (transient ischemic attack)          Surgical History     Past Surgical History:   Procedure Laterality Date    ESOPHAGOGASTRIC FUNDOPLASTY      Nissen Fundoplication    HERNIA REPAIR           Family History     Family History   Problem Relation Age of Onset    Stroke Father     Leukemia Sister     Alcohol abuse Brother     Kidney cancer Brother          Social History     Social History       Radiology

## 2021-07-01 ENCOUNTER — TELEPHONE (OUTPATIENT)
Dept: NEUROLOGY | Facility: CLINIC | Age: 76
End: 2021-07-01

## 2021-07-01 NOTE — TELEPHONE ENCOUNTER
Spoke with patient to remind of appt with Dr Debo Darling on 7/6/21 at 3:30 pm  Nay Scott to keep appt

## 2021-07-06 ENCOUNTER — OFFICE VISIT (OUTPATIENT)
Dept: NEUROLOGY | Facility: CLINIC | Age: 76
End: 2021-07-06
Payer: COMMERCIAL

## 2021-07-06 VITALS
DIASTOLIC BLOOD PRESSURE: 84 MMHG | WEIGHT: 166 LBS | HEIGHT: 62 IN | HEART RATE: 70 BPM | SYSTOLIC BLOOD PRESSURE: 120 MMHG | BODY MASS INDEX: 30.55 KG/M2

## 2021-07-06 DIAGNOSIS — F02.80 LATE ONSET ALZHEIMER'S DISEASE WITHOUT BEHAVIORAL DISTURBANCE (HCC): Primary | ICD-10-CM

## 2021-07-06 DIAGNOSIS — G30.1 LATE ONSET ALZHEIMER'S DISEASE WITHOUT BEHAVIORAL DISTURBANCE (HCC): Primary | ICD-10-CM

## 2021-07-06 DIAGNOSIS — I10 ESSENTIAL HYPERTENSION: ICD-10-CM

## 2021-07-06 DIAGNOSIS — E78.2 MIXED HYPERLIPIDEMIA: ICD-10-CM

## 2021-07-06 PROCEDURE — 3079F DIAST BP 80-89 MM HG: CPT | Performed by: PSYCHIATRY & NEUROLOGY

## 2021-07-06 PROCEDURE — 1036F TOBACCO NON-USER: CPT | Performed by: PSYCHIATRY & NEUROLOGY

## 2021-07-06 PROCEDURE — 99214 OFFICE O/P EST MOD 30 MIN: CPT | Performed by: PSYCHIATRY & NEUROLOGY

## 2021-07-06 PROCEDURE — 3074F SYST BP LT 130 MM HG: CPT | Performed by: PSYCHIATRY & NEUROLOGY

## 2021-07-06 PROCEDURE — 1160F RVW MEDS BY RX/DR IN RCRD: CPT | Performed by: PSYCHIATRY & NEUROLOGY

## 2021-07-06 RX ORDER — MEMANTINE HYDROCHLORIDE 10 MG/1
10 TABLET ORAL 2 TIMES DAILY
Qty: 60 TABLET | Refills: 6 | Status: SHIPPED | OUTPATIENT
Start: 2021-07-06 | End: 2021-11-11 | Stop reason: SDUPTHER

## 2021-07-06 RX ORDER — MEMANTINE HYDROCHLORIDE 5 MG-10 MG
KIT ORAL
Qty: 1 TABLET | Refills: 0 | Status: SHIPPED | OUTPATIENT
Start: 2021-07-06 | End: 2022-03-25 | Stop reason: ALTCHOICE

## 2021-07-06 NOTE — PROGRESS NOTES
Tucker Modi is a 67 y o  female  Chief Complaint   Patient presents with    Late onset Alzheimer's disease    Mild cognitive impairment       Assessment:  1  Late onset Alzheimer's disease without behavioral disturbance ([address] )    2  Essential hypertension    3  Mixed hyperlipidemia        Plan:   Namenda titration pack followed by 10 mg twice a day  Continue with Exelon  follow-up in  4 months    Discussion:   patient's [place] has declined from the last visit it 16/30 it was  23/30, we discussed regarding Namenda and its side effects patient is agreeable will start patient on Namenda titration pack followed by 10 mg twice a day, side effects discussed with her in detail she will stop the medication if experiences any side effects also to continue with Exelon patch 9 5 mg per 24 hours, she was advised to continue with mentally stimulating exercises to keep her blood pressure cholesterol and sugar under control to go to the hospital if has any worsening symptoms and call me otherwise to see me back in 4 months and follow up with the other physicians  Subjective:    HPI    patient is here in follow-up for her memory difficulty, since her last visit she feels her memory is slightly worse but overall she is doing good she is staying with her daughter she feels her mood is okay she denies having any headaches no vision or speech difficulty no suicidal or homicidal thoughts she is under constant supervision she ambulates with a cane for safety no falls no seizures no other complaints      Vitals:    07/06/21 1142   BP: 120/84   BP Location: Left arm   Patient Position: Sitting   Cuff Size: Adult   Pulse: 70   Weight: 75 3 kg (166 lb)   Height: 5' 2" (1 575 m)       Current Medications    Current Outpatient Medications:     amLODIPine (NORVASC) 2 5 mg tablet, Take 2 tablets (5 mg total) by mouth daily, Disp: 180 tablet, Rfl: 1    aspirin 325 mg tablet, Take 325 mg by mouth as needed , Disp: , Rfl:    cholestyramine (QUESTRAN) 4 g packet, TAKE 1 PACKET (4 G TOTAL) BY MOUTH DAILY, Disp: 90 packet, Rfl: 1    estradiol (ESTRACE) 0 1 mg/g vaginal cream, Apply small pea sized amount around urethra 3x weekly (Patient taking differently: Insert into the vagina 3 (three) times a week Apply small pea sized amount around urethra 3x weekly PRN), Disp: 42 5 g, Rfl: 1    fluticasone (FLONASE) 50 mcg/act nasal spray, SPRAY 1 SPRAY INTO EACH NOSTRIL EVERY DAY (Patient taking differently: as needed ), Disp: 16 mL, Rfl: 1    levothyroxine 125 mcg tablet, Take 1 tablet (125 mcg total) by mouth daily, Disp: 90 tablet, Rfl: 1    Mirabegron ER (Myrbetriq) 50 MG TB24, Take 1 tablet (50 mg total) by mouth daily, Disp: 90 tablet, Rfl: 1    multivitamin (THERAGRAN) TABS, Take 1 tablet by mouth, Disp: , Rfl:     omeprazole (PriLOSEC) 20 mg delayed release capsule, Take 1 capsule (20 mg total) by mouth daily, Disp: 90 capsule, Rfl: 1    PARoxetine (PAXIL) 20 mg tablet, Take 1 tablet (20 mg total) by mouth daily, Disp: 90 tablet, Rfl: 1    potassium chloride (MICRO-K) 10 MEQ CR capsule, Take 1 capsule (10 mEq total) by mouth daily, Disp: 30 capsule, Rfl: 0    PROAIR  (90 Base) MCG/ACT inhaler, as needed , Disp: , Rfl:     rivastigmine (EXELON) 9 5 mg/24 hr TD 24 hr patch, Place 1 patch on the skin daily, Disp: 90 patch, Rfl: 0      Allergies  Ciprofloxacin, Other, Sulfa antibiotics, Penicillins, and Sulfacetamide    Past Medical History  Past Medical History:   Diagnosis Date    Anxiety disorder     Aortic valve disorder     Breast cancer ([facility] [address] )     Cellulitis of finger of left hand 11/20/2018    Cellulitis of left hand 11/10/2018    Added automatically from request for surgery [id number]    Depression     History of gastroesophageal reflux (GERD)     History of kidney cancer 03/06/2014    Hyperlipidemia 03/06/2014    Hypertension 03/06/2014    Hypothyroidism 03/06/2014    Shortness of breath     TIA (transient ischemic attack)          Past Surgical History:  Past Surgical History:   Procedure Laterality Date    ESOPHAGOGASTRIC FUNDOPLASTY      Nissen Fundoplication    HERNIA REPAIR           Family History:  Family History   Problem Relation Age of Onset    Stroke Father     Leukemia Sister     No Known Problems Brother     Skin cancer Mother     Stroke Maternal Grandmother     No Known Problems Maternal Grandfather     No Known Problems Paternal Grandmother     No Known Problems Paternal Grandfather     No Known Problems Sister     Breast cancer Sister     Kidney cancer Brother     Alcohol abuse Brother     Kidney cancer Brother     Stomach cancer Brother     No Known Problems Daughter     No Known Problems Daughter        Social History:   reports that she has never smoked  She has never used smokeless tobacco  She reports current alcohol use  She reports that she does not use drugs  I have reviewed the past medical history, surgical history, social and family history, current medications, allergies vitals, review of systems, and updated this information as appropriate today  Objective:    Physical Exam    Neurological Exam    GENERAL:  Cooperative in no acute distress  Well-developed and well-nourished    HEAD and NECK   Head is atraumatic normocephalic with no lesions or masses  Neck is supple with full range of motion    CARDIOVASCULAR  Carotid Arteries-no carotid bruits  NEUROLOGIC:  Mental Status-the patient is awake alert and oriented without aphasia or apraxia,  [place] is16/30  Cranial Nerves: Visual fields are full to confrontation  Extraocular movements are full without nystagmus  Pupils are 2-1/2 mm and reactive  Face is symmetrical to light touch  Movements of facial expression move symmetrically  Hearing is normal to finger rub bilaterally  Soft palate lifts symmetrically  Shoulder shrug is symmetrical  Tongue is midline without atrophy  Motor: No drift is noted on arm extension  Strength is full in the upper and lower extremities with normal bulk and tone  ambulates with a cane          ROS:  Review of Systems   Constitutional: Negative for appetite change, fatigue and fever  HENT: Negative for drooling, ear pain, tinnitus, trouble swallowing and voice change  Eyes: Negative for photophobia, pain and visual disturbance  Respiratory: Positive for shortness of breath (On exertion)  Negative for chest tightness  Cardiovascular: Negative for chest pain, palpitations and leg swelling  Gastrointestinal: Negative for abdominal pain, constipation, diarrhea, nausea and vomiting  Endocrine: Negative for cold intolerance and heat intolerance  Genitourinary: Negative for difficulty urinating, frequency and urgency  Musculoskeletal: Positive for back pain (Low back), gait problem and joint swelling (Both ankles )  Negative for myalgias and neck pain  Skin: Negative for rash  Neurological: Positive for headaches  Negative for dizziness, tremors, seizures, syncope, facial asymmetry, speech difficulty, weakness, light-headedness and numbness  Psychiatric/Behavioral: Positive for confusion, decreased concentration and dysphoric mood  Negative for agitation, behavioral problems and sleep disturbance  The patient is not nervous/anxious and is not hyperactive

## 2021-07-28 DIAGNOSIS — F02.80 LATE ONSET ALZHEIMER'S DISEASE WITHOUT BEHAVIORAL DISTURBANCE (HCC): ICD-10-CM

## 2021-07-28 DIAGNOSIS — G30.1 LATE ONSET ALZHEIMER'S DISEASE WITHOUT BEHAVIORAL DISTURBANCE (HCC): ICD-10-CM

## 2021-07-29 RX ORDER — RIVASTIGMINE 9.5 MG/24H
PATCH, EXTENDED RELEASE TRANSDERMAL
Qty: 90 PATCH | Refills: 0 | Status: SHIPPED | OUTPATIENT
Start: 2021-07-29 | End: 2021-09-30

## 2021-08-08 DIAGNOSIS — K52.9 CHRONIC DIARRHEA: ICD-10-CM

## 2021-08-09 RX ORDER — CHOLESTYRAMINE 4 G/9G
1 POWDER, FOR SUSPENSION ORAL DAILY
Qty: 90 PACKET | Refills: 1 | Status: SHIPPED | OUTPATIENT
Start: 2021-08-09

## 2021-08-11 ENCOUNTER — TELEPHONE (OUTPATIENT)
Dept: NEUROLOGY | Facility: CLINIC | Age: 76
End: 2021-08-11

## 2021-08-11 DIAGNOSIS — N39.0 RECURRENT UTI: ICD-10-CM

## 2021-08-11 RX ORDER — ESTRADIOL 0.1 MG/G
CREAM VAGINAL
Qty: 42.5 G | Refills: 1 | Status: SHIPPED | OUTPATIENT
Start: 2021-08-11 | End: 2022-03-25 | Stop reason: ALTCHOICE

## 2021-08-11 NOTE — TELEPHONE ENCOUNTER
Patient's daughter Ramandeep Mcintosh requested refill on exelon patch  Informed her that a rx was sent to pharm on 7/29 for 90 day supply  She stated the pharmacy told her it's too soon to refill and that she thinks they may have misplaced a rx for this  Called [facility] and spoke to pharmacist  He also stated it's too soon to fill, can be filled on 8/30  Paola Dias to call the pharmacy and request an override or she may have to pay OOP for the amount she needs to cover her until 8/30  She verbalized understanding

## 2021-08-30 ENCOUNTER — TELEPHONE (OUTPATIENT)
Dept: UROLOGY | Facility: CLINIC | Age: 76
End: 2021-08-30

## 2021-09-08 ENCOUNTER — TELEPHONE (OUTPATIENT)
Dept: UROLOGY | Facility: AMBULATORY SURGERY CENTER | Age: 76
End: 2021-09-08

## 2021-09-08 NOTE — TELEPHONE ENCOUNTER
Patient daughter Tejas Vasquez is calling to say Pramod is not working  Patient is having dribbling in the morning when she stands up and incontinence during day when she is walking around  She is wearing pads  Would like to know if she needs new medication

## 2021-09-08 NOTE — TELEPHONE ENCOUNTER
This is likely due to mixed incontinence and functional incontinence secondary  to patient's medical history  I would recommend follow-up to assess with PVR and potentially discuss other treatments  At this time Myrbetriq is the best medication for this patient  Given history of Alzheimer's I would advise against anticholinergic therapy  Conservative measures to minimize urinary incontinence include timed voiding every 2-3 hours, avoidance of constipation, and limiting bladder irritants

## 2021-09-14 ENCOUNTER — NURSE TRIAGE (OUTPATIENT)
Dept: OTHER | Facility: OTHER | Age: 76
End: 2021-09-14

## 2021-09-14 ENCOUNTER — APPOINTMENT (OUTPATIENT)
Dept: LAB | Facility: HOSPITAL | Age: 76
End: 2021-09-14
Attending: INTERNAL MEDICINE
Payer: COMMERCIAL

## 2021-09-14 DIAGNOSIS — R73.01 IMPAIRED FASTING GLUCOSE: ICD-10-CM

## 2021-09-14 DIAGNOSIS — I10 ESSENTIAL HYPERTENSION: ICD-10-CM

## 2021-09-14 DIAGNOSIS — R35.0 FREQUENCY OF URINATION: Primary | ICD-10-CM

## 2021-09-14 DIAGNOSIS — R35.0 FREQUENCY OF URINATION: ICD-10-CM

## 2021-09-14 DIAGNOSIS — E03.9 HYPOTHYROIDISM, UNSPECIFIED TYPE: ICD-10-CM

## 2021-09-14 LAB
ALBUMIN SERPL BCP-MCNC: 3.4 G/DL (ref 3.5–5)
ALP SERPL-CCNC: 81 U/L (ref 46–116)
ALT SERPL W P-5'-P-CCNC: 19 U/L (ref 12–78)
ANION GAP SERPL CALCULATED.3IONS-SCNC: 13 MMOL/L (ref 4–13)
AST SERPL W P-5'-P-CCNC: 19 U/L (ref 5–45)
BACTERIA UR QL AUTO: ABNORMAL /HPF
BASOPHILS # BLD MANUAL: 0.06 THOUSAND/UL (ref 0–0.1)
BASOPHILS NFR MAR MANUAL: 1 % (ref 0–1)
BILIRUB SERPL-MCNC: 0.41 MG/DL (ref 0.2–1)
BILIRUB UR QL STRIP: NEGATIVE
BUN SERPL-MCNC: 20 MG/DL (ref 5–25)
CALCIUM ALBUM COR SERPL-MCNC: 9 MG/DL (ref 8.3–10.1)
CALCIUM SERPL-MCNC: 8.5 MG/DL (ref 8.3–10.1)
CHLORIDE SERPL-SCNC: 106 MMOL/L (ref 100–108)
CHOLEST SERPL-MCNC: 200 MG/DL (ref 50–200)
CLARITY UR: ABNORMAL
CO2 SERPL-SCNC: 25 MMOL/L (ref 21–32)
COLOR UR: YELLOW
CREAT SERPL-MCNC: 0.78 MG/DL (ref 0.6–1.3)
EOSINOPHIL # BLD MANUAL: 0.23 THOUSAND/UL (ref 0–0.4)
EOSINOPHIL NFR BLD MANUAL: 4 % (ref 0–6)
ERYTHROCYTE [DISTWIDTH] IN BLOOD BY AUTOMATED COUNT: 13.2 % (ref 11.6–15.1)
EST. AVERAGE GLUCOSE BLD GHB EST-MCNC: 108 MG/DL
GFR SERPL CREATININE-BSD FRML MDRD: 74 ML/MIN/1.73SQ M
GLUCOSE P FAST SERPL-MCNC: 96 MG/DL (ref 65–99)
GLUCOSE UR STRIP-MCNC: NEGATIVE MG/DL
HBA1C MFR BLD: 5.4 %
HCT VFR BLD AUTO: 36.5 % (ref 34.8–46.1)
HDLC SERPL-MCNC: 86 MG/DL
HGB BLD-MCNC: 12.5 G/DL (ref 11.5–15.4)
HGB UR QL STRIP.AUTO: ABNORMAL
KETONES UR STRIP-MCNC: NEGATIVE MG/DL
LDLC SERPL CALC-MCNC: 99 MG/DL (ref 0–100)
LEUKOCYTE ESTERASE UR QL STRIP: ABNORMAL
LYMPHOCYTES # BLD AUTO: 1.2 THOUSAND/UL (ref 0.6–4.47)
LYMPHOCYTES # BLD AUTO: 21 % (ref 14–44)
MCH RBC QN AUTO: 35.5 PG (ref 26.8–34.3)
MCHC RBC AUTO-ENTMCNC: 34.2 G/DL (ref 31.4–37.4)
MCV RBC AUTO: 104 FL (ref 82–98)
MONOCYTES # BLD AUTO: 0.68 THOUSAND/UL (ref 0–1.22)
MONOCYTES NFR BLD: 12 % (ref 4–12)
NEUTROPHILS # BLD MANUAL: 3.42 THOUSAND/UL (ref 1.85–7.62)
NEUTS SEG NFR BLD AUTO: 60 % (ref 43–75)
NITRITE UR QL STRIP: NEGATIVE
NON-SQ EPI CELLS URNS QL MICRO: ABNORMAL /HPF
NONHDLC SERPL-MCNC: 114 MG/DL
PH UR STRIP.AUTO: 6.5 [PH]
PLATELET # BLD AUTO: 247 THOUSANDS/UL (ref 149–390)
PLATELET BLD QL SMEAR: ADEQUATE
PMV BLD AUTO: 11.9 FL (ref 8.9–12.7)
POTASSIUM SERPL-SCNC: 3.7 MMOL/L (ref 3.5–5.3)
PROT SERPL-MCNC: 7.1 G/DL (ref 6.4–8.2)
PROT UR STRIP-MCNC: ABNORMAL MG/DL
RBC # BLD AUTO: 3.52 MILLION/UL (ref 3.81–5.12)
RBC #/AREA URNS AUTO: ABNORMAL /HPF
RBC MORPH BLD: NORMAL
SODIUM SERPL-SCNC: 144 MMOL/L (ref 136–145)
SP GR UR STRIP.AUTO: 1.02 (ref 1–1.03)
T4 FREE SERPL-MCNC: 1.5 NG/DL (ref 0.76–1.46)
TRIGL SERPL-MCNC: 75 MG/DL
TSH SERPL DL<=0.05 MIU/L-ACNC: 1.43 UIU/ML (ref 0.36–3.74)
UROBILINOGEN UR QL STRIP.AUTO: 0.2 E.U./DL
VARIANT LYMPHS # BLD AUTO: 2 %
WBC # BLD AUTO: 5.7 THOUSAND/UL (ref 4.31–10.16)
WBC #/AREA URNS AUTO: ABNORMAL /HPF

## 2021-09-14 PROCEDURE — 85027 COMPLETE CBC AUTOMATED: CPT

## 2021-09-14 PROCEDURE — 85007 BL SMEAR W/DIFF WBC COUNT: CPT

## 2021-09-14 PROCEDURE — 84443 ASSAY THYROID STIM HORMONE: CPT

## 2021-09-14 PROCEDURE — 36415 COLL VENOUS BLD VENIPUNCTURE: CPT

## 2021-09-14 PROCEDURE — 83036 HEMOGLOBIN GLYCOSYLATED A1C: CPT

## 2021-09-14 PROCEDURE — 87086 URINE CULTURE/COLONY COUNT: CPT

## 2021-09-14 PROCEDURE — 80061 LIPID PANEL: CPT

## 2021-09-14 PROCEDURE — 81001 URINALYSIS AUTO W/SCOPE: CPT

## 2021-09-14 PROCEDURE — 80053 COMPREHEN METABOLIC PANEL: CPT

## 2021-09-14 PROCEDURE — 84439 ASSAY OF FREE THYROXINE: CPT

## 2021-09-14 NOTE — TELEPHONE ENCOUNTER
Carmen Cohen will be taking the patient to the [facility]  Advised will call with results and the patient should stay well hydrated

## 2021-09-14 NOTE — TELEPHONE ENCOUNTER
Reason for Disposition   Urinating more frequently than usual (i e , frequency)    Answer Assessment - Initial Assessment Questions  1  SYMPTOM: "What's the main symptom you're concerned about?" (e g , frequency, incontinence)      Frequency  Urgency  2  ONSET: "When did the symptoms  start?"      Started 9/13/2021  3  PAIN: "Is there any pain?" If so, ask: "How bad is it?" (Scale: 1-10; mild, moderate, severe)      Intermittent, diffuse, cramping abdominal pain when eating, rated 5/10  Intermittent flank pain rated 5/10  Pain with urination rated 8/10    4  CAUSE: "What do you think is causing the symptoms?"      UTI  5  OTHER SYMPTOMS: "Do you have any other symptoms?" (e g , fever, flank pain, blood in urine, pain with urination)      Denied hematuria or fever  6   PREGNANCY: "Is there any chance you are pregnant?" "When was your last menstrual period?"     N/A    Protocols used: [facility]

## 2021-09-14 NOTE — TELEPHONE ENCOUNTER
Regarding: UTI  ----- Message from Calli Tom sent at 9/14/2021 11:29 AM EDT -----  " My Mother has a UTI, It can get pretty Severe, She is having Abdominal Cramping, Pain with Urination, Urgency "  ( Cell Phone is [phone] in case the home phone does not answer  )

## 2021-09-15 LAB — BACTERIA UR CULT: NORMAL

## 2021-09-17 ENCOUNTER — TELEPHONE (OUTPATIENT)
Dept: OTHER | Facility: OTHER | Age: 76
End: 2021-09-17

## 2021-09-17 NOTE — TELEPHONE ENCOUNTER
Patient's daughter is calling for patient's urine culture results  Please leave a voicemail on patient's daughter's cell number if she does not answer

## 2021-09-24 NOTE — PROGRESS NOTES
9/27/2021      Chief Complaint   Patient presents with    Follow-up         Assessment and Plan  1  Urinary frequency  2  Urinary incontinence  3  Recurrent UTI    - UA today positive for nitrites, leukocytes, blood  Will send for culture  Macrobid sent to pharmacy due to patient's symptoms  Will adjust medication based on culture results  - PVR today 19 mL  - Taking Myrbetriq 50 mg daily and was prescribed topical estrogen cream at last appointment  Patient had not started topical estrogen due to history of breast cancer  Will call oncologist to inquire about starting this  Will call the office either way  - Discussed conservative management with voiding every 2-3 hours, avoidance of constipation, water intake of at least 60 oz daily, probiotics, and limiting bladder irritants  - Will follow up as scheduled in December  - Will call with any questions or concerns  - All questions answered; patient understands and agrees with plan        History of Present Illness  Cade Collins is a 67 y o  female patient with history of recurrent UTI here for evaluation of urinary incontinence  Patient having urinary frequency, urgency, suprapubic pressure, and dysuria  States she has had symptoms for the past few days  Was started on Myrbetriq and topical estrogen cream, however, has not used topical estrogen due to history of breast cancer  Will get clearance from oncologist for this  Denies gross hematuria, fevers, chills, nausea, vomiting  States she does have family history of RCC  Denies any other family history of  malignancies  Review of Systems   Constitutional: Negative for activity change, appetite change, chills and fever  HENT: Negative for congestion and trouble swallowing  Respiratory: Negative for cough and shortness of breath  Cardiovascular: Negative for chest pain, palpitations and leg swelling     Gastrointestinal: Negative for abdominal pain, constipation, diarrhea, nausea and vomiting  Genitourinary: Negative for difficulty urinating, dysuria, flank pain, frequency, hematuria and urgency  Musculoskeletal: Negative for back pain and gait problem  Skin: Negative for wound  Allergic/Immunologic: Negative for immunocompromised state  Neurological: Negative for dizziness and syncope  Hematological: Does not bruise/bleed easily  Psychiatric/Behavioral: Negative for confusion  All other systems reviewed and are negative  Vitals  Vitals:    09/27/21 1540   BP: 132/88   Pulse: 85   Weight: 74 4 kg (164 lb)   Height: 5' 2" (1 575 m)       Physical Exam  Constitutional:       Appearance: Normal appearance  HENT:      Head: Normocephalic  Pulmonary:      Effort: Pulmonary effort is normal    Musculoskeletal:      Cervical back: Normal range of motion  Skin:     General: Skin is warm and dry  Neurological:      General: No focal deficit present  Mental Status: She is alert and oriented to person, place, and time  Psychiatric:         Mood and Affect: Mood normal          Behavior: Behavior normal          Thought Content: Thought content normal          Judgment: Judgment normal            Past History  Past Medical History:   Diagnosis Date    Anxiety disorder     Aortic valve disorder     Breast cancer ([facility] [address]     Cellulitis of finger of left hand 11/20/2018    Cellulitis of left hand 11/10/2018    Added automatically from request for surgery [id number]    Depression     History of gastroesophageal reflux (GERD)     History of kidney cancer 03/06/2014    Hyperlipidemia 03/06/2014    Hypertension 03/06/2014    Hypothyroidism 03/06/2014    Shortness of breath     TIA (transient ischemic attack)      Social History     Socioeconomic History    Marital status:       Spouse name: None    Number of children: 2    Years of education: None    Highest education level: None   Occupational History    None   Tobacco Use    Smoking status: Never Smoker  Smokeless tobacco: Never Used   Vaping Use    Vaping Use: Never used   Substance and Sexual Activity    Alcohol use: Yes     Comment: social-rare    Drug use: Never    Sexual activity: None   Other Topics Concern    None   Social History Narrative    None     Social Determinants of Health     Financial Resource Strain:     Difficulty of Paying Living Expenses:    Food Insecurity:     Worried About Running Out of Food in the Last Year:     [address] in the Last Year:    Transportation Needs:     Lack of Transportation (Medical):      Lack of Transportation (Non-Medical):    Physical Activity:     Days of Exercise per Week:     Minutes of Exercise per Session:    Stress:     Feeling of Stress :    Social Connections:     Frequency of Communication with Friends and Family:     Frequency of Social Gatherings with Friends and Family:     Attends Mandaen Services:     Active Member of Clubs or Organizations:     Attends Club or Organization Meetings:     Marital Status:    Intimate Partner Violence:     Fear of Current or Ex-Partner:     Emotionally Abused:     Physically Abused:     Sexually Abused:      Social History     Tobacco Use   Smoking Status Never Smoker   Smokeless Tobacco Never Used     Family History   Problem Relation Age of Onset    Stroke Father     Leukemia Sister     No Known Problems Brother     Skin cancer Mother     Stroke Maternal Grandmother     No Known Problems Maternal Grandfather     No Known Problems Paternal Grandmother     No Known Problems Paternal Grandfather     No Known Problems Sister     Breast cancer Sister     Kidney cancer Brother     Alcohol abuse Brother     Kidney cancer Brother     Stomach cancer Brother     No Known Problems Daughter     No Known Problems Daughter        The following portions of the patient's history were reviewed and updated as appropriate: allergies, current medications, past medical history, past social history, past surgical history and problem list     Results  Recent Results (from the past 1 hour(s))   POCT urine dip    Collection Time: 09/27/21  3:45 PM   Result Value Ref Range    LEUKOCYTE ESTERASE,UA ++     NITRITE,UA +     SL AMB POCT UROBILINOGEN 0 2     POCT URINE PROTEIN +      PH,UA 6 5     BLOOD,UA trace     SPECIFIC GRAVITY,UA 1 020     KETONES,UA -     BILIRUBIN,UA -     GLUCOSE, UA -      COLOR,UA yellow     CLARITY,UA cloudy    POCT Measure PVR    Collection Time: 09/27/21  3:47 PM   Result Value Ref Range    POST-VOID RESIDUAL VOLUME, ML POC 19 mL   ]  No results found for: PSA  Lab Results   Component Value Date    GLUCOSE 85 07/17/2014    CALCIUM 8 5 09/14/2021     07/17/2014    K 3 7 09/14/2021    CO2 25 09/14/2021     09/14/2021    BUN 20 09/14/2021    CREATININE 0 78 09/14/2021     Lab Results   Component Value Date    WBC 5 70 09/14/2021    HGB 12 5 09/14/2021    HCT 36 5 09/14/2021     (H) 09/14/2021     09/14/2021       Sam Aj PA-C

## 2021-09-27 ENCOUNTER — OFFICE VISIT (OUTPATIENT)
Dept: UROLOGY | Facility: CLINIC | Age: 76
End: 2021-09-27
Payer: COMMERCIAL

## 2021-09-27 VITALS
HEART RATE: 85 BPM | HEIGHT: 62 IN | WEIGHT: 164 LBS | BODY MASS INDEX: 30.18 KG/M2 | SYSTOLIC BLOOD PRESSURE: 132 MMHG | DIASTOLIC BLOOD PRESSURE: 88 MMHG

## 2021-09-27 DIAGNOSIS — N39.0 RECURRENT UTI: Primary | ICD-10-CM

## 2021-09-27 LAB
POST-VOID RESIDUAL VOLUME, ML POC: 19 ML
SL AMB  POCT GLUCOSE, UA: NORMAL
SL AMB LEUKOCYTE ESTERASE,UA: NORMAL
SL AMB POCT BILIRUBIN,UA: NORMAL
SL AMB POCT BLOOD,UA: NORMAL
SL AMB POCT CLARITY,UA: NORMAL
SL AMB POCT COLOR,UA: YELLOW
SL AMB POCT KETONES,UA: NORMAL
SL AMB POCT NITRITE,UA: NORMAL
SL AMB POCT PH,UA: 6.5
SL AMB POCT SPECIFIC GRAVITY,UA: 1.02
SL AMB POCT URINE PROTEIN: NORMAL
SL AMB POCT UROBILINOGEN: 0.2

## 2021-09-27 PROCEDURE — 1160F RVW MEDS BY RX/DR IN RCRD: CPT | Performed by: PHYSICIAN ASSISTANT

## 2021-09-27 PROCEDURE — 87086 URINE CULTURE/COLONY COUNT: CPT | Performed by: PHYSICIAN ASSISTANT

## 2021-09-27 PROCEDURE — 3079F DIAST BP 80-89 MM HG: CPT | Performed by: PHYSICIAN ASSISTANT

## 2021-09-27 PROCEDURE — 81002 URINALYSIS NONAUTO W/O SCOPE: CPT | Performed by: PHYSICIAN ASSISTANT

## 2021-09-27 PROCEDURE — 51798 US URINE CAPACITY MEASURE: CPT | Performed by: PHYSICIAN ASSISTANT

## 2021-09-27 PROCEDURE — 87077 CULTURE AEROBIC IDENTIFY: CPT | Performed by: PHYSICIAN ASSISTANT

## 2021-09-27 PROCEDURE — 1036F TOBACCO NON-USER: CPT | Performed by: PHYSICIAN ASSISTANT

## 2021-09-27 PROCEDURE — 87186 SC STD MICRODIL/AGAR DIL: CPT | Performed by: PHYSICIAN ASSISTANT

## 2021-09-27 PROCEDURE — 3075F SYST BP GE 130 - 139MM HG: CPT | Performed by: PHYSICIAN ASSISTANT

## 2021-09-27 PROCEDURE — 99214 OFFICE O/P EST MOD 30 MIN: CPT | Performed by: PHYSICIAN ASSISTANT

## 2021-09-27 RX ORDER — NITROFURANTOIN 25; 75 MG/1; MG/1
100 CAPSULE ORAL 2 TIMES DAILY
Qty: 10 CAPSULE | Refills: 0 | Status: SHIPPED | OUTPATIENT
Start: 2021-09-27 | End: 2021-10-02

## 2021-09-29 ENCOUNTER — TELEPHONE (OUTPATIENT)
Dept: UROLOGY | Facility: CLINIC | Age: 76
End: 2021-09-29

## 2021-09-29 DIAGNOSIS — N39.0 RECURRENT UTI: Primary | ICD-10-CM

## 2021-09-29 LAB
BACTERIA UR CULT: ABNORMAL
BACTERIA UR CULT: ABNORMAL

## 2021-09-29 NOTE — TELEPHONE ENCOUNTER
Gave results,advised patient to finish Macrobid, patient states she is already feeling better  Patient to call office if she has any further symptoms after she completes antibiotics

## 2021-09-29 NOTE — TELEPHONE ENCOUNTER
----- Message from Tayla Thornton PA-C sent at 9/29/2021 11:41 AM EDT -----  Please let Cici Maynard know Yovanybrendahiltonwilliam Chelo is susceptible, continue this as prescribed

## 2021-09-30 DIAGNOSIS — F02.80 LATE ONSET ALZHEIMER'S DISEASE WITHOUT BEHAVIORAL DISTURBANCE (HCC): ICD-10-CM

## 2021-09-30 DIAGNOSIS — G30.1 LATE ONSET ALZHEIMER'S DISEASE WITHOUT BEHAVIORAL DISTURBANCE (HCC): ICD-10-CM

## 2021-09-30 RX ORDER — RIVASTIGMINE 9.5 MG/24H
PATCH, EXTENDED RELEASE TRANSDERMAL
Qty: 90 PATCH | Refills: 0 | Status: SHIPPED | OUTPATIENT
Start: 2021-09-30 | End: 2021-11-11 | Stop reason: SDUPTHER

## 2021-10-04 ENCOUNTER — RA CDI HCC (OUTPATIENT)
Dept: OTHER | Facility: HOSPITAL | Age: 76
End: 2021-10-04

## 2021-10-11 ENCOUNTER — OFFICE VISIT (OUTPATIENT)
Dept: INTERNAL MEDICINE CLINIC | Facility: CLINIC | Age: 76
End: 2021-10-11
Payer: COMMERCIAL

## 2021-10-11 VITALS
BODY MASS INDEX: 30.54 KG/M2 | TEMPERATURE: 98.6 F | HEART RATE: 70 BPM | OXYGEN SATURATION: 98 % | DIASTOLIC BLOOD PRESSURE: 80 MMHG | SYSTOLIC BLOOD PRESSURE: 130 MMHG | WEIGHT: 167 LBS

## 2021-10-11 DIAGNOSIS — F02.80 LATE ONSET ALZHEIMER'S DISEASE WITHOUT BEHAVIORAL DISTURBANCE (HCC): ICD-10-CM

## 2021-10-11 DIAGNOSIS — Z23 NEED FOR INFLUENZA VACCINATION: ICD-10-CM

## 2021-10-11 DIAGNOSIS — G30.1 LATE ONSET ALZHEIMER'S DISEASE WITHOUT BEHAVIORAL DISTURBANCE (HCC): ICD-10-CM

## 2021-10-11 DIAGNOSIS — R42 VERTIGO: ICD-10-CM

## 2021-10-11 DIAGNOSIS — R73.01 IMPAIRED FASTING GLUCOSE: ICD-10-CM

## 2021-10-11 DIAGNOSIS — E03.9 HYPOTHYROIDISM, UNSPECIFIED TYPE: ICD-10-CM

## 2021-10-11 DIAGNOSIS — I10 ESSENTIAL HYPERTENSION: Primary | ICD-10-CM

## 2021-10-11 PROCEDURE — 90662 IIV NO PRSV INCREASED AG IM: CPT | Performed by: INTERNAL MEDICINE

## 2021-10-11 PROCEDURE — G0008 ADMIN INFLUENZA VIRUS VAC: HCPCS | Performed by: INTERNAL MEDICINE

## 2021-10-11 PROCEDURE — 3079F DIAST BP 80-89 MM HG: CPT | Performed by: INTERNAL MEDICINE

## 2021-10-11 PROCEDURE — 1160F RVW MEDS BY RX/DR IN RCRD: CPT | Performed by: INTERNAL MEDICINE

## 2021-10-11 PROCEDURE — 3075F SYST BP GE 130 - 139MM HG: CPT | Performed by: INTERNAL MEDICINE

## 2021-10-11 PROCEDURE — 99214 OFFICE O/P EST MOD 30 MIN: CPT | Performed by: INTERNAL MEDICINE

## 2021-10-11 RX ORDER — MECLIZINE HYDROCHLORIDE 25 MG/1
25 TABLET ORAL 3 TIMES DAILY PRN
Qty: 50 TABLET | Refills: 1 | Status: SHIPPED | OUTPATIENT
Start: 2021-10-11 | End: 2022-02-02

## 2021-11-08 ENCOUNTER — APPOINTMENT (OUTPATIENT)
Dept: LAB | Facility: HOSPITAL | Age: 76
End: 2021-11-08
Payer: COMMERCIAL

## 2021-11-08 LAB
BACTERIA UR QL AUTO: ABNORMAL /HPF
BILIRUB UR QL STRIP: NEGATIVE
CAOX CRY URNS QL MICRO: ABNORMAL /HPF
CLARITY UR: ABNORMAL
COLOR UR: YELLOW
GLUCOSE UR STRIP-MCNC: NEGATIVE MG/DL
HGB UR QL STRIP.AUTO: NEGATIVE
KETONES UR STRIP-MCNC: ABNORMAL MG/DL
LEUKOCYTE ESTERASE UR QL STRIP: ABNORMAL
NITRITE UR QL STRIP: POSITIVE
NON-SQ EPI CELLS URNS QL MICRO: ABNORMAL /HPF
PH UR STRIP.AUTO: 6.5 [PH]
PROT UR STRIP-MCNC: NEGATIVE MG/DL
RBC #/AREA URNS AUTO: ABNORMAL /HPF
SP GR UR STRIP.AUTO: 1.02 (ref 1–1.03)
UROBILINOGEN UR QL STRIP.AUTO: 0.2 E.U./DL
WBC #/AREA URNS AUTO: ABNORMAL /HPF

## 2021-11-08 PROCEDURE — 81001 URINALYSIS AUTO W/SCOPE: CPT

## 2021-11-08 PROCEDURE — 87086 URINE CULTURE/COLONY COUNT: CPT

## 2021-11-08 PROCEDURE — 87077 CULTURE AEROBIC IDENTIFY: CPT

## 2021-11-08 PROCEDURE — 87186 SC STD MICRODIL/AGAR DIL: CPT

## 2021-11-08 NOTE — TELEPHONE ENCOUNTER
Attempted to contact daughter cell number  Per media consent unable to leave message on voice mail  Contacted patient    Patient states that she has frequency, urgency, yellow urine  No odor reported  Advised patient to get urine testing  60oz of water daily  Avoiding Bladder irritants such as coffee,tea,soda,carbonated beverages  Limiting your alcohol intake  Avoiding constipation  Continue taking medications as prescribed  Reducing cigarette smoking  Advised patient monitor/contact our office with fever above 101 0, chills, decreased urination or unable to urinate, blood or clots in the urine  Health Call after hours protocol reviewed  Ed precautions reviewed   Patient verbilized understanding/ Agreement       Pharmacy confirmed as       [address] [facility] [phone]), [facility] [address]   Phone:  [phone]  Fax:  [phone]

## 2021-11-08 NOTE — TELEPHONE ENCOUNTER
Patients daughter [facility] called in stating patient is experiencing symptoms of UTI  Patient has burning and pain  [facility] stated patient is not drinking water like she should    [facility] can be reached at [phone]

## 2021-11-10 NOTE — TELEPHONE ENCOUNTER
Preliminary results are as shown,   Urine Culture >100,000 cfu/ml Gram Negative Mina Enteric Like Abnormal

## 2021-11-11 ENCOUNTER — OFFICE VISIT (OUTPATIENT)
Dept: NEUROLOGY | Facility: CLINIC | Age: 76
End: 2021-11-11
Payer: COMMERCIAL

## 2021-11-11 ENCOUNTER — TELEPHONE (OUTPATIENT)
Dept: OTHER | Facility: OTHER | Age: 76
End: 2021-11-11

## 2021-11-11 VITALS
HEIGHT: 62 IN | TEMPERATURE: 96.9 F | DIASTOLIC BLOOD PRESSURE: 90 MMHG | WEIGHT: 167 LBS | BODY MASS INDEX: 30.73 KG/M2 | HEART RATE: 68 BPM | SYSTOLIC BLOOD PRESSURE: 146 MMHG

## 2021-11-11 DIAGNOSIS — I10 ESSENTIAL HYPERTENSION: ICD-10-CM

## 2021-11-11 DIAGNOSIS — G31.84 MILD COGNITIVE IMPAIRMENT: Primary | ICD-10-CM

## 2021-11-11 DIAGNOSIS — G30.1 LATE ONSET ALZHEIMER'S DISEASE WITHOUT BEHAVIORAL DISTURBANCE (HCC): ICD-10-CM

## 2021-11-11 DIAGNOSIS — E78.2 MIXED HYPERLIPIDEMIA: ICD-10-CM

## 2021-11-11 DIAGNOSIS — F02.80 LATE ONSET ALZHEIMER'S DISEASE WITHOUT BEHAVIORAL DISTURBANCE (HCC): ICD-10-CM

## 2021-11-11 LAB — BACTERIA UR CULT: ABNORMAL

## 2021-11-11 PROCEDURE — 99214 OFFICE O/P EST MOD 30 MIN: CPT | Performed by: PSYCHIATRY & NEUROLOGY

## 2021-11-11 RX ORDER — MEMANTINE HYDROCHLORIDE 10 MG/1
10 TABLET ORAL 2 TIMES DAILY
Qty: 60 TABLET | Refills: 6 | Status: SHIPPED | OUTPATIENT
Start: 2021-11-11 | End: 2022-06-30

## 2021-11-11 RX ORDER — DIPHENHYDRAMINE HCL 25 MG
25 TABLET ORAL DAILY
COMMUNITY

## 2021-11-11 RX ORDER — RIVASTIGMINE 9.5 MG/24H
1 PATCH, EXTENDED RELEASE TRANSDERMAL DAILY
Qty: 90 PATCH | Refills: 0 | Status: SHIPPED | OUTPATIENT
Start: 2021-11-11 | End: 2022-03-03

## 2021-11-12 DIAGNOSIS — N30.00 ACUTE CYSTITIS WITHOUT HEMATURIA: Primary | ICD-10-CM

## 2021-11-12 RX ORDER — NITROFURANTOIN 25; 75 MG/1; MG/1
100 CAPSULE ORAL 2 TIMES DAILY
Qty: 10 CAPSULE | Refills: 0 | Status: SHIPPED | OUTPATIENT
Start: 2021-11-12 | End: 2021-11-17

## 2021-12-21 ENCOUNTER — OFFICE VISIT (OUTPATIENT)
Dept: UROLOGY | Facility: CLINIC | Age: 76
End: 2021-12-21
Payer: COMMERCIAL

## 2021-12-21 VITALS
HEIGHT: 62 IN | SYSTOLIC BLOOD PRESSURE: 128 MMHG | HEART RATE: 75 BPM | TEMPERATURE: 98 F | DIASTOLIC BLOOD PRESSURE: 82 MMHG | BODY MASS INDEX: 30.73 KG/M2 | WEIGHT: 167 LBS | RESPIRATION RATE: 16 BRPM

## 2021-12-21 DIAGNOSIS — R35.0 URINARY FREQUENCY: Primary | ICD-10-CM

## 2021-12-21 DIAGNOSIS — N39.0 RECURRENT UTI: ICD-10-CM

## 2021-12-21 LAB
POST-VOID RESIDUAL VOLUME, ML POC: 41 ML
SL AMB  POCT GLUCOSE, UA: NORMAL
SL AMB LEUKOCYTE ESTERASE,UA: NORMAL
SL AMB POCT BILIRUBIN,UA: NORMAL
SL AMB POCT BLOOD,UA: NORMAL
SL AMB POCT CLARITY,UA: CLEAR
SL AMB POCT COLOR,UA: NORMAL
SL AMB POCT KETONES,UA: 15
SL AMB POCT NITRITE,UA: NORMAL
SL AMB POCT PH,UA: 6.5
SL AMB POCT SPECIFIC GRAVITY,UA: 1.02
SL AMB POCT URINE PROTEIN: 30
SL AMB POCT UROBILINOGEN: 0.2

## 2021-12-21 PROCEDURE — 1160F RVW MEDS BY RX/DR IN RCRD: CPT | Performed by: PHYSICIAN ASSISTANT

## 2021-12-21 PROCEDURE — 81002 URINALYSIS NONAUTO W/O SCOPE: CPT | Performed by: PHYSICIAN ASSISTANT

## 2021-12-21 PROCEDURE — 99213 OFFICE O/P EST LOW 20 MIN: CPT | Performed by: PHYSICIAN ASSISTANT

## 2021-12-21 PROCEDURE — 51798 US URINE CAPACITY MEASURE: CPT | Performed by: PHYSICIAN ASSISTANT

## 2021-12-21 PROCEDURE — 3074F SYST BP LT 130 MM HG: CPT | Performed by: PHYSICIAN ASSISTANT

## 2021-12-21 PROCEDURE — 1036F TOBACCO NON-USER: CPT | Performed by: PHYSICIAN ASSISTANT

## 2021-12-21 PROCEDURE — 3079F DIAST BP 80-89 MM HG: CPT | Performed by: PHYSICIAN ASSISTANT

## 2021-12-21 RX ORDER — MIRABEGRON 50 MG/1
1 TABLET, FILM COATED, EXTENDED RELEASE ORAL DAILY
Qty: 90 TABLET | Refills: 3 | Status: SHIPPED | OUTPATIENT
Start: 2021-12-21 | End: 2022-03-14 | Stop reason: SDUPTHER

## 2021-12-29 ENCOUNTER — RA CDI HCC (OUTPATIENT)
Dept: OTHER | Facility: HOSPITAL | Age: 76
End: 2021-12-29

## 2021-12-29 NOTE — PROGRESS NOTES
Based on clinical documentation indicated in your record, it appears that the patient may have the following conditions:    F33 9 Major depressive disorder, recurrent    Please review/recertify the BPA diagnoses for      If this is correct, please document and assess at your next visit     [employer]  coding opportunities          Number of diagnosis code(s) already on the problem list added to  fla     Chart Reviewed * (Number of) Inbasket suggestions sent to Provider: 1                  Patients insurance company: [employer] (Medicare Advantage and Commercial)             [employer]  coding opportunities          Number of diagnosis code(s) already on the problem list added to [employer] fla     Chart Reviewed * (Number of) Inbasket suggestions sent to Provider: 1               Number of suggestions NOT actually used: 3     Patients insurance company: [employer] (Medicare Advantage and Commercial)     Visit status: Patient arrived for their scheduled appointment     Provider never responded to [employer]  coding request

## 2022-01-03 DIAGNOSIS — F32.A DEPRESSION, UNSPECIFIED DEPRESSION TYPE: ICD-10-CM

## 2022-01-03 RX ORDER — PAROXETINE HYDROCHLORIDE 20 MG/1
TABLET, FILM COATED ORAL
Qty: 90 TABLET | Refills: 1 | Status: SHIPPED | OUTPATIENT
Start: 2022-01-03 | End: 2022-07-13

## 2022-01-05 ENCOUNTER — OFFICE VISIT (OUTPATIENT)
Dept: INTERNAL MEDICINE CLINIC | Facility: CLINIC | Age: 77
End: 2022-01-05
Payer: COMMERCIAL

## 2022-01-05 VITALS
OXYGEN SATURATION: 96 % | SYSTOLIC BLOOD PRESSURE: 132 MMHG | WEIGHT: 162.8 LBS | HEIGHT: 62 IN | BODY MASS INDEX: 29.96 KG/M2 | HEART RATE: 96 BPM | RESPIRATION RATE: 16 BRPM | DIASTOLIC BLOOD PRESSURE: 90 MMHG | TEMPERATURE: 98.5 F

## 2022-01-05 DIAGNOSIS — M71.331 OTHER BURSAL CYST, RIGHT WRIST: Primary | ICD-10-CM

## 2022-01-05 DIAGNOSIS — Z12.31 SCREENING MAMMOGRAM FOR BREAST CANCER: ICD-10-CM

## 2022-01-05 PROCEDURE — 1036F TOBACCO NON-USER: CPT | Performed by: INTERNAL MEDICINE

## 2022-01-05 PROCEDURE — 99213 OFFICE O/P EST LOW 20 MIN: CPT | Performed by: INTERNAL MEDICINE

## 2022-01-05 PROCEDURE — 1160F RVW MEDS BY RX/DR IN RCRD: CPT | Performed by: INTERNAL MEDICINE

## 2022-01-05 PROCEDURE — 3075F SYST BP GE 130 - 139MM HG: CPT | Performed by: INTERNAL MEDICINE

## 2022-01-05 PROCEDURE — 3080F DIAST BP >= 90 MM HG: CPT | Performed by: INTERNAL MEDICINE

## 2022-01-05 NOTE — PROGRESS NOTES
Assessment/Plan:       Not sure if this is a ganglion cyst   Appears to be a vein  Will speak with Radiology whether an ultrasound could help clarify this  Quality Measures: BMI Counseling: Body mass index is 29 78 kg/m²  The BMI is above normal  Nutrition recommendations include encouraging healthy choices of fruits and vegetables  Rationale for BMI follow-up plan is due to patient being overweight or obese  No follow-ups on file  No problem-specific Assessment & Plan notes found for this encounter  Diagnoses and all orders for this visit:    Other bursal cyst, right wrist    Screening mammogram for breast cancer  -     Mammo screening bilateral w 3d & cad; Future          Subjective:      Patient ID: Savanah Junior is a 67 y o  female  Patient comes with her daughter because they think she has a ganglion cyst on her right wrist   They state is getting a little better  No pain  ALLERGIES:  Allergies   Allergen Reactions    Ciprofloxacin     Other     Sulfa Antibiotics     Penicillins Rash     Category: Allergy;   --  Pt received unasyn 1 5 mg IV 11-12-18 to 11-15-18    Sulfacetamide Rash     Category:  Allergy;        CURRENT MEDICATIONS:    Current Outpatient Medications:     amLODIPine (NORVASC) 2 5 mg tablet, Take 2 tablets (5 mg total) by mouth daily, Disp: 180 tablet, Rfl: 1    aspirin 325 mg tablet, Take 325 mg by mouth daily , Disp: , Rfl:     cholestyramine (QUESTRAN) 4 g packet, TAKE 1 PACKET (4 G TOTAL) BY MOUTH DAILY, Disp: 90 packet, Rfl: 1    diphenhydrAMINE (BENADRYL) 25 mg tablet, Take 25 mg by mouth daily 1/2 tablet with Paxil, Disp: , Rfl:     fluticasone (FLONASE) 50 mcg/act nasal spray, SPRAY 1 SPRAY INTO EACH NOSTRIL EVERY DAY (Patient taking differently: as needed ), Disp: 16 mL, Rfl: 1    levothyroxine 125 mcg tablet, Take 1 tablet (125 mcg total) by mouth daily, Disp: 90 tablet, Rfl: 1    meclizine (ANTIVERT) 25 mg tablet, Take 1 tablet (25 mg total) by mouth 3 (three) times a day as needed for dizziness, Disp: 50 tablet, Rfl: 1    memantine (NAMENDA) 10 mg tablet, Take 1 tablet (10 mg total) by mouth 2 (two) times a day, Disp: 60 tablet, Rfl: 6    Mirabegron ER (Myrbetriq) 50 MG TB24, Take 1 tablet (50 mg total) by mouth daily, Disp: 90 tablet, Rfl: 3    multivitamin (THERAGRAN) TABS, Take 1 tablet by mouth, Disp: , Rfl:     omeprazole (PriLOSEC) 20 mg delayed release capsule, TAKE 1 CAPSULE BY MOUTH EVERY DAY, Disp: 90 capsule, Rfl: 1    PARoxetine (PAXIL) 20 mg tablet, TAKE 1 TABLET BY MOUTH EVERY DAY, Disp: 90 tablet, Rfl: 1    potassium chloride (MICRO-K) 10 MEQ CR capsule, Take 1 capsule (10 mEq total) by mouth daily, Disp: 30 capsule, Rfl: 0    PROAIR  (90 Base) MCG/ACT inhaler, as needed , Disp: , Rfl:     rivastigmine (EXELON) 9 5 mg/24 hr TD 24 hr patch, Place 1 patch on the skin daily, Disp: 90 patch, Rfl: 0    estradiol (ESTRACE) 0 1 mg/g vaginal cream, APPLY SMALL PEA SIZED AMOUNT AROUND URETHRA 3X WEEKLY (Patient not taking: Reported on 1/5/2022), Disp: 42 5 g, Rfl: 1    memantine (NAMENDA TITRATION PACK), Follow package directions   (Patient not taking: Reported on 1/5/2022 ), Disp: 1 tablet, Rfl: 0    ACTIVE PROBLEM LIST:  Patient Active Problem List   Diagnosis    Depression    Gastroesophageal reflux disease without esophagitis    Mixed hyperlipidemia    Essential hypertension    Hypothyroid    Impaired fasting glucose    Lymphoma (HCC)    Cerebral aneurysm, nonruptured    Mild cognitive impairment    Flexor tenosynovitis of finger    Macrocytosis without anemia    Paresthesias    Non-intractable vomiting with nausea    Hypokalemia    Late onset Alzheimer's disease without behavioral disturbance (HCC)    Chest pain    Near syncope    Immunocompromised [facility])       PAST MEDICAL HISTORY:  Past Medical History:   Diagnosis Date    Anxiety disorder     Aortic valve disorder     Breast cancer (HCC)     Cellulitis of finger of left hand 11/20/2018    Cellulitis of left hand 11/10/2018    Added automatically from request for surgery [id number]    Depression     History of gastroesophageal reflux (GERD)     History of kidney cancer 03/06/2014    Hyperlipidemia 03/06/2014    Hypertension 03/06/2014    Hypothyroidism 03/06/2014    Shortness of breath     TIA (transient ischemic attack)        PAST SURGICAL HISTORY:  Past Surgical History:   Procedure Laterality Date    ESOPHAGOGASTRIC FUNDOPLASTY      Nissen Fundoplication    HERNIA REPAIR         FAMILY HISTORY:  Family History   Problem Relation Age of Onset    Stroke Father     Leukemia Sister     No Known Problems Brother     Skin cancer Mother     Stroke Maternal Grandmother     No Known Problems Maternal Grandfather     No Known Problems Paternal Grandmother     No Known Problems Paternal Grandfather     Breast cancer Sister     Breast cancer Sister     Kidney cancer Brother     Alcohol abuse Brother     Kidney cancer Brother     Stomach cancer Brother     No Known Problems Daughter     No Known Problems Daughter        SOCIAL HISTORY:  Social History     Socioeconomic History    Marital status:       Spouse name: Not on file    Number of children: 2    Years of education: Not on file    Highest education level: Not on file   Occupational History    Not on file   Tobacco Use    Smoking status: Never Smoker    Smokeless tobacco: Never Used   Vaping Use    Vaping Use: Never used   Substance and Sexual Activity    Alcohol use: Yes     Comment: social-rare    Drug use: Never    Sexual activity: Not on file   Other Topics Concern    Not on file   Social History Narrative    Not on file     Social Determinants of Health     Financial Resource Strain: Not on file   Food Insecurity: Not on file   Transportation Needs: Not on file   Physical Activity: Not on file   Stress: Not on file   Social Connections: Not on file   Intimate Partner Violence: Not on file   Housing Stability: Not on file       Review of Systems   Constitutional: Negative for fever  Musculoskeletal: Positive for joint swelling  Objective:  Vitals:    01/05/22 0911   BP: 132/90   BP Location: Left arm   Patient Position: Sitting   Cuff Size: Adult   Pulse: 96   Resp: 16   Temp: 98 5 °F (36 9 °C)   TempSrc: Tympanic   SpO2: 96%   Weight: 73 8 kg (162 lb 12 8 oz)   Height: 5' 2" (1 575 m)     Body mass index is 29 78 kg/m²  Physical Exam  Skin:     Comments:   Multiple cystic areas on the right wrist, soft  Violaceous  RESULTS:    Recent Results (from the past 1008 hour(s))   POCT Measure PVR    Collection Time: 12/21/21 10:55 AM   Result Value Ref Range    POST-VOID RESIDUAL VOLUME, ML POC 41 mL   POCT urine dip    Collection Time: 12/21/21 10:57 AM   Result Value Ref Range    LEUKOCYTE ESTERASE,UA -     NITRITE,UA -     SL AMB POCT UROBILINOGEN 0 2     POCT URINE PROTEIN 30      PH,UA 6 5     BLOOD,UA -     SPECIFIC GRAVITY,UA 1 025     KETONES,UA 15     BILIRUBIN,UA -     GLUCOSE, UA -      COLOR,UA oneyda     CLARITY,UA clear        This note was created with voice recognition software  Phonic, grammatical and spelling errors may be present within the note as a result

## 2022-02-02 DIAGNOSIS — E03.9 HYPOTHYROIDISM, UNSPECIFIED TYPE: ICD-10-CM

## 2022-02-02 DIAGNOSIS — I10 ESSENTIAL HYPERTENSION: ICD-10-CM

## 2022-02-02 DIAGNOSIS — R42 VERTIGO: ICD-10-CM

## 2022-02-02 RX ORDER — LEVOTHYROXINE SODIUM 0.12 MG/1
TABLET ORAL
Qty: 90 TABLET | Refills: 1 | Status: SHIPPED | OUTPATIENT
Start: 2022-02-02 | End: 2022-04-27

## 2022-02-02 RX ORDER — MECLIZINE HYDROCHLORIDE 25 MG/1
TABLET ORAL
Qty: 50 TABLET | Refills: 1 | Status: SHIPPED | OUTPATIENT
Start: 2022-02-02

## 2022-02-02 RX ORDER — AMLODIPINE BESYLATE 2.5 MG/1
TABLET ORAL
Qty: 180 TABLET | Refills: 1 | Status: SHIPPED | OUTPATIENT
Start: 2022-02-02 | End: 2022-07-25

## 2022-02-14 ENCOUNTER — HOSPITAL ENCOUNTER (OUTPATIENT)
Dept: MAMMOGRAPHY | Facility: CLINIC | Age: 77
Discharge: HOME | End: 2022-02-14
Payer: COMMERCIAL

## 2022-02-14 VITALS — HEIGHT: 62 IN | WEIGHT: 162 LBS | BODY MASS INDEX: 29.81 KG/M2

## 2022-02-14 DIAGNOSIS — Z12.31 SCREENING MAMMOGRAM FOR BREAST CANCER: ICD-10-CM

## 2022-02-14 PROCEDURE — 77063 BREAST TOMOSYNTHESIS BI: CPT

## 2022-02-14 PROCEDURE — 77067 SCR MAMMO BI INCL CAD: CPT

## 2022-02-17 NOTE — PROGRESS NOTES
Call placed to patient regarding recommendation for R mammogram and ultrasound with possible;    __X___ RIGHT ______LEFT      __X___Ultrasound guided  ______Stereotactic breast biopsy  Procedure explained to patient, additional questions answered at this time    __X___Verbalized understanding        Blood thinners: No: _____ Yes: __X___ What: ASA 325mg    Biopsy teaching sheet given:  _____yes __X__no (All teaching points discussed during call, pt with no questions at this time, pt adv to arrive at [phone] for mammogram, followed by US at 1300 and biopsy at 1400 if needed)    Pt given name/# for any further questions/needs

## 2022-03-03 DIAGNOSIS — G30.1 LATE ONSET ALZHEIMER'S DISEASE WITHOUT BEHAVIORAL DISTURBANCE (HCC): ICD-10-CM

## 2022-03-03 DIAGNOSIS — F02.80 LATE ONSET ALZHEIMER'S DISEASE WITHOUT BEHAVIORAL DISTURBANCE (HCC): ICD-10-CM

## 2022-03-03 RX ORDER — RIVASTIGMINE 9.5 MG/24H
PATCH, EXTENDED RELEASE TRANSDERMAL
Qty: 90 PATCH | Refills: 0 | Status: SHIPPED | OUTPATIENT
Start: 2022-03-03 | End: 2022-05-14 | Stop reason: SDUPTHER

## 2022-03-07 ENCOUNTER — HOSPITAL ENCOUNTER (OUTPATIENT)
Dept: ULTRASOUND IMAGING | Facility: CLINIC | Age: 77
Discharge: HOME | End: 2022-03-07
Admitting: RADIOLOGY
Payer: COMMERCIAL

## 2022-03-07 ENCOUNTER — HOSPITAL ENCOUNTER (OUTPATIENT)
Dept: MAMMOGRAPHY | Facility: CLINIC | Age: 77
Discharge: HOME | End: 2022-03-07
Payer: COMMERCIAL

## 2022-03-07 ENCOUNTER — HOSPITAL ENCOUNTER (OUTPATIENT)
Dept: ULTRASOUND IMAGING | Facility: CLINIC | Age: 77
Discharge: HOME | End: 2022-03-07
Payer: COMMERCIAL

## 2022-03-07 VITALS — HEART RATE: 87 BPM | DIASTOLIC BLOOD PRESSURE: 75 MMHG | SYSTOLIC BLOOD PRESSURE: 123 MMHG

## 2022-03-07 VITALS — SYSTOLIC BLOOD PRESSURE: 126 MMHG | DIASTOLIC BLOOD PRESSURE: 78 MMHG | HEART RATE: 73 BPM

## 2022-03-07 VITALS — BODY MASS INDEX: 29.81 KG/M2 | HEIGHT: 62 IN | WEIGHT: 162 LBS

## 2022-03-07 DIAGNOSIS — R92.8 ABNORMAL SCREENING MAMMOGRAM: ICD-10-CM

## 2022-03-07 DIAGNOSIS — R92.8 ABNORMAL ULTRASOUND OF BREAST: ICD-10-CM

## 2022-03-07 DIAGNOSIS — R92.8 ABNORMAL MAMMOGRAM: ICD-10-CM

## 2022-03-07 PROCEDURE — 88341 IMHCHEM/IMCYTCHM EA ADD ANTB: CPT | Performed by: PATHOLOGY

## 2022-03-07 PROCEDURE — 88342 IMHCHEM/IMCYTCHM 1ST ANTB: CPT | Performed by: PATHOLOGY

## 2022-03-07 PROCEDURE — 88361 TUMOR IMMUNOHISTOCHEM/COMPUT: CPT | Performed by: PATHOLOGY

## 2022-03-07 PROCEDURE — A4648 IMPLANTABLE TISSUE MARKER: HCPCS

## 2022-03-07 PROCEDURE — 77065 DX MAMMO INCL CAD UNI: CPT

## 2022-03-07 PROCEDURE — 19081 BX BREAST 1ST LESION STRTCTC: CPT

## 2022-03-07 PROCEDURE — 76642 ULTRASOUND BREAST LIMITED: CPT

## 2022-03-07 PROCEDURE — 88305 TISSUE EXAM BY PATHOLOGIST: CPT | Performed by: PATHOLOGY

## 2022-03-07 PROCEDURE — 19083 BX BREAST 1ST LESION US IMAG: CPT

## 2022-03-07 PROCEDURE — G0279 TOMOSYNTHESIS, MAMMO: HCPCS

## 2022-03-07 RX ORDER — LIDOCAINE HYDROCHLORIDE AND EPINEPHRINE 20; 5 MG/ML; UG/ML
10 INJECTION, SOLUTION EPIDURAL; INFILTRATION; INTRACAUDAL; PERINEURAL ONCE
Status: COMPLETED | OUTPATIENT
Start: 2022-03-07 | End: 2022-03-07

## 2022-03-07 RX ORDER — LIDOCAINE HYDROCHLORIDE AND EPINEPHRINE BITARTRATE 20; .01 MG/ML; MG/ML
10 INJECTION, SOLUTION SUBCUTANEOUS ONCE
Status: COMPLETED | OUTPATIENT
Start: 2022-03-07 | End: 2022-03-07

## 2022-03-07 RX ORDER — LIDOCAINE HYDROCHLORIDE 10 MG/ML
5 INJECTION, SOLUTION EPIDURAL; INFILTRATION; INTRACAUDAL; PERINEURAL ONCE
Status: COMPLETED | OUTPATIENT
Start: 2022-03-07 | End: 2022-03-07

## 2022-03-07 RX ADMIN — LIDOCAINE HYDROCHLORIDE 5 ML: 10 INJECTION, SOLUTION EPIDURAL; INFILTRATION; INTRACAUDAL; PERINEURAL at 13:54

## 2022-03-07 RX ADMIN — LIDOCAINE HYDROCHLORIDE AND EPINEPHRINE 10 ML: 20; 5 INJECTION, SOLUTION EPIDURAL; INFILTRATION; INTRACAUDAL; PERINEURAL at 13:54

## 2022-03-07 RX ADMIN — LIDOCAINE HYDROCHLORIDE,EPINEPHRINE BITARTRATE 10 ML: 20; .01 INJECTION, SOLUTION INFILTRATION; PERINEURAL at 13:54

## 2022-03-07 RX ADMIN — LIDOCAINE HYDROCHLORIDE 5 ML: 10 INJECTION, SOLUTION EPIDURAL; INFILTRATION; INTRACAUDAL; PERINEURAL at 14:33

## 2022-03-07 NOTE — PROGRESS NOTES
Ice pack given:    ___x__yes _____no    Discharge instructions signed by patient:    ___x__yes _____no    Discharge instructions given to patient:    __x___yes _____no    Discharged via:    ___x__ambulatory    _____wheelchair    _____stretcher    Stable on discharge:    ___x__yes ____no  Patient would like results over the phone  Ok to leave a message

## 2022-03-07 NOTE — DISCHARGE INSTR - OTHER ORDERS
POST LARGE CORE BREAST BIOPSY PATIENT INFORMATION      Place an ice pack inside your bra over the top of the dressing every hour for 20 minutes (20 minutes on, 60 minutes off)  Do this until bedtime  Do not shower or bathe until the following morning  After bathing, you may remove the bandaid  You may bathe your breast carefully with the steri-strips in place  Be careful not to loosen them  The steri-strips will fall off in 3-5 days  You may have mild discomfort, and you may have some bruising where the needle entered the skin  This should clear within 5-7 days  If you need medicine for discomfort, take acetaminophen products such as Tylenol  You may also take Advil or Motrin products  DO NOT use Aspirin for the first 24 hours  Do not participate in strenuous activities such as-tennis, aerobics, weight lifting, etc  for 24 hours  Refrain from swimming/soaking for 72 hours  Wearing a bra for sleeping may be more comfortable for the first 24-48 hours after your biopsy  Watch for continued bleeding, pain or fever over 101  If any of these symptoms occur, please contact our breast nurse navigator at the location where your biopsy was performed  During normal business hours (7:30am - 4:00pm) please call the nurse navigator at the site     where your procedure was performed:       [address]: [phone] or      [address]: [phone] or [phone]     [address]: [address] [facility]: [address] [facility] [address]: [phone]        After 4pm - please call your physician or go to the nearest Emergency Department location  The final results of your biopsy are usually available within one week

## 2022-03-07 NOTE — PROGRESS NOTES
Procedure type:    _____ultrasound guided __x___stereotactic    Breast:    _____Left __x___Right    Location: 10 o'clock     Needle: 10 gauge mammotome    # of passes: 5 ( 4 with calcificatios, 1 without calcifications )     Clip: Mammotome be     Performed by: Dr Cy Sorenson held for 5 minutes by: Shahzad Storm     Sterlakisha Strips:    __x___yes _____no    Band aid:    ___x__yes_____no    Tape and guaze:    _____yes ___x__no    Tolerated procedure:    ___x__yes _____no

## 2022-03-07 NOTE — PROGRESS NOTES
Procedure type:    __x___ultrasound guided _____stereotactic    Breast:    _____Left ___x__Right    Location: 12 o'clock  8 cmfn     Needle: 12 gauge srini     # of passes: 3     Clip: Ribbon     Performed by: Dr Tristian Sawyer held for 5 minutes by: Emily Avila     Sterlakisha Strips:    ___x__yes _____no    Band aid:    ___x__yes_____no    Tape and guaze:    _____yes __x___no    Tolerated procedure:    ___x__yes _____no

## 2022-03-08 NOTE — PROGRESS NOTES
Post procedure call completed    Bleeding: _____yes __X___no (pt denies)    Pain: _____yes ___X___no (pt denies, used ice yesterday, no need for OTC pain meds)    Redness/Swelling: ______yes ___X___no (pt denies)    Band aid removed: _____yes __X___no (discussed removing when she showers)    Steri-Strips intact: ___X___yes _____no (discussed with patient to remove steri strips on Saturday if they have not come off on their own)    Pt with no questions at this time, adv will call when results available, adv to call with any questions or concerns, has name/# for contact

## 2022-03-09 ENCOUNTER — DOCUMENTATION (OUTPATIENT)
Dept: HEMATOLOGY ONCOLOGY | Facility: CLINIC | Age: 77
End: 2022-03-09

## 2022-03-09 ENCOUNTER — TELEPHONE (OUTPATIENT)
Dept: MAMMOGRAPHY | Facility: CLINIC | Age: 77
End: 2022-03-09

## 2022-03-09 NOTE — PROGRESS NOTES
Called pt to introduce self, role, and schedule pt for Multi-D Breast Clinic  No answer, VM box if full  Unable to leave message  Will try again

## 2022-03-09 NOTE — TELEPHONE ENCOUNTER
Call placed to patient after pt given biopsy results from radiologist, Dr Lucita Paulson, questions answered, support given, adv next step is to set patient up with surgeon, options discussed and patient wanted to have appt made with Dr Khai Hammond, discussed cancer care clinic, adv that either [facility] or Emily from the [address] [place] [address] Team would call her to schedule this appointment, pt states understanding, pt with no further questions at this time, pt given name/# for any additional needs

## 2022-03-10 ENCOUNTER — TELEPHONE (OUTPATIENT)
Dept: GENETICS | Facility: CLINIC | Age: 77
End: 2022-03-10

## 2022-03-10 ENCOUNTER — DOCUMENTATION (OUTPATIENT)
Dept: HEMATOLOGY ONCOLOGY | Facility: CLINIC | Age: 77
End: 2022-03-10

## 2022-03-10 PROBLEM — C50.811 MALIGNANT NEOPLASM OF OVERLAPPING SITES OF RIGHT FEMALE BREAST (HCC): Status: ACTIVE | Noted: 2022-03-10

## 2022-03-10 PROBLEM — C50.911 MALIGNANT NEOPLASM OF RIGHT FEMALE BREAST (HCC): Status: ACTIVE | Noted: 2022-03-10

## 2022-03-10 NOTE — TELEPHONE ENCOUNTER
I introduced myself to Christipatrick Lopez and let her know that her nurse navigator reached out to the cancer risk and genetics program on her behalf  I reviewed the following with Christi Lopez:     While the majority of cancer occurs by chance, approximately 5-10% of breast cancer has an underlying genetic cause  Genetic testing is available which can determine if there is an underlying genetic cause to your cancer  Understanding if there is an underlying genetic cause can:   Provide your surgeon with additional information to help with surgical decisions, treatment decisions and eligibility for clinical trials  It can determine if you have an increased risk for any additional cancers  Help family members understand their cancer risk  [facility] [address] [facility] and are reaching out to see if you have interest in genetic testing  The reason we are reaching out at this time is that this result may help your surgeon determine the appropriate type of surgery (i e  lumpectomy vs mastectomy)  This test is not a requirement but can take 5-10 days to complete so we would like to start the process as soon as possible so the results are ready for your appointment with your surgeon  If you are interested in genetic testing, I can collect your family history and initiate genetic testing for you  Patient elected to pursue testing     Diagnosis Details:   Ductal Carcinoma In situ   Right   Hormone receptors pending    Personal History:   Do you have a personal history of any other cancer? Yes   If yes type/age of diagnosis: Left Breast ca age 57    Family history:   Do you have Ashkenazi Taoism ancestry? No   If yes, maternal, paternal, or both? Do you have any children? Yes   How many sons? 0   How many daughters? 2   Do any of your children have a history of cancer? No   If yes type/age of diagnosis:     Do you have any brothers or sisters? Yes   How many brothers? 4   How many sisters?  5   Are they from the same parents? Yes   If no how maternal/paternal half-siblings:   Do any of your brothers or sisters have a history of cancer? Yes   If yes who and the type/age of diagnosis:   Brother Kidney ca age 39 ()   Sister Metastatic Breast ca age 50 ()   Sister Leukemia age 62s ()   Sister Breast ca age [de-identified]   Brother Lung ca age 62s ()     Do you have nieces or nephews? Yes   Do any of them have a history of cancer? Yes   If yes type/age of diagnosis:  Niece Breast ca age 62s   Does your mother have a history of cancer? Yes   If yes, cancer type and age of diagnosis: Skin ca Orlando Pong) age 59s-75s   Is your mother still living? No   Age/Age of death: 80    Thinking about your mother's family (aunts, uncles, cousins, grandparents) is there anyone with a history of cancer? Yes   If yes, list relationship, cancer type and age of diagnosis:  Maternal Uncle Unknown ca type     Does your father have a history of cancer? No   If yes, cancer type and age of diagnosis:   Is your father still living? No   Age/age of death: 80s    Thinking about your father's family (aunts, uncles, cousins, grandparents) is there anyone with a history of cancer? No   If yes, list relationship, cancer type and age of diagnosis:     Types of Results- positive, negative, VUS   Positive result- may explain personal diagnosis/family history  Can give surgeon information on treatment plan, inform future screening/management or tell a person about other possible risks  Positive results can initiate testing for other family members who may be at risk (children, siblings, etc)   Negative result- does not give an explanation  Surgical/treatment plan will be based on clinical presentation and will be part of discussion with surgeon  Negative result cannot be passed down to children, but they are still at elevated risk  Uncertain result- common, but treated like a negative result clinically   90% are downgraded over time     [facility]   Most insurance plans cover the cost of genetic testing  The out-of-pocket cost varies due to the differences in deductibles, co-payments and co-insurance defined by individual plans but 90% of people pay $100 or less for a genetic test     A blood kit will be mailed to you overnight  Please take the blood kit along with packet of paperwork to any [facility]'s lab to have your blood drawn  We have genetic counselors available, if you have any additional questions or would like to speak with them we can schedule you a 15 minute phone appointment  Plan:   A blood kit was mailed out on 3/10/2022 along with information on genetic testing and the lab's billing policy  Genetic Testing Preformed: [facility] Breast Cancer STAT Panel (9 genes): SYDNI, BRCA1, BRCA2, CDH1, CHEK2, PALB2, PTEN, STK11, and TP53 with reflex to [facility] Core Cancer Panel (27 additional genes): APC, SYDNI, AXIN2 BARD1, BRCA1, BRCA2, BRIP1, BMPR1A, CDH1, CDK4, CDKN2A, CHEK2, DICER1, EPCAM, GREM1, HOXB13, MLH1, MSH2, MSH3, MSH6, MUTYH, NBN, NF1, NTHL1, PALB2, PMS2, POLD1, POLE, PTEN, RAD51C, RAD51D, RECQL SMAD4, SMARCA4, STK11, TP53 + [facility] Renal/Urinary Tract Cancers Panel (25 genes)BAP1, CDC73, CDKN1C, DICER1, DIS3L2, EPCAM, FH, FLCN, GPC3, MET, MLH1, MSH2, MSH6, PMS2, PTEN, REST, SDHB, SDHC, SMARCA4, SMARCB1, TP53, TSC1, TSC2, VHL, WT1         Initial results will take approximately 5-12 days to return     Additional results may take up to 2-3 weeks to complete once test is started  Initial results will take approximately 5-12 days to return     Additional results may take up to 2-3 weeks to complete once test is started  Patient does not have any further questions, declined meeting with genetic counselor     When your results are ready, someone from the genetics team will call you, review the results, and contact your breast surgeon   You will be contacted with any type of result- positive, negative, or uncertain

## 2022-03-10 NOTE — PROGRESS NOTES
Called pt to schedule her for the Multidisciplinary Breast Clinic  No answer, VM message left       MyChart message sent to patient

## 2022-03-10 NOTE — TELEPHONE ENCOUNTER
I introduced myself to Misti Raucsh and let her know that her nurse navigator reached out to the cancer risk and genetics program on her behalf  I reviewed the following with Mistikortney Rausch:    [facility] While the majority of cancer occurs by chance, approximately 5-10% of breast cancer has an underlying genetic cause  Genetic testing is available which can determine if there is an underlying genetic cause to your cancer  Understanding if there is an underlying genetic cause can:  o Provide your surgeon with additional information to help with surgical decisions, treatment decisions and eligibility for clinical trials  o It can determine if you have an increased risk for any additional cancers  o Help family members understand their cancer risk   [facility] and are reaching out to see if you have interest in genetic testing  The reason we are reaching out at this time is that this result may help your surgeon determine the appropriate type of surgery (i e  lumpectomy vs mastectomy)  This test is not a requirement but can take 5-10 days to complete so we would like to start the process as soon as possible so the results are ready for your appointment with your surgeon   If you are interested in genetic testing, I can collect your family history and initiate genetic testing for you     Patient elected to pursue testing      Diagnosis Details:  o Ductal Carcinoma In situ  o Right  o Hormone receptors pending   Personal History:  o Do you have a personal history of any other cancer? Yes  - If yes type/age of diagnosis: Left Breast ca age 57   Family history:   o Do you have Ashkenazi Denominational ancestry? No  - If yes, maternal, paternal, or both?  o Do you have any children? Yes  - How many sons? 0  - How many daughters? 2  - Do any of your children have a history of cancer? No  - If yes type/age of diagnosis:     o Do you have any brothers or sisters?  Yes  - How many brothers? 4  - How many sisters? 5  - Are they from the same parents? Yes  - If no how maternal/paternal half-siblings:  - Do any of your brothers or sisters have a history of cancer? Yes  - If yes who and the type/age of diagnosis:   Brother Kidney ca age 39 ()  Sister Metastatic Breast ca age 50 ()  Sister Leukemia age 62s ()  Sister Breast ca age [de-identified]  Brother Lung ca age 62s ()           Do you have nieces or nephews? Yes  - Do any of them have a history of cancer? Yes  - If yes type/age of diagnosis:  Niece Breast ca age 62s    o Does your mother have a history of cancer? Yes  - If yes, cancer type and age of diagnosis: Skin ca Gar Chris) age 59s-75s  - Is your mother still living? No  - Age/Age of death: 80    o Thinking about your mother's family (aunts, uncles, cousins, grandparents) is there anyone with a history of cancer? Yes  - If yes, list relationship, cancer type and age of diagnosis:  Maternal Uncle Unknown ca type    o Does your father have a history of cancer? No  - If yes, cancer type and age of diagnosis:  - Is your father still living? No  - Age/age of death: 80s    o Thinking about your father's family (aunts, uncles, cousins, grandparents) is there anyone with a history of cancer? No  - If yes, list relationship, cancer type and age of diagnosis:       Types of Results- positive, negative, VUS  o Positive result- may explain personal diagnosis/family history  Can give surgeon information on treatment plan, inform future screening/management or tell a person about other possible risks  Positive results can initiate testing for other family members who may be at risk (children, siblings, etc)  o Negative result- does not give an explanation  Surgical/treatment plan will be based on clinical presentation and will be part of discussion with surgeon  Negative result cannot be passed down to children, but they are still at elevated risk    o Uncertain result- common, but treated like a negative result clinically  90% are downgraded over time   [facility]'s billing policy   o Most insurance plans cover the cost of genetic testing  The out-of-pocket cost varies due to the differences in deductibles, co-payments and co-insurance defined by individual plans but 90% of people pay $100 or less for a genetic test     A blood kit will be mailed to you overnight  Please take the blood kit along with packet of paperwork to any [facility]'s lab to have your blood drawn  We have genetic counselors available, if you have any additional questions or would like to speak with them we can schedule you a 15 minute phone appointment  Plan:  A blood kit was mailed out on 3/10/2022 along with information on genetic testing and the lab's billing policy  Genetic Testing Preformed: [facility] Breast Cancer STAT Panel (9 genes): SYDNI, BRCA1, BRCA2, CDH1, CHEK2, PALB2, PTEN, STK11, and TP53 with reflex to [facility] Core Cancer Panel (27 additional genes): APC, SYDNI, AXIN2 BARD1, BRCA1, BRCA2, BRIP1, BMPR1A, CDH1, CDK4, CDKN2A, CHEK2, DICER1, EPCAM, GREM1, HOXB13, MLH1, MSH2, MSH3, MSH6, MUTYH, NBN, NF1, NTHL1, PALB2, PMS2, POLD1, POLE, PTEN, RAD51C, RAD51D, RECQL SMAD4, SMARCA4, STK11, TP53    Initial results will take approximately 5-12 days to return     Additional results may take up to 2-3 weeks to complete once test is started  Patient does not have any further questions, declined meeting with genetic counselor    When your results are ready, someone from the genetics team will call you, review the results, and contact your breast surgeon  You will be contacted with any type of result- positive, negative, or uncertain

## 2022-03-11 ENCOUNTER — DOCUMENTATION (OUTPATIENT)
Dept: HEMATOLOGY ONCOLOGY | Facility: CLINIC | Age: 77
End: 2022-03-11

## 2022-03-11 NOTE — PROGRESS NOTES
Attempted call to patient again this am  No answer  VM message left     Reached out to Drew Laguerre from [facility] who was able to assist me with speaking with the patient  Appointment time, location and Physician's were confirmed  Pt asked to bring a list of medications, insurance cards and one supportive friend  Pt was made aware of the potential of 3 copays  My contact information was given to pt and encouraged her call if any questions or concerns  SPECIFIC PATIENT INSTRUCTIONS FROM THE EMERGENCY PHYSICIAN WHO TREATED YOU TODAY:  1. Return if worse. 2. Follow up with your primary doctor or your cardiologist (if you have one) in the next 2-4 days for reevaluation. 3. If prescribed, take the ibuprofen and flexeril as prescribed until finished. Shortness of Breath: Care Instructions  Your Care Instructions  Shortness of breath has many causes. Sometimes conditions such as anxiety can lead to shortness of breath. Some people get mild shortness of breath when they exercise. Trouble breathing also can be a symptom of a serious problem, such as asthma, lung disease, emphysema, heart problems, and pneumonia. If your shortness of breath continues, you may need tests and treatment. Watch for any changes in your breathing and other symptoms. Follow-up care is a key part of your treatment and safety. Be sure to make and go to all appointments, and call your doctor if you are having problems. It's also a good idea to know your test results and keep a list of the medicines you take. How can you care for yourself at home? · Do not smoke or allow others to smoke around you. If you need help quitting, talk to your doctor about stop-smoking programs and medicines. These can increase your chances of quitting for good. · Get plenty of rest and sleep. · Take your medicines exactly as prescribed. Call your doctor if you think you are having a problem with your medicine. · Find healthy ways to deal with stress. ¨ Exercise daily. ¨ Get plenty of sleep. ¨ Eat regularly and well. When should you call for help? Call 911 anytime you think you may need emergency care. For example, call if:    · You have severe shortness of breath.     · You have symptoms of a heart attack. These may include:  ¨ Chest pain or pressure, or a strange feeling in the chest.  ¨ Sweating. ¨ Shortness of breath. ¨ Nausea or vomiting.   ¨ Pain, pressure, or a strange feeling in the back, neck, jaw, or upper belly or in one or both shoulders or arms. ¨ Lightheadedness or sudden weakness. ¨ A fast or irregular heartbeat. After you call 911, the  may tell you to chew 1 adult-strength or 2 to 4 low-dose aspirin. Wait for an ambulance. Do not try to drive yourself.    Call your doctor now or seek immediate medical care if:    · Your shortness of breath gets worse or you start to wheeze. Wheezing is a high-pitched sound when you breathe.     · You wake up at night out of breath or have to prop your head up on several pillows to breathe.     · You are short of breath after only light activity or while at rest.    Watch closely for changes in your health, and be sure to contact your doctor if:    · You do not get better over the next 1 to 2 days. Where can you learn more? Go to http://thalia-rachel.info/. Enter S780 in the search box to learn more about \"Shortness of Breath: Care Instructions. \"  Current as of: December 6, 2017  Content Version: 11.8  © 6274-3477 [employer]. Care instructions adapted under license by [employer] (which disclaims liability or warranty for this information). If you have questions about a medical condition or this instruction, always ask your healthcare professional. [place] [address] any warranty or liability for your use of this information. Chest Pain: Care Instructions  Your Care Instructions    There are many things that can cause chest pain. Some are not serious and will get better on their own in a few days. But some kinds of chest pain need more testing and treatment. Your doctor may have recommended a follow-up visit in the next 8 to 12 hours. If you are not getting better, you may need more tests or treatment. Even though your doctor has released you, you still need to watch for any problems. The doctor carefully checked you, but sometimes problems can develop later.  If you have new symptoms or if your symptoms do not get better, get medical care right away. If you have worse or different chest pain or pressure that lasts more than 5 minutes or you passed out (lost consciousness), call 911 or seek other emergency help right away. A medical visit is only one step in your treatment. Even if you feel better, you still need to do what your doctor recommends, such as going to all suggested follow-up appointments and taking medicines exactly as directed. This will help you recover and help prevent future problems. How can you care for yourself at home? · Rest until you feel better. · Take your medicine exactly as prescribed. Call your doctor if you think you are having a problem with your medicine. · Do not drive after taking a prescription pain medicine. When should you call for help? Call 911 if:    · You passed out (lost consciousness).     · You have severe difficulty breathing.     · You have symptoms of a heart attack. These may include:  ¨ Chest pain or pressure, or a strange feeling in your chest.  ¨ Sweating. ¨ Shortness of breath. ¨ Nausea or vomiting. ¨ Pain, pressure, or a strange feeling in your back, neck, jaw, or upper belly or in one or both shoulders or arms. ¨ Lightheadedness or sudden weakness. ¨ A fast or irregular heartbeat. After you call 911, the  may tell you to chew 1 adult-strength or 2 to 4 low-dose aspirin. Wait for an ambulance. Do not try to drive yourself.    Call your doctor today if:    · You have any trouble breathing.     · Your chest pain gets worse.     · You are dizzy or lightheaded, or you feel like you may faint.     · You are not getting better as expected.     · You are having new or different chest pain. Where can you learn more? Go to http://thalia-rachel.info/. Enter A120 in the search box to learn more about \"Chest Pain: Care Instructions. \"  Current as of: November 20, 2017  Content Version: 11.8  © 1965-2234 [employer]. Care instructions adapted under license by [employer] (which disclaims liability or warranty for this information). If you have questions about a medical condition or this instruction, always ask your healthcare professional. [place] [address] any warranty or liability for your use of this information. Back Pain: Care Instructions  Your Care Instructions    Back pain has many possible causes. It is often related to problems with muscles and ligaments of the back. It may also be related to problems with the nerves, discs, or bones of the back. Moving, lifting, standing, sitting, or sleeping in an awkward way can strain the back. Sometimes you don't notice the injury until later. Arthritis is another common cause of back pain. Although it may hurt a lot, back pain usually improves on its own within several weeks. Most people recover in 12 weeks or less. Using good home treatment and being careful not to stress your back can help you feel better sooner. Follow-up care is a key part of your treatment and safety. Be sure to make and go to all appointments, and call your doctor if you are having problems. It's also a good idea to know your test results and keep a list of the medicines you take. How can you care for yourself at home? · Sit or lie in positions that are most comfortable and reduce your pain. Try one of these positions when you lie down:  ¨ Lie on your back with your knees bent and supported by large pillows. ¨ Lie on the floor with your legs on the seat of a sofa or chair. Bettey Rounds on your side with your knees and hips bent and a pillow between your legs. ¨ Lie on your stomach if it does not make pain worse. · Do not sit up in bed, and avoid soft couches and twisted positions. Bed rest can help relieve pain at first, but it delays healing. Avoid bed rest after the first day of back pain. · Change positions every 30 minutes.  If you must sit for long periods of time, take breaks from sitting. Get up and walk around, or lie in a comfortable position. · Try using a heating pad on a low or medium setting for 15 to 20 minutes every 2 or 3 hours. Try a warm shower in place of one session with the heating pad. · You can also try an ice pack for 10 to 15 minutes every 2 to 3 hours. Put a thin cloth between the ice pack and your skin. · Take pain medicines exactly as directed. ¨ If the doctor gave you a prescription medicine for pain, take it as prescribed. ¨ If you are not taking a prescription pain medicine, ask your doctor if you can take an over-the-counter medicine. · Take short walks several times a day. You can start with 5 to 10 minutes, 3 or 4 times a day, and work up to longer walks. Walk on level surfaces and avoid hills and stairs until your back is better. · Return to work and other activities as soon as you can. Continued rest without activity is usually not good for your back. · To prevent future back pain, do exercises to stretch and strengthen your back and stomach. Learn how to use good posture, safe lifting techniques, and proper body mechanics. When should you call for help? Call your doctor now or seek immediate medical care if:    · You have new or worsening numbness in your legs.     · You have new or worsening weakness in your legs. (This could make it hard to stand up.)     · You lose control of your bladder or bowels.    Watch closely for changes in your health, and be sure to contact your doctor if:    · You have a fever, lose weight, or don't feel well.     · You do not get better as expected. Where can you learn more? Go to http://thalia-rachel.info/. Enter V124 in the search box to learn more about \"Back Pain: Care Instructions. \"  Current as of: November 29, 2017  Content Version: 11.8  © 2750-4617 [employer].  Care instructions adapted under license by [employer] (which disclaims liability or warranty for this information). If you have questions about a medical condition or this instruction, always ask your healthcare professional. [address] any warranty or liability for your use of this information. MyChart Activation    Thank you for requesting access to [facility]. Please follow the instructions below to securely access and download your online medical record. B[facility]180 allows you to send messages to your doctor, view your test results, renew your prescriptions, schedule appointments, and more. How Do I Sign Up? 1. In your internet browser, go to [email or web address]. 2. Click on the First Time User? Click Here link in the Sign In box. You will see the New Member Sign Up page. 3. Enter your Bux180 Access Code exactly as it appears below. You will not need to use this code after youve completed the sign-up process. If you do not sign up before the expiration date, you must request a new code. Bux180 Access Code: [email or web address]  Expires: 10/26/2018  1:18 AM (This is the date your Bux180 access code will )    4. Enter the last four digits of your Social Security Number (xxxx) and Date of Birth (mm/dd/yyyy) as indicated and click Submit. You will be taken to the next sign-up page. 5. Create a Bux180 ID. This will be your Bux180 login ID and cannot be changed, so think of one that is secure and easy to remember. 6. Create a Bux180 password. You can change your password at any time. 7. Enter your Password Reset Question and Answer. This can be used at a later time if you forget your password. 8. Enter your e-mail address. You will receive e-mail notification when new information is available in [address] 9. Click Sign Up. You can now view and download portions of your medical record. 10. Click the Download Summary menu link to download a portable copy of your medical information.     Additional Information    If you have questions, please visit the Frequently Asked Questions section of the [employer] website at [email or web address] Remember, [facility] is NOT to be used for urgent needs. For medical emergencies, dial 911.

## 2022-03-14 ENCOUNTER — CONSULT (OUTPATIENT)
Dept: SURGICAL ONCOLOGY | Facility: CLINIC | Age: 77
End: 2022-03-14
Payer: COMMERCIAL

## 2022-03-14 ENCOUNTER — DOCUMENTATION (OUTPATIENT)
Dept: HEMATOLOGY ONCOLOGY | Facility: CLINIC | Age: 77
End: 2022-03-14

## 2022-03-14 ENCOUNTER — RADIATION ONCOLOGY CONSULT (OUTPATIENT)
Dept: RADIATION ONCOLOGY | Facility: CLINIC | Age: 77
End: 2022-03-14
Attending: RADIOLOGY
Payer: COMMERCIAL

## 2022-03-14 ENCOUNTER — HOSPITAL ENCOUNTER (OUTPATIENT)
Dept: RADIOLOGY | Facility: HOSPITAL | Age: 77
Discharge: HOME | End: 2022-03-14
Payer: COMMERCIAL

## 2022-03-14 ENCOUNTER — CONSULT (OUTPATIENT)
Dept: HEMATOLOGY ONCOLOGY | Facility: CLINIC | Age: 77
End: 2022-03-14
Payer: COMMERCIAL

## 2022-03-14 ENCOUNTER — APPOINTMENT (OUTPATIENT)
Dept: LAB | Facility: HOSPITAL | Age: 77
End: 2022-03-14
Payer: COMMERCIAL

## 2022-03-14 ENCOUNTER — PATIENT OUTREACH (OUTPATIENT)
Dept: CASE MANAGEMENT | Facility: HOSPITAL | Age: 77
End: 2022-03-14

## 2022-03-14 VITALS
HEART RATE: 96 BPM | BODY MASS INDEX: 30.55 KG/M2 | SYSTOLIC BLOOD PRESSURE: 144 MMHG | WEIGHT: 166 LBS | RESPIRATION RATE: 18 BRPM | TEMPERATURE: 97.2 F | OXYGEN SATURATION: 95 % | DIASTOLIC BLOOD PRESSURE: 100 MMHG | HEIGHT: 62 IN

## 2022-03-14 VITALS
SYSTOLIC BLOOD PRESSURE: 144 MMHG | TEMPERATURE: 97.2 F | WEIGHT: 166 LBS | BODY MASS INDEX: 30.55 KG/M2 | DIASTOLIC BLOOD PRESSURE: 100 MMHG | HEART RATE: 96 BPM | RESPIRATION RATE: 18 BRPM | HEIGHT: 62 IN | OXYGEN SATURATION: 96 %

## 2022-03-14 VITALS
SYSTOLIC BLOOD PRESSURE: 144 MMHG | RESPIRATION RATE: 18 BRPM | HEART RATE: 96 BPM | TEMPERATURE: 97.2 F | OXYGEN SATURATION: 95 % | BODY MASS INDEX: 30.55 KG/M2 | HEIGHT: 62 IN | DIASTOLIC BLOOD PRESSURE: 100 MMHG | WEIGHT: 166 LBS

## 2022-03-14 DIAGNOSIS — N39.0 RECURRENT UTI: ICD-10-CM

## 2022-03-14 DIAGNOSIS — D05.11 INTRADUCTAL CARCINOMA IN SITU OF RIGHT BREAST: ICD-10-CM

## 2022-03-14 DIAGNOSIS — C50.811 MALIGNANT NEOPLASM OF OVERLAPPING SITES OF RIGHT BREAST IN FEMALE, ESTROGEN RECEPTOR POSITIVE (HCC): Primary | ICD-10-CM

## 2022-03-14 DIAGNOSIS — Z17.0 MALIGNANT NEOPLASM OF OVERLAPPING SITES OF RIGHT BREAST IN FEMALE, ESTROGEN RECEPTOR POSITIVE (HCC): Primary | ICD-10-CM

## 2022-03-14 DIAGNOSIS — Z01.818 PRE-OP EXAMINATION: ICD-10-CM

## 2022-03-14 DIAGNOSIS — C50.811 MALIGNANT NEOPLASM OF OVERLAPPING SITES OF RIGHT FEMALE BREAST, UNSPECIFIED ESTROGEN RECEPTOR STATUS (HCC): Primary | ICD-10-CM

## 2022-03-14 DIAGNOSIS — Z85.3 PERSONAL HISTORY OF MALIGNANT NEOPLASM OF BREAST: ICD-10-CM

## 2022-03-14 DIAGNOSIS — C50.811 MALIGNANT NEOPLASM OF OVERLAPPING SITES OF RIGHT FEMALE BREAST, UNSPECIFIED ESTROGEN RECEPTOR STATUS (HCC): ICD-10-CM

## 2022-03-14 DIAGNOSIS — Z80.51 FAMILY HISTORY OF MALIGNANT NEOPLASM OF KIDNEY: ICD-10-CM

## 2022-03-14 LAB — MISCELLANEOUS LAB TEST RESULT: NORMAL

## 2022-03-14 PROCEDURE — 99205 OFFICE O/P NEW HI 60 MIN: CPT | Performed by: INTERNAL MEDICINE

## 2022-03-14 PROCEDURE — 99204 OFFICE O/P NEW MOD 45 MIN: CPT | Performed by: RADIOLOGY

## 2022-03-14 PROCEDURE — 99205 OFFICE O/P NEW HI 60 MIN: CPT | Performed by: SURGERY

## 2022-03-14 PROCEDURE — 71046 X-RAY EXAM CHEST 2 VIEWS: CPT

## 2022-03-14 PROCEDURE — 36415 COLL VENOUS BLD VENIPUNCTURE: CPT

## 2022-03-14 RX ORDER — OXYCODONE HYDROCHLORIDE AND ACETAMINOPHEN 5; 325 MG/1; MG/1
1 TABLET ORAL EVERY 6 HOURS PRN
Qty: 20 TABLET | Refills: 0 | Status: SHIPPED | OUTPATIENT
Start: 2022-03-14

## 2022-03-14 RX ORDER — MIRABEGRON 50 MG/1
1 TABLET, FILM COATED, EXTENDED RELEASE ORAL DAILY
Qty: 90 TABLET | Refills: 0 | Status: SHIPPED | OUTPATIENT
Start: 2022-03-14 | End: 2022-06-09

## 2022-03-14 NOTE — PROGRESS NOTES
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 15 years ago at [place] [address] currently ADDI who was recently diagnosed with clinical stage I, grade 1 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 extensive low grade DCIS  The patient denies breast symptoms including palpable nodules, suspicious skin changes or nipple discharge of the breasts bilaterally  She had gone some years without screening mammogram     22 Bilateral screening mammogram demonstrated right breast mass with spiculated margins at the 11 o'clock in the middle depth  There are round calcifications in a grouped distribution seen in the right breast at 10 o'clock in the posterior depth  Left breast was benign  Further views with right diagnostic mammogram on 3/7/22  This demonstrated a mass with spiculated margins seen in the right breast at 12 o'clock in the middle depth  Targeted ultrasound noted correlate 9 mm hypoechoic mass with spiculated margins with shadowing seen in the right breast at 11 o'clock in the middle depth, 8 cm from the nipple  There was no evidence of lymphadenopathy  Round calcifications grouped distribution seen in the right breast at 10 o'clock again noted  Despite low morphologic suspicion, a benign representative sampling will likely be necessary pending pathologic results of the ultrasound-guided core biopsy      3/7/22 US guided biopsy 12:00 lesion of the right breast  Pathology demonstrates invasive mammary carcinoma of no special type, 10 mm in greatest dimension, with 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)  Tumor cells were ER/MT(+) and HER2 (-)  Stereotactic biopsy on same day of the 10:00 position demonstrated fibroadenoma and was negative for atypia or in-situ or invasive carcinoma  The patient offers no breast related complaints  She denies breast tenderness or pain  She denies palpable nodule or suspicious skin changes  Of note, the patient is currently ADDI from her other history of malignancies  She notes longevity in her family with parents living into their mid to late 80s  Historical Information   Oncology History Overview Note        Malignant neoplasm of overlapping sites of right female breast ([facility] [address] )   3/7/2022 Initial Diagnosis    Malignant neoplasm of right femae breast ([facility] [address] )     3/7/2022 Biopsy    Right Breast Ultrasound guided biopsy  12 o'clock, 8 cm from nipple  Invasive mammary carcinoma of no special type with extensive Ductal Carcinoma in situ  Grade 1   ER pending  MT pending  HER2 pending  Lymphovascular invasion not identified    Concordant  Malingnacy appears unifocal  Right axilla clear  Left breast clear  Breast, Right, Stereo bx right breast 10oclock, 4 cores with calcs:  - Fibroadenoma with focal coarse calcifications  - Negative for atypia and in-situ or invasive carcinoma  Breast, Right, Stereo bx right breast 10oclock, 1 core without calcs:  - Fibroadenoma with focal coarse calcifications  - Negative for atypia and in-situ or invasive carcinoma            Past Medical History:   Diagnosis Date    Anxiety disorder     Aortic valve disorder     Breast cancer (HCC)     Cellulitis of finger of left hand 11/20/2018    Cellulitis of left hand 11/10/2018    Added automatically from request for surgery [id number]    Depression     History of gastroesophageal reflux (GERD)     History of kidney cancer 03/06/2014    Hyperlipidemia 03/06/2014    Hypertension 03/06/2014    Hypothyroidism 03/06/2014  Shortness of breath     TIA (transient ischemic attack)      Past Surgical History:   Procedure Laterality Date    ESOPHAGOGASTRIC FUNDOPLASTY      Nissen Fundoplication    HERNIA REPAIR      MAMMO STEREOTACTIC BREAST BIOPSY RIGHT (ALL INC) Right 3/7/2022    US GUIDED BREAST BIOPSY RIGHT COMPLETE Right 3/7/2022       Family History   Problem Relation Age of Onset    Stroke Father     Leukemia Sister     No Known Problems Brother     Skin cancer Mother     Stroke Maternal Grandmother     No Known Problems Maternal Grandfather     No Known Problems Paternal Grandmother     No Known Problems Paternal Grandfather     Breast cancer Sister     Breast cancer Sister     Kidney cancer Brother     Alcohol abuse Brother     Kidney cancer Brother     Stomach cancer Brother     No Known Problems Daughter     No Known Problems Daughter        Social History   Social History     Substance and Sexual Activity   Alcohol Use Yes    Comment: social-rare     Social History     Substance and Sexual Activity   Drug Use Never     Social History     Tobacco Use   Smoking Status Never Smoker   Smokeless Tobacco Never Used         Meds/Allergies     Current Outpatient Medications:     amLODIPine (NORVASC) 2 5 mg tablet, TAKE 2 TABLETS BY MOUTH DAILY, Disp: 180 tablet, Rfl: 1    aspirin 325 mg tablet, Take 325 mg by mouth daily , Disp: , Rfl:     cholestyramine (QUESTRAN) 4 g packet, TAKE 1 PACKET (4 G TOTAL) BY MOUTH DAILY, Disp: 90 packet, Rfl: 1    diphenhydrAMINE (BENADRYL) 25 mg tablet, Take 25 mg by mouth daily 1/2 tablet with Paxil, Disp: , Rfl:     fluticasone (FLONASE) 50 mcg/act nasal spray, SPRAY 1 SPRAY INTO EACH NOSTRIL EVERY DAY (Patient taking differently: as needed ), Disp: 16 mL, Rfl: 1    levothyroxine 125 mcg tablet, TAKE 1 TABLET BY MOUTH EVERY DAY, Disp: 90 tablet, Rfl: 1    meclizine (ANTIVERT) 25 mg tablet, TAKE 1 TABLET BY MOUTH THREE TIMES A DAY AS NEEDED FOR DIZZINESS, Disp: 50 tablet, Rfl: 1    memantine (NAMENDA) 10 mg tablet, Take 1 tablet (10 mg total) by mouth 2 (two) times a day, Disp: 60 tablet, Rfl: 6    Mirabegron ER (Myrbetriq) 50 MG TB24, Take 1 tablet (50 mg total) by mouth daily, Disp: 90 tablet, Rfl: 0    multivitamin (THERAGRAN) TABS, Take 1 tablet by mouth, Disp: , Rfl:     omeprazole (PriLOSEC) 20 mg delayed release capsule, TAKE 1 CAPSULE BY MOUTH EVERY DAY, Disp: 90 capsule, Rfl: 1    oxyCODONE-acetaminophen (Percocet) 5-325 mg per tablet, Take 1 tablet by mouth every 6 (six) hours as needed for moderate pain Max Daily Amount: 4 tablets, Disp: 20 tablet, Rfl: 0    PARoxetine (PAXIL) 20 mg tablet, TAKE 1 TABLET BY MOUTH EVERY DAY, Disp: 90 tablet, Rfl: 1    potassium chloride (MICRO-K) 10 MEQ CR capsule, Take 1 capsule (10 mEq total) by mouth daily, Disp: 30 capsule, Rfl: 0    PROAIR  (90 Base) MCG/ACT inhaler, as needed , Disp: , Rfl:     rivastigmine (EXELON) 9 5 mg/24 hr TD 24 hr patch, APPLY 1 PATCH EVERY DAY, Disp: 90 patch, Rfl: 0    estradiol (ESTRACE) 0 1 mg/g vaginal cream, APPLY SMALL PEA SIZED AMOUNT AROUND URETHRA 3X WEEKLY (Patient not taking: Reported on 1/5/2022), Disp: 42 5 g, Rfl: 1    memantine (NAMENDA TITRATION PACK), Follow package directions  (Patient not taking: Reported on 1/5/2022 ), Disp: 1 tablet, Rfl: 0  Allergies   Allergen Reactions    Ciprofloxacin     Other     Sulfa Antibiotics     Penicillins Rash     Category: Allergy;   --  Pt received unasyn 1 5 mg IV 11-12-18 to 11-15-18    Sulfacetamide Rash     Category: Allergy; Review of Systems   Constitutional: Negative for activity change, chills and fever  HENT: Negative for dental problem, ear pain, sinus pain, sore throat and trouble swallowing  Eyes: Negative for pain, discharge and visual disturbance  Respiratory: Negative for apnea, cough, chest tightness and shortness of breath  Cardiovascular: Negative for chest pain, palpitations and leg swelling  Gastrointestinal: Negative for abdominal pain, diarrhea, nausea and vomiting  Endocrine: Negative for cold intolerance, heat intolerance, polydipsia, polyphagia and polyuria  Genitourinary: Negative for dysuria, hematuria and vaginal bleeding  Musculoskeletal: Negative for arthralgias, back pain, joint swelling and neck pain  Skin: Negative for color change and rash  Neurological: Negative for dizziness, seizures, syncope and headaches  Hematological: Negative for adenopathy  Psychiatric/Behavioral: Negative for agitation and behavioral problems  All other systems reviewed and are negative  OBJECTIVE:   /100   Pulse 96   Temp (!) 97 2 °F (36 2 °C)   Resp 18   Ht 5' 2" (1 575 m)   Wt 75 3 kg (166 lb)   SpO2 96%   BMI 30 36 kg/m²   Performance Status: Karnofsky: 90 - Able to carry on normal activity; minor signs or symptoms of disease     Physical Exam  Vitals and nursing note reviewed  Constitutional:       General: She is not in acute distress  Appearance: She is well-developed  Eyes:      General: No scleral icterus  Cardiovascular:      Rate and Rhythm: Normal rate and regular rhythm  Pulmonary:      Effort: No respiratory distress  Breath sounds: No wheezing, rhonchi or rales  Chest:   Breasts:      Right: No axillary adenopathy or supraclavicular adenopathy  Left: No axillary adenopathy or supraclavicular adenopathy  Comments: Breast examination demonstrates the breasts to be symmetric in size and contour  She has large, pendulous breasts  There is a small 2mm scab in the upper outer breast and second larger, closed biopsy site more inferiorly covered with bandaid and associated with small area of healing ecchymosis  There are no palpable nodules or suspicious skin changes bilaterally  Old radiation tattoos are noted over the central and upper left breast and chest wall    No residual radiation skin changes noted of left breast   Abdominal: General: There is no distension  Palpations: Abdomen is soft  Tenderness: There is no abdominal tenderness  Musculoskeletal:      Right lower leg: No edema  Left lower leg: No edema  Comments: Full range of motion of upper extremities bilaterally  No upper extremity edema  Lymphadenopathy:      Cervical: No cervical adenopathy  Upper Body:      Right upper body: No supraclavicular or axillary adenopathy  Left upper body: No supraclavicular or axillary adenopathy  Neurological:      Mental Status: She is alert and oriented to person, place, and time  Gait: Gait normal           RESULTS  Imaging Studies  US guided breast biopsy right complete, Mammo stereotactic breast biopsy right (all inc), Mammo post biopsy right    Addendum Date: 3/9/2022 Addendum:   ADDENDUM: PATHOLOGY RESULTS: A) Mass (Right; 12 o'clock; Middle; 8 cm from nipple) Malignant finding: the pathology findings indicate invasive mammary carcinoma and ductal carcinoma in situ  The pathology result is concordant with imaging  Ribbon clip  B) Calcifications (Right; 10 o'clock; Posterior) Benign finding: the pathology findings indicate fibroadenoma and benign calcifications  The pathology result is concordant with imaging  Bowtie clip  The pathology is malignant (ribbon clip, ultrasound-guided core biopsy)  In review of the patients recent imaging, the right malignancy appears unifocal  Ultrasound of the right axilla was performed on March 7, 2022 and showed no suspicious adenopathy  Recent imaging of the contralateral left breast dated February 14, 2022 was reviewed and shows no suspicious findings   RECOMMENDATION:      - Surgical Consultation for the right breast  END ADDENDUM    Result Date: 3/7/2022  Narrative: DIAGNOSIS: Abnormal ultrasound of breast INDICATION: Alden Hernandez is a 67 y o  female presenting for ultrasound-guided core biopsy and stereotactic core biopsy of the right breast  FINDINGS: RIGHT A) MASS US guided breast biopsy right complete: Images of the right breast mass at 12 o'clock in the middle portion, 8 cm from the nipple were obtained  The patient was placed supine on the biopsy table  A core needle biopsy was performed under ultrasound guidance  The skin was prepped in the usual fashion  Anesthesia was administered using lidocaine 1%  A 12 G device was advanced in multiple passes  Using the device, 3 samples were collected  A ribbon clip was inserted into the target area  Post-placement ultrasound and digital mammographic imaging demonstrate the clip was at the site  The patient tolerated the procedure well  There were no immediate complications  B) CALCIFICATIONS US guided breast biopsy right complete: Images of the right breast calcifications at 10 o'clock in the posterior portion were obtained  The patient was placed upright on the biopsy table  A core needle biopsy was performed under stereotactic mammographic guidance using a craniocaudal approach  The skin was prepped in the usual fashion  Anesthesia was administered using lidocaine 1% and lidocaine 2% with epinephrine  An 8 G vacuum-assisted device was advanced in multiple passes  Using the device, 5 samples were collected  A bow tie clip was inserted into the target area  Post-placement digital mammographic imaging demonstrates the clip was at the site  The patient tolerated the procedure well  There were no immediate complications  Impression:  Successful stereotactic and ultrasound-guided core biopsy of the right breast RECOMMENDATION:      - Waiting for pathology for the right breast  Workstation ID: [id number]    Mammo diagnostic right w 3d & cad, US breast right limited (diagnostic)    Result Date: 3/7/2022  Narrative: DIAGNOSIS: Abnormal screening mammogram TECHNIQUE: Digital diagnostic mammography was performed  Computer Aided Detection (CAD) analyzed all applicable images   Ultrasound of the right breast(s) was performed  COMPARISONS: Prior breast imaging dated: 02/14/2022 RELEVANT HISTORY: Family Breast Cancer History: History of breast cancer in Sister, Sister  Family Medical History: Family medical history includes breast cancer in 2 relatives (sister, sister)  Personal History: No known relevant hormone history  No known relevant surgical history  Medical history includes breast cancer  RISK ASSESSMENT: [facility] risk assessment reporting was suppressed due to the patient's history and/or demographic factors  TISSUE DENSITY: There are scattered areas of fibroglandular density  INDICATION: Raul Burgess is a 67 y o  female presenting for abnormal screening mammogram  FINDINGS: RIGHT A) MASS Mammo diagnostic right w 3d & cad: There is a mass with spiculated margins seen in the right breast at 12 o'clock in the middle depth  US breast right limited (diagnostic): There is a 9 mm hypoechoic mass with spiculated margins with shadowing seen in the right breast at 11 o'clock in the middle depth, 8 cm from the nipple  There is no evidence of lymphadenopathy  B) CALCIFICATIONS Mammo diagnostic right w 3d & cad: There are round calcifications in a grouped distribution seen in the right breast at 10 o'clock in the posterior depth  Despite low morphologic suspicion, a benign representative sampling will likely be necessary pending pathologic results of the ultrasound-guided core biopsy       Impression: Recommend stereotactic and ultrasound-guided core biopsy of the right breast  ASSESSMENT/BI-RADS CATEGORY: Right: 5 - Highly Suggestive of Malignancy Overall: 5 - Highly Suggestive of Malignancy RECOMMENDATION:      - Ultrasound-guided breast biopsy for the right breast       - Stereotactic breast biopsy for the right breast  Workstation ID: [id number]     Mammo screening bilateral w 3d & cad    Result Date: 2/16/2022  Narrative: DIAGNOSIS: Screening mammogram for breast cancer TECHNIQUE: Digital screening mammography was performed  Computer Aided Detection (CAD) analyzed all applicable images  COMPARISONS: None available  RELEVANT HISTORY: Family Breast Cancer History: History of breast cancer in Sister, Sister  Family Medical History: Family medical history includes breast cancer in 2 relatives (sister, sister)  Personal History: No known relevant hormone history  No known relevant surgical history  Medical history includes breast cancer  The patient is scheduled in a reminder system for screening mammography  8-10% of cancers will be missed on mammography  Management of a palpable abnormality must be based on clinical grounds  Patients will be notified of their results via letter from our facility  Accredited by [employer] of [facility] and [facility]  RISK ASSESSMENT: [facility] risk assessment reporting was suppressed due to the patient's history and/or demographic factors  TISSUE DENSITY: There are scattered areas of fibroglandular density  INDICATION: Moris Mendoza is a 67 y o  female presenting for screening mammography  FINDINGS: RIGHT A) MASS: There is a mass with spiculated margins seen in the right breast at 11 o'clock in the middle depth  B) CALCIFICATIONS: There are round calcifications in a grouped distribution seen in the right breast at 10 o'clock in the posterior depth  Left There are no suspicious masses, grouped microcalcifications or areas of unexplained architectural distortion  The skin and nipple areolar complex are unremarkable  Impression: Additional imaging required  A breast health care nurse from our facility will be contacting the patient regarding the need for additional imaging ASSESSMENT/BI-RADS CATEGORY: Left: 2 - Benign Right: 0 - Incomplete: Needs Additional Imaging Evaluation Overall: 0 - Incomplete: Needs Additional Imaging Evaluation RECOMMENDATION:      - Diagnostic mammogram and ultrasound at the current time for the right breast mass and posterior calcifications       Workstation ID: [id number]       Pathology:   Tissue Exam  Final result 3/7/2022   Final Diagnosis   A  Breast, Right, Stereo bx right breast 10oclock, 4 cores with calcs:  - Fibroadenoma with focal coarse calcifications  - Negative for atypia and in-situ or invasive carcinoma       B  Breast, Right, Stereo bx right breast 10oclock, 1 core without calcs:  - Fibroadenoma with focal coarse calcifications  - Negative for atypia and in-situ or invasive carcinoma  Tissue Exam: [id number]  Order: [id number]   Collected 3/7/2022 14:34     Status: Edited Result - FINAL     Visible to patient: Yes (seen)     Dx:  Abnormal ultrasound of breast     0 Result Notes    Component    Case Report   Surgical Pathology Report                         Case: [id number]                                    Authorizing Provider: Seferino Durant MD            Collected:           03/07/2022 1434               Ordering Location:     [facility] Received:            03/07/2022 1639                                      [facility]                                                               Pathologist:           Hussein Navarro MD                                                                  Specimen:    Breast, Right, US BX RT BREAST 12:00 8CMFN 3 PASSES 12G MARQUEE                            Addendum   HORMONE RECEPTOR AND HER2/olga STUDIES     Performed on invasive carcinoma block A1:  Test Description                        Result               Prognostic Interpretation     Estrogen Receptor/ER                90-95%                           Positive  Primary Antibody: SP1  Internal control: Positive              Staining Intensity: Strong  External control: Positive                          Blanca Score*: 7     Progesterone Receptor/PgR       65-75%                           Positive  Primary Antibody: 1E2  Internal control: Positive              Staining Intensity: Strong  External control: Positive                          Blanca Score*: 7        HER2 by IHC                               1+                                  Negative  Primary Antibody:4B5  HER2 by FISH                            Not Indicated     Appropriate positive and negative controls were reviewed      Fixative: Formalin  Duration of Fixation (hours): 9 5 Meets specified requirements of latest ASCO/CAP guidelines  Cold Ischemia Time (minutes): Not stated  Sample Adequacy: Adequate     These immunohistochemical tests were performed at [facility] in [place], [place] and interpreted by Dr Ankit Franks  An electronic copy of this report will be kept on file in the Medical Records Department at [facility]      * The Blanca score is a semi quantitative system that takes into consideration the proportion of positive cells (scored on a scale of 0-5) and staining intensity (scored on a scale of (0-3)  The proportion and intensity are summed to produce total scores of 0 or 2 through 8  A score of 0-2 is regarded as negative while 3-8 as positive       Addendum electronically signed by Celena Johnson MD on 3/11/2022 at 03 91 12 [phone] 13   Final Diagnosis   A  Breast, Right, US BX RT BREAST 12:00 8CMFN:  - Invasive mammary carcinoma of no special type, 10 mm in greatest dimension, with 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)  See comment and synoptic report      Comment: Immunohistochemistry for SMMHC, calponin, and CK5/6 demonstrate areas of lost and intact myoepithelial layer, in the invasive and in-situ components respectively      Results reported to Andrea Bishop at the [address] [place] [address] on 3/9/22 at 10:48am    Electronically signed by Celena Johnson MD on 3/9/2022 at 1049              ASSESSMENT  1   Malignant neoplasm of overlapping sites of right breast in female, estrogen receptor positive [facility])       Cancer Staging  Clinical stage I      PLAN/DISCUSSION    Keith Alvarado is a 67y o  year old female with multiple comorbidities, but excellent performance status, including 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 treated with chemotherapy and radiation and currently ADDI who was recently diagnosed with clinical stage I, grade 1 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 extensive low grade DCIS  Tumor cells were ER/NH(+) and [id number](-)  This case was presented at multidisciplinary tumor board this morning and breast conservation therapy was recommended  The patient has agreed to undergo lumpectomy with sentinel lymph node biopsy which has been scheduled by Dr Archana Arreguin  I discussed 3 treatment options with the patient today regarding adjuvant radiation  1) No adjuvant radiation and pursue adjuvant endocrine therapy alone  2) Accelerated partial breast irradiation (APBI)  3) Hypofractionated whole breast radiation  I explained that adjuvant radiation reduces risk or local recurrence of tumor above and beyond endocrine therapy alone  The relative risk reduction of local recurrence is 60-70%, but the absolute risk reduction in elderly low grade, early stage breast cancer patients typically about 2-4% over 5 years when Tamoxifen was used  This risk reduction benefit does increase over time with CALGB data showing up to 7-8% benefit at about 10 years  This may be somewhat less with aromatase inhibitor therapy  There would be no expected survival benefit, but treatment will expect to provide some benefit of disease free survival   This would be a reasonable option if she is motivated to avoid radiation treatment  Improved local control data is based on whole breast irradiation  If whole breast irradiation is done, then we would plan prone breast technique using hypofractionated radiation to a total dose of 40  05Gy    Boost could be considered, but unless pathology is upgraded or close margins would likely offer very small benefit in terms of local control  Given her age and clinical presentation, at this time I would recommend adjuvant radiation  I favor accelerated partial breast irradiation to 30 Gy in 5 fractions  I feel that this would offer patient benefit in terms of local regional control and minimize time and toxicity from treatment  If her lumpectomy resection is too large or her tumor is upgraded or upstaged at time of resection, however, then she may no longer be a good candidate for APBI  The rationale and potential benefits, as well as the risks and acute and late side effects and potential toxicities of radiation were discussed with the patient and her daughter at length  Side effects discussed included, but were not limited to: Fatigue, skin erythema, hyperpigmentation, fibrosis, asymmetry of the breast, rib weakening, and desquamation  This case was discussed with surgical oncology and medical oncology at multidisciplinary clinic today  They agreed with the recommendations and management options  They also favored accelerated partial breast irradiation if the patient was felt to be a good candidate  The patient and her daughter were given the opportunity to ask questions, which were answered at length  They are currently leaning towards pursuing some type of adjuvant radiation  We will plan to see the patient again after resection  Final recommendations will be provided at that time based final pathology evaluation as well as tolerance to treatment  Remainder cells available for any questions or concerns that may arise in the interim  Rocio Ricci MD  8/52/7927,9:69 PM      Portions of the record may have been created with voice recognition software  Occasional wrong word or "sound a like" substitutions may have occurred due to the inherent limitations of voice recognition software    Read the chart carefully and recognize, using context, where substitutions have occurred

## 2022-03-14 NOTE — LETTER
2022     [address]Pul March, [address] [facility]  [address] [place] [address] 87    Patient: Raul Burgess   YOB: 1945   Date of Visit: 3/14/2022       Dear Dr Maddy Ferrell: Thank you for referring Raul Burgess to me for evaluation  Below are my notes for this consultation  If you have questions, please do not hesitate to call me  I look forward to following your patient along with you  Sincerely,        Rocio Ricci MD        CC: No Recipients  Rocio Ricci MD  3/14/2022  3:40 PM  Sign when Signing Visit  Consultation - Radiation Oncology      JDK:[id number] : 1945  Encounter: [id number]  Patient Information: Raul Burgess      CHIEF COMPLAINT  Chief Complaint   Patient presents with    Consult     Malignant neoplasm of overlapping sites of right female breast             History of Present Illness   Raul Burgess is a 67y o  year old female with multiple comorbidities including past medical history significant for 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 15 years ago at [place] [id number] currently ADDI who was recently diagnosed with clinical stage I, grade 1 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 extensive low grade DCIS  The patient denies breast symptoms including palpable nodules, suspicious skin changes or nipple discharge of the breasts bilaterally  She had gone some years without screening mammogram     22 Bilateral screening mammogram demonstrated right breast mass with spiculated margins at the 11 o'clock in the middle depth  There are round calcifications in a grouped distribution seen in the right breast at 10 o'clock in the posterior depth  Left breast was benign  Further views with right diagnostic mammogram on 3/7/22  This demonstrated a mass with spiculated margins seen in the right breast at 12 o'clock in the middle depth    Targeted ultrasound noted correlate 9 mm hypoechoic mass with spiculated margins with shadowing seen in the right breast at 11 o'clock in the middle depth, 8 cm from the nipple  There was no evidence of lymphadenopathy  Round calcifications grouped distribution seen in the right breast at 10 o'clock again noted  Despite low morphologic suspicion, a benign representative sampling will likely be necessary pending pathologic results of the ultrasound-guided core biopsy  3/7/22 US guided biopsy 12:00 lesion of the right breast  Pathology demonstrates invasive mammary carcinoma of no special type, 10 mm in greatest dimension, with 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)  Tumor cells were ER/DE(+) and HER2 (-)  Stereotactic biopsy on same day of the 10:00 position demonstrated fibroadenoma and was negative for atypia or in-situ or invasive carcinoma  The patient offers no breast related complaints  She denies breast tenderness or pain  She denies palpable nodule or suspicious skin changes  Of note, the patient is currently ADDI from her other history of malignancies  She notes longevity in her family with parents living into their mid to late 80s  Historical Information   Oncology History Overview Note        Malignant neoplasm of overlapping sites of right female breast ([facility] [address] )   3/7/2022 Initial Diagnosis    Malignant neoplasm of right femae breast ([facility] [address] )     3/7/2022 Biopsy    Right Breast Ultrasound guided biopsy  12 o'clock, 8 cm from nipple  Invasive mammary carcinoma of no special type with extensive Ductal Carcinoma in situ  Grade 1   ER pending  DE pending  HER2 pending  Lymphovascular invasion not identified    Concordant  Malingnacy appears unifocal  Right axilla clear  Left breast clear  Breast, Right, Stereo bx right breast 10oclock, 4 cores with calcs:  - Fibroadenoma with focal coarse calcifications  - Negative for atypia and in-situ or invasive carcinoma        Breast, Right, Stereo bx right breast 10oclock, 1 core without calcs:  - Fibroadenoma with focal coarse calcifications  - Negative for atypia and in-situ or invasive carcinoma            Past Medical History:   Diagnosis Date    Anxiety disorder     Aortic valve disorder     Breast cancer ([address] )     Cellulitis of finger of left hand 11/20/2018    Cellulitis of left hand 11/10/2018    Added automatically from request for surgery [id number]    Depression     History of gastroesophageal reflux (GERD)     History of kidney cancer 03/06/2014    Hyperlipidemia 03/06/2014    Hypertension 03/06/2014    Hypothyroidism 03/06/2014    Shortness of breath     TIA (transient ischemic attack)      Past Surgical History:   Procedure Laterality Date    ESOPHAGOGASTRIC FUNDOPLASTY      Nissen Fundoplication    HERNIA REPAIR      MAMMO STEREOTACTIC BREAST BIOPSY RIGHT (ALL INC) Right 3/7/2022    US GUIDED BREAST BIOPSY RIGHT COMPLETE Right 3/7/2022       Family History   Problem Relation Age of Onset    Stroke Father     Leukemia Sister     No Known Problems Brother     Skin cancer Mother     Stroke Maternal Grandmother     No Known Problems Maternal Grandfather     No Known Problems Paternal Grandmother     No Known Problems Paternal Grandfather     Breast cancer Sister     Breast cancer Sister     Kidney cancer Brother     Alcohol abuse Brother     Kidney cancer Brother     Stomach cancer Brother     No Known Problems Daughter     No Known Problems Daughter        Social History   Social History     Substance and Sexual Activity   Alcohol Use Yes    Comment: social-rare     Social History     Substance and Sexual Activity   Drug Use Never     Social History     Tobacco Use   Smoking Status Never Smoker   Smokeless Tobacco Never Used         Meds/Allergies     Current Outpatient Medications:     amLODIPine (NORVASC) 2 5 mg tablet, TAKE 2 TABLETS BY MOUTH DAILY, Disp: 180 tablet, Rfl: 1    aspirin 325 mg tablet, Take 325 mg by mouth daily , Disp: , Rfl:     cholestyramine (QUESTRAN) 4 g packet, TAKE 1 PACKET (4 G TOTAL) BY MOUTH DAILY, Disp: 90 packet, Rfl: 1    diphenhydrAMINE (BENADRYL) 25 mg tablet, Take 25 mg by mouth daily 1/2 tablet with Paxil, Disp: , Rfl:     fluticasone (FLONASE) 50 mcg/act nasal spray, SPRAY 1 SPRAY INTO EACH NOSTRIL EVERY DAY (Patient taking differently: as needed ), Disp: 16 mL, Rfl: 1    levothyroxine 125 mcg tablet, TAKE 1 TABLET BY MOUTH EVERY DAY, Disp: 90 tablet, Rfl: 1    meclizine (ANTIVERT) 25 mg tablet, TAKE 1 TABLET BY MOUTH THREE TIMES A DAY AS NEEDED FOR DIZZINESS, Disp: 50 tablet, Rfl: 1    memantine (NAMENDA) 10 mg tablet, Take 1 tablet (10 mg total) by mouth 2 (two) times a day, Disp: 60 tablet, Rfl: 6    Mirabegron ER (Myrbetriq) 50 MG TB24, Take 1 tablet (50 mg total) by mouth daily, Disp: 90 tablet, Rfl: 0    multivitamin (THERAGRAN) TABS, Take 1 tablet by mouth, Disp: , Rfl:     omeprazole (PriLOSEC) 20 mg delayed release capsule, TAKE 1 CAPSULE BY MOUTH EVERY DAY, Disp: 90 capsule, Rfl: 1    oxyCODONE-acetaminophen (Percocet) 5-325 mg per tablet, Take 1 tablet by mouth every 6 (six) hours as needed for moderate pain Max Daily Amount: 4 tablets, Disp: 20 tablet, Rfl: 0    PARoxetine (PAXIL) 20 mg tablet, TAKE 1 TABLET BY MOUTH EVERY DAY, Disp: 90 tablet, Rfl: 1    potassium chloride (MICRO-K) 10 MEQ CR capsule, Take 1 capsule (10 mEq total) by mouth daily, Disp: 30 capsule, Rfl: 0    PROAIR  (90 Base) MCG/ACT inhaler, as needed , Disp: , Rfl:     rivastigmine (EXELON) 9 5 mg/24 hr TD 24 hr patch, APPLY 1 PATCH EVERY DAY, Disp: 90 patch, Rfl: 0    estradiol (ESTRACE) 0 1 mg/g vaginal cream, APPLY SMALL PEA SIZED AMOUNT AROUND URETHRA 3X WEEKLY (Patient not taking: Reported on 1/5/2022), Disp: 42 5 g, Rfl: 1    memantine (NAMENDA TITRATION PACK), Follow package directions   (Patient not taking: Reported on 1/5/2022 ), Disp: 1 tablet, Rfl: 0  Allergies   Allergen Reactions    Ciprofloxacin     Other     Sulfa Antibiotics     Penicillins Rash     Category: Allergy;   --  Pt received unasyn 1 5 mg IV 11-12-18 to 11-15-18    Sulfacetamide Rash     Category: Allergy; Review of Systems   Constitutional: Negative for activity change, chills and fever  HENT: Negative for dental problem, ear pain, sinus pain, sore throat and trouble swallowing  Eyes: Negative for pain, discharge and visual disturbance  Respiratory: Negative for apnea, cough, chest tightness and shortness of breath  Cardiovascular: Negative for chest pain, palpitations and leg swelling  Gastrointestinal: Negative for abdominal pain, diarrhea, nausea and vomiting  Endocrine: Negative for cold intolerance, heat intolerance, polydipsia, polyphagia and polyuria  Genitourinary: Negative for dysuria, hematuria and vaginal bleeding  Musculoskeletal: Negative for arthralgias, back pain, joint swelling and neck pain  Skin: Negative for color change and rash  Neurological: Negative for dizziness, seizures, syncope and headaches  Hematological: Negative for adenopathy  Psychiatric/Behavioral: Negative for agitation and behavioral problems  All other systems reviewed and are negative  OBJECTIVE:   /100   Pulse 96   Temp (!) 97 2 °F (36 2 °C)   Resp 18   Ht 5' 2" (1 575 m)   Wt 75 3 kg (166 lb)   SpO2 96%   BMI 30 36 kg/m²   Performance Status: Karnofsky: 90 - Able to carry on normal activity; minor signs or symptoms of disease     Physical Exam  Vitals and nursing note reviewed  Constitutional:       General: She is not in acute distress  Appearance: She is well-developed  Eyes:      General: No scleral icterus  Cardiovascular:      Rate and Rhythm: Normal rate and regular rhythm  Pulmonary:      Effort: No respiratory distress  Breath sounds: No wheezing, rhonchi or rales  Chest:   Breasts:      Right: No axillary adenopathy or supraclavicular adenopathy        Left: No axillary adenopathy or supraclavicular adenopathy  Comments: Breast examination demonstrates the breasts to be symmetric in size and contour  She has large, pendulous breasts  There is a small 2mm scab in the upper outer breast and second larger, closed biopsy site more inferiorly covered with bandaid and associated with small area of healing ecchymosis  There are no palpable nodules or suspicious skin changes bilaterally  Old radiation tattoos are noted over the central and upper left breast and chest wall  No residual radiation skin changes noted of left breast   Abdominal:      General: There is no distension  Palpations: Abdomen is soft  Tenderness: There is no abdominal tenderness  Musculoskeletal:      Right lower leg: No edema  Left lower leg: No edema  Comments: Full range of motion of upper extremities bilaterally  No upper extremity edema  Lymphadenopathy:      Cervical: No cervical adenopathy  Upper Body:      Right upper body: No supraclavicular or axillary adenopathy  Left upper body: No supraclavicular or axillary adenopathy  Neurological:      Mental Status: She is alert and oriented to person, place, and time  Gait: Gait normal           RESULTS  Imaging Studies  US guided breast biopsy right complete, Mammo stereotactic breast biopsy right (all inc), Mammo post biopsy right    Addendum Date: 3/9/2022 Addendum:   ADDENDUM: PATHOLOGY RESULTS: A) Mass (Right; 12 o'clock; Middle; 8 cm from nipple) Malignant finding: the pathology findings indicate invasive mammary carcinoma and ductal carcinoma in situ  The pathology result is concordant with imaging  Ribbon clip  B) Calcifications (Right; 10 o'clock; Posterior) Benign finding: the pathology findings indicate fibroadenoma and benign calcifications  The pathology result is concordant with imaging  Bowtie clip  The pathology is malignant (ribbon clip, ultrasound-guided core biopsy)   In review of the patients recent imaging, the right malignancy appears unifocal  Ultrasound of the right axilla was performed on March 7, 2022 and showed no suspicious adenopathy  Recent imaging of the contralateral left breast dated February 14, 2022 was reviewed and shows no suspicious findings  RECOMMENDATION:      - Surgical Consultation for the right breast  END ADDENDUM    Result Date: 3/7/2022  Narrative: DIAGNOSIS: Abnormal ultrasound of breast INDICATION: Aleks Hernández is a 67 y o  female presenting for ultrasound-guided core biopsy and stereotactic core biopsy of the right breast  FINDINGS: RIGHT A) MASS US guided breast biopsy right complete: Images of the right breast mass at 12 o'clock in the middle portion, 8 cm from the nipple were obtained  The patient was placed supine on the biopsy table  A core needle biopsy was performed under ultrasound guidance  The skin was prepped in the usual fashion  Anesthesia was administered using lidocaine 1%  A 12 G device was advanced in multiple passes  Using the device, 3 samples were collected  A ribbon clip was inserted into the target area  Post-placement ultrasound and digital mammographic imaging demonstrate the clip was at the site  The patient tolerated the procedure well  There were no immediate complications  B) CALCIFICATIONS US guided breast biopsy right complete: Images of the right breast calcifications at 10 o'clock in the posterior portion were obtained  The patient was placed upright on the biopsy table  A core needle biopsy was performed under stereotactic mammographic guidance using a craniocaudal approach  The skin was prepped in the usual fashion  Anesthesia was administered using lidocaine 1% and lidocaine 2% with epinephrine  An 8 G vacuum-assisted device was advanced in multiple passes  Using the device, 5 samples were collected  A bow tie clip was inserted into the target area  Post-placement digital mammographic imaging demonstrates the clip was at the site   The patient tolerated the procedure well  There were no immediate complications  Impression:  Successful stereotactic and ultrasound-guided core biopsy of the right breast RECOMMENDATION:      - Waiting for pathology for the right breast  Workstation ID: [id number]    Mammo diagnostic right w 3d & cad, US breast right limited (diagnostic)    Result Date: 3/7/2022  Narrative: DIAGNOSIS: Abnormal screening mammogram TECHNIQUE: Digital diagnostic mammography was performed  Computer Aided Detection (CAD) analyzed all applicable images  Ultrasound of the right breast(s) was performed  COMPARISONS: Prior breast imaging dated: 02/14/2022 RELEVANT HISTORY: Family Breast Cancer History: History of breast cancer in Sister, Sister  Family Medical History: Family medical history includes breast cancer in 2 relatives (sister, sister)  Personal History: No known relevant hormone history  No known relevant surgical history  Medical history includes breast cancer  RISK ASSESSMENT: [facility] risk assessment reporting was suppressed due to the patient's history and/or demographic factors  TISSUE DENSITY: There are scattered areas of fibroglandular density  INDICATION: Sumi Valdovinos is a 67 y o  female presenting for abnormal screening mammogram  FINDINGS: RIGHT A) MASS Mammo diagnostic right w 3d & cad: There is a mass with spiculated margins seen in the right breast at 12 o'clock in the middle depth  US breast right limited (diagnostic): There is a 9 mm hypoechoic mass with spiculated margins with shadowing seen in the right breast at 11 o'clock in the middle depth, 8 cm from the nipple  There is no evidence of lymphadenopathy  B) CALCIFICATIONS Mammo diagnostic right w 3d & cad: There are round calcifications in a grouped distribution seen in the right breast at 10 o'clock in the posterior depth    Despite low morphologic suspicion, a benign representative sampling will likely be necessary pending pathologic results of the ultrasound-guided core biopsy  Impression: Recommend stereotactic and ultrasound-guided core biopsy of the right breast  ASSESSMENT/BI-RADS CATEGORY: Right: 5 - Highly Suggestive of Malignancy Overall: 5 - Highly Suggestive of Malignancy RECOMMENDATION:      - Ultrasound-guided breast biopsy for the right breast       - Stereotactic breast biopsy for the right breast  Workstation ID: [id number]     Mammo screening bilateral w 3d & cad    Result Date: 2/16/2022  Narrative: DIAGNOSIS: Screening mammogram for breast cancer TECHNIQUE: Digital screening mammography was performed  Computer Aided Detection (CAD) analyzed all applicable images  COMPARISONS: None available  RELEVANT HISTORY: Family Breast Cancer History: History of breast cancer in Sister, Sister  Family Medical History: Family medical history includes breast cancer in 2 relatives (sister, sister)  Personal History: No known relevant hormone history  No known relevant surgical history  Medical history includes breast cancer  The patient is scheduled in a reminder system for screening mammography  8-10% of cancers will be missed on mammography  Management of a palpable abnormality must be based on clinical grounds  Patients will be notified of their results via letter from our facility  Accredited by [employer] of [employer] and FDA  RISK ASSESSMENT: [facility] risk assessment reporting was suppressed due to the patient's history and/or demographic factors  TISSUE DENSITY: There are scattered areas of fibroglandular density  INDICATION: Arpita Hermosillo is a 67 y o  female presenting for screening mammography  FINDINGS: RIGHT A) MASS: There is a mass with spiculated margins seen in the right breast at 11 o'clock in the middle depth  B) CALCIFICATIONS: There are round calcifications in a grouped distribution seen in the right breast at 10 o'clock in the posterior depth   Left There are no suspicious masses, grouped microcalcifications or areas of unexplained architectural distortion  The skin and nipple areolar complex are unremarkable  Impression: Additional imaging required  A breast health care nurse from our facility will be contacting the patient regarding the need for additional imaging ASSESSMENT/BI-RADS CATEGORY: Left: 2 - Benign Right: 0 - Incomplete: Needs Additional Imaging Evaluation Overall: 0 - Incomplete: Needs Additional Imaging Evaluation RECOMMENDATION:      - Diagnostic mammogram and ultrasound at the current time for the right breast mass and posterior calcifications  Workstation ID: [id number]       Pathology:   Tissue Exam  Final result 3/7/2022   Final Diagnosis   A  Breast, Right, Stereo bx right breast 10oclock, 4 cores with calcs:  - Fibroadenoma with focal coarse calcifications  - Negative for atypia and in-situ or invasive carcinoma       B  Breast, Right, Stereo bx right breast 10oclock, 1 core without calcs:  - Fibroadenoma with focal coarse calcifications  - Negative for atypia and in-situ or invasive carcinoma  Tissue Exam: [id number]  Order: [id number]   Collected 3/7/2022 14:34     Status: Edited Result - FINAL     Visible to patient: Yes (seen)     Dx:  Abnormal ultrasound of breast     0 Result Notes    Component    Case Report   Surgical Pathology Report                         Case: [id number]                                    Authorizing Provider: Pola Messer MD            Collected:           03/07/2022 1434               Ordering Location:     [facility] Received:            03/07/2022 1639                                      [facility]                                                               Pathologist:           Wm Navarro MD                                                                  Specimen:    Breast, Right, US BX RT BREAST 12:00 8CMFN 3 PASSES 12G MASOOD                            Addendum   HORMONE RECEPTOR AND HER2/olga STUDIES     Performed on invasive carcinoma block A1:  Test Description                        Result               Prognostic Interpretation     Estrogen Receptor/ER                90-95%                           Positive  Primary Antibody: SP1  Internal control: Positive              Staining Intensity: Strong  External control: Positive                          Blanca Score*: 7     Progesterone Receptor/PgR       65-75%                           Positive  Primary Antibody: 1E2  Internal control: Positive              Staining Intensity: Strong  External control: Positive                          Blanca Score*: 7        HER2 by IHC                               1+                                  Negative  Primary Antibody:4B5  HER2 by FISH                            Not Indicated     Appropriate positive and negative controls were reviewed      Fixative: Formalin  Duration of Fixation (hours): 9 5 Meets specified requirements of latest ASCO/CAP guidelines  Cold Ischemia Time (minutes): Not stated  Sample Adequacy: Adequate     These immunohistochemical tests were performed at [facility] in [place], [place] and interpreted by Dr Cayetano Calvin  An electronic copy of this report will be kept on file in the Medical Records Department at [facility]      * The Blanca score is a semi quantitative system that takes into consideration the proportion of positive cells (scored on a scale of 0-5) and staining intensity (scored on a scale of (0-3)  The proportion and intensity are summed to produce total scores of 0 or 2 through 8  A score of 0-2 is regarded as negative while 3-8 as positive       Addendum electronically signed by Gil Chavez MD on 3/11/2022 at 03 91 12 [phone] 13   Final Diagnosis   A   Breast, Right, US BX RT BREAST 12:00 8CMFN:  - Invasive mammary carcinoma of no special type, 10 mm in greatest dimension, with 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)  See comment and synoptic report      Comment: Immunohistochemistry for SMMHC, calponin, and CK5/6 demonstrate areas of lost and intact myoepithelial layer, in the invasive and in-situ components respectively      Results reported to Guerita Slaughter at the [address] [place] [address] on 3/9/22 at 10:48am    Electronically signed by Carley Person MD on 3/9/2022 at 1049              ASSESSMENT  1  Malignant neoplasm of overlapping sites of right breast in female, estrogen receptor positive ([facility] [address] )       Cancer Staging  Clinical stage I      PLAN/DISCUSSION    Trent Saunders is a 67y o  year old female with multiple comorbidities, but excellent performance status, including 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 treated with chemotherapy and radiation and currently ADDI who was recently diagnosed with clinical stage I, grade 1 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 extensive low grade DCIS  Tumor cells were ER/IL(+) and [id number](-)  This case was presented at multidisciplinary tumor board this morning and breast conservation therapy was recommended  The patient has agreed to undergo lumpectomy with sentinel lymph node biopsy which has been scheduled by Dr Thomas Silva  I discussed 3 treatment options with the patient today regarding adjuvant radiation  1) No adjuvant radiation and pursue adjuvant endocrine therapy alone  2) Accelerated partial breast irradiation (APBI)  3) Hypofractionated whole breast radiation  I explained that adjuvant radiation reduces risk or local recurrence of tumor above and beyond endocrine therapy alone  The relative risk reduction of local recurrence is 60-70%, but the absolute risk reduction in elderly low grade, early stage breast cancer patients typically about 2-4% over 5 years when Tamoxifen was used    This risk reduction benefit does increase over time with CALGB data showing up to 7-8% benefit at about 10 years  This may be somewhat less with aromatase inhibitor therapy  There would be no expected survival benefit, but treatment will expect to provide some benefit of disease free survival   This would be a reasonable option if she is motivated to avoid radiation treatment  Improved local control data is based on whole breast irradiation  If whole breast irradiation is done, then we would plan prone breast technique using hypofractionated radiation to a total dose of 40  05Gy  Boost could be considered, but unless pathology is upgraded or close margins would likely offer very small benefit in terms of local control  Given her age and clinical presentation, at this time I would recommend adjuvant radiation  I favor accelerated partial breast irradiation to 30 Gy in 5 fractions  I feel that this would offer patient benefit in terms of local regional control and minimize time and toxicity from treatment  If her lumpectomy resection is too large or her tumor is upgraded or upstaged at time of resection, however, then she may no longer be a good candidate for APBI  The rationale and potential benefits, as well as the risks and acute and late side effects and potential toxicities of radiation were discussed with the patient and her daughter at length  Side effects discussed included, but were not limited to: Fatigue, skin erythema, hyperpigmentation, fibrosis, asymmetry of the breast, rib weakening, and desquamation  This case was discussed with surgical oncology and medical oncology at multidisciplinary clinic today  They agreed with the recommendations and management options  They also favored accelerated partial breast irradiation if the patient was felt to be a good candidate  The patient and her daughter were given the opportunity to ask questions, which were answered at length    They are currently leaning towards pursuing some type of adjuvant radiation  We will plan to see the patient again after resection  Final recommendations will be provided at that time based final pathology evaluation as well as tolerance to treatment  Remainder cells available for any questions or concerns that may arise in the interim  Genevieve Smallwood MD  2/09/5529,1:98 PM      Portions of the record may have been created with voice recognition software  Occasional wrong word or "sound a like" substitutions may have occurred due to the inherent limitations of voice recognition software  Read the chart carefully and recognize, using context, where substitutions have occurred

## 2022-03-14 NOTE — PROGRESS NOTES
Hematology/Oncology Consult Note    Date of Service: 3/14/2022    [address] [facility]   [address] [facility] [id number]    Reason for Consultation:  Right-sided breast cancer 1, ER/MN positive, HER2 negative    AJCC 8th Edition Cancer Stage:   Cancer Staging  Malignant neoplasm of overlapping sites of right breast in female, estrogen receptor positive ([address] )  Staging form: Breast, AJCC 8th Edition  - Clinical stage from 3/14/2022: cT1b, cN0, cM0, GX, ER+, MN+, HER2- - Signed by Marco Melendez MD on 3/25/2022  Stage prefix: Initial diagnosis  Histologic grading system: 3 grade system      Referral Physician:  Dr Dia Toth    Oncology/Hematology History:  · March 18, 2016 patient had DEXA scan which showed osteopenia  DEXA scan was done in the [facility]  · February 14, 2022 screening bilateral mammogram:     IMPRESSION:  Additional imaging required  A breast health care nurse from our facility will be contacting the patient regarding the need for additional imaging     ASSESSMENT/BI-RADS CATEGORY:  Left: 2 - Benign  Right: 0 - Incomplete: Needs Additional Imaging Evaluation  Overall: 0 - Incomplete: Needs Additional Imaging Evaluation     RECOMMENDATION:       - Diagnostic mammogram and ultrasound at the current time for the right breast mass and posterior calcifications  · March 7, 2022, diagnostic mammogram of right breast:  RIGHT  A) MASS  Mammo diagnostic right w 3d & cad: There is a mass with spiculated margins seen in the right breast at 12 o'clock in the middle depth  US breast right limited (diagnostic): There is a 9 mm hypoechoic mass with spiculated margins with shadowing seen in the right breast at 11 o'clock in the middle depth, 8 cm from the nipple  There is no evidence of lymphadenopathy      B) CALCIFICATIONS  Mammo diagnostic right w 3d & cad:  There are round calcifications in a grouped distribution seen in the right breast at 10 o'clock in the posterior depth  Despite low morphologic suspicion, a benign representative sampling will likely be necessary pending pathologic results of the ultrasound-guided core biopsy  · March 7, 2022, ultrasound and mammogram guided biopsy of the breast cancer:  Final Diagnosis   A  Breast, Right, US BX RT BREAST 12:00 8CMFN:  - Invasive mammary carcinoma of no special type, 10 mm in greatest dimension, with 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)  See comment and synoptic report      Comment: Immunohistochemistry for SMMHC, calponin, and CK5/6 demonstrate areas of lost and intact myoepithelial layer, in the invasive and in-situ components respectively         Addendum   HORMONE RECEPTOR AND HER2/olga STUDIES     Performed on invasive carcinoma block A1:  Test Description                        Result               Prognostic Interpretation     Estrogen Receptor/ER                90-95%                           Positive  Primary Antibody: SP1  Internal control: Positive              Staining Intensity: Strong  External control: Positive                          Blanca Score*: 7     Progesterone Receptor/PgR       65-75%                           Positive  Primary Antibody: 1E2  Internal control: Positive              Staining Intensity: Strong  External control: Positive                          Blanca Score*: 7        HER2 by IHC                               1+                                  Negative  Primary Antibody:4B5  HER2 by FISH                            Not Indicated     Appropriate positive and negative controls were reviewed         · March 14, 2022 that came with discussed in our multidisciplinary breast tumor board recommendation of lumpectomy endocrine therapy and radiation therapy    Assessment and Recommendations:     I personally reviewed the lab results,  the image studies,  pathology, other specialty/physicians consult notes/recommendations, and outside medical records  I had a lengthy discussion with the patient and shared the work-up findings  We discussed the diagnosis and management plan as below  1  Right breast invasive mammary carcinoma of unknown type  ER and MI positive, HER2 negative  cT1b cN0  This case was discussed in our multidisciplinary breast cancer tumor board with recommendation of lumpectomy followed by adjuvant endocrine therapy and radiation therapy  I recommend MammaPrint to evaluate the risk of the breast cancer  Based on the size and the biology of the cancer, this is likely a low risk disease  Patient will consult Dr Rita Gomez today to discuss the rationale, risks and benefits of surgery  Radiation Oncology will consult patient to discuss the rationale, benefits,  risks of radiation therapy  2  Bone health  DEXA scan in March 2016 showed osteopenia  Patient will need another DEXA scan after surgery  I recommend patient taking calcium vitamin-D supplement and doing exercise for bone health  3  Hypothyroidism on levothyroxine  Patient will continue levothyroxine  4  Hypertension on Norvasc  Her blood pressure is fairly well controlled  5  Follow-up, return to clinic in 2-3 weeks  Follow-up with Dr Chow Rash than 50% of this 60 minute visit was spent in counseling, as detailed above  Thank you very much for your consultation and making us part of this nice patient's care  I will continue to follow closely with you  Please contact me with any additional questions  Disclaimer: This document was prepared using [employer] Flu[employer] Direct technology  If a word or phrase is confusing, or does not make sense, this is likely due to recognition error which was not discovered during the providers review  If you believe an error has occurred, please contact me through [employer] service for jet? cation      HPI:   Falguni Hernandez is a 67 y o  female with a past medical history of 110, anxiety, aortic valve disorder, diabetes mellitus, hypothyroidism , who had screening mammogram in February 14, 2022 which showed a 9 mm mass in the right breast with spiculated margins  Patient underwent ultrasound-guided biopsy on March 7, 2022 which showed invasive mammary carcinoma of no special type with extensive associated DCIS  ER and GA positive  HER2 negative  This case was discussed in our multidisciplinary tumor board with recommendation of lumpectomy, endocrine therapy followed by adjuvant radiation therapy  The patient here for comanagement of breast cancer  Patient feels fine  No fever or chills  No exertional chest pain, diaphoresis or shortness  of breath  No cough and phlegm, no hemoptysis  Patient denied nausea  and vomiting  No abdominal pain  No diarrhea or constipation  No  symptoms  No headache or blurred vision  No seizure activity  Appetite good  No significant weight loss or weight gain  The patient denies bleeding anywhere      Review of system:  12-point review of system was performed, pertinent positive and negative were detailed as above    Past Medical History:   Diagnosis Date    Anxiety disorder     Aortic valve disorder     Breast cancer ([address] )     Cellulitis of finger of left hand 11/20/2018    Cellulitis of left hand 11/10/2018    Added automatically from request for surgery [id number]    Depression     History of gastroesophageal reflux (GERD)     History of kidney cancer 03/06/2014    Hyperlipidemia 03/06/2014    Hypertension 03/06/2014    Hypothyroidism 03/06/2014    Shortness of breath     TIA (transient ischemic attack)        Past Surgical History:   Procedure Laterality Date    ESOPHAGOGASTRIC FUNDOPLASTY      Nissen Fundoplication    HERNIA REPAIR      MAMMO STEREOTACTIC BREAST BIOPSY RIGHT (ALL INC) Right 3/7/2022    US GUIDED BREAST BIOPSY RIGHT COMPLETE Right 3/7/2022       Family History   Problem Relation Age of Onset    Stroke Father    [facility] Leukemia Sister     No Known Problems Brother     Skin cancer Mother     Stroke Maternal Grandmother     No Known Problems Maternal Grandfather     No Known Problems Paternal Grandmother     No Known Problems Paternal Grandfather     Breast cancer Sister     Breast cancer Sister     Kidney cancer Brother     Alcohol abuse Brother     Kidney cancer Brother     Stomach cancer Brother     No Known Problems Daughter     No Known Problems Daughter        Social History     Socioeconomic History    Marital status:      Spouse name: Not on file    Number of children: 2    Years of education: Not on file    Highest education level: Not on file   Occupational History    Not on file   Tobacco Use    Smoking status: Never Smoker    Smokeless tobacco: Never Used   Vaping Use    Vaping Use: Never used   Substance and Sexual Activity    Alcohol use: Yes     Comment: social-rare    Drug use: Never    Sexual activity: Not on file   Other Topics Concern    Not on file   Social History Narrative    Not on file     Social Determinants of Health     Financial Resource Strain: Not on file   Food Insecurity: Not on file   Transportation Needs: Not on file   Physical Activity: Not on file   Stress: Not on file   Social Connections: Not on file   Intimate Partner Violence: Not on file   Housing Stability: Not on file       Allergies   Allergen Reactions    Ciprofloxacin     Other     Sulfa Antibiotics     Penicillins Rash     Category: Allergy;   --  Pt received unasyn 1 5 mg IV 11-12-18 to 11-15-18    Sulfacetamide Rash     Category:  Allergy;        Current Outpatient Medications   Medication Sig Dispense Refill    amLODIPine (NORVASC) 2 5 mg tablet TAKE 2 TABLETS BY MOUTH DAILY 180 tablet 1    aspirin 325 mg tablet Take 325 mg by mouth daily       cholestyramine (QUESTRAN) 4 g packet TAKE 1 PACKET (4 G TOTAL) BY MOUTH DAILY 90 packet 1    diphenhydrAMINE (BENADRYL) 25 mg tablet Take 25 mg by mouth daily 1/2 tablet with Paxil      estradiol (ESTRACE) 0 1 mg/g vaginal cream APPLY SMALL PEA SIZED AMOUNT AROUND URETHRA 3X WEEKLY (Patient not taking: Reported on 1/5/2022) 42 5 g 1    fluticasone (FLONASE) 50 mcg/act nasal spray SPRAY 1 SPRAY INTO EACH NOSTRIL EVERY DAY (Patient taking differently: as needed ) 16 mL 1    levothyroxine 125 mcg tablet TAKE 1 TABLET BY MOUTH EVERY DAY 90 tablet 1    meclizine (ANTIVERT) 25 mg tablet TAKE 1 TABLET BY MOUTH THREE TIMES A DAY AS NEEDED FOR DIZZINESS 50 tablet 1    memantine (NAMENDA TITRATION PACK) Follow package directions  (Patient not taking: Reported on 1/5/2022 ) 1 tablet 0    memantine (NAMENDA) 10 mg tablet Take 1 tablet (10 mg total) by mouth 2 (two) times a day 60 tablet 6    Mirabegron ER (Myrbetriq) 50 MG TB24 Take 1 tablet (50 mg total) by mouth daily 90 tablet 0    multivitamin (THERAGRAN) TABS Take 1 tablet by mouth      omeprazole (PriLOSEC) 20 mg delayed release capsule TAKE 1 CAPSULE BY MOUTH EVERY DAY 90 capsule 1    oxyCODONE-acetaminophen (Percocet) 5-325 mg per tablet Take 1 tablet by mouth every 6 (six) hours as needed for moderate pain Max Daily Amount: 4 tablets 20 tablet 0    PARoxetine (PAXIL) 20 mg tablet TAKE 1 TABLET BY MOUTH EVERY DAY 90 tablet 1    potassium chloride (MICRO-K) 10 MEQ CR capsule Take 1 capsule (10 mEq total) by mouth daily 30 capsule 0    PROAIR  (90 Base) MCG/ACT inhaler as needed       rivastigmine (EXELON) 9 5 mg/24 hr TD 24 hr patch APPLY 1 PATCH EVERY DAY 90 patch 0     No current facility-administered medications for this visit  (Not in a hospital admission)      Objective:     24 Hour Vitals Assessment:  Vitals:    03/14/22 1300   BP: 144/100   Pulse: 96   Resp: 18   Temp: (!) 97 2 °F (36 2 °C)   SpO2: 95%       PHYSICIAN EXAM:    General: Appearance: alert, cooperative, no distress  HEENT: Normocephalic, atraumatic  No scleral icterus  conjunctivae clear  PERRL, EOM's intact  No sinus drainage or tenderness  Chest: No tenderness  Breasts:  Bilateral breast symmetrical  No skin change  No nipple retraction or abnormal discharge  No palpable mass  No palpable axillary LN enlargement  Lungs: Clear to auscultation bilaterally, Respirations unlabored  Cardiac: Regular rate and rhythm, S1and S2 are normal, no murmur  Abdomen: Soft, non-tender, non-distended  Bowel sounds are normal  No masses, no organomegaly  Pelvic: deferred  Extremities:  No edema, cyanosis, clubbing  Skin: Skin color, turgor are normal  No rashes  Lymphatics: no palpable adenopathy  Neurologic: Alert and oriented X 3,  no focal neuro deficits  Normal strength and sensation  DATA REVIEW:    Pathology Result:    Final Diagnosis   Date Value Ref Range Status   03/07/2022   Final    A  Breast, Right, US BX RT BREAST 12:00 8CMFN:  - Invasive mammary carcinoma of no special type, 10 mm in greatest dimension, with 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)  See comment and synoptic report  Comment: Immunohistochemistry for SMMHC, calponin, and CK5/6 demonstrate areas of lost and intact myoepithelial layer, in the invasive and in-situ components respectively  Results reported to Jaylon Hayes at the [address] [place] [address] on 3/9/22 at 10:48am      03/07/2022   Final    A  Breast, Right, Stereo bx right breast 10oclock, 4 cores with calcs:  - Fibroadenoma with focal coarse calcifications  - Negative for atypia and in-situ or invasive carcinoma  B  Breast, Right, Stereo bx right breast 10oclock, 1 core without calcs:  - Fibroadenoma with focal coarse calcifications  - Negative for atypia and in-situ or invasive carcinoma  Image Results:   They are reviewed and documented in Hematology/Oncology history    US guided breast biopsy right complete, Mammo stereotactic breast biopsy right (all inc), Mammo post biopsy right  Addendum: ADDENDUM:     PATHOLOGY RESULTS:    A) Mass (Right; 12 o'clock; Middle; 8 cm from nipple)   Malignant finding: the pathology findings indicate invasive mammary  carcinoma and ductal carcinoma in situ  The pathology result is concordant  with imaging  Ribbon clip  B) Calcifications (Right; 10 o'clock; Posterior)   Benign finding: the pathology findings indicate fibroadenoma and benign  calcifications  The pathology result is concordant with imaging  Bowtie  clip  The pathology is malignant (ribbon clip, ultrasound-guided core biopsy)  In review of the patients recent imaging, the right malignancy appears  unifocal    Ultrasound of the right axilla was performed on March 7, 2022 and showed  no suspicious adenopathy  Recent imaging of the contralateral left breast dated February 14, 2022  was reviewed and shows no suspicious findings  RECOMMENDATION:        - Surgical Consultation for the right breast      END ADDENDUM  Narrative: DIAGNOSIS: Abnormal ultrasound of breast     INDICATION: Elisa Elliott is a 67 y o  female presenting for   ultrasound-guided core biopsy and stereotactic core biopsy of the right   breast     FINDINGS:   RIGHT  A) MASS  US guided breast biopsy right complete: Images of the right breast mass at   12 o'clock in the middle portion, 8 cm from the nipple were obtained  The   patient was placed supine on the biopsy table  A core needle biopsy was   performed under ultrasound guidance  The skin was prepped in the usual   fashion  Anesthesia was administered using lidocaine 1%  A 12 G device was   advanced in multiple passes  Using the device, 3 samples were collected  A   ribbon clip was inserted into the target area  Post-placement ultrasound   and digital mammographic imaging demonstrate the clip was at the site  The   patient tolerated the procedure well  There were no immediate   complications      B) CALCIFICATIONS  US guided breast biopsy right complete: Images of the right breast   calcifications at 10 o'clock in the posterior portion were obtained  The   patient was placed upright on the biopsy table  A core needle biopsy was   performed under stereotactic mammographic guidance using a craniocaudal   approach  The skin was prepped in the usual fashion  Anesthesia was   administered using lidocaine 1% and lidocaine 2% with epinephrine  An 8 G   vacuum-assisted device was advanced in multiple passes  Using the device,   5 samples were collected  A bow tie clip was inserted into the target area  Post-placement digital   mammographic imaging demonstrates the clip was at the site  The patient   tolerated the procedure well  There were no immediate complications  Impression:    Successful stereotactic and ultrasound-guided core biopsy of the right   breast    RECOMMENDATION:       - Waiting for pathology for the right breast     Workstation ID: [id number]      LABS:  Lab data are reviewed and documented in HemOnc history  Recent Results (from the past 48 hour(s))   MISCELLANEOUS LAB TEST    Collection Time: 03/14/22 12:31 PM   Result Value Ref Range    Miscellaneous Lab Test Result       Kit collected on behalf of patient   Results to follow        By:  Fili Devine MD, 3/14/2022, 2:02 PM                                  Primary Care Physician:  Saw Stoddard MD

## 2022-03-14 NOTE — PROGRESS NOTES
Surgical Oncology Consult Note       [facility]  [facility] Julianna Blank [address] [address]    Charline Shonna  1945  [id number]      Chief Complaint   Patient presents with    Consult     Malignant neoplasm of overlapping sites of right female breast         Assessment/Plan    1  Malignant neoplasm of overlapping sites of right female breast, unspecified estrogen receptor status ([address] )    2  Pre-op examination         Oncology History Overview Note   2/14/22 Screening Mammogram  RIGHT  A) MASS: There is a mass with spiculated margins seen in the right breast at 11 o'clock in the middle depth  B) CALCIFICATIONS: There are round calcifications in a grouped distribution seen in the right breast at 10 o'clock in the posterior depth  Left  There are no suspicious masses, grouped microcalcifications or areas of unexplained architectural distortion  The skin and nipple areolar complex are unremarkable  RECOMMENDATION:  Diagnostic mammogram and ultrasound at the current time for the right breast mass and posterior calcifications  3/7/22 Mammogram diagnostic right  RIGHT  A) MASS  Mammo diagnostic right w 3d & cad: There is a mass with spiculated margins seen in the right breast at 12 o'clock in the middle depth  US breast right limited (diagnostic): There is a 9 mm hypoechoic mass with spiculated margins with shadowing seen in the right breast at 11 o'clock in the middle depth, 8 cm from the nipple  There is no evidence of lymphadenopathy  B) CALCIFICATIONS  Mammo diagnostic right w 3d & cad: There are round calcifications in a grouped distribution seen in the right breast at 10 o'clock in the posterior depth  Despite low morphologic suspicion, a benign representative sampling will likely be necessary pending pathologic results of the ultrasound-guided core biopsy    RECOMMENDATION:       - Ultrasound-guided breast biopsy for the right breast        - Stereotactic breast biopsy for the right breast     3/7/22 US guided biopsy     Malignant neoplasm of overlapping sites of right female breast ([facility] [address] )   3/7/2022 Initial Diagnosis    Malignant neoplasm of right female breast ([facility] [address] )     3/7/2022 Biopsy    Right Breast Ultrasound guided biopsy  12 o'clock, 8 cm from nipple  Invasive mammary carcinoma of no special type with extensive Ductal Carcinoma in situ  Grade 1   ER pending  [place] pending  HER2 pending  Lymphovascular invasion not identified    Concordant  Malingnacy appears unifocal  Right axilla clear  Left breast clear  Breast, Right, Stereo bx right breast 10oclock, 4 cores with calcs:  - Fibroadenoma with focal coarse calcifications  - Negative for atypia and in-situ or invasive carcinoma  Breast, Right, Stereo bx right breast 10oclock, 1 core without calcs:  - Fibroadenoma with focal coarse calcifications  - Negative for atypia and in-situ or invasive carcinoma  This is a 25-year-old female with a history of left breast lymphoma a status post chemotherapy and radiation approximately 15 years ago for non-Hodgkin lymphoma  She had an abnormal right breast mammogram followed by biopsy biopsies consistent with 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  She is here with her daughter to discuss further workup and management to our multidisciplinary breast cancer tumor board  Review of Systems   Constitutional: Negative for activity change, chills and fever  HENT: Negative for dental problem, ear pain, sinus pain, sore throat and trouble swallowing  Eyes: Negative for pain, discharge and visual disturbance  Respiratory: Negative for apnea, cough, chest tightness and shortness of breath  Cardiovascular: Negative for chest pain, palpitations and leg swelling  Gastrointestinal: Negative for abdominal pain, diarrhea, nausea and vomiting     Endocrine: Negative for cold intolerance, heat intolerance, polydipsia, polyphagia and polyuria  Genitourinary: Negative for dysuria, hematuria and vaginal bleeding  Musculoskeletal: Negative for arthralgias, back pain, joint swelling and neck pain  Skin: Negative for color change and rash  Neurological: Negative for dizziness, seizures, syncope and headaches  Hematological: Negative for adenopathy  Psychiatric/Behavioral: Negative for agitation and behavioral problems  All other systems reviewed and are negative         Past Medical History:      Patient Active Problem List   Diagnosis    Depression    Gastroesophageal reflux disease without esophagitis    Mixed hyperlipidemia    Essential hypertension    Hypothyroid    Impaired fasting glucose    Lymphoma (HCC)    Cerebral aneurysm, nonruptured    Mild cognitive impairment    Flexor tenosynovitis of finger    Macrocytosis without anemia    Paresthesias    Non-intractable vomiting with nausea    Hypokalemia    Late onset Alzheimer's disease without behavioral disturbance (HCC)    Chest pain    Near syncope    [facility]prom[facility]    Malignant neoplasm of overlapping sites of right female breast [facility])        Past Medical History:   Diagnosis Date    Anxiety disorder     Aortic valve disorder     Breast cancer ([place] [address] )     Cellulitis of finger of left hand 11/20/2018    Cellulitis of left hand 11/10/2018    Added automatically from request for surgery [id number]    Depression     History of gastroesophageal reflux (GERD)     History of kidney cancer 03/06/2014    Hyperlipidemia 03/06/2014    Hypertension 03/06/2014    Hypothyroidism 03/06/2014    Shortness of breath     TIA (transient ischemic attack)         Past Surgical History:   Procedure Laterality Date    ESOPHAGOGASTRIC FUNDOPLASTY      Nissen Fundoplication    HERNIA REPAIR      MAMMO STEREOTACTIC BREAST BIOPSY RIGHT (ALL INC) Right 3/7/2022    US GUIDED BREAST BIOPSY RIGHT COMPLETE Right 3/7/2022        Family History   Problem Relation Age of Onset    Stroke Father     Leukemia Sister     No Known Problems Brother     Skin cancer Mother     Stroke Maternal Grandmother     No Known Problems Maternal Grandfather     No Known Problems Paternal Grandmother     No Known Problems Paternal Grandfather     Breast cancer Sister     Breast cancer Sister     Kidney cancer Brother     Alcohol abuse Brother     Kidney cancer Brother     Stomach cancer Brother     No Known Problems Daughter     No Known Problems Daughter         Social History     Socioeconomic History    Marital status:       Spouse name: Not on file    Number of children: 2    Years of education: Not on file    Highest education level: Not on file   Occupational History    Not on file   Tobacco Use    Smoking status: Never Smoker    Smokeless tobacco: Never Used   Vaping Use    Vaping Use: Never used   Substance and Sexual Activity    Alcohol use: Yes     Comment: social-rare    Drug use: Never    Sexual activity: Not on file   Other Topics Concern    Not on file   Social History Narrative    Not on file     Social Determinants of Health     Financial Resource Strain: Not on file   Food Insecurity: Not on file   Transportation Needs: Not on file   Physical Activity: Not on file   Stress: Not on file   Social Connections: Not on file   Intimate Partner Violence: Not on file   Housing Stability: Not on file        Current Outpatient Medications:     amLODIPine (NORVASC) 2 5 mg tablet, TAKE 2 TABLETS BY MOUTH DAILY, Disp: 180 tablet, Rfl: 1    aspirin 325 mg tablet, Take 325 mg by mouth daily , Disp: , Rfl:     cholestyramine (QUESTRAN) 4 g packet, TAKE 1 PACKET (4 G TOTAL) BY MOUTH DAILY, Disp: 90 packet, Rfl: 1    diphenhydrAMINE (BENADRYL) 25 mg tablet, Take 25 mg by mouth daily 1/2 tablet with Paxil, Disp: , Rfl:     fluticasone (FLONASE) 50 mcg/act nasal spray, SPRAY 1 SPRAY INTO EACH NOSTRIL EVERY DAY (Patient taking differently: as needed ), Disp: 16 mL, Rfl: 1    levothyroxine 125 mcg tablet, TAKE 1 TABLET BY MOUTH EVERY DAY, Disp: 90 tablet, Rfl: 1    meclizine (ANTIVERT) 25 mg tablet, TAKE 1 TABLET BY MOUTH THREE TIMES A DAY AS NEEDED FOR DIZZINESS, Disp: 50 tablet, Rfl: 1    memantine (NAMENDA) 10 mg tablet, Take 1 tablet (10 mg total) by mouth 2 (two) times a day, Disp: 60 tablet, Rfl: 6    Mirabegron ER (Myrbetriq) 50 MG TB24, Take 1 tablet (50 mg total) by mouth daily, Disp: 90 tablet, Rfl: 0    multivitamin (THERAGRAN) TABS, Take 1 tablet by mouth, Disp: , Rfl:     omeprazole (PriLOSEC) 20 mg delayed release capsule, TAKE 1 CAPSULE BY MOUTH EVERY DAY, Disp: 90 capsule, Rfl: 1    PARoxetine (PAXIL) 20 mg tablet, TAKE 1 TABLET BY MOUTH EVERY DAY, Disp: 90 tablet, Rfl: 1    potassium chloride (MICRO-K) 10 MEQ CR capsule, Take 1 capsule (10 mEq total) by mouth daily, Disp: 30 capsule, Rfl: 0    PROAIR  (90 Base) MCG/ACT inhaler, as needed , Disp: , Rfl:     rivastigmine (EXELON) 9 5 mg/24 hr TD 24 hr patch, APPLY 1 PATCH EVERY DAY, Disp: 90 patch, Rfl: 0    estradiol (ESTRACE) 0 1 mg/g vaginal cream, APPLY SMALL PEA SIZED AMOUNT AROUND URETHRA 3X WEEKLY (Patient not taking: Reported on 1/5/2022), Disp: 42 5 g, Rfl: 1    memantine (NAMENDA TITRATION PACK), Follow package directions  (Patient not taking: Reported on 1/5/2022 ), Disp: 1 tablet, Rfl: 0     Allergies   Allergen Reactions    Ciprofloxacin     Other     Sulfa Antibiotics     Penicillins Rash     Category: Allergy;   --  Pt received unasyn 1 5 mg IV 11-12-18 to 11-15-18    Sulfacetamide Rash     Category: Allergy; Physical Exam:     Vitals:    03/14/22 1200   BP: 144/100   Pulse: 96   Resp: 18   Temp: (!) 97 2 °F (36 2 °C)   SpO2: 95%     Physical Exam  Constitutional:       Appearance: Normal appearance  She is normal weight  HENT:      Head: Normocephalic and atraumatic  Nose: Nose normal  No congestion  Mouth/Throat:      Mouth: Mucous membranes are moist       Pharynx: Oropharynx is clear  Eyes:      Conjunctiva/sclera: Conjunctivae normal       Pupils: Pupils are equal, round, and reactive to light  Cardiovascular:      Rate and Rhythm: Normal rate and regular rhythm  Pulses: Normal pulses  Heart sounds: Normal heart sounds  No murmur heard  Pulmonary:      Effort: Pulmonary effort is normal  No respiratory distress  Breath sounds: Normal breath sounds  No stridor  No wheezing or rhonchi  Chest:      Comments: The bilateral Breast(s) were examined  There was not any sign of an inverted nipple, mass, nipple discharge, skin changes or tenderness  The bilateral  breast(s) were examined in the sitting and supine position  There are not any worrisome skin changes, tenderness, nipple changes, swelling ,bleeding or evidence of mass/s in all four quadrants  Ani survey demonstrated that there is not any evidence of any clinically suspicious axillary, pectoral or supraclavicular lymph nodes  Abdominal:      General: Abdomen is flat  Bowel sounds are normal       Palpations: Abdomen is soft  Musculoskeletal:         General: Normal range of motion  Cervical back: Normal range of motion and neck supple  No rigidity or tenderness  Lymphadenopathy:      Cervical: No cervical adenopathy  Skin:     General: Skin is warm  Coloration: Skin is not jaundiced or pale  Neurological:      General: No focal deficit present  Mental Status: She is alert and oriented to person, place, and time  Mental status is at baseline  Psychiatric:         Mood and Affect: Mood normal          Behavior: Behavior normal          Thought Content: Thought content normal          Judgment: Judgment normal          Results:      Breast, Right, US BX RT BREAST 12:00 8CMFN:  - Invasive mammary carcinoma of no special type, 10 mm in greatest dimension, with 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)  See comment and synoptic report        Discussion/Summary:   We did discuss in detail the newly diagnosed right breast cancer  We have discussed her case in our multidisciplinary tumor board I have discussed with Medical and Radiation oncologist as well  I have recommended her large lumpectomy given her extensive radiated DCIS it is approximately 3 7 cm as per her radiologist her when we discussed in the tumor board  We discussed the potential breast conservation as well as mastectomy  Consensus was breast conservation surgery with sentinel lymph node biopsy  Also follow a genetic testing results  Consent was obtained for right breast ITZEL  directed lumpectomy at 12:00 p m  invasive mammary carcinoma, with dual tracer sentinel lymph node biopsy after explaining the benefit procedure alternatives and possible complications  We did discuss the cosmetic concern after surgery given the specimen can be large lumpectomy  Patient and her daughter had several questions I answered all of them  Advance Care Planning/Advance Directives: Meri Schwab, MD discussed the disease status, and treatment plans with Elisa whitmore 03/14/22 and will follow-up with the patient

## 2022-03-14 NOTE — H&P (VIEW-ONLY)
Surgical Oncology Consult Note       [facility]  [facility] [place] [address]    [facility]  1945  [id number]      Chief Complaint   Patient presents with    Consult     Malignant neoplasm of overlapping sites of right female breast         Assessment/Plan    1  Malignant neoplasm of overlapping sites of right female breast, unspecified estrogen receptor status ([address] )    2  Pre-op examination         Oncology History Overview Note   2/14/22 Screening Mammogram  RIGHT  A) MASS: There is a mass with spiculated margins seen in the right breast at 11 o'clock in the middle depth  B) CALCIFICATIONS: There are round calcifications in a grouped distribution seen in the right breast at 10 o'clock in the posterior depth  Left  There are no suspicious masses, grouped microcalcifications or areas of unexplained architectural distortion  The skin and nipple areolar complex are unremarkable  RECOMMENDATION:  Diagnostic mammogram and ultrasound at the current time for the right breast mass and posterior calcifications  3/7/22 Mammogram diagnostic right  RIGHT  A) MASS  Mammo diagnostic right w 3d & cad: There is a mass with spiculated margins seen in the right breast at 12 o'clock in the middle depth  US breast right limited (diagnostic): There is a 9 mm hypoechoic mass with spiculated margins with shadowing seen in the right breast at 11 o'clock in the middle depth, 8 cm from the nipple  There is no evidence of lymphadenopathy  B) CALCIFICATIONS  Mammo diagnostic right w 3d & cad: There are round calcifications in a grouped distribution seen in the right breast at 10 o'clock in the posterior depth  Despite low morphologic suspicion, a benign representative sampling will likely be necessary pending pathologic results of the ultrasound-guided core biopsy    RECOMMENDATION:       - Ultrasound-guided breast biopsy for the right breast        - Stereotactic breast biopsy for the right breast     3/7/22 US guided biopsy     Malignant neoplasm of overlapping sites of right female breast ([facility] [address] )   3/7/2022 Initial Diagnosis    Malignant neoplasm of right female breast ([facility] [address] )     3/7/2022 Biopsy    Right Breast Ultrasound guided biopsy  12 o'clock, 8 cm from nipple  Invasive mammary carcinoma of no special type with extensive Ductal Carcinoma in situ  Grade 1   ER pending  VT pending  HER2 pending  Lymphovascular invasion not identified    Concordant  Malingnacy appears unifocal  Right axilla clear  Left breast clear  Breast, Right, Stereo bx right breast 10oclock, 4 cores with calcs:  - Fibroadenoma with focal coarse calcifications  - Negative for atypia and in-situ or invasive carcinoma  Breast, Right, Stereo bx right breast 10oclock, 1 core without calcs:  - Fibroadenoma with focal coarse calcifications  - Negative for atypia and in-situ or invasive carcinoma  This is a 76-year-old female with a history of left breast lymphoma a status post chemotherapy and radiation approximately 15 years ago for non-Hodgkin lymphoma  She had an abnormal right breast mammogram followed by biopsy biopsies consistent with 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  She is here with her daughter to discuss further workup and management to our multidisciplinary breast cancer tumor board  Review of Systems   Constitutional: Negative for activity change, chills and fever  HENT: Negative for dental problem, ear pain, sinus pain, sore throat and trouble swallowing  Eyes: Negative for pain, discharge and visual disturbance  Respiratory: Negative for apnea, cough, chest tightness and shortness of breath  Cardiovascular: Negative for chest pain, palpitations and leg swelling  Gastrointestinal: Negative for abdominal pain, diarrhea, nausea and vomiting     Endocrine: Negative for cold intolerance, heat intolerance, polydipsia, polyphagia and polyuria  Genitourinary: Negative for dysuria, hematuria and vaginal bleeding  Musculoskeletal: Negative for arthralgias, back pain, joint swelling and neck pain  Skin: Negative for color change and rash  Neurological: Negative for dizziness, seizures, syncope and headaches  Hematological: Negative for adenopathy  Psychiatric/Behavioral: Negative for agitation and behavioral problems  All other systems reviewed and are negative         Past Medical History:      Patient Active Problem List   Diagnosis    Depression    Gastroesophageal reflux disease without esophagitis    Mixed hyperlipidemia    Essential hypertension    Hypothyroid    Impaired fasting glucose    Lymphoma (HCC)    Cerebral aneurysm, nonruptured    Mild cognitive impairment    Flexor tenosynovitis of finger    Macrocytosis without anemia    Paresthesias    Non-intractable vomiting with nausea    Hypokalemia    Late onset Alzheimer's disease without behavioral disturbance (HCC)    Chest pain    Near syncope    [facility]    Malignant neoplasm of overlapping sites of right female breast [facility])        Past Medical History:   Diagnosis Date    Anxiety disorder     Aortic valve disorder     Breast cancer ([place] [address] )     Cellulitis of finger of left hand 11/20/2018    Cellulitis of left hand 11/10/2018    Added automatically from request for surgery [id number]    Depression     History of gastroesophageal reflux (GERD)     History of kidney cancer 03/06/2014    Hyperlipidemia 03/06/2014    Hypertension 03/06/2014    Hypothyroidism 03/06/2014    Shortness of breath     TIA (transient ischemic attack)         Past Surgical History:   Procedure Laterality Date    ESOPHAGOGASTRIC FUNDOPLASTY      Nissen Fundoplication    HERNIA REPAIR      MAMMO STEREOTACTIC BREAST BIOPSY RIGHT (ALL INC) Right 3/7/2022    US GUIDED BREAST BIOPSY RIGHT COMPLETE Right 3/7/2022        Family History   Problem Relation Age of Onset    Stroke Father     Leukemia Sister     No Known Problems Brother     Skin cancer Mother     Stroke Maternal Grandmother     No Known Problems Maternal Grandfather     No Known Problems Paternal Grandmother     No Known Problems Paternal Grandfather     Breast cancer Sister     Breast cancer Sister     Kidney cancer Brother     Alcohol abuse Brother     Kidney cancer Brother     Stomach cancer Brother     No Known Problems Daughter     No Known Problems Daughter         Social History     Socioeconomic History    Marital status:       Spouse name: Not on file    Number of children: 2    Years of education: Not on file    Highest education level: Not on file   Occupational History    Not on file   Tobacco Use    Smoking status: Never Smoker    Smokeless tobacco: Never Used   Vaping Use    Vaping Use: Never used   Substance and Sexual Activity    Alcohol use: Yes     Comment: social-rare    Drug use: Never    Sexual activity: Not on file   Other Topics Concern    Not on file   Social History Narrative    Not on file     Social Determinants of Health     Financial Resource Strain: Not on file   Food Insecurity: Not on file   Transportation Needs: Not on file   Physical Activity: Not on file   Stress: Not on file   Social Connections: Not on file   Intimate Partner Violence: Not on file   Housing Stability: Not on file        Current Outpatient Medications:     amLODIPine (NORVASC) 2 5 mg tablet, TAKE 2 TABLETS BY MOUTH DAILY, Disp: 180 tablet, Rfl: 1    aspirin 325 mg tablet, Take 325 mg by mouth daily , Disp: , Rfl:     cholestyramine (QUESTRAN) 4 g packet, TAKE 1 PACKET (4 G TOTAL) BY MOUTH DAILY, Disp: 90 packet, Rfl: 1    diphenhydrAMINE (BENADRYL) 25 mg tablet, Take 25 mg by mouth daily 1/2 tablet with Paxil, Disp: , Rfl:     fluticasone (FLONASE) 50 mcg/act nasal spray, SPRAY 1 SPRAY INTO EACH NOSTRIL EVERY DAY (Patient taking differently: as needed ), Disp: 16 mL, Rfl: 1    levothyroxine 125 mcg tablet, TAKE 1 TABLET BY MOUTH EVERY DAY, Disp: 90 tablet, Rfl: 1    meclizine (ANTIVERT) 25 mg tablet, TAKE 1 TABLET BY MOUTH THREE TIMES A DAY AS NEEDED FOR DIZZINESS, Disp: 50 tablet, Rfl: 1    memantine (NAMENDA) 10 mg tablet, Take 1 tablet (10 mg total) by mouth 2 (two) times a day, Disp: 60 tablet, Rfl: 6    Mirabegron ER (Myrbetriq) 50 MG TB24, Take 1 tablet (50 mg total) by mouth daily, Disp: 90 tablet, Rfl: 0    multivitamin (THERAGRAN) TABS, Take 1 tablet by mouth, Disp: , Rfl:     omeprazole (PriLOSEC) 20 mg delayed release capsule, TAKE 1 CAPSULE BY MOUTH EVERY DAY, Disp: 90 capsule, Rfl: 1    PARoxetine (PAXIL) 20 mg tablet, TAKE 1 TABLET BY MOUTH EVERY DAY, Disp: 90 tablet, Rfl: 1    potassium chloride (MICRO-K) 10 MEQ CR capsule, Take 1 capsule (10 mEq total) by mouth daily, Disp: 30 capsule, Rfl: 0    PROAIR  (90 Base) MCG/ACT inhaler, as needed , Disp: , Rfl:     rivastigmine (EXELON) 9 5 mg/24 hr TD 24 hr patch, APPLY 1 PATCH EVERY DAY, Disp: 90 patch, Rfl: 0    estradiol (ESTRACE) 0 1 mg/g vaginal cream, APPLY SMALL PEA SIZED AMOUNT AROUND URETHRA 3X WEEKLY (Patient not taking: Reported on 1/5/2022), Disp: 42 5 g, Rfl: 1    memantine (NAMENDA TITRATION PACK), Follow package directions  (Patient not taking: Reported on 1/5/2022 ), Disp: 1 tablet, Rfl: 0     Allergies   Allergen Reactions    Ciprofloxacin     Other     Sulfa Antibiotics     Penicillins Rash     Category: Allergy;   --  Pt received unasyn 1 5 mg IV 11-12-18 to 11-15-18    Sulfacetamide Rash     Category: Allergy; Physical Exam:     Vitals:    03/14/22 1200   BP: 144/100   Pulse: 96   Resp: 18   Temp: (!) 97 2 °F (36 2 °C)   SpO2: 95%     Physical Exam  Constitutional:       Appearance: Normal appearance  She is normal weight  HENT:      Head: Normocephalic and atraumatic  Nose: Nose normal  No congestion  Mouth/Throat:      Mouth: Mucous membranes are moist       Pharynx: Oropharynx is clear  Eyes:      Conjunctiva/sclera: Conjunctivae normal       Pupils: Pupils are equal, round, and reactive to light  Cardiovascular:      Rate and Rhythm: Normal rate and regular rhythm  Pulses: Normal pulses  Heart sounds: Normal heart sounds  No murmur heard  Pulmonary:      Effort: Pulmonary effort is normal  No respiratory distress  Breath sounds: Normal breath sounds  No stridor  No wheezing or rhonchi  Chest:      Comments: The bilateral Breast(s) were examined  There was not any sign of an inverted nipple, mass, nipple discharge, skin changes or tenderness  The bilateral  breast(s) were examined in the sitting and supine position  There are not any worrisome skin changes, tenderness, nipple changes, swelling ,bleeding or evidence of mass/s in all four quadrants  Ani survey demonstrated that there is not any evidence of any clinically suspicious axillary, pectoral or supraclavicular lymph nodes  Abdominal:      General: Abdomen is flat  Bowel sounds are normal       Palpations: Abdomen is soft  Musculoskeletal:         General: Normal range of motion  Cervical back: Normal range of motion and neck supple  No rigidity or tenderness  Lymphadenopathy:      Cervical: No cervical adenopathy  Skin:     General: Skin is warm  Coloration: Skin is not jaundiced or pale  Neurological:      General: No focal deficit present  Mental Status: She is alert and oriented to person, place, and time  Mental status is at baseline  Psychiatric:         Mood and Affect: Mood normal          Behavior: Behavior normal          Thought Content: Thought content normal          Judgment: Judgment normal          Results:      Breast, Right, US BX RT BREAST 12:00 8CMFN:  - Invasive mammary carcinoma of no special type, 10 mm in greatest dimension, with 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)  See comment and synoptic report        Discussion/Summary:   We did discuss in detail the newly diagnosed right breast cancer  We have discussed her case in our multidisciplinary tumor board I have discussed with Medical and Radiation oncologist as well  I have recommended her large lumpectomy given her extensive radiated DCIS it is approximately 3 7 cm as per her radiologist her when we discussed in the tumor board  We discussed the potential breast conservation as well as mastectomy  Consensus was breast conservation surgery with sentinel lymph node biopsy  Also follow a genetic testing results  Consent was obtained for right breast ITZEL  directed lumpectomy at 12:00 p m  invasive mammary carcinoma, with dual tracer sentinel lymph node biopsy after explaining the benefit procedure alternatives and possible complications  We did discuss the cosmetic concern after surgery given the specimen can be large lumpectomy  Patient and her daughter had several questions I answered all of them  Advance Care Planning/Advance Directives: Saundra Chavez MD discussed the disease status, and treatment plans with Mau whitmore 03/14/22 and will follow-up with the patient

## 2022-03-14 NOTE — PROGRESS NOTES
Message rec'd from Cesar Miguel, Dr Sayra Chambers's office regarding recommendation for;    __X___ RIGHT ______LEFT      __X___SAVI  placement  Procedure explained to patient by Dr Xochilt Nava, additional questions answered at that time    __X___Verbalized understanding        Blood thinners:  __X___yes _____no  What: ASA 325mg    Date stopped: ___N/A____    All teaching points discussed during office visit, pt with no questions at that time, pt adv to arrive at 0915 for 0930 insertion    Pt given name/# for any further questions/needs

## 2022-03-14 NOTE — PROGRESS NOTES
BREAST CANCER MULTIDISCIPLINARY CASE REVIEW    DATE:  3/14/22      PRESENTING DOCTOR: Dr Beronica Doran      DIAGNOSIS:  R breast IDC with extensive DCIS      Jeanie Jones is a 67 y o  female was who presented at the Breast Multidisciplinary Case Conference today  She had a screening mammogram in February 2022, followed by a diagnostic mammogram and breast ultrasound in March 2022, along with a stereotactic biopsy of the right breast on 3/7/22  She will be seen on 3/14/22 at the [address] [place] [address]  GENETICS: Initiated on 3/10/22  Patient had two sisters with breast cancer    IMAGING REVIEWED:   3/7/22-mammogram diagnostic right w 3d & cad and US breast right limited (diagnostic)      PATHOLOGY REVIEWED:  3/7/22-right breast tissue biopsy      PHYSICIAN RECOMMENDED PLAN:  · Offer wide lumpectomy to clear surgical margins              FOLLOW UP APPOINTMENTS:  3/14/22-Patient to consult with Dr Beronica Doran, Dr Franklin Harmon and Dr Gisela Crews at [facility] in [facility] agreed to plan  The final treatment plan will be left to the discretion of the patient and the treating physician  DISCLAIMERS:  TO THE TREATING PHYSICIAN:  This conference is a meeting of clinicians from various specialty areas who evaluate and discuss patients for whom a multidisciplinary treatment approach is being considered  Please note that the above opinion was a consensus of the conference attendees and is intended only to assist in quality care of the discussed patient  The responsibility for follow up on the input given during the conference, along with any final decisions regarding plan of care, is that of the patient and the patient's provider  Accordingly, appointments have only been recommended based on this information and have NOT been scheduled unless otherwise noted  TO THE PATIENT:  This summary is a brief record of major aspects of your cancer treatment   You may choose to share a copy with any of your doctors or nurses  However, this is not a detailed or comprehensive record of your care        NCCN guidelines were readily available for review at this discussion

## 2022-03-14 NOTE — PROGRESS NOTES
[facility] with pt in the [facility] this afternoon, she is here with her daughter today  She denies any immediate needs or concerns including transportation, insurance, finances, etc    MSW will set up VNA services for pt when appropriate, she will have a lumpectomy with Dr Yareli Figueroa later this month  MSW will check in with pt later this week to address any other needs or concerns that have surfaced since today's clinic appointments, no other needs at this time

## 2022-03-15 ENCOUNTER — DOCUMENTATION (OUTPATIENT)
Dept: HEMATOLOGY ONCOLOGY | Facility: CLINIC | Age: 77
End: 2022-03-15

## 2022-03-18 ENCOUNTER — PATIENT OUTREACH (OUTPATIENT)
Dept: CASE MANAGEMENT | Facility: HOSPITAL | Age: 77
End: 2022-03-18

## 2022-03-18 NOTE — PROGRESS NOTES
MSW f/u with pt by phone today after she was seen in our [facility] earlier this week  She says that she is doing well and has no other questions or concerns that have come up since Monday  She is aware of her upcoming appointments and denies any other needs or concerns at this time  MSW will f/u with pt when she starts her tx, no other needs at this time

## 2022-03-21 ENCOUNTER — TELEPHONE (OUTPATIENT)
Dept: GENETICS | Facility: CLINIC | Age: 77
End: 2022-03-21

## 2022-03-21 NOTE — TELEPHONE ENCOUNTER
I spoke with Elijah Sandifer to review the result of her STAT genetic test     Test: [facility] Breast Cancer STAT Panel (9 genes): SYDNI, BRCA1, BRCA2, CDH1, CHEK2, PALB2, PTEN, STK11    Additional results may take up to 2-3 weeks to complete once test is started  Result:   Negative - No Clinically Significant Variants Detected       Assessment:     Negative   A negative result significantly reduces the likelihood that Elijah Sandifer has a hereditary cancer syndrome related to the high-risk breast cancer genes listed above  This result does not have surgical implications and surgical options should be discussed with her healthcare provider  Additional Testing: The reflex to [facility] Core Cancer Panel (27 additional genes): APC, AXIN2 BARD1, BRIP1, BMPR1A, CDK4, CDKN2A, DICER1, EPCAM, GREM1, HOXB13, MLH1, MSH2, MSH3, MSH6, MUTYH, NBN, NF1, NTHL1, PMS2, POLD1, POLE, RAD51C, RAD51D, RECQL SMAD4, SMARCA4 test is pending  We will contact Elijah Sandifer once results are available  Additional recommendations for surveillance/medical management will be made upon final genetic test result  We will mail a copy of the test result and consult note upon completion of the final result  Paula's surgeon was made aware of this test result

## 2022-03-22 ENCOUNTER — TELEPHONE (OUTPATIENT)
Dept: HEMATOLOGY ONCOLOGY | Facility: CLINIC | Age: 77
End: 2022-03-22

## 2022-03-22 NOTE — TELEPHONE ENCOUNTER
Appointment Confirmation (to confirm pre existing appointments - ONLY)     Appointment with  Dr Peewee Almeida   Appointment date & time  3/25 @ 8:40AM   Location [place]   Patient verbilized Understanding  Yes

## 2022-03-24 ENCOUNTER — HOSPITAL ENCOUNTER (OUTPATIENT)
Dept: ULTRASOUND IMAGING | Facility: CLINIC | Age: 77
Discharge: HOME | End: 2022-03-24
Payer: COMMERCIAL

## 2022-03-24 ENCOUNTER — HOSPITAL ENCOUNTER (OUTPATIENT)
Dept: MAMMOGRAPHY | Facility: CLINIC | Age: 77
Discharge: HOME | End: 2022-03-24

## 2022-03-24 VITALS — DIASTOLIC BLOOD PRESSURE: 78 MMHG | HEART RATE: 66 BPM | SYSTOLIC BLOOD PRESSURE: 134 MMHG

## 2022-03-24 DIAGNOSIS — R92.8 ABNORMAL MAMMOGRAM: ICD-10-CM

## 2022-03-24 LAB — SCAN RESULT: NORMAL

## 2022-03-24 PROCEDURE — 19285 PERQ DEV BREAST 1ST US IMAG: CPT

## 2022-03-24 PROCEDURE — A4648 IMPLANTABLE TISSUE MARKER: HCPCS

## 2022-03-24 RX ORDER — LIDOCAINE HYDROCHLORIDE 10 MG/ML
5 INJECTION, SOLUTION EPIDURAL; INFILTRATION; INTRACAUDAL; PERINEURAL ONCE
Status: COMPLETED | OUTPATIENT
Start: 2022-03-24 | End: 2022-03-24

## 2022-03-24 RX ADMIN — LIDOCAINE HYDROCHLORIDE 5 ML: 10 INJECTION, SOLUTION EPIDURAL; INFILTRATION; INTRACAUDAL; PERINEURAL at 10:20

## 2022-03-24 NOTE — PROGRESS NOTES
Procedure type:    __x___ultrasound guided _____stereotactic    Breast:    _____Left __x___Right    Location: 12 o'clock 8 cmfn    Needle: 16 gauge    # of passes: N/A    Clip: Qamar    Performed by: Dr Leigh Ventura held for 5 minutes by: Ritu Fuel    Steri Strips:    _____yes __x___no    Band aid:    ___x__yes_____no    Tape and guaze:    _____yes ___x__no    Tolerated procedure:    ___x__yes _____no

## 2022-03-24 NOTE — PROGRESS NOTES
Ice pack given:    __x___yes _____no    Discharge instructions signed by patient:    __x___yes _____no    Discharge instructions given to patient:    __x___yes _____no    Discharged via:    __x___ambulatory    _____wheelchair    _____stretcher    Stable on discharge:    __x___yes ____no  Patient is scheduled for surgery on 3/29/2022 with Dr Gaynelle Leventhal  Ok to leave message

## 2022-03-24 NOTE — DISCHARGE INSTR - OTHER ORDERS
POST LARGE CORE BREAST BIOPSY PATIENT INFORMATION      Place an ice pack inside your bra over the top of the dressing every hour for 20 minutes (20 minutes on, 60 minutes off)  Do this until bedtime  Do not shower or bathe until the following morning  After bathing, you may remove the bandaid  You may bathe your breast carefully with the steri-strips in place  Be careful not to loosen them  The steri-strips will fall off in 3-5 days  You may have mild discomfort, and you may have some bruising where the needle entered the skin  This should clear within 5-7 days  If you need medicine for discomfort, take acetaminophen products such as Tylenol  You may also take Advil or Motrin products  DO NOT use Aspirin for the first 24 hours  Do not participate in strenuous activities such as-tennis, aerobics, weight lifting, etc  for 24 hours  Refrain from swimming/soaking for 72 hours  Wearing a bra for sleeping may be more comfortable for the first 24-48 hours after your biopsy  Watch for continued bleeding, pain or fever over 101  If any of these symptoms occur, please contact our breast nurse navigator at the location where your biopsy was performed  During normal business hours (7:30am - 4:00pm) please call the nurse navigator at the site     where your procedure was performed:       [address]: [phone] or      [address]: [phone] or [phone]     [address]: [address] [facility]: [address]: [phone]        After 4pm - please call your physician or go to the nearest Emergency Department location  The final results of your biopsy are usually available within one week

## 2022-03-25 ENCOUNTER — TELEPHONE (OUTPATIENT)
Dept: HEMATOLOGY ONCOLOGY | Facility: CLINIC | Age: 77
End: 2022-03-25

## 2022-03-25 ENCOUNTER — OFFICE VISIT (OUTPATIENT)
Dept: HEMATOLOGY ONCOLOGY | Facility: CLINIC | Age: 77
End: 2022-03-25
Payer: COMMERCIAL

## 2022-03-25 ENCOUNTER — NURSE TRIAGE (OUTPATIENT)
Dept: OTHER | Facility: OTHER | Age: 77
End: 2022-03-25

## 2022-03-25 VITALS
TEMPERATURE: 97.9 F | DIASTOLIC BLOOD PRESSURE: 86 MMHG | SYSTOLIC BLOOD PRESSURE: 132 MMHG | WEIGHT: 164 LBS | HEART RATE: 83 BPM | BODY MASS INDEX: 30.18 KG/M2 | OXYGEN SATURATION: 96 % | HEIGHT: 62 IN | RESPIRATION RATE: 16 BRPM

## 2022-03-25 DIAGNOSIS — Z17.0 MALIGNANT NEOPLASM OF OVERLAPPING SITES OF RIGHT BREAST IN FEMALE, ESTROGEN RECEPTOR POSITIVE (HCC): Primary | ICD-10-CM

## 2022-03-25 DIAGNOSIS — C50.811 MALIGNANT NEOPLASM OF OVERLAPPING SITES OF RIGHT BREAST IN FEMALE, ESTROGEN RECEPTOR POSITIVE (HCC): Primary | ICD-10-CM

## 2022-03-25 DIAGNOSIS — C85.90 NON-HODGKIN'S LYMPHOMA, UNSPECIFIED BODY REGION, UNSPECIFIED NON-HODGKIN LYMPHOMA TYPE (HCC): ICD-10-CM

## 2022-03-25 PROCEDURE — 1036F TOBACCO NON-USER: CPT | Performed by: INTERNAL MEDICINE

## 2022-03-25 PROCEDURE — 99214 OFFICE O/P EST MOD 30 MIN: CPT | Performed by: INTERNAL MEDICINE

## 2022-03-25 PROCEDURE — 3079F DIAST BP 80-89 MM HG: CPT | Performed by: INTERNAL MEDICINE

## 2022-03-25 PROCEDURE — 3075F SYST BP GE 130 - 139MM HG: CPT | Performed by: INTERNAL MEDICINE

## 2022-03-25 PROCEDURE — 1160F RVW MEDS BY RX/DR IN RCRD: CPT | Performed by: INTERNAL MEDICINE

## 2022-03-25 NOTE — PROGRESS NOTES
Hematology/Oncology Progress Note    Date of Service: 3/25/2022    [address] [facility]   [address] [facility] [place] [address]    Hem/Onc Problem List:     Right-sided breast cancer, ER TN positive, HER2 negative    Chief Complaint:    Routine follow-up to discuss workup result    Assessment/Plan:     I personally reviewed the lab results, image studies results and other specialties/physicians consult notes and recommendations  I shared the findings with patient and family, discussed the diagnosis and management plan as below  1  Right breast invasive mammary carcinoma of unknown type  ER and TN positive, HER2 negative  cT1b cN0  Grade 1  MammaPrint showed low risk disease  No benefits of adjuvant chemotherapy  Lumpectomy is scheduled on March 29, 2022  I will see patient in 4 weeks and discuss the adjuvant endocrine therapy Arimidex  Patient will follow-up with Dr Richelle Jenkins to discuss adjuvant radiation therapy after surgery  2  Bone health  DEXA scan in March 2016 showed osteopenia  Patient will need another DEXA scan after surgery  I recommend patient taking calcium 1200 mg, vitamin-D3 1000IU daily supplement and doing exercise for bone health  3  Inherited gene predisposition  INVITAE DIAGNOSTIC TESTING RESULTS: negative   4  Hypothyroidism on levothyroxine  Patient will continue levothyroxine  5  Hypertension on Norvasc  Her blood pressure is fairly well controlled  6  History of non-Hodgkin lymphoma, status post chemotherapy  No evidence of disease  We continue to monitor  7  Follow-up, return to clinic in 2-3 weeks  Follow-up with Dr Pippa Gan: This document was prepared using [employer] Direct technology  If a word or phrase is confusing, or does not make sense, this is likely due to recognition error which was not discovered during the providers review   If you believe an error has occurred, please Contact me through [employer] service for jet? cation  AJCC 8th Edition Cancer Stage :      Cancer Staging  Malignant neoplasm of overlapping sites of right breast in female, estrogen receptor positive ([facility] [address] )  Staging form: Breast, AJCC 8th Edition  - Clinical stage from 3/14/2022: cT1b, cN0, cM0, GX, ER+, CO+, HER2- - Signed by Zena Kendall MD on 3/25/2022  Stage prefix: Initial diagnosis  Histologic grading system: 3 grade system      Hematology/Oncology History:   · March 18, 2016 patient had DEXA scan which showed osteopenia  DEXA scan was done in the [facility]  · February 14, 2022 screening bilateral mammogram:     IMPRESSION:  Additional imaging required  A breast health care nurse from our facility will be contacting the patient regarding the need for additional imaging     ASSESSMENT/BI-RADS CATEGORY:  Left: 2 - Benign  Right: 0 - Incomplete: Needs Additional Imaging Evaluation  Overall: 0 - Incomplete: Needs Additional Imaging Evaluation     RECOMMENDATION:       - Diagnostic mammogram and ultrasound at the current time for the right breast mass and posterior calcifications      · March 7, 2022, diagnostic mammogram of right breast:  RIGHT  A) MASS  Mammo diagnostic right w 3d & cad: There is a mass with spiculated margins seen in the right breast at 12 o'clock in the middle depth  US breast right limited (diagnostic): There is a 9 mm hypoechoic mass with spiculated margins with shadowing seen in the right breast at 11 o'clock in the middle depth, 8 cm from the nipple  There is no evidence of lymphadenopathy      B) CALCIFICATIONS  Mammo diagnostic right w 3d & cad:  There are round calcifications in a grouped distribution seen in the right breast at 10 o'clock in the posterior depth   Despite low morphologic suspicion, a benign representative sampling will likely be necessary pending pathologic results of the ultrasound-guided core biopsy      · March 7, 2022, ultrasound and mammogram guided biopsy of the breast cancer:  Final Diagnosis   A  Breast, Right, US BX RT BREAST 12:00 8CMFN:  - Invasive mammary carcinoma of no special type, 10 mm in greatest dimension, with 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)   See comment and synoptic report      Comment: Immunohistochemistry for SMMHC, calponin, and CK5/6 demonstrate areas of lost and intact myoepithelial layer, in the invasive and in-situ components respectively          Addendum   HORMONE RECEPTOR AND HER2/olga STUDIES     Performed on invasive carcinoma block A1:  Test Description                        Result               Prognostic Interpretation     Estrogen Receptor/ER                90-95%                           Positive  Primary Antibody: SP1  Internal control: Positive              Staining Intensity: Strong  External control: Positive                          Blanca Score*: 7     Progesterone Receptor/PgR       65-75%                           Positive  Primary Antibody: 1E2  Internal control: Positive              Staining Intensity: Strong  External control: Positive                          Blanca Score*: 7        HER2 by IHC                               1+                                  Negative  Primary Antibody:4B5  HER2 by Gia Reza Indicated     Appropriate positive and negative controls were reviewed          · March 14, 2022 that came with discussed in our multidisciplinary breast tumor board recommendation of lumpectomy endocrine therapy and radiation therapy  · March 21, 2022, INVITAE DIAGNOSTIC TESTING RESULTS: negative   · March 24, 2022, MammaPrint showed a low risk disease  History of Present Illiness:   Alden Hernandez is a 67 y o  female with the above-noted HemOnc history who is here  to discuss workup result  Patient has grade 1, ER/AR positive, HER2 negative right-sided breast cancer  cT1b cN0  MammaPrint showed low risk disease  No benefits from adjuvant radiation therapy  INVITAE DIAGNOSTIC TESTING RESULTS: negative  Patient had a DEXA scan in March 2016 which showed osteopenia  Patient is not taking calcium or vitamin-D  Patient already has a surgery scheduled an March 29, 2022  Patient feels fine  No fever or chills  No exertional chest pain, diaphoresis or shortness  of breath  No cough and phlegm, no hemoptysis  Patient denied nausea  and vomiting  No abdominal pain  No diarrhea or constipation  No  symptoms  No headache or blurred vision  No seizure activity  Appetite good  No significant weight loss or weight gain  The patient denies bleeding anywhere  ROS: A 12-point of review of systems is obtained and other than the above is noncontributory  Objective:   VITALS:   /86   Pulse 83   Temp 97 9 °F (36 6 °C)   Resp 16   Ht 5' 2" (1 575 m)   Wt 74 4 kg (164 lb)   SpO2 96%   BMI 30 00 kg/m²     Physical EXAM:  General:  Alert, cooperative, no distress, appears stated age  Head:  Normocephalic, without obvious abnormality, atraumatic  Eyes:  Conjunctivae/corneas clear  No evidence of conjunctivitis     Throat: Lips, mucosa, and tongue normal   No bleeding from mouth  Neck: Supple, symmetrical, trachea midline    Lungs:   Clear to auscultation bilaterally  Respiratory effort easy, nonlabored    Heart:  Regular rate and rhythm, S1, S2 normal, no murmur  Abdomen:   Soft, non-tender,nondistended  Bowel sounds normal  No masses,  No organomegaly  Extremities:  Lymphatics: Extremities normal, no edema  No cervical, axillary or inguinal adenopathy   Skin: No rashes  Neurologic: A&Ox4  No focal neuro deficits       Allergies   Allergen Reactions    Ciprofloxacin Hives    Sulfa Antibiotics Itching    Other     Penicillins Rash     Category: Allergy;   --  Pt received unasyn 1 5 mg IV 11-12-18 to 11-15-18    Sulfacetamide Rash     Category: Allergy;         Past Medical History: Diagnosis Date    Anxiety disorder     Aortic valve disorder     Breast cancer (HCC)     Cancer ([facility] [address] )     breast    Cellulitis of finger of left hand 11/20/2018    Cellulitis of left hand 11/10/2018    Added automatically from request for surgery [id number]    Depression     Disease of thyroid gland     GERD (gastroesophageal reflux disease)     History of gastroesophageal reflux (GERD)     History of kidney cancer 03/06/2014    History of transfusion     Hyperlipidemia 03/06/2014    Hypertension 03/06/2014    Hypothyroidism 03/06/2014    Shortness of breath     TIA (transient ischemic attack)     UTI (urinary tract infection)        Past Surgical History:   Procedure Laterality Date    ESOPHAGOGASTRIC FUNDOPLASTY      Nissen Fundoplication    HERNIA REPAIR      MAMMO STEREOTACTIC BREAST BIOPSY RIGHT (ALL INC) Right 3/7/2022    US BREAST ITZEL  NEEDLE LOC RIGHT Right 3/24/2022    US GUIDED BREAST BIOPSY RIGHT COMPLETE Right 3/7/2022    WRIST SURGERY Left        Family History   Problem Relation Age of Onset    Stroke Father     Leukemia Sister     No Known Problems Brother     Skin cancer Mother     Stroke Maternal Grandmother     No Known Problems Maternal Grandfather     No Known Problems Paternal Grandmother     No Known Problems Paternal Grandfather     Breast cancer Sister     Breast cancer Sister     Kidney cancer Brother     Alcohol abuse Brother     Kidney cancer Brother     Stomach cancer Brother     No Known Problems Daughter     No Known Problems Daughter        Social History     Socioeconomic History    Marital status:       Spouse name: Not on file    Number of children: 2    Years of education: Not on file    Highest education level: Not on file   Occupational History    Not on file   Tobacco Use    Smoking status: Never Smoker    Smokeless tobacco: Never Used   Vaping Use    Vaping Use: Never used   Substance and Sexual Activity    Alcohol use: Yes     Comment: social-rare    Drug use: Never    Sexual activity: Not on file   Other Topics Concern    Not on file   Social History Narrative    Not on file     Social Determinants of Health     Financial Resource Strain: Not on file   Food Insecurity: Not on file   Transportation Needs: Not on file   Physical Activity: Not on file   Stress: Not on file   Social Connections: Not on file   Intimate Partner Violence: Not on file   Housing Stability: Not on file       Current Outpatient Medications   Medication Sig Dispense Refill    amLODIPine (NORVASC) 2 5 mg tablet TAKE 2 TABLETS BY MOUTH DAILY 180 tablet 1    aspirin 325 mg tablet Take 325 mg by mouth daily       cholestyramine (QUESTRAN) 4 g packet TAKE 1 PACKET (4 G TOTAL) BY MOUTH DAILY 90 packet 1    diphenhydrAMINE (BENADRYL) 25 mg tablet Take 25 mg by mouth daily 1/2 tablet with Paxil      fluticasone (FLONASE) 50 mcg/act nasal spray SPRAY 1 SPRAY INTO EACH NOSTRIL EVERY DAY (Patient taking differently: as needed ) 16 mL 1    levothyroxine 125 mcg tablet TAKE 1 TABLET BY MOUTH EVERY DAY 90 tablet 1    meclizine (ANTIVERT) 25 mg tablet TAKE 1 TABLET BY MOUTH THREE TIMES A DAY AS NEEDED FOR DIZZINESS 50 tablet 1    memantine (NAMENDA) 10 mg tablet Take 1 tablet (10 mg total) by mouth 2 (two) times a day 60 tablet 6    Mirabegron ER (Myrbetriq) 50 MG TB24 Take 1 tablet (50 mg total) by mouth daily 90 tablet 0    multivitamin (THERAGRAN) TABS Take 1 tablet by mouth      omeprazole (PriLOSEC) 20 mg delayed release capsule TAKE 1 CAPSULE BY MOUTH EVERY DAY 90 capsule 1    oxyCODONE-acetaminophen (Percocet) 5-325 mg per tablet Take 1 tablet by mouth every 6 (six) hours as needed for moderate pain Max Daily Amount: 4 tablets 20 tablet 0    PARoxetine (PAXIL) 20 mg tablet TAKE 1 TABLET BY MOUTH EVERY DAY 90 tablet 1    potassium chloride (MICRO-K) 10 MEQ CR capsule Take 1 capsule (10 mEq total) by mouth daily 30 capsule 0    PROAIR  (90 Base) MCG/ACT inhaler as needed       rivastigmine (EXELON) 9 5 mg/24 hr TD 24 hr patch APPLY 1 PATCH EVERY DAY 90 patch 0     No current facility-administered medications for this visit  (Not in a hospital admission)      DATA REVIEW:    Pathology Result:    Final Diagnosis   Date Value Ref Range Status   03/07/2022   Final    A  Breast, Right, US BX RT BREAST 12:00 8CMFN:  - Invasive mammary carcinoma of no special type, 10 mm in greatest dimension, with 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)  See comment and synoptic report  Comment: Immunohistochemistry for SMMHC, calponin, and CK5/6 demonstrate areas of lost and intact myoepithelial layer, in the invasive and in-situ components respectively  Results reported to Jackie Diaz at the [address] [place] [address] on 3/9/22 at 10:48am      03/07/2022   Final    A  Breast, Right, Stereo bx right breast 10oclock, 4 cores with calcs:  - Fibroadenoma with focal coarse calcifications  - Negative for atypia and in-situ or invasive carcinoma  B  Breast, Right, Stereo bx right breast 10oclock, 1 core without calcs:  - Fibroadenoma with focal coarse calcifications  - Negative for atypia and in-situ or invasive carcinoma  Image Results: They are reviewed and documented in Hematology/Oncology history    US breast liyah  right, Mammo post biopsy right  Narrative: DIAGNOSIS: Abnormal mammogram     INDICATION: Jaime Field is a 67 y o  female presenting for   ultrasound-guided LIYAH  receptor placement for right breast   malignancy  FINDINGS:   RIGHT  A) MASS at 12 o'clock, 8 cm from the nipple (ribbon clip): Informed consent was obtained by myself, the performing physician  The   right breast was marked  Limited ultrasound was performed  The procedure   site was marked  A time-out was performed  The skin was prepped in the   usual fashion  Anesthesia was administered using 5 mL of lidocaine 1%      The LIYAH  was placed into the mass under direct ultrasound guidance  The ITZEL  probe was held over the procedures site and audible   clicking was heard, confirming placement/functioning of the reflector  The patient tolerated the procedure well  There were no immediate   complications  Post procedure mammogram:  Right 2D CC and ML views were obtained  The   ITZEL  reflector and ribbon clip project within the mass  Impression:   Successful ultrasound-guided ITZEL  placement for the   mass in the right breast at 12 o'clock, 8 cm from the nipple (also marked   with a ribbon clip)  RECOMMENDATION:  -Continued surgical management  Workstation ID: [id number]        LABS:  Lab data are reviewed and documented in HemOnc history  No results found for this or any previous visit (from the past 48 hour(s))            Scott Redmond MD  3/25/2022, 9:47 AM

## 2022-03-25 NOTE — TELEPHONE ENCOUNTER
Patient's daughter would like a call with directions on how to get to the office  She was provided with the address, but would like more specific directions       Reason for Disposition   [1] Caller requesting NON-URGENT health information AND [2] PCP's office is the best resource    Protocols used: INFORMATION ONLY CALL - NO TRIAGE-ADULT-

## 2022-03-25 NOTE — TELEPHONE ENCOUNTER
Called patient's daughter Jerrod Escalera, regarding patient's appointment  Jerrod Escalera called in earlier to confirm address to appointment  Jerrod Escalera and patient were to be very late to appointment but we never rescheduled  Viraj Smith back to offer patient a later appointment with Dr Kaden Francis

## 2022-03-25 NOTE — TELEPHONE ENCOUNTER
Called and left message on patient and daughters telephone to give directions to get to the office for visit 3/25/22

## 2022-03-25 NOTE — PRE-PROCEDURE INSTRUCTIONS
Pre-Surgery Instructions:   Medication Instructions    amLODIPine (NORVASC) 2 5 mg tablet Take this medication day of surgery if normally taken in the morning   aspirin 325 mg tablet Hold from now till after procedure unless prescribed by a Physician   cholestyramine (QUESTRAN) 4 g packet Hold day of surgery      diphenhydrAMINE (BENADRYL) 25 mg tablet Hold day of surgery      fluticasone (FLONASE) 50 mcg/act nasal spray May use day of surgery if needed      levothyroxine 125 mcg tablet Take this medication day of surgery if normally taken in the morning   meclizine (ANTIVERT) 25 mg tablet Hold day of surgery      memantine (NAMENDA) 10 mg tablet Take this medication day of surgery if normally taken in the morning   Mirabegron ER (Myrbetriq) 50 MG TB24 Hold day of surgery      multivitamin (THERAGRAN) TABS Hold from now till after procedure unless prescribed by a Physician   omeprazole (PriLOSEC) 20 mg delayed release capsule Take this medication day of surgery if normally taken in the morning   PARoxetine (PAXIL) 20 mg tablet Take this medication day of surgery if normally taken in the morning   potassium chloride (MICRO-K) 10 MEQ CR capsule Hold day of surgery      PROAIR  (90 Base) MCG/ACT inhaler Use day of surgery if needed and bring with you      rivastigmine (EXELON) 9 5 mg/24 hr TD 24 hr patch Hold day of surgery      My Surgical Experience    The following information was developed to assist you to prepare for your operation  What do I need to do before coming to the hospital?   Arrange for a responsible person to drive you to and from the hospital    Arrange care for your children at home  Children are not allowed in the recovery areas of the hospital   Plan to wear clothing that is easy to put on and take off   If you are having shoulder surgery, wear a shirt that buttons or zippers in the front  Bathing  o Shower the evening before and the morning of your surgery with an antibacterial soap  Please refer to the Pre Op Showering Instructions for Surgery Patients Sheet   o Remove nail polish and all body piercing jewelry  o Do not shave any body part for at least 24 hours before surgery-this includes face, arms, legs and upper body  Food  o Nothing to eat or drink after midnight the night before your surgery  This includes candy and chewing gum  o Exception: If your surgery is after 12:00pm (noon), you may have clear liquids such as 7-Up®, ginger ale, apple or cranberry juice, Jell-O®, water, or clear broth until 8:00 am  o Do not drink milk or juice with pulp on the morning before surgery  o Do not drink alcohol 24 hours before surgery  Medicine  o Follow instructions you received from your surgeon about which medicines you may take on the day of surgery  o If instructed to take medicine on the morning of surgery, take pills with just a small sip of water  Call your prescribing doctor for specific infroamtion on what to do if you take insulin    What should I bring to the hospital?    Bring:  Eunice Lomeli or a walker, if you have them, for foot or knee surgery   A list of the daily medicines, vitamins, minerals, herbals and nutritional supplements you take  Include the dosages of medicines and the time you take them each day   Glasses, dentures or hearing aids   Minimal clothing; you will be wearing hospital sleepwear   Photo ID; required to verify your identity   If you have a Living Will or Power of , bring a copy of the documents   If you have an ostomy, bring an extra pouch and any supplies you use    Do not bring   Medicines or inhalers   Money, valuables or jewelry    What other information should I know about the day of surgery?  Notify your surgeons if you develop a cold, sore throat, cough, fever, rash or any other illness     Report to the Ambulatory Surgical/Same Day Surgery Unit   You will be instructed to stop at Registration only if you have not been pre-registered   Inform your  fi they do not stay that they will be asked by the staff to leave a phone number where they can be reached   Be available to be reached before surgery  In the event the operating room schedule changes, you may be asked to come in earlier or later than expected    *It is important to tell your doctor and others involved in your health care if you are taking or have been taking any non-prescription drugs, vitamins, minerals, herbals or other nutritional supplements   Any of these may interact with some food or medicines and cause a reaction

## 2022-03-28 ENCOUNTER — APPOINTMENT (OUTPATIENT)
Dept: PREADMISSION TESTING | Facility: HOSPITAL | Age: 77
End: 2022-03-28
Payer: COMMERCIAL

## 2022-03-28 ENCOUNTER — ANESTHESIA EVENT (OUTPATIENT)
Dept: PERIOP | Facility: HOSPITAL | Age: 77
End: 2022-03-28
Payer: COMMERCIAL

## 2022-03-28 ENCOUNTER — OFFICE VISIT (OUTPATIENT)
Dept: LAB | Facility: HOSPITAL | Age: 77
End: 2022-03-28
Payer: COMMERCIAL

## 2022-03-28 ENCOUNTER — APPOINTMENT (OUTPATIENT)
Dept: LAB | Facility: HOSPITAL | Age: 77
End: 2022-03-28
Payer: COMMERCIAL

## 2022-03-28 DIAGNOSIS — C50.811 MALIGNANT NEOPLASM OF OVERLAPPING SITES OF RIGHT FEMALE BREAST, UNSPECIFIED ESTROGEN RECEPTOR STATUS (HCC): ICD-10-CM

## 2022-03-28 DIAGNOSIS — C50.811 MALIGNANT NEOPLASM OF OVERLAPPING SITES OF RIGHT BREAST IN FEMALE, ESTROGEN RECEPTOR POSITIVE (HCC): ICD-10-CM

## 2022-03-28 DIAGNOSIS — Z17.0 MALIGNANT NEOPLASM OF OVERLAPPING SITES OF RIGHT BREAST IN FEMALE, ESTROGEN RECEPTOR POSITIVE (HCC): ICD-10-CM

## 2022-03-28 LAB
ALBUMIN SERPL BCP-MCNC: 3.4 G/DL (ref 3.5–5)
ALP SERPL-CCNC: 73 U/L (ref 46–116)
ALT SERPL W P-5'-P-CCNC: 16 U/L (ref 12–78)
ANION GAP SERPL CALCULATED.3IONS-SCNC: 9 MMOL/L (ref 4–13)
AST SERPL W P-5'-P-CCNC: 15 U/L (ref 5–45)
ATRIAL RATE: 69 BPM
BASOPHILS # BLD AUTO: 0.05 THOUSANDS/ΜL (ref 0–0.1)
BASOPHILS NFR BLD AUTO: 1 % (ref 0–1)
BILIRUB SERPL-MCNC: 0.49 MG/DL (ref 0.2–1)
BUN SERPL-MCNC: 15 MG/DL (ref 5–25)
CALCIUM ALBUM COR SERPL-MCNC: 9.4 MG/DL (ref 8.3–10.1)
CALCIUM SERPL-MCNC: 8.9 MG/DL (ref 8.3–10.1)
CHLORIDE SERPL-SCNC: 105 MMOL/L (ref 100–108)
CO2 SERPL-SCNC: 27 MMOL/L (ref 21–32)
CREAT SERPL-MCNC: 0.85 MG/DL (ref 0.6–1.3)
EOSINOPHIL # BLD AUTO: 0.13 THOUSAND/ΜL (ref 0–0.61)
EOSINOPHIL NFR BLD AUTO: 2 % (ref 0–6)
ERYTHROCYTE [DISTWIDTH] IN BLOOD BY AUTOMATED COUNT: 12.8 % (ref 11.6–15.1)
GFR SERPL CREATININE-BSD FRML MDRD: 66 ML/MIN/1.73SQ M
GLUCOSE SERPL-MCNC: 122 MG/DL (ref 65–140)
HCT VFR BLD AUTO: 39.3 % (ref 34.8–46.1)
HGB BLD-MCNC: 13.2 G/DL (ref 11.5–15.4)
IMM GRANULOCYTES # BLD AUTO: 0.08 THOUSAND/UL (ref 0–0.2)
IMM GRANULOCYTES NFR BLD AUTO: 2 % (ref 0–2)
LYMPHOCYTES # BLD AUTO: 1.25 THOUSANDS/ΜL (ref 0.6–4.47)
LYMPHOCYTES NFR BLD AUTO: 23 % (ref 14–44)
MCH RBC QN AUTO: 36.1 PG (ref 26.8–34.3)
MCHC RBC AUTO-ENTMCNC: 33.6 G/DL (ref 31.4–37.4)
MCV RBC AUTO: 107 FL (ref 82–98)
MONOCYTES # BLD AUTO: 0.55 THOUSAND/ΜL (ref 0.17–1.22)
MONOCYTES NFR BLD AUTO: 10 % (ref 4–12)
NEUTROPHILS # BLD AUTO: 3.41 THOUSANDS/ΜL (ref 1.85–7.62)
NEUTS SEG NFR BLD AUTO: 62 % (ref 43–75)
NRBC BLD AUTO-RTO: 0 /100 WBCS
P AXIS: 36 DEGREES
PLATELET # BLD AUTO: 246 THOUSANDS/UL (ref 149–390)
PMV BLD AUTO: 11.7 FL (ref 8.9–12.7)
POTASSIUM SERPL-SCNC: 3.4 MMOL/L (ref 3.5–5.3)
PR INTERVAL: 138 MS
PROT SERPL-MCNC: 7 G/DL (ref 6.4–8.2)
QRS AXIS: 4 DEGREES
QRSD INTERVAL: 78 MS
QT INTERVAL: 396 MS
QTC INTERVAL: 424 MS
RBC # BLD AUTO: 3.66 MILLION/UL (ref 3.81–5.12)
SODIUM SERPL-SCNC: 141 MMOL/L (ref 136–145)
T WAVE AXIS: 5 DEGREES
VENTRICULAR RATE: 69 BPM
WBC # BLD AUTO: 5.47 THOUSAND/UL (ref 4.31–10.16)

## 2022-03-28 PROCEDURE — 85025 COMPLETE CBC W/AUTO DIFF WBC: CPT

## 2022-03-28 PROCEDURE — 93005 ELECTROCARDIOGRAM TRACING: CPT

## 2022-03-28 PROCEDURE — 93010 ELECTROCARDIOGRAM REPORT: CPT | Performed by: INTERNAL MEDICINE

## 2022-03-28 PROCEDURE — 36415 COLL VENOUS BLD VENIPUNCTURE: CPT

## 2022-03-28 PROCEDURE — 80053 COMPREHEN METABOLIC PANEL: CPT

## 2022-03-29 ENCOUNTER — APPOINTMENT (OUTPATIENT)
Dept: MAMMOGRAPHY | Facility: CLINIC | Age: 77
End: 2022-03-29
Payer: COMMERCIAL

## 2022-03-29 ENCOUNTER — ANESTHESIA (OUTPATIENT)
Dept: PERIOP | Facility: HOSPITAL | Age: 77
End: 2022-03-29
Payer: COMMERCIAL

## 2022-03-29 ENCOUNTER — RA CDI HCC (OUTPATIENT)
Dept: OTHER | Facility: HOSPITAL | Age: 77
End: 2022-03-29

## 2022-03-29 ENCOUNTER — HOSPITAL ENCOUNTER (OUTPATIENT)
Dept: NUCLEAR MEDICINE | Facility: HOSPITAL | Age: 77
Discharge: HOME | End: 2022-03-29
Attending: SURGERY
Payer: COMMERCIAL

## 2022-03-29 ENCOUNTER — HOSPITAL ENCOUNTER (OUTPATIENT)
Facility: HOSPITAL | Age: 77
Setting detail: OUTPATIENT SURGERY
Discharge: HOME | End: 2022-03-29
Attending: SURGERY | Admitting: SURGERY
Payer: COMMERCIAL

## 2022-03-29 VITALS
OXYGEN SATURATION: 95 % | DIASTOLIC BLOOD PRESSURE: 80 MMHG | HEART RATE: 81 BPM | SYSTOLIC BLOOD PRESSURE: 155 MMHG | WEIGHT: 163.36 LBS | HEIGHT: 62 IN | RESPIRATION RATE: 16 BRPM | BODY MASS INDEX: 30.06 KG/M2 | TEMPERATURE: 98.1 F

## 2022-03-29 DIAGNOSIS — C50.811 MALIGNANT NEOPLASM OF OVERLAPPING SITES OF RIGHT FEMALE BREAST, UNSPECIFIED ESTROGEN RECEPTOR STATUS (HCC): ICD-10-CM

## 2022-03-29 DIAGNOSIS — N63.0 BREAST LUMP OR MASS: ICD-10-CM

## 2022-03-29 PROCEDURE — 19301 PARTIAL MASTECTOMY: CPT | Performed by: SURGERY

## 2022-03-29 PROCEDURE — 76098 X-RAY EXAM SURGICAL SPECIMEN: CPT | Performed by: SURGERY

## 2022-03-29 PROCEDURE — 38792 RA TRACER ID OF SENTINL NODE: CPT | Performed by: SURGERY

## 2022-03-29 PROCEDURE — 19301 PARTIAL MASTECTOMY: CPT | Performed by: PHYSICIAN ASSISTANT

## 2022-03-29 PROCEDURE — NC001 PR NO CHARGE: Performed by: PHYSICIAN ASSISTANT

## 2022-03-29 PROCEDURE — 88307 TISSUE EXAM BY PATHOLOGIST: CPT | Performed by: PATHOLOGY

## 2022-03-29 PROCEDURE — 38792 RA TRACER ID OF SENTINL NODE: CPT

## 2022-03-29 PROCEDURE — 38900 IO MAP OF SENT LYMPH NODE: CPT | Performed by: SURGERY

## 2022-03-29 PROCEDURE — 38525 BIOPSY/REMOVAL LYMPH NODES: CPT | Performed by: SURGERY

## 2022-03-29 PROCEDURE — A9541 TC99M SULFUR COLLOID: HCPCS

## 2022-03-29 RX ORDER — ONDANSETRON 2 MG/ML
4 INJECTION INTRAMUSCULAR; INTRAVENOUS ONCE AS NEEDED
Status: DISCONTINUED | OUTPATIENT
Start: 2022-03-29 | End: 2022-03-29 | Stop reason: HOSPADM

## 2022-03-29 RX ORDER — LIDOCAINE HYDROCHLORIDE 10 MG/ML
INJECTION, SOLUTION EPIDURAL; INFILTRATION; INTRACAUDAL; PERINEURAL AS NEEDED
Status: DISCONTINUED | OUTPATIENT
Start: 2022-03-29 | End: 2022-03-29

## 2022-03-29 RX ORDER — SODIUM CHLORIDE, SODIUM LACTATE, POTASSIUM CHLORIDE, CALCIUM CHLORIDE 600; 310; 30; 20 MG/100ML; MG/100ML; MG/100ML; MG/100ML
100 INJECTION, SOLUTION INTRAVENOUS CONTINUOUS
Status: DISCONTINUED | OUTPATIENT
Start: 2022-03-29 | End: 2022-03-29 | Stop reason: HOSPADM

## 2022-03-29 RX ORDER — DEXAMETHASONE SODIUM PHOSPHATE 4 MG/ML
INJECTION, SOLUTION INTRA-ARTICULAR; INTRALESIONAL; INTRAMUSCULAR; INTRAVENOUS; SOFT TISSUE AS NEEDED
Status: DISCONTINUED | OUTPATIENT
Start: 2022-03-29 | End: 2022-03-29

## 2022-03-29 RX ORDER — FENTANYL CITRATE 50 UG/ML
INJECTION, SOLUTION INTRAMUSCULAR; INTRAVENOUS AS NEEDED
Status: DISCONTINUED | OUTPATIENT
Start: 2022-03-29 | End: 2022-03-29

## 2022-03-29 RX ORDER — MAGNESIUM HYDROXIDE 1200 MG/15ML
LIQUID ORAL AS NEEDED
Status: DISCONTINUED | OUTPATIENT
Start: 2022-03-29 | End: 2022-03-29 | Stop reason: HOSPADM

## 2022-03-29 RX ORDER — SODIUM CHLORIDE 9 MG/ML
125 INJECTION, SOLUTION INTRAVENOUS CONTINUOUS
Status: DISCONTINUED | OUTPATIENT
Start: 2022-03-29 | End: 2022-03-29 | Stop reason: HOSPADM

## 2022-03-29 RX ORDER — PROPOFOL 10 MG/ML
INJECTION, EMULSION INTRAVENOUS AS NEEDED
Status: DISCONTINUED | OUTPATIENT
Start: 2022-03-29 | End: 2022-03-29

## 2022-03-29 RX ORDER — FENTANYL CITRATE/PF 50 MCG/ML
50 SYRINGE (ML) INJECTION
Status: DISCONTINUED | OUTPATIENT
Start: 2022-03-29 | End: 2022-03-29 | Stop reason: HOSPADM

## 2022-03-29 RX ORDER — ONDANSETRON 2 MG/ML
INJECTION INTRAMUSCULAR; INTRAVENOUS AS NEEDED
Status: DISCONTINUED | OUTPATIENT
Start: 2022-03-29 | End: 2022-03-29

## 2022-03-29 RX ORDER — ISOSULFAN BLUE 50 MG/5ML
INJECTION, SOLUTION SUBCUTANEOUS AS NEEDED
Status: DISCONTINUED | OUTPATIENT
Start: 2022-03-29 | End: 2022-03-29 | Stop reason: HOSPADM

## 2022-03-29 RX ORDER — GLYCOPYRROLATE 0.2 MG/ML
INJECTION INTRAMUSCULAR; INTRAVENOUS AS NEEDED
Status: DISCONTINUED | OUTPATIENT
Start: 2022-03-29 | End: 2022-03-29

## 2022-03-29 RX ORDER — CLINDAMYCIN PHOSPHATE 900 MG/50ML
900 INJECTION INTRAVENOUS ONCE
Status: COMPLETED | OUTPATIENT
Start: 2022-03-29 | End: 2022-03-29

## 2022-03-29 RX ORDER — EPHEDRINE SULFATE 50 MG/ML
INJECTION INTRAVENOUS AS NEEDED
Status: DISCONTINUED | OUTPATIENT
Start: 2022-03-29 | End: 2022-03-29

## 2022-03-29 RX ADMIN — PROPOFOL 50 MG: 10 INJECTION, EMULSION INTRAVENOUS at 09:19

## 2022-03-29 RX ADMIN — LIDOCAINE HYDROCHLORIDE 50 MG: 10 INJECTION, SOLUTION EPIDURAL; INFILTRATION; INTRACAUDAL; PERINEURAL at 08:08

## 2022-03-29 RX ADMIN — SODIUM CHLORIDE: 0.9 INJECTION, SOLUTION INTRAVENOUS at 10:13

## 2022-03-29 RX ADMIN — EPHEDRINE SULFATE 10 MG: 50 INJECTION, SOLUTION INTRAVENOUS at 08:14

## 2022-03-29 RX ADMIN — FENTANYL CITRATE 25 MCG: 50 INJECTION, SOLUTION INTRAMUSCULAR; INTRAVENOUS at 08:55

## 2022-03-29 RX ADMIN — FENTANYL CITRATE 25 MCG: 50 INJECTION, SOLUTION INTRAMUSCULAR; INTRAVENOUS at 09:10

## 2022-03-29 RX ADMIN — FENTANYL CITRATE 50 MCG: 50 INJECTION, SOLUTION INTRAMUSCULAR; INTRAVENOUS at 08:10

## 2022-03-29 RX ADMIN — FENTANYL CITRATE 25 MCG: 50 INJECTION, SOLUTION INTRAMUSCULAR; INTRAVENOUS at 09:43

## 2022-03-29 RX ADMIN — DEXAMETHASONE SODIUM PHOSPHATE 4 MG: 4 INJECTION, SOLUTION INTRAMUSCULAR; INTRAVENOUS at 08:22

## 2022-03-29 RX ADMIN — ONDANSETRON 4 MG: 2 INJECTION INTRAMUSCULAR; INTRAVENOUS at 08:22

## 2022-03-29 RX ADMIN — GLYCOPYRROLATE 0.2 MG: 0.2 INJECTION, SOLUTION INTRAMUSCULAR; INTRAVENOUS at 08:13

## 2022-03-29 RX ADMIN — FENTANYL CITRATE 50 MCG: 50 INJECTION, SOLUTION INTRAMUSCULAR; INTRAVENOUS at 09:19

## 2022-03-29 RX ADMIN — SODIUM CHLORIDE 125 ML/HR: 0.9 INJECTION, SOLUTION INTRAVENOUS at 07:25

## 2022-03-29 RX ADMIN — PROPOFOL 150 MG: 10 INJECTION, EMULSION INTRAVENOUS at 08:08

## 2022-03-29 RX ADMIN — CLINDAMYCIN PHOSPHATE 900 MG: 900 INJECTION, SOLUTION INTRAVENOUS at 07:50

## 2022-03-29 RX ADMIN — PROPOFOL 50 MG: 10 INJECTION, EMULSION INTRAVENOUS at 08:10

## 2022-03-29 RX ADMIN — EPHEDRINE SULFATE 10 MG: 50 INJECTION, SOLUTION INTRAVENOUS at 08:16

## 2022-03-29 NOTE — ANESTHESIA POSTPROCEDURE EVALUATION
Post-Op Assessment Note    CV Status:  Stable  Pain Score: 0    Pain management: adequate     Mental Status:  Alert and awake   Hydration Status:  Stable   PONV Controlled:  None   Airway Patency:  Patent       Staff: CRNA         No complications documented      BP   181/81   Temp   97 4   Pulse  88   Resp   18   SpO2   100

## 2022-03-29 NOTE — ANESTHESIA PREPROCEDURE EVALUATION
Procedure:  ITZEL  DIRECTED LUMPECTOMY (Right Breast)  LYMPHATIC MAPPING WITH BLUE AND RADIOACTIVE DYES SENTINEL LYMPH NODE BIOPSY, INJECTION IN OR AT 0800 BY DR CASSIDY (Right Axilla)  DISSECTION LYMPH NODE AXILLARY (Right Axilla)    Relevant Problems   ANESTHESIA (within normal limits)      CARDIO   (+) Chest pain   (+) Essential hypertension   (+) Mixed hyperlipidemia      ENDO   (+) Hypothyroid      GI/HEPATIC   (+) Gastroesophageal reflux disease without esophagitis      GYN   (+) Malignant neoplasm of overlapping sites of right breast in female, estrogen receptor positive (HCC)      HEMATOLOGY   (+) Immunocompromised (HCC)   (+) Lymphoma (HCC)      NEURO/PSYCH   (+) Depression   (+) Late onset Alzheimer's disease without behavioral disturbance (HCC)   (+) Paresthesias      PULMONARY (within normal limits)        Physical Exam    Airway    Mallampati score: II  TM Distance: >3 FB  Neck ROM: full     Dental   No notable dental hx     Cardiovascular  Rhythm: regular, Rate: normal,     Pulmonary  Breath sounds clear to auscultation,     Other Findings        Anesthesia Plan  ASA Score- 3     Anesthesia Type- general with ASA Monitors  Additional Monitors:   Airway Plan: LMA  Plan Factors-Exercise tolerance (METS): >4 METS  Chart reviewed  EKG reviewed  Existing labs reviewed  Patient summary reviewed  Patient is not a current smoker  Induction- intravenous  Postoperative Plan- Plan for postoperative opioid use  Planned trial extubation    Informed Consent- Anesthetic plan and risks discussed with patient  I personally reviewed this patient with the CRNA  Discussed and agreed on the Anesthesia Plan with the CRNA  Gigi Higuera

## 2022-03-29 NOTE — INTERVAL H&P NOTE
H&P reviewed  After examining the patient I find no changes in the patients condition since the H&P had been written      Vitals:    03/29/22 0715   BP: 140/71   Pulse: 89   Resp: 16   SpO2: 97%

## 2022-03-29 NOTE — OP NOTE
OPERATIVE REPORT  PATIENT NAME: Dez Cuellar    :  1945  MRN: [id number]  Pt Location: [facility] OR ROOM 01    SURGERY DATE: 3/29/2022    Surgeon(s) and Role:     * Lisa Espinal MD - Primary     * Jayne Gallegos PA-C - Assisting    Preop Diagnosis:  Malignant neoplasm of overlapping sites of right female breast, unspecified estrogen receptor status ([facility] [address] ) [C50 811]    Post-Op Diagnosis Codes:     * Malignant neoplasm of overlapping sites of right female breast, unspecified estrogen receptor status ([facility] [address] ) [C50 811]    Procedure(s) (LRB):  ITZEL  DIRECTED LUMPECTOMY (Right)  LYMPHATIC MAPPING WITH BLUE AND RADIOACTIVE DYES SENTINEL LYMPH NODE BIOPSY, INJECTION IN OR AT 0800 BY DR CASSIDY (Right)  DISSECTION LYMPH NODE AXILLARY (Right)    Specimen(s):  ID Type Source Tests Collected by Time Destination   1 : Right axillary sentinel lymph node #1 Tissue Axillary lymph node TISSUE EXAM Denton Ro MD 3/29/2022 0273    2 : Additional anterior margin, inked most distal margin (green) Tissue Breast, Right TISSUE [address], [place] 3/29/2022 0945    3 : additional inferior margin,  inked most distal margin (blue) Tissue Breast, Right TISSUE [address], [place] 3/29/2022 7955    4 : additional lateral margin, inked most distal margin (red) Tissue Breast, Right TISSUE [address], [place] 3/29/2022 2954    5 : additional medial margin, inked most distal margin (yellow) Tissue Breast, Right TISSUE [address], [place] 3/29/2022 9936    6 : additional posterior margin, inked most distal margin (black) Tissue Breast, Right Parth Vinte E Lesly De Setembro MD Sukhdev 3/29/2022 0551    7 : additional superior margin, inked most distal margin (orange) Tissue Breast, Right TISSUE [address], [place] 3/29/2022 7444    8 : Right breast itzel  directed lumpectomy marked per margin by protocol Tissue Breast, Right TISSUE EXAM [place] NAILS Janneth Marina MD 3/29/2022 2345    9 : right axillary adipose tissue Tissue Axillary TISSUE EXAM Adi Yap MD 3/29/2022 1000        Estimated Blood Loss:   Minimal    Drains:  * No LDAs found *    Anesthesia Type:   General    Operative Indications:  Malignant neoplasm of overlapping sites of right female breast, unspecified estrogen receptor status ([facility] [address] ) [C50 811]      Operative Findings:  ITZEL clip and both biopsy clips are in the specimen  Complications:   None    Procedure and Technique:    The prior post biopsy images were reviewed prior to the procedure  Lumpectomy  The patient was brought to the operation room and placed under general anesthesia  Attention was paid to ensure appropriate padding and correct positioning  The ITZEL  detector was then used to localize the [employer]  The breast skin was marked in this area  The right  breast was injected intradermally with technetium sulfur colloid in the mitul-areolar region and 0 3cc of methylene blue was injected intradermally over the tumor  These areas were vigorously massaged to facilitate identification of the sentinel lymph node (SLN)  The breast and axillary region were then prepped and draped in a sterile fashion  I initiated a time-out, identifying the patient, the correct side and the above procedure  All parties agreed with the time out  The planned incision was then injected with 0 25% Marcaine for local anesthesia  The incision was sharply incised encompassing the blue dye  The ITZEL detector was used to guide the dissection  Superior, inferior, medial and lateral planes were developed around the ITZEL deflector and the specimen truncated beyond the ITZEL deflector  The specimen was then inked for orientation purposes  A specimen radiograph was taken in two dimensions  6 Additional margins were removed to optimize complete removal of the tumor  The additional margins were inked on the most distal area  All specimens were sent to pathology for permanent analysis  Superficial bleeding controlled with electrocautery and the wound was extensively irrigated  The wound was closed with two layers, an inner layer of 3-0 vicryl and a subcuticular layer of 4-0 monocryl  prineo were applied  SLN Biopsy  The previously marked location of the sentinel lymph node was injected with 0 25% Marcaine  A curivilinear incision was made and dissection was taken down into the axillar proper with electrocautery  The mary counter was used to help localize the SLN  The sentinel lymph node was identified and removed with electrocautery  SLN Findings  The SLN was blue and radioactive  The lymph node was grossly normal and sent for permanent pathologic analysis  After sentinel lymph node removed there is no palpable, blue dyed or radioactive lymph node in the right axillary fossa  This wound also was irrigated extensively: superficial bleeding was controlled with electrocautery and then closed in 2 layers - an inner layer of 3-0 Vicryl and a subcuticular layer of 4-0 Monocryl  Prineo were applied     I was present for the entire procedure and A physician assistant was required during the procedure for retraction tissue handling,dissection and suturing    Patient Disposition:  PACU       SIGNATURE: Shawna Lloyd MD  DATE: March 29, 2022  TIME: 10:00 AM

## 2022-03-29 NOTE — PROGRESS NOTES
[id number]  [facility]  coding opportunities          Chart Reviewed number of suggestions sent to Provider: 1     Patients Insurance     Medicare Insurance: Brandon Benson

## 2022-04-01 ENCOUNTER — TELEPHONE (OUTPATIENT)
Dept: HEMATOLOGY ONCOLOGY | Facility: CLINIC | Age: 77
End: 2022-04-01

## 2022-04-01 DIAGNOSIS — R21 RASH: Primary | ICD-10-CM

## 2022-04-01 RX ORDER — PREDNISONE 10 MG/1
10 TABLET ORAL DAILY
Qty: 21 TABLET | Refills: 0 | Status: SHIPPED | OUTPATIENT
Start: 2022-04-01 | End: 2022-04-19

## 2022-04-01 NOTE — TELEPHONE ENCOUNTER
CALL RETURN FORM   Reason for patient call? Daughter, states patient is not feeling well   Patient's primary oncologist? Dr Mary Sanchez   Name of person the patient was calling for? Dr Mary Sanchez   Any additional information to add, if applicable? Patient has a rash and fever   Informed patient that the message will be forwarded to the team and someone will get back to them as soon as possible    Did you relay this information to the patient?   yes [phone]

## 2022-04-04 ENCOUNTER — OFFICE VISIT (OUTPATIENT)
Dept: SURGICAL ONCOLOGY | Facility: CLINIC | Age: 77
End: 2022-04-04

## 2022-04-04 VITALS
HEIGHT: 62 IN | HEART RATE: 82 BPM | RESPIRATION RATE: 14 BRPM | DIASTOLIC BLOOD PRESSURE: 76 MMHG | SYSTOLIC BLOOD PRESSURE: 120 MMHG | TEMPERATURE: 96.7 F | OXYGEN SATURATION: 95 % | WEIGHT: 163.5 LBS | BODY MASS INDEX: 30.09 KG/M2

## 2022-04-04 DIAGNOSIS — Z98.890 STATUS POST RIGHT BREAST LUMPECTOMY: ICD-10-CM

## 2022-04-04 DIAGNOSIS — Z17.0 MALIGNANT NEOPLASM OF OVERLAPPING SITES OF RIGHT BREAST IN FEMALE, ESTROGEN RECEPTOR POSITIVE (HCC): Primary | ICD-10-CM

## 2022-04-04 DIAGNOSIS — Z71.89 OTHER SPECIFIED COUNSELING: ICD-10-CM

## 2022-04-04 DIAGNOSIS — C50.811 MALIGNANT NEOPLASM OF OVERLAPPING SITES OF RIGHT BREAST IN FEMALE, ESTROGEN RECEPTOR POSITIVE (HCC): Primary | ICD-10-CM

## 2022-04-04 PROCEDURE — 99024 POSTOP FOLLOW-UP VISIT: CPT | Performed by: SURGERY

## 2022-04-04 NOTE — PROGRESS NOTES
Surgical Oncology Follow Up  [facility]  [facility]  [address]  [facility] [place] [address]    Ling All  1945  [id number]      Chief Complaint   Patient presents with    Post-op     Lumpectomy-Rash         Assessment & Plan:   Status post right breast conservation surgery for right breast cancer  She was brought in since she has developed a rash in the opposite breast as well as her left left back maculopapular rash  We started steroid course and local application of 1% steroid  This has significantly improved surgical sites are healing well there is no evidence of surgical site infection we will follow her in 2 weeks time  Pathology is pending  Her genetic test results were negative    Cancer History:     Oncology History Overview Note   2/14/22 Screening Mammogram  RIGHT  A) MASS: There is a mass with spiculated margins seen in the right breast at 11 o'clock in the middle depth  B) CALCIFICATIONS: There are round calcifications in a grouped distribution seen in the right breast at 10 o'clock in the posterior depth  Left  There are no suspicious masses, grouped microcalcifications or areas of unexplained architectural distortion  The skin and nipple areolar complex are unremarkable  RECOMMENDATION:  Diagnostic mammogram and ultrasound at the current time for the right breast mass and posterior calcifications  3/7/22 Mammogram diagnostic right  RIGHT  A) MASS  Mammo diagnostic right w 3d & cad: There is a mass with spiculated margins seen in the right breast at 12 o'clock in the middle depth  US breast right limited (diagnostic): There is a 9 mm hypoechoic mass with spiculated margins with shadowing seen in the right breast at 11 o'clock in the middle depth, 8 cm from the nipple  There is no evidence of lymphadenopathy  B) CALCIFICATIONS  Mammo diagnostic right w 3d & cad:  There are round calcifications in a grouped distribution seen in the right breast at 10 o'clock in the posterior depth  Despite low morphologic suspicion, a benign representative sampling will likely be necessary pending pathologic results of the ultrasound-guided core biopsy  RECOMMENDATION:       - Ultrasound-guided breast biopsy for the right breast        - Stereotactic breast biopsy for the right breast     3/7/22 US guided biopsy     Malignant neoplasm of overlapping sites of right breast in female, estrogen receptor positive ([address] )   3/7/2022 Initial Diagnosis    Malignant neoplasm of right female breast ([facility] [address] )     3/7/2022 Biopsy    Right Breast Ultrasound guided biopsy  12 o'clock, 8 cm from nipple  Invasive mammary carcinoma of no special type with extensive Ductal Carcinoma in situ  Grade 1   ER 95  GA 75  HER2 1+  Lymphovascular invasion not identified    Concordant  Malingnacy appears unifocal  Right axilla clear  Left breast clear  Breast, Right, Stereo bx right breast 10oclock, 4 cores with calcs:  - Fibroadenoma with focal coarse calcifications  - Negative for atypia and in-situ or invasive carcinoma  Breast, Right, Stereo bx right breast 10oclock, 1 core without calcs:  - Fibroadenoma with focal coarse calcifications  - Negative for atypia and in-situ or invasive carcinoma  3/14/2022 Genetic Testing    A stat panel of genes were evaluated, including: SYDNI, BRCA1, BRCA2, CDH1, CHEK2, PALB2, PTEN, STK11, TP53  Negative result   No pathogenic sequence variants or deletions/duplications identified     3/14/2022 Genomic Testing    MammaPrint   Low risk  Mammaprint Index: +0 537  Luminal type A     3/14/2022 -  Cancer Staged    Staging form: Breast, AJCC 8th Edition  - Clinical stage from 3/14/2022: cT1b, cN0, cM0, GX, ER+, GA+, HER2- - Signed by Gerard Baumann MD on 3/25/2022  Stage prefix: Initial diagnosis  Histologic grading system: 3 grade system             Interval History:   Follow-up with newly developed breast rash in the opposite breast   Review of Systems:   Review of Systems   Constitutional: Negative for chills and fever  HENT: Negative for ear pain and sore throat  Eyes: Negative for pain and visual disturbance  Respiratory: Negative for cough and shortness of breath  Cardiovascular: Negative for chest pain and palpitations  Gastrointestinal: Negative for abdominal pain and vomiting  Genitourinary: Negative for dysuria and hematuria  Musculoskeletal: Negative for arthralgias and back pain  Skin: Negative for color change and rash  Neurological: Negative for seizures and syncope  All other systems reviewed and are negative        Past Medical History     Patient Active Problem List   Diagnosis    Depression    Gastroesophageal reflux disease without esophagitis    Mixed hyperlipidemia    Essential hypertension    Hypothyroid    Impaired fasting glucose    Lymphoma (HCC)    Cerebral aneurysm, nonruptured    Mild cognitive impairment    Flexor tenosynovitis of finger    Macrocytosis without anemia    Paresthesias    Non-intractable vomiting with nausea    Hypokalemia    Late onset Alzheimer's disease without behavioral disturbance (HCC)    Chest pain    Near syncope    Immunocompromised ([address] )    Malignant neoplasm of overlapping sites of right breast in female, estrogen receptor positive ([facility] [address] )     Past Medical History:   Diagnosis Date    Anxiety disorder     Aortic valve disorder     Breast cancer ([facility] [address] )     Cancer ([facility] [address] )     breast    Cellulitis of finger of left hand 11/20/2018    Cellulitis of left hand 11/10/2018    Added automatically from request for surgery [id number]    Depression     Disease of thyroid gland     GERD (gastroesophageal reflux disease)     History of gastroesophageal reflux (GERD)     History of kidney cancer 03/06/2014    History of transfusion     Hyperlipidemia 03/06/2014    Hypertension 03/06/2014    Hypothyroidism 03/06/2014    Shortness of breath     TIA (transient ischemic attack)     UTI (urinary tract infection)      Past Surgical History:   Procedure Laterality Date    BREAST LUMPECTOMY Right 3/29/2022    Procedure: ITZEL  DIRECTED LUMPECTOMY;  Surgeon: Roberto Lucas MD;  Location: [facility] MAIN [facility];  Service: Surgical Oncology    ESOPHAGOGASTRIC FUNDOPLASTY      Nissen Fundoplication    HERNIA REPAIR      LYMPH NODE BIOPSY Right 3/29/2022    Procedure: LYMPHATIC MAPPING WITH BLUE AND RADIOACTIVE DYES SENTINEL LYMPH NODE BIOPSY, INJECTION IN OR AT 0800 BY DR Rebecca He;  Surgeon: Roberto Lucas MD;  Location: [facility];  Service: Surgical Oncology    MAMMO STEREOTACTIC BREAST BIOPSY RIGHT (ALL INC) Right 3/7/2022    US BREAST ITZEL  NEEDLE LOC RIGHT Right 3/24/2022    US GUIDED BREAST BIOPSY RIGHT COMPLETE Right 3/7/2022    WRIST SURGERY Left      Family History   Problem Relation Age of Onset    Stroke Father     Leukemia Sister     No Known Problems Brother     Skin cancer Mother     Stroke Maternal Grandmother     No Known Problems Maternal Grandfather     No Known Problems Paternal Grandmother     No Known Problems Paternal Grandfather     Breast cancer Sister     Breast cancer Sister     Kidney cancer Brother     Alcohol abuse Brother     Kidney cancer Brother     Stomach cancer Brother     No Known Problems Daughter     No Known Problems Daughter      Social History     Socioeconomic History    Marital status:       Spouse name: Not on file    Number of children: 2    Years of education: Not on file    Highest education level: Not on file   Occupational History    Not on file   Tobacco Use    Smoking status: Never Smoker    Smokeless tobacco: Never Used   Vaping Use    Vaping Use: Never used   Substance and Sexual Activity    Alcohol use: Yes     Comment: social-rare    Drug use: Never    Sexual activity: Not Currently   Other Topics Concern    Not on file   Social History Narrative    Not on file     Social Determinants of Health     Financial Resource Strain: Not on file   Food Insecurity: Not on file   Transportation Needs: Not on file   Physical Activity: Not on file   Stress: Not on file   Social Connections: Not on file   Intimate Partner Violence: Not on file   Housing Stability: Not on file       Current Outpatient Medications:     amLODIPine (NORVASC) 2 5 mg tablet, TAKE 2 TABLETS BY MOUTH DAILY, Disp: 180 tablet, Rfl: 1    aspirin 325 mg tablet, Take 325 mg by mouth daily , Disp: , Rfl:     cholestyramine (QUESTRAN) 4 g packet, TAKE 1 PACKET (4 G TOTAL) BY MOUTH DAILY, Disp: 90 packet, Rfl: 1    diphenhydrAMINE (BENADRYL) 25 mg tablet, Take 25 mg by mouth daily 1/2 tablet with Paxil, Disp: , Rfl:     fluticasone (FLONASE) 50 mcg/act nasal spray, SPRAY 1 SPRAY INTO EACH NOSTRIL EVERY DAY (Patient taking differently: as needed ), Disp: 16 mL, Rfl: 1    levothyroxine 125 mcg tablet, TAKE 1 TABLET BY MOUTH EVERY DAY, Disp: 90 tablet, Rfl: 1    meclizine (ANTIVERT) 25 mg tablet, TAKE 1 TABLET BY MOUTH THREE TIMES A DAY AS NEEDED FOR DIZZINESS, Disp: 50 tablet, Rfl: 1    memantine (NAMENDA) 10 mg tablet, Take 1 tablet (10 mg total) by mouth 2 (two) times a day, Disp: 60 tablet, Rfl: 6    Mirabegron ER (Myrbetriq) 50 MG TB24, Take 1 tablet (50 mg total) by mouth daily, Disp: 90 tablet, Rfl: 0    multivitamin (THERAGRAN) TABS, Take 1 tablet by mouth, Disp: , Rfl:     omeprazole (PriLOSEC) 20 mg delayed release capsule, TAKE 1 CAPSULE BY MOUTH EVERY DAY, Disp: 90 capsule, Rfl: 1    oxyCODONE-acetaminophen (Percocet) 5-325 mg per tablet, Take 1 tablet by mouth every 6 (six) hours as needed for moderate pain Max Daily Amount: 4 tablets, Disp: 20 tablet, Rfl: 0    PARoxetine (PAXIL) 20 mg tablet, TAKE 1 TABLET BY MOUTH EVERY DAY, Disp: 90 tablet, Rfl: 1    potassium chloride (MICRO-K) 10 MEQ CR capsule, Take 1 capsule (10 mEq total) by mouth daily, Disp: 30 capsule, Rfl: 0   predniSONE 10 mg tablet, Take 1 tablet (10 mg total) by mouth daily, Disp: 21 tablet, Rfl: 0    PROAIR  (90 Base) MCG/ACT inhaler, as needed , Disp: , Rfl:     rivastigmine (EXELON) 9 5 mg/24 hr TD 24 hr patch, APPLY 1 PATCH EVERY DAY, Disp: 90 patch, Rfl: 0  Allergies   Allergen Reactions    Ciprofloxacin Hives    Sulfa Antibiotics Itching    Other     Penicillins Rash     Category: Allergy;   --  Pt received unasyn 1 5 mg IV 11-12-18 to 11-15-18    Sulfacetamide Rash     Category: Allergy; Physical Exam:     Vitals:    04/04/22 1110   BP: 120/76   Pulse: 82   Resp: 14   Temp: (!) 96 7 °F (35 9 °C)   SpO2: 95%     Physical Exam  Cardiovascular:      Rate and Rhythm: Normal rate  Pulses: Normal pulses  Heart sounds: No murmur heard  Pulmonary:      Effort: No respiratory distress  Breath sounds: No wheezing  Results & Discussion:   I did explain to her pathology is pending  There is no cervical surgical site infection we will see her in 2 weeks time  She was told to call us with any questions or concern in the interim  understands and  agrees   All patient questions were answered  Advance Care Planning/Advance Directives: Silke Engel MD discussed the disease status with Gigi Ramirez  today 04/04/22  treatment plans and follow-up with the patient

## 2022-04-06 ENCOUNTER — TELEPHONE (OUTPATIENT)
Dept: GENETICS | Facility: CLINIC | Age: 77
End: 2022-04-06

## 2022-04-06 NOTE — TELEPHONE ENCOUNTER
I called Davi Ken to discuss her final genetic test result however I received a message that her inbox was full so I was unable to leave a message  I will send her a [place] message and attempt to call her back later

## 2022-04-11 ENCOUNTER — TELEPHONE (OUTPATIENT)
Dept: GENETICS | Facility: CLINIC | Age: 77
End: 2022-04-11

## 2022-04-11 NOTE — TELEPHONE ENCOUNTER
I called the patient to let her know that her result is complete and the genetic counselor has been trying to contact her for review  Patient states she has not received any messages  According to the patient, the best time to call would be 9am  This information has been passed to the genetic counselor

## 2022-04-12 ENCOUNTER — OFFICE VISIT (OUTPATIENT)
Dept: INTERNAL MEDICINE CLINIC | Facility: CLINIC | Age: 77
End: 2022-04-12
Payer: COMMERCIAL

## 2022-04-12 VITALS
DIASTOLIC BLOOD PRESSURE: 78 MMHG | HEART RATE: 93 BPM | HEIGHT: 62 IN | TEMPERATURE: 98.2 F | WEIGHT: 157 LBS | BODY MASS INDEX: 28.89 KG/M2 | OXYGEN SATURATION: 94 % | SYSTOLIC BLOOD PRESSURE: 118 MMHG

## 2022-04-12 DIAGNOSIS — G30.1 LATE ONSET ALZHEIMER'S DISEASE WITHOUT BEHAVIORAL DISTURBANCE (HCC): ICD-10-CM

## 2022-04-12 DIAGNOSIS — Z17.0 MALIGNANT NEOPLASM OF OVERLAPPING SITES OF RIGHT BREAST IN FEMALE, ESTROGEN RECEPTOR POSITIVE (HCC): ICD-10-CM

## 2022-04-12 DIAGNOSIS — C50.811 MALIGNANT NEOPLASM OF OVERLAPPING SITES OF RIGHT BREAST IN FEMALE, ESTROGEN RECEPTOR POSITIVE (HCC): ICD-10-CM

## 2022-04-12 DIAGNOSIS — I10 ESSENTIAL HYPERTENSION: Primary | ICD-10-CM

## 2022-04-12 DIAGNOSIS — F02.80 LATE ONSET ALZHEIMER'S DISEASE WITHOUT BEHAVIORAL DISTURBANCE (HCC): ICD-10-CM

## 2022-04-12 DIAGNOSIS — E03.9 HYPOTHYROIDISM, UNSPECIFIED TYPE: ICD-10-CM

## 2022-04-12 PROCEDURE — 3288F FALL RISK ASSESSMENT DOCD: CPT | Performed by: INTERNAL MEDICINE

## 2022-04-12 PROCEDURE — 1101F PT FALLS ASSESS-DOCD LE1/YR: CPT | Performed by: INTERNAL MEDICINE

## 2022-04-12 PROCEDURE — 99214 OFFICE O/P EST MOD 30 MIN: CPT | Performed by: INTERNAL MEDICINE

## 2022-04-12 NOTE — PROGRESS NOTES
Assessment/Plan:       Chronic problems appear stable  Ordered labs that she can do with her next set for Oncology  Follow-up with her specialists regarding the breast cancer  No change in medications  Quality Measures:       Return in about 4 months (around 8/12/2022) for Regular visit and Medicare Wellness  No problem-specific Assessment & Plan notes found for this encounter  Diagnoses and all orders for this visit:    Essential hypertension  -     CBC and differential; Future  -     Comprehensive metabolic panel; Future  -     Lipid panel; Future    Hypothyroidism, unspecified type  -     T4, free; Future  -     TSH, 3rd generation; Future    Malignant neoplasm of overlapping sites of right breast in female, estrogen receptor positive ([facility] [address] )    Late onset Alzheimer's disease without behavioral disturbance (HCC)          Subjective:      Patient ID: Sage Maza is a 67 y o  female  Patient comes in today for routine follow-up with her daughter  Since her last visit, her mammogram revealed breast cancer  She had surgery a few weeks ago  Waiting on lymph node results  Probably is going to require radiation at the least   Her blood pressure is controlled  Thyroid and cholesterol have been controlled but she is due for blood work  With all the breast cancer scheduling, they for got about the blood work  Taking all of her medicines as directed  Daughter reports no worsening of her dementia  No new problems today  No further additions to her history  ALLERGIES:  Allergies   Allergen Reactions    Ciprofloxacin Hives    Sulfa Antibiotics Itching    Other     Penicillins Rash     Category: Allergy;   --  Pt received unasyn 1 5 mg IV 11-12-18 to 11-15-18    Sulfacetamide Rash     Category:  Allergy;        CURRENT MEDICATIONS:    Current Outpatient Medications:     amLODIPine (NORVASC) 2 5 mg tablet, TAKE 2 TABLETS BY MOUTH DAILY, Disp: 180 tablet, Rfl: 1    aspirin 325 mg tablet, Take 325 mg by mouth daily , Disp: , Rfl:     cholestyramine (QUESTRAN) 4 g packet, TAKE 1 PACKET (4 G TOTAL) BY MOUTH DAILY, Disp: 90 packet, Rfl: 1    diphenhydrAMINE (BENADRYL) 25 mg tablet, Take 25 mg by mouth daily 1/2 tablet with Paxil, Disp: , Rfl:     fluticasone (FLONASE) 50 mcg/act nasal spray, SPRAY 1 SPRAY INTO EACH NOSTRIL EVERY DAY (Patient taking differently: as needed ), Disp: 16 mL, Rfl: 1    levothyroxine 125 mcg tablet, TAKE 1 TABLET BY MOUTH EVERY DAY, Disp: 90 tablet, Rfl: 1    meclizine (ANTIVERT) 25 mg tablet, TAKE 1 TABLET BY MOUTH THREE TIMES A DAY AS NEEDED FOR DIZZINESS, Disp: 50 tablet, Rfl: 1    memantine (NAMENDA) 10 mg tablet, Take 1 tablet (10 mg total) by mouth 2 (two) times a day, Disp: 60 tablet, Rfl: 6    Mirabegron ER (Myrbetriq) 50 MG TB24, Take 1 tablet (50 mg total) by mouth daily, Disp: 90 tablet, Rfl: 0    multivitamin (THERAGRAN) TABS, Take 1 tablet by mouth, Disp: , Rfl:     omeprazole (PriLOSEC) 20 mg delayed release capsule, TAKE 1 CAPSULE BY MOUTH EVERY DAY, Disp: 90 capsule, Rfl: 1    oxyCODONE-acetaminophen (Percocet) 5-325 mg per tablet, Take 1 tablet by mouth every 6 (six) hours as needed for moderate pain Max Daily Amount: 4 tablets, Disp: 20 tablet, Rfl: 0    PARoxetine (PAXIL) 20 mg tablet, TAKE 1 TABLET BY MOUTH EVERY DAY, Disp: 90 tablet, Rfl: 1    potassium chloride (MICRO-K) 10 MEQ CR capsule, Take 1 capsule (10 mEq total) by mouth daily, Disp: 30 capsule, Rfl: 0    PROAIR  (90 Base) MCG/ACT inhaler, as needed , Disp: , Rfl:     rivastigmine (EXELON) 9 5 mg/24 hr TD 24 hr patch, APPLY 1 PATCH EVERY DAY, Disp: 90 patch, Rfl: 0    predniSONE 10 mg tablet, Take 1 tablet (10 mg total) by mouth daily (Patient not taking: Reported on 4/12/2022 ), Disp: 21 tablet, Rfl: 0    ACTIVE PROBLEM LIST:  Patient Active Problem List   Diagnosis    Depression    Gastroesophageal reflux disease without esophagitis    Mixed hyperlipidemia    Essential hypertension    Hypothyroid    Impaired fasting glucose    Lymphoma (HCC)    Cerebral aneurysm, nonruptured    Mild cognitive impairment    Flexor tenosynovitis of finger    Macrocytosis without anemia    Paresthesias    Non-intractable vomiting with nausea    Hypokalemia    Late onset Alzheimer's disease without behavioral disturbance (HCC)    Chest pain    Near syncope    Immunocompromised (HCC)    Malignant neoplasm of overlapping sites of right breast in female, estrogen receptor positive ([facility] [address] )       PAST MEDICAL HISTORY:  Past Medical History:   Diagnosis Date    Anxiety disorder     Aortic valve disorder     Breast cancer ([facility] [address] )     [facility]     breast    Cellulitis of finger of left hand 11/20/2018    Cellulitis of left hand 11/10/2018    Added automatically from request for surgery [id number]    Depression     Disease of thyroid gland     GERD (gastroesophageal reflux disease)     History of gastroesophageal reflux (GERD)     History of kidney cancer 03/06/2014    History of transfusion     Hyperlipidemia 03/06/2014    Hypertension 03/06/2014    Hypothyroidism 03/06/2014    Shortness of breath     TIA (transient ischemic attack)     UTI (urinary tract infection)        PAST SURGICAL HISTORY:  Past Surgical History:   Procedure Laterality Date    BREAST LUMPECTOMY Right 3/29/2022    Procedure: ITZEL  DIRECTED LUMPECTOMY;  Surgeon: Sara Leong MD;  Location: [facility];  Service: Surgical Oncology    ESOPHAGOGASTRIC FUNDOPLASTY      Nissen Fundoplication    HERNIA REPAIR      LYMPH NODE BIOPSY Right 3/29/2022    Procedure: LYMPHATIC MAPPING WITH BLUE AND RADIOACTIVE DYES SENTINEL LYMPH NODE BIOPSY, INJECTION IN OR AT 0800 BY DR Deyanira Childs;  Surgeon: Sara Leong MD;  Location: [facility];  Service: Surgical Oncology    MAMMO STEREOTACTIC BREAST BIOPSY RIGHT (ALL INC) Right 3/7/2022     BREAST ITZEL  NEEDLE LOC RIGHT Right 3/24/2022     GUIDED BREAST BIOPSY RIGHT COMPLETE Right 3/7/2022    WRIST SURGERY Left        FAMILY HISTORY:  Family History   Problem Relation Age of Onset    Stroke Father    Tyshawn Bones Leukemia Sister     No Known Problems Brother     Skin cancer Mother     Stroke Maternal Grandmother     No Known Problems Maternal Grandfather     No Known Problems Paternal Grandmother     No Known Problems Paternal Grandfather     Breast cancer Sister     Breast cancer Sister     Kidney cancer Brother     Alcohol abuse Brother     Kidney cancer Brother     Stomach cancer Brother     No Known Problems Daughter     No Known Problems Daughter        SOCIAL HISTORY:  Social History     Socioeconomic History    Marital status:      Spouse name: Not on file    Number of children: 2    Years of education: Not on file    Highest education level: Not on file   Occupational History    Not on file   Tobacco Use    Smoking status: Never Smoker    Smokeless tobacco: Never Used   Vaping Use    Vaping Use: Never used   Substance and Sexual Activity    Alcohol use: Yes     Comment: social-rare    Drug use: Never    Sexual activity: Not Currently   Other Topics Concern    Not on file   Social History Narrative    Not on file     Social Determinants of Health     Financial Resource Strain: Not on file   Food Insecurity: Not on file   Transportation Needs: Not on file   Physical Activity: Not on file   Stress: Not on file   Social Connections: Not on file   Intimate Partner Violence: Not on file   Housing Stability: Not on file       Review of Systems   Respiratory: Negative for shortness of breath  Cardiovascular: Negative for chest pain  Gastrointestinal: Negative for abdominal pain           Objective:  Vitals:    04/12/22 1347   BP: 118/78   BP Location: Left arm   Patient Position: Sitting   Cuff Size: Large   Pulse: 93   Temp: 98 2 °F (36 8 °C)   TempSrc: Tympanic   SpO2: 94%   Weight: 71 2 kg (157 lb)   Height: 5' 2" (1 575 m)     Body mass index is 28 72 kg/m²  Physical Exam  Vitals and nursing note reviewed  Constitutional:       Appearance: She is well-developed  Cardiovascular:      Rate and Rhythm: Normal rate and regular rhythm  Heart sounds: Normal heart sounds  Pulmonary:      Effort: Pulmonary effort is normal       Breath sounds: Normal breath sounds  Abdominal:      Palpations: Abdomen is soft  Tenderness: There is no abdominal tenderness  Neurological:      Mental Status: She is alert and oriented to person, place, and time  RESULTS:    Recent Results (from the past 1008 hour(s))   Tissue Exam    Collection Time: 03/07/22  2:09 PM   Result Value Ref Range    Case Report       Surgical Pathology Report                         Case: [id number]                                   Authorizing Provider:  Darrel Casanova MD            Collected:           03/07/2022 1409              Ordering Location:     [address] [place] [address] Received:            03/07/2022 Samantha                                                               Pathologist:           Juana Mistry MD                                                                 Specimens:   A) - Breast, Right, Stereo bx right breast 10oclock, 4 cores with calcs                             B) - Breast, Right, Stereo bx right breast 10oclock, 1 core without calcs                  Final Diagnosis       A  Breast, Right, Stereo bx right breast 10oclock, 4 cores with calcs:  - Fibroadenoma with focal coarse calcifications  - Negative for atypia and in-situ or invasive carcinoma  B  Breast, Right, Stereo bx right breast 10oclock, 1 core without calcs:  - Fibroadenoma with focal coarse calcifications  - Negative for atypia and in-situ or invasive carcinoma         Additional Information       All reported additional testing was performed with appropriately reactive controls  These tests were developed and their performance characteristics determined by [facility] or appropriate performing facility, though some tests may be performed on tissues which have not been validated for performance characteristics (such as staining performed on alcohol exposed cell blocks and decalcified tissues)  Results should be interpreted with caution and in the context of the patients clinical condition  These tests may not be cleared or approved by the [place]  Food and Drug Administration, though the FDA has determined that such clearance or approval is not necessary  These tests are used for clinical purposes and they should not be regarded as investigational or for research  This laboratory has been approved by [facility], designated as a high-complexity laboratory and is qualified to perform these tests     Interpretation performed at [address] [place] [address]      Gross Description          A  The specimen is received in formalin, labeled with the patient's name and hospital number, and is designated "stereotactic right breast biopsy at 10:00 o'clock, 4 cores with calcs  The specimen consists of 4 tan-yellow, focally friable fibrofatty tissue cores ranging from 1 4-2 5 cm in length and 0 1-0 4 cm in diameter  The specimen also contains an aggregate of tan-yellow, focally friable fibrofatty tissue fragments measuring 2 2 x 0 9 x 0 3 cm  The specimen is entirely submitted in 3 cassettes  A1-2:  1 core per cassette between sponges   A3:  Fibrofatty tissue aggregate in an embedding bag         B  The specimen is received in formalin, labeled with the patient's name and hospital number, and is designated "stereotactic right breast biopsy at 10:00 o'clock, 1 core without calcs    The specimen consists of an aggregate of tan-yellow, focally friable fibrofatty tissue core fragments measuring 1 7 x 0 8 x 0 2 cm  The specimen is entirely submitted in an embedding bag in 1 cassette  Note: The estimated total formalin fixation time based upon information provided by the submitting clinician and the standard processing schedule is 9 75 hours  Antonio          Clinical Information Fixed in Formalin    Tissue Exam    Collection Time: 03/07/22  2:34 PM   Result Value Ref Range    Case Report       Surgical Pathology Report                         Case: [id number]                                   Authorizing Provider:  Guero Law MD            Collected:           03/07/2022 1434              Ordering Location:     [address] [place] [address] Breast Received:            03/07/2022 Samantha 83                                                              Pathologist:           Flako Mcclendon MD                                                                 Specimen:    Breast, Right, US BX RT BREAST 12:00 8CMFN 3 PASSES 12G MARQUEE                            Addendum 2       At the request of Dr Bela Pinon, unstained slides from paraffin BLOCK A1 containing the patient's cancer cells were sent to [facility]  for Breast Cancer Test Suite testing  Upon completion of testing, the [facility]  Laboratory report will be directly sent to the requesting physician as well as posted in the Media Tab of the patient's [employer] EMR by the [facility] Pathology Department  Please note: The 1436 Face++ analysis and report is performed independently of [facility], and neither the Hospital nor the Pathology Department screen, review or comment upon [employer] Face++ report    Because the role of testing in cancer diagnosis and management is subject to evolving development, usage and interpretation, we strongly urge that the test limitations described in the [facility]  Lab report be carefully read, appropriately shared with the patient, and critically considered when reaching treatment decisions  This pathology material was removed from archive for purpose of molecular testing  It was reviewed and approved by Dr Lisbeth Doan  Addendum       HORMONE RECEPTOR AND HER2/olga STUDIES    Performed on invasive carcinoma block A1:  Test Description  Result  Prognostic Interpretation    Estrogen Receptor/ER              90-95%   Positive  Primary Antibody: SP1  Internal control: Positive  Staining Intensity: Strong  External control: Positive  Blanca Score*: 7    Progesterone Receptor/PgR 65-75%   Positive  Primary Antibody: 1E2  Internal control: Positive  Staining Intensity: Strong  External control: Positive  Blanca Score*: 7      HER2 by IHC   1+                          Negative  Primary Antibody:4B5  HER2 by FISH              Not Indicated    Appropriate positive and negative controls were reviewed  Fixative: Formalin  Duration of Fixation (hours): 9 5 Meets specified requirements of latest ASCO/CAP guidelines  Cold Ischemia Time (minutes): Not stated  Sample Adequacy: Adequate    These immunohistochemical tests were performed at [facility] in [place], [place] and interpreted by Dr Abril Posey  An electronic copy of this report will be kept on file in the Medical Records Department at [facility]     * The Blanca score is a semi quantitative system that takes into consideration the proportion of positive cells (scored on a scale of 0-5) and staining intensity (scored on a scale of (0-3)  The proportion and intensity are summed to produce total scores of 0 or 2 through 8  A score of 0-2 is regarded as negative while 3-8 as positive  Final Diagnosis       A   Breast, Right, US BX RT BREAST 12:00 8CMFN:  - Invasive mammary carcinoma of no special type, 10 mm in greatest dimension, with 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)  See comment and synoptic report  Comment: Immunohistochemistry for SMMHC, calponin, and CK5/6 demonstrate areas of lost and intact myoepithelial layer, in the invasive and in-situ components respectively  Results reported to Delaney Pyle at the [address] [place] [address] on 3/9/22 at 10:48am       Additional Information       All reported additional testing was performed with appropriately reactive controls  These tests were developed and their performance characteristics determined by [facility] or appropriate performing facility, though some tests may be performed on tissues which have not been validated for performance characteristics (such as staining performed on alcohol exposed cell blocks and decalcified tissues)  Results should be interpreted with caution and in the context of the patients clinical condition  These tests may not be cleared or approved by the [place] S  Food and Drug Administration, though the FDA has determined that such clearance or approval is not necessary  These tests are used for clinical purposes and they should not be regarded as investigational or for research  This laboratory has been approved by [place] [id number], designated as a high-complexity laboratory and is qualified to perform these tests     Interpretation performed at [facility] [place] [address]      Synoptic Checklist       INVASIVE CARCINOMA OF THE BREAST: Biopsy   INVASIVE CARCINOMA OF THE BREAST: BIOPSY - All Specimens   Protocol posted: 11/10/2021      SPECIMEN      Procedure:    Needle biopsy       Specimen Laterality:    Right       TUMOR      Tumor Site:    Clock position       :    12 o'clock     Histologic Type:     Invasive carcinoma of no special type (ductal)     Histologic Grade (Ladonna Histologic Score):          Glandular (Acinar) / Tubular Differentiation:    Score 2       Nuclear Pleomorphism:    Score 1       Mitotic Rate: Score 1       Overall Grade:    Grade 1 (scores of 3, 4 or 5)     Tumor Size:    Greatest dimension of largest invasive focus (Millimeters): 10 mm    Ductal Carcinoma In Situ (DCIS):    Present       Architectural Patterns:    Cribriform       Architectural Patterns:    Solid       Nuclear Grade:    Grade I (low)       Necrosis:    Present, focal (small foci or single cell necrosis)     Lymphovascular Invasion:    Not identified        Breast Biomarker Studies (pending):    pending ER/TN/Her2       Gross Description          A  The specimen is received in formalin, labeled with the patient's name and hospital number, and is designated "ultrasound-guided right breast biopsy at 12:00 o'clock, 8 cm from nipple, 3 passes, 12 gauge  The specimen consists of 3 tan-yellow fibrofatty tissue cores ranging from 1 5-1 8 cm in length and 0 2-0 4 cm in diameter  The specimen is entirely submitted between sponges in 2 cassettes, 2 cores A1, 1 core A2  Note: The estimated total formalin fixation time based upon information provided by the submitting clinician and the standard processing schedule is 9 5 hours  Antonio          Clinical Information       INDETERMINATE SUSPICIOUS RT BREAST MASS PLEASE FORWARD ALL POSITIVE RESULTS TO NURSE NATE CASH [facility] [phone]   MISCELLANEOUS LAB TEST    Collection Time: 03/14/22 12:31 PM   Result Value Ref Range    Miscellaneous Lab Test Result       Kit collected on behalf of patient   Results to follow    Ancillary Pathology Sendout (AP only)    Collection Time: 03/16/22 12:00 AM   Result Value Ref Range    Scan Result       Molecular testing by [facility] BREAST CANCER TEST SUITE   CBC and differential    Collection Time: 03/28/22  3:40 PM   Result Value Ref Range    WBC 5 47 4 31 - 10 16 Thousand/uL    RBC 3 66 (L) 3 81 - 5 12 Million/uL    Hemoglobin 13 2 11 5 - 15 4 g/dL    Hematocrit 39 3 34 8 - 46 1 %     (H) 82 - 98 fL    MCH 36 1 (H) 26 8 - 34 3 pg    MCHC 33 6 31 4 - 37 4 g/dL    RDW 12 8 11 6 - 15 1 %    MPV 11 7 8 9 - 12 7 fL    Platelets 471 867 - 799 Thousands/uL    nRBC 0 /100 WBCs    Neutrophils Relative 62 43 - 75 %    Immat GRANS % 2 0 - 2 %    Lymphocytes Relative 23 14 - 44 %    Monocytes Relative 10 4 - 12 %    Eosinophils Relative 2 0 - 6 %    Basophils Relative 1 0 - 1 %    Neutrophils Absolute 3 41 1 85 - 7 62 Thousands/µL    Immature Grans Absolute 0 08 0 00 - 0 20 Thousand/uL    Lymphocytes Absolute 1 25 0 60 - 4 47 Thousands/µL    Monocytes Absolute 0 55 0 17 - 1 22 Thousand/µL    Eosinophils Absolute 0 13 0 00 - 0 61 Thousand/µL    Basophils Absolute 0 05 0 00 - 0 10 Thousands/µL   Comprehensive metabolic panel    Collection Time: 03/28/22  3:40 PM   Result Value Ref Range    Sodium 141 136 - 145 mmol/L    Potassium 3 4 (L) 3 5 - 5 3 mmol/L    Chloride 105 100 - 108 mmol/L    CO2 27 21 - 32 mmol/L    ANION GAP 9 4 - 13 mmol/L    BUN 15 5 - 25 mg/dL    Creatinine 0 85 0 60 - 1 30 mg/dL    Glucose 122 65 - 140 mg/dL    Calcium 8 9 8 3 - 10 1 mg/dL    Corrected Calcium 9 4 8 3 - 10 1 mg/dL    AST 15 5 - 45 U/L    ALT 16 12 - 78 U/L    Alkaline Phosphatase 73 46 - 116 U/L    Total Protein 7 0 6 4 - 8 2 g/dL    Albumin 3 4 (L) 3 5 - 5 0 g/dL    Total Bilirubin 0 49 0 20 - 1 00 mg/dL    eGFR 66 ml/min/1 73sq m   ECG 12 lead    Collection Time: 03/28/22  3:49 PM   Result Value Ref Range    Ventricular Rate 69 BPM    Atrial Rate 69 BPM    AR Interval 138 ms    QRSD Interval 78 ms    QT Interval 396 ms    QTC Interval 424 ms    P Axis 36 degrees    QRS Axis 4 degrees    T Wave Axis 5 degrees   Tissue Exam    Collection Time: 03/29/22  8:52 AM   Result Value Ref Range    Case Report       Surgical Pathology Report                         Case: [id number]                                   Authorizing Provider:  Ki Navas,  Collected:           03/29/2022 Marleny CASTILLO Ordering Location:     [address] [place] [address] Received:            03/29/2022 1203                                     Operating Room                                                               Pathologist:           Martin Navarro MD                                                                 Specimens:   A) - Axillary lymph node, Right axillary sentinel lymph node #1                                     B) - Breast, Right, Additional anterior margin, inked most distal margin (green)                    C) - Breast, Right, additional inferior margin,  inked most distal margin (blue)                     D) - Breast, Right, additional lateral margin, inked most distal margin (red)                       E) - Breast, Right, additional medial margin, inked most distal margin (yellow)                     F) - Breast, Right, additional posterior margin, inked most distal margin (black)                   G) - Breast, Right, additional superior margin, inked most distal margin (orange)                   H) - Breast, Right, Right breast liyah  directed lumpectomy marked per margin by                protocol                                                                                            I) - Soft Tissue, Other, right axillary adipose tissue                                     Final Diagnosis       A  Right axillary sentinel lymph node #1:  - Fibroadipose tissue with no specific histopathologic abnormality   - No lymph node identified  B  Breast, Right, Additional anterior margin:  - Benign fibroadipose tissue with focal fat necrosis  C  Breast, Right, additional inferior margin:  - Benign fibroadipose tissue  D  Breast, Right, additional lateral margin:  - Benign breast tissue with focal sclerosing adenosis and ectatic ducts       E  Breast, Right, additional medial margin:  - Benign breast tissue  F  Breast, Right, additional posterior margin:  - Benign fibroadipose tissue  G  Breast, Right, additional superior margin:  - Benign fibroadipose tissue  H  Breast, Right, liyah  directed lumpectomy:  - Invasive mammary carcinoma of no special type, 13 mm in greatest dimension, with 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)  See comment and synoptic report  I  Right axillary adipose tissue:  - Three benign lymph nodes (0/3)  Comment:  1  Ancillary Studies:  Performed on original biopsy material, [id number], and reported to be:     - ER:  90-95% / Positive     - [place]:  65-75% / Positive     - HER2 (IHC):  1+ / Negative     - Repeat HER2 testing (2013 ASCO/CAP Recommendations):  Not Indicated      - Best representative tumor block:  H10       -- Sufficient tumor present for          Agendia Mammaprint/Blueprint (1 cm2 of invasive tumor in aggregate):  Yes          MI Profile/Foundation One (at least 5 x 5 mm of tumor):  Yes    2  Pathologic Stage Classification (pTNM, AJCC 8th Edition):       8th ed, AJCC Anatomic Stage:  at least Stage IA - pT1c, pN0, cM0, G1    3   8th ed  AJCC Pathologic Prognostic Stage (use AJCC update):  IA      Additional Information       All reported additional testing was performed with appropriately reactive controls  These tests were developed and their performance characteristics determined by [facility] or appropriate performing facility, though some tests may be performed on tissues which have not been validated for performance characteristics (such as staining performed on alcohol exposed cell blocks and decalcified tissues)  Results should be interpreted with caution and in the context of the patients clinical condition  These tests may not be cleared or approved by the [place]  Food and Drug Administration, though the FDA has determined that such clearance or approval is not necessary   These tests are used for clinical purposes and they should not be regarded as investigational or for research  This laboratory has been approved by CLIA 88, designated as a high-complexity laboratory and is qualified to perform these tests     Interpretation performed at [facility], [address]      Synoptic Checklist       INVASIVE CARCINOMA OF THE BREAST: Resection   INVASIVE CARCINOMA OF THE BREAST: COMPLETE EXCISION - All Specimens   8th Edition - Protocol posted: 12/17/2021      SPECIMEN      Procedure:    Excision (less than total mastectomy)       Specimen Laterality:    Right       TUMOR      Histologic Type: Invasive carcinoma of no special type (ductal)       Histologic Grade ([place] Histologic Score):            Glandular (Acinar) / Tubular Differentiation:    Score 2         Nuclear Pleomorphism:    Score 1         Mitotic Rate:    Score 1         Overall Grade:    Grade 1 (scores of 3, 4 or 5)       Tumor Size:    Greatest dimension of largest invasive focus (Millimeters): 13 mm      Tumor Focality:    Single focus of invasive carcinoma       Ductal Carcinoma In Situ (DCIS):    Present         :    Negative for extensive intraductal component (EIC)         Architectural Patterns:    Solid         Nuclear Grade:    Grade I (low)     Lymphovascular Invasion:    Not identified     Treatment Effect in the Breast:    No known presurgical therapy       MARGINS    Margin Status for Invasive Carcinoma:     All margins negative for invasive carcinoma       Distance from Invasive Carcinoma to Closest Margin:    Greater than: 10 mm      Closest Margin(s) to Invasive Carcinoma:    >10 mm all margins     Margin Status for DCIS:    All margins negative for DCIS       Distance from DCIS to Closest Margin:    Greater than: 10 mm      Closest Margin(s) to DCIS:    >10 mm all margins       REGIONAL LYMPH NODES    Regional Lymph Node Status:          :    All regional lymph nodes negative for tumor       Total Number of Lymph Nodes Examined (sentinel and non-sentinel):    3       DISTANT METASTASIS      PATHOLOGIC STAGE CLASSIFICATION (pTNM, AJCC 8th Edition)      Reporting of pT, pN, and (when applicable) pM categories is based on information available to the pathologist at the time the report is issued  As per the AJCC (Chapter 1, 8th Ed ) it is the managing physicians responsibility to establish the final pathologic stage based upon all pertinent information, including but potentially not limited to this pathology report  pT Category:    pT1c     pN Category:    pN0       Gross Description          A  The specimen is received in formalin, labeled with the patient's name and hospital number, and is designated "right axillary sentinel lymph node 1  The specimen consists of a fibrofatty tissue fragment measuring 1 3 cm  No lymphoid tissue is identified  Entirely submitted  One cassette  B  The specimen is received in formalin, labeled with the patient's name and hospital number, and is designated "right breast additional anterior margin  The specimen consists of a fibrofatty tissue fragment measuring 3 9 x 2 5 x 1 2 cm  1 surface is received inked green which according to the surgeon indicates the true margin  The remaining surfaces are inked black upon receipt  The specimen is sectioned revealing unremarkable yellow fatty cut surfaces  Entirely submitted  Seven cassettes  Sequentially submitted  C  The specimen is received in formalin, labeled with the patient's name and hospital number, and is designated "right breast additional inferior margin  The specimen consists of a fibrofatty tissue fragment measuring 3 5 x 1 8 x 1 1 cm   1 surface is received inked blue which according to the surgeon indicates the true margin  The remaining surfaces are inked black upon receipt  The specimen is sectioned revealing unremarkable yellow fatty cut surfaces  Entirely submitted  Six cassettes    Sequentially submitted  D  The specimen is received in formalin, labeled with the patient's name and hospital number, and is designated "right breast additional lateral margin  The specimen consists of a fibrofatty tissue fragment measuring 3 x 2 5 x 1 5 cm  1 surface is received inked red which according to the surgeon indicates the true margin  The remaining surfaces are inked black upon receipt  The specimen is sectioned revealing yellow fatty and white fibrous cut surfaces  Entirely submitted  Five cassettes  Sequentially submitted  E  The specimen is received in formalin, labeled with the patient's name and hospital number, and is designated "right breast additional medial margin  The specimen consists of a fibrofatty tissue fragment measuring 3 4 x 1 8 x 1 7 cm   1 surface is received inked yellow which according to the surgeon indicates the true margin  The remaining surfaces are inked black upon receipt  The specimen is sectioned revealing yellow fatty cut surfaces  Entirely submitted  Four cassettes  Sequentially submitted  F  The specimen is received in formalin, labeled with the patient's name and hospital number, and is designated "right breast additional posterior margin  The specimen consists of a fibrofatty tissue fragment measuring 2 5 x 2 x 1 cm   1 surface is received inked black which according to the surgeon indicates the true margin  The remaining surfaces are inked blue upon receipt  The specimen is sectioned revealing unremarkable yellow fatty cut surfaces  Entirely submitted  Four cassettes  Sequentially submitted  G  The specimen is received in formalin, labeled with the patient's name and hospital number, and is designated "right breast additional superior margin  The specimen consists of a fibrofatty tissue fragment measuring 2 4 x 1 8 x 1 cm   1 surface is received inked orange which according to the surgeon indicates the true margin    The remaining surfaces are inked black upon receipt  The specimen is sectioned revealing unremarkable yellow fatty cut surfaces  Entirely submitted  Four cassettes  Sequentially submitted  H  The specimen is received in formalin, labeled with the patient's name and hospital number, and is designated "right breast ITZEL  directed lumpectomy  The specimen consists of a fibrofatty tissue fragment measuring 10 x 9 x 5 4 cm  The specimen has been pre-inked by the surgeon as follows:  anterior-green, posterior-black, superior-orange, inferior-blue, medial-yellow and lateral-red  An unremarkable tan ellipse of skin measuring 5 5 x 2 5 cm is present on the anterior surface  The specimen is sectioned in a lateral to medial progression revealing a somewhat ill-defined tan spiculated lesion measuring approximately 1 3 x 1 x 0 7 cm  The lesion contains a ITZEL  marker as well as a ribbon shaped biopsy marker  The lesion is located 2 cm from the anterior, posterior and superior margins, 3 cm from the inferior margin, 3 5 cm from the lateral margin and 4 cm from the medial margin  The remainder of the specimen is sectioned revealing white fibrous and yellow fatty cut surfaces  2 cm medial and 3 cm inferior to the lesion, a tan purple focus consistent with possible additional previous biopsy site measuring 1 6 x 0 5 x 0 5 cm is identified which extends to within less than 1 mm of the deep margin and to within approximately 1 cm of the inferior margin  No other abnormalities are noted  Representative sections  Fourteen cassettes    1: Lateral margin nearest lesion   2: Medial margin nearest lesion  3: Inferior margin nearest lesion  4: Superior margin nearest lesion  5: Posterior margin nearest lesion  6: Anterior margin/skin nearest lesion  7-11: Lesion, entire, sequentially submitted  12: Bridging tissue  13-14:  Possible additional biopsy site, bisected, entire, includes adjacent posterior and inferior margins I  The specimen is received in formalin, labeled with the patient's name and hospital number, and is designated "right axillary adipose tissue  The specimen consists of multiple fibrofatty tissue fragments measuring in aggregate in 4 x 4 x 2 cm  Examination of the fragments reveals 3 densities consistent with lymph nodes measuring 0 4, 0 8 and 1 cm  The densities are entirely submitted for histologic examination in 3 cassettes as follows:  1:1 bisected  2:1   3:1, between sponges    Note: The estimated total formalin fixation time based upon information provided by the submitting clinician and the standard processing schedule is 34 50 hours  MCrites        Clinical Information Right axillary sentinel lymph node #1        This note was created with voice recognition software  Phonic, grammatical and spelling errors may be present within the note as a result

## 2022-04-19 DIAGNOSIS — R21 RASH: ICD-10-CM

## 2022-04-19 RX ORDER — PREDNISONE 10 MG/1
TABLET ORAL
Qty: 21 TABLET | Refills: 0 | Status: SHIPPED | OUTPATIENT
Start: 2022-04-19

## 2022-04-20 ENCOUNTER — TELEPHONE (OUTPATIENT)
Dept: GENETICS | Facility: CLINIC | Age: 77
End: 2022-04-20

## 2022-04-20 ENCOUNTER — OFFICE VISIT (OUTPATIENT)
Dept: SURGICAL ONCOLOGY | Facility: CLINIC | Age: 77
End: 2022-04-20
Payer: COMMERCIAL

## 2022-04-20 VITALS
RESPIRATION RATE: 12 BRPM | BODY MASS INDEX: 29.72 KG/M2 | WEIGHT: 161.5 LBS | HEIGHT: 62 IN | SYSTOLIC BLOOD PRESSURE: 132 MMHG | TEMPERATURE: 98 F | OXYGEN SATURATION: 96 % | HEART RATE: 90 BPM | DIASTOLIC BLOOD PRESSURE: 80 MMHG

## 2022-04-20 DIAGNOSIS — C50.811 MALIGNANT NEOPLASM OF OVERLAPPING SITES OF RIGHT BREAST IN FEMALE, ESTROGEN RECEPTOR POSITIVE (HCC): ICD-10-CM

## 2022-04-20 DIAGNOSIS — Z17.0 MALIGNANT NEOPLASM OF OVERLAPPING SITES OF RIGHT BREAST IN FEMALE, ESTROGEN RECEPTOR POSITIVE (HCC): ICD-10-CM

## 2022-04-20 DIAGNOSIS — Z98.890 STATUS POST RIGHT BREAST LUMPECTOMY: ICD-10-CM

## 2022-04-20 DIAGNOSIS — Z71.89 OTHER SPECIFIED COUNSELING: Primary | ICD-10-CM

## 2022-04-20 PROCEDURE — 99214 OFFICE O/P EST MOD 30 MIN: CPT | Performed by: SURGERY

## 2022-04-20 NOTE — TELEPHONE ENCOUNTER
Reflexed Result Call    Today I spoke with Tyson Adan over the phone to review the reflexed results of her genetic test for hereditary cancer  She underwent genetic testing through our program on 3/10 due to her recent diagnosis of breast cancer  Her initial genetic test results were released to her by Velma Mckenzie on 3/21  Initial Test: [facility] Breast Cancer STAT Panel (9 genes): SYDNI, BRCA1, BRCA2, CDH1, CHEK2, PALB2, PTEN, STK11, and TP53     Result: Negative - No Clinically Significant Variants Detected    Reflexed Test:  [facility] Core Cancer Panel (27 additional genes): APC, SYDNI, AXIN2 BARD1, BRCA1, BRCA2, BRIP1, BMPR1A, CDH1, CDK4, CDKN2A, CHEK2, DICER1, EPCAM, GREM1, HOXB13, MLH1, MSH2, MSH3, MSH6, MUTYH, NBN, NF1, NTHL1, PALB2, PMS2, POLD1, POLE, PTEN, RAD51C, RAD51D, RECQL SMAD4, SMARCA4, STK11, TP53 + [facility] Renal/Urinary Tract Cancers Panel (25 genes)BAP1, CDC73, CDKN1C, DICER1, DIS3L2, EPCAM, FH, FLCN, GPC3, MET, MLH1, MSH2, MSH6, PMS2, PTEN, REST, SDHB, SDHC, SMARCA4, SMARCB1, TP53, TSC1, TSC2, VHL, WT1     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), 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    Additional Result: Variant of Uncertain Significance (VUS)  Uncertain Variant: [id number] c [id number] (p [id number])    Assessment:    I reviewed with Tyson Adan that 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  However, this result is considered to be possibly mosaic, meaning the lab did not identify this mutation in the typical frequency they would expect (less than 50%)  There are several possible reasons for the APC deletion to be at a lower than expected frequency in Paula's blood sample includin  Constitutional somatic mosaicism (I e  the variant was acquired during embryonic development and is present in some cells, but absent from others)  In this scenario, some of Paula's cells carry the TP53 variant while the remaining cells do not    The TP53 variant is not isolated to the blood but may be present in other tissues of the body like the colon  In this scenario it is possible that some the germ cells (eggs/sperm) carry the TP53 variant and there may be risks for relatives such as children  2  Clonal hematopoiesis (I e  The expansion of a group of blood cells derived from a single hematopoietic stem cell that acquired a genetic mutation)  Clonal expansion in the circulating hematopoietic cells has been studied and reported under multiple terms, including clonal hematopoiesis of indeterminate potential (CHIP), aberrant clonal expansion (ACE), and age-related clonal hematopoiesis [facility]) (PMID: 86659607)  Studies show that the TP53 gene accounts for the largest proportion (approximately 38 8%) of somatic pathogenic variants associated with clonal hematopoiesis followed by the SYDNI, CHEK2, PTEN, NF, and even BRCA1 and BRCA2 genes (PMID: 98565931)  Clonal hematopoiesis is rare in persons younger than age 36 but the frequency increases with age  The frequency of clonal hematopoiesis was 9 5%, 11 7%, and 18 4% in individuals aged 66-77, 80-80, and , respectively (PMID: 66299826)  Some studies found an association of clonal hematopoiesis with chemotherapy, radiation therapy and tobacco use (PMID 40342413)  The presence of clonal hematopoiesis has been associated with increased risk of hematologic malignancies, cardiac disease, and even solid tumors as well as increased mortality (PMID: 63952722)  The APC gene is not a typical gene involved in ACE  3  Other possible etiologies include technical interference in the assay, or genetic variants circulating in the blood from a hematologic malignancy or from a solid tumor  For most assays evaluating germline genetic variants, the assay is not sensitive enough to detect circulating tumor DNA  Clinical Discussion:     Mutations in the APC gene is associated with autosomal dominant familial adenomatous polyposis (FAP)   FAP is characterized by the development of hundreds to thousands of adenomatous colon polyps, with % penetrance  Extracolonic cancers include duodenal, stomach, hepatic, brain, pancreatic, and thyroid  AFAP is associated with fewer colon polyps, reduced penetrance and later cancer onset (PMID: 73103476)  Fredo Nunez and her relatives do not exhibit this pattern of hundreds to thousands of colon polyps and do not carry a clinical polyposis diagnosis  She recently had a colonoscopy 5 years ago that did not identify any polyps  Additional Testing Strategies Include:      1  Testing a second tissue sample: Confirmation of somatic mosaicism can be achieved by identifying the APC variant in a second tissue through a skin punch biopsy or fibroblast culture  We do not recommend this strategy, as identifying an APC  mutation in the second tissue type will not change Paula's  medical management or screening  2  Testing Family Members: Confirmation of somatic mosaicism can be achieved by identifying the TP53 variant in a child  Identification of the TP53 variant in a child confirms constitutional somatic mosaicism and excludes clonal hematopoiesis  However, the failure to identify the variant does not exclude either  It does however exclude the risk for the children  I reviewed with Fredo Nunez that her daughters could undergo  genetic testing for this deletion as it could impact their  screening or medical management should they be found to  have this deletion  [facility] for any of Paula's blood  relatives up to 150 days following a positive genetic test result  If  any of Paula's family members have any quesitons regarding  genetic testing would like to pursue genetic testing to understand  if they carry the same APC mutation as Fredo Nunez they can reach  out to the main Genetics number at [phone] for additional  information       Assessment for Variant of Uncertain Significance:  A variant of uncertain significance (VUS) means that a change was identified in a specific gene but it cannot be determined whether the variant is associated with an increased risk of cancer or is a harmless genetic change  The significance of the DIS3L2 variant is currently not known and therefore this test result cannot be used to help determine Parris Mohs cancer risks  The DIS3L2 gene is associated with autosomal recessive Perlman syndrome (Rufus Marie: [id number])  Additionally, Fouzia Thorpe has preliminary evidence supporting a correlation with autosomal dominant predisposition to non-syndromic Wilms tumor; however, the available evidence is insufficient to make a determination regarding this relationship (PMID: 44475156)  It is possible that the variant was seen in only a handful of individuals, or there may be conflicting or incomplete information in the medical literature about the variant and its association with hereditary cancer  Genetic testing for this variant is not recommended for relatives who wish to determine their cancer risks for purposes of determining medical management  The presence or absence of this variant in a relative is not clinically meaningful unless the variant is reclassified in the future  The laboratory will continue to accumulate information on this variant and will reclassify it as either a positive or negative genetic test result when they are confident that they have adequate information  As updated information is obtained, we will notify Parris Mohs with the updated information  It is important to note that the majority of variants of uncertain significance are reclassified as likely benign or benign as additional information about the variant becomes available  There are no additional recommendations based on Paula's result  she should continue cancer screening and medical management as clinically indicated and as determined appropriate by her healthcare providers      Plan: After a discussion about this complicated result, we strongly encouraged targeted testing for the APC deletion for Paula's children  If the children are negative, we still will not be able to distinguish between constitutional somatic mosaicism and clonal hematopoiesis, but we will have ruled out the risk for FAP in her children  Issa Michel expressed understanding that we encourage testing for her daughters  Paula's children can reach out to our office at [phone] to schedule a genetic counseling visit and discuss the possibility of genetic testing

## 2022-04-20 NOTE — TELEPHONE ENCOUNTER
Reflexed Result Call     Today I spoke with [facility] over the phone to review the reflexed results of her genetic test for hereditary cancer  She underwent genetic testing through our program on 3/10 due to her recent diagnosis of breast cancer  Her initial genetic test results were released to her by Cody Garrett on 3/21      Initial Test: [facility] Breast Cancer STAT Panel (9 genes): SYDNI, BRCA1, BRCA2, CDH1, CHEK2, PALB2, PTEN, STK11, and TP53      Result: Negative - No Clinically Significant Variants Detected     Reflexed Test:  [facility] Core Cancer Panel (27 additional genes): APC, SYDNI, AXIN2 BARD1, BRCA1, BRCA2, BRIP1, BMPR1A, CDH1, CDK4, CDKN2A, CHEK2, DICER1, EPCAM, GREM1, HOXB13, MLH1, MSH2, MSH3, MSH6, MUTYH, NBN, NF1, NTHL1, PALB2, PMS2, POLD1, POLE, PTEN, RAD51C, RAD51D, RECQL SMAD4, SMARCA4, STK11, TP53 + [facility] Renal/Urinary Tract Cancers Panel (25 genes)BAP1, CDC73, CDKN1C, DICER1, DIS3L2, EPCAM, FH, FLCN, GPC3, MET, MLH1, MSH2, MSH6, PMS2, PTEN, REST, SDHB, SDHC, SMARCA4, SMARCB1, TP53, TSC1, TSC2, VHL, WT1      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  However, this result is considered to be possibly mosaic, meaning the lab did not identify this mutation in the typical frequency they would expect (less than 50%)  There are several possible reasons for the APC deletion to be at a lower than expected frequency in Paula's blood sample includin  Constitutional somatic mosaicism (I e  the variant was acquired during embryonic development and is present in some cells, but absent from others)  In this scenario, some of Paula's cells carry the APC variant while the remaining cells do not    The APC variant is not isolated to the blood but may be present in other tissues of the body like the colon  In this scenario it is possible that some the germ cells (eggs/sperm) carry the APC variant and there may be risks for relatives such as children          2  Clonal hematopoiesis (I e  The expansion of a group of blood cells derived from a single hematopoietic stem cell that acquired a genetic mutation)  Clonal expansion in the circulating hematopoietic cells has been studied and reported under multiple terms, including clonal hematopoiesis of indeterminate potential (CHIP), aberrant clonal expansion (ACE), and age-related clonal hematopoiesis [facility]) (PMID: 32793678)  Studies show that the TP53 gene accounts for the largest proportion (approximately 38 8%) of somatic pathogenic variants associated with clonal hematopoiesis followed by the SYDNI, CHEK2, PTEN, NF, and even BRCA1 and BRCA2 genes (PMID: 52328709)  Clonal hematopoiesis is rare in persons younger than age 36 but the frequency increases with age  The frequency of clonal hematopoiesis was 9 5%, 11 7%, and 18 4% in individuals aged 66-77, 80-80, and , respectively (PMID: 27418423)  Some studies found an association of clonal hematopoiesis with chemotherapy, radiation therapy and tobacco use (PMID 68671397)  The presence of clonal hematopoiesis has been associated with increased risk of hematologic malignancies, cardiac disease, and even solid tumors as well as increased mortality (PMID: 32648850)  The APC gene is not a typical gene involved in ACE       3  Other possible etiologies include technical interference in the assay, or genetic variants circulating in the blood from a hematologic malignancy or from a solid tumor  For most assays evaluating germline genetic variants, the assay is not sensitive enough to detect circulating tumor DNA        Clinical Discussion:      Mutations in the APC gene is associated with autosomal dominant familial adenomatous polyposis (FAP)  FAP is characterized by the development of hundreds to thousands of adenomatous colon polyps, with % penetrance  Extracolonic cancers include duodenal, stomach, hepatic, brain, pancreatic, and thyroid  AFAP is associated with fewer colon polyps, reduced penetrance and later cancer onset (PMID: 30525834)     Terrance Perez and her relatives do not exhibit this pattern of hundreds to thousands of colon polyps and do not carry a clinical polyposis diagnosis  She recently had a colonoscopy 5 years ago that did not identify any polyps      Additional Testing Strategies Include:       1  Testing a second tissue sample: Confirmation of somatic mosaicism can be achieved by identifying the APC variant in a second tissue through a skin punch biopsy or fibroblast culture             We do not recommend this strategy, as identifying an APC      mutation in the second tissue type will not change Paula's       medical management or screening      2  Testing Family Members: Confirmation of somatic mosaicism can be achieved by identifying the APC variant in a child  Identification of the APC variant in a child confirms constitutional somatic mosaicism and excludes clonal hematopoiesis  However, the failure to identify the variant does not exclude either  It does however exclude the risk for the children              I reviewed with Terrance Perez that her daughters could undergo           genetic testing for this deletion as it could impact their        screening or medical management should they be found to            have this deletion            [facility] free family member testing for any of Paula's blood   relatives up to 150 days following a positive genetic test result   If    any of Paula's family members have any quesitons regarding  genetic testing would like to pursue genetic testing to understand           if they carry the same APC mutation as Terrance Perez they can reach        out to the main Genetics number at [phone] for additional  information       Assessment for Variant of Uncertain Significance:  A variant of uncertain significance (VUS) means that a change was identified in a specific gene but it cannot be determined whether the variant is associated with an increased risk of cancer or is a harmless genetic change  The significance of the DIS3L2 variant is currently not known and therefore this test result cannot be used to help determine Paula cancer risks       The DIS3L2 gene is associated with autosomal recessive Perlman syndrome ([facility]: [id number])  Additionally, Yaron Lyman has preliminary evidence supporting a correlation with autosomal dominant predisposition to non-syndromic Wilms tumor; however, the available evidence is insufficient to make a determination regarding this relationship (PMID: 06160141)     It is possible that the variant was seen in only a handful of individuals, or there may be conflicting or incomplete information in the medical literature about the variant and its association with hereditary cancer       Genetic testing for this variant is not recommended for relatives who wish to determine their cancer risks for purposes of determining medical management  The presence or absence of this variant in a relative is not clinically meaningful unless the variant is reclassified in the future       The laboratory will continue to accumulate information on this variant and will reclassify it as either a positive or negative genetic test result when they are confident that they have adequate information  As updated information is obtained, we will notify Grey Weiner with the updated information  It is important to note that the majority of variants of uncertain significance are reclassified as likely benign or benign as additional information about the variant becomes available       There are no additional recommendations based on Paula's result   she should continue cancer screening and medical management as clinically indicated and as determined appropriate by her healthcare providers      Plan:      After a discussion about this complicated result, we strongly encouraged targeted testing for the APC deletion for Paula's children  If the children are negative, we still will not be able to distinguish between constitutional somatic mosaicism and clonal hematopoiesis, but we will have ruled out the risk for FAP in her children       Terrance Perez expressed understanding that we encourage testing for her daughters      Paula's children can reach out to our office at [phone] to schedule a genetic counseling visit and discuss the possibility of genetic testing

## 2022-04-20 NOTE — PROGRESS NOTES
Surgical Oncology Follow Up  [facility]  [facility] [address]  [place] [address]    Vasiliy Vasquez  1945  [id number]      Chief Complaint   Patient presents with    Post-op        Assessment & Plan: For follow-up with right breast cancer status post right breast conservation surgery and lymph node biopsy pathology was discussed and copy of the report was given to the patient patient will follow with Medical and Radiation oncologist I will see her in 3 months time    Cancer History:     Oncology History Overview Note   2/14/22 Screening Mammogram  RIGHT  A) MASS: There is a mass with spiculated margins seen in the right breast at 11 o'clock in the middle depth  B) CALCIFICATIONS: There are round calcifications in a grouped distribution seen in the right breast at 10 o'clock in the posterior depth  Left  There are no suspicious masses, grouped microcalcifications or areas of unexplained architectural distortion  The skin and nipple areolar complex are unremarkable  RECOMMENDATION:  Diagnostic mammogram and ultrasound at the current time for the right breast mass and posterior calcifications  3/7/22 Mammogram diagnostic right  RIGHT  A) MASS  Mammo diagnostic right w 3d & cad: There is a mass with spiculated margins seen in the right breast at 12 o'clock in the middle depth  US breast right limited (diagnostic): There is a 9 mm hypoechoic mass with spiculated margins with shadowing seen in the right breast at 11 o'clock in the middle depth, 8 cm from the nipple  There is no evidence of lymphadenopathy  B) CALCIFICATIONS  Mammo diagnostic right w 3d & cad: There are round calcifications in a grouped distribution seen in the right breast at 10 o'clock in the posterior depth    Despite low morphologic suspicion, a benign representative sampling will likely be necessary pending pathologic results of the ultrasound-guided core biopsy  RECOMMENDATION:       - Ultrasound-guided breast biopsy for the right breast        - Stereotactic breast biopsy for the right breast     3/7/22 US guided biopsy     Malignant neoplasm of overlapping sites of right breast in female, estrogen receptor positive ([facility] [address] )   3/7/2022 Initial Diagnosis    Malignant neoplasm of right female breast ([facility] [address] )     3/7/2022 Biopsy    Right Breast Ultrasound guided biopsy  12 o'clock, 8 cm from nipple  Invasive mammary carcinoma of no special type with extensive Ductal Carcinoma in situ  Grade 1   ER 95  AZ 75  HER2 1+  Lymphovascular invasion not identified    Concordant  Malingnacy appears unifocal  Right axilla clear  Left breast clear  Breast, Right, Stereo bx right breast 10oclock, 4 cores with calcs:  - Fibroadenoma with focal coarse calcifications  - Negative for atypia and in-situ or invasive carcinoma  Breast, Right, Stereo bx right breast 10oclock, 1 core without calcs:  - Fibroadenoma with focal coarse calcifications  - Negative for atypia and in-situ or invasive carcinoma  3/14/2022 Genetic Testing    A stat panel of genes were evaluated, including: SYDNI, BRCA1, BRCA2, CDH1, CHEK2, PALB2, PTEN, STK11, TP53  Negative result   No pathogenic sequence variants or deletions/duplications identified     3/14/2022 Genomic Testing    MammaPrint   Low risk  Mammaprint Index: +0 537  Luminal type A     3/14/2022 -  Cancer Staged    Staging form: Breast, AJCC 8th Edition  - Clinical stage from 3/14/2022: cT1b, cN0, cM0, GX, ER+, AZ+, HER2- - Signed by Gilda Vee MD on 3/25/2022  Stage prefix: Initial diagnosis  Histologic grading system: 3 grade system       3/29/2022 Surgery    Right breast liyah  directed lumpectomy with sentinel lymph node biopsy  Invasive mammary carcinoma of no special type with associated ductal carcinoma in situ  Grade 1   1 3 cm  All margins negative for invasive carcinoma and ductal carcinoma in situ  0/3 Lymph node  Anatomic/prognostic stage: IA            Interval History:   She is here for follow-up with right breast cancer  Review of Systems:   Review of Systems   Constitutional: Negative for chills and fever  HENT: Negative for ear pain and sore throat  Eyes: Negative for pain and visual disturbance  Respiratory: Negative for cough and shortness of breath  Cardiovascular: Negative for chest pain and palpitations  Gastrointestinal: Negative for abdominal pain and vomiting  Genitourinary: Negative for dysuria and hematuria  Musculoskeletal: Negative for arthralgias and back pain  Skin: Negative for color change and rash  Neurological: Negative for seizures and syncope  All other systems reviewed and are negative        Past Medical History     Patient Active Problem List   Diagnosis    Depression    Gastroesophageal reflux disease without esophagitis    Mixed hyperlipidemia    Essential hypertension    Hypothyroid    Impaired fasting glucose    Lymphoma (HCC)    Cerebral aneurysm, nonruptured    Mild cognitive impairment    Flexor tenosynovitis of finger    Macrocytosis without anemia    Paresthesias    Non-intractable vomiting with nausea    Hypokalemia    Late onset Alzheimer's disease without behavioral disturbance (HCC)    Chest pain    Near syncope    Immunocompromised ([facility] [address] )    Malignant neoplasm of overlapping sites of right breast in female, estrogen receptor positive ([facility] [address] )     Past Medical History:   Diagnosis Date    Anxiety disorder     Aortic valve disorder     Breast cancer ([facility] [address] )     [facility]     breast    Cellulitis of finger of left hand 11/20/2018    Cellulitis of left hand 11/10/2018    Added automatically from request for surgery [id number]    Depression     Disease of thyroid gland     GERD (gastroesophageal reflux disease)     History of gastroesophageal reflux (GERD)     History of kidney cancer 03/06/2014    History of transfusion     Hyperlipidemia 03/06/2014    Hypertension 03/06/2014    Hypothyroidism 03/06/2014    Malignant neoplasm of overlapping sites of right breast in female, estrogen receptor positive ([facility] [address] ) 3/10/2022    Shortness of breath     TIA (transient ischemic attack)     UTI (urinary tract infection)      Past Surgical History:   Procedure Laterality Date    BREAST LUMPECTOMY Right 3/29/2022    Procedure: ITZEL  DIRECTED LUMPECTOMY;  Surgeon: Ellena Castleman, MD;  Location: [facility];  Service: Surgical Oncology    ESOPHAGOGASTRIC FUNDOPLASTY      Nissen Fundoplication    HERNIA REPAIR      LYMPH NODE BIOPSY Right 3/29/2022    Procedure: LYMPHATIC MAPPING WITH BLUE AND RADIOACTIVE DYES SENTINEL LYMPH NODE BIOPSY, INJECTION IN OR AT 0800 BY DR Sara Harper;  Surgeon: Ellena Castleman, MD;  Location: [facility];  Service: Surgical Oncology    MAMMO STEREOTACTIC BREAST BIOPSY RIGHT (ALL INC) Right 3/7/2022    US BREAST ITZEL  NEEDLE LOC RIGHT Right 3/24/2022    US GUIDED BREAST BIOPSY RIGHT COMPLETE Right 3/7/2022    WRIST SURGERY Left      Family History   Problem Relation Age of Onset    Stroke Father     Leukemia Sister     No Known Problems Brother     Skin cancer Mother     Stroke Maternal Grandmother     No Known Problems Maternal Grandfather     No Known Problems Paternal Grandmother     No Known Problems Paternal Grandfather     Breast cancer Sister     Breast cancer Sister     Kidney cancer Brother     Alcohol abuse Brother     Kidney cancer Brother     Stomach cancer Brother     No Known Problems Daughter     No Known Problems Daughter      Social History     Socioeconomic History    Marital status:       Spouse name: Not on file    Number of children: 2    Years of education: Not on file    Highest education level: Not on file   Occupational History    Not on file   Tobacco Use    Smoking status: Never Smoker    Smokeless tobacco: Never Used   Vaping Use    Vaping Use: Never used   Substance and Sexual Activity    Alcohol use: Yes     Comment: social-rare    Drug use: Never    Sexual activity: Not Currently   Other Topics Concern    Not on file   Social History Narrative    Not on file     Social Determinants of Health     Financial Resource Strain: Not on file   Food Insecurity: Not on file   Transportation Needs: Not on file   Physical Activity: Not on file   Stress: Not on file   Social Connections: Not on file   Intimate Partner Violence: Not on file   Housing Stability: Not on file       Current Outpatient Medications:     amLODIPine (NORVASC) 2 5 mg tablet, TAKE 2 TABLETS BY MOUTH DAILY, Disp: 180 tablet, Rfl: 1    aspirin 325 mg tablet, Take 325 mg by mouth daily , Disp: , Rfl:     cholestyramine (QUESTRAN) 4 g packet, TAKE 1 PACKET (4 G TOTAL) BY MOUTH DAILY, Disp: 90 packet, Rfl: 1    diphenhydrAMINE (BENADRYL) 25 mg tablet, Take 25 mg by mouth daily 1/2 tablet with Paxil, Disp: , Rfl:     fluticasone (FLONASE) 50 mcg/act nasal spray, SPRAY 1 SPRAY INTO EACH NOSTRIL EVERY DAY (Patient taking differently: as needed ), Disp: 16 mL, Rfl: 1    levothyroxine 125 mcg tablet, TAKE 1 TABLET BY MOUTH EVERY DAY, Disp: 90 tablet, Rfl: 1    meclizine (ANTIVERT) 25 mg tablet, TAKE 1 TABLET BY MOUTH THREE TIMES A DAY AS NEEDED FOR DIZZINESS, Disp: 50 tablet, Rfl: 1    memantine (NAMENDA) 10 mg tablet, Take 1 tablet (10 mg total) by mouth 2 (two) times a day, Disp: 60 tablet, Rfl: 6    Mirabegron ER (Myrbetriq) 50 MG TB24, Take 1 tablet (50 mg total) by mouth daily, Disp: 90 tablet, Rfl: 0    multivitamin (THERAGRAN) TABS, Take 1 tablet by mouth, Disp: , Rfl:     omeprazole (PriLOSEC) 20 mg delayed release capsule, TAKE 1 CAPSULE BY MOUTH EVERY DAY, Disp: 90 capsule, Rfl: 1    oxyCODONE-acetaminophen (Percocet) 5-325 mg per tablet, Take 1 tablet by mouth every 6 (six) hours as needed for moderate pain Max Daily Amount: 4 tablets, Disp: 20 tablet, Rfl: 0   PARoxetine (PAXIL) 20 mg tablet, TAKE 1 TABLET BY MOUTH EVERY DAY, Disp: 90 tablet, Rfl: 1    potassium chloride (MICRO-K) 10 MEQ CR capsule, Take 1 capsule (10 mEq total) by mouth daily, Disp: 30 capsule, Rfl: 0    predniSONE 10 mg tablet, TAKE 1 TABLET BY MOUTH EVERY DAY, Disp: 21 tablet, Rfl: 0    PROAIR  (90 Base) MCG/ACT inhaler, as needed , Disp: , Rfl:     rivastigmine (EXELON) 9 5 mg/24 hr TD 24 hr patch, APPLY 1 PATCH EVERY DAY, Disp: 90 patch, Rfl: 0  Allergies   Allergen Reactions    Ciprofloxacin Hives    Sulfa Antibiotics Itching    Other     Penicillins Rash     Category: Allergy;   --  Pt received unasyn 1 5 mg IV 11-12-18 to 11-15-18    Sulfacetamide Rash     Category: Allergy; Physical Exam:     Vitals:    04/20/22 1324   BP: 132/80   Pulse: 90   Resp: 12   Temp: 98 °F (36 7 °C)   SpO2: 96%     Physical Exam  Constitutional:       Appearance: Normal appearance  HENT:      Head: Normocephalic and atraumatic  Nose: Nose normal       Mouth/Throat:      Mouth: Mucous membranes are moist    Eyes:      Pupils: Pupils are equal, round, and reactive to light  Cardiovascular:      Rate and Rhythm: Normal rate  Pulses: Normal pulses  Heart sounds: Normal heart sounds  Pulmonary:      Effort: Pulmonary effort is normal       Breath sounds: Normal breath sounds  Abdominal:      General: Bowel sounds are normal       Palpations: Abdomen is soft  Musculoskeletal:         General: Normal range of motion  Cervical back: Normal range of motion and neck supple  Skin:     General: Skin is warm  Neurological:      General: No focal deficit present  Mental Status: She is alert and oriented to person, place, and time  Psychiatric:         Mood and Affect: Mood normal          Behavior: Behavior normal          Thought Content: Thought content normal          Judgment: Judgment normal            Results & Discussion:   I did review  and discuss pathology  She I will see her in 3 months time she was told to call us with any questions or concern in the interim she understand agree to do she understands and  agrees   All patient questions were answered  Advance Care Planning/Advance Directives: Saundra Chavez MD discussed the disease status with Mau whitmore 04/20/22  treatment plans and follow-up with the patient

## 2022-04-22 ENCOUNTER — OFFICE VISIT (OUTPATIENT)
Dept: HEMATOLOGY ONCOLOGY | Facility: CLINIC | Age: 77
End: 2022-04-22
Payer: COMMERCIAL

## 2022-04-22 VITALS
OXYGEN SATURATION: 95 % | HEIGHT: 62 IN | TEMPERATURE: 98.1 F | RESPIRATION RATE: 16 BRPM | DIASTOLIC BLOOD PRESSURE: 96 MMHG | HEART RATE: 93 BPM | WEIGHT: 161 LBS | BODY MASS INDEX: 29.63 KG/M2 | SYSTOLIC BLOOD PRESSURE: 140 MMHG

## 2022-04-22 DIAGNOSIS — Z78.0 POST-MENOPAUSE: ICD-10-CM

## 2022-04-22 DIAGNOSIS — Z17.0 MALIGNANT NEOPLASM OF OVERLAPPING SITES OF RIGHT BREAST IN FEMALE, ESTROGEN RECEPTOR POSITIVE (HCC): Primary | ICD-10-CM

## 2022-04-22 DIAGNOSIS — C50.811 MALIGNANT NEOPLASM OF OVERLAPPING SITES OF RIGHT BREAST IN FEMALE, ESTROGEN RECEPTOR POSITIVE (HCC): Primary | ICD-10-CM

## 2022-04-22 PROCEDURE — 99214 OFFICE O/P EST MOD 30 MIN: CPT | Performed by: INTERNAL MEDICINE

## 2022-04-22 PROCEDURE — 1036F TOBACCO NON-USER: CPT | Performed by: INTERNAL MEDICINE

## 2022-04-22 PROCEDURE — 1160F RVW MEDS BY RX/DR IN RCRD: CPT | Performed by: INTERNAL MEDICINE

## 2022-04-22 RX ORDER — ANASTROZOLE 1 MG/1
1 TABLET ORAL DAILY
Qty: 30 TABLET | Refills: 2 | Status: SHIPPED | OUTPATIENT
Start: 2022-04-22 | End: 2022-07-11

## 2022-04-22 RX ORDER — IBUPROFEN 200 MG
1 CAPSULE ORAL DAILY
Qty: 30 TABLET | Refills: 2 | Status: SHIPPED | OUTPATIENT
Start: 2022-04-22 | End: 2022-08-08

## 2022-04-22 RX ORDER — MELATONIN
1000 DAILY
Qty: 30 TABLET | Refills: 6 | Status: SHIPPED | OUTPATIENT
Start: 2022-04-22 | End: 2022-08-08

## 2022-04-22 NOTE — PROGRESS NOTES
Hematology/Oncology Progress Note    Date of Service: 4/22/2022    [address] [facility]   [address] [facility] [address]    Hem/Onc Problem List:     Right-sided breast cancer, ER TN positive, HER2 negative    Chief Complaint:    Routine follow-up to discuss pathology result and management plan    Assessment/Plan:     I personally reviewed the lab results, image studies results and other specialties/physicians consult notes and recommendations  I shared the findings with patient and family, discussed the diagnosis and management plan as below  1  Right breast invasive mammary carcinoma of unknown type  ER and TN positive, HER2 negative  cT1b cN0  Grade 1  MammaPrint showed low risk disease  Status post lumpectomy with sentinel lymph node biopsy on March 29, 2022  Final pathology showed grade 1 invasive mammary carcinoma, 1 3 cm in greatest dimension, with associated DCIS  Or lymph node negative  All margins negative  Final pathologic stage pT1c pN0, cM0  Stage IA  No benefits of adjuvant chemotherapy  I will refer patient to Dr Rosalind Christianson to discuss the rationale, benefits, risks of adjuvant radiation therapy  Patient will start Arimidex 1 mg daily for 5-7 years  Mammogram 6-12 months after radiation therapy  Mammogram to be ordered by surgical oncology  2  Bone health  DEXA scan in March 2016 showed osteopenia  I will order DEXA scan in 3 months  Patient will start calcium 1200 mg, vitamin-D3 1000IU daily supplement and doing exercise for bone health  3  Inherited gene predisposition  INVITAE DIAGNOSTIC TESTING RESULTS: negative   4  Hypothyroidism on levothyroxine  Patient will continue levothyroxine  5  Hypertension on Norvasc  Her blood pressure is fairly well controlled  6  History of non-Hodgkin lymphoma, status post chemotherapy  No evidence of disease  We continue to monitor    7  Follow-up, return to clinic in 3 months  Patient will call my office if she has questions or side effects from Arimidex  Disclaimer: This document was prepared using [employer] Fluency Direct technology  If a word or phrase is confusing, or does not make sense, this is likely due to recognition error which was not discovered during the providers review  If you believe an error has occurred, please Contact me through [employer] service for jet? cation  AJCC 8th Edition Cancer Stage :      Cancer Staging  Malignant neoplasm of overlapping sites of right breast in female, estrogen receptor positive ([facility] [address] )  Staging form: Breast, AJCC 8th Edition  - Clinical stage from 3/14/2022: cT1b, cN0, cM0, GX, ER+, ID+, HER2- - Signed by Wu Barrera MD on 3/25/2022  Stage prefix: Initial diagnosis  Histologic grading system: 3 grade system      Hematology/Oncology History:   · March 18, 2016 patient had DEXA scan which showed osteopenia  DEXA scan was done in the [facility]  · February 14, 2022 screening bilateral mammogram:     IMPRESSION:  Additional imaging required  A breast health care nurse from our facility will be contacting the patient regarding the need for additional imaging     ASSESSMENT/BI-RADS CATEGORY:  Left: 2 - Benign  Right: 0 - Incomplete: Needs Additional Imaging Evaluation  Overall: 0 - Incomplete: Needs Additional Imaging Evaluation     RECOMMENDATION:       - Diagnostic mammogram and ultrasound at the current time for the right breast mass and posterior calcifications      · March 7, 2022, diagnostic mammogram of right breast:  RIGHT  A) MASS  Mammo diagnostic right w 3d & cad: There is a mass with spiculated margins seen in the right breast at 12 o'clock in the middle depth  US breast right limited (diagnostic): There is a 9 mm hypoechoic mass with spiculated margins with shadowing seen in the right breast at 11 o'clock in the middle depth, 8 cm from the nipple   There is no evidence of lymphadenopathy      B) CALCIFICATIONS  Mammo diagnostic right w 3d & cad: There are round calcifications in a grouped distribution seen in the right breast at 10 o'clock in the posterior depth   Despite low morphologic suspicion, a benign representative sampling will likely be necessary pending pathologic results of the ultrasound-guided core biopsy      · March 7, 2022, ultrasound and mammogram guided biopsy of the breast cancer:  Final Diagnosis   A  Breast, Right, US BX RT BREAST 12:00 8CMFN:  - Invasive mammary carcinoma of no special type, 10 mm in greatest dimension, with 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)    See comment and synoptic report      Comment: Immunohistochemistry for SMMHC, calponin, and CK5/6 demonstrate areas of lost and intact myoepithelial layer, in the invasive and in-situ components respectively          Addendum   HORMONE RECEPTOR AND HER2/olga STUDIES     Performed on invasive carcinoma block A1:  Test Description                        Result               Prognostic Interpretation     Estrogen Receptor/ER                90-95%                           Positive  Primary Antibody: SP1  Internal control: Positive              Staining Intensity: Strong  External control: Positive                          Blanca Score*: 7     Progesterone Receptor/PgR       65-75%                           Positive  Primary Antibody: 1E2  Internal control: Positive              Staining Intensity: Strong  External control: Positive                          Blanca Score*: 7        HER2 by IHC                               1+                                  Negative  Primary Antibody:4B5  HER2 by Radene Cargo Indicated     Appropriate positive and negative controls were reviewed          · March 14, 2022 that came with discussed in our multidisciplinary breast tumor board recommendation of lumpectomy endocrine therapy and radiation therapy  · March 21, 2022, Angie DIAGNOSTIC TESTING RESULTS: negative   · March 24, 2022, MammaPrint showed a low risk disease  · March 29, 2022 patient underwent right breast lumpectomy  Pathology:  Final Diagnosis   A  Right axillary sentinel lymph node #1:  - Fibroadipose tissue with no specific histopathologic abnormality   - No lymph node identified       B  Breast, Right, Additional anterior margin:  - Benign fibroadipose tissue with focal fat necrosis       C  Breast, Right, additional inferior margin:  - Benign fibroadipose tissue       D  Breast, Right, additional lateral margin:  - Benign breast tissue with focal sclerosing adenosis and ectatic ducts       E  Breast, Right, additional medial margin:  - Benign breast tissue       F  Breast, Right, additional posterior margin:  - Benign fibroadipose tissue       G  Breast, Right, additional superior margin:  - Benign fibroadipose tissue       H  Breast, Right, liyah  directed lumpectomy:  - Invasive mammary carcinoma of no special type, 13 mm in greatest dimension, with 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)  See comment and synoptic report       I  Right axillary adipose tissue:  - Three benign lymph nodes (0/3)     Comment:  1  Ancillary Studies:  Performed on original biopsy material, [id number], and reported to be:     - ER:  90-95% / Positive     - WA:  65-75% / Positive     - HER2 (IHC):  1+ / Negative     - Repeat HER2 testing (2013 ASCO/CAP Recommendations):  Not Indicated      - Best representative tumor block:  H10       -- Sufficient tumor present for          Agendia Mammaprint/Blueprint (1 cm2 of invasive tumor in aggregate):  Yes          MI Profile/Foundation One (at least 5 x 5 mm of tumor): Yes     2   Pathologic Stage Classification (pTNM, AJCC 8th Edition):       8th ed, AJCC Anatomic Stage:  at least Stage IA - pT1c, pN0, cM0, G1     3   8th ed   AJCC Pathologic Prognostic Stage (use AJCC update):  IA       History of Present Illiness:   Cleatis Clamp is a 67 y o  female with the above-noted HemOnc history who is here to discuss pathology result and management plan  Patient has grade 1, ER/ND positive, HER2 negative right-sided breast cancer  cT1b cN0  MammaPrint showed low risk disease  No benefits from adjuvant radiation therapy  INVITAE DIAGNOSTIC TESTING RESULTS: negative  Patient underwent lumpectomy with lymph node dissection on March 29, 2022  All margins negative  Or lymph node negative  Final pathologic stage [id number]  Stage IA  Patient had a DEXA scan in March 2016 which showed osteopenia  Patient is not taking calcium or vitamin-D  Patient tolerated procedure very well  No fever or chills  No exertional chest pain, diaphoresis or shortness  of breath  No cough and phlegm, no hemoptysis  Patient denied nausea  and vomiting  No abdominal pain  No diarrhea or constipation  No  symptoms  No headache or blurred vision  No seizure activity  Appetite good  No significant weight loss or weight gain  The patient denies bleeding anywhere  ROS: A 12-point of review of systems is obtained and other than the above is noncontributory  Objective:   VITALS:   /96 (BP Location: Left arm, Cuff Size: Standard)   Pulse 93   Temp 98 1 °F (36 7 °C)   Resp 16   Ht 5' 2" (1 575 m)   Wt 73 kg (161 lb)   SpO2 95%   BMI 29 45 kg/m²     Physical EXAM:  General:  Alert, cooperative, no distress, appears stated age  Head:  Normocephalic, without obvious abnormality  Eyes:  Conjunctivae/corneas clear  No evidence of conjunctivitis     Throat: Lips, mucosa, and tongue normal   No bleeding from mouth  Neck: Supple, symmetrical, trachea midline    Lungs:   Clear to auscultation bilaterally  Respiratory effort easy, nonlabored    Heart:  Regular rate and rhythm, S1, S2 normal, no murmur  Abdomen:   Soft, non-tender,nondistended  Bowel sounds normal  No masses,  No organomegaly       Extremities:  Lymphatics: Extremities normal, no edema  No cervical, axillary or inguinal adenopathy   Skin: No rashes  Neurologic: A&Ox4  No focal neuro deficits       Allergies   Allergen Reactions    Ciprofloxacin Hives    Sulfa Antibiotics Itching    Other     Penicillins Rash     Category: Allergy;   --  Pt received unasyn 1 5 mg IV 11-12-18 to 11-15-18    Sulfacetamide Rash     Category: Allergy;         Past Medical History:   Diagnosis Date    Anxiety disorder     Aortic valve disorder     Breast cancer (HCC)     Cancer ([facility] [address] )     breast    Cellulitis of finger of left hand 11/20/2018    Cellulitis of left hand 11/10/2018    Added automatically from request for surgery [id number]    Depression     Disease of thyroid gland     GERD (gastroesophageal reflux disease)     History of gastroesophageal reflux (GERD)     History of kidney cancer 03/06/2014    History of transfusion     Hyperlipidemia 03/06/2014    Hypertension 03/06/2014    Hypothyroidism 03/06/2014    Malignant neoplasm of overlapping sites of right breast in female, estrogen receptor positive ([facility] ) 3/10/2022    Shortness of breath     TIA (transient ischemic attack)     UTI (urinary tract infection)        Past Surgical History:   Procedure Laterality Date    BREAST LUMPECTOMY Right 3/29/2022    Procedure: ITZEL  DIRECTED LUMPECTOMY;  Surgeon: Ezequiel Vasquez MD;  Location: [facility];  Service: Surgical Oncology    ESOPHAGOGASTRIC FUNDOPLASTY      Nissen Fundoplication    HERNIA REPAIR      LYMPH NODE BIOPSY Right 3/29/2022    Procedure: LYMPHATIC MAPPING WITH BLUE AND RADIOACTIVE DYES SENTINEL LYMPH NODE BIOPSY, INJECTION IN OR AT 0800 BY DR Erasto Byrd;  Surgeon: Ezequiel Vasquez MD;  Location: [facility];  Service: Surgical Oncology    MAMMO STEREOTACTIC BREAST BIOPSY RIGHT (ALL INC) Right 3/7/2022    US BREAST ITZEL  NEEDLE LOC RIGHT Right 3/24/2022    US GUIDED BREAST BIOPSY RIGHT COMPLETE Right 3/7/2022    WRIST SURGERY Left Family History   Problem Relation Age of Onset    Stroke Father    Chris Vega Leukemia Sister     No Known Problems Brother     Skin cancer Mother     Stroke Maternal Grandmother     No Known Problems Maternal Grandfather     No Known Problems Paternal Grandmother     No Known Problems Paternal Grandfather     Breast cancer Sister     Breast cancer Sister     Kidney cancer Brother     Alcohol abuse Brother     Kidney cancer Brother     Stomach cancer Brother     No Known Problems Daughter     No Known Problems Daughter        Social History     Socioeconomic History    Marital status:       Spouse name: Not on file    Number of children: 2    Years of education: Not on file    Highest education level: Not on file   Occupational History    Not on file   Tobacco Use    Smoking status: Never Smoker    Smokeless tobacco: Never Used   Vaping Use    Vaping Use: Never used   Substance and Sexual Activity    Alcohol use: Yes     Comment: social-rare    Drug use: Never    Sexual activity: Not Currently   Other Topics Concern    Not on file   Social History Narrative    Not on file     Social Determinants of Health     Financial Resource Strain: Not on file   Food Insecurity: Not on file   Transportation Needs: Not on file   Physical Activity: Not on file   Stress: Not on file   Social Connections: Not on file   Intimate Partner Violence: Not on file   Housing Stability: Not on file       Current Outpatient Medications   Medication Sig Dispense Refill    amLODIPine (NORVASC) 2 5 mg tablet TAKE 2 TABLETS BY MOUTH DAILY 180 tablet 1    aspirin 325 mg tablet Take 325 mg by mouth daily       cholestyramine (QUESTRAN) 4 g packet TAKE 1 PACKET (4 G TOTAL) BY MOUTH DAILY 90 packet 1    diphenhydrAMINE (BENADRYL) 25 mg tablet Take 25 mg by mouth daily 1/2 tablet with Paxil      fluticasone (FLONASE) 50 mcg/act nasal spray SPRAY 1 SPRAY INTO EACH NOSTRIL EVERY DAY (Patient taking differently: as needed ) 16 mL 1    levothyroxine 125 mcg tablet TAKE 1 TABLET BY MOUTH EVERY DAY 90 tablet 1    meclizine (ANTIVERT) 25 mg tablet TAKE 1 TABLET BY MOUTH THREE TIMES A DAY AS NEEDED FOR DIZZINESS 50 tablet 1    memantine (NAMENDA) 10 mg tablet Take 1 tablet (10 mg total) by mouth 2 (two) times a day 60 tablet 6    Mirabegron ER (Myrbetriq) 50 MG TB24 Take 1 tablet (50 mg total) by mouth daily 90 tablet 0    multivitamin (THERAGRAN) TABS Take 1 tablet by mouth      omeprazole (PriLOSEC) 20 mg delayed release capsule TAKE 1 CAPSULE BY MOUTH EVERY DAY 90 capsule 1    oxyCODONE-acetaminophen (Percocet) 5-325 mg per tablet Take 1 tablet by mouth every 6 (six) hours as needed for moderate pain Max Daily Amount: 4 tablets 20 tablet 0    PARoxetine (PAXIL) 20 mg tablet TAKE 1 TABLET BY MOUTH EVERY DAY 90 tablet 1    potassium chloride (MICRO-K) 10 MEQ CR capsule Take 1 capsule (10 mEq total) by mouth daily 30 capsule 0    predniSONE 10 mg tablet TAKE 1 TABLET BY MOUTH EVERY DAY 21 tablet 0    PROAIR  (90 Base) MCG/ACT inhaler as needed       rivastigmine (EXELON) 9 5 mg/24 hr TD 24 hr patch APPLY 1 PATCH EVERY DAY 90 patch 0     No current facility-administered medications for this visit  (Not in a hospital admission)      DATA REVIEW:    Pathology Result:    Final Diagnosis   Date Value Ref Range Status   03/29/2022   Final    A  Right axillary sentinel lymph node #1:  - Fibroadipose tissue with no specific histopathologic abnormality   - No lymph node identified  B  Breast, Right, Additional anterior margin:  - Benign fibroadipose tissue with focal fat necrosis  C  Breast, Right, additional inferior margin:  - Benign fibroadipose tissue  D  Breast, Right, additional lateral margin:  - Benign breast tissue with focal sclerosing adenosis and ectatic ducts  E  Breast, Right, additional medial margin:  - Benign breast tissue       F  Breast, Right, additional posterior margin:  - Benign fibroadipose tissue  G  Breast, Right, additional superior margin:  - Benign fibroadipose tissue  H  Breast, Right, liyah  directed lumpectomy:  - Invasive mammary carcinoma of no special type, 13 mm in greatest dimension, with 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)  See comment and synoptic report  I  Right axillary adipose tissue:  - Three benign lymph nodes (0/3)  Comment:  1  Ancillary Studies:  Performed on original biopsy material, [id number], and reported to be:     - ER:  90-95% / Positive     - NH:  65-75% / Positive     - HER2 (IHC):  1+ / Negative     - Repeat HER2 testing (2013 ASCO/CAP Recommendations):  Not Indicated      - Best representative tumor block:  H10       -- Sufficient tumor present for          Agendia Mammaprint/Blueprint (1 cm2 of invasive tumor in aggregate):  Yes          MI Profile/Foundation One (at least 5 x 5 mm of tumor):  Yes    2  Pathologic Stage Classification (pTNM, AJCC 8th Edition):       8th ed, AJCC Anatomic Stage:  at least Stage IA - pT1c, pN0, cM0, G1    3   8th ed  AJCC Pathologic Prognostic Stage (use AJCC update):  IA     03/07/2022   Final    A  Breast, Right, US BX RT BREAST 12:00 8CMFN:  - Invasive mammary carcinoma of no special type, 10 mm in greatest dimension, with 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)  See comment and synoptic report  Comment: Immunohistochemistry for SMMHC, calponin, and CK5/6 demonstrate areas of lost and intact myoepithelial layer, in the invasive and in-situ components respectively  Results reported to Christa Aquino at the [address] [place] [address] on 3/9/22 at 10:48am      03/07/2022   Final    A  Breast, Right, Stereo bx right breast 10oclock, 4 cores with calcs:  - Fibroadenoma with focal coarse calcifications  - Negative for atypia and in-situ or invasive carcinoma       B  Breast, Right, Stereo bx right breast 10oclock, 1 core without calcs:  - Fibroadenoma with focal coarse calcifications  - Negative for atypia and in-situ or invasive carcinoma  Image Results: They are reviewed and documented in Hematology/Oncology history    NM sentinal node inj  Narrative: SENTINEL NODE LYMPHOSCINTIGRAPHY    INDICATION:  C50 811: Malignant neoplasm of overlapping sites of right female breast     Localize sentinel node     FINDINGS:    0 550 mCi Tc-99m filtered sulfur colloid (0 6 cc volume) was administered intradermally into the right breast by Dr Gerardo Dias in the OR  No imaging was performed  Impression: Injection of  0 550 mCi Tc-99m sulfur colloid into the right breast in the OR  Workstation performed: [id number]  Mammo liyah  breast specimen (No Charge)  Narrative: SPECIMEN RADIOGRAPH  Impression:   2 images of the right breast specimen were submitted  A   ribbon clip and LIYAH  reflector project together within the specimen   on both views  There is also a bow tie clip in the specimen  Specimen radiograph was not discussed with the surgeon while they were in   the operating room  LABS:  Lab data are reviewed and documented in HemOnc history  No results found for this or any previous visit (from the past 48 hour(s))            Beryl Aceves MD  4/22/2022, 4:10 PM

## 2022-04-25 ENCOUNTER — TELEPHONE (OUTPATIENT)
Dept: GENETICS | Facility: CLINIC | Age: 77
End: 2022-04-25

## 2022-04-25 NOTE — TELEPHONE ENCOUNTER
----- Message from Delano Chi sent at 4/20/2022 11:08 AM EDT -----  Regarding: complete positive chart  GC Completed Chart     Result Type: Positive    Result Delivery: [facility] sent 0049 Jade Gan, patient also requested mail    Monthly Review: Place in monthly chart review folder

## 2022-04-26 ENCOUNTER — PATIENT OUTREACH (OUTPATIENT)
Dept: CASE MANAGEMENT | Facility: HOSPITAL | Age: 77
End: 2022-04-26

## 2022-05-09 ENCOUNTER — CLINICAL SUPPORT (OUTPATIENT)
Dept: RADIATION ONCOLOGY | Facility: CLINIC | Age: 77
End: 2022-05-09
Attending: RADIOLOGY
Payer: COMMERCIAL

## 2022-05-09 VITALS
OXYGEN SATURATION: 96 % | DIASTOLIC BLOOD PRESSURE: 80 MMHG | WEIGHT: 163 LBS | BODY MASS INDEX: 29.81 KG/M2 | HEART RATE: 94 BPM | SYSTOLIC BLOOD PRESSURE: 140 MMHG | TEMPERATURE: 96.7 F | RESPIRATION RATE: 16 BRPM

## 2022-05-09 DIAGNOSIS — Z17.0 MALIGNANT NEOPLASM OF OVERLAPPING SITES OF RIGHT BREAST IN FEMALE, ESTROGEN RECEPTOR POSITIVE (HCC): Primary | ICD-10-CM

## 2022-05-09 DIAGNOSIS — Z17.0 MALIGNANT NEOPLASM OF OVERLAPPING SITES OF RIGHT BREAST IN FEMALE, ESTROGEN RECEPTOR POSITIVE (HCC): ICD-10-CM

## 2022-05-09 DIAGNOSIS — C50.811 MALIGNANT NEOPLASM OF OVERLAPPING SITES OF RIGHT BREAST IN FEMALE, ESTROGEN RECEPTOR POSITIVE (HCC): Primary | ICD-10-CM

## 2022-05-09 DIAGNOSIS — C50.811 MALIGNANT NEOPLASM OF OVERLAPPING SITES OF RIGHT BREAST IN FEMALE, ESTROGEN RECEPTOR POSITIVE (HCC): ICD-10-CM

## 2022-05-09 PROCEDURE — 99215 OFFICE O/P EST HI 40 MIN: CPT | Performed by: RADIOLOGY

## 2022-05-09 PROCEDURE — 99211 OFF/OP EST MAY X REQ PHY/QHP: CPT | Performed by: RADIOLOGY

## 2022-05-09 PROCEDURE — G0463 HOSPITAL OUTPT CLINIC VISIT: HCPCS | Performed by: RADIOLOGY

## 2022-05-09 NOTE — PROGRESS NOTES
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 treated with chemotherapy and radiation and currently ADDI who was recently diagnosed with clinical stage I, grade 1 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 extensive low grade DCIS   Tumor cells were ER/NH(+) and [id number](-)  She was seen in the multidisciplinary breast clinic on 3/14/22 and offered lumpectomy, sentinel lymph node biopsy with adjuvant endocrine therapy and radiation to follow  She now returns after completing surgical resection        3/29/22 Brii Downey  directed right lumpectomy with sentinel lymph node biopsy with Dr Marilyn Contreras  Pathology demonstrated 1 3cm grade 1 invasive mammary carcinoma  There was no LVI and margins were widely negative  There was associated low grade DCIS, but was not extensive  0/3 sentinel lymph nodes demonstrated metastatic disease  Final pathologic stage was IA ([id number])  The patient tolerated surgery well without complication  4/22/22 Dr Remy Farnsworth started patient on Arimidex 1 mg daily for 5-7 years       The patient offers no breast related complaints today  She denies significant breast pain or tenderness  She denies new palpable nodules, suspicious skin changes, or nipple discharge of the breasts bilaterally  She did have radiation to the left breast for non-Hodgkin's lymphoma proximally 15 years ago    She tolerated this well, but does not recall details of her treatment       Upcomin22 Dr Rico Nieto  22 Medical Oncology      Historical Information   Oncology History   Malignant neoplasm of overlapping sites of right breast in female, estrogen receptor positive ([facility] [address] )   3/7/2022 Initial Diagnosis    Malignant neoplasm of right female breast ([facility] [address] )     3/7/2022 Biopsy    Right Breast Ultrasound guided biopsy  12 o'clock, 8 cm from nipple  Invasive mammary carcinoma of no special type with extensive Ductal Carcinoma in situ  Grade 1   ER 95  CT 75  HER2 1+  Lymphovascular invasion not identified    Concordant  Malingnacy appears unifocal  Right axilla clear  Left breast clear  Breast, Right, Stereo bx right breast 10oclock, 4 cores with calcs:  - Fibroadenoma with focal coarse calcifications  - Negative for atypia and in-situ or invasive carcinoma  Breast, Right, Stereo bx right breast 10oclock, 1 core without calcs:  - Fibroadenoma with focal coarse calcifications  - Negative for atypia and in-situ or invasive carcinoma  3/14/2022 Genetic Testing    A stat panel of genes were evaluated, including: SYDNI, BRCA1, BRCA2, CDH1, CHEK2, PALB2, PTEN, STK11, TP53  Negative result   No pathogenic sequence variants or deletions/duplications identified     3/14/2022 Genomic Testing    MammaPrint   Low risk  Mammaprint Index: +0 [phone]  Luminal type A     3/14/2022 -  Cancer Staged    Staging form: Breast, AJCC 8th Edition  - Clinical stage from 3/14/2022: cT1b, cN0, cM0, GX, ER+, CT+, HER2- - Signed by Sammy Wheeler MD on 3/25/2022  Stage prefix: Initial diagnosis  Histologic grading system: 3 grade system       3/29/2022 Surgery    Right breast liyah  directed lumpectomy with sentinel lymph node biopsy  Invasive mammary carcinoma of no special type with associated ductal carcinoma in situ  Grade 1   1 3 cm  All margins negative for invasive carcinoma and ductal carcinoma in situ  0/3 Lymph node  Anatomic/prognostic stage: IA Past Medical History:   Diagnosis Date    Anxiety disorder     Aortic valve disorder     Breast cancer (HCC)     Cancer ([facility] [address] )     breast    Cellulitis of finger of left hand 11/20/2018    Cellulitis of left hand 11/10/2018    Added automatically from request for surgery [id number]    Depression     Disease of thyroid gland     GERD (gastroesophageal reflux disease)     History of gastroesophageal reflux (GERD)     History of kidney cancer 03/06/2014    History of transfusion     Hyperlipidemia 03/06/2014    Hypertension 03/06/2014    Hypothyroidism 03/06/2014    Malignant neoplasm of overlapping sites of right breast in female, estrogen receptor [facility] 3/10/2022    Shortness of breath     TIA (transient ischemic attack)     UTI (urinary tract infection)      Past Surgical History:   Procedure Laterality Date    BREAST LUMPECTOMY Right 3/29/2022    Procedure: ITZEL  DIRECTED LUMPECTOMY;  Surgeon: Lisa Espinal MD;  Location: [facility];  Service: Surgical Oncology    ESOPHAGOGASTRIC FUNDOPLASTY      Nissen Fundoplication    HERNIA REPAIR      LYMPH NODE BIOPSY Right 3/29/2022    Procedure: LYMPHATIC MAPPING WITH BLUE AND RADIOACTIVE DYES SENTINEL LYMPH NODE BIOPSY, INJECTION IN OR AT 0800 BY DR Hilda Bradford;  Surgeon: Lisa Espinal MD;  Location: [facility];  Service: Surgical Oncology    MAMMO STEREOTACTIC BREAST BIOPSY RIGHT (ALL INC) Right 3/7/2022    US BREAST ITZEL  NEEDLE LOC RIGHT Right 3/24/2022    US GUIDED BREAST BIOPSY RIGHT COMPLETE Right 3/7/2022    WRIST SURGERY Left        Social History   Social History     Substance and Sexual Activity   Alcohol Use Yes    Comment: social-rare     Social History     Substance and Sexual Activity   Drug Use Never     Social History     Tobacco Use   Smoking Status Never Smoker   Smokeless Tobacco Never Used         Meds/Allergies     Current Outpatient Medications:     amLODIPine (NORVASC) 2 5 mg tablet, TAKE 2 TABLETS BY MOUTH DAILY, Disp: 180 tablet, Rfl: 1    anastrozole (ARIMIDEX) 1 mg tablet, Take 1 tablet (1 mg total) by mouth daily, Disp: 30 tablet, Rfl: 2    aspirin 325 mg tablet, Take 325 mg by mouth daily , Disp: , Rfl:     calcium carbonate (OS-MICHAEL) 1250 (500 Ca) MG tablet, Take 1 tablet (1,250 mg total) by mouth daily, Disp: 30 tablet, Rfl: 2    cholecalciferol (VITAMIN D3) 1,000 units tablet, Take 1 tablet (1,000 Units total) by mouth daily, Disp: 30 tablet, Rfl: 6    cholestyramine (QUESTRAN) 4 g packet, TAKE 1 PACKET (4 G TOTAL) BY MOUTH DAILY, Disp: 90 packet, Rfl: 1    diphenhydrAMINE (BENADRYL) 25 mg tablet, Take 25 mg by mouth daily 1/2 tablet with Paxil, Disp: , Rfl:     fluticasone (FLONASE) 50 mcg/act nasal spray, SPRAY 1 SPRAY INTO EACH NOSTRIL EVERY DAY (Patient taking differently: as needed ), Disp: 16 mL, Rfl: 1    levothyroxine 125 mcg tablet, TAKE 1 TABLET BY MOUTH EVERY DAY, Disp: 90 tablet, Rfl: 3    meclizine (ANTIVERT) 25 mg tablet, TAKE 1 TABLET BY MOUTH THREE TIMES A DAY AS NEEDED FOR DIZZINESS, Disp: 50 tablet, Rfl: 1    memantine (NAMENDA) 10 mg tablet, Take 1 tablet (10 mg total) by mouth 2 (two) times a day, Disp: 60 tablet, Rfl: 6    Mirabegron ER (Myrbetriq) 50 MG TB24, Take 1 tablet (50 mg total) by mouth daily, Disp: 90 tablet, Rfl: 0    multivitamin (THERAGRAN) TABS, Take 1 tablet by mouth, Disp: , Rfl:     omeprazole (PriLOSEC) 20 mg delayed release capsule, TAKE 1 CAPSULE BY MOUTH EVERY DAY, Disp: 90 capsule, Rfl: 1    oxyCODONE-acetaminophen (Percocet) 5-325 mg per tablet, Take 1 tablet by mouth every 6 (six) hours as needed for moderate pain Max Daily Amount: 4 tablets, Disp: 20 tablet, Rfl: 0    PARoxetine (PAXIL) 20 mg tablet, TAKE 1 TABLET BY MOUTH EVERY DAY, Disp: 90 tablet, Rfl: 1    potassium chloride (MICRO-K) 10 MEQ CR capsule, Take 1 capsule (10 mEq total) by mouth daily, Disp: 30 capsule, Rfl: 0    predniSONE 10 mg tablet, TAKE 1 TABLET BY MOUTH EVERY DAY, Disp: 21 tablet, Rfl: 0    PROAIR  (90 Base) MCG/ACT inhaler, as needed , Disp: , Rfl:     rivastigmine (EXELON) 9 5 mg/24 hr TD 24 hr patch, APPLY 1 PATCH EVERY DAY, Disp: 90 patch, Rfl: 0  Allergies   Allergen Reactions    Ciprofloxacin Hives    Sulfa Antibiotics Itching    Other     Penicillins Rash     Category: Allergy;   --  Pt received unasyn 1 5 mg IV 18 to 11-15-18    Sulfacetamide Rash     Category: Allergy; Review of Systems  Constitutional: Positive for fatigue  HENT: Negative  Eyes:        Wears glasses   Respiratory: Positive for shortness of breath (with exertion)  Cardiovascular: Positive for leg swelling (sometimes B/L ankles)  Gastrointestinal:        Occasional stool incontinence   Genitourinary: Negative  Musculoskeletal: Positive for back pain  Skin: Negative  Allergic/Immunologic: Negative  Neurological: Positive for dizziness and light-headedness  Hematological: Bruises/bleeds easily  Psychiatric/Behavioral: Negative  Negative for sleep disturbance          OB/GYN History:  The patient underwent menarche at 5 years  Menopause Status Pre, Krystina, Unknown and No Answer  No LMP recorded  Patient is postmenopausal   Menopause at 49} years  Menopause Reason natural  Hormone replacement therapy: no     2   Para 2   Age at first delivery being 20 years  Nursing: yes  Birth control pills: no  OBJECTIVE:   /80   Pulse 94   Temp (!) 96 7 °F (35 9 °C)   Resp 16   Wt 73 9 kg (163 lb)   SpO2 96%   BMI 29 81 kg/m²   Karnofsky: 90 - Able to carry on normal activity; minor signs or symptoms of disease     Physical Exam  Vitals and nursing note reviewed  Constitutional:       General: She is not in acute distress  Appearance: She is well-developed  Eyes:      General: No scleral icterus  Cardiovascular:      Rate and Rhythm: Normal rate and regular rhythm  Pulmonary:      Breath sounds:  No wheezing, rhonchi or rales  Chest:   Breasts:      Right: No axillary adenopathy or supraclavicular adenopathy  Left: No axillary adenopathy or supraclavicular adenopathy  Comments: Breast examination demonstrates the patient to be symmetric in contour and size  There is a well-healed upper outer right breast scar and a second well healed axillary scar   There is some localized edema and seroma associated in this area  There are no palpable masses or suspicious skin changes of the breasts bilaterally  Old radiation tattoos noted midline and left lateral chest wall  Abdominal:      General: There is no distension  Palpations: Abdomen is soft  Tenderness: There is no abdominal tenderness  Musculoskeletal:      Right lower leg: No edema  Left lower leg: No edema  Comments: No upper extremity edema bilaterally  Full range of motion of upper extremities bilaterally  Lymphadenopathy:      Cervical: No cervical adenopathy  Upper Body:      Right upper body: No supraclavicular or axillary adenopathy  Left upper body: No supraclavicular or axillary adenopathy  Neurological:      Mental Status: She is alert and oriented to person, place, and time  Gait: Gait normal             RESULTS  Pathology:  Collected 3/29/2022 08:52     Status: Final result     Visible to patient: Yes (not seen)     Dx: Malignant neoplasm of overlapping sit        0 Result Notes    Component    Case Report   Surgical Pathology Report                         Case: [id number]                                    Authorizing Provider: Yong Douglas,  Collected:           03/29/2022 0852                                      MD                                                                            Ordering Location:     [facility] Received:            03/29/2022 1203                                      Operating Room                                                                Pathologist:           Michell Navarro MD                                                                  Specimens:   A) - Axillary lymph node, Right axillary sentinel lymph node #1                                      B) - Breast, Right, Additional anterior margin, inked most distal margin (green)                     C) - Breast, Right, additional inferior margin,  inked most distal margin (blue)                     D) - Breast, Right, additional lateral margin, inked most distal margin (red)                        E) - Breast, Right, additional medial margin, inked most distal margin (yellow)                      F) - Breast, Right, additional posterior margin, inked most distal margin (black)                    G) - Breast, Right, additional superior margin, inked most distal margin (orange)                    H) - Breast, Right, Right breast liyah  directed lumpectomy marked per margin by                 protocol                                                                                             I) - Soft Tissue, Other, right axillary adipose tissue                                     Final Diagnosis   A  Right axillary sentinel lymph node #1:  - Fibroadipose tissue with no specific histopathologic abnormality   - No lymph node identified       B  Breast, Right, Additional anterior margin:  - Benign fibroadipose tissue with focal fat necrosis       C  Breast, Right, additional inferior margin:  - Benign fibroadipose tissue       D  Breast, Right, additional lateral margin:  - Benign breast tissue with focal sclerosing adenosis and ectatic ducts       E  Breast, Right, additional medial margin:  - Benign breast tissue       F  Breast, Right, additional posterior margin:  - Benign fibroadipose tissue       G  Breast, Right, additional superior margin:  - Benign fibroadipose tissue       H   Breast, Right, liyah  directed lumpectomy:  - Invasive mammary carcinoma of no special type, 13 mm in greatest dimension, with 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)  See comment and synoptic report       I  Right axillary adipose tissue:  - Three benign lymph nodes (0/3)     Comment:  1  Ancillary Studies:  Performed on original biopsy material, [id number], and reported to be:     - ER:  90-95% / Positive     - NV:  65-75% / Positive     - HER2 (IHC):  1+ / Negative     - Repeat HER2 testing (2013 ASCO/CAP Recommendations):  Not Indicated      - Best representative tumor block:  H10       -- Sufficient tumor present for          Agendia Mammaprint/Blueprint (1 cm2 of invasive tumor in aggregate):  Yes          MI Profile/Foundation One (at least 5 x 5 mm of tumor): Yes     2   Pathologic Stage Classification (pTNM, AJCC 8th Edition):       8th ed, AJCC Anatomic Stage:  at least Stage IA - pT1c, pN0, cM0, G1     3   8th ed  AJCC Pathologic Prognostic Stage (use AJCC update):  IA   Electronically signed by Stefano Braswell MD on 4/5/2022 at 1341   Additional Information    All reported additional testing was performed with appropriately reactive controls   These tests were developed and their performance characteristics determined by [facility] or appropriate performing facility, though some tests may be performed on tissues which have not been validated for performance characteristics (such as staining performed on alcohol exposed cell blocks and decalcified tissues)   Results should be interpreted with caution and in the context of the patients clinical condition  These tests may not be cleared or approved by the [place]  Food and Drug Administration, though the FDA has determined that such clearance or approval is not necessary  These tests are used for clinical purposes and they should not be regarded as investigational or for research   This laboratory has been approved by [place] 80, designated as a high-complexity laboratory and is qualified to perform these tests     Interpretation performed at [facility], [address]   Synoptic Checklist     INVASIVE CARCINOMA OF THE BREAST: Resection  8th Edition - Protocol posted: 12/17/2021  INVASIVE CARCINOMA OF THE BREAST: COMPLETE EXCISION - All Specimens  SPECIMEN   Procedure  Excision (less than total mastectomy)    Specimen Laterality  Right    TUMOR   Histologic Type  Invasive carcinoma of no special type (ductal)    Histologic Grade (Ladonna Histologic Score)     Glandular (Acinar) / Tubular Differentiation  Score 2    Nuclear Pleomorphism  Score 1    Mitotic Rate  Score 1    Overall Grade  Grade 1 (scores of 3, 4 or 5)    Tumor Size  Greatest dimension of largest invasive focus (Millimeters): 13 mm   Tumor Focality  Single focus of invasive carcinoma    Ductal Carcinoma In Situ (DCIS)  Present      Negative for extensive intraductal component (EIC)    Architectural Patterns  Solid    Nuclear Grade  Grade I (low)         Lymphovascular Invasion  Not identified    Treatment Effect in the Breast  No known presurgical therapy    MARGINS   Margin Status for Invasive Carcinoma  All margins negative for invasive carcinoma    Distance from Invasive Carcinoma to Closest Margin  Greater than: 10 mm   Closest Margin(s) to Invasive Carcinoma  >10 mm all margins    Margin Status for DCIS  All margins negative for DCIS    Distance from DCIS to Closest Margin  Greater than: 10 mm   Closest Margin(s) to DCIS  >10 mm all margins    REGIONAL LYMPH NODES   Regional Lymph Node Status  All regional lymph nodes negative for tumor    Total Number of Lymph Nodes Examined (sentinel and non-sentinel)  3    PATHOLOGIC STAGE CLASSIFICATION (pTNM, AJCC 8th Edition)   Reporting of pT, pN, and (when applicable) pM categories is based on information available to the pathologist at the time the report is issued   As per the AJCC (Chapter 1, 8th Ed ) it is the managing physicians responsibility to establish the final pathologic stage based upon all pertinent information, including but potentially not limited to this pathology report  pT Category  pT1c    pN Category  pN0      Assessment/Plan:  Claudio Maxwell is a 67y o  year old female with multiple comorbidities, but excellent performance status recently diagnosed with clinical stage I, grade 1 invasive mammary carcinoma of the right breast status post lumpectomy and setinel lymph node biopsy achieving negative margins  Tumor cells were ER/AL(+) and KPD8Goh(-)  She will be initiating a planned 5 years of anastrazole endocrine therapy       I again discussed 3 treatment options with the patient today:    1) No adjuvant radiation and pursue adjuvant endocrine therapy alone  2) Accelerated partial breast irradiation (APBI)  3) Hypofractionated whole breast radiation  She has longevity in her family and we discussed the pros and cons of each of the approaches  We discussed radiation provides local control benefit, but does not manifest small survival benefit until about 15 years have passed  Her absolute risk of recurrence is low given her presentation  Radiation would provide a 60-70% relative risk reduction, but the absolute percentage benefit would be expected to be small  This protection, however, would be durable for her lifetime and would increase if she is unable to continue AI therapy for 5 years for any reason  APBI would provide similar, but not quite as risk reduction of local recurrence  After discussion, the patient wished to proceed with hypofractionated whole breast radiation  I feel this is reasonable and is the standard of care for early stage breast cancer adjuvant radiation  We would plan prone breast radiation to 40Gy  Boost would be considered to 50Gy  This is in alignment with NCCN guideline recommendations      The rationale and potential benefits, as well as the risks and acute and late side effects and potential toxicities of radiation were discussed with the patient and her daughter at length  Side effects discussed included, but were not limited to: Fatigue, skin erythema, hyperpigmentation, desquamation, fibrosis, shrinkage of the breast resulting asymmetry, rib weakening, pulmonary fibrosis, lymphedema, increased risk for cardiovascular disease  The patient was given the opportunity to ask questions and all questions were answered to her satisfaction  She wished to proceed with the recommended treatment plan  CT simulation will be scheduled  She will start treatment soon thereafter  Jose Angel Grace MD  2/3/1599,1:52 PM    Portions of the record may have been created with voice recognition software   Occasional wrong word or "sound a like" substitutions may have occurred due to the inherent limitations of voice recognition software   Read the chart carefully and recognize, using context, where substitutions have occurred

## 2022-05-09 NOTE — PROGRESS NOTES
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  Tumor cells were ER/CT(+) and CYX2Grl(-)  She was seen in the multidisciplinary breast clinic on 3/14/22  She underwent Gina  directed right lumpectomy with sentinel lymph node biopsy on 3/29/22  She presents today for follow up     3/14/22  Breast Multidisciplinary Case Conference   PHYSICIAN RECOMMENDED PLAN:  · Offer wide lumpectomy to clear surgical margins    3/25/22 Dr Gopal Lemus  Follow up in 4 weeks and discuss the adjuvant endocrine therapy Arimidex    3/29/22 Zuleykawilliam Contrerasns  directed right lumpectomy with sentinel lymph node biopsy with Dr Wilfred Snowden    22 Dr Wilfred Snowden  follow up with Medical and Radiation oncologist   Follow up in 3 months    22 Dr Gopal Lemus  will start Arimidex 1 mg daily for 5-7 years  Mammogram 6-12 months after radiation therapy  Mammogram to be ordered by surgical oncology  Upcomin22 Dr Wilfred Snowden  22 Medical Oncology        Oncology History   Malignant neoplasm of overlapping sites of right breast in female, estrogen receptor positive ([address] )   3/7/2022 Initial Diagnosis    Malignant neoplasm of right female breast ([facility] [address] )     3/7/2022 Biopsy    Right Breast Ultrasound guided biopsy  12 o'clock, 8 cm from nipple  Invasive mammary carcinoma of no special type with extensive Ductal Carcinoma in situ  Grade 1   ER 95  CT 75  HER2 1+  Lymphovascular invasion not identified    Concordant  Malingnacy appears unifocal  Right axilla clear  Left breast clear  Breast, Right, Stereo bx right breast 10oclock, 4 cores with calcs:  - Fibroadenoma with focal coarse calcifications    - Negative for atypia and in-situ or invasive carcinoma  Breast, Right, Stereo bx right breast 10oclock, 1 core without calcs:  - Fibroadenoma with focal coarse calcifications  - Negative for atypia and in-situ or invasive carcinoma  3/14/2022 Genetic Testing    A stat panel of genes were evaluated, including: SYDNI, BRCA1, BRCA2, CDH1, CHEK2, PALB2, PTEN, STK11, TP53  Negative result  No pathogenic sequence variants or deletions/duplications identified     3/14/2022 Genomic Testing    MammaPrint   Low risk  Mammaprint Index: +0 [phone]  Luminal type A     3/14/2022 -  Cancer Staged    Staging form: Breast, AJCC 8th Edition  - Clinical stage from 3/14/2022: cT1b, cN0, cM0, GX, ER+, AR+, HER2- - Signed by Margaret Long MD on 3/25/2022  Stage prefix: Initial diagnosis  Histologic grading system: 3 grade system       3/29/2022 Surgery    Right breast liyah  directed lumpectomy with sentinel lymph node biopsy  Invasive mammary carcinoma of no special type with associated ductal carcinoma in situ  Grade 1   1 3 cm  All margins negative for invasive carcinoma and ductal carcinoma in situ  0/3 Lymph node  Anatomic/prognostic stage: IA          Review of Systems:  Review of Systems   Constitutional: Positive for fatigue  HENT: Negative  Eyes:        Wears glasses   Respiratory: Positive for shortness of breath (with exertion)  Cardiovascular: Positive for leg swelling (sometimes B/L ankles)  Gastrointestinal:        Occasional stool incontinence   Genitourinary: Negative  Musculoskeletal: Positive for back pain  Skin: Negative  Allergic/Immunologic: Negative  Neurological: Positive for dizziness and light-headedness  Hematological: Bruises/bleeds easily  Psychiatric/Behavioral: Negative  Negative for sleep disturbance  Clinical Trial: no    OB/GYN History:  The patient underwent menarche at 5 years  Menopause Status Pre, Krystina, Unknown and No Answer  No LMP recorded   Patient is postmenopausal   Menopause at 49} years   Menopause Reason natural  Hormone replacement therapy: no     2   Para 2   Age at first delivery being 20 years  Nursing: yes     Birth control pills: no       Pregnancy test needed:  no    ONCOTYPE/MAMMOPRINT results Mammaprint 3/14/22    PFT n/a    Prior Radiation [facility]- left breast    Teaching NIH book, SIM, side effects    MST yes    Implantable Devices (Port, Pacemaker, pain stimulator) no    Hip Replacement no    Covid Vaccine Status yes + 2 boosters     [unfilled]  Health Maintenance   Topic Date Due    Hepatitis C Screening  Never done    Pneumococcal Vaccine: 65+ Years (2 of 2 - PCV13) 2020    Medicare Annual Wellness Visit (AWV)  2022    COVID-19 Vaccine (4 - Booster) 2022    BMI: Followup Plan  2023    Breast Cancer Screening: Mammogram  2023    Fall Risk  2023    BMI: Adult  2023    DTaP,Tdap,and Td Vaccines (2 - Td or Tdap) 11/10/2028    Osteoporosis Screening  Completed    Influenza Vaccine  Completed    HIB Vaccine  Aged Out    Hepatitis B Vaccine  Aged Out    IPV Vaccine  Aged Out    Hepatitis A Vaccine  Aged Out    Meningococcal ACWY Vaccine  Aged Out    HPV Vaccine  Aged Out     Past Medical History:   Diagnosis Date    Anxiety disorder     Aortic valve disorder     Breast cancer ([facility] [address]     Cancer ([facility] [address] )     breast    Cellulitis of finger of left hand 2018    Cellulitis of left hand 11/10/2018    Added automatically from request for surgery [id number]    Depression     Disease of thyroid gland     GERD (gastroesophageal reflux disease)     History of gastroesophageal reflux (GERD)     History of kidney cancer 2014    History of transfusion     Hyperlipidemia 2014    Hypertension 2014    Hypothyroidism 2014    Malignant neoplasm of overlapping sites of right breast in female, estrogen receptor positive ([facility] [address] ) 3/10/2022    Shortness of breath     TIA (transient ischemic attack)     UTI (urinary tract infection)      Past Surgical History:   Procedure Laterality Date    BREAST LUMPECTOMY Right 3/29/2022    Procedure: ITZEL  DIRECTED LUMPECTOMY;  Surgeon: Sara Leong MD;  [facility];  Service: Surgical Oncology    ESOPHAGOGASTRIC FUNDOPLASTY      Nissen Fundoplication    HERNIA REPAIR      LYMPH NODE BIOPSY Right 3/29/2022    Procedure: LYMPHATIC MAPPING WITH BLUE AND RADIOACTIVE DYES SENTINEL LYMPH NODE BIOPSY, INJECTION IN OR AT 0800 BY DR Deyanira Childs;  Surgeon: Sara Leong MD;  [facility];  Service: Surgical Oncology    MAMMO STEREOTACTIC BREAST BIOPSY RIGHT (ALL INC) Right 3/7/2022    US BREAST ITZEL  NEEDLE LOC RIGHT Right 3/24/2022    US GUIDED BREAST BIOPSY RIGHT COMPLETE Right 3/7/2022    WRIST SURGERY Left      Family History   Problem Relation Age of Onset    Stroke Father     Leukemia Sister     No Known Problems Brother     Skin cancer Mother     Stroke Maternal Grandmother     No Known Problems Maternal Grandfather     No Known Problems Paternal Grandmother     No Known Problems Paternal Grandfather     Breast cancer Sister     Breast cancer Sister     Kidney cancer Brother     Alcohol abuse Brother     Kidney cancer Brother     Stomach cancer Brother     No Known Problems Daughter     No Known Problems Daughter      Social History     Tobacco Use    Smoking status: Never Smoker    Smokeless tobacco: Never Used   Vaping Use    Vaping Use: Never used   Substance Use Topics    Alcohol use: Yes     Comment: social-rare    Drug use: Never        Current Outpatient Medications:     amLODIPine (NORVASC) 2 5 mg tablet, TAKE 2 TABLETS BY MOUTH DAILY, Disp: 180 tablet, Rfl: 1    anastrozole (ARIMIDEX) 1 mg tablet, Take 1 tablet (1 mg total) by mouth daily, Disp: 30 tablet, Rfl: 2    aspirin 325 mg tablet, Take 325 mg by mouth daily , Disp: , Rfl:     calcium carbonate (OS-MICHAEL) 1250 (500 Ca) MG tablet, Take 1 tablet (1,250 mg total) by mouth daily, Disp: 30 tablet, Rfl: 2    cholecalciferol (VITAMIN D3) 1,000 units tablet, Take 1 tablet (1,000 Units total) by mouth daily, Disp: 30 tablet, Rfl: 6    cholestyramine (QUESTRAN) 4 g packet, TAKE 1 PACKET (4 G TOTAL) BY MOUTH DAILY, Disp: 90 packet, Rfl: 1    diphenhydrAMINE (BENADRYL) 25 mg tablet, Take 25 mg by mouth daily 1/2 tablet with Paxil, Disp: , Rfl:     fluticasone (FLONASE) 50 mcg/act nasal spray, SPRAY 1 SPRAY INTO EACH NOSTRIL EVERY DAY (Patient taking differently: as needed ), Disp: 16 mL, Rfl: 1    levothyroxine 125 mcg tablet, TAKE 1 TABLET BY MOUTH EVERY DAY, Disp: 90 tablet, Rfl: 3    meclizine (ANTIVERT) 25 mg tablet, TAKE 1 TABLET BY MOUTH THREE TIMES A DAY AS NEEDED FOR DIZZINESS, Disp: 50 tablet, Rfl: 1    memantine (NAMENDA) 10 mg tablet, Take 1 tablet (10 mg total) by mouth 2 (two) times a day, Disp: 60 tablet, Rfl: 6    Mirabegron ER (Myrbetriq) 50 MG TB24, Take 1 tablet (50 mg total) by mouth daily, Disp: 90 tablet, Rfl: 0    multivitamin (THERAGRAN) TABS, Take 1 tablet by mouth, Disp: , Rfl:     omeprazole (PriLOSEC) 20 mg delayed release capsule, TAKE 1 CAPSULE BY MOUTH EVERY DAY, Disp: 90 capsule, Rfl: 1    oxyCODONE-acetaminophen (Percocet) 5-325 mg per tablet, Take 1 tablet by mouth every 6 (six) hours as needed for moderate pain Max Daily Amount: 4 tablets, Disp: 20 tablet, Rfl: 0    PARoxetine (PAXIL) 20 mg tablet, TAKE 1 TABLET BY MOUTH EVERY DAY, Disp: 90 tablet, Rfl: 1    potassium chloride (MICRO-K) 10 MEQ CR capsule, Take 1 capsule (10 mEq total) by mouth daily, Disp: 30 capsule, Rfl: 0    predniSONE 10 mg tablet, TAKE 1 TABLET BY MOUTH EVERY DAY, Disp: 21 tablet, Rfl: 0    PROAIR  (90 Base) MCG/ACT inhaler, as needed , Disp: , Rfl:     rivastigmine (EXELON) 9 5 mg/24 hr TD 24 hr patch, APPLY 1 PATCH EVERY DAY, Disp: 90 patch, Rfl: 0  Allergies   Allergen Reactions    Ciprofloxacin Hives    Sulfa Antibiotics Itching    Other     Penicillins Rash     Category: Allergy;   --  Pt received unasyn 1 5 mg IV 11-12-18 to 11-15-18    Sulfacetamide Rash     Category: Allergy; There were no vitals filed for this visit

## 2022-05-11 ENCOUNTER — OFFICE VISIT (OUTPATIENT)
Dept: NEUROLOGY | Facility: CLINIC | Age: 77
End: 2022-05-11
Payer: COMMERCIAL

## 2022-05-11 ENCOUNTER — APPOINTMENT (OUTPATIENT)
Dept: RADIATION ONCOLOGY | Facility: CLINIC | Age: 77
End: 2022-05-11
Attending: RADIOLOGY
Payer: COMMERCIAL

## 2022-05-11 VITALS
HEART RATE: 78 BPM | OXYGEN SATURATION: 100 % | SYSTOLIC BLOOD PRESSURE: 126 MMHG | WEIGHT: 160 LBS | BODY MASS INDEX: 29.44 KG/M2 | TEMPERATURE: 98.9 F | HEIGHT: 62 IN | DIASTOLIC BLOOD PRESSURE: 68 MMHG

## 2022-05-11 DIAGNOSIS — F02.80 LATE ONSET ALZHEIMER'S DISEASE WITHOUT BEHAVIORAL DISTURBANCE (HCC): Primary | ICD-10-CM

## 2022-05-11 DIAGNOSIS — E78.2 MIXED HYPERLIPIDEMIA: ICD-10-CM

## 2022-05-11 DIAGNOSIS — G31.84 MILD COGNITIVE IMPAIRMENT: ICD-10-CM

## 2022-05-11 DIAGNOSIS — I10 ESSENTIAL HYPERTENSION: ICD-10-CM

## 2022-05-11 DIAGNOSIS — G30.1 LATE ONSET ALZHEIMER'S DISEASE WITHOUT BEHAVIORAL DISTURBANCE (HCC): Primary | ICD-10-CM

## 2022-05-11 PROCEDURE — 1036F TOBACCO NON-USER: CPT | Performed by: PSYCHIATRY & NEUROLOGY

## 2022-05-11 PROCEDURE — 77280 THER RAD SIMULAJ FIELD SMPL: CPT | Performed by: RADIOLOGY

## 2022-05-11 PROCEDURE — 77332 RADIATION TREATMENT AID(S): CPT | Performed by: RADIOLOGY

## 2022-05-11 PROCEDURE — 1160F RVW MEDS BY RX/DR IN RCRD: CPT | Performed by: PSYCHIATRY & NEUROLOGY

## 2022-05-11 PROCEDURE — 99214 OFFICE O/P EST MOD 30 MIN: CPT | Performed by: PSYCHIATRY & NEUROLOGY

## 2022-05-11 PROCEDURE — 77263 THER RADIOLOGY TX PLNG CPLX: CPT | Performed by: RADIOLOGY

## 2022-05-11 RX ORDER — CALCIUM CARBONATE 500(1250)
TABLET ORAL
COMMUNITY
Start: 2022-04-22

## 2022-05-11 NOTE — PROGRESS NOTES
Steve Mirza is a 67 y o  female  Chief Complaint   Patient presents with    Mild cognitive impairment       Assessment:  1  Late onset Alzheimer's disease without behavioral disturbance ([facility] [address] )    2  Mild cognitive impairment    3  Mixed hyperlipidemia    4  Essential hypertension        Plan:  Continue with Exelon patch 9 6 mg per 24 hour and Namenda 10 mg twice a day  Continue with mentally stimulating exercises  Follow-up in 6 months  Discussion:  Patient is doing well her Snyder is 23/30 just about the same as last time, she is tolerating Exelon and Namenda well I have advised her to continue with same also advised to take a multivitamin continue with mentally stimulating exercises, to keep her blood pressure cholesterol and sugar under control to go to the hospital if has any worsening symptoms and call me otherwise to see me back in 6 months and follow up with her other physicians,    Subjective:    HPI   Patient is here in follow-up for memory difficulty, since her last visit she feels her memory is doing good she stays with her daughter her mood is good denies any headaches no vision difficulties no speech difficulty no hallucinations appetite is good weight has been stable, no other complaints      Vitals:    05/11/22 1227   BP: 126/68   BP Location: Right arm   Patient Position: Sitting   Cuff Size: Standard   Pulse: 78   Temp: 98 9 °F (37 2 °C)   TempSrc: Temporal   SpO2: 100%   Weight: 72 6 kg (160 lb)   Height: 5' 2" (1 575 m)       Current Medications    Current Outpatient Medications:     amLODIPine (NORVASC) 2 5 mg tablet, TAKE 2 TABLETS BY MOUTH DAILY, Disp: 180 tablet, Rfl: 1    anastrozole (ARIMIDEX) 1 mg tablet, Take 1 tablet (1 mg total) by mouth daily, Disp: 30 tablet, Rfl: 2    aspirin 325 mg tablet, Take 325 mg by mouth daily , Disp: , Rfl:     calcium carbonate (OS-MICHAEL) 1250 (500 Ca) MG tablet, Take 1 tablet (1,250 mg total) by mouth daily, Disp: 30 tablet, Rfl: 2    calcium carbonate (OYSTER SHELL,OSCAL) 500 mg, TAKE 1 TABLET (1,250 MG TOTAL) BY MOUTH DAILY, Disp: , Rfl:     cholecalciferol (VITAMIN D3) 1,000 units tablet, Take 1 tablet (1,000 Units total) by mouth daily, Disp: 30 tablet, Rfl: 6    cholestyramine (QUESTRAN) 4 g packet, TAKE 1 PACKET (4 G TOTAL) BY MOUTH DAILY, Disp: 90 packet, Rfl: 1    diphenhydrAMINE (BENADRYL) 25 mg tablet, Take 25 mg by mouth in the morning   1/2 tablet with Paxil  , Disp: , Rfl:     fluticasone (FLONASE) 50 mcg/act nasal spray, SPRAY 1 SPRAY INTO EACH NOSTRIL EVERY DAY (Patient taking differently: as needed), Disp: 16 mL, Rfl: 1    levothyroxine 125 mcg tablet, TAKE 1 TABLET BY MOUTH EVERY DAY, Disp: 90 tablet, Rfl: 3    meclizine (ANTIVERT) 25 mg tablet, TAKE 1 TABLET BY MOUTH THREE TIMES A DAY AS NEEDED FOR DIZZINESS, Disp: 50 tablet, Rfl: 1    memantine (NAMENDA) 10 mg tablet, Take 1 tablet (10 mg total) by mouth 2 (two) times a day, Disp: 60 tablet, Rfl: 6    Mirabegron ER (Myrbetriq) 50 MG TB24, Take 1 tablet (50 mg total) by mouth daily, Disp: 90 tablet, Rfl: 0    multivitamin (THERAGRAN) TABS, Take 1 tablet by mouth, Disp: , Rfl:     omeprazole (PriLOSEC) 20 mg delayed release capsule, TAKE 1 CAPSULE BY MOUTH EVERY DAY, Disp: 90 capsule, Rfl: 1    oxyCODONE-acetaminophen (Percocet) 5-325 mg per tablet, Take 1 tablet by mouth every 6 (six) hours as needed for moderate pain Max Daily Amount: 4 tablets, Disp: 20 tablet, Rfl: 0    PARoxetine (PAXIL) 20 mg tablet, TAKE 1 TABLET BY MOUTH EVERY DAY, Disp: 90 tablet, Rfl: 1    potassium chloride (MICRO-K) 10 MEQ CR capsule, Take 1 capsule (10 mEq total) by mouth daily, Disp: 30 capsule, Rfl: 0    predniSONE 10 mg tablet, TAKE 1 TABLET BY MOUTH EVERY DAY, Disp: 21 tablet, Rfl: 0    PROAIR  (90 Base) MCG/ACT inhaler, as needed , Disp: , Rfl:     rivastigmine (EXELON) 9 5 mg/24 hr TD 24 hr patch, APPLY 1 PATCH EVERY DAY, Disp: 90 patch, Rfl: 0      Allergies  Ciprofloxacin, Sulfa antibiotics, Other, Penicillins, and Sulfacetamide    Past Medical History  Past Medical History:   Diagnosis Date    Anxiety disorder     Aortic valve disorder     Breast cancer ([facility] [address] )     Cancer ([facility] )     breast    Cellulitis of finger of left hand 11/20/2018    Cellulitis of left hand 11/10/2018    Added automatically from request for surgery [id number]    Depression     Disease of thyroid gland     GERD (gastroesophageal reflux disease)     History of gastroesophageal reflux (GERD)     History of kidney cancer 03/06/2014    History of transfusion     Hyperlipidemia 03/06/2014    Hypertension 03/06/2014    Hypothyroidism 03/06/2014    Malignant neoplasm of overlapping sites of right breast in female, estrogen receptor positive ([facility] [address] ) 3/10/2022    Shortness of breath     TIA (transient ischemic attack)     UTI (urinary tract infection)          Past Surgical History:  Past Surgical History:   Procedure Laterality Date    BREAST LUMPECTOMY Right 3/29/2022    Procedure: ITZEL  DIRECTED LUMPECTOMY;  Surgeon: Breana Torrez MD;  [facility];  Service: Surgical Oncology    ESOPHAGOGASTRIC FUNDOPLASTY      Nissen Fundoplication    HERNIA REPAIR      LYMPH NODE BIOPSY Right 3/29/2022    Procedure: LYMPHATIC MAPPING WITH BLUE AND RADIOACTIVE DYES SENTINEL LYMPH NODE BIOPSY, INJECTION IN OR AT 0800 BY DR Jakob Beckham;  Surgeon: Breana Torrez MD;  [facility];  Service: Surgical Oncology    MAMMO STEREOTACTIC BREAST BIOPSY RIGHT (ALL INC) Right 3/7/2022    US BREAST ITZEL  NEEDLE LOC RIGHT Right 3/24/2022    US GUIDED BREAST BIOPSY RIGHT COMPLETE Right 3/7/2022    WRIST SURGERY Left          Family History:  Family History   Problem Relation Age of Onset    Stroke Father     Leukemia Sister     No Known Problems Brother     Skin cancer Mother     Stroke Maternal Grandmother     No Known Problems Maternal Grandfather     No Known Problems Paternal Grandmother     No Known Problems Paternal Grandfather     Breast cancer Sister     Breast cancer Sister     Kidney cancer Brother     Alcohol abuse Brother     Kidney cancer Brother     Stomach cancer Brother     No Known Problems Daughter     No Known Problems Daughter        Social History:   reports that she has never smoked  She has never used smokeless tobacco  She reports current alcohol use  She reports that she does not use drugs  I have reviewed the past medical history, surgical history, social and family history, current medications, allergies vitals, review of systems, and updated this information as appropriate today  Objective:    Physical Exam    Neurological Exam    GENERAL:  Cooperative in no acute distress  Well-developed and well-nourished    HEAD and NECK   Head is atraumatic normocephalic with no lesions or masses  Neck is supple with full range of motion    CARDIOVASCULAR  Carotid Arteries-no carotid bruits  NEUROLOGIC:  Mental Status-the patient is awake alert and oriented without aphasia or apraxia, 20/30, repeat was 23/30  Cranial Nerves: Visual fields are full to confrontation  Discs are flat  Extraocular movements are full without nystagmus  Pupils are 2-1/2 mm and reactive  Face is symmetrical to light touch  Movements of facial expression move symmetrically  Hearing is normal to finger rub bilaterally  Soft palate lifts symmetrically  Shoulder shrug is symmetrical  Tongue is midline without atrophy  Motor: No drift is noted on arm extension  Strength is full in the upper and lower extremities with normal bulk and tone  Ambulates with a cane          ROS:  Review of Systems   Constitutional: Negative  Negative for appetite change and fever  HENT: Negative  Negative for hearing loss, tinnitus, trouble swallowing and voice change  Eyes: Negative  Negative for photophobia and pain  Respiratory: Negative  Negative for shortness of breath      Cardiovascular: Negative  Negative for palpitations  Gastrointestinal: Negative  Negative for nausea and vomiting  Endocrine: Negative  Negative for cold intolerance  Genitourinary: Negative  Negative for dysuria, frequency and urgency  Musculoskeletal: Negative  Negative for myalgias and neck pain  Skin: Negative  Negative for rash  Neurological: Negative  Negative for dizziness, tremors, seizures, syncope, facial asymmetry, speech difficulty, weakness, light-headedness, numbness and headaches  Hematological: Negative  Does not bruise/bleed easily  Psychiatric/Behavioral: Negative  Negative for confusion, hallucinations and sleep disturbance

## 2022-05-13 DIAGNOSIS — Z17.0 MALIGNANT NEOPLASM OF OVERLAPPING SITES OF RIGHT BREAST IN FEMALE, ESTROGEN RECEPTOR POSITIVE (HCC): Primary | ICD-10-CM

## 2022-05-13 DIAGNOSIS — C50.811 MALIGNANT NEOPLASM OF OVERLAPPING SITES OF RIGHT BREAST IN FEMALE, ESTROGEN RECEPTOR POSITIVE (HCC): Primary | ICD-10-CM

## 2022-05-14 DIAGNOSIS — F02.80 LATE ONSET ALZHEIMER'S DISEASE WITHOUT BEHAVIORAL DISTURBANCE (HCC): ICD-10-CM

## 2022-05-14 DIAGNOSIS — G30.1 LATE ONSET ALZHEIMER'S DISEASE WITHOUT BEHAVIORAL DISTURBANCE (HCC): ICD-10-CM

## 2022-05-16 RX ORDER — RIVASTIGMINE 9.5 MG/24H
1 PATCH, EXTENDED RELEASE TRANSDERMAL DAILY
Qty: 90 PATCH | Refills: 0 | Status: SHIPPED | OUTPATIENT
Start: 2022-05-16

## 2022-05-16 NOTE — TELEPHONE ENCOUNTER
Called pharmacy and they state they did not receive script that was sent in March  LOV 5/14/22  Next OV 11/14/2022 with Dr Jo Monroy - Rx entered  Please review and sign if in agreement

## 2022-05-18 ENCOUNTER — DOCUMENTATION (OUTPATIENT)
Dept: HEMATOLOGY ONCOLOGY | Facility: CLINIC | Age: 77
End: 2022-05-18

## 2022-05-18 PROCEDURE — 77295 3-D RADIOTHERAPY PLAN: CPT | Performed by: RADIOLOGY

## 2022-05-18 PROCEDURE — 77300 RADIATION THERAPY DOSE PLAN: CPT | Performed by: RADIOLOGY

## 2022-05-18 PROCEDURE — 77334 RADIATION TREATMENT AID(S): CPT | Performed by: RADIOLOGY

## 2022-05-18 NOTE — PROGRESS NOTES
Called patient to check In after Sim was placed on 5-   Voicemail was left with my contact information

## 2022-05-20 ENCOUNTER — APPOINTMENT (OUTPATIENT)
Dept: RADIATION ONCOLOGY | Facility: CLINIC | Age: 77
End: 2022-05-20
Attending: RADIOLOGY
Payer: COMMERCIAL

## 2022-05-20 PROCEDURE — 77280 THER RAD SIMULAJ FIELD SMPL: CPT | Performed by: RADIOLOGY

## 2022-05-23 ENCOUNTER — APPOINTMENT (OUTPATIENT)
Dept: RADIATION ONCOLOGY | Facility: CLINIC | Age: 77
End: 2022-05-23
Attending: RADIOLOGY
Payer: COMMERCIAL

## 2022-05-23 PROCEDURE — 77412 RADIATION TX DELIVERY LVL 3: CPT | Performed by: RADIOLOGY

## 2022-05-23 PROCEDURE — 77427 RADIATION TX MANAGEMENT X5: CPT | Performed by: RADIOLOGY

## 2022-05-24 ENCOUNTER — APPOINTMENT (OUTPATIENT)
Dept: RADIATION ONCOLOGY | Facility: CLINIC | Age: 77
End: 2022-05-24
Attending: RADIOLOGY
Payer: COMMERCIAL

## 2022-05-24 PROCEDURE — 77412 RADIATION TX DELIVERY LVL 3: CPT | Performed by: STUDENT IN AN ORGANIZED HEALTH CARE EDUCATION/TRAINING PROGRAM

## 2022-05-25 ENCOUNTER — APPOINTMENT (OUTPATIENT)
Dept: RADIATION ONCOLOGY | Facility: CLINIC | Age: 77
End: 2022-05-25
Attending: RADIOLOGY
Payer: COMMERCIAL

## 2022-05-25 PROCEDURE — 77412 RADIATION TX DELIVERY LVL 3: CPT | Performed by: RADIOLOGY

## 2022-05-26 ENCOUNTER — APPOINTMENT (OUTPATIENT)
Dept: RADIATION ONCOLOGY | Facility: CLINIC | Age: 77
End: 2022-05-26
Attending: RADIOLOGY
Payer: COMMERCIAL

## 2022-05-26 PROCEDURE — 77412 RADIATION TX DELIVERY LVL 3: CPT | Performed by: STUDENT IN AN ORGANIZED HEALTH CARE EDUCATION/TRAINING PROGRAM

## 2022-05-27 ENCOUNTER — APPOINTMENT (OUTPATIENT)
Dept: RADIATION ONCOLOGY | Facility: CLINIC | Age: 77
End: 2022-05-27
Attending: RADIOLOGY
Payer: COMMERCIAL

## 2022-05-27 PROCEDURE — 77417 THER RADIOLOGY PORT IMAGE(S): CPT | Performed by: RADIOLOGY

## 2022-05-27 PROCEDURE — 77412 RADIATION TX DELIVERY LVL 3: CPT | Performed by: RADIOLOGY

## 2022-05-27 PROCEDURE — 77336 RADIATION PHYSICS CONSULT: CPT | Performed by: RADIOLOGY

## 2022-05-31 ENCOUNTER — APPOINTMENT (OUTPATIENT)
Dept: RADIATION ONCOLOGY | Facility: CLINIC | Age: 77
End: 2022-05-31
Attending: RADIOLOGY
Payer: COMMERCIAL

## 2022-06-01 ENCOUNTER — APPOINTMENT (OUTPATIENT)
Dept: RADIATION ONCOLOGY | Facility: CLINIC | Age: 77
End: 2022-06-01
Attending: RADIOLOGY
Payer: COMMERCIAL

## 2022-06-01 ENCOUNTER — APPOINTMENT (OUTPATIENT)
Dept: LAB | Facility: HOSPITAL | Age: 77
End: 2022-06-01
Payer: COMMERCIAL

## 2022-06-01 DIAGNOSIS — C50.811 MALIGNANT NEOPLASM OF OVERLAPPING SITES OF RIGHT BREAST IN FEMALE, ESTROGEN RECEPTOR POSITIVE (HCC): ICD-10-CM

## 2022-06-01 DIAGNOSIS — Z17.0 MALIGNANT NEOPLASM OF OVERLAPPING SITES OF RIGHT BREAST IN FEMALE, ESTROGEN RECEPTOR POSITIVE (HCC): ICD-10-CM

## 2022-06-01 LAB
ALBUMIN SERPL BCP-MCNC: 3.3 G/DL (ref 3.5–5)
ALP SERPL-CCNC: 84 U/L (ref 46–116)
ALT SERPL W P-5'-P-CCNC: 14 U/L (ref 12–78)
ANION GAP SERPL CALCULATED.3IONS-SCNC: 9 MMOL/L (ref 4–13)
AST SERPL W P-5'-P-CCNC: 21 U/L (ref 5–45)
BASOPHILS # BLD MANUAL: 0 THOUSAND/UL (ref 0–0.1)
BASOPHILS NFR MAR MANUAL: 0 % (ref 0–1)
BILIRUB SERPL-MCNC: 0.33 MG/DL (ref 0.2–1)
BUN SERPL-MCNC: 23 MG/DL (ref 5–25)
CALCIUM ALBUM COR SERPL-MCNC: 9.8 MG/DL (ref 8.3–10.1)
CALCIUM SERPL-MCNC: 9.2 MG/DL (ref 8.3–10.1)
CHLORIDE SERPL-SCNC: 104 MMOL/L (ref 100–108)
CO2 SERPL-SCNC: 28 MMOL/L (ref 21–32)
CREAT SERPL-MCNC: 0.82 MG/DL (ref 0.6–1.3)
EOSINOPHIL # BLD MANUAL: 0.16 THOUSAND/UL (ref 0–0.4)
EOSINOPHIL NFR BLD MANUAL: 3 % (ref 0–6)
ERYTHROCYTE [DISTWIDTH] IN BLOOD BY AUTOMATED COUNT: 13.8 % (ref 11.6–15.1)
GFR SERPL CREATININE-BSD FRML MDRD: 69 ML/MIN/1.73SQ M
GLUCOSE P FAST SERPL-MCNC: 93 MG/DL (ref 65–99)
HCT VFR BLD AUTO: 36.7 % (ref 34.8–46.1)
HGB BLD-MCNC: 12.2 G/DL (ref 11.5–15.4)
LYMPHOCYTES # BLD AUTO: 1.49 THOUSAND/UL (ref 0.6–4.47)
LYMPHOCYTES # BLD AUTO: 28 % (ref 14–44)
MACROCYTES BLD QL AUTO: PRESENT
MCH RBC QN AUTO: 35.6 PG (ref 26.8–34.3)
MCHC RBC AUTO-ENTMCNC: 33.2 G/DL (ref 31.4–37.4)
MCV RBC AUTO: 107 FL (ref 82–98)
MONOCYTES # BLD AUTO: 0.48 THOUSAND/UL (ref 0–1.22)
MONOCYTES NFR BLD: 9 % (ref 4–12)
NEUTROPHILS # BLD MANUAL: 3.19 THOUSAND/UL (ref 1.85–7.62)
NEUTS BAND NFR BLD MANUAL: 2 % (ref 0–8)
NEUTS SEG NFR BLD AUTO: 58 % (ref 43–75)
PLATELET # BLD AUTO: 266 THOUSANDS/UL (ref 149–390)
PLATELET BLD QL SMEAR: ADEQUATE
PMV BLD AUTO: 11.8 FL (ref 8.9–12.7)
POTASSIUM SERPL-SCNC: 3.5 MMOL/L (ref 3.5–5.3)
PROT SERPL-MCNC: 7.1 G/DL (ref 6.4–8.2)
RBC # BLD AUTO: 3.43 MILLION/UL (ref 3.81–5.12)
SODIUM SERPL-SCNC: 141 MMOL/L (ref 136–145)
WBC # BLD AUTO: 5.31 THOUSAND/UL (ref 4.31–10.16)

## 2022-06-01 PROCEDURE — 77412 RADIATION TX DELIVERY LVL 3: CPT | Performed by: RADIOLOGY

## 2022-06-01 PROCEDURE — 80053 COMPREHEN METABOLIC PANEL: CPT

## 2022-06-01 PROCEDURE — 85027 COMPLETE CBC AUTOMATED: CPT

## 2022-06-01 PROCEDURE — 77427 RADIATION TX MANAGEMENT X5: CPT | Performed by: RADIOLOGY

## 2022-06-01 PROCEDURE — 36415 COLL VENOUS BLD VENIPUNCTURE: CPT

## 2022-06-01 PROCEDURE — 85007 BL SMEAR W/DIFF WBC COUNT: CPT

## 2022-06-02 ENCOUNTER — APPOINTMENT (OUTPATIENT)
Dept: RADIATION ONCOLOGY | Facility: CLINIC | Age: 77
End: 2022-06-02
Attending: RADIOLOGY
Payer: COMMERCIAL

## 2022-06-02 PROCEDURE — 77412 RADIATION TX DELIVERY LVL 3: CPT | Performed by: RADIOLOGY

## 2022-06-03 ENCOUNTER — APPOINTMENT (OUTPATIENT)
Dept: RADIATION ONCOLOGY | Facility: CLINIC | Age: 77
End: 2022-06-03
Attending: RADIOLOGY
Payer: COMMERCIAL

## 2022-06-03 PROCEDURE — 77412 RADIATION TX DELIVERY LVL 3: CPT | Performed by: RADIOLOGY

## 2022-06-06 ENCOUNTER — APPOINTMENT (OUTPATIENT)
Dept: RADIATION ONCOLOGY | Facility: CLINIC | Age: 77
End: 2022-06-06
Attending: RADIOLOGY
Payer: COMMERCIAL

## 2022-06-06 PROCEDURE — 77412 RADIATION TX DELIVERY LVL 3: CPT | Performed by: RADIOLOGY

## 2022-06-07 ENCOUNTER — APPOINTMENT (OUTPATIENT)
Dept: RADIATION ONCOLOGY | Facility: CLINIC | Age: 77
End: 2022-06-07
Attending: RADIOLOGY
Payer: COMMERCIAL

## 2022-06-07 PROCEDURE — 77412 RADIATION TX DELIVERY LVL 3: CPT | Performed by: RADIOLOGY

## 2022-06-07 PROCEDURE — 77417 THER RADIOLOGY PORT IMAGE(S): CPT | Performed by: RADIOLOGY

## 2022-06-07 PROCEDURE — 77336 RADIATION PHYSICS CONSULT: CPT | Performed by: RADIOLOGY

## 2022-06-08 ENCOUNTER — APPOINTMENT (OUTPATIENT)
Dept: RADIATION ONCOLOGY | Facility: CLINIC | Age: 77
End: 2022-06-08
Attending: RADIOLOGY
Payer: COMMERCIAL

## 2022-06-08 ENCOUNTER — PATIENT OUTREACH (OUTPATIENT)
Dept: CASE MANAGEMENT | Facility: HOSPITAL | Age: 77
End: 2022-06-08

## 2022-06-08 DIAGNOSIS — C50.811 MALIGNANT NEOPLASM OF OVERLAPPING SITES OF RIGHT BREAST IN FEMALE, ESTROGEN RECEPTOR POSITIVE (HCC): Primary | ICD-10-CM

## 2022-06-08 DIAGNOSIS — Z17.0 MALIGNANT NEOPLASM OF OVERLAPPING SITES OF RIGHT BREAST IN FEMALE, ESTROGEN RECEPTOR POSITIVE (HCC): Primary | ICD-10-CM

## 2022-06-08 PROCEDURE — 77412 RADIATION TX DELIVERY LVL 3: CPT | Performed by: RADIOLOGY

## 2022-06-08 PROCEDURE — 77427 RADIATION TX MANAGEMENT X5: CPT | Performed by: RADIOLOGY

## 2022-06-08 NOTE — PROGRESS NOTES
MSW met with pt in [address] [place] [address] today to check in since she's started her treatments  She tells me that things are going well and other than some redness to the treatment site, she does not have any side effects or issues  She says that her daughter has been driving her in each day for treatments just to lessen the load on her  She denies any needs or concerns at this time and thanked me for checking in  OncSW episode will be closed as of today, MSW will remain available to pt as needed moving forward

## 2022-06-09 ENCOUNTER — APPOINTMENT (OUTPATIENT)
Dept: RADIATION ONCOLOGY | Facility: CLINIC | Age: 77
End: 2022-06-09
Attending: RADIOLOGY
Payer: COMMERCIAL

## 2022-06-09 PROCEDURE — 77412 RADIATION TX DELIVERY LVL 3: CPT | Performed by: RADIOLOGY

## 2022-06-10 ENCOUNTER — APPOINTMENT (OUTPATIENT)
Dept: RADIATION ONCOLOGY | Facility: CLINIC | Age: 77
End: 2022-06-10
Attending: RADIOLOGY
Payer: COMMERCIAL

## 2022-06-10 PROCEDURE — 77412 RADIATION TX DELIVERY LVL 3: CPT | Performed by: RADIOLOGY

## 2022-06-13 ENCOUNTER — APPOINTMENT (OUTPATIENT)
Dept: RADIATION ONCOLOGY | Facility: CLINIC | Age: 77
End: 2022-06-13
Attending: RADIOLOGY
Payer: COMMERCIAL

## 2022-06-13 ENCOUNTER — APPOINTMENT (OUTPATIENT)
Dept: RADIATION ONCOLOGY | Facility: CLINIC | Age: 77
End: 2022-06-13
Payer: COMMERCIAL

## 2022-06-13 PROCEDURE — 77412 RADIATION TX DELIVERY LVL 3: CPT | Performed by: RADIOLOGY

## 2022-06-14 ENCOUNTER — APPOINTMENT (OUTPATIENT)
Dept: RADIATION ONCOLOGY | Facility: CLINIC | Age: 77
End: 2022-06-14
Attending: RADIOLOGY
Payer: COMMERCIAL

## 2022-06-14 PROCEDURE — 77417 THER RADIOLOGY PORT IMAGE(S): CPT | Performed by: RADIOLOGY

## 2022-06-14 PROCEDURE — 77412 RADIATION TX DELIVERY LVL 3: CPT | Performed by: RADIOLOGY

## 2022-06-14 PROCEDURE — 77336 RADIATION PHYSICS CONSULT: CPT | Performed by: RADIOLOGY

## 2022-06-15 ENCOUNTER — APPOINTMENT (OUTPATIENT)
Dept: RADIATION ONCOLOGY | Facility: CLINIC | Age: 77
End: 2022-06-15
Payer: COMMERCIAL

## 2022-06-21 ENCOUNTER — DOCUMENTATION (OUTPATIENT)
Dept: HEMATOLOGY ONCOLOGY | Facility: CLINIC | Age: 77
End: 2022-06-21

## 2022-06-21 NOTE — PROGRESS NOTES
Called to check in with patient  No Answer, unable to leave a vm due to mailbox being full  Pt's information forwarded to survivorship

## 2022-06-22 ENCOUNTER — APPOINTMENT (OUTPATIENT)
Dept: RADIATION ONCOLOGY | Facility: CLINIC | Age: 77
End: 2022-06-22
Payer: COMMERCIAL

## 2022-06-30 DIAGNOSIS — G30.1 LATE ONSET ALZHEIMER'S DISEASE WITHOUT BEHAVIORAL DISTURBANCE (HCC): ICD-10-CM

## 2022-06-30 DIAGNOSIS — F02.80 LATE ONSET ALZHEIMER'S DISEASE WITHOUT BEHAVIORAL DISTURBANCE (HCC): ICD-10-CM

## 2022-06-30 RX ORDER — MEMANTINE HYDROCHLORIDE 10 MG/1
TABLET ORAL
Qty: 180 TABLET | Refills: 2 | Status: SHIPPED | OUTPATIENT
Start: 2022-06-30

## 2022-07-11 ENCOUNTER — RADIATION ONCOLOGY FOLLOW-UP (OUTPATIENT)
Dept: RADIATION ONCOLOGY | Facility: CLINIC | Age: 77
End: 2022-07-11
Attending: RADIOLOGY
Payer: COMMERCIAL

## 2022-07-11 ENCOUNTER — TELEPHONE (OUTPATIENT)
Dept: HEMATOLOGY ONCOLOGY | Facility: CLINIC | Age: 77
End: 2022-07-11

## 2022-07-11 VITALS
BODY MASS INDEX: 29.45 KG/M2 | WEIGHT: 161 LBS | DIASTOLIC BLOOD PRESSURE: 82 MMHG | RESPIRATION RATE: 16 BRPM | OXYGEN SATURATION: 97 % | HEART RATE: 84 BPM | TEMPERATURE: 97.1 F | SYSTOLIC BLOOD PRESSURE: 150 MMHG

## 2022-07-11 DIAGNOSIS — C50.811 MALIGNANT NEOPLASM OF OVERLAPPING SITES OF RIGHT BREAST IN FEMALE, ESTROGEN RECEPTOR POSITIVE (HCC): Primary | ICD-10-CM

## 2022-07-11 DIAGNOSIS — Z17.0 MALIGNANT NEOPLASM OF OVERLAPPING SITES OF RIGHT BREAST IN FEMALE, ESTROGEN RECEPTOR POSITIVE (HCC): Primary | ICD-10-CM

## 2022-07-11 PROCEDURE — G0463 HOSPITAL OUTPT CLINIC VISIT: HCPCS | Performed by: RADIOLOGY

## 2022-07-11 PROCEDURE — 99211 OFF/OP EST MAY X REQ PHY/QHP: CPT | Performed by: RADIOLOGY

## 2022-07-11 RX ORDER — ANASTROZOLE 1 MG/1
1 TABLET ORAL DAILY
Qty: 30 TABLET | Refills: 0 | Status: SHIPPED | OUTPATIENT
Start: 2022-07-11 | End: 2022-08-02

## 2022-07-11 NOTE — TELEPHONE ENCOUNTER
I called the patient's daughter as Surgical Oncology informed me that patient was not taking her Arimidex as prescribed  She never started medication  She sees new attending next month for transfer of care as Dr Jayson Long is no longer at our practice  There was no answer  I left voice message regarding the following: I filled Arimidex 1mg once daily without any refills  She will have a repeat CBC, CMP prior to her follow-up with Dr Elyssa Pickard  I informed them to call our office should they have additional questions, concerns

## 2022-07-11 NOTE — PROGRESS NOTES
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 extensive low grade DCIS status post resection achieving negative margins on 3/29/22  Tumor cells were ER/CT(+) and [id number](-)  She recently completed a course of adjuvant radiation on 22  She returns today for one month EOT follow up  Upcomin22 Surgical Oncology  22 Medical Oncology      Follow up visit     Oncology History   Malignant neoplasm of overlapping sites of right breast in female, estrogen receptor positive ([facility] [address] )   3/7/2022 Initial Diagnosis    Malignant neoplasm of right female breast ([facility] [address] )     3/7/2022 Biopsy    Right Breast Ultrasound guided biopsy  12 o'clock, 8 cm from nipple  Invasive mammary carcinoma of no special type with extensive Ductal Carcinoma in situ  Grade 1   ER 95  CT 75  HER2 1+  Lymphovascular invasion not identified    Concordant  Malingnacy appears unifocal  Right axilla clear  Left breast clear  Breast, Right, Stereo bx right breast 10oclock, 4 cores with calcs:  - Fibroadenoma with focal coarse calcifications  - Negative for atypia and in-situ or invasive carcinoma  Breast, Right, Stereo bx right breast 10oclock, 1 core without calcs:  - Fibroadenoma with focal coarse calcifications  - Negative for atypia and in-situ or invasive carcinoma  3/14/2022 Genetic Testing    A stat panel of genes were evaluated, including: SYDNI, BRCA1, BRCA2, CDH1, CHEK2, PALB2, PTEN, STK11, TP53  Negative result   No pathogenic sequence variants or deletions/duplications identified     3/14/2022 Genomic Testing    MammaPrint   Low risk  Mammaprint Index: +0 [phone]  Luminal type A     3/14/2022 -  Cancer Staged    Staging form: Breast, AJCC 8th Edition  - Clinical stage from 3/14/2022: cT1b, cN0, cM0, GX, ER+, AL+, HER2- - Signed by Artem Mei MD on 3/25/2022  Stage prefix: Initial diagnosis  Histologic grading system: 3 grade system       3/29/2022 Surgery    Right breast liyah  directed lumpectomy with sentinel lymph node biopsy  Invasive mammary carcinoma of no special type with associated ductal carcinoma in situ  Grade 1   1 3 cm  All margins negative for invasive carcinoma and ductal carcinoma in situ  0/3 Lymph node  Anatomic/prognostic stage: IA      5/23/2022 - 6/14/2022 Radiation    Treatments:  Course: C1  Plan ID Energy Fractions Dose per Fraction (cGy) Dose Correction (cGy) Total Dose Delivered (cGy) Elapsed Days   R Breast 6X 15 / 15 267 0 4,005 22    Treatment Dates:  5/23/2022 - 6/14/2022  Review of Systems:  Review of Systems   Constitutional: Positive for fatigue  Eyes:        Wears glasses   Respiratory: Negative  Cardiovascular: Negative  Gastrointestinal: Positive for diarrhea  Genitourinary: Negative  Musculoskeletal: Positive for back pain  Skin: Negative  Allergic/Immunologic: Negative  Neurological: Positive for dizziness and light-headedness  Hematological: Negative  Psychiatric/Behavioral: Positive for sleep disturbance         Clinical Trial: no      Covid Vaccine Status yes + 2 boosters    Health Maintenance   Topic Date Due    Hepatitis C Screening  Never done    Pneumococcal Vaccine: 65+ Years (3 - PCV) 11/11/2020    Medicare Annual Wellness Visit (AWV)  02/08/2022    COVID-19 Vaccine (4 - Booster) 03/01/2022    Influenza Vaccine (1) 09/01/2022    BMI: Followup Plan  01/05/2023    Breast Cancer Screening: Mammogram  03/07/2023    Fall Risk  04/12/2023    BMI: Adult  05/11/2023    DTaP,Tdap,and Td Vaccines (2 - Td or Tdap) 11/10/2028    Osteoporosis Screening  Completed    HIB Vaccine  Aged Out    Hepatitis B Vaccine  Aged Out    IPV Vaccine  Aged Out    Hepatitis A Vaccine  Aged Out    Meningococcal ACWY Vaccine  Aged Out    HPV Vaccine  Aged Out     Patient Active Problem List   Diagnosis    Depression    Gastroesophageal reflux disease without esophagitis    Mixed hyperlipidemia    Essential hypertension    Hypothyroid    Impaired fasting glucose    Lymphoma (HCC)    Cerebral aneurysm, nonruptured    Mild cognitive impairment    Flexor tenosynovitis of finger    Macrocytosis without anemia    Paresthesias    Non-intractable vomiting with nausea    Hypokalemia    Late onset Alzheimer's disease without behavioral disturbance (HCC)    Chest pain    Near syncope    Immunocompromised ([facility] [address] [facility]    Malignant neoplasm of overlapping sites of right breast in female, estrogen receptor positive ([facility] [address] )     Past Medical History:   Diagnosis Date    Anxiety disorder     Aortic valve disorder     Breast cancer ([facility] [address] )     [facility]     breast    Cellulitis of finger of left hand 11/20/2018    Cellulitis of left hand 11/10/2018    Added automatically from request for surgery [id number]    Depression     Disease of thyroid gland     GERD (gastroesophageal reflux disease)     History of gastroesophageal reflux (GERD)     History of kidney cancer 03/06/2014    History of transfusion     Hyperlipidemia 03/06/2014    Hypertension 03/06/2014    Hypothyroidism 03/06/2014    Malignant neoplasm of overlapping sites of right breast in female, estrogen receptor positive ([facility] [address] ) 3/10/2022    Shortness of breath     TIA (transient ischemic attack)     UTI (urinary tract infection)      Past Surgical History:   Procedure Laterality Date    BREAST LUMPECTOMY Right 3/29/2022    Procedure: ITZEL  DIRECTED LUMPECTOMY;  Surgeon: Ravinder Keenan MD;  Location: [facility] OR;  Service: Surgical Oncology    ESOPHAGOGASTRIC FUNDOPLASTY      Nissen Fundoplication    HERNIA REPAIR      LYMPH NODE BIOPSY Right 3/29/2022    Procedure: LYMPHATIC MAPPING WITH BLUE AND RADIOACTIVE DYES SENTINEL LYMPH NODE BIOPSY, INJECTION IN OR AT 0800 BY DR Leia Romero;  Surgeon: Pattie Funk MD;  Location: [facility];  Service: Surgical Oncology    MAMMO STEREOTACTIC BREAST BIOPSY RIGHT (ALL INC) Right 3/7/2022    US BREAST ITZEL  NEEDLE LOC RIGHT Right 3/24/2022    US GUIDED BREAST BIOPSY RIGHT COMPLETE Right 3/7/2022    WRIST SURGERY Left      Family History   Problem Relation Age of Onset    Stroke Father     Leukemia Sister     No Known Problems Brother     Skin cancer Mother     Stroke Maternal Grandmother     No Known Problems Maternal Grandfather     No Known Problems Paternal Grandmother     No Known Problems Paternal Grandfather     Breast cancer Sister     Breast cancer Sister     Kidney cancer Brother     Alcohol abuse Brother     Kidney cancer Brother     Stomach cancer Brother     No Known Problems Daughter     No Known Problems Daughter      Social History     Socioeconomic History    Marital status:       Spouse name: Not on file    Number of children: 2    Years of education: Not on file    Highest education level: Not on file   Occupational History    Not on file   Tobacco Use    Smoking status: Never Smoker    Smokeless tobacco: Never Used   Vaping Use    Vaping Use: Never used   Substance and Sexual Activity    Alcohol use: Yes     Comment: social-rare    Drug use: Never    Sexual activity: Not Currently   Other Topics Concern    Not on file   Social History Narrative    Not on file     Social Determinants of Health     Financial Resource Strain: Not on file   Food Insecurity: Not on file   Transportation Needs: Not on file   Physical Activity: Not on file   Stress: Not on file   Social Connections: Not on file   Intimate Partner Violence: Not on file   Housing Stability: Not on file       Current Outpatient Medications:     amLODIPine (NORVASC) 2 5 mg tablet, TAKE 2 TABLETS BY MOUTH DAILY, Disp: 180 tablet, Rfl: 1   anastrozole (ARIMIDEX) 1 mg tablet, Take 1 tablet (1 mg total) by mouth daily, Disp: 30 tablet, Rfl: 2    aspirin 325 mg tablet, Take 325 mg by mouth daily , Disp: , Rfl:     calcium carbonate (OS-MICHAEL) 1250 (500 Ca) MG tablet, Take 1 tablet (1,250 mg total) by mouth daily, Disp: 30 tablet, Rfl: 2    calcium carbonate (OYSTER SHELL,OSCAL) 500 mg, TAKE 1 TABLET (1,250 MG TOTAL) BY MOUTH DAILY, Disp: , Rfl:     cholecalciferol (VITAMIN D3) 1,000 units tablet, Take 1 tablet (1,000 Units total) by mouth daily, Disp: 30 tablet, Rfl: 6    cholestyramine (QUESTRAN) 4 g packet, TAKE 1 PACKET (4 G TOTAL) BY MOUTH DAILY, Disp: 90 packet, Rfl: 1    diphenhydrAMINE (BENADRYL) 25 mg tablet, Take 25 mg by mouth in the morning   1/2 tablet with Paxil  , Disp: , Rfl:     fluticasone (FLONASE) 50 mcg/act nasal spray, SPRAY 1 SPRAY INTO EACH NOSTRIL EVERY DAY (Patient taking differently: as needed), Disp: 16 mL, Rfl: 1    levothyroxine 125 mcg tablet, TAKE 1 TABLET BY MOUTH EVERY DAY, Disp: 90 tablet, Rfl: 3    meclizine (ANTIVERT) 25 mg tablet, TAKE 1 TABLET BY MOUTH THREE TIMES A DAY AS NEEDED FOR DIZZINESS, Disp: 50 tablet, Rfl: 1    memantine (NAMENDA) 10 mg tablet, TAKE 1 TABLET BY MOUTH TWICE A DAY, Disp: 180 tablet, Rfl: 2    multivitamin (THERAGRAN) TABS, Take 1 tablet by mouth, Disp: , Rfl:     Myrbetriq 50 MG TB24, TAKE 1 TABLET BY MOUTH EVERY DAY, Disp: 90 tablet, Rfl: 3    omeprazole (PriLOSEC) 20 mg delayed release capsule, TAKE 1 CAPSULE BY MOUTH EVERY DAY, Disp: 90 capsule, Rfl: 3    oxyCODONE-acetaminophen (Percocet) 5-325 mg per tablet, Take 1 tablet by mouth every 6 (six) hours as needed for moderate pain Max Daily Amount: 4 tablets, Disp: 20 tablet, Rfl: 0    PARoxetine (PAXIL) 20 mg tablet, TAKE 1 TABLET BY MOUTH EVERY DAY, Disp: 90 tablet, Rfl: 1    potassium chloride (MICRO-K) 10 MEQ CR capsule, Take 1 capsule (10 mEq total) by mouth daily, Disp: 30 capsule, Rfl: 0    predniSONE 10 mg tablet, TAKE 1 TABLET BY MOUTH EVERY DAY, Disp: 21 tablet, Rfl: 0    PROAIR  (90 Base) MCG/ACT inhaler, as needed , Disp: , Rfl:     rivastigmine (EXELON) 9 5 mg/24 hr TD 24 hr patch, Place 1 patch on the skin in the morning , Disp: 90 patch, Rfl: 0  Allergies   Allergen Reactions    Ciprofloxacin Hives    Sulfa Antibiotics Itching    Other     Penicillins Rash     Category: Allergy;   --  Pt received unasyn 1 5 mg IV 11-12-18 to 11-15-18    Sulfacetamide Rash     Category: Allergy; There were no vitals filed for this visit

## 2022-07-11 NOTE — PROGRESS NOTES
Follow-up - Radiation Oncology   Artur Timmons 1945 67 y o  female [id number]      History of Present Illness   Cancer Staging  Malignant neoplasm of overlapping sites of right breast in female, estrogen receptor positive ([facility] [address] )  Staging form: Breast, AJCC 8th Edition  - Clinical stage from 3/14/2022: cT1b, cN0, cM0, GX, ER+, UT+, HER2- - Signed by Latrell Madison MD on 3/25/2022  Stage prefix: Initial diagnosis  Histologic grading system: 3 grade system      Artur Timmons is a 67y o  year old female with multiple comorbidities, but excellent performance status, including 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 treated with chemotherapy and radiation and currently ADDI who was diagnosed with clinical stage I, grade 1 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 extensive low grade DCIS status post resection achieving negative margins on 3/29/22  Tumor cells were ER/UT(+) and [id number](-)  She recently completed a course of adjuvant radiation on 22  She returns today for one month EOT follow up      Upcomin22 Surgical Oncology  22 Medical Oncology      Historical Information   Oncology History   Malignant neoplasm of overlapping sites of right breast in female, estrogen receptor positive ([facility] [address] )   3/7/2022 Initial Diagnosis    Malignant neoplasm of right female breast ([facility] [address] )     3/7/2022 Biopsy    Right Breast Ultrasound guided biopsy  12 o'clock, 8 cm from nipple  Invasive mammary carcinoma of no special type with extensive Ductal Carcinoma in situ  Grade 1   ER 95  UT 75  HER2 1+  Lymphovascular invasion not identified    Concordant  Malingnacy appears unifocal  Right axilla clear  Left breast clear  Breast, Right, Stereo bx right breast 10oclock, 4 cores with calcs:  - Fibroadenoma with focal coarse calcifications  - Negative for atypia and in-situ or invasive carcinoma        Breast, Right, Stereo bx right breast 10oclock, 1 core without calcs:  - Fibroadenoma with focal coarse calcifications  - Negative for atypia and in-situ or invasive carcinoma  3/14/2022 Genetic Testing    A stat panel of genes were evaluated, including: SYDNI, BRCA1, BRCA2, CDH1, CHEK2, PALB2, PTEN, STK11, TP53  Negative result  No pathogenic sequence variants or deletions/duplications identified     3/14/2022 Genomic Testing    MammaPrint   Low risk  Mammaprint Index: +0 537  Luminal type A     3/14/2022 -  Cancer Staged    Staging form: Breast, AJCC 8th Edition  - Clinical stage from 3/14/2022: cT1b, cN0, cM0, GX, ER+, MA+, HER2- - Signed by Cookie Adamson MD on 3/25/2022  Stage prefix: Initial diagnosis  Histologic grading system: 3 grade system       3/29/2022 Surgery    Right breast liyah  directed lumpectomy with sentinel lymph node biopsy  Invasive mammary carcinoma of no special type with associated ductal carcinoma in situ  Grade 1   1 3 cm  All margins negative for invasive carcinoma and ductal carcinoma in situ  0/3 Lymph node  Anatomic/prognostic stage: IA      5/23/2022 - 6/14/2022 Radiation    Treatments:  Course: C1  Plan ID Energy Fractions Dose per Fraction (cGy) Dose Correction (cGy) Total Dose Delivered (cGy) Elapsed Days   R Breast 6X 15 / 15 267 0 4,005 22    Treatment Dates:  5/23/2022 - 6/14/2022              Past Medical History:   Diagnosis Date    Anxiety disorder     Aortic valve disorder     Breast cancer ([facility] [address] )     Cancer ([facility] [address] )     breast    Cellulitis of finger of left hand 11/20/2018    Cellulitis of left hand 11/10/2018    Added automatically from request for surgery [id number]    Depression     Disease of thyroid gland     GERD (gastroesophageal reflux disease)     History of gastroesophageal reflux (GERD)     History of kidney cancer 03/06/2014    History of transfusion     Hyperlipidemia 03/06/2014    Hypertension 03/06/2014    Hypothyroidism 03/06/2014    Malignant neoplasm of overlapping sites of right breast in female, estrogen receptor positive ([address] ) 3/10/2022    Shortness of breath     TIA (transient ischemic attack)     UTI (urinary tract infection)      Past Surgical History:   Procedure Laterality Date    BREAST LUMPECTOMY Right 3/29/2022    Procedure: ITZEL  DIRECTED LUMPECTOMY;  Surgeon: Shyam Chambers MD;  Location: [facility];  Service: Surgical Oncology    ESOPHAGOGASTRIC FUNDOPLASTY      Nissen Fundoplication    HERNIA REPAIR      LYMPH NODE BIOPSY Right 3/29/2022    Procedure: LYMPHATIC MAPPING WITH BLUE AND RADIOACTIVE DYES SENTINEL LYMPH NODE BIOPSY, INJECTION IN OR AT 0800 BY DR Herbert Luna;  Surgeon: Shyam Chambers MD;  Location: [facility];  Service: Surgical Oncology    MAMMO STEREOTACTIC BREAST BIOPSY RIGHT (ALL INC) Right 3/7/2022    US BREAST ITZEL  NEEDLE LOC RIGHT Right 3/24/2022    US GUIDED BREAST BIOPSY RIGHT COMPLETE Right 3/7/2022    WRIST SURGERY Left        Social History   Social History     Substance and Sexual Activity   Alcohol Use Yes    Comment: social-rare     Social History     Substance and Sexual Activity   Drug Use Never     Social History     Tobacco Use   Smoking Status Never Smoker   Smokeless Tobacco Never Used         Meds/Allergies     Current Outpatient Medications:     amLODIPine (NORVASC) 2 5 mg tablet, TAKE 2 TABLETS BY MOUTH DAILY, Disp: 180 tablet, Rfl: 1    aspirin 325 mg tablet, Take 325 mg by mouth daily , Disp: , Rfl:     calcium carbonate (OYSTER SHELL,OSCAL) 500 mg, TAKE 1 TABLET (1,250 MG TOTAL) BY MOUTH DAILY, Disp: , Rfl:     cholecalciferol (VITAMIN D3) 1,000 units tablet, Take 1 tablet (1,000 Units total) by mouth daily, Disp: 30 tablet, Rfl: 6    cholestyramine (QUESTRAN) 4 g packet, TAKE 1 PACKET (4 G TOTAL) BY MOUTH DAILY, Disp: 90 packet, Rfl: 1    diphenhydrAMINE (BENADRYL) 25 mg tablet, Take 25 mg by mouth in the morning   1/2 tablet with Paxil  , Disp: , Rfl:     fluticasone (FLONASE) 50 mcg/act nasal spray, SPRAY 1 SPRAY INTO EACH NOSTRIL EVERY DAY (Patient taking differently: as needed), Disp: 16 mL, Rfl: 1    levothyroxine 125 mcg tablet, TAKE 1 TABLET BY MOUTH EVERY DAY, Disp: 90 tablet, Rfl: 3    meclizine (ANTIVERT) 25 mg tablet, TAKE 1 TABLET BY MOUTH THREE TIMES A DAY AS NEEDED FOR DIZZINESS, Disp: 50 tablet, Rfl: 1    multivitamin (THERAGRAN) TABS, Take 1 tablet by mouth, Disp: , Rfl:     Myrbetriq 50 MG TB24, TAKE 1 TABLET BY MOUTH EVERY DAY, Disp: 90 tablet, Rfl: 3    omeprazole (PriLOSEC) 20 mg delayed release capsule, TAKE 1 CAPSULE BY MOUTH EVERY DAY, Disp: 90 capsule, Rfl: 3    oxyCODONE-acetaminophen (Percocet) 5-325 mg per tablet, Take 1 tablet by mouth every 6 (six) hours as needed for moderate pain Max Daily Amount: 4 tablets, Disp: 20 tablet, Rfl: 0    PARoxetine (PAXIL) 20 mg tablet, TAKE 1 TABLET BY MOUTH EVERY DAY, Disp: 90 tablet, Rfl: 1    potassium chloride (MICRO-K) 10 MEQ CR capsule, Take 1 capsule (10 mEq total) by mouth daily, Disp: 30 capsule, Rfl: 0    predniSONE 10 mg tablet, TAKE 1 TABLET BY MOUTH EVERY DAY, Disp: 21 tablet, Rfl: 0    PROAIR  (90 Base) MCG/ACT inhaler, as needed , Disp: , Rfl:     rivastigmine (EXELON) 9 5 mg/24 hr TD 24 hr patch, Place 1 patch on the skin in the morning , Disp: 90 patch, Rfl: 0    anastrozole (ARIMIDEX) 1 mg tablet, Take 1 tablet (1 mg total) by mouth daily, Disp: 30 tablet, Rfl: 0    calcium carbonate (OS-MICHAEL) 1250 (500 Ca) MG tablet, Take 1 tablet (1,250 mg total) by mouth daily, Disp: 30 tablet, Rfl: 2    memantine (NAMENDA) 10 mg tablet, TAKE 1 TABLET BY MOUTH TWICE A DAY, Disp: 180 tablet, Rfl: 2  Allergies   Allergen Reactions    Ciprofloxacin Hives    Sulfa Antibiotics Itching    Other     Penicillins Rash     Category: Allergy;   --  Pt received unasyn 1 5 mg IV 11-12-18 to 11-15-18    Sulfacetamide Rash     Category: Allergy; Review of Systems   Constitutional: Positive for fatigue  Eyes:        Wears glasses   Respiratory: Negative  Cardiovascular: Negative  Gastrointestinal: Positive for diarrhea  Genitourinary: Negative  Musculoskeletal: Positive for back pain  Skin: Negative  Allergic/Immunologic: Negative  Neurological: Positive for dizziness and light-headedness  Hematological: Negative  Psychiatric/Behavioral: Positive for sleep disturbance  OBJECTIVE:   /82   Pulse 84   Temp (!) 97 1 °F (36 2 °C)   Resp 16   Wt 73 kg (161 lb)   SpO2 97%   BMI 29 45 kg/m²   Karnofsky: 90 - Able to carry on normal activity; minor signs or symptoms of disease     Physical Exam  Vitals and nursing note reviewed  Constitutional:       General: She is not in acute distress  Appearance: She is well-developed  Eyes:      General: No scleral icterus  Cardiovascular:      Rate and Rhythm: Normal rate and regular rhythm  Pulmonary:      Breath sounds: No wheezing, rhonchi or rales  Chest:   Breasts:      Right: No axillary adenopathy or supraclavicular adenopathy  Left: No axillary adenopathy or supraclavicular adenopathy  Abdominal:      General: There is no distension  Palpations: Abdomen is soft  Tenderness: There is no abdominal tenderness  Musculoskeletal:      Right lower leg: No edema  Left lower leg: No edema  Comments: Full range of motion of the upper extremities bilaterally  There is no upper extremity edema   Lymphadenopathy:      Cervical: No cervical adenopathy  Upper Body:      Right upper body: No supraclavicular or axillary adenopathy  Left upper body: No supraclavicular or axillary adenopathy  Neurological:      Mental Status: She is alert and oriented to person, place, and time  Gait: Gait (Ambulates with a cane) normal       ***        RESULTS    Lab Results: No results found for this or any previous visit (from the past 672 hour(s))  Imaging Studies:No results found          Assessment/Plan:  No orders of the defined types were placed in this encounter  [place]am [place] is a 67y o  year old female with ***      Bruce Michelle MD  7/11/2022,1:23 PM    Portions of the record may have been created with voice recognition software   Occasional wrong word or "sound a like" substitutions may have occurred due to the inherent limitations of voice recognition software   Read the chart carefully and recognize, using context, where substitutions have occurred

## 2022-07-25 ENCOUNTER — OFFICE VISIT (OUTPATIENT)
Dept: SURGICAL ONCOLOGY | Facility: CLINIC | Age: 77
End: 2022-07-25
Payer: COMMERCIAL

## 2022-07-25 VITALS
DIASTOLIC BLOOD PRESSURE: 88 MMHG | WEIGHT: 157 LBS | HEART RATE: 83 BPM | SYSTOLIC BLOOD PRESSURE: 156 MMHG | BODY MASS INDEX: 28.89 KG/M2 | TEMPERATURE: 97.3 F | OXYGEN SATURATION: 93 % | RESPIRATION RATE: 16 BRPM | HEIGHT: 62 IN

## 2022-07-25 DIAGNOSIS — Z79.811 USE OF ANASTROZOLE: ICD-10-CM

## 2022-07-25 DIAGNOSIS — Z17.0 MALIGNANT NEOPLASM OF OVERLAPPING SITES OF RIGHT BREAST IN FEMALE, ESTROGEN RECEPTOR POSITIVE (HCC): Primary | ICD-10-CM

## 2022-07-25 DIAGNOSIS — C50.811 MALIGNANT NEOPLASM OF OVERLAPPING SITES OF RIGHT BREAST IN FEMALE, ESTROGEN RECEPTOR POSITIVE (HCC): Primary | ICD-10-CM

## 2022-07-25 DIAGNOSIS — I10 ESSENTIAL HYPERTENSION: ICD-10-CM

## 2022-07-25 PROCEDURE — 99214 OFFICE O/P EST MOD 30 MIN: CPT | Performed by: SURGERY

## 2022-07-25 PROCEDURE — 1160F RVW MEDS BY RX/DR IN RCRD: CPT | Performed by: SURGERY

## 2022-07-25 RX ORDER — AMLODIPINE BESYLATE 2.5 MG/1
TABLET ORAL
Qty: 180 TABLET | Refills: 1 | Status: SHIPPED | OUTPATIENT
Start: 2022-07-25 | End: 2022-08-15 | Stop reason: SDUPTHER

## 2022-07-25 NOTE — PROGRESS NOTES
Surgical Oncology Follow Up  [facility]  [facility] [place]  [address]  [place] [address]    Trent Saunders  1945  [id number]      Chief Complaint   Patient presents with    Follow-up        Assessment & Plan:   She is here for follow-up with right breast cancer overall she is doing well she has completed her radiation  Well-healed right breast and right axillary incisions  She is on anastrozole and doing well  Cancer History:     Oncology History   Malignant neoplasm of overlapping sites of right breast in female, estrogen receptor positive ([address] )   3/7/2022 Initial Diagnosis    Malignant neoplasm of right female breast ([facility] [address] )     3/7/2022 Biopsy    Right Breast Ultrasound guided biopsy  12 o'clock, 8 cm from nipple  Invasive mammary carcinoma of no special type with extensive Ductal Carcinoma in situ  Grade 1   ER 95  CO 75  HER2 1+  Lymphovascular invasion not identified    Concordant  Malingnacy appears unifocal  Right axilla clear  Left breast clear  Breast, Right, Stereo bx right breast 10oclock, 4 cores with calcs:  - Fibroadenoma with focal coarse calcifications  - Negative for atypia and in-situ or invasive carcinoma  Breast, Right, Stereo bx right breast 10oclock, 1 core without calcs:  - Fibroadenoma with focal coarse calcifications  - Negative for atypia and in-situ or invasive carcinoma  3/14/2022 Genetic Testing    A stat panel of genes were evaluated, including: SYDNI, BRCA1, BRCA2, CDH1, CHEK2, PALB2, PTEN, STK11, TP53  Negative result   No pathogenic sequence variants or deletions/duplications identified     3/14/2022 Genomic Testing    MammaPrint   Low risk  Mammaprint Index: +0 537  Luminal type A     3/14/2022 -  Cancer Staged    Staging form: Breast, AJCC 8th Edition  - Clinical stage from 3/14/2022: cT1b, cN0, cM0, GX, ER+, CO+, HER2- - Signed by Karen Gama MD on 3/25/2022  Stage prefix: Initial diagnosis  Histologic grading system: 3 grade system       3/29/2022 Surgery    Right breast liyah  directed lumpectomy with sentinel lymph node biopsy  Invasive mammary carcinoma of no special type with associated ductal carcinoma in situ  Grade 1   1 3 cm  All margins negative for invasive carcinoma and ductal carcinoma in situ  0/3 Lymph node  Anatomic/prognostic stage: IA      5/23/2022 - 6/14/2022 Radiation    Treatments:  Course: C1  Plan ID Energy Fractions Dose per Fraction (cGy) Dose Correction (cGy) Total Dose Delivered (cGy) Elapsed Days   R Breast 6X 15 / 15 267 0 4,005 22    Treatment Dates:  5/23/2022 - 6/14/2022  Interval History:   Follow-up with right breast cancer  Review of Systems:   Review of Systems   Constitutional: Negative for chills and fever  HENT: Negative for ear pain and sore throat  Eyes: Negative for pain and visual disturbance  Respiratory: Negative for cough and shortness of breath  Cardiovascular: Negative for chest pain and palpitations  Gastrointestinal: Negative for abdominal pain and vomiting  Genitourinary: Negative for dysuria and hematuria  Musculoskeletal: Negative for arthralgias and back pain  Skin: Negative for color change and rash  Neurological: Negative for seizures and syncope  All other systems reviewed and are negative        Past Medical History     Patient Active Problem List   Diagnosis    Depression    Gastroesophageal reflux disease without esophagitis    Mixed hyperlipidemia    Essential hypertension    Hypothyroid    Impaired fasting glucose    Lymphoma (HCC)    Cerebral aneurysm, nonruptured    Mild cognitive impairment    Flexor tenosynovitis of finger    Macrocytosis without anemia    Paresthesias    Non-intractable vomiting with nausea    Hypokalemia    Late onset Alzheimer's disease without behavioral disturbance (HCC)    Chest pain    Near syncope    Immunocompromised ([address] )    Malignant neoplasm of overlapping sites of right breast in female, estrogen receptor positive ([facility] [address] )     Past Medical History:   Diagnosis Date    Anxiety disorder     Aortic valve disorder     Breast cancer ([facility] [address] )     [facility] ([email or web address]     breast    Cellulitis of finger of left hand 11/20/2018    Cellulitis of left hand 11/10/2018    Added automatically from request for surgery [id number]    Depression     Disease of thyroid gland     GERD (gastroesophageal reflux disease)     History of gastroesophageal reflux (GERD)     History of kidney cancer 03/06/2014    History of transfusion     Hyperlipidemia 03/06/2014    Hypertension 03/06/2014    Hypothyroidism 03/06/2014    Malignant neoplasm of overlapping sites of right breast in female, estrogen receptor positive ([facility] [address] ) 3/10/2022    Shortness of breath     TIA (transient ischemic attack)     UTI (urinary tract infection)      Past Surgical History:   Procedure Laterality Date    BREAST LUMPECTOMY Right 3/29/2022    Procedure: ITZEL  DIRECTED LUMPECTOMY;  Surgeon: Torrey Landaverde MD;  Location: [facility];  Service: Surgical Oncology    ESOPHAGOGASTRIC FUNDOPLASTY      Nissen Fundoplication    HERNIA REPAIR      LYMPH NODE BIOPSY Right 3/29/2022    Procedure: LYMPHATIC MAPPING WITH BLUE AND RADIOACTIVE DYES SENTINEL LYMPH NODE BIOPSY, INJECTION IN OR AT 0800 BY DR Boo Segovia;  Surgeon: Torrey Landaverde MD;  Location: [facility];  Service: Surgical Oncology    MAMMO STEREOTACTIC BREAST BIOPSY RIGHT (ALL INC) Right 3/7/2022    US BREAST ITZEL  NEEDLE LOC RIGHT Right 3/24/2022    US GUIDED BREAST BIOPSY RIGHT COMPLETE Right 3/7/2022    WRIST SURGERY Left      Family History   Problem Relation Age of Onset    Stroke Father     Leukemia Sister     No Known Problems Brother     Skin cancer Mother     Stroke Maternal Grandmother     No Known Problems Maternal Grandfather     No Known Problems Paternal Grandmother     No Known Problems Paternal Grandfather     Breast cancer Sister     Breast cancer Sister     Kidney cancer Brother     Alcohol abuse Brother     Kidney cancer Brother     Stomach cancer Brother     No Known Problems Daughter     No Known Problems Daughter      Social History     Socioeconomic History    Marital status:      Spouse name: Not on file    Number of children: 2    Years of education: Not on file    Highest education level: Not on file   Occupational History    Not on file   Tobacco Use    Smoking status: Never Smoker    Smokeless tobacco: Never Used   Vaping Use    Vaping Use: Never used   Substance and Sexual Activity    Alcohol use: Yes     Comment: social-rare    Drug use: Never    Sexual activity: Not Currently   Other Topics Concern    Not on file   Social History Narrative    Not on file     Social Determinants of Health     Financial Resource Strain: Not on file   Food Insecurity: Not on file   Transportation Needs: Not on file   Physical Activity: Not on file   Stress: Not on file   Social Connections: Not on file   Intimate Partner Violence: Not on file   Housing Stability: Not on file       Current Outpatient Medications:     amLODIPine (NORVASC) 2 5 mg tablet, TAKE 2 TABLETS BY MOUTH DAILY, Disp: 180 tablet, Rfl: 1    anastrozole (ARIMIDEX) 1 mg tablet, Take 1 tablet (1 mg total) by mouth daily, Disp: 30 tablet, Rfl: 0    aspirin 325 mg tablet, Take 325 mg by mouth daily , Disp: , Rfl:     calcium carbonate (OYSTER SHELL,OSCAL) 500 mg, TAKE 1 TABLET (1,250 MG TOTAL) BY MOUTH DAILY, Disp: , Rfl:     cholestyramine (QUESTRAN) 4 g packet, TAKE 1 PACKET (4 G TOTAL) BY MOUTH DAILY, Disp: 90 packet, Rfl: 1    diphenhydrAMINE (BENADRYL) 25 mg tablet, Take 25 mg by mouth in the morning   1/2 tablet with Paxil  , Disp: , Rfl:     fluticasone (FLONASE) 50 mcg/act nasal spray, SPRAY 1 SPRAY INTO EACH NOSTRIL EVERY DAY (Patient taking differently: as needed), Disp: 16 mL, Rfl: 1   levothyroxine 125 mcg tablet, TAKE 1 TABLET BY MOUTH EVERY DAY, Disp: 90 tablet, Rfl: 3    meclizine (ANTIVERT) 25 mg tablet, TAKE 1 TABLET BY MOUTH THREE TIMES A DAY AS NEEDED FOR DIZZINESS, Disp: 50 tablet, Rfl: 1    memantine (NAMENDA) 10 mg tablet, TAKE 1 TABLET BY MOUTH TWICE A DAY, Disp: 180 tablet, Rfl: 2    multivitamin (THERAGRAN) TABS, Take 1 tablet by mouth, Disp: , Rfl:     Myrbetriq 50 MG TB24, TAKE 1 TABLET BY MOUTH EVERY DAY, Disp: 90 tablet, Rfl: 3    omeprazole (PriLOSEC) 20 mg delayed release capsule, TAKE 1 CAPSULE BY MOUTH EVERY DAY, Disp: 90 capsule, Rfl: 3    oxyCODONE-acetaminophen (Percocet) 5-325 mg per tablet, Take 1 tablet by mouth every 6 (six) hours as needed for moderate pain Max Daily Amount: 4 tablets, Disp: 20 tablet, Rfl: 0    PARoxetine (PAXIL) 20 mg tablet, TAKE 1 TABLET BY MOUTH EVERY DAY, Disp: 90 tablet, Rfl: 3    potassium chloride (MICRO-K) 10 MEQ CR capsule, Take 1 capsule (10 mEq total) by mouth daily, Disp: 30 capsule, Rfl: 0    predniSONE 10 mg tablet, TAKE 1 TABLET BY MOUTH EVERY DAY, Disp: 21 tablet, Rfl: 0    PROAIR  (90 Base) MCG/ACT inhaler, as needed , Disp: , Rfl:     rivastigmine (EXELON) 9 5 mg/24 hr TD 24 hr patch, Place 1 patch on the skin in the morning , Disp: 90 patch, Rfl: 0    calcium carbonate (OS-MICHAEL) 1250 (500 Ca) MG tablet, Take 1 tablet (1,250 mg total) by mouth daily, Disp: 30 tablet, Rfl: 2    cholecalciferol (VITAMIN D3) 1,000 units tablet, Take 1 tablet (1,000 Units total) by mouth daily, Disp: 30 tablet, Rfl: 6  Allergies   Allergen Reactions    Ciprofloxacin Hives    Sulfa Antibiotics Itching    Other     Penicillins Rash     Category: Allergy;   --  Pt received unasyn 1 5 mg IV 11-12-18 to 11-15-18    Sulfacetamide Rash     Category: Allergy;         Physical Exam:     Vitals:    07/25/22 1033   BP: 156/88   Pulse: 83   Resp: 16   Temp: (!) 97 3 °F (36 3 °C)   SpO2: 93%     Physical Exam  Constitutional: Appearance: Normal appearance  HENT:      Head: Normocephalic and atraumatic  Nose: Nose normal       Mouth/Throat:      Mouth: Mucous membranes are moist    Eyes:      Pupils: Pupils are equal, round, and reactive to light  Cardiovascular:      Rate and Rhythm: Normal rate  Pulses: Normal pulses  Heart sounds: Normal heart sounds  Pulmonary:      Effort: Pulmonary effort is normal       Breath sounds: Normal breath sounds  Chest:          Comments: The bilateral Breast(s) were examined  There was not any sign of an inverted nipple, mass, nipple discharge, skin changes or tenderness  The bilateral  breast(s) were examined in the sitting and supine position  There are not any worrisome skin changes, tenderness, nipple changes, swelling ,bleeding or evidence of mass/s in all four quadrants  Ani survey demonstrated that there is not any evidence of any clinically suspicious axillary, pectoral or supraclavicular lymph nodes  Abdominal:      General: Bowel sounds are normal       Palpations: Abdomen is soft  Musculoskeletal:         General: Normal range of motion  Cervical back: Normal range of motion and neck supple  Skin:     General: Skin is warm  Neurological:      General: No focal deficit present  Mental Status: She is alert and oriented to person, place, and time  Psychiatric:         Mood and Affect: Mood normal          Behavior: Behavior normal          Thought Content: Thought content normal          Judgment: Judgment normal            Results & Discussion:   I did review further follow-up as well as the side effects of anastrozole in detail  I will follow her in 6  she understands and  agrees   All patient questions were answered  Advance Care Planning/Advance Directives: Kristyn Long MD discussed the disease status with Job Joseph  today 07/25/22  treatment plans and follow-up with the patient

## 2022-07-26 ENCOUNTER — TELEPHONE (OUTPATIENT)
Dept: INTERNAL MEDICINE CLINIC | Facility: CLINIC | Age: 77
End: 2022-07-26

## 2022-07-26 DIAGNOSIS — N39.0 RECURRENT UTI: Primary | ICD-10-CM

## 2022-07-26 RX ORDER — NITROFURANTOIN 25; 75 MG/1; MG/1
100 CAPSULE ORAL 2 TIMES DAILY
Qty: 10 CAPSULE | Refills: 0 | Status: SHIPPED | OUTPATIENT
Start: 2022-07-26 | End: 2022-07-31

## 2022-07-26 NOTE — TELEPHONE ENCOUNTER
Patient's daughter called and said that she is positive her mother has a UTI again  She thinks its pretty bad  Daughter is asking if you could please order a test?    Please advise    Zoila Fernandez

## 2022-07-26 NOTE — TELEPHONE ENCOUNTER
As per Dr Diane Jasso, " I put orders in but also sent antibiotic that she can start after the urine sample is obtained "    Spoke with the patient's daughter and made her aware of this  She verbalized understanding

## 2022-07-27 ENCOUNTER — APPOINTMENT (OUTPATIENT)
Dept: LAB | Facility: HOSPITAL | Age: 77
End: 2022-07-27
Payer: COMMERCIAL

## 2022-07-27 DIAGNOSIS — N39.0 RECURRENT UTI: ICD-10-CM

## 2022-07-27 LAB
BACTERIA UR QL AUTO: ABNORMAL /HPF
BILIRUB UR QL STRIP: ABNORMAL
CLARITY UR: CLEAR
COLOR UR: YELLOW
GLUCOSE UR STRIP-MCNC: NEGATIVE MG/DL
HGB UR QL STRIP.AUTO: ABNORMAL
KETONES UR STRIP-MCNC: ABNORMAL MG/DL
LEUKOCYTE ESTERASE UR QL STRIP: ABNORMAL
NITRITE UR QL STRIP: NEGATIVE
NON-SQ EPI CELLS URNS QL MICRO: ABNORMAL /HPF
PH UR STRIP.AUTO: 6.5 [PH]
PROT UR STRIP-MCNC: ABNORMAL MG/DL
RBC #/AREA URNS AUTO: ABNORMAL /HPF
SP GR UR STRIP.AUTO: 1.02 (ref 1–1.03)
UROBILINOGEN UR QL STRIP.AUTO: 0.2 E.U./DL
WBC #/AREA URNS AUTO: ABNORMAL /HPF

## 2022-07-27 PROCEDURE — 87186 SC STD MICRODIL/AGAR DIL: CPT

## 2022-07-27 PROCEDURE — 87077 CULTURE AEROBIC IDENTIFY: CPT

## 2022-07-27 PROCEDURE — 81001 URINALYSIS AUTO W/SCOPE: CPT

## 2022-07-27 PROCEDURE — 87086 URINE CULTURE/COLONY COUNT: CPT

## 2022-07-29 LAB — BACTERIA UR CULT: ABNORMAL

## 2022-07-30 ENCOUNTER — HOSPITAL ENCOUNTER (EMERGENCY)
Facility: HOSPITAL | Age: 77
Discharge: HOME | End: 2022-07-30
Attending: EMERGENCY MEDICINE
Payer: COMMERCIAL

## 2022-07-30 ENCOUNTER — APPOINTMENT (EMERGENCY)
Dept: RADIOLOGY | Facility: HOSPITAL | Age: 77
End: 2022-07-30
Payer: COMMERCIAL

## 2022-07-30 VITALS
DIASTOLIC BLOOD PRESSURE: 71 MMHG | WEIGHT: 161 LBS | HEIGHT: 62 IN | SYSTOLIC BLOOD PRESSURE: 131 MMHG | HEART RATE: 81 BPM | BODY MASS INDEX: 29.63 KG/M2 | RESPIRATION RATE: 17 BRPM | OXYGEN SATURATION: 93 %

## 2022-07-30 DIAGNOSIS — R06.00 DYSPNEA: Primary | ICD-10-CM

## 2022-07-30 LAB
ANION GAP SERPL CALCULATED.3IONS-SCNC: 9 MMOL/L (ref 4–13)
APTT PPP: 25 SECONDS (ref 23–37)
BASOPHILS # BLD MANUAL: 0.08 THOUSAND/UL (ref 0–0.1)
BASOPHILS NFR MAR MANUAL: 1 % (ref 0–1)
BUN SERPL-MCNC: 17 MG/DL (ref 5–25)
CALCIUM SERPL-MCNC: 9.3 MG/DL (ref 8.3–10.1)
CARDIAC TROPONIN I PNL SERPL HS: 2 NG/L
CHLORIDE SERPL-SCNC: 103 MMOL/L (ref 96–108)
CO2 SERPL-SCNC: 27 MMOL/L (ref 21–32)
CREAT SERPL-MCNC: 1.08 MG/DL (ref 0.6–1.3)
EOSINOPHIL # BLD MANUAL: 0.38 THOUSAND/UL (ref 0–0.4)
EOSINOPHIL NFR BLD MANUAL: 5 % (ref 0–6)
ERYTHROCYTE [DISTWIDTH] IN BLOOD BY AUTOMATED COUNT: 13.9 % (ref 11.6–15.1)
FLUAV RNA RESP QL NAA+PROBE: NEGATIVE
FLUBV RNA RESP QL NAA+PROBE: NEGATIVE
GFR SERPL CREATININE-BSD FRML MDRD: 49 ML/MIN/1.73SQ M
GLUCOSE SERPL-MCNC: 91 MG/DL (ref 65–140)
HCT VFR BLD AUTO: 39.6 % (ref 34.8–46.1)
HGB BLD-MCNC: 13.6 G/DL (ref 11.5–15.4)
INR PPP: 0.89 (ref 0.84–1.19)
LACTATE SERPL-SCNC: 1.4 MMOL/L (ref 0.5–2)
LYMPHOCYTES # BLD AUTO: 0.84 THOUSAND/UL (ref 0.6–4.47)
LYMPHOCYTES # BLD AUTO: 11 % (ref 14–44)
MACROCYTES BLD QL AUTO: PRESENT
MCH RBC QN AUTO: 36.5 PG (ref 26.8–34.3)
MCHC RBC AUTO-ENTMCNC: 34.3 G/DL (ref 31.4–37.4)
MCV RBC AUTO: 106 FL (ref 82–98)
MONOCYTES # BLD AUTO: 0.23 THOUSAND/UL (ref 0–1.22)
MONOCYTES NFR BLD: 3 % (ref 4–12)
NEUTROPHILS # BLD MANUAL: 6.08 THOUSAND/UL (ref 1.85–7.62)
NEUTS SEG NFR BLD AUTO: 80 % (ref 43–75)
NT-PROBNP SERPL-MCNC: 73 PG/ML
PLATELET # BLD AUTO: 255 THOUSANDS/UL (ref 149–390)
PLATELET BLD QL SMEAR: ADEQUATE
PMV BLD AUTO: 12.1 FL (ref 8.9–12.7)
POTASSIUM SERPL-SCNC: 4 MMOL/L (ref 3.5–5.3)
PROTHROMBIN TIME: 11.9 SECONDS (ref 11.6–14.5)
RBC # BLD AUTO: 3.73 MILLION/UL (ref 3.81–5.12)
RSV RNA RESP QL NAA+PROBE: NEGATIVE
SARS-COV-2 RNA RESP QL NAA+PROBE: NEGATIVE
SODIUM SERPL-SCNC: 139 MMOL/L (ref 135–147)
WBC # BLD AUTO: 7.6 THOUSAND/UL (ref 4.31–10.16)

## 2022-07-30 PROCEDURE — 84484 ASSAY OF TROPONIN QUANT: CPT | Performed by: EMERGENCY MEDICINE

## 2022-07-30 PROCEDURE — 87040 BLOOD CULTURE FOR BACTERIA: CPT | Performed by: EMERGENCY MEDICINE

## 2022-07-30 PROCEDURE — 83605 ASSAY OF LACTIC ACID: CPT | Performed by: EMERGENCY MEDICINE

## 2022-07-30 PROCEDURE — 71046 X-RAY EXAM CHEST 2 VIEWS: CPT

## 2022-07-30 PROCEDURE — 85007 BL SMEAR W/DIFF WBC COUNT: CPT | Performed by: EMERGENCY MEDICINE

## 2022-07-30 PROCEDURE — 85610 PROTHROMBIN TIME: CPT | Performed by: EMERGENCY MEDICINE

## 2022-07-30 PROCEDURE — 80048 BASIC METABOLIC PNL TOTAL CA: CPT | Performed by: EMERGENCY MEDICINE

## 2022-07-30 PROCEDURE — 36415 COLL VENOUS BLD VENIPUNCTURE: CPT | Performed by: EMERGENCY MEDICINE

## 2022-07-30 PROCEDURE — 85027 COMPLETE CBC AUTOMATED: CPT | Performed by: EMERGENCY MEDICINE

## 2022-07-30 PROCEDURE — 93005 ELECTROCARDIOGRAM TRACING: CPT

## 2022-07-30 PROCEDURE — 99285 EMERGENCY DEPT VISIT HI MDM: CPT | Performed by: EMERGENCY MEDICINE

## 2022-07-30 PROCEDURE — 83880 ASSAY OF NATRIURETIC PEPTIDE: CPT | Performed by: EMERGENCY MEDICINE

## 2022-07-30 PROCEDURE — 99285 EMERGENCY DEPT VISIT HI MDM: CPT

## 2022-07-30 PROCEDURE — 0241U HB NFCT DS VIR RESP RNA 4 TRGT: CPT | Performed by: EMERGENCY MEDICINE

## 2022-07-30 PROCEDURE — 85730 THROMBOPLASTIN TIME PARTIAL: CPT | Performed by: EMERGENCY MEDICINE

## 2022-07-30 NOTE — ED PROVIDER NOTES
History  Chief Complaint   Patient presents with    Shortness of Breath     Pt c/o worsening sob x 1 week     68 yo female who presents to ED for one week of mild dyspnea  Progressively worsening  Only present at night when lying flat  Improved w sitting up  No cough, cp, fever, chills, hemoptysis or unilateral leg pain or swelling  No h/o CHF  PMH significant for breast CA for which she is undergoing radiation tx  Prior to Admission Medications   Prescriptions Last Dose Informant Patient Reported? Taking? Myrbetriq 50 MG TB24  Self No No   Sig: TAKE 1 TABLET BY MOUTH EVERY DAY   PARoxetine (PAXIL) 20 mg tablet   No No   Sig: TAKE 1 TABLET BY MOUTH EVERY DAY   PROAIR  (90 Base) MCG/ACT inhaler  Self Yes No   Sig: as needed    amLODIPine (NORVASC) 2 5 mg tablet   No No   Sig: TAKE 2 TABLETS BY MOUTH DAILY   anastrozole (ARIMIDEX) 1 mg tablet   No No   Sig: Take 1 tablet (1 mg total) by mouth daily   aspirin 325 mg tablet  Self Yes No   Sig: Take 325 mg by mouth daily    calcium carbonate (OS-MICHAEL) 1250 (500 Ca) MG tablet  Self No No   Sig: Take 1 tablet (1,250 mg total) by mouth daily   calcium carbonate (OYSTER SHELL,OSCAL) 500 mg  Self Yes No   Sig: TAKE 1 TABLET (1,250 MG TOTAL) BY MOUTH DAILY   cholecalciferol (VITAMIN D3) 1,000 units tablet  Self No No   Sig: Take 1 tablet (1,000 Units total) by mouth daily   cholestyramine (QUESTRAN) 4 g packet  Self No No   Sig: TAKE 1 PACKET (4 G TOTAL) BY MOUTH DAILY   diphenhydrAMINE (BENADRYL) 25 mg tablet  Self Yes No   Sig: Take 25 mg by mouth in the morning  1/2 tablet with Paxil      fluticasone (FLONASE) 50 mcg/act nasal spray   No No   Sig: SPRAY 1 SPRAY INTO EACH NOSTRIL EVERY DAY   Patient taking differently: as needed   levothyroxine 125 mcg tablet  Self No No   Sig: TAKE 1 TABLET BY MOUTH EVERY DAY   meclizine (ANTIVERT) 25 mg tablet  Self No No   Sig: TAKE 1 TABLET BY MOUTH THREE TIMES A DAY AS NEEDED FOR DIZZINESS   memantine (NAMENDA) 10 mg tablet  Self No No   Sig: TAKE 1 TABLET BY MOUTH TWICE A DAY   multivitamin (THERAGRAN) TABS  Self Yes No   Sig: Take 1 tablet by mouth   nitrofurantoin (MACROBID) 100 mg capsule   No No   Sig: Take 1 capsule (100 mg total) by mouth 2 (two) times a day for 5 days   omeprazole (PriLOSEC) 20 mg delayed release capsule  Self No No   Sig: TAKE 1 CAPSULE BY MOUTH EVERY DAY   oxyCODONE-acetaminophen (Percocet) 5-325 mg per tablet  Self No No   Sig: Take 1 tablet by mouth every 6 (six) hours as needed for moderate pain Max Daily Amount: 4 tablets   potassium chloride (MICRO-K) 10 MEQ CR capsule  Self No No   Sig: Take 1 capsule (10 mEq total) by mouth daily   predniSONE 10 mg tablet  Self No No   Sig: TAKE 1 TABLET BY MOUTH EVERY DAY   rivastigmine (EXELON) 9 5 mg/24 hr TD 24 hr patch  Self No No   Sig: Place 1 patch on the skin in the morning        Facility-Administered Medications: None       Past Medical History:   Diagnosis Date    Anxiety disorder     Aortic valve disorder     [facility]     Cancer [facility]     breast    Cellulitis of finger of left hand 11/20/2018    Cellulitis of left hand 11/10/2018    Added automatically from request for surgery [id number]    Depression     Disease of thyroid gland     GERD (gastroesophageal reflux disease)     History of gastroesophageal reflux (GERD)     History of kidney cancer 03/06/2014    History of transfusion     Hyperlipidemia 03/06/2014    Hypertension 03/06/2014    Hypothyroidism 03/06/2014    Malignant neoplasm of overlapping sites of right breast in female, estrogen receptor positive ([facility] ) 3/10/2022    Shortness of breath     TIA (transient ischemic attack)     UTI (urinary tract infection)        Past Surgical History:   Procedure Laterality Date    BREAST LUMPECTOMY Right 3/29/2022    Procedure: ITZEL  DIRECTED LUMPECTOMY;  Surgeon: Remedios Beyer MD;  Location: [facility];  Service: Surgical Oncology    ESOPHAGOGASTRIC FUNDOPLASTY      Nissen Fundoplication    HERNIA REPAIR      LYMPH NODE BIOPSY Right 3/29/2022    Procedure: LYMPHATIC MAPPING WITH BLUE AND RADIOACTIVE DYES SENTINEL LYMPH NODE BIOPSY, INJECTION IN OR AT 0800 BY DR Nancy Juarez;  Surgeon: Juancarlos Dong MD;  Location: [facility];  Service: Surgical Oncology    MAMMO STEREOTACTIC BREAST BIOPSY RIGHT (ALL INC) Right 3/7/2022    US BREAST ITZEL  NEEDLE LOC RIGHT Right 3/24/2022    US GUIDED BREAST BIOPSY RIGHT COMPLETE Right 3/7/2022    WRIST SURGERY Left        Family History   Problem Relation Age of Onset    Stroke Father     Leukemia Sister     No Known Problems Brother     Skin cancer Mother     Stroke Maternal Grandmother     No Known Problems Maternal Grandfather     No Known Problems Paternal Grandmother     No Known Problems Paternal Grandfather     Breast cancer Sister     Breast cancer Sister     Kidney cancer Brother     Alcohol abuse Brother     Kidney cancer Brother     Stomach cancer Brother     No Known Problems Daughter     No Known Problems Daughter      I have reviewed and agree with the history as documented  E-Cigarette/Vaping    E-Cigarette Use Never User      E-Cigarette/Vaping Substances    Nicotine No     THC No     CBD No     Flavoring No     Other No     Unknown No      Social History     Tobacco Use    Smoking status: Never Smoker    Smokeless tobacco: Never Used   Vaping Use    Vaping Use: Never used   Substance Use Topics    Alcohol use: Yes     Comment: social-rare    Drug use: Never       Review of Systems   Respiratory: Positive for shortness of breath  All other systems reviewed and are negative  Physical Exam  Physical Exam  Vitals and nursing note reviewed  Constitutional:       General: She is not in acute distress  Appearance: Normal appearance  She is well-developed  She is not ill-appearing, toxic-appearing or diaphoretic     HENT:      Head: Normocephalic and atraumatic  Mouth/Throat:      Mouth: Mucous membranes are moist       Pharynx: Oropharynx is clear  Eyes:      Conjunctiva/sclera: Conjunctivae normal       Pupils: Pupils are equal, round, and reactive to light  Neck:      Vascular: No carotid bruit or JVD  Cardiovascular:      Rate and Rhythm: Normal rate and regular rhythm  Pulses: Normal pulses  Heart sounds: Normal heart sounds  No murmur heard  Pulmonary:      Effort: Pulmonary effort is normal  No respiratory distress  Breath sounds: Normal breath sounds  No stridor  No wheezing, rhonchi or rales  Chest:      Chest wall: No tenderness  Abdominal:      General: There is no distension  Palpations: Abdomen is soft  Tenderness: There is no abdominal tenderness  There is no guarding or rebound  Musculoskeletal:         General: No swelling, tenderness, deformity or signs of injury  Normal range of motion  Cervical back: Normal range of motion and neck supple  No rigidity or tenderness  Right lower leg: No edema  Left lower leg: No edema  Skin:     General: Skin is warm and dry  Capillary Refill: Capillary refill takes less than 2 seconds  Coloration: Skin is not jaundiced or pale  Findings: No bruising, erythema, lesion or rash  Neurological:      General: No focal deficit present  Mental Status: She is alert and oriented to person, place, and time  Cranial Nerves: No cranial nerve deficit  Sensory: No sensory deficit  Motor: No weakness or abnormal muscle tone        Coordination: Coordination normal       Gait: Gait normal          Vital Signs  ED Triage Vitals [07/30/22 1803]   Temp Pulse Respirations Blood Pressure SpO2   -- 84 20 134/74 98 %      Temp src Heart Rate Source Patient Position - Orthostatic VS BP Location FiO2 (%)   -- Monitor Sitting Left arm --      Pain Score       --           Vitals:    07/30/22 1803 07/30/22 1900 07/30/22 2030   BP: 134/74 153/89 131/71   Pulse: 84 75 81   Patient Position - Orthostatic VS: Sitting           Visual Acuity      ED Medications  Medications - No data to display    Diagnostic Studies  Results Reviewed     Procedure Component Value Units Date/Time    Blood culture #1 [[id number]] Collected: 07/30/22 1905    Lab Status: Preliminary result Specimen: Blood from Arm, Right Updated: 07/31/22 0103     Blood Culture Received in Microbiology Lab  Culture in Progress  Blood culture #2 [[id number]] Collected: 07/30/22 1905    Lab Status: Preliminary result Specimen: Blood from Arm, Left Updated: 07/31/22 0103     Blood Culture Received in Microbiology Lab  Culture in Progress  CBC and differential [[id number]]  (Abnormal) Collected: 07/30/22 1905    Lab Status: Final result Specimen: Blood from Arm, Left Updated: 07/30/22 2043     WBC 7 60 Thousand/uL      RBC 3 73 Million/uL      Hemoglobin 13 6 g/dL      Hematocrit 39 6 %       fL      MCH 36 5 pg      MCHC 34 3 g/dL      RDW 13 9 %      MPV 12 1 fL      Platelets 869 Thousands/uL     Narrative: This is an appended report  These results have been appended to a previously verified report      Manual Differential(PHLEBS Do Not Order) [[id number]]  (Abnormal) Collected: 07/30/22 1905    Lab Status: Final result Specimen: Blood from Arm, Left Updated: 07/30/22 2043     Segmented % 80 %      Lymphocytes % 11 %      Monocytes % 3 %      Eosinophils, % 5 %      Basophils % 1 %      Absolute Neutrophils 6 08 Thousand/uL      Lymphocytes Absolute 0 84 Thousand/uL      Monocytes Absolute 0 23 Thousand/uL      Eosinophils Absolute 0 38 Thousand/uL      Basophils Absolute 0 08 Thousand/uL      Total Counted --     Macrocytes Present     Platelet Estimate Adequate    FLU/RSV/COVID - if FLU/RSV clinically relevant [[id number]]  (Normal) Collected: 07/30/22 1905    Lab Status: Final result Specimen: Nares from Nose Updated: 07/30/22 2004     SARS-CoV-2 Negative     INFLUENZA A PCR Negative     INFLUENZA B PCR Negative     RSV PCR Negative    Narrative:      FOR PEDIATRIC PATIENTS - copy/paste COVID Guidelines URL to browser: [email or web address]    SARS-CoV-2 assay is a Nucleic Acid Amplification assay intended for the  qualitative detection of nucleic acid from SARS-CoV-2 in nasopharyngeal  swabs  Results are for the presumptive identification of SARS-CoV-2 RNA  Positive results are indicative of infection with SARS-CoV-2, the virus  causing COVID-19, but do not rule out bacterial infection or co-infection  with other viruses  Laboratories within the [place] and its  territories are required to report all positive results to the appropriate  public health authorities  Negative results do not preclude SARS-CoV-2  infection and should not be used as the sole basis for treatment or other  patient management decisions  Negative results must be combined with  clinical observations, patient history, and epidemiological information  This test has not been FDA cleared or approved  This test has been authorized by FDA under an Emergency Use Authorization  (EUA)  This test is only authorized for the duration of time the  declaration that circumstances exist justifying the authorization of the  emergency use of an in vitro diagnostic tests for detection of SARS-CoV-2  virus and/or diagnosis of COVID-19 infection under section 564(b)(1) of  the Act, 21 U  S C  095RUC-0(C)(2), unless the authorization is terminated  or revoked sooner  The test has been validated but independent review by FDA  and CLIA is pending  Test performed using [employer] GeneXpert: This RT-PCR assay targets N2,  a region unique to SARS-CoV-2  A conserved region in the E-gene was chosen  for pan-Sarbecovirus detection which includes SARS-CoV-2      HS Troponin 0hr (reflex protocol) [[id number]]  (Normal) Collected: 07/30/22 1903    Lab Status: Final result Specimen: Blood from Arm, Left Updated: 07/30/22 1942     hs TnI 0hr 2 ng/L     Basic metabolic panel [[id number]] Collected: 07/30/22 1905    Lab Status: Final result Specimen: Blood from Arm, Left Updated: 07/30/22 1940     Sodium 139 mmol/L      Potassium 4 0 mmol/L      Chloride 103 mmol/L      CO2 27 mmol/L      ANION GAP 9 mmol/L      BUN 17 mg/dL      Creatinine 1 08 mg/dL      Glucose 91 mg/dL      Calcium 9 3 mg/dL      eGFR 49 ml/min/1 73sq m     Narrative:      [facility] guidelines for Chronic Kidney Disease (CKD):     Stage 1 with normal or high GFR (GFR > 90 mL/min/1 73 square meters)    Stage 2 Mild CKD (GFR = 60-89 mL/min/1 73 square meters)    Stage 3A Moderate CKD (GFR = 45-59 mL/min/1 73 square meters)    Stage 3B Moderate CKD (GFR = 30-44 mL/min/1 73 square meters)    Stage 4 Severe CKD (GFR = 15-29 mL/min/1 73 square meters)    Stage 5 End Stage CKD (GFR <15 mL/min/1 73 square meters)  Note: GFR calculation is accurate only with a steady state creatinine    NT-BNP PRO [[id number]]  (Normal) Collected: 07/30/22 1905    Lab Status: Final result Specimen: Blood from Arm, Left Updated: 07/30/22 1940     NT-proBNP 73 pg/mL     Lactic acid [[id number]]  (Normal) Collected: 07/30/22 1905    Lab Status: Final result Specimen: Blood from Arm, Left Updated: 07/30/22 1936     LACTIC ACID 1 4 mmol/L     Narrative:      Result may be elevated if tourniquet was used during collection      Lopez Luciejaime [[id number]]  (Normal) Collected: 07/30/22 1905    Lab Status: Final result Specimen: Blood from Arm, Left Updated: 07/30/22 1928     Protime 11 9 seconds      INR 0 89    APTT [[id number]]  (Normal) Collected: 07/30/22 1905    Lab Status: Final result Specimen: Blood from Arm, Left Updated: 07/30/22 1928     PTT 25 seconds                  XR chest 2 views    (Results Pending)              Procedures  Procedures         ED Course  ED Course as of 07/31/22 0122   Sat Jul 30, 2022 2001 Ekg interpreted by me  St at 109  Nonspecific st changes  Improved as compared to prior ekg on file  SBIRT 22yo+    Flowsheet Row Most Recent Value   SBIRT (23 yo +)    In order to provide better care to our patients, we are screening all of our patients for alcohol and drug use  Would it be okay to ask you these screening questions? Yes Filed at: 07/30/2022 1919   Initial Alcohol Screen: US AUDIT-C     1  How often do you have a drink containing alcohol? 0 Filed at: 07/30/2022 1919   2  How many drinks containing alcohol do you have on a typical day you are drinking? 0 Filed at: 07/30/2022 1919   3b  FEMALE Any Age, or MALE 65+: How often do you have 4 or more drinks on one occassion? 0 Filed at: 07/30/2022 1919   Audit-C Score 0 Filed at: 07/30/2022 1919   RENZO: How many times in the past year have you    Used an illegal drug or used a prescription medication for non-medical reasons?  Never Filed at: 07/30/2022 Phoebe Haines Criteria for PE    Flowsheet Row Most Recent Value   Wells' Criteria for PE    Clinical signs and symptoms of DVT 0 Filed at: 07/31/2022 8053   PE is primary diagnosis or equally likely 0 Filed at: 07/31/2022 0121   HR >100 0 Filed at: 07/31/2022 0121   Immobilization at least 3 days or Surgery in the previous 4 weeks 0 Filed at: 07/31/2022 0121   Previous, objectively diagnosed PE or DVT 0 Filed at: 07/31/2022 0121   Hemoptysis 0 Filed at: 07/31/2022 0121   Malignancy with treatment within 6 months or palliative 1 Filed at: 07/31/2022 0121   Wells' Criteria Total 1 Filed at: 07/31/2022 0121                MDM    Disposition  Final diagnoses:   Dyspnea     Time reflects when diagnosis was documented in both MDM as applicable and the Disposition within this note     Time User Action Codes Description Comment    7/30/2022  8:05 PM Sandra Henry Add [R06 [id number]] Dyspnea       ED Disposition     ED Disposition   Discharge    Condition   Stable    Date/Time   Sat Jul 30, 2022  8:05 PM    Comment   Joan Zhong discharge to home/self care  Follow-up Information     Follow up With Specialties Details Why Contact Info Additional Information    [facility] Emergency Department Emergency Medicine  If symptoms worsen [address] [facility], [address] [place] [address], Ramona Bui MD Internal Medicine In 1 week for further evaluation of your dyspnea [id number]  [place]  [phone]             Discharge Medication List as of 7/30/2022  8:06 PM      CONTINUE these medications which have NOT CHANGED    Details   amLODIPine (NORVASC) 2 5 mg tablet TAKE 2 TABLETS BY MOUTH DAILY, Normal      anastrozole (ARIMIDEX) 1 mg tablet Take 1 tablet (1 mg total) by mouth daily, Starting Mon 7/11/2022, Normal      aspirin 325 mg tablet Take 325 mg by mouth daily , Starting Mon 3/19/2018, Historical Med      calcium carbonate (OS-MICHAEL) 1250 (500 Ca) MG tablet Take 1 tablet (1,250 mg total) by mouth daily, Starting Fri 4/22/2022, Until Sun 5/22/2022, Normal      calcium carbonate (OYSTER SHELL,OSCAL) 500 mg TAKE 1 TABLET (1,250 MG TOTAL) BY MOUTH DAILY, Historical Med      cholecalciferol (VITAMIN D3) 1,000 units tablet Take 1 tablet (1,000 Units total) by mouth daily, Starting Fri 4/22/2022, Until Mon 7/11/2022, Normal      cholestyramine (QUESTRAN) 4 g packet TAKE 1 PACKET (4 G TOTAL) BY MOUTH DAILY, Starting Mon 8/9/2021, Normal      diphenhydrAMINE (BENADRYL) 25 mg tablet Take 25 mg by mouth in the morning   1/2 tablet with Paxil  , Historical Med      fluticasone (FLONASE) 50 mcg/act nasal spray SPRAY 1 SPRAY INTO EACH NOSTRIL EVERY DAY, Normal      levothyroxine 125 mcg tablet TAKE 1 TABLET BY MOUTH EVERY DAY, Normal      meclizine (ANTIVERT) 25 mg tablet TAKE 1 TABLET BY MOUTH THREE TIMES A DAY AS NEEDED FOR DIZZINESS, Normal memantine (NAMENDA) 10 mg tablet TAKE 1 TABLET BY MOUTH TWICE A DAY, Normal      multivitamin (THERAGRAN) TABS Take 1 tablet by mouth, Historical Med      Myrbetriq 50 MG TB24 TAKE 1 TABLET BY MOUTH EVERY DAY, Normal      nitrofurantoin (MACROBID) 100 mg capsule Take 1 capsule (100 mg total) by mouth 2 (two) times a day for 5 days, Starting Tue 7/26/2022, Until Sun 7/31/2022, Normal      omeprazole (PriLOSEC) 20 mg delayed release capsule TAKE 1 CAPSULE BY MOUTH EVERY DAY, Normal      oxyCODONE-acetaminophen (Percocet) 5-325 mg per tablet Take 1 tablet by mouth every 6 (six) hours as needed for moderate pain Max Daily Amount: 4 tablets, Starting Mon 3/14/2022, Normal      PARoxetine (PAXIL) 20 mg tablet TAKE 1 TABLET BY MOUTH EVERY DAY, Normal      potassium chloride (MICRO-K) 10 MEQ CR capsule Take 1 capsule (10 mEq total) by mouth daily, Starting Wed 4/29/2020, Normal      predniSONE 10 mg tablet TAKE 1 TABLET BY MOUTH EVERY DAY, Normal      PROAIR  (90 Base) MCG/ACT inhaler as needed , Starting Wed 8/7/2019, Historical Med      rivastigmine (EXELON) 9 5 mg/24 hr TD 24 hr patch Place 1 patch on the skin in the morning , Starting Mon 5/16/2022, Normal             No discharge procedures on file      PDMP Review       Value Time User    PDMP Reviewed  Yes 3/14/2022  1:24 PM Krystina Wick MD          ED Provider  Electronically Signed by           Catalino Ragland MD  07/31/22 3833

## 2022-08-02 ENCOUNTER — HOSPITAL ENCOUNTER (OUTPATIENT)
Dept: BONE DENSITY | Facility: CLINIC | Age: 77
Discharge: HOME | End: 2022-08-02
Payer: COMMERCIAL

## 2022-08-02 DIAGNOSIS — C50.811 MALIGNANT NEOPLASM OF OVERLAPPING SITES OF RIGHT BREAST IN FEMALE, ESTROGEN RECEPTOR POSITIVE (HCC): Primary | ICD-10-CM

## 2022-08-02 DIAGNOSIS — Z17.0 MALIGNANT NEOPLASM OF OVERLAPPING SITES OF RIGHT BREAST IN FEMALE, ESTROGEN RECEPTOR POSITIVE (HCC): Primary | ICD-10-CM

## 2022-08-02 DIAGNOSIS — Z78.0 POST-MENOPAUSE: ICD-10-CM

## 2022-08-02 LAB
ATRIAL RATE: 78 BPM
P AXIS: 47 DEGREES
PR INTERVAL: 146 MS
QRS AXIS: 12 DEGREES
QRSD INTERVAL: 82 MS
QT INTERVAL: 376 MS
QTC INTERVAL: 428 MS
T WAVE AXIS: 37 DEGREES
VENTRICULAR RATE: 78 BPM

## 2022-08-02 PROCEDURE — 93010 ELECTROCARDIOGRAM REPORT: CPT | Performed by: INTERNAL MEDICINE

## 2022-08-02 PROCEDURE — 77080 DXA BONE DENSITY AXIAL: CPT

## 2022-08-02 RX ORDER — ANASTROZOLE 1 MG/1
1 TABLET ORAL DAILY
Qty: 90 TABLET | Refills: 1 | Status: SHIPPED | OUTPATIENT
Start: 2022-08-02 | End: 2022-08-17 | Stop reason: SDUPTHER

## 2022-08-05 LAB
BACTERIA BLD CULT: NORMAL
BACTERIA BLD CULT: NORMAL

## 2022-08-07 NOTE — PROGRESS NOTES
Hematology/Oncology Progress Note    Date of Service: 8/17/2022    [address] [facility]   [address] [place] [address]    Daughter: Norman Burt (lives with the patient)  ECOG PS today: 2 (active and up half the day)  Uses walking assistance outside the home for longer distances and has fallen in the past (2021)    Hem/Onc Problem List:     Right-sided breast cancer, ER UT positive, HER2 negative    Chief Complaint:    ALEN    Assessment/Plan:       1  Right breast invasive mammary carcinoma of unknown type  ER and UT positive, HER2 negative  cT1b cN0  Grade 1  MammaPrint showed low risk disease  Status post lumpectomy with sentinel lymph node biopsy on March 29, 2022  Final pathology showed grade 1 invasive mammary carcinoma, 1 3 cm in greatest dimension, with associated DCIS  Or lymph node negative  All margins negative  Final pathologic stage pT1c pN0, cM0  Stage IA  No benefits of adjuvant chemotherapy  I will refer patient to Dr Allison Chester to discuss the rationale, benefits, risks of adjuvant radiation therapy  Patient will start Arimidex 1 mg daily for 5 years  Mammogram 6-12 months after radiation therapy  Mammogram to be ordered by surgical oncology  2  Bone health  DEXA scan in March 2016 showed osteopenia and re-demonstrated on 8/2/2022 DEXA  Will need repeat q2 years (next due 8/2024)  Patient will cont calcium 1200 mg, vitamin-D3 1000IU daily supplement and doing exercise for bone health  3  Inherited gene predisposition  INVITAE DIAGNOSTIC TESTING RESULTS: negative   4  Hypothyroidism on levothyroxine  Patient will continue levothyroxine  5  Hypertension on Norvasc  Her blood pressure is fairly well controlled  6  History of non-Hodgkin lymphoma, status post chemotherapy  No evidence of disease or recurrence of constitutional symptoms      Discussion of decision making     I personally reviewed the following lab results, the image studies, pathology, other specialty/physicians consult notes and recommendations, and outside medical records from [facility]  I had a lengthy discussion with the patient and shared the work-up findings  We discussed the diagnosis and management plan as below  I spent 36 minutes reviewing the records (labs, clinician notes, outside records, medical history, ordering medicine/tests/procedures, interpreting the imaging/labs previously done) and coordination of care as well as direct time with the patient today, of which greater than 50% of the time was spent in counseling and coordination of care with the patient/family  Plan:  -Cont supplemental Vit D3 and calcium as per above  -Start Arimidex as per above  -F/u Surg-Onc and screening mammograms      Survivorship planning  · Every 6 month H&P for first 5 years, then annual thereafter  · B/l Mammograms yearly  · Distress counseling on an ongoing basis as needed    Follow Up: 1 year with [facility]      All questions were answered to the patient's satisfaction during this encounter  The patient knows the contact information for our office and knows to reach out for any relevant concerns related to this encounter  They are to call for any temperature 100 4 or higher, new symptoms including but not restricted to shaking chills, decreased appetite, nausea, vomiting, diarrhea, increased fatigue, shortness of breath or chest pain, confusion, and not feeling the strength to come to the clinic  For all other listed problems and medical diagnosis in their chart - they are managed by PCP and/or other specialists, which the patient acknowledges  Thank you very much for your consultation and making us a part of this patient's care  We are continuing to follow closely with you  Please do not hesitate to reach out to me with any additional questions or concerns  Marine Lara MD  Hematology & Medical Oncology Staff Physician    Disclaimer:  This document was prepared using [employer] technology  If a word or phrase is confusing, or does not make sense, this is likely due to recognition error which was not discovered during the providers review  If you believe an error has occurred, please Contact me through [employer] service for jet? cation  AJCC 8th Edition Cancer Stage :      Cancer Staging  Malignant neoplasm of overlapping sites of right breast in female, estrogen receptor positive ([address] )  Staging form: Breast, AJCC 8th Edition  - Clinical stage from 3/14/2022: cT1b, cN0, cM0, GX, ER+, DC+, HER2- - Signed by Judy Diaz MD on 3/25/2022  Stage prefix: Initial diagnosis  Histologic grading system: 3 grade system      Hematology/Oncology History:   · March 18, 2016 patient had DEXA scan which showed osteopenia  DEXA scan was done in the [facility]  · February 14, 2022 screening bilateral mammogram:     IMPRESSION:  Additional imaging required  A breast health care nurse from our facility will be contacting the patient regarding the need for additional imaging     ASSESSMENT/BI-RADS CATEGORY:  Left: 2 - Benign  Right: 0 - Incomplete: Needs Additional Imaging Evaluation  Overall: 0 - Incomplete: Needs Additional Imaging Evaluation     RECOMMENDATION:       - Diagnostic mammogram and ultrasound at the current time for the right breast mass and posterior calcifications      · March 7, 2022, diagnostic mammogram of right breast:  RIGHT  A) MASS  Mammo diagnostic right w 3d & cad: There is a mass with spiculated margins seen in the right breast at 12 o'clock in the middle depth  US breast right limited (diagnostic): There is a 9 mm hypoechoic mass with spiculated margins with shadowing seen in the right breast at 11 o'clock in the middle depth, 8 cm from the nipple  There is no evidence of lymphadenopathy      B) CALCIFICATIONS  Mammo diagnostic right w 3d & cad:  There are round calcifications in a grouped distribution seen in the right breast at 10 o'clock in the posterior depth   Despite low morphologic suspicion, a benign representative sampling will likely be necessary pending pathologic results of the ultrasound-guided core biopsy      · March 7, 2022, ultrasound and mammogram guided biopsy of the breast cancer:  Final Diagnosis   A  Breast, Right, US BX RT BREAST 12:00 8CMFN:  - Invasive mammary carcinoma of no special type, 10 mm in greatest dimension, with 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)   See comment and synoptic report      Comment: Immunohistochemistry for SMMHC, calponin, and CK5/6 demonstrate areas of lost and intact myoepithelial layer, in the invasive and in-situ components respectively          Addendum   HORMONE RECEPTOR AND HER2/olga STUDIES     Performed on invasive carcinoma block A1:  Test Description                        Result               Prognostic Interpretation     Estrogen Receptor/ER                90-95%                           Positive  Primary Antibody: SP1  Internal control: Positive              Staining Intensity: Strong  External control: Positive                          Blanca Score*: 7     Progesterone Receptor/PgR       65-75%                           Positive  Primary Antibody: 1E2  Internal control: Positive              Staining Intensity: Strong  External control: Positive                          Blanca Score*: 7        HER2 by IHC                               1+                                  Negative  Primary Antibody:4B5  HER2 by Paul Cancel Indicated     Appropriate positive and negative controls were reviewed          · March 14, 2022 that came with discussed in our multidisciplinary breast tumor board recommendation of lumpectomy endocrine therapy and radiation therapy  · March 21, 2022, INVITAE DIAGNOSTIC TESTING RESULTS: negative   · March 24, 2022, MammaPrint showed a low risk disease    · March 29, 2022 patient underwent right breast lumpectomy  Pathology:  Final Diagnosis   A  Right axillary sentinel lymph node #1:  - Fibroadipose tissue with no specific histopathologic abnormality   - No lymph node identified       B  Breast, Right, Additional anterior margin:  - Benign fibroadipose tissue with focal fat necrosis       C  Breast, Right, additional inferior margin:  - Benign fibroadipose tissue       D  Breast, Right, additional lateral margin:  - Benign breast tissue with focal sclerosing adenosis and ectatic ducts       E  Breast, Right, additional medial margin:  - Benign breast tissue       F  Breast, Right, additional posterior margin:  - Benign fibroadipose tissue       G  Breast, Right, additional superior margin:  - Benign fibroadipose tissue       H  Breast, Right, liyah  directed lumpectomy:  - Invasive mammary carcinoma of no special type, 13 mm in greatest dimension, with 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)  See comment and synoptic report       I  Right axillary adipose tissue:  - Three benign lymph nodes (0/3)     Comment:  1  Ancillary Studies:  Performed on original biopsy material, [id number], and reported to be:     - ER:  90-95% / Positive     - ME:  65-75% / Positive     - HER2 (IHC):  1+ / Negative     - Repeat HER2 testing (2013 ASCO/CAP Recommendations):  Not Indicated      - Best representative tumor block:  H10       -- Sufficient tumor present for          Agendia Mammaprint/Blueprint (1 cm2 of invasive tumor in aggregate):  Yes          MI Profile/Foundation One (at least 5 x 5 mm of tumor): Yes     2   Pathologic Stage Classification (pTNM, AJCC 8th Edition):       8th ed, AJCC Anatomic Stage:  at least Stage IA - pT1c, pN0, cM0, G1     3   8th ed  AJCC Pathologic Prognostic Stage (use AJCC update):  IA   -7/26/2022:  Anastrozole 1mg started  -8/2/2022 DEXA scan: L femoral neck osteopenia noted    History of Present Illiness:   Henrry Mancilla is a 67 y o  female with the above-noted HemOnc history who is here to discuss pathology result and management plan  Patient has grade 1, ER/MO positive, HER2 negative right-sided breast cancer  cT1b cN0  MammaPrint showed low risk disease  No benefits from adjuvant radiation therapy  INVITAE DIAGNOSTIC TESTING RESULTS: negative  Patient underwent lumpectomy with lymph node dissection on March 29, 2022  All margins negative  Or lymph node negative  Final pathologic stage [id number]  Stage IA  Patient had a DEXA scan in March 2016 which showed osteopenia  Patient is not taking calcium or vitamin-D  Interval events: No new night sweats, fatigue, joint or back pains, hot flashes on the medicine  Denies F/C, N/V, SOB, CP, LH, HA     ROS: A 10-point of review of systems is obtained and other than the above is noncontributory  Objective:   VITALS:   /80 (BP Location: Left arm, Patient Position: Sitting, Cuff Size: Adult)   Pulse 79   Temp 98 3 °F (36 8 °C)   Resp 16   Ht 5' 2" (1 575 m)   Wt 72 6 kg (160 lb)   SpO2 94%   BMI 29 26 kg/m²     Physical EXAM:  General:  Alert, cooperative, no distress, appears stated age  Head:  Normocephalic, without obvious abnormality  Eyes:  Conjunctivae/corneas clear  No evidence of conjunctivitis     Throat: Lips, mucosa, and tongue normal   No bleeding from mouth  Neck: Supple, symmetrical, trachea midline    Lungs:   Clear to auscultation bilaterally  Respiratory effort easy, nonlabored    Heart:  Regular rate and rhythm, +S1, S2   Abdomen:   Soft, non-tender,nondistended  Bowel sounds normal       Extremities:  Lymphatics: Extremities normal, no edema  No cervical, axillary or inguinal adenopathy   Skin: No rashes  Neurologic: AAO  No focal neuro deficits noted b/l       Allergies   Allergen Reactions    Ciprofloxacin Hives    Sulfa Antibiotics Itching    Other     Penicillins Rash     Category:  Allergy;   --  Pt received unasyn 1 5 mg IV 11-12-18 to 11-15-18    Sulfacetamide Rash Category: Allergy;         Past Medical History:   Diagnosis Date    Anxiety disorder     Aortic valve disorder     Breast cancer ([facility] )     Cancer ([facility] )     breast    Cellulitis of finger of left hand 11/20/2018    Cellulitis of left hand 11/10/2018    Added automatically from request for surgery [id number]    Depression     Disease of thyroid gland     GERD (gastroesophageal reflux disease)     History of gastroesophageal reflux (GERD)     History of kidney cancer 03/06/2014    History of transfusion     Hyperlipidemia 03/06/2014    Hypertension 03/06/2014    Hypothyroidism 03/06/2014    Malignant neoplasm of overlapping sites of right breast in female, estrogen receptor positive ([facility] ) 3/10/2022    Shortness of breath     TIA (transient ischemic attack)     UTI (urinary tract infection)        Past Surgical History:   Procedure Laterality Date    BREAST LUMPECTOMY Right 3/29/2022    Procedure: ITZEL  DIRECTED LUMPECTOMY;  Surgeon: Paul March MD;  Location: [facility];  Service: Surgical Oncology    ESOPHAGOGASTRIC FUNDOPLASTY      Nissen Fundoplication    HERNIA REPAIR      LYMPH NODE BIOPSY Right 3/29/2022    Procedure: LYMPHATIC MAPPING WITH BLUE AND RADIOACTIVE DYES SENTINEL LYMPH NODE BIOPSY, INJECTION IN OR AT 0800 BY DR Jay Jay Herman;  Surgeon: Paul March MD;  Location: [facility];  Service: Surgical Oncology    MAMMO STEREOTACTIC BREAST BIOPSY RIGHT (ALL INC) Right 3/7/2022    US BREAST ITZEL  NEEDLE LOC RIGHT Right 3/24/2022    US GUIDED BREAST BIOPSY RIGHT COMPLETE Right 3/7/2022    WRIST SURGERY Left        Family History   Problem Relation Age of Onset    Stroke Father     Leukemia Sister     No Known Problems Brother     Skin cancer Mother     Stroke Maternal Grandmother     No Known Problems Maternal Grandfather     No Known Problems Paternal Grandmother     No Known Problems Paternal Grandfather     Breast cancer Sister     Breast cancer Sister     Kidney cancer Brother     Alcohol abuse Brother     Kidney cancer Brother     Stomach cancer Brother     No Known Problems Daughter     No Known Problems Daughter        Social History     Socioeconomic History    Marital status:      Spouse name: Not on file    Number of children: 2    Years of education: Not on file    Highest education level: Not on file   Occupational History    Not on file   Tobacco Use    Smoking status: Never Smoker    Smokeless tobacco: Never Used   Vaping Use    Vaping Use: Never used   Substance and Sexual Activity    Alcohol use: Yes     Comment: social-rare    Drug use: Never    Sexual activity: Not Currently   Other Topics Concern    Not on file   Social History Narrative    Not on file     Social Determinants of Health     Financial Resource Strain: Low Risk     Difficulty of Paying Living Expenses: Not very hard   Food Insecurity: Not on file   Transportation Needs: No Transportation Needs    Lack of Transportation (Medical): No    Lack of Transportation (Non-Medical): No   Physical Activity: Not on file   Stress: Not on file   Social Connections: Not on file   Intimate Partner Violence: Not on file   Housing Stability: Not on file       Current Outpatient Medications   Medication Sig Dispense Refill    amLODIPine (NORVASC) 2 5 mg tablet Take 2 tablets (5 mg total) by mouth daily 180 tablet 1    anastrozole (ARIMIDEX) 1 mg tablet Take 1 tablet (1 mg total) by mouth daily 90 tablet 1    aspirin 325 mg tablet Take 325 mg by mouth daily       cholestyramine (QUESTRAN) 4 g packet TAKE 1 PACKET (4 G TOTAL) BY MOUTH DAILY 90 packet 1    diphenhydrAMINE (BENADRYL) 25 mg tablet Take 25 mg by mouth in the morning  1/2 tablet with Paxil         fluticasone (FLONASE) 50 mcg/act nasal spray 1 spray into each nostril as needed for rhinitis or allergies 16 g 1    furosemide (LASIX) 40 mg tablet Take 1 tablet (40 mg total) by mouth daily for 2 days 2 tablet 0    levothyroxine 125 mcg tablet Take 1 tablet (125 mcg total) by mouth daily 90 tablet 1    meclizine (ANTIVERT) 25 mg tablet Take 1 tablet (25 mg total) by mouth every 8 (eight) hours as needed for dizziness 50 tablet 1    memantine (NAMENDA) 10 mg tablet Take 1 tablet (10 mg total) by mouth 2 (two) times a day 180 tablet 1    Mirabegron ER (Myrbetriq) 50 MG TB24 Take 1 tablet (50 mg total) by mouth daily 90 tablet 1    multivitamin (THERAGRAN) TABS Take 1 tablet by mouth      omeprazole (PriLOSEC) 20 mg delayed release capsule Take 1 capsule (20 mg total) by mouth 2 (two) times a day 180 capsule 3    PARoxetine (PAXIL) 20 mg tablet Take 1 tablet (20 mg total) by mouth daily 90 tablet 1    potassium chloride (MICRO-K) 10 MEQ CR capsule Take 1 capsule (10 mEq total) by mouth daily 30 capsule 0    PROAIR  (90 Base) MCG/ACT inhaler as needed       rivastigmine (EXELON) 9 5 mg/24 hr TD 24 hr patch Place 1 patch on the skin daily 90 patch 0    cholecalciferol (VITAMIN D3) 1,000 units tablet Take 1 tablet (1,000 Units total) by mouth daily 30 tablet 6     No current facility-administered medications for this visit  (Not in a hospital admission)      DATA REVIEW:    Pathology Result:    Final Diagnosis   Date Value Ref Range Status   03/29/2022   Final    A  Right axillary sentinel lymph node #1:  - Fibroadipose tissue with no specific histopathologic abnormality   - No lymph node identified  B  Breast, Right, Additional anterior margin:  - Benign fibroadipose tissue with focal fat necrosis  C  Breast, Right, additional inferior margin:  - Benign fibroadipose tissue  D  Breast, Right, additional lateral margin:  - Benign breast tissue with focal sclerosing adenosis and ectatic ducts  E  Breast, Right, additional medial margin:  - Benign breast tissue  F  Breast, Right, additional posterior margin:  - Benign fibroadipose tissue       G  Breast, Right, additional superior margin:  - Benign fibroadipose tissue  H  Breast, Right, liyah  directed lumpectomy:  - Invasive mammary carcinoma of no special type, 13 mm in greatest dimension, with 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)  See comment and synoptic report  I  Right axillary adipose tissue:  - Three benign lymph nodes (0/3)  Comment:  1  Ancillary Studies:  Performed on original biopsy material, [id number], and reported to be:     - ER:  90-95% / Positive     - NE:  65-75% / Positive     - HER2 (IHC):  1+ / Negative     - Repeat HER2 testing (2013 ASCO/CAP Recommendations):  Not Indicated      - Best representative tumor block:  H10       -- Sufficient tumor present for          Agendia Mammaprint/Blueprint (1 cm2 of invasive tumor in aggregate):  Yes          MI Profile/Foundation One (at least 5 x 5 mm of tumor):  Yes    2  Pathologic Stage Classification (pTNM, AJCC 8th Edition):       8th ed, AJCC Anatomic Stage:  at least Stage IA - pT1c, pN0, cM0, G1    3   8th ed  AJCC Pathologic Prognostic Stage (use AJCC update):  IA     03/07/2022   Final    A  Breast, Right, US BX RT BREAST 12:00 8CMFN:  - Invasive mammary carcinoma of no special type, 10 mm in greatest dimension, with 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)  See comment and synoptic report  Comment: Immunohistochemistry for SMMHC, calponin, and CK5/6 demonstrate areas of lost and intact myoepithelial layer, in the invasive and in-situ components respectively  Results reported to Scottie Jaimes at the [address] [place] [address] on 3/9/22 at 10:48am      03/07/2022   Final    A  Breast, Right, Stereo bx right breast 10oclock, 4 cores with calcs:  - Fibroadenoma with focal coarse calcifications  - Negative for atypia and in-situ or invasive carcinoma  B  Breast, Right, Stereo bx right breast 10oclock, 1 core without calcs:  - Fibroadenoma with focal coarse calcifications    - Negative for atypia and in-situ or invasive carcinoma  Image Results: They are reviewed and documented in Hematology/Oncology history    Echo complete w/ contrast if indicated    Left Ventricle: Left ventricular cavity size is normal  Wall thickness   is normal  The left ventricular ejection fraction is 60%  Systolic   function is normal  Although no diagnostic regional wall motion   abnormality was identified, this possibility cannot be completely excluded   on the basis of this study  Diastolic function is normal     Left Atrium: The atrium is mildly dilated    Tricuspid Valve: There is mild regurgitation  Estimated RVSP 30 mm Hg  LABS:  Lab data are reviewed and documented in HemOn history       Recent Results (from the past 48 hour(s))   Urinalysis with microscopic    Collection Time: 08/15/22  2:52 PM   Result Value Ref Range    Color, UA Yellow     Clarity, UA Turbid     Specific Gravity, UA >=1 030 1 003 - 1 030    pH, UA 6 0 4 5, 5 0, 5 5, 6 0, 6 5, 7 0, 7 5, 8 0    Leukocytes, UA Moderate (A) Negative    Nitrite, UA Negative Negative    Protein,  (2+) (A) Negative mg/dl    Glucose, UA Negative Negative mg/dl    Ketones, UA Trace (A) Negative mg/dl    Urobilinogen, UA 0 2 0 2, 1 0 E U /dl E U /dl    Bilirubin, UA Negative Negative    Occult Blood, UA Moderate (A) Negative    RBC, UA 10-20 (A) None Seen, 2-4 /hpf    WBC, UA 10-20 (A) None Seen, 2-4, 5-60 /hpf    Epithelial Cells None Seen None Seen, Occasional /hpf    Bacteria, UA Moderate (A) None Seen, Occasional /hpf   Urine culture    Collection Time: 08/15/22  2:52 PM    Specimen: Urine, Clean Catch   Result Value Ref Range    Urine Culture >100,000 cfu/ml               Maisha Florez MD  8/17/2022, 1:24 PM

## 2022-08-08 ENCOUNTER — HOSPITAL ENCOUNTER (OUTPATIENT)
Dept: RADIOLOGY | Facility: HOSPITAL | Age: 77
Discharge: HOME | End: 2022-08-08
Payer: COMMERCIAL

## 2022-08-08 ENCOUNTER — OFFICE VISIT (OUTPATIENT)
Dept: INTERNAL MEDICINE CLINIC | Facility: CLINIC | Age: 77
End: 2022-08-08
Payer: COMMERCIAL

## 2022-08-08 VITALS
SYSTOLIC BLOOD PRESSURE: 124 MMHG | HEART RATE: 94 BPM | OXYGEN SATURATION: 97 % | BODY MASS INDEX: 29.74 KG/M2 | RESPIRATION RATE: 20 BRPM | HEIGHT: 62 IN | DIASTOLIC BLOOD PRESSURE: 82 MMHG | WEIGHT: 161.6 LBS | TEMPERATURE: 98.3 F

## 2022-08-08 DIAGNOSIS — R06.02 SOB (SHORTNESS OF BREATH) ON EXERTION: Primary | ICD-10-CM

## 2022-08-08 DIAGNOSIS — R06.02 SOB (SHORTNESS OF BREATH) ON EXERTION: ICD-10-CM

## 2022-08-08 PROBLEM — F11.20 CONTINUOUS OPIOID DEPENDENCE (HCC): Status: ACTIVE | Noted: 2022-08-08

## 2022-08-08 PROBLEM — N18.30 STAGE 3 CHRONIC KIDNEY DISEASE, UNSPECIFIED WHETHER STAGE 3A OR 3B CKD (HCC): Status: ACTIVE | Noted: 2022-08-08

## 2022-08-08 PROBLEM — F33.9 RECURRENT MAJOR DEPRESSIVE DISORDER, REMISSION STATUS UNSPECIFIED (HCC): Status: ACTIVE | Noted: 2022-08-08

## 2022-08-08 PROCEDURE — 99214 OFFICE O/P EST MOD 30 MIN: CPT | Performed by: INTERNAL MEDICINE

## 2022-08-08 PROCEDURE — 3074F SYST BP LT 130 MM HG: CPT | Performed by: INTERNAL MEDICINE

## 2022-08-08 PROCEDURE — 3725F SCREEN DEPRESSION PERFORMED: CPT | Performed by: INTERNAL MEDICINE

## 2022-08-08 PROCEDURE — 71046 X-RAY EXAM CHEST 2 VIEWS: CPT

## 2022-08-08 PROCEDURE — 3079F DIAST BP 80-89 MM HG: CPT | Performed by: INTERNAL MEDICINE

## 2022-08-08 RX ORDER — FUROSEMIDE 40 MG/1
40 TABLET ORAL DAILY
Qty: 2 TABLET | Refills: 0 | Status: SHIPPED | OUTPATIENT
Start: 2022-08-08 | End: 2022-08-15 | Stop reason: SDUPTHER

## 2022-08-08 NOTE — PROGRESS NOTES
Assessment/Plan:     Patient is here with complaints of shortness of breath for the past few weeks  She aches explains that she gets more short of breath when lying down flat at night  Reports shortness of breath when walking  Denies any chest pain or snoring  Some swelling to her bilateral lower extremities  She was recently seen in the ER for the same  Chest x-ray was negative labs were mostly unremarkable  She has a history of breast cancer and recently completed radiation therapy  No history of heart failure  Echo done in 2020 mostly normal     Obtain chest x-ray an echo  Will give a 1 time dose of Lasix  Rales present on exam      Quality Measures:       No follow-ups on file  No problem-specific Assessment & Plan notes found for this encounter  Diagnoses and all orders for this visit:    SOB (shortness of breath) on exertion  -     XR chest pa & lateral; Future  -     Echo complete w/ contrast if indicated; Future  -     furosemide (LASIX) 40 mg tablet; Take 1 tablet (40 mg total) by mouth daily for 2 days          Subjective:      Patient ID: Haritha Duarte is a 67 y o  female  Here with shortness of breath      ALLERGIES:  Allergies   Allergen Reactions    Ciprofloxacin Hives    Sulfa Antibiotics Itching    Other     Penicillins Rash     Category: Allergy;   --  Pt received unasyn 1 5 mg IV 11-12-18 to 11-15-18    Sulfacetamide Rash     Category:  Allergy;        CURRENT MEDICATIONS:    Current Outpatient Medications:     amLODIPine (NORVASC) 2 5 mg tablet, TAKE 2 TABLETS BY MOUTH DAILY, Disp: 180 tablet, Rfl: 1    anastrozole (ARIMIDEX) 1 mg tablet, Take 1 tablet (1 mg total) by mouth daily, Disp: 90 tablet, Rfl: 1    aspirin 325 mg tablet, Take 325 mg by mouth daily , Disp: , Rfl:     calcium carbonate (OS-MICHAEL) 1250 (500 Ca) MG tablet, Take 1 tablet (1,250 mg total) by mouth daily, Disp: 30 tablet, Rfl: 2    calcium carbonate (OYSTER SHELL,OSCAL) 500 mg, TAKE 1 TABLET (1,250 MG TOTAL) BY MOUTH DAILY, Disp: , Rfl:     cholecalciferol (VITAMIN D3) 1,000 units tablet, Take 1 tablet (1,000 Units total) by mouth daily, Disp: 30 tablet, Rfl: 6    cholestyramine (QUESTRAN) 4 g packet, TAKE 1 PACKET (4 G TOTAL) BY MOUTH DAILY, Disp: 90 packet, Rfl: 1    diphenhydrAMINE (BENADRYL) 25 mg tablet, Take 25 mg by mouth in the morning   1/2 tablet with Paxil  , Disp: , Rfl:     fluticasone (FLONASE) 50 mcg/act nasal spray, SPRAY 1 SPRAY INTO EACH NOSTRIL EVERY DAY (Patient taking differently: as needed), Disp: 16 mL, Rfl: 1    furosemide (LASIX) 40 mg tablet, Take 1 tablet (40 mg total) by mouth daily for 2 days, Disp: 2 tablet, Rfl: 0    levothyroxine 125 mcg tablet, TAKE 1 TABLET BY MOUTH EVERY DAY, Disp: 90 tablet, Rfl: 3    meclizine (ANTIVERT) 25 mg tablet, TAKE 1 TABLET BY MOUTH THREE TIMES A DAY AS NEEDED FOR DIZZINESS, Disp: 50 tablet, Rfl: 1    memantine (NAMENDA) 10 mg tablet, TAKE 1 TABLET BY MOUTH TWICE A DAY, Disp: 180 tablet, Rfl: 2    multivitamin (THERAGRAN) TABS, Take 1 tablet by mouth, Disp: , Rfl:     Myrbetriq 50 MG TB24, TAKE 1 TABLET BY MOUTH EVERY DAY, Disp: 90 tablet, Rfl: 3    omeprazole (PriLOSEC) 20 mg delayed release capsule, TAKE 1 CAPSULE BY MOUTH EVERY DAY, Disp: 90 capsule, Rfl: 3    oxyCODONE-acetaminophen (Percocet) 5-325 mg per tablet, Take 1 tablet by mouth every 6 (six) hours as needed for moderate pain Max Daily Amount: 4 tablets, Disp: 20 tablet, Rfl: 0    PARoxetine (PAXIL) 20 mg tablet, TAKE 1 TABLET BY MOUTH EVERY DAY, Disp: 90 tablet, Rfl: 3    potassium chloride (MICRO-K) 10 MEQ CR capsule, Take 1 capsule (10 mEq total) by mouth daily, Disp: 30 capsule, Rfl: 0    PROAIR  (90 Base) MCG/ACT inhaler, as needed , Disp: , Rfl:     rivastigmine (EXELON) 9 5 mg/24 hr TD 24 hr patch, Place 1 patch on the skin in the morning , Disp: 90 patch, Rfl: 0    predniSONE 10 mg tablet, TAKE 1 TABLET BY MOUTH EVERY DAY (Patient not taking: Reported on 8/8/2022), Disp: 21 tablet, Rfl: 0    ACTIVE PROBLEM LIST:  Patient Active Problem List   Diagnosis    Depression    Gastroesophageal reflux disease without esophagitis    Mixed hyperlipidemia    Essential hypertension    Hypothyroid    Impaired fasting glucose    Lymphoma (HCC)    Cerebral aneurysm, nonruptured    Mild cognitive impairment    Flexor tenosynovitis of finger    Macrocytosis without anemia    Paresthesias    Non-intractable vomiting with nausea    Hypokalemia    Late onset Alzheimer's disease without behavioral disturbance (HCC)    Chest pain    Near syncope    Immunocompromised (HCC)    Malignant neoplasm of overlapping sites of right breast in female, estrogen receptor positive ([facility] )    Use of anastrozole    Continuous opioid dependence (HCC)    Recurrent major depressive disorder, remission status unspecified ([facility] )    Stage 3 chronic kidney disease, unspecified whether stage 3a or 3b CKD ([facility] )       PAST MEDICAL HISTORY:  Past Medical History:   Diagnosis Date    Anxiety disorder     Aortic valve disorder     [facility]     Cancer ([facility] )     breast    Cellulitis of finger of left hand 11/20/2018    Cellulitis of left hand 11/10/2018    Added automatically from request for surgery [id number]    Depression     Disease of thyroid gland     GERD (gastroesophageal reflux disease)     History of gastroesophageal reflux (GERD)     History of kidney cancer 03/06/2014    History of transfusion     Hyperlipidemia 03/06/2014    Hypertension 03/06/2014    Hypothyroidism 03/06/2014    Malignant neoplasm of overlapping sites of right breast in female, estrogen receptor positive ([facility] ) 3/10/2022    Shortness of breath     TIA (transient ischemic attack)     UTI (urinary tract infection)        PAST SURGICAL HISTORY:  Past Surgical History:   Procedure Laterality Date    BREAST LUMPECTOMY Right 3/29/2022    Procedure: ITZEL  DIRECTED LUMPECTOMY;  Surgeon: Tari PEARL Marlin Yanes MD;  Location: [facility];  Service: Surgical Oncology    ESOPHAGOGASTRIC FUNDOPLASTY      Nissen Fundoplication    HERNIA REPAIR      LYMPH NODE BIOPSY Right 3/29/2022    Procedure: LYMPHATIC MAPPING WITH BLUE AND RADIOACTIVE DYES SENTINEL LYMPH NODE BIOPSY, INJECTION IN OR AT 0800 BY DR Natasha Dickson;  Surgeon: Michele Lira MD;  Location: [facility];  Service: Surgical Oncology    MAMMO STEREOTACTIC BREAST BIOPSY RIGHT (ALL INC) Right 3/7/2022    US BREAST ITZEL  NEEDLE LOC RIGHT Right 3/24/2022    US GUIDED BREAST BIOPSY RIGHT COMPLETE Right 3/7/2022    WRIST SURGERY Left        FAMILY HISTORY:  Family History   Problem Relation Age of Onset    Stroke Father     Leukemia Sister     No Known Problems Brother     Skin cancer Mother     Stroke Maternal Grandmother     No Known Problems Maternal Grandfather     No Known Problems Paternal Grandmother     No Known Problems Paternal Grandfather     Breast cancer Sister     Breast cancer Sister     Kidney cancer Brother     Alcohol abuse Brother     Kidney cancer Brother     Stomach cancer Brother     No Known Problems Daughter     No Known Problems Daughter        SOCIAL HISTORY:  Social History     Socioeconomic History    Marital status:       Spouse name: Not on file    Number of children: 2    Years of education: Not on file    Highest education level: Not on file   Occupational History    Not on file   Tobacco Use    Smoking status: Never Smoker    Smokeless tobacco: Never Used   Vaping Use    Vaping Use: Never used   Substance and Sexual Activity    Alcohol use: Yes     Comment: social-rare    Drug use: Never    Sexual activity: Not Currently   Other Topics Concern    Not on file   Social History Narrative    Not on file     Social Determinants of Health     Financial Resource Strain: Not on file   Food Insecurity: Not on file   Transportation Needs: Not on file   Physical Activity: Not on file Stress: Not on file   Social Connections: Not on file   Intimate Partner Violence: Not on file   Housing Stability: Not on file       Review of Systems   Respiratory: Positive for shortness of breath  Cardiovascular: Positive for leg swelling  All other systems reviewed and are negative  Objective:  Vitals:    08/08/22 1636   BP: 124/82   BP Location: Left arm   Patient Position: Sitting   Cuff Size: Adult   Pulse: 94   Resp: 20   Temp: 98 3 °F (36 8 °C)   TempSrc: Tympanic   SpO2: 97%   Weight: 73 3 kg (161 lb 9 6 oz)   Height: 5' 2" (1 575 m)     Body mass index is 29 56 kg/m²  Physical Exam  Vitals and nursing note reviewed  Constitutional:       Appearance: She is well-developed  HENT:      Head: Normocephalic and atraumatic  Pulmonary:      Breath sounds: Examination of the right-lower field reveals rales  Examination of the left-lower field reveals rales  Rales present  Neurological:      Mental Status: She is alert and oriented to person, place, and time     Psychiatric:         Mood and Affect: Mood normal            RESULTS:    Recent Results (from the past 1008 hour(s))   Urinalysis with microscopic    Collection Time: 07/27/22  1:42 PM   Result Value Ref Range    Color, UA Yellow     Clarity, UA Clear     Specific Gravity, UA 1 020 1 003 - 1 030    pH, UA 6 5 4 5, 5 0, 5 5, 6 0, 6 5, 7 0, 7 5, 8 0    Leukocytes, UA Large (A) Negative    Nitrite, UA Negative Negative    Protein, UA 30 (1+) (A) Negative mg/dl    Glucose, UA Negative Negative mg/dl    Ketones, UA Trace (A) Negative mg/dl    Urobilinogen, UA 0 2 0 2, 1 0 E U /dl E U /dl    Bilirubin, UA Small (A) Negative    Occult Blood, UA Small (A) Negative    RBC, UA 2-4 None Seen, 2-4 /hpf    WBC, UA 20-30 (A) None Seen, 2-4, 5-60 /hpf    Epithelial Cells Moderate (A) None Seen, Occasional /hpf    Bacteria, UA Occasional None Seen, Occasional /hpf   Urine culture    Collection Time: 07/27/22  1:42 PM    Specimen: Urine, Clean Catch   Result Value Ref Range    Urine Culture >100,000 cfu/ml Escherichia coli (A)        Susceptibility    Escherichia coli - PASCUAL     ZID Performed Yes       Ampicillin ($$) <=8 00 Susceptible ug/ml     Aztreonam ($$$)  <=4 Susceptible ug/ml     Cefazolin ($) <=2 00 Susceptible ug/ml     Ciprofloxacin ($)  <=0 25 Susceptible ug/ml     Gentamicin ($$) <=2 Susceptible ug/ml     Levofloxacin ($) <=0 50 Susceptible ug/ml     Nitrofurantoin <=32 Susceptible ug/ml     Tetracycline <=4 Susceptible ug/ml     Tobramycin ($) <=2 Susceptible ug/ml     Trimethoprim + Sulfamethoxazole ($$$) <=0 5/9 5 Susceptible ug/ml   ECG 12 lead    Collection Time: 07/30/22  6:09 PM   Result Value Ref Range    Ventricular Rate 78 BPM    Atrial Rate 78 BPM    MT Interval 146 ms    QRSD Interval 82 ms    QT Interval 376 ms    QTC Interval 428 ms    P Axis 47 degrees    QRS Axis 12 degrees    T Wave Axis 37 degrees   CBC and differential    Collection Time: 07/30/22  7:05 PM   Result Value Ref Range    WBC 7 60 4 31 - 10 16 Thousand/uL    RBC 3 73 (L) 3 81 - 5 12 Million/uL    Hemoglobin 13 6 11 5 - 15 4 g/dL    Hematocrit 39 6 34 8 - 46 1 %     (H) 82 - 98 fL    MCH 36 5 (H) 26 8 - 34 3 pg    MCHC 34 3 31 4 - 37 4 g/dL    RDW 13 9 11 6 - 15 1 %    MPV 12 1 8 9 - 12 7 fL    Platelets 449 131 - 315 Thousands/uL   Basic metabolic panel    Collection Time: 07/30/22  7:05 PM   Result Value Ref Range    Sodium 139 135 - 147 mmol/L    Potassium 4 0 3 5 - 5 3 mmol/L    Chloride 103 96 - 108 mmol/L    CO2 27 21 - 32 mmol/L    ANION GAP 9 4 - 13 mmol/L    BUN 17 5 - 25 mg/dL    Creatinine 1 08 0 60 - 1 30 mg/dL    Glucose 91 65 - 140 mg/dL    Calcium 9 3 8 3 - 10 1 mg/dL    eGFR 49 ml/min/1 73sq m   Protime-INR    Collection Time: 07/30/22  7:05 PM   Result Value Ref Range    Protime 11 9 11 6 - 14 5 seconds    INR 0 89 0 84 - 1 19   APTT    Collection Time: 07/30/22  7:05 PM   Result Value Ref Range    PTT 25 23 - 37 seconds   HS Troponin 0hr (reflex protocol)    Collection Time: 07/30/22  7:05 PM   Result Value Ref Range    hs TnI 0hr 2 "Refer to ACS Flowchart"- see link ng/L   NT-BNP PRO    Collection Time: 07/30/22  7:05 PM   Result Value Ref Range    NT-proBNP 73 <450 pg/mL   Blood culture #1    Collection Time: 07/30/22  7:05 PM    Specimen: Arm, Right; Blood   Result Value Ref Range    Blood Culture No Growth After 5 Days  Blood culture #2    Collection Time: 07/30/22  7:05 PM    Specimen: Arm, Left; Blood   Result Value Ref Range    Blood Culture No Growth After 5 Days  FLU/RSV/COVID - if FLU/RSV clinically relevant    Collection Time: 07/30/22  7:05 PM    Specimen: Nose; Nares   Result Value Ref Range    SARS-CoV-2 Negative Negative    INFLUENZA A PCR Negative Negative    INFLUENZA B PCR Negative Negative    RSV PCR Negative Negative   Lactic acid    Collection Time: 07/30/22  7:05 PM   Result Value Ref Range    LACTIC ACID 1 4 0 5 - 2 0 mmol/L   Manual Differential(PHLEBS Do Not Order)    Collection Time: 07/30/22  7:05 PM   Result Value Ref Range    Segmented % 80 (H) 43 - 75 %    Lymphocytes % 11 (L) 14 - 44 %    Monocytes % 3 (L) 4 - 12 %    Eosinophils, % 5 0 - 6 %    Basophils % 1 0 - 1 %    Absolute Neutrophils 6 08 1 85 - 7 62 Thousand/uL    Lymphocytes Absolute 0 84 0 60 - 4 47 Thousand/uL    Monocytes Absolute 0 23 0 00 - 1 22 Thousand/uL    Eosinophils Absolute 0 38 0 00 - 0 40 Thousand/uL    Basophils Absolute 0 08 0 00 - 0 10 Thousand/uL    Total Counted      Macrocytes Present     Platelet Estimate Adequate Adequate       This note was created with voice recognition software  Phonic, grammatical and spelling errors may be present within the note as a result

## 2022-08-09 DIAGNOSIS — K21.9 HIATAL HERNIA WITH GASTROESOPHAGEAL REFLUX: Primary | ICD-10-CM

## 2022-08-09 DIAGNOSIS — K44.9 HIATAL HERNIA WITH GASTROESOPHAGEAL REFLUX: Primary | ICD-10-CM

## 2022-08-12 ENCOUNTER — HOSPITAL ENCOUNTER (OUTPATIENT)
Dept: NON INVASIVE DIAGNOSTICS | Facility: CLINIC | Age: 77
Discharge: HOME | End: 2022-08-12
Payer: COMMERCIAL

## 2022-08-12 VITALS
DIASTOLIC BLOOD PRESSURE: 82 MMHG | HEART RATE: 75 BPM | WEIGHT: 161 LBS | HEIGHT: 62 IN | BODY MASS INDEX: 29.63 KG/M2 | SYSTOLIC BLOOD PRESSURE: 124 MMHG

## 2022-08-12 DIAGNOSIS — R06.02 SOB (SHORTNESS OF BREATH) ON EXERTION: ICD-10-CM

## 2022-08-12 DIAGNOSIS — G30.1 LATE ONSET ALZHEIMER'S DISEASE WITHOUT BEHAVIORAL DISTURBANCE (HCC): ICD-10-CM

## 2022-08-12 DIAGNOSIS — F02.80 LATE ONSET ALZHEIMER'S DISEASE WITHOUT BEHAVIORAL DISTURBANCE (HCC): ICD-10-CM

## 2022-08-12 LAB
AORTIC ROOT: 3 CM
AORTIC VALVE MEAN VELOCITY: 10 M/S
APICAL FOUR CHAMBER EJECTION FRACTION: 70 %
AV LVOT PEAK GRADIENT: 5 MMHG
AV MEAN GRADIENT: 4 MMHG
AV PEAK GRADIENT: 9 MMHG
DOP CALC AO PEAK VEL: 1.54 M/S
DOP CALC AO VTI: 30.15 CM
DOP CALC LVOT AREA: 2.83 CM2
DOP CALC LVOT DIAMETER: 1.9 CM
E WAVE DECELERATION TIME: 165 MS
FRACTIONAL SHORTENING: 29 % (ref 28–44)
INTERVENTRICULAR SEPTUM IN DIASTOLE (PARASTERNAL SHORT AXIS VIEW): 0.8 CM
INTERVENTRICULAR SEPTUM: 0.8 CM (ref 0.6–1.1)
LAAS-AP2: 19.7 CM2
LAAS-AP4: 19 CM2
LEFT ATRIUM AREA SYSTOLE SINGLE PLANE A4C: 19.4 CM2
LEFT ATRIUM SIZE: 2.6 CM
LEFT INTERNAL DIMENSION IN SYSTOLE: 2.5 CM (ref 2.1–4)
LEFT VENTRICULAR INTERNAL DIMENSION IN DIASTOLE: 3.5 CM (ref 3.5–6)
LEFT VENTRICULAR POSTERIOR WALL IN END DIASTOLE: 0.8 CM
LEFT VENTRICULAR STROKE VOLUME: 27 ML
LVSV (TEICH): 27 ML
MV E'TISSUE VEL-LAT: 6 CM/S
MV PEAK A VEL: 0.71 M/S
MV PEAK E VEL: 78 CM/S
MV STENOSIS PRESSURE HALF TIME: 48 MS
MV VALVE AREA P 1/2 METHOD: 4.58 CM2
RIGHT ATRIUM AREA SYSTOLE A4C: 9.9 CM2
RIGHT VENTRICLE ID DIMENSION: 2.3 CM
SL CV LEFT ATRIUM LENGTH A2C: 6.1 CM
SL CV LV EF: 60
SL CV PED ECHO LEFT VENTRICLE DIASTOLIC VOLUME (MOD BIPLANE) 2D: 49 ML
SL CV PED ECHO LEFT VENTRICLE SYSTOLIC VOLUME (MOD BIPLANE) 2D: 23 ML
TR MAX PG: 28 MMHG
TR PEAK VELOCITY: 2.6 M/S
TRICUSPID VALVE PEAK REGURGITATION VELOCITY: 2.63 M/S

## 2022-08-12 PROCEDURE — 93306 TTE W/DOPPLER COMPLETE: CPT | Performed by: INTERNAL MEDICINE

## 2022-08-12 PROCEDURE — 93306 TTE W/DOPPLER COMPLETE: CPT

## 2022-08-12 RX ORDER — RIVASTIGMINE 9.5 MG/24H
1 PATCH, EXTENDED RELEASE TRANSDERMAL DAILY
Qty: 90 PATCH | Refills: 0 | Status: SHIPPED | OUTPATIENT
Start: 2022-08-12 | End: 2022-08-15 | Stop reason: SDUPTHER

## 2022-08-15 ENCOUNTER — OFFICE VISIT (OUTPATIENT)
Dept: INTERNAL MEDICINE CLINIC | Facility: CLINIC | Age: 77
End: 2022-08-15
Payer: COMMERCIAL

## 2022-08-15 ENCOUNTER — APPOINTMENT (OUTPATIENT)
Dept: LAB | Facility: HOSPITAL | Age: 77
End: 2022-08-15
Payer: COMMERCIAL

## 2022-08-15 VITALS
OXYGEN SATURATION: 96 % | HEART RATE: 91 BPM | BODY MASS INDEX: 29.81 KG/M2 | HEIGHT: 62 IN | DIASTOLIC BLOOD PRESSURE: 80 MMHG | SYSTOLIC BLOOD PRESSURE: 116 MMHG | WEIGHT: 162 LBS | RESPIRATION RATE: 14 BRPM

## 2022-08-15 DIAGNOSIS — K44.9 HIATAL HERNIA: ICD-10-CM

## 2022-08-15 DIAGNOSIS — J01.00 ACUTE NON-RECURRENT MAXILLARY SINUSITIS: ICD-10-CM

## 2022-08-15 DIAGNOSIS — I10 ESSENTIAL HYPERTENSION: ICD-10-CM

## 2022-08-15 DIAGNOSIS — E03.9 HYPOTHYROIDISM, UNSPECIFIED TYPE: ICD-10-CM

## 2022-08-15 DIAGNOSIS — F32.A DEPRESSION, UNSPECIFIED DEPRESSION TYPE: ICD-10-CM

## 2022-08-15 DIAGNOSIS — G30.1 LATE ONSET ALZHEIMER'S DISEASE WITHOUT BEHAVIORAL DISTURBANCE (HCC): ICD-10-CM

## 2022-08-15 DIAGNOSIS — N39.0 RECURRENT UTI: ICD-10-CM

## 2022-08-15 DIAGNOSIS — K21.9 GASTROESOPHAGEAL REFLUX DISEASE WITHOUT ESOPHAGITIS: ICD-10-CM

## 2022-08-15 DIAGNOSIS — F02.80 LATE ONSET ALZHEIMER'S DISEASE WITHOUT BEHAVIORAL DISTURBANCE (HCC): ICD-10-CM

## 2022-08-15 DIAGNOSIS — Z00.00 MEDICARE ANNUAL WELLNESS VISIT, SUBSEQUENT: Primary | ICD-10-CM

## 2022-08-15 DIAGNOSIS — R42 VERTIGO: ICD-10-CM

## 2022-08-15 DIAGNOSIS — E87.6 HYPOKALEMIA: ICD-10-CM

## 2022-08-15 DIAGNOSIS — R06.02 SOB (SHORTNESS OF BREATH) ON EXERTION: ICD-10-CM

## 2022-08-15 PROBLEM — F11.20 CONTINUOUS OPIOID DEPENDENCE (HCC): Status: RESOLVED | Noted: 2022-08-08 | Resolved: 2022-08-15

## 2022-08-15 LAB
BACTERIA UR QL AUTO: ABNORMAL /HPF
BILIRUB UR QL STRIP: NEGATIVE
CLARITY UR: ABNORMAL
COLOR UR: YELLOW
GLUCOSE UR STRIP-MCNC: NEGATIVE MG/DL
HGB UR QL STRIP.AUTO: ABNORMAL
KETONES UR STRIP-MCNC: ABNORMAL MG/DL
LEUKOCYTE ESTERASE UR QL STRIP: ABNORMAL
NITRITE UR QL STRIP: NEGATIVE
NON-SQ EPI CELLS URNS QL MICRO: ABNORMAL /HPF
PH UR STRIP.AUTO: 6 [PH]
PROT UR STRIP-MCNC: ABNORMAL MG/DL
RBC #/AREA URNS AUTO: ABNORMAL /HPF
SP GR UR STRIP.AUTO: >=1.03 (ref 1–1.03)
UROBILINOGEN UR QL STRIP.AUTO: 0.2 E.U./DL
WBC #/AREA URNS AUTO: ABNORMAL /HPF

## 2022-08-15 PROCEDURE — 1170F FXNL STATUS ASSESSED: CPT | Performed by: INTERNAL MEDICINE

## 2022-08-15 PROCEDURE — G0439 PPPS, SUBSEQ VISIT: HCPCS | Performed by: INTERNAL MEDICINE

## 2022-08-15 PROCEDURE — 3288F FALL RISK ASSESSMENT DOCD: CPT | Performed by: INTERNAL MEDICINE

## 2022-08-15 PROCEDURE — 99214 OFFICE O/P EST MOD 30 MIN: CPT | Performed by: INTERNAL MEDICINE

## 2022-08-15 PROCEDURE — 81001 URINALYSIS AUTO W/SCOPE: CPT

## 2022-08-15 PROCEDURE — 1125F AMNT PAIN NOTED PAIN PRSNT: CPT | Performed by: INTERNAL MEDICINE

## 2022-08-15 PROCEDURE — 87086 URINE CULTURE/COLONY COUNT: CPT

## 2022-08-15 RX ORDER — POTASSIUM CHLORIDE 750 MG/1
10 CAPSULE, EXTENDED RELEASE ORAL DAILY
Qty: 30 CAPSULE | Refills: 0 | Status: SHIPPED | OUTPATIENT
Start: 2022-08-15 | End: 2022-09-06

## 2022-08-15 RX ORDER — AMLODIPINE BESYLATE 2.5 MG/1
5 TABLET ORAL DAILY
Qty: 180 TABLET | Refills: 1 | Status: SHIPPED | OUTPATIENT
Start: 2022-08-15

## 2022-08-15 RX ORDER — FUROSEMIDE 40 MG/1
40 TABLET ORAL DAILY
Qty: 2 TABLET | Refills: 0 | Status: SHIPPED | OUTPATIENT
Start: 2022-08-15 | End: 2022-10-28

## 2022-08-15 RX ORDER — MEMANTINE HYDROCHLORIDE 10 MG/1
10 TABLET ORAL 2 TIMES DAILY
Qty: 180 TABLET | Refills: 1 | Status: SHIPPED | OUTPATIENT
Start: 2022-08-15

## 2022-08-15 RX ORDER — RIVASTIGMINE 9.5 MG/24H
1 PATCH, EXTENDED RELEASE TRANSDERMAL DAILY
Qty: 90 PATCH | Refills: 0 | Status: SHIPPED | OUTPATIENT
Start: 2022-08-15 | End: 2022-09-23 | Stop reason: SDUPTHER

## 2022-08-15 RX ORDER — LEVOTHYROXINE SODIUM 0.12 MG/1
125 TABLET ORAL DAILY
Qty: 90 TABLET | Refills: 1 | Status: SHIPPED | OUTPATIENT
Start: 2022-08-15

## 2022-08-15 RX ORDER — OMEPRAZOLE 20 MG/1
20 CAPSULE, DELAYED RELEASE ORAL 2 TIMES DAILY
Qty: 180 CAPSULE | Refills: 3 | Status: SHIPPED | OUTPATIENT
Start: 2022-08-15

## 2022-08-15 RX ORDER — OMEPRAZOLE 20 MG/1
20 CAPSULE, DELAYED RELEASE ORAL DAILY
Qty: 90 CAPSULE | Refills: 1 | Status: CANCELLED | OUTPATIENT
Start: 2022-08-15

## 2022-08-15 RX ORDER — FLUTICASONE PROPIONATE 50 MCG
1 SPRAY, SUSPENSION (ML) NASAL AS NEEDED
Qty: 16 G | Refills: 1 | Status: SHIPPED | OUTPATIENT
Start: 2022-08-15 | End: 2022-09-06

## 2022-08-15 RX ORDER — PAROXETINE HYDROCHLORIDE 20 MG/1
20 TABLET, FILM COATED ORAL DAILY
Qty: 90 TABLET | Refills: 1 | Status: SHIPPED | OUTPATIENT
Start: 2022-08-15

## 2022-08-15 RX ORDER — MECLIZINE HYDROCHLORIDE 25 MG/1
25 TABLET ORAL EVERY 8 HOURS PRN
Qty: 50 TABLET | Refills: 1 | Status: SHIPPED | OUTPATIENT
Start: 2022-08-15 | End: 2022-09-22

## 2022-08-15 RX ORDER — MIRABEGRON 50 MG/1
1 TABLET, FILM COATED, EXTENDED RELEASE ORAL DAILY
Qty: 90 TABLET | Refills: 1 | Status: SHIPPED | OUTPATIENT
Start: 2022-08-15

## 2022-08-15 NOTE — PROGRESS NOTES
Assessment and Plan:     Problem List Items Addressed This Visit        Digestive    Gastroesophageal reflux disease without esophagitis    Relevant Medications    omeprazole (PriLOSEC) 20 mg delayed release capsule    Other Relevant Orders    Ambulatory Referral to Gastroenterology       Endocrine    Hypothyroid    Relevant Medications    levothyroxine 125 mcg tablet       Cardiovascular and Mediastinum    Essential hypertension    Relevant Medications    amLODIPine (NORVASC) 2 5 mg tablet    furosemide (LASIX) 40 mg tablet       Nervous and Auditory    Late onset Alzheimer's disease without behavioral disturbance (HCC)    Relevant Medications    PARoxetine (PAXIL) 20 mg tablet    memantine (NAMENDA) 10 mg tablet    rivastigmine (EXELON) 9 5 mg/24 hr TD 24 hr patch       Other    Depression    Relevant Medications    PARoxetine (PAXIL) 20 mg tablet    memantine (NAMENDA) 10 mg tablet    rivastigmine (EXELON) 9 5 mg/24 hr TD 24 hr patch    Hypokalemia    Relevant Medications    potassium chloride (MICRO-K) 10 MEQ CR capsule      Other Visit Diagnoses     Medicare annual wellness visit, subsequent    -  Primary    Hiatal hernia        Relevant Medications    omeprazole (PriLOSEC) 20 mg delayed release capsule    Other Relevant Orders    Ambulatory Referral to Gastroenterology    Acute non-recurrent maxillary sinusitis        Relevant Medications    fluticasone (FLONASE) 50 mcg/act nasal spray    SOB (shortness of breath) on exertion        Relevant Medications    furosemide (LASIX) 40 mg tablet    Vertigo        Relevant Medications    meclizine (ANTIVERT) 25 mg tablet    Recurrent UTI        Relevant Medications    Mirabegron ER (Myrbetriq) 50 MG TB24    Other Relevant Orders    Urinalysis with microscopic    Urine culture        Chronic problems are stable  Unsure if the shortness of breath was helped with the water pill or the sliding hiatal hernia    Her daughter's going to trying get further water pill on alternating days and see if there is a difference  Will also refer to GI  Continue other medications  Preventive health issues were discussed with patient, and age appropriate screening tests were ordered as noted in patient's After Visit Summary  Personalized health advice and appropriate referrals for health education or preventive services given if needed, as noted in patient's After Visit Summary  History of Present Illness:     Patient presents for a Medicare Wellness Visit      Patient comes in today for routine follow-up and Medicare wellness with her daughter  Her daughter needed all of her medicines refilled  Her breathing is better but did not appear to lose any weight so it is unclear whether the water pill helped  Her chest x-ray suggests a large hiatal hernia  Her omeprazole was doubled because of that  Blood pressure is controlled  Still feels like she has her UTI, taking all of her medicines as directed  Dementia stable  No other complaints today  No further additions to her history  Patient Care Team:  Chanel Mullins MD as PCP - Tushar Longoria MD as Surgeon (Surgical Oncology)  Elvis Cooley MD (Hematology and Oncology)  Sivakumar Colmenares MD (Radiation Oncology)     Review of Systems:     Review of Systems   Respiratory: Negative for shortness of breath  Cardiovascular: Negative for chest pain  Gastrointestinal: Negative for abdominal pain  Genitourinary: Positive for dysuria          Problem List:     Patient Active Problem List   Diagnosis    Depression    Gastroesophageal reflux disease without esophagitis    Mixed hyperlipidemia    Essential hypertension    Hypothyroid    Impaired fasting glucose    Lymphoma (HCC)    Cerebral aneurysm, nonruptured    Mild cognitive impairment    Flexor tenosynovitis of finger    Macrocytosis without anemia    Paresthesias    Non-intractable vomiting with nausea    Hypokalemia    Late onset Alzheimer's disease without behavioral disturbance ([place] [address] )    Chest pain    Near syncope    Immunocompromised (HCC)    Malignant neoplasm of overlapping sites of right breast in female, estrogen receptor positive [facility] )    Use of anastrozole    Recurrent major depressive disorder, remission status unspecified ([place] [address] )    Stage 3 chronic kidney disease, unspecified whether stage 3a or 3b CKD ([facility] )      Past Medical and Surgical History:     Past Medical History:   Diagnosis Date    Anxiety disorder     Aortic valve disorder     Breast cancer ([place] [address] )     Cancer ([place] [address] )     breast    Cellulitis of finger of left hand 11/20/2018    Cellulitis of left hand 11/10/2018    Added automatically from request for surgery [id number]    Depression     Disease of thyroid gland     GERD (gastroesophageal reflux disease)     History of gastroesophageal reflux (GERD)     History of kidney cancer 03/06/2014    History of transfusion     Hyperlipidemia 03/06/2014    Hypertension 03/06/2014    Hypothyroidism 03/06/2014    Malignant neoplasm of overlapping sites of right breast in female, estrogen receptor positive ([facility] ) 3/10/2022    Shortness of breath     TIA (transient ischemic attack)     UTI (urinary tract infection)      Past Surgical History:   Procedure Laterality Date    BREAST LUMPECTOMY Right 3/29/2022    Procedure: ITZEL  DIRECTED LUMPECTOMY;  Surgeon: Dionicio Huggins MD;  Location: [facility];  Service: Surgical Oncology    ESOPHAGOGASTRIC FUNDOPLASTY      Nissen Fundoplication    HERNIA REPAIR      LYMPH NODE BIOPSY Right 3/29/2022    Procedure: LYMPHATIC MAPPING WITH BLUE AND RADIOACTIVE DYES SENTINEL LYMPH NODE BIOPSY, INJECTION IN OR AT 0800 BY DR Prosper Sanford;  Surgeon: Dionicio Huggins MD;  Location: [facility];  Service: Surgical Oncology    MAMMO STEREOTACTIC BREAST BIOPSY RIGHT (ALL INC) Right 3/7/2022    US BREAST ITZEL  NEEDLE LOC RIGHT Right 3/24/2022    US GUIDED BREAST BIOPSY RIGHT COMPLETE Right 3/7/2022    WRIST SURGERY Left       Family History:     Family History   Problem Relation Age of Onset    Stroke Father    Jeevan Stafford Leukemia Sister     No Known Problems Brother     Skin cancer Mother     Stroke Maternal Grandmother     No Known Problems Maternal Grandfather     No Known Problems Paternal Grandmother     No Known Problems Paternal Grandfather     Breast cancer Sister     Breast cancer Sister     Kidney cancer Brother     Alcohol abuse Brother     Kidney cancer Brother     Stomach cancer Brother     No Known Problems Daughter     No Known Problems Daughter       Social History:     Social History     Socioeconomic History    Marital status:      Spouse name: None    Number of children: 2    Years of education: None    Highest education level: None   Occupational History    None   Tobacco Use    Smoking status: Never Smoker    Smokeless tobacco: Never Used   Vaping Use    Vaping Use: Never used   Substance and Sexual Activity    Alcohol use: Yes     Comment: social-rare    Drug use: Never    Sexual activity: Not Currently   Other Topics Concern    None   Social History Narrative    None     Social Determinants of Health     Financial Resource Strain: Low Risk     Difficulty of Paying Living Expenses: Not very hard   Food Insecurity: Not on file   Transportation Needs: No Transportation Needs    Lack of Transportation (Medical): No    Lack of Transportation (Non-Medical):  No   Physical Activity: Not on file   Stress: Not on file   Social Connections: Not on file   Intimate Partner Violence: Not on file   Housing Stability: Not on file      Medications and Allergies:     Current Outpatient Medications   Medication Sig Dispense Refill    amLODIPine (NORVASC) 2 5 mg tablet Take 2 tablets (5 mg total) by mouth daily 180 tablet 1    anastrozole (ARIMIDEX) 1 mg tablet Take 1 tablet (1 mg total) by mouth daily 90 tablet 1    aspirin 325 mg tablet Take 325 mg by mouth daily       cholecalciferol (VITAMIN D3) 1,000 units tablet Take 1 tablet (1,000 Units total) by mouth daily 30 tablet 6    cholestyramine (QUESTRAN) 4 g packet TAKE 1 PACKET (4 G TOTAL) BY MOUTH DAILY 90 packet 1    diphenhydrAMINE (BENADRYL) 25 mg tablet Take 25 mg by mouth in the morning  1/2 tablet with Paxil    fluticasone (FLONASE) 50 mcg/act nasal spray 1 spray into each nostril as needed for rhinitis or allergies 16 g 1    furosemide (LASIX) 40 mg tablet Take 1 tablet (40 mg total) by mouth daily for 2 days 2 tablet 0    levothyroxine 125 mcg tablet Take 1 tablet (125 mcg total) by mouth daily 90 tablet 1    meclizine (ANTIVERT) 25 mg tablet Take 1 tablet (25 mg total) by mouth every 8 (eight) hours as needed for dizziness 50 tablet 1    memantine (NAMENDA) 10 mg tablet Take 1 tablet (10 mg total) by mouth 2 (two) times a day 180 tablet 1    Mirabegron ER (Myrbetriq) 50 MG TB24 Take 1 tablet (50 mg total) by mouth daily 90 tablet 1    multivitamin (THERAGRAN) TABS Take 1 tablet by mouth      omeprazole (PriLOSEC) 20 mg delayed release capsule Take 1 capsule (20 mg total) by mouth 2 (two) times a day 180 capsule 3    PARoxetine (PAXIL) 20 mg tablet Take 1 tablet (20 mg total) by mouth daily 90 tablet 1    potassium chloride (MICRO-K) 10 MEQ CR capsule Take 1 capsule (10 mEq total) by mouth daily 30 capsule 0    PROAIR  (90 Base) MCG/ACT inhaler as needed       rivastigmine (EXELON) 9 5 mg/24 hr TD 24 hr patch Place 1 patch on the skin daily 90 patch 0     No current facility-administered medications for this visit  Allergies   Allergen Reactions    Ciprofloxacin Hives    Sulfa Antibiotics Itching    Other     Penicillins Rash     Category: Allergy;   --  Pt received unasyn 1 5 mg IV 11-12-18 to 11-15-18    Sulfacetamide Rash     Category:  Allergy;       Immunizations:     Immunization History   Administered Date(s) Administered    COVID-19 MODERNA VACC 0 5 ML IM 02/10/2021, 03/08/2021    COVID-19 PFIZER VACCINE 0 3 ML IM 12/01/2021, 12/01/2021    INFLUENZA 11/03/2010, 11/12/2012    Influenza Split High Dose Preservative Free IM 01/19/2018    Influenza, high dose seasonal 0 7 mL 01/21/2019, 11/11/2019, 10/05/2020, 10/11/2021    Pneumococcal Polysaccharide PPV23 09/16/2011, 11/11/2019    Rabies-IM Human Diploid Cell Culture 11/10/2018    Tdap 11/10/2018      Health Maintenance:         Topic Date Due    Hepatitis C Screening  Never done    Breast Cancer Screening: Mammogram  03/07/2023         Topic Date Due    Pneumococcal Vaccine: 65+ Years (3 - PCV) 11/11/2020    COVID-19 Vaccine (4 - Booster) 03/01/2022    Influenza Vaccine (1) 09/01/2022      Medicare Screening Tests and Risk Assessments:     Christi Lopez is here for her Subsequent Wellness visit  Health Risk Assessment:   Patient rates overall health as good  Patient feels that their physical health rating is slightly worse  Patient is satisfied with their life  Eyesight was rated as same  Hearing was rated as same  Patient feels that their emotional and mental health rating is slightly worse  Patients states they are sometimes angry  Patient states they are often unusually tired/fatigued  Pain experienced in the last 7 days has been some  Patient's pain rating has been 6/10  Patient states that she has experienced no weight loss or gain in last 6 months  Depression Screening:   PHQ-9 Score: 12      Fall Risk Screening: In the past year, patient has experienced: no history of falling in past year      Urinary Incontinence Screening:   Patient has leaked urine accidently in the last six months  Home Safety:  Patient does not have trouble with stairs inside or outside of their home  Patient has working smoke alarms and has working carbon monoxide detector  Home safety hazards include: none  Nutrition:   Current diet is Regular       Medications:   Patient is currently taking over-the-counter supplements  OTC medications include: see medication list  Patient is able to manage medications  Activities of Daily Living (ADLs)/Instrumental Activities of Daily Living (IADLs):   Walk and transfer into and out of bed and chair?: Yes  Dress and groom yourself?: Yes    Bathe or shower yourself?: Yes    Feed yourself? Yes  Do your laundry/housekeeping?: Yes  Manage your money, pay your bills and track your expenses?: Yes  Make your own meals?: Yes    Do your own shopping?: Yes    Previous Hospitalizations:   Any hospitalizations or ED visits within the last 12 months?: Yes    How many hospitalizations have you had in the last year?: 1-2    Advance Care Planning:     Advanced directive counseling given: Yes      Cognitive Screening:   Provider or family/friend/caregiver concerned regarding cognition?: Yes    Cognition Comments: Has known dementia    PREVENTIVE SCREENINGS      Cardiovascular Screening:    General: Screening Not Indicated and History Lipid Disorder      Diabetes Screening:     General: Screening Current      Colorectal Cancer Screening:     General: Screening Not Indicated      Breast Cancer Screening:     General: History Breast Cancer      Cervical Cancer Screening:    General: Screening Not Indicated      Osteoporosis Screening:    General: Screening Current      Abdominal Aortic Aneurysm (AAA) Screening:        General: Screening Not Indicated      Lung Cancer Screening:     General: Screening Not Indicated      Hepatitis C Screening:    General: Screening Not Indicated    Screening, Brief Intervention, and Referral to Treatment (SBIRT)    Screening  Typical number of drinks in a day: 0  Typical number of drinks in a week: 0  Interpretation: Low risk drinking behavior      AUDIT-C Screenin) How often did you have a drink containing alcohol in the past year? never  2) How many drinks did you have on a typical day when you were drinking in the past year? 0  3) How often did you have 6 or more drinks on one occasion in the past year? never    AUDIT-C Score: 0  Interpretation: Score 0-2 (female): Negative screen for alcohol misuse    Single Item Drug Screening:  How often have you used an illegal drug (including marijuana) or a prescription medication for non-medical reasons in the past year? never    Single Item Drug Screen Score: 0  Interpretation: Negative screen for possible drug use disorder    No exam data present     Physical Exam:     /80 (BP Location: Left arm, Patient Position: Sitting)   Pulse 91   Resp 14   Ht 5' 2" (1 575 m)   Wt 73 5 kg (162 lb)   SpO2 96%   BMI 29 63 kg/m²     Physical Exam  Vitals and nursing note reviewed  Constitutional:       Appearance: She is well-developed  Cardiovascular:      Rate and Rhythm: Normal rate and regular rhythm  Pulmonary:      Effort: Pulmonary effort is normal       Breath sounds: Normal breath sounds  No wheezing, rhonchi or rales  Abdominal:      General: Abdomen is flat  Tenderness: There is no abdominal tenderness  Musculoskeletal:      Right lower leg: No edema  Left lower leg: No edema  Neurological:      Mental Status: She is alert  Mental status is at baseline     Psychiatric:         Mood and Affect: Mood normal          Behavior: Behavior normal           Issa Drew MD

## 2022-08-17 ENCOUNTER — OFFICE VISIT (OUTPATIENT)
Dept: HEMATOLOGY ONCOLOGY | Facility: CLINIC | Age: 77
End: 2022-08-17
Payer: COMMERCIAL

## 2022-08-17 VITALS
HEART RATE: 79 BPM | TEMPERATURE: 98.3 F | HEIGHT: 62 IN | DIASTOLIC BLOOD PRESSURE: 80 MMHG | WEIGHT: 160 LBS | RESPIRATION RATE: 16 BRPM | SYSTOLIC BLOOD PRESSURE: 136 MMHG | OXYGEN SATURATION: 94 % | BODY MASS INDEX: 29.44 KG/M2

## 2022-08-17 DIAGNOSIS — C50.811 MALIGNANT NEOPLASM OF OVERLAPPING SITES OF RIGHT BREAST IN FEMALE, ESTROGEN RECEPTOR POSITIVE (HCC): Primary | ICD-10-CM

## 2022-08-17 DIAGNOSIS — N39.0 RECURRENT UTI: Primary | ICD-10-CM

## 2022-08-17 DIAGNOSIS — Z17.0 MALIGNANT NEOPLASM OF OVERLAPPING SITES OF RIGHT BREAST IN FEMALE, ESTROGEN RECEPTOR POSITIVE (HCC): Primary | ICD-10-CM

## 2022-08-17 LAB — BACTERIA UR CULT: NORMAL

## 2022-08-17 PROCEDURE — 1160F RVW MEDS BY RX/DR IN RCRD: CPT | Performed by: INTERNAL MEDICINE

## 2022-08-17 PROCEDURE — 99214 OFFICE O/P EST MOD 30 MIN: CPT | Performed by: INTERNAL MEDICINE

## 2022-08-17 RX ORDER — ANASTROZOLE 1 MG/1
1 TABLET ORAL DAILY
Qty: 90 TABLET | Refills: 3 | Status: SHIPPED | OUTPATIENT
Start: 2022-08-17

## 2022-09-06 DIAGNOSIS — J01.00 ACUTE NON-RECURRENT MAXILLARY SINUSITIS: ICD-10-CM

## 2022-09-06 DIAGNOSIS — E87.6 HYPOKALEMIA: ICD-10-CM

## 2022-09-06 RX ORDER — FLUTICASONE PROPIONATE 50 MCG
SPRAY, SUSPENSION (ML) NASAL
Qty: 48 ML | Refills: 1 | Status: SHIPPED | OUTPATIENT
Start: 2022-09-06

## 2022-09-06 RX ORDER — POTASSIUM CHLORIDE 750 MG/1
CAPSULE, EXTENDED RELEASE ORAL
Qty: 90 CAPSULE | Refills: 1 | Status: SHIPPED | OUTPATIENT
Start: 2022-09-06 | End: 2022-10-28 | Stop reason: ALTCHOICE

## 2022-09-20 ENCOUNTER — TELEPHONE (OUTPATIENT)
Dept: INTERNAL MEDICINE CLINIC | Facility: CLINIC | Age: 77
End: 2022-09-20

## 2022-09-22 DIAGNOSIS — R42 VERTIGO: ICD-10-CM

## 2022-09-22 RX ORDER — MECLIZINE HYDROCHLORIDE 25 MG/1
25 TABLET ORAL EVERY 8 HOURS PRN
Qty: 50 TABLET | Refills: 1 | Status: SHIPPED | OUTPATIENT
Start: 2022-09-22

## 2022-09-23 DIAGNOSIS — G30.1 LATE ONSET ALZHEIMER'S DISEASE WITHOUT BEHAVIORAL DISTURBANCE (HCC): ICD-10-CM

## 2022-09-23 DIAGNOSIS — F02.80 LATE ONSET ALZHEIMER'S DISEASE WITHOUT BEHAVIORAL DISTURBANCE (HCC): ICD-10-CM

## 2022-09-23 RX ORDER — RIVASTIGMINE 9.5 MG/24H
1 PATCH, EXTENDED RELEASE TRANSDERMAL DAILY
Qty: 90 PATCH | Refills: 1 | Status: SHIPPED | OUTPATIENT
Start: 2022-09-23 | End: 2022-10-17 | Stop reason: SDUPTHER

## 2022-09-27 ENCOUNTER — TELEPHONE (OUTPATIENT)
Dept: NEUROLOGY | Facility: CLINIC | Age: 77
End: 2022-09-27

## 2022-09-27 NOTE — TELEPHONE ENCOUNTER
Pt's daughter left ,asking for a refill of Exelon patch  Noted that script was sent on 9/23 for 90 days supply with 1 refill available  Called daughter back and left message making her aware that script was sent to pt's pharmacy

## 2022-10-17 ENCOUNTER — TELEPHONE (OUTPATIENT)
Dept: NEUROLOGY | Facility: CLINIC | Age: 77
End: 2022-10-17

## 2022-10-17 DIAGNOSIS — G30.1 LATE ONSET ALZHEIMER'S DISEASE WITHOUT BEHAVIORAL DISTURBANCE (HCC): ICD-10-CM

## 2022-10-17 DIAGNOSIS — F02.80 LATE ONSET ALZHEIMER'S DISEASE WITHOUT BEHAVIORAL DISTURBANCE (HCC): ICD-10-CM

## 2022-10-17 RX ORDER — RIVASTIGMINE 9.5 MG/24H
1 PATCH, EXTENDED RELEASE TRANSDERMAL DAILY
Qty: 90 PATCH | Refills: 1 | Status: SHIPPED | OUTPATIENT
Start: 2022-10-17

## 2022-10-17 NOTE — TELEPHONE ENCOUNTER
Wm came into office and stated that [facility] did not have this srivastigmine (EXELON) 9 5 mg/24 hr TD 24 hr patch [[id number]]     Order Details  Dose: 1 patch Route: Transdermal Frequency: Daily   Dispense Quantity: 90 patch Refills: 1          Sig: Place 1 patch on the skin daily         Start Date: 09/23/22 End Date: --   Written Date: 09/23/22 Expiration Date: 09/23/23         I printed oput and gave her the information , the script was sent on 9/30 order on 9/23  [facility] has any problems to call

## 2022-10-28 ENCOUNTER — OFFICE VISIT (OUTPATIENT)
Dept: INTERNAL MEDICINE CLINIC | Facility: CLINIC | Age: 77
End: 2022-10-28
Payer: COMMERCIAL

## 2022-10-28 VITALS
DIASTOLIC BLOOD PRESSURE: 82 MMHG | RESPIRATION RATE: 14 BRPM | WEIGHT: 162 LBS | HEIGHT: 62 IN | HEART RATE: 105 BPM | OXYGEN SATURATION: 96 % | BODY MASS INDEX: 29.81 KG/M2 | SYSTOLIC BLOOD PRESSURE: 138 MMHG

## 2022-10-28 DIAGNOSIS — Z23 NEED FOR INFLUENZA VACCINATION: ICD-10-CM

## 2022-10-28 DIAGNOSIS — N39.0 ACUTE URINARY TRACT INFECTION: Primary | ICD-10-CM

## 2022-10-28 LAB
SL AMB  POCT GLUCOSE, UA: ABNORMAL
SL AMB LEUKOCYTE ESTERASE,UA: 125
SL AMB POCT BILIRUBIN,UA: ABNORMAL
SL AMB POCT BLOOD,UA: ABNORMAL
SL AMB POCT CLARITY,UA: ABNORMAL
SL AMB POCT COLOR,UA: ABNORMAL
SL AMB POCT KETONES,UA: ABNORMAL
SL AMB POCT NITRITE,UA: ABNORMAL
SL AMB POCT PH,UA: 6.5
SL AMB POCT SPECIFIC GRAVITY,UA: 1.02
SL AMB POCT URINE PROTEIN: ABNORMAL
SL AMB POCT UROBILINOGEN: 3.2

## 2022-10-28 PROCEDURE — 90662 IIV NO PRSV INCREASED AG IM: CPT | Performed by: INTERNAL MEDICINE

## 2022-10-28 PROCEDURE — 81002 URINALYSIS NONAUTO W/O SCOPE: CPT | Performed by: INTERNAL MEDICINE

## 2022-10-28 PROCEDURE — 99214 OFFICE O/P EST MOD 30 MIN: CPT | Performed by: INTERNAL MEDICINE

## 2022-10-28 PROCEDURE — 87086 URINE CULTURE/COLONY COUNT: CPT | Performed by: INTERNAL MEDICINE

## 2022-10-28 PROCEDURE — G0008 ADMIN INFLUENZA VIRUS VAC: HCPCS | Performed by: INTERNAL MEDICINE

## 2022-10-28 RX ORDER — NITROFURANTOIN 25; 75 MG/1; MG/1
100 CAPSULE ORAL 2 TIMES DAILY
Qty: 10 CAPSULE | Refills: 0 | Status: SHIPPED | OUTPATIENT
Start: 2022-10-28 | End: 2022-11-02

## 2022-10-28 NOTE — PROGRESS NOTES
Assessment/Plan:       Will try and obtain sample but, irregardless, will start antibiotics based on her symptoms  She can only tolerate Macrobid because of allergies  That has worked before  Since she does not have fever, she may have her flu shot today  Quality Measures:       No follow-ups on file  No problem-specific Assessment & Plan notes found for this encounter  Diagnoses and all orders for this visit:    Acute urinary tract infection  -     nitrofurantoin (MACROBID) 100 mg capsule; Take 1 capsule (100 mg total) by mouth 2 (two) times a day for 5 days  -     POCT urine dip  -     Urine culture; Future    Need for influenza vaccination  -     influenza vaccine, high-dose, PF 0 7 mL (FLUZONE HIGH-DOSE)        Subjective:      Patient ID: Hillary Sinha is a 67 y o  female  Patient comes in today with her daughter complaining of UTI symptoms  They did a home azo test which was positive  She has had urinary tract infections several times before  Similar symptoms  No fever  Some nausea  ALLERGIES:  Allergies   Allergen Reactions   • Ciprofloxacin Hives   • Sulfa Antibiotics Itching   • Other    • Penicillins Rash     Category: Allergy;   --  Pt received unasyn 1 5 mg IV 11-12-18 to 11-15-18   • Sulfacetamide Rash     Category:  Allergy;        CURRENT MEDICATIONS:    Current Outpatient Medications:   •  amLODIPine (NORVASC) 2 5 mg tablet, Take 2 tablets (5 mg total) by mouth daily, Disp: 180 tablet, Rfl: 1  •  anastrozole (ARIMIDEX) 1 mg tablet, Take 1 tablet (1 mg total) by mouth daily, Disp: 90 tablet, Rfl: 3  •  aspirin 325 mg tablet, Take 325 mg by mouth daily , Disp: , Rfl:   •  cholecalciferol (VITAMIN D3) 1,000 units tablet, Take 1 tablet (1,000 Units total) by mouth daily, Disp: 30 tablet, Rfl: 6  •  cholestyramine (QUESTRAN) 4 g packet, TAKE 1 PACKET (4 G TOTAL) BY MOUTH DAILY, Disp: 90 packet, Rfl: 1  •  diphenhydrAMINE (BENADRYL) 25 mg tablet, Take 25 mg by mouth in the morning   1/2 tablet with Paxil  , Disp: , Rfl:   •  fluticasone (FLONASE) 50 mcg/act nasal spray, INSTILL 1 SPRAY INTO EACH NOSTRIL AS NEEDED FOR RHINITIS OR ALLERGIES, Disp: 48 mL, Rfl: 1  •  furosemide (LASIX) 40 mg tablet, Take 1 tablet (40 mg total) by mouth daily for 2 days, Disp: 2 tablet, Rfl: 0  •  levothyroxine 125 mcg tablet, Take 1 tablet (125 mcg total) by mouth daily, Disp: 90 tablet, Rfl: 1  •  meclizine (ANTIVERT) 25 mg tablet, TAKE 1 TABLET (25 MG TOTAL) BY MOUTH EVERY 8 (EIGHT) HOURS AS NEEDED FOR DIZZINESS , Disp: 50 tablet, Rfl: 1  •  memantine (NAMENDA) 10 mg tablet, Take 1 tablet (10 mg total) by mouth 2 (two) times a day, Disp: 180 tablet, Rfl: 1  •  Mirabegron ER (Myrbetriq) 50 MG TB24, Take 1 tablet (50 mg total) by mouth daily, Disp: 90 tablet, Rfl: 1  •  multivitamin (THERAGRAN) TABS, Take 1 tablet by mouth, Disp: , Rfl:   •  nitrofurantoin (MACROBID) 100 mg capsule, Take 1 capsule (100 mg total) by mouth 2 (two) times a day for 5 days, Disp: 10 capsule, Rfl: 0  •  omeprazole (PriLOSEC) 20 mg delayed release capsule, Take 1 capsule (20 mg total) by mouth 2 (two) times a day, Disp: 180 capsule, Rfl: 3  •  PARoxetine (PAXIL) 20 mg tablet, Take 1 tablet (20 mg total) by mouth daily, Disp: 90 tablet, Rfl: 1  •  PROAIR  (90 Base) MCG/ACT inhaler, as needed , Disp: , Rfl:   •  rivastigmine (EXELON) 9 5 mg/24 hr TD 24 hr patch, Place 1 patch on the skin daily, Disp: 90 patch, Rfl: 1    ACTIVE PROBLEM LIST:  Patient Active Problem List   Diagnosis   • Depression   • Gastroesophageal reflux disease without esophagitis   • Mixed hyperlipidemia   • Essential hypertension   • Hypothyroid   • Impaired fasting glucose   • Lymphoma (HCC)   • Cerebral aneurysm, nonruptured   • Mild cognitive impairment   • Flexor tenosynovitis of finger   • Macrocytosis without anemia   • Paresthesias   • Non-intractable vomiting with nausea   • Hypokalemia   • Late onset Alzheimer's disease without behavioral disturbance (HCC)   • Chest pain   • Near syncope   • Immunocompromised (HCC)   • Malignant neoplasm of overlapping sites of right breast in female, estrogen receptor positive ([facility] )   • Use of anastrozole   • Recurrent major depressive disorder, remission status unspecified ([place] [address] )   • Stage 3 chronic kidney disease, unspecified whether stage 3a or 3b CKD ([facility] )       PAST MEDICAL HISTORY:  Past Medical History:   Diagnosis Date   • Anxiety disorder    • Aortic valve disorder    • Breast cancer ([facility] )    • Cancer ([place] [address] )     breast   • Cellulitis of finger of left hand 11/20/2018   • Cellulitis of left hand 11/10/2018    Added automatically from request for surgery [id number]   • Depression    • Disease of thyroid gland    • GERD (gastroesophageal reflux disease)    • History of gastroesophageal reflux (GERD)    • History of kidney cancer 03/06/2014   • History of transfusion    • Hyperlipidemia 03/06/2014   • Hypertension 03/06/2014   • Hypothyroidism 03/06/2014   • Malignant neoplasm of overlapping sites of right breast in female, estrogen receptor positive ([facility] ) 3/10/2022   • Shortness of breath    • TIA (transient ischemic attack)    • UTI (urinary tract infection)        PAST SURGICAL HISTORY:  Past Surgical History:   Procedure Laterality Date   • BREAST LUMPECTOMY Right 3/29/2022    Procedure: ITZEL  DIRECTED LUMPECTOMY;  Surgeon: Red Odom MD;  Location: [facility];  Service: Surgical Oncology   • ESOPHAGOGASTRIC FUNDOPLASTY      Nissen Fundoplication   • HERNIA REPAIR     • LYMPH NODE BIOPSY Right 3/29/2022    Procedure: LYMPHATIC MAPPING WITH BLUE AND RADIOACTIVE DYES SENTINEL LYMPH NODE BIOPSY, INJECTION IN OR AT 0800 BY DR Naina Osman;  Surgeon: Red Odom MD;  Location: [facility];  Service: Surgical Oncology   • MAMMO STEREOTACTIC BREAST BIOPSY RIGHT (ALL INC) Right 3/7/2022   • US BREAST ITZEL  NEEDLE LOC RIGHT Right 3/24/2022   • US GUIDED BREAST BIOPSY RIGHT COMPLETE Right 3/7/2022   • WRIST SURGERY Left        FAMILY HISTORY:  Family History   Problem Relation Age of Onset   • Stroke Father    • Leukemia Sister    • No Known Problems Brother    • Skin cancer Mother    • Stroke Maternal Grandmother    • No Known Problems Maternal Grandfather    • No Known Problems Paternal Grandmother    • No Known Problems Paternal Grandfather    • Breast cancer Sister    • Breast cancer Sister    • Kidney cancer Brother    • Alcohol abuse Brother    • Kidney cancer Brother    • Stomach cancer Brother    • No Known Problems Daughter    • No Known Problems Daughter        SOCIAL HISTORY:  Social History     Socioeconomic History   • Marital status:      Spouse name: Not on file   • Number of children: 2   • Years of education: Not on file   • Highest education level: Not on file   Occupational History   • Not on file   Tobacco Use   • Smoking status: Never Smoker   • Smokeless tobacco: Never Used   Vaping Use   • Vaping Use: Never used   Substance and Sexual Activity   • Alcohol use: Yes     Comment: social-rare   • Drug use: Never   • Sexual activity: Not Currently   Other Topics Concern   • Not on file   Social History Narrative   • Not on file     Social Determinants of Health     Financial Resource Strain: Low Risk    • Difficulty of Paying Living Expenses: Not very hard   Food Insecurity: Not on file   Transportation Needs: No Transportation Needs   • Lack of Transportation (Medical): No   • Lack of Transportation (Non-Medical): No   Physical Activity: Not on file   Stress: Not on file   Social Connections: Not on file   Intimate Partner Violence: Not on file   Housing Stability: Not on file       Review of Systems   Constitutional: Negative for fever  Musculoskeletal: Negative for back pain           Objective:  Vitals:    10/28/22 0851   BP: 138/82   BP Location: Left arm   Patient Position: Sitting   Pulse: 105   Resp: 14   SpO2: 96%   Weight: 73 5 kg (162 lb)   Height: 5' 2" (1 575 m) Body mass index is 29 63 kg/m²  Physical Exam  Vitals and nursing note reviewed  Constitutional:       Appearance: Normal appearance  She is not ill-appearing  Abdominal:      Tenderness: There is no right CVA tenderness or left CVA tenderness  Neurological:      Mental Status: She is alert  RESULTS:    No results found for this or any previous visit (from the past 1008 hour(s))  This note was created with voice recognition software  Phonic, grammatical and spelling errors may be present within the note as a result

## 2022-10-30 LAB — BACTERIA UR CULT: NORMAL

## 2022-11-14 ENCOUNTER — OFFICE VISIT (OUTPATIENT)
Dept: NEUROLOGY | Facility: CLINIC | Age: 77
End: 2022-11-14

## 2022-11-14 VITALS
HEART RATE: 79 BPM | OXYGEN SATURATION: 94 % | DIASTOLIC BLOOD PRESSURE: 80 MMHG | SYSTOLIC BLOOD PRESSURE: 138 MMHG | TEMPERATURE: 97.1 F | HEIGHT: 62 IN | BODY MASS INDEX: 30 KG/M2 | WEIGHT: 163 LBS

## 2022-11-14 DIAGNOSIS — F02.80 LATE ONSET ALZHEIMER'S DISEASE WITHOUT BEHAVIORAL DISTURBANCE (HCC): Primary | ICD-10-CM

## 2022-11-14 DIAGNOSIS — E78.2 MIXED HYPERLIPIDEMIA: ICD-10-CM

## 2022-11-14 DIAGNOSIS — G30.1 LATE ONSET ALZHEIMER'S DISEASE WITHOUT BEHAVIORAL DISTURBANCE (HCC): Primary | ICD-10-CM

## 2022-11-14 DIAGNOSIS — I10 ESSENTIAL HYPERTENSION: ICD-10-CM

## 2022-11-14 NOTE — PROGRESS NOTES
Bigg Ibarra is a 67 y o  female  Chief Complaint   Patient presents with   • Late onset Alzheimer's disease without behavioral disturban       Assessment:  1  Late onset Alzheimer's disease without behavioral disturbance ([address] )    2  Essential hypertension    3  Mixed hyperlipidemia        Plan:  Continue with Exelon patch 9 6 mg per 24 hour and Namenda 10 mg twice a day  Continue with mentally stimulating exercises  Follow-up in 6 months  Discussion:  Patient is doing reasonably well her [place] is 21/30 slightly less than last time it was 23/30, she is tolerating the Exelon patch and the Namenda well, I have advised her to continue with same and also advised her to take a multivitamin, continue with mentally stimulating exercises, to keep her blood pressure cholesterol and sugar under control, to go to the hospital if has any worsening symptoms and call me otherwise to see me back in 6 months and follow up with other physicians  Subjective:    HPI   Patient is here in follow-up for memory difficulty, since her last visit she feels her memory is doing good and stable she stays with her daughter her mood is good denies any headaches no vision difficulty no speech difficulty, no hallucinations appetite is good weight has been stable  No other complaints      Vitals:    11/14/22 1316   BP: 138/80   BP Location: Right arm   Patient Position: Sitting   Cuff Size: Adult   Pulse: 79   Temp: (!) 97 1 °F (36 2 °C)   TempSrc: Temporal   SpO2: 94%   Height: 5' 2" (1 575 m)       Current Medications    Current Outpatient Medications:   •  amLODIPine (NORVASC) 2 5 mg tablet, Take 2 tablets (5 mg total) by mouth daily, Disp: 180 tablet, Rfl: 3  •  anastrozole (ARIMIDEX) 1 mg tablet, Take 1 tablet (1 mg total) by mouth daily, Disp: 90 tablet, Rfl: 3  •  aspirin 325 mg tablet, Take 325 mg by mouth daily , Disp: , Rfl:   •  cholecalciferol (VITAMIN D3) 1,000 units tablet, Take 1 tablet (1,000 Units total) by mouth daily, Disp: 30 tablet, Rfl: 6  •  cholestyramine (QUESTRAN) 4 g packet, TAKE 1 PACKET (4 G TOTAL) BY MOUTH DAILY, Disp: 90 packet, Rfl: 1  •  diphenhydrAMINE (BENADRYL) 25 mg tablet, Take 25 mg by mouth in the morning   1/2 tablet with Paxil  , Disp: , Rfl:   •  fluticasone (FLONASE) 50 mcg/act nasal spray, INSTILL 1 SPRAY INTO EACH NOSTRIL AS NEEDED FOR RHINITIS OR ALLERGIES, Disp: 48 mL, Rfl: 1  •  furosemide (LASIX) 40 mg tablet, Take 1 tablet (40 mg total) by mouth daily for 2 days, Disp: 2 tablet, Rfl: 0  •  levothyroxine 125 mcg tablet, Take 1 tablet (125 mcg total) by mouth daily, Disp: 90 tablet, Rfl: 1  •  meclizine (ANTIVERT) 25 mg tablet, TAKE 1 TABLET (25 MG TOTAL) BY MOUTH EVERY 8 (EIGHT) HOURS AS NEEDED FOR DIZZINESS , Disp: 50 tablet, Rfl: 1  •  memantine (NAMENDA) 10 mg tablet, Take 1 tablet (10 mg total) by mouth 2 (two) times a day, Disp: 180 tablet, Rfl: 1  •  Mirabegron ER (Myrbetriq) 50 MG TB24, Take 1 tablet (50 mg total) by mouth daily, Disp: 90 tablet, Rfl: 3  •  multivitamin (THERAGRAN) TABS, Take 1 tablet by mouth, Disp: , Rfl:   •  omeprazole (PriLOSEC) 20 mg delayed release capsule, Take 1 capsule (20 mg total) by mouth 2 (two) times a day, Disp: 180 capsule, Rfl: 3  •  PARoxetine (PAXIL) 20 mg tablet, Take 1 tablet (20 mg total) by mouth daily, Disp: 90 tablet, Rfl: 1  •  PROAIR  (90 Base) MCG/ACT inhaler, as needed , Disp: , Rfl:   •  rivastigmine (EXELON) 9 5 mg/24 hr TD 24 hr patch, Place 1 patch on the skin daily, Disp: 90 patch, Rfl: 1      Allergies  Ciprofloxacin, Sulfa antibiotics, Other, Penicillins, and Sulfacetamide    Past Medical History  Past Medical History:   Diagnosis Date   • Anxiety disorder    • Aortic valve disorder    • Breast cancer (HCC)    • Cancer [facility] )     breast   • Cellulitis of finger of left hand 11/20/2018   • Cellulitis of left hand 11/10/2018    Added automatically from request for surgery [id number]   • Depression    • Disease of thyroid gland    • GERD (gastroesophageal reflux disease)    • History of gastroesophageal reflux (GERD)    • History of kidney cancer 03/06/2014   • History of transfusion    • Hyperlipidemia 03/06/2014   • Hypertension 03/06/2014   • Hypothyroidism 03/06/2014   • Malignant neoplasm of overlapping sites of right breast in female, estrogen receptor positive ([facility] ) 3/10/2022   • Shortness of breath    • TIA (transient ischemic attack)    • UTI (urinary tract infection)          Past Surgical History:  Past Surgical History:   Procedure Laterality Date   • BREAST LUMPECTOMY Right 3/29/2022    Procedure: ITZEL  DIRECTED LUMPECTOMY;  Surgeon: Earline Jordan MD;  Location: [facility];  Service: Surgical Oncology   • ESOPHAGOGASTRIC FUNDOPLASTY      Nissen Fundoplication   • HERNIA REPAIR     • LYMPH NODE BIOPSY Right 3/29/2022    Procedure: LYMPHATIC MAPPING WITH BLUE AND RADIOACTIVE DYES SENTINEL LYMPH NODE BIOPSY, INJECTION IN OR AT 0800 BY DR Rivera Lemus;  Surgeon: Earline Jordan MD;  Location: [facility];  Service: Surgical Oncology   • MAMMO STEREOTACTIC BREAST BIOPSY RIGHT (ALL INC) Right 3/7/2022   • US BREAST ITZEL  NEEDLE LOC RIGHT Right 3/24/2022   • US GUIDED BREAST BIOPSY RIGHT COMPLETE Right 3/7/2022   • WRIST SURGERY Left          Family History:  Family History   Problem Relation Age of Onset   • Stroke Father    • Leukemia Sister    • No Known Problems Brother    • Skin cancer Mother    • Stroke Maternal Grandmother    • No Known Problems Maternal Grandfather    • No Known Problems Paternal Grandmother    • No Known Problems Paternal Grandfather    • Breast cancer Sister    • Breast cancer Sister    • Kidney cancer Brother    • Alcohol abuse Brother    • Kidney cancer Brother    • Stomach cancer Brother    • No Known Problems Daughter    • No Known Problems Daughter        Social History:   reports that she has never smoked  She has never used smokeless tobacco  She reports current alcohol use   She reports that she does not use drugs  I have reviewed the past medical history, surgical history, social and family history, current medications, allergies vitals, review of systems, and updated this information as appropriate today  Objective:    Physical Exam    Neurological Exam    GENERAL:  Cooperative in no acute distress  Well-developed and well-nourished    HEAD and NECK   Head is atraumatic normocephalic with no lesions or masses  Neck is supple with full range of motion    CARDIOVASCULAR  Carotid Arteries-no carotid bruits  NEUROLOGIC:  Mental Status-the patient is awake alert and oriented without aphasia or apraxia, [place] is 21/30  Cranial Nerves: Visual fields are full to confrontation  Discs are flat  Extraocular movements are full without nystagmus  Pupils are 2-1/2 mm and reactive  Face is symmetrical to light touch  Movements of facial expression move symmetrically  Hearing is normal to finger rub bilaterally  Soft palate lifts symmetrically  Shoulder shrug is symmetrical  Tongue is midline without atrophy  Motor: No drift is noted on arm extension  Strength is full in the upper and lower extremities with normal bulk and tone  Ambulates with a cane  ROS:  Review of Systems   Constitutional: Negative  Negative for appetite change and fever  HENT: Negative  Negative for hearing loss, tinnitus, trouble swallowing and voice change  Eyes: Negative  Negative for photophobia, pain and visual disturbance  Respiratory: Negative  Negative for shortness of breath  Cardiovascular: Negative  Negative for palpitations  Gastrointestinal: Negative  Negative for nausea and vomiting  Endocrine: Negative  Negative for cold intolerance  Genitourinary: Negative  Negative for dysuria, frequency and urgency  Musculoskeletal: Negative  Negative for gait problem, myalgias and neck pain  Skin: Negative  Negative for rash  Allergic/Immunologic: Negative      Neurological: Negative  Negative for dizziness, tremors, seizures, syncope, facial asymmetry, speech difficulty, weakness, light-headedness, numbness and headaches  Hematological: Negative  Does not bruise/bleed easily  Psychiatric/Behavioral: Negative  Negative for confusion, hallucinations and sleep disturbance

## 2022-11-17 ENCOUNTER — APPOINTMENT (EMERGENCY)
Dept: CT IMAGING | Facility: HOSPITAL | Age: 77
End: 2022-11-17

## 2022-11-17 ENCOUNTER — HOSPITAL ENCOUNTER (EMERGENCY)
Facility: HOSPITAL | Age: 77
Discharge: HOME | End: 2022-11-17
Attending: EMERGENCY MEDICINE

## 2022-11-17 VITALS
OXYGEN SATURATION: 97 % | DIASTOLIC BLOOD PRESSURE: 70 MMHG | HEART RATE: 89 BPM | RESPIRATION RATE: 18 BRPM | TEMPERATURE: 97.4 F | SYSTOLIC BLOOD PRESSURE: 159 MMHG

## 2022-11-17 DIAGNOSIS — N39.0 UTI (URINARY TRACT INFECTION): Primary | ICD-10-CM

## 2022-11-17 LAB
2HR DELTA HS TROPONIN: 0 NG/L
ALBUMIN SERPL BCP-MCNC: 3.7 G/DL (ref 3.5–5)
ALP SERPL-CCNC: 91 U/L (ref 46–116)
ALT SERPL W P-5'-P-CCNC: 17 U/L (ref 12–78)
ANION GAP SERPL CALCULATED.3IONS-SCNC: 11 MMOL/L (ref 4–13)
AST SERPL W P-5'-P-CCNC: 18 U/L (ref 5–45)
BACTERIA UR QL AUTO: ABNORMAL /HPF
BASOPHILS # BLD AUTO: 0.08 THOUSANDS/ÂΜL (ref 0–0.1)
BASOPHILS NFR BLD AUTO: 1 % (ref 0–1)
BILIRUB SERPL-MCNC: 0.45 MG/DL (ref 0.2–1)
BILIRUB UR QL STRIP: NEGATIVE
BUN SERPL-MCNC: 18 MG/DL (ref 5–25)
CALCIUM SERPL-MCNC: 9.1 MG/DL (ref 8.3–10.1)
CARDIAC TROPONIN I PNL SERPL HS: 3 NG/L
CARDIAC TROPONIN I PNL SERPL HS: 3 NG/L
CHLORIDE SERPL-SCNC: 103 MMOL/L (ref 96–108)
CLARITY UR: CLEAR
CO2 SERPL-SCNC: 24 MMOL/L (ref 21–32)
COLOR UR: YELLOW
CREAT SERPL-MCNC: 0.97 MG/DL (ref 0.6–1.3)
EOSINOPHIL # BLD AUTO: 0.09 THOUSAND/ÂΜL (ref 0–0.61)
EOSINOPHIL NFR BLD AUTO: 1 % (ref 0–6)
ERYTHROCYTE [DISTWIDTH] IN BLOOD BY AUTOMATED COUNT: 13.5 % (ref 11.6–15.1)
FLUAV RNA RESP QL NAA+PROBE: NEGATIVE
FLUBV RNA RESP QL NAA+PROBE: NEGATIVE
GFR SERPL CREATININE-BSD FRML MDRD: 56 ML/MIN/1.73SQ M
GLUCOSE SERPL-MCNC: 129 MG/DL (ref 65–140)
GLUCOSE UR STRIP-MCNC: NEGATIVE MG/DL
HCT VFR BLD AUTO: 37.4 % (ref 34.8–46.1)
HGB BLD-MCNC: 12.7 G/DL (ref 11.5–15.4)
HGB UR QL STRIP.AUTO: NEGATIVE
HYALINE CASTS #/AREA URNS LPF: ABNORMAL /LPF
IMM GRANULOCYTES # BLD AUTO: >0.5 THOUSAND/UL (ref 0–0.2)
IMM GRANULOCYTES NFR BLD AUTO: 14 % (ref 0–2)
KETONES UR STRIP-MCNC: ABNORMAL MG/DL
LACTATE SERPL-SCNC: 1.5 MMOL/L (ref 0.5–2)
LACTATE SERPL-SCNC: 2.5 MMOL/L (ref 0.5–2)
LEUKOCYTE ESTERASE UR QL STRIP: ABNORMAL
LIPASE SERPL-CCNC: 61 U/L (ref 73–393)
LYMPHOCYTES # BLD AUTO: 0.73 THOUSANDS/ÂΜL (ref 0.6–4.47)
LYMPHOCYTES NFR BLD AUTO: 7 % (ref 14–44)
MCH RBC QN AUTO: 36.9 PG (ref 26.8–34.3)
MCHC RBC AUTO-ENTMCNC: 34 G/DL (ref 31.4–37.4)
MCV RBC AUTO: 109 FL (ref 82–98)
MONOCYTES # BLD AUTO: 0.64 THOUSAND/ÂΜL (ref 0.17–1.22)
MONOCYTES NFR BLD AUTO: 6 % (ref 4–12)
MUCOUS THREADS UR QL AUTO: ABNORMAL
NEUTROPHILS # BLD AUTO: 7.79 THOUSANDS/ÂΜL (ref 1.85–7.62)
NEUTS SEG NFR BLD AUTO: 71 % (ref 43–75)
NITRITE UR QL STRIP: NEGATIVE
NON-SQ EPI CELLS URNS QL MICRO: ABNORMAL /HPF
NRBC BLD AUTO-RTO: 0 /100 WBCS
PH UR STRIP.AUTO: 6.5 [PH]
PLATELET # BLD AUTO: 284 THOUSANDS/UL (ref 149–390)
PMV BLD AUTO: 11.4 FL (ref 8.9–12.7)
POTASSIUM SERPL-SCNC: 3.6 MMOL/L (ref 3.5–5.3)
PROT SERPL-MCNC: 7.6 G/DL (ref 6.4–8.4)
PROT UR STRIP-MCNC: ABNORMAL MG/DL
RBC # BLD AUTO: 3.44 MILLION/UL (ref 3.81–5.12)
RBC #/AREA URNS AUTO: ABNORMAL /HPF
RSV RNA RESP QL NAA+PROBE: NEGATIVE
SARS-COV-2 RNA RESP QL NAA+PROBE: NEGATIVE
SODIUM SERPL-SCNC: 138 MMOL/L (ref 135–147)
SP GR UR STRIP.AUTO: 1.02 (ref 1–1.03)
UROBILINOGEN UR STRIP-ACNC: <2 MG/DL
WBC # BLD AUTO: 10.82 THOUSAND/UL (ref 4.31–10.16)
WBC #/AREA URNS AUTO: ABNORMAL /HPF

## 2022-11-17 RX ORDER — ONDANSETRON 2 MG/ML
4 INJECTION INTRAMUSCULAR; INTRAVENOUS ONCE
Status: COMPLETED | OUTPATIENT
Start: 2022-11-17 | End: 2022-11-17

## 2022-11-17 RX ORDER — CEPHALEXIN 500 MG/1
500 CAPSULE ORAL EVERY 12 HOURS SCHEDULED
Qty: 14 CAPSULE | Refills: 0 | Status: SHIPPED | OUTPATIENT
Start: 2022-11-17 | End: 2022-11-24

## 2022-11-17 RX ORDER — ONDANSETRON 4 MG/1
4 TABLET, ORALLY DISINTEGRATING ORAL EVERY 8 HOURS PRN
Qty: 15 TABLET | Refills: 0 | Status: SHIPPED | OUTPATIENT
Start: 2022-11-17 | End: 2022-11-17 | Stop reason: SDUPTHER

## 2022-11-17 RX ORDER — ONDANSETRON 4 MG/1
4 TABLET, ORALLY DISINTEGRATING ORAL EVERY 8 HOURS PRN
Qty: 15 TABLET | Refills: 0 | Status: SHIPPED | OUTPATIENT
Start: 2022-11-17

## 2022-11-17 RX ORDER — CEPHALEXIN 250 MG/1
500 CAPSULE ORAL ONCE
Status: COMPLETED | OUTPATIENT
Start: 2022-11-17 | End: 2022-11-17

## 2022-11-17 RX ORDER — CEPHALEXIN 500 MG/1
500 CAPSULE ORAL EVERY 12 HOURS SCHEDULED
Qty: 14 CAPSULE | Refills: 0 | Status: SHIPPED | OUTPATIENT
Start: 2022-11-17 | End: 2022-11-17 | Stop reason: SDUPTHER

## 2022-11-17 RX ADMIN — ONDANSETRON 4 MG: 2 INJECTION INTRAMUSCULAR; INTRAVENOUS at 18:36

## 2022-11-17 RX ADMIN — IOHEXOL 100 ML: 350 INJECTION, SOLUTION INTRAVENOUS at 19:32

## 2022-11-17 RX ADMIN — CEPHALEXIN 500 MG: 250 CAPSULE ORAL at 21:50

## 2022-11-17 RX ADMIN — SODIUM CHLORIDE 1000 ML: 0.9 INJECTION, SOLUTION INTRAVENOUS at 18:43

## 2022-11-17 NOTE — ED PROVIDER NOTES
History  Chief Complaint   Patient presents with   • Vomiting     Pt arrives with c/o dry heaving since earlier today  Pt denies any stomach pain, or diarrhea  25-year-old female presents to ER for evaluation of vomiting  PMH hiatal hernia, GERD, HTN, stage 3 CKD, hypothyroid, hyperlipidemia, lymphoma  Patient stated that she woke up this morning and was not feeling well  She a stated she a vomited multiple times throughout the day and unable to keep anything down  She noted the vomit to be a initially green to white in color  Daughter states that the patient has been dry heaving since she got home around 3 pm   Has not tried anything over the counter  Patient states that she was recently treated for a UTI approximately 4 weeks ago she denies having any urinary frequency, burning, or urgency  Patient denies abdominal pain, diarrhea, fever, blood in stool body aches  Denies sick contacts  Denies headache, dizziness  No vertigo  No extremity numbness tingling or weakness  No chest pain or shortness of breath  No diaphoresis  History provided by:  Patient   used: No    Vomiting  Severity:  Moderate  Duration:  1 day  Timing:  Constant  Quality:  Stomach contents  Progression:  Unchanged  Chronicity:  New  Relieved by:  Nothing  Worsened by:  Nothing  Ineffective treatments:  None tried  Associated symptoms: no abdominal pain, no cough, no diarrhea and no fever        Prior to Admission Medications   Prescriptions Last Dose Informant Patient Reported? Taking?    Mirabegron ER (Myrbetriq) 50 MG TB24   No No   Sig: Take 1 tablet (50 mg total) by mouth daily   PARoxetine (PAXIL) 20 mg tablet   No No   Sig: Take 1 tablet (20 mg total) by mouth daily   PROAIR  (90 Base) MCG/ACT inhaler   Yes No   Sig: as needed    amLODIPine (NORVASC) 2 5 mg tablet   No No   Sig: Take 2 tablets (5 mg total) by mouth daily   anastrozole (ARIMIDEX) 1 mg tablet   No No   Sig: Take 1 tablet (1 mg total) by mouth daily   aspirin 325 mg tablet   Yes No   Sig: Take 325 mg by mouth daily    cholecalciferol (VITAMIN D3) 1,000 units tablet   No No   Sig: Take 1 tablet (1,000 Units total) by mouth daily   cholestyramine (QUESTRAN) 4 g packet   No No   Sig: TAKE 1 PACKET (4 G TOTAL) BY MOUTH DAILY   diphenhydrAMINE (BENADRYL) 25 mg tablet   Yes No   Sig: Take 25 mg by mouth in the morning  1/2 tablet with Paxil      fluticasone (FLONASE) 50 mcg/act nasal spray   No No   Sig: INSTILL 1 SPRAY INTO EACH NOSTRIL AS NEEDED FOR RHINITIS OR ALLERGIES   furosemide (LASIX) 40 mg tablet   No No   Sig: Take 1 tablet (40 mg total) by mouth daily for 2 days   levothyroxine 125 mcg tablet   No No   Sig: Take 1 tablet (125 mcg total) by mouth daily   meclizine (ANTIVERT) 25 mg tablet   No No   Sig: TAKE 1 TABLET (25 MG TOTAL) BY MOUTH EVERY 8 (EIGHT) HOURS AS NEEDED FOR DIZZINESS    memantine (NAMENDA) 10 mg tablet   No No   Sig: Take 1 tablet (10 mg total) by mouth 2 (two) times a day   multivitamin (THERAGRAN) TABS   Yes No   Sig: Take 1 tablet by mouth   omeprazole (PriLOSEC) 20 mg delayed release capsule   No No   Sig: Take 1 capsule (20 mg total) by mouth 2 (two) times a day   rivastigmine (EXELON) 9 5 mg/24 hr TD 24 hr patch   No No   Sig: Place 1 patch on the skin daily      Facility-Administered Medications: None       Past Medical History:   Diagnosis Date   • Anxiety disorder    • Aortic valve disorder    • Breast cancer (HCC)    • Cancer (HCC)     breast   • Cellulitis of finger of left hand 11/20/2018   • Cellulitis of left hand 11/10/2018    Added automatically from request for surgery [id number]   • Depression    • Disease of thyroid gland    • GERD (gastroesophageal reflux disease)    • History of gastroesophageal reflux (GERD)    • History of kidney cancer 03/06/2014   • History of transfusion    • Hyperlipidemia 03/06/2014   • Hypertension 03/06/2014   • Hypothyroidism 03/06/2014   • Malignant neoplasm of overlapping sites of right breast in female, estrogen receptor positive ([facility] [address] ) 3/10/2022   • Shortness of breath    • TIA (transient ischemic attack)    • UTI (urinary tract infection)        Past Surgical History:   Procedure Laterality Date   • BREAST LUMPECTOMY Right 3/29/2022    Procedure: ITZEL  DIRECTED LUMPECTOMY;  Surgeon: Remedios Beyer MD;  Location: [facility];  Service: Surgical Oncology   • ESOPHAGOGASTRIC FUNDOPLASTY      Nissen Fundoplication   • HERNIA REPAIR     • LYMPH NODE BIOPSY Right 3/29/2022    Procedure: LYMPHATIC MAPPING WITH BLUE AND RADIOACTIVE DYES SENTINEL LYMPH NODE BIOPSY, INJECTION IN OR AT 0800 BY DR Hugh Eller;  Surgeon: Remedios Beyer MD;  Location: [facility];  Service: Surgical Oncology   • MAMMO STEREOTACTIC BREAST BIOPSY RIGHT (ALL INC) Right 3/7/2022   • US BREAST ITZEL  NEEDLE LOC RIGHT Right 3/24/2022   • US GUIDED BREAST BIOPSY RIGHT COMPLETE Right 3/7/2022   • WRIST SURGERY Left        Family History   Problem Relation Age of Onset   • Stroke Father    • Leukemia Sister    • No Known Problems Brother    • Skin cancer Mother    • Stroke Maternal Grandmother    • No Known Problems Maternal Grandfather    • No Known Problems Paternal Grandmother    • No Known Problems Paternal Grandfather    • Breast cancer Sister    • Breast cancer Sister    • Kidney cancer Brother    • Alcohol abuse Brother    • Kidney cancer Brother    • Stomach cancer Brother    • No Known Problems Daughter    • No Known Problems Daughter      I have reviewed and agree with the history as documented      E-Cigarette/Vaping   • E-Cigarette Use Never User      E-Cigarette/Vaping Substances   • Nicotine No    • THC No    • CBD No    • Flavoring No    • Other No    • Unknown No      Social History     Tobacco Use   • Smoking status: Never   • Smokeless tobacco: Never   Vaping Use   • Vaping Use: Never used   Substance Use Topics   • Alcohol use: Yes     Comment: social-rare   • Drug use: Never Review of Systems   Constitutional: Positive for appetite change  Negative for fever  HENT: Negative for congestion  Respiratory: Negative for cough, chest tightness and shortness of breath  Cardiovascular: Negative for chest pain  Gastrointestinal: Positive for nausea and vomiting  Negative for abdominal pain, blood in stool and diarrhea  Genitourinary: Negative for flank pain, frequency, hematuria, urgency and vaginal pain  Musculoskeletal: Negative for back pain  Skin: Negative for pallor and rash  Neurological: Negative for dizziness, seizures, syncope, weakness and numbness  Psychiatric/Behavioral: Negative for behavioral problems and sleep disturbance  All other systems reviewed and are negative  Physical Exam  Physical Exam  Constitutional:       General: She is not in acute distress  Appearance: Normal appearance  She is not ill-appearing  HENT:      Head: Normocephalic and atraumatic  Right Ear: External ear normal       Left Ear: External ear normal       Nose: Nose normal       Mouth/Throat:      Mouth: Mucous membranes are moist    Eyes:      Pupils: Pupils are equal, round, and reactive to light  Cardiovascular:      Rate and Rhythm: Normal rate and regular rhythm  Pulses: Normal pulses  Heart sounds: Normal heart sounds  No murmur heard  Pulmonary:      Effort: Pulmonary effort is normal       Breath sounds: Normal breath sounds  Abdominal:      General: Bowel sounds are normal       Palpations: Abdomen is soft  Tenderness: There is no abdominal tenderness  There is no guarding or rebound  Negative signs include Chavez's sign and McBurney's sign  Musculoskeletal:         General: Normal range of motion  Cervical back: Normal range of motion  Skin:     General: Skin is warm  Neurological:      General: No focal deficit present  Mental Status: She is alert and oriented to person, place, and time        GCS: GCS eye subscore is 4  GCS verbal subscore is 5  GCS motor subscore is 6  Motor: No weakness  Gait: Gait normal       Comments: GCS 15  AAOx3  No focal neuro deficits  CN II-XII grossly intact  Speech normal, no aphasia or dysarthria  No pronator drift  Cerebellar function normal  Finger to nose normal  PERRL  EOMI  Peripheral vision intact  No nystagmus  Upper and lower extremity strength 5/5 through   strength 5/5 b/l  Gross sensation intact b/l  Psychiatric:         Mood and Affect: Mood normal          Behavior: Behavior normal          Thought Content: Thought content normal          Judgment: Judgment normal          Vital Signs  ED Triage Vitals   Temperature Pulse Respirations Blood Pressure SpO2   11/17/22 1719 11/17/22 1719 11/17/22 1719 11/17/22 1719 11/17/22 1719   (!) 97 4 °F (36 3 °C) 71 18 (!) 181/84 96 %      Temp Source Heart Rate Source Patient Position - Orthostatic VS BP Location FiO2 (%)   11/17/22 1719 -- 11/17/22 1719 11/17/22 1719 --   Tympanic  Sitting Left arm       Pain Score       11/17/22 2200       No Pain           Vitals:    11/17/22 1719 11/17/22 2200   BP: (!) 181/84 159/70   Pulse: 71 89   Patient Position - Orthostatic VS: Sitting Sitting         Visual Acuity      ED Medications  Medications   sodium chloride 0 9 % bolus 1,000 mL (0 mL Intravenous Stopped 11/17/22 2034)   ondansetron (ZOFRAN) injection 4 mg (4 mg Intravenous Given 11/17/22 1836)   iohexol (OMNIPAQUE) 350 MG/ML injection (SINGLE-DOSE) 100 mL (100 mL Intravenous Given 11/17/22 1932)   cephalexin (KEFLEX) capsule 500 mg (500 mg Oral Given 11/17/22 2150)       Diagnostic Studies  Results Reviewed     Procedure Component Value Units Date/Time    Lactic acid 2 Hours [[id number]]  (Normal) Collected: 11/17/22 2143    Lab Status: Final result Specimen: Blood from Arm, Left Updated: 11/17/22 2226     LACTIC ACID 1 5 mmol/L     Narrative:      Result may be elevated if tourniquet was used during collection      HS Troponin I 2hr [[id number]]  (Normal) Collected: 11/17/22 2035    Lab Status: Final result Specimen: Blood from Arm, Left Updated: 11/17/22 2123     hs TnI 2hr 3 ng/L      Delta 2hr hsTnI 0 ng/L     CBC and differential [[id number]]  (Abnormal) Collected: 11/17/22 1835    Lab Status: Final result Specimen: Blood from Arm, Left Updated: 11/17/22 1945     WBC 10 82 Thousand/uL      RBC 3 44 Million/uL      Hemoglobin 12 7 g/dL      Hematocrit 37 4 %       fL      MCH 36 9 pg      MCHC 34 0 g/dL      RDW 13 5 %      MPV 11 4 fL      Platelets 686 Thousands/uL      nRBC 0 /100 WBCs      Neutrophils Relative 71 %      Immat GRANS % 14 %      Lymphocytes Relative 7 %      Monocytes Relative 6 %      Eosinophils Relative 1 %      Basophils Relative 1 %      Neutrophils Absolute 7 79 Thousands/µL      Immature Grans Absolute >0 50 Thousand/uL      Lymphocytes Absolute 0 73 Thousands/µL      Monocytes Absolute 0 64 Thousand/µL      Eosinophils Absolute 0 09 Thousand/µL      Basophils Absolute 0 08 Thousands/µL     Narrative: This is an appended report  These results have been appended to a previously verified report  Lactic acid [[id number]]  (Abnormal) Collected: 11/17/22 1835    Lab Status: Final result Specimen: Blood from Arm, Left Updated: 11/17/22 1942     LACTIC ACID 2 5 mmol/L     Narrative:      Result may be elevated if tourniquet was used during collection  FLU/RSV/COVID - if FLU/RSV clinically relevant [[id number]]  (Normal) Collected: 11/17/22 1835    Lab Status: Final result Specimen: Nares from Nose Updated: 11/17/22 1935     SARS-CoV-2 Negative     INFLUENZA A PCR Negative     INFLUENZA B PCR Negative     RSV PCR Negative    Narrative:      FOR PEDIATRIC PATIENTS - copy/paste COVID Guidelines URL to browser: [email or web address]    SARS-CoV-2 assay is a Nucleic Acid Amplification assay intended for the  qualitative detection of nucleic acid from SARS-CoV-2 in nasopharyngeal  swabs  Results are for the presumptive identification of SARS-CoV-2 RNA  Positive results are indicative of infection with SARS-CoV-2, the virus  causing COVID-19, but do not rule out bacterial infection or co-infection  with other viruses  Laboratories within the [facility] and its  territories are required to report all positive results to the appropriate  public health authorities  Negative results do not preclude SARS-CoV-2  infection and should not be used as the sole basis for treatment or other  patient management decisions  Negative results must be combined with  clinical observations, patient history, and epidemiological information  This test has not been FDA cleared or approved  This test has been authorized by FDA under an Emergency Use Authorization  (EUA)  This test is only authorized for the duration of time the  declaration that circumstances exist justifying the authorization of the  emergency use of an in vitro diagnostic tests for detection of SARS-CoV-2  virus and/or diagnosis of COVID-19 infection under section 564(b)(1) of  the Act, 21 U  S C  039SQZ-5(Q)(5), unless the authorization is terminated  or revoked sooner  The test has been validated but independent review by FDA  and CLIA is pending  Test performed using [employer] GeneXpert: This RT-PCR assay targets N2,  a region unique to SARS-CoV-2  A conserved region in the E-gene was chosen  for pan-Sarbecovirus detection which includes SARS-CoV-2  According to CMS-2020-01-R, this platform meets the definition of high-throughput technology      Comprehensive metabolic panel [[id number]] Collected: 11/17/22 1835    Lab Status: Final result Specimen: Blood from Arm, Left Updated: 11/17/22 1925     Sodium 138 mmol/L      Potassium 3 6 mmol/L      Chloride 103 mmol/L      CO2 24 mmol/L      ANION GAP 11 mmol/L      BUN 18 mg/dL      Creatinine 0 97 mg/dL      Glucose 129 mg/dL      Calcium 9 1 mg/dL      AST 18 U/L      ALT 17 U/L      Alkaline Phosphatase 91 U/L      Total Protein 7 6 g/dL      Albumin 3 7 g/dL      Total Bilirubin 0 45 mg/dL      eGFR 56 ml/min/1 73sq m     Narrative:      [place] guidelines for Chronic Kidney Disease (CKD):   •  Stage 1 with normal or high GFR (GFR > 90 mL/min/1 73 square meters)  •  Stage 2 Mild CKD (GFR = 60-89 mL/min/1 73 square meters)  •  Stage 3A Moderate CKD (GFR = 45-59 mL/min/1 73 square meters)  •  Stage 3B Moderate CKD (GFR = 30-44 mL/min/1 73 square meters)  •  Stage 4 Severe CKD (GFR = 15-29 mL/min/1 73 square meters)  •  Stage 5 End Stage CKD (GFR <15 mL/min/1 73 square meters)  Note: GFR calculation is accurate only with a steady state creatinine    Lipase [[id number]]  (Abnormal) Collected: 11/17/22 1835    Lab Status: Final result Specimen: Blood from Arm, Left Updated: 11/17/22 1925     Lipase 61 u/L     HS Troponin 0hr (reflex protocol) [[id number]]  (Normal) Collected: 11/17/22 1835    Lab Status: Final result Specimen: Blood from Arm, Left Updated: 11/17/22 1920     hs TnI 0hr 3 ng/L     Urine Microscopic [[id number]]  (Abnormal) Collected: 11/17/22 1843    Lab Status: Final result Specimen: Urine, Clean Catch Updated: 11/17/22 1909     RBC, UA 4-10 /hpf      WBC, UA Innumerable /hpf      Epithelial Cells Occasional /hpf      Bacteria, UA Occasional /hpf      MUCUS THREADS Occasional     Hyaline Casts, UA 0-3 /lpf     Urine culture [[id number]] Collected: 11/17/22 1843    Lab Status:  In process Specimen: Urine, Clean Catch Updated: 11/17/22 1909    UA w Reflex to Microscopic w Reflex to Culture [[id number]]  (Abnormal) Collected: 11/17/22 1843    Lab Status: Final result Specimen: Urine, Clean Catch Updated: 11/17/22 1858     Color, UA Yellow     Clarity, UA Clear     Specific Gravity, UA 1 023     pH, UA 6 5     Leukocytes, UA Large     Nitrite, UA Negative     Protein, UA Trace mg/dl      Glucose, UA Negative mg/dl      Ketones, UA 40 (2+) mg/dl      Urobilinogen, UA <2 0 mg/dl      Bilirubin, UA Negative     Occult Blood, UA Negative                 CT head without contrast   Final Result by Jordan Gilbert MD (11/17 2022)      No acute intracranial abnormality  Workstation performed: [id number]         CT abdomen pelvis with contrast   Final Result by Jordan Gilbert MD (11/17 2033)      Mild bladder wall thickening  Correlate for cystitis  Enlarging left fat-containing diaphragmatic hernia measuring 6 1 cm (previous 4 3 cm)  Stable hiatal hernia  Workstation performed: [id number]                    Procedures  ECG 12 Lead Documentation Only    Date/Time: 11/17/2022 9:05 PM  Performed by: Richard Snowden PA-C  Authorized by: Ricahrd Snowden PA-C     Indications / Diagnosis:  Vomiting  ECG reviewed by me, the ED Provider: yes    Patient location:  ED  Previous ECG:     Previous ECG:  Compared to current    Comparison ECG info:  07/30/22    Similarity:  No change  Interpretation:     Interpretation: normal    Rate:     ECG rate:  76    ECG rate assessment: normal    Rhythm:     Rhythm: sinus rhythm    Ectopy:     Ectopy: none    QRS:     QRS axis:  Normal  Conduction:     Conduction: normal    ST segments:     ST segments:  Normal  T waves:     T waves: normal               ED Course  ED Course as of 11/17/22 2244   Thu Nov 17, 2022 2027 Symptoms improved  Tolerating PO    2038 CT abdomen pelvis with contrast                               SBIRT 20yo+    Flowsheet Row Most Recent Value   SBIRT (23 yo +)    In order to provide better care to our patients, we are screening all of our patients for alcohol and drug use  Would it be okay to ask you these screening questions? Yes Filed at: 11/17/2022 2235   Initial Alcohol Screen: US AUDIT-C     1  How often do you have a drink containing alcohol? 1 Filed at: 11/17/2022 2235   2   How many drinks containing alcohol do you have on a typical day you are drinking? 1 Filed at: 11/17/2022 2235   3b  FEMALE Any Age, or MALE 65+: How often do you have 4 or more drinks on one occassion? 0 Filed at: 11/17/2022 2235   Audit-C Score 2 Filed at: 11/17/2022 2235   RENZO: How many times in the past year have you    Used an illegal drug or used a prescription medication for non-medical reasons? Never Filed at: 11/17/2022 2235                    MDM  Number of Diagnoses or Management Options  UTI (urinary tract infection): new and requires workup  Diagnosis management comments: DDx including but not limited to: food poisoning, viral illness, metabolic abnormality, dehydration, intracranial process, GI etiology, UTI, adverse reaction; doubt acute surgical intraabdominal process, Boorhave's syndrome, mediastinitis  Plan: Ct head and a/p  Labs  UA  Dispo pending  Amount and/or Complexity of Data Reviewed  Clinical lab tests: ordered and reviewed  Tests in the radiology section of CPT®: ordered and reviewed    Risk of Complications, Morbidity, and/or Mortality  Presenting problems: moderate  Management options: low  General comments: 67year-old with vomiting  Given Zofran  Feels better  Tolerating p o   Labs overall unremarkable with the exception of slightly elevated lactic acid which cleared with fluids  Likely secondary to vomiting  She does have large leuks  Evidence of cystitis on CT scan  This could be the cause of her vomiting  Will treat with antibiotics  She is well appearing, no distress  She and daughter agrees with plan      Patient Progress  Patient progress: stable      Disposition  Final diagnoses:   UTI (urinary tract infection)     Time reflects when diagnosis was documented in both MDM as applicable and the Disposition within this note     Time User Action Codes Description Comment    11/17/2022 10:29 PM Danica CARCAMO Add [N39 0] UTI (urinary tract infection)       ED Disposition     ED Disposition   Discharge    Condition   Stable Date/Time   Thu Nov 17, 2022 10:29 PM    Comment   Raul Burgess discharge to home/self care  Follow-up Information     Follow up With Specialties Details Why Contact Info    Mendy Smith MD Internal Medicine   [id number]  [facility]  [phone]            Current Discharge Medication List      START taking these medications    Details   cephalexin (Keflex) 500 mg capsule Take 1 capsule (500 mg total) by mouth every 12 (twelve) hours for 7 days  Qty: 14 capsule, Refills: 0    Associated Diagnoses: UTI (urinary tract infection)      ondansetron (ZOFRAN-ODT) 4 mg disintegrating tablet Take 1 tablet (4 mg total) by mouth every 8 (eight) hours as needed for nausea  Qty: 15 tablet, Refills: 0    Associated Diagnoses: UTI (urinary tract infection)         CONTINUE these medications which have NOT CHANGED    Details   amLODIPine (NORVASC) 2 5 mg tablet Take 2 tablets (5 mg total) by mouth daily  Qty: 180 tablet, Refills: 3    Associated Diagnoses: Essential hypertension      anastrozole (ARIMIDEX) 1 mg tablet Take 1 tablet (1 mg total) by mouth daily  Qty: 90 tablet, Refills: 3    Associated Diagnoses: Malignant neoplasm of overlapping sites of right breast in female, estrogen receptor positive (HCC)      aspirin 325 mg tablet Take 325 mg by mouth daily       cholecalciferol (VITAMIN D3) 1,000 units tablet Take 1 tablet (1,000 Units total) by mouth daily  Qty: 30 tablet, Refills: 6    Associated Diagnoses: Malignant neoplasm of overlapping sites of right breast in female, estrogen receptor positive (HCC)      cholestyramine (QUESTRAN) 4 g packet TAKE 1 PACKET (4 G TOTAL) BY MOUTH DAILY  Qty: 90 packet, Refills: 1    Associated Diagnoses: Chronic diarrhea      diphenhydrAMINE (BENADRYL) 25 mg tablet Take 25 mg by mouth in the morning  1/2 tablet with Paxil         fluticasone (FLONASE) 50 mcg/act nasal spray INSTILL 1 SPRAY INTO EACH NOSTRIL AS NEEDED FOR RHINITIS OR ALLERGIES  Qty: 48 mL, Refills: 1    Associated Diagnoses: Acute non-recurrent maxillary sinusitis      furosemide (LASIX) 40 mg tablet Take 1 tablet (40 mg total) by mouth daily for 2 days  Qty: 2 tablet, Refills: 0    Associated Diagnoses: SOB (shortness of breath) on exertion      levothyroxine 125 mcg tablet Take 1 tablet (125 mcg total) by mouth daily  Qty: 90 tablet, Refills: 1    Comments: DX Code Needed    Associated Diagnoses: Hypothyroidism, unspecified type      meclizine (ANTIVERT) 25 mg tablet TAKE 1 TABLET (25 MG TOTAL) BY MOUTH EVERY 8 (EIGHT) HOURS AS NEEDED FOR DIZZINESS  Qty: 50 tablet, Refills: 1    Associated Diagnoses: Vertigo      memantine (NAMENDA) 10 mg tablet Take 1 tablet (10 mg total) by mouth 2 (two) times a day  Qty: 180 tablet, Refills: 1    Associated Diagnoses: Late onset Alzheimer's disease without behavioral disturbance ([facility])      Mirabegron ER (Myrbetriq) 50 MG TB24 Take 1 tablet (50 mg total) by mouth daily  Qty: 90 tablet, Refills: 3    Associated Diagnoses: Recurrent UTI      multivitamin (THERAGRAN) TABS Take 1 tablet by mouth      omeprazole (PriLOSEC) 20 mg delayed release capsule Take 1 capsule (20 mg total) by mouth 2 (two) times a day  Qty: 180 capsule, Refills: 3    Associated Diagnoses: Gastroesophageal reflux disease without esophagitis; Hiatal hernia      PARoxetine (PAXIL) 20 mg tablet Take 1 tablet (20 mg total) by mouth daily  Qty: 90 tablet, Refills: 1    Associated Diagnoses: Depression, unspecified depression type      PROAIR  (90 Base) MCG/ACT inhaler as needed     Comments: Substitution to a formulary equivalent within the same pharmaceutical class is authorized  rivastigmine (EXELON) 9 5 mg/24 hr TD 24 hr patch Place 1 patch on the skin daily  Qty: 90 patch, Refills: 1    Associated Diagnoses: Late onset [facility]'s [facility]             No discharge procedures on file      PDMP Review       Value Time User    PDMP Reviewed  Yes 3/14/2022  1:24 PM Carol Anders MD          ED Provider  Electronically Signed by           Taz Valdes PA-C  11/17/22 1691

## 2022-11-18 DIAGNOSIS — Z71.89 COMPLEX CARE COORDINATION: Primary | ICD-10-CM

## 2022-11-18 LAB
ATRIAL RATE: 76 BPM
BACTERIA UR CULT: ABNORMAL
P AXIS: 36 DEGREES
PR INTERVAL: 152 MS
QRS AXIS: 12 DEGREES
QRSD INTERVAL: 84 MS
QT INTERVAL: 424 MS
QTC INTERVAL: 477 MS
T WAVE AXIS: 29 DEGREES
VENTRICULAR RATE: 76 BPM

## 2022-11-18 NOTE — DISCHARGE INSTRUCTIONS
Return sooner to the Emergency Department if increased difficulty urinating, pain, fever, vomiting, bleeding

## 2022-11-22 ENCOUNTER — PATIENT OUTREACH (OUTPATIENT)
Dept: INTERNAL MEDICINE CLINIC | Facility: CLINIC | Age: 77
End: 2022-11-22

## 2022-11-22 NOTE — PROGRESS NOTES
Complex Care Plan Note:  Direct referral from  for complex outpatient care management was identified in [address] on 11/18/22   Chart review completed  PMH: CKD stage 3, Alzherimer's disease, depression, right breast cancer, Hx of Non-Hodgkin's Lymphoma S/p chemotherapy    ED visits and Hospitalizations:   Patient had a recent ED visit at [facility] on 11/17/22 for a UTI  She has only had one other ED visit in July for dyspnea, and no other hospitalizations or ED visits in the past 12 months  Admission or ED risk score is 66  Future Appointments are:    12/20/2022 1:40 PM (Arrive by 1:25 PM) Marialuisa Hanley MD [facility] Internal Medicine [facility]   1/23/2023 11:00 AM [facility] [address] [facility]   1/23/2023 11:30 AM Max Arellano MD [address] [facility]   2/15/2023 10:00 AM [facility] [address]  [address] [place] [address]   2/22/2023 9:45 AM (Arrive by 9:30 AM) Jean Stout MD [facility] [place] [facility]   5/22/2023 1:00 PM (Arrive by 12:45 PM) Licha Andrews MD Neurology [facility]     There does not appear to be SDOH needs that may be impacting utilization  Patient resides with her daughter  There have not been multiple ED visits or unplanned hospitalizations in the past 12 months  She ifollows Hem/Onc, Rad Onc, and Surg Onc for her breast Cancer,  Neurology for her Alzheimer's dx and her PCP ofr her general overall health and other medical conditions         Case meets does not screening criteria for complex care management     Episode closed and I removed myself from the care team

## 2022-11-24 ENCOUNTER — APPOINTMENT (EMERGENCY)
Dept: RADIOLOGY | Facility: HOSPITAL | Age: 77
End: 2022-11-24

## 2022-11-24 ENCOUNTER — HOSPITAL ENCOUNTER (EMERGENCY)
Facility: HOSPITAL | Age: 77
Discharge: HOME | End: 2022-11-24
Attending: EMERGENCY MEDICINE

## 2022-11-24 VITALS
OXYGEN SATURATION: 95 % | DIASTOLIC BLOOD PRESSURE: 65 MMHG | HEART RATE: 80 BPM | SYSTOLIC BLOOD PRESSURE: 144 MMHG | RESPIRATION RATE: 15 BRPM | TEMPERATURE: 97.2 F

## 2022-11-24 DIAGNOSIS — R11.2 NAUSEA AND VOMITING: Primary | ICD-10-CM

## 2022-11-24 LAB
ALBUMIN SERPL BCP-MCNC: 3.5 G/DL (ref 3.5–5)
ALP SERPL-CCNC: 85 U/L (ref 46–116)
ALT SERPL W P-5'-P-CCNC: 16 U/L (ref 12–78)
ANION GAP SERPL CALCULATED.3IONS-SCNC: 10 MMOL/L (ref 4–13)
ANISOCYTOSIS BLD QL SMEAR: PRESENT
AST SERPL W P-5'-P-CCNC: 17 U/L (ref 5–45)
BASOPHILS # BLD MANUAL: 0.25 THOUSAND/UL (ref 0–0.1)
BASOPHILS NFR MAR MANUAL: 3 % (ref 0–1)
BILIRUB SERPL-MCNC: 0.23 MG/DL (ref 0.2–1)
BUN SERPL-MCNC: 27 MG/DL (ref 5–25)
CALCIUM SERPL-MCNC: 9 MG/DL (ref 8.3–10.1)
CARDIAC TROPONIN I PNL SERPL HS: 3 NG/L
CHLORIDE SERPL-SCNC: 105 MMOL/L (ref 96–108)
CO2 SERPL-SCNC: 27 MMOL/L (ref 21–32)
CREAT SERPL-MCNC: 1.17 MG/DL (ref 0.6–1.3)
ERYTHROCYTE [DISTWIDTH] IN BLOOD BY AUTOMATED COUNT: 13.7 % (ref 11.6–15.1)
GFR SERPL CREATININE-BSD FRML MDRD: 45 ML/MIN/1.73SQ M
GLUCOSE SERPL-MCNC: 134 MG/DL (ref 65–140)
HCT VFR BLD AUTO: 36.5 % (ref 34.8–46.1)
HGB BLD-MCNC: 12.1 G/DL (ref 11.5–15.4)
LACTATE SERPL-SCNC: 2.1 MMOL/L (ref 0.5–2)
LACTATE SERPL-SCNC: 2.4 MMOL/L (ref 0.5–2)
LIPASE SERPL-CCNC: 74 U/L (ref 73–393)
LYMPHOCYTES # BLD AUTO: 0.74 THOUSAND/UL (ref 0.6–4.47)
LYMPHOCYTES # BLD AUTO: 9 % (ref 14–44)
MACROCYTES BLD QL AUTO: PRESENT
MAGNESIUM SERPL-MCNC: 2 MG/DL (ref 1.6–2.6)
MCH RBC QN AUTO: 36.7 PG (ref 26.8–34.3)
MCHC RBC AUTO-ENTMCNC: 33.2 G/DL (ref 31.4–37.4)
MCV RBC AUTO: 111 FL (ref 82–98)
MONOCYTES # BLD AUTO: 0.16 THOUSAND/UL (ref 0–1.22)
MONOCYTES NFR BLD: 2 % (ref 4–12)
NEUTROPHILS # BLD MANUAL: 7.07 THOUSAND/UL (ref 1.85–7.62)
NEUTS SEG NFR BLD AUTO: 86 % (ref 43–75)
PLATELET # BLD AUTO: 242 THOUSANDS/UL (ref 149–390)
PLATELET BLD QL SMEAR: ADEQUATE
PMV BLD AUTO: 11.2 FL (ref 8.9–12.7)
POTASSIUM SERPL-SCNC: 3.7 MMOL/L (ref 3.5–5.3)
PROT SERPL-MCNC: 7.2 G/DL (ref 6.4–8.4)
RBC # BLD AUTO: 3.3 MILLION/UL (ref 3.81–5.12)
SODIUM SERPL-SCNC: 142 MMOL/L (ref 135–147)
WBC # BLD AUTO: 8.22 THOUSAND/UL (ref 4.31–10.16)

## 2022-11-24 RX ORDER — ONDANSETRON 2 MG/ML
4 INJECTION INTRAMUSCULAR; INTRAVENOUS ONCE
Status: COMPLETED | OUTPATIENT
Start: 2022-11-24 | End: 2022-11-24

## 2022-11-24 RX ADMIN — SODIUM CHLORIDE 1000 ML: 0.9 INJECTION, SOLUTION INTRAVENOUS at 18:35

## 2022-11-24 RX ADMIN — ONDANSETRON 4 MG: 2 INJECTION INTRAMUSCULAR; INTRAVENOUS at 18:35

## 2022-11-24 NOTE — ED PROVIDER NOTES
History  Chief Complaint   Patient presents with   • Vomiting     Was seen 3 days ago for same, nausea, vomiting, cannot keep food down  Denies abdominal pain, has chills  65yo female with a history of hypertension, hyperlipidemia, CKD, hypothyroidism, and remote history of breast cancer currently in remission presenting with her daughter for evaluation of nausea and vomiting that began this morning  Patient started with nausea earlier today  She has been having dry heaving and some vomiting throughout the day  She was only able to eat very small amounts of her Thanksgiving dinner  Vomiting has been persistent and there has been no relief with Zofran so daughter brought her to the ED  Patient is otherwise asymptomatic and denies any fevers, chills, diarrhea, dysuria, abdominal pain, chest pain, shortness of breath  Of note, patient was seen in the ED 1 week ago for similar complaints and she was diagnosed with a UTI  Urine culture grew out <10,000 GNR  Her urinary symptoms have resolved  History provided by:  Medical records, patient and relative   used: No    Vomiting  Severity:  Moderate  Timing:  Constant  Quality:  Stomach contents  Progression:  Unchanged  Chronicity:  New  Recent urination:  Normal  Relieved by:  Nothing  Worsened by:  Nothing  Ineffective treatments:  Antiemetics  Associated symptoms: no abdominal pain, no chills, no diarrhea and no fever    Risk factors: no sick contacts, no suspect food intake and no travel to endemic areas        Prior to Admission Medications   Prescriptions Last Dose Informant Patient Reported? Taking?    Mirabegron ER (Myrbetriq) 50 MG TB24   No No   Sig: Take 1 tablet (50 mg total) by mouth daily   PARoxetine (PAXIL) 20 mg tablet   No No   Sig: Take 1 tablet (20 mg total) by mouth daily   PROAIR  (90 Base) MCG/ACT inhaler   Yes No   Sig: as needed    amLODIPine (NORVASC) 2 5 mg tablet   No No   Sig: Take 2 tablets (5 mg total) by mouth daily   anastrozole (ARIMIDEX) 1 mg tablet   No No   Sig: Take 1 tablet (1 mg total) by mouth daily   aspirin 325 mg tablet   Yes No   Sig: Take 325 mg by mouth daily    cephalexin (Keflex) 500 mg capsule   No No   Sig: Take 1 capsule (500 mg total) by mouth every 12 (twelve) hours for 7 days   cholecalciferol (VITAMIN D3) 1,000 units tablet   No No   Sig: Take 1 tablet (1,000 Units total) by mouth daily   cholestyramine (QUESTRAN) 4 g packet   No No   Sig: TAKE 1 PACKET (4 G TOTAL) BY MOUTH DAILY   diphenhydrAMINE (BENADRYL) 25 mg tablet   Yes No   Sig: Take 25 mg by mouth in the morning  1/2 tablet with Paxil      fluticasone (FLONASE) 50 mcg/act nasal spray   No No   Sig: INSTILL 1 SPRAY INTO EACH NOSTRIL AS NEEDED FOR RHINITIS OR ALLERGIES   furosemide (LASIX) 40 mg tablet   No No   Sig: Take 1 tablet (40 mg total) by mouth daily for 2 days   levothyroxine 125 mcg tablet   No No   Sig: Take 1 tablet (125 mcg total) by mouth daily   meclizine (ANTIVERT) 25 mg tablet   No No   Sig: TAKE 1 TABLET (25 MG TOTAL) BY MOUTH EVERY 8 (EIGHT) HOURS AS NEEDED FOR DIZZINESS    memantine (NAMENDA) 10 mg tablet   No No   Sig: Take 1 tablet (10 mg total) by mouth 2 (two) times a day   multivitamin (THERAGRAN) TABS   Yes No   Sig: Take 1 tablet by mouth   omeprazole (PriLOSEC) 20 mg delayed release capsule   No No   Sig: Take 1 capsule (20 mg total) by mouth 2 (two) times a day   ondansetron (ZOFRAN-ODT) 4 mg disintegrating tablet   No No   Sig: Take 1 tablet (4 mg total) by mouth every 8 (eight) hours as needed for nausea   rivastigmine (EXELON) 9 5 mg/24 hr TD 24 hr patch   No No   Sig: Place 1 patch on the skin daily      Facility-Administered Medications: None       Past Medical History:   Diagnosis Date   • Anxiety disorder    • Aortic valve disorder    • Breast cancer (HCC)    • Cancer (HCC)     breast   • Cellulitis of finger of left hand 11/20/2018   • Cellulitis of left hand 11/10/2018    Added automatically from request for surgery [id number]   • Depression    • Disease of thyroid gland    • GERD (gastroesophageal reflux disease)    • History of gastroesophageal reflux (GERD)    • History of kidney cancer 03/06/2014   • History of transfusion    • Hyperlipidemia 03/06/2014   • Hypertension 03/06/2014   • Hypothyroidism 03/06/2014   • Malignant neoplasm of overlapping sites of right breast in female, estrogen receptor positive ([facility] [address] ) 3/10/2022   • Shortness of breath    • TIA (transient ischemic attack)    • UTI (urinary tract infection)        Past Surgical History:   Procedure Laterality Date   • BREAST LUMPECTOMY Right 3/29/2022    Procedure: ITZEL  DIRECTED LUMPECTOMY;  Surgeon: Link Lopez MD;  Location: [facility]IN [facility];  Service: Surgical Oncology   • ESOPHAGOGASTRIC FUNDOPLASTY      Nissen Fundoplication   • HERNIA REPAIR     • LYMPH NODE BIOPSY Right 3/29/2022    Procedure: LYMPHATIC MAPPING WITH BLUE AND RADIOACTIVE DYES SENTINEL LYMPH NODE BIOPSY, INJECTION IN OR AT 0800 BY DR Tutu Malagon;  Surgeon: Link Lopez MD;  Location: [facility]IN [facility];  Service: Surgical Oncology   • MAMMO STEREOTACTIC BREAST BIOPSY RIGHT (ALL INC) Right 3/7/2022   • US BREAST ITZEL  NEEDLE LOC RIGHT Right 3/24/2022   • US GUIDED BREAST BIOPSY RIGHT COMPLETE Right 3/7/2022   • WRIST SURGERY Left        Family History   Problem Relation Age of Onset   • Stroke Father    • Leukemia Sister    • No Known Problems Brother    • Skin cancer Mother    • Stroke Maternal Grandmother    • No Known Problems Maternal Grandfather    • No Known Problems Paternal Grandmother    • No Known Problems Paternal Grandfather    • Breast cancer Sister    • Breast cancer Sister    • Kidney cancer Brother    • Alcohol abuse Brother    • Kidney cancer Brother    • Stomach cancer Brother    • No Known Problems Daughter    • No Known Problems Daughter      I have reviewed and agree with the history as documented      E-Cigarette/Vaping   • E-Cigarette Use Never User      E-Cigarette/Vaping Substances   • Nicotine No    • THC No    • CBD No    • Flavoring No    • Other No    • Unknown No      Social History     Tobacco Use   • Smoking status: Never   • Smokeless tobacco: Never   Vaping Use   • Vaping Use: Never used   Substance Use Topics   • Alcohol use: Yes     Comment: social-rare   • Drug use: Never       Review of Systems   Constitutional: Negative for chills and fever  HENT: Negative for drooling and voice change  Eyes: Negative for discharge and redness  Respiratory: Negative for shortness of breath and stridor  Cardiovascular: Negative for chest pain and leg swelling  Gastrointestinal: Positive for nausea and vomiting  Negative for abdominal pain and diarrhea  Musculoskeletal: Negative for neck pain and neck stiffness  Skin: Negative for color change and rash  Neurological: Negative for seizures and syncope  Psychiatric/Behavioral: Negative for confusion  The patient is not nervous/anxious  All other systems reviewed and are negative  Physical Exam  Physical Exam  Vitals and nursing note reviewed  Constitutional:       General: She is not in acute distress  Appearance: She is well-developed  She is not diaphoretic  HENT:      Head: Normocephalic and atraumatic  Right Ear: External ear normal       Left Ear: External ear normal       Nose: Nose normal    Eyes:      General: No scleral icterus  Right eye: No discharge  Left eye: No discharge  Conjunctiva/sclera: Conjunctivae normal    Cardiovascular:      Rate and Rhythm: Normal rate and regular rhythm  Heart sounds: Normal heart sounds  No murmur heard  Pulmonary:      Effort: Pulmonary effort is normal  No respiratory distress  Breath sounds: Normal breath sounds  No stridor  No wheezing or rales  Abdominal:      General: Bowel sounds are normal  There is no distension  Palpations: Abdomen is soft  Tenderness:  There is no abdominal tenderness  There is no guarding  Musculoskeletal:         General: No deformity  Normal range of motion  Cervical back: Normal range of motion and neck supple  Lymphadenopathy:      Cervical: No cervical adenopathy  Skin:     General: Skin is warm and dry  Neurological:      Mental Status: She is alert  She is not disoriented  GCS: GCS eye subscore is 4  GCS verbal subscore is 5  GCS motor subscore is 6  Psychiatric:         Behavior: Behavior normal          Vital Signs  ED Triage Vitals [11/24/22 1807]   Temperature Pulse Respirations Blood Pressure SpO2   (!) 97 2 °F (36 2 °C) 92 15 (!) 217/94 95 %      Temp Source Heart Rate Source Patient Position - Orthostatic VS BP Location FiO2 (%)   Tympanic Monitor Lying Right arm --      Pain Score       No Pain           Vitals:    11/24/22 1841 11/24/22 1859 11/24/22 2026 11/24/22 2133   BP: 160/73  152/65 144/65   Pulse: 82 77 83 80   Patient Position - Orthostatic VS:             Visual Acuity  Visual Acuity    Flowsheet Row Most Recent Value   L Pupil Size (mm) 3   R Pupil Size (mm) 3          ED Medications  Medications   sodium chloride 0 9 % bolus 1,000 mL (0 mL Intravenous Stopped 11/24/22 2026)   ondansetron (ZOFRAN) injection 4 mg (4 mg Intravenous Given 11/24/22 1835)       Diagnostic Studies  Results Reviewed     Procedure Component Value Units Date/Time    Lactic acid 2 Hours [[id number]]  (Abnormal) Collected: 11/24/22 2030    Lab Status: Final result Specimen: Blood from Arm, Left Updated: 11/24/22 2105     LACTIC ACID 2 4 mmol/L     Narrative:      Result may be elevated if tourniquet was used during collection      Manual Differential(PHLEBS Do Not Order) [[id number]]  (Abnormal) Collected: 11/24/22 1834    Lab Status: Edited Result - FINAL Specimen: Blood from Arm, Left Updated: 11/24/22 2012     Segmented % 86 %      Bands % --     Lymphocytes % 9 %      Monocytes % 2 %      Eosinophils, % --     Basophils % 3 % Metamyelocytes% --     Absolute Neutrophils 7 07 Thousand/uL      Lymphocytes Absolute 0 74 Thousand/uL      Monocytes Absolute 0 16 Thousand/uL      Eosinophils Absolute --     Basophils Absolute 0 25 Thousand/uL      Total Counted --     Anisocytosis Present     Luz Cells --     Macrocytes Present     Poikilocytes --     Platelet Estimate Adequate    CBC and differential [[id number]]  (Abnormal) Collected: 11/24/22 1834    Lab Status: Final result Specimen: Blood from Arm, Left Updated: 11/24/22 1911     WBC 8 22 Thousand/uL      RBC 3 30 Million/uL      Hemoglobin 12 1 g/dL      Hematocrit 36 5 %       fL      MCH 36 7 pg      MCHC 33 2 g/dL      RDW 13 7 %      MPV 11 2 fL      Platelets 062 Thousands/uL     Narrative: This is an appended report  These results have been appended to a previously verified report  Lactic acid [[id number]]  (Abnormal) Collected: 11/24/22 1834    Lab Status: Final result Specimen: Blood from Arm, Left Updated: 11/24/22 1909     LACTIC ACID 2 1 mmol/L     Narrative:      Result may be elevated if tourniquet was used during collection      HS Troponin 0hr (reflex protocol) [[id number]]  (Normal) Collected: 11/24/22 1834    Lab Status: Final result Specimen: Blood from Arm, Left Updated: 11/24/22 1906     hs TnI 0hr 3 ng/L     Comprehensive metabolic panel [[id number]]  (Abnormal) Collected: 11/24/22 1834    Lab Status: Final result Specimen: Blood from Arm, Left Updated: 11/24/22 1900     Sodium 142 mmol/L      Potassium 3 7 mmol/L      Chloride 105 mmol/L      CO2 27 mmol/L      ANION GAP 10 mmol/L      BUN 27 mg/dL      Creatinine 1 17 mg/dL      Glucose 134 mg/dL      Calcium 9 0 mg/dL      AST 17 U/L      ALT 16 U/L      Alkaline Phosphatase 85 U/L      Total Protein 7 2 g/dL      Albumin 3 5 g/dL      Total Bilirubin 0 23 mg/dL      eGFR 45 ml/min/1 73sq m     Narrative:      [place] guidelines for Chronic Kidney Disease (CKD):   •  Stage 1 with normal or high GFR (GFR > 90 mL/min/1 73 square meters)  •  Stage 2 Mild CKD (GFR = 60-89 mL/min/1 73 square meters)  •  Stage 3A Moderate CKD (GFR = 45-59 mL/min/1 73 square meters)  •  Stage 3B Moderate CKD (GFR = 30-44 mL/min/1 73 square meters)  •  Stage 4 Severe CKD (GFR = 15-29 mL/min/1 73 square meters)  •  Stage 5 End Stage CKD (GFR <15 mL/min/1 73 square meters)  Note: GFR calculation is accurate only with a steady state creatinine    Lipase [[id number]]  (Normal) Collected: 11/24/22 1834    Lab Status: Final result Specimen: Blood from Arm, Left Updated: 11/24/22 1900     Lipase 74 u/L     Magnesium [[id number]]  (Normal) Collected: 11/24/22 1834    Lab Status: Final result Specimen: Blood from Arm, Left Updated: 11/24/22 1900     Magnesium 2 0 mg/dL                  XR chest 1 view portable   Final Result by Jj Dumont MD (11/25 4510)      No acute cardiopulmonary disease  Workstation performed: [id number]                    Procedures  ECG 12 Lead Documentation Only    Date/Time: 11/24/2022 11:45 PM  Performed by: Benita De PA-C  Authorized by: Benita De PA-C     Indications / Diagnosis:  Vomiting  ECG reviewed by me, the ED Provider: yes    Patient location:  ED  Rate:     ECG rate:  78    ECG rate assessment: normal    Rhythm:     Rhythm: sinus rhythm    Ectopy:     Ectopy: none    QRS:     QRS axis:  Normal  Conduction:     Conduction: normal    ST segments:     ST segments:  Normal  Other findings:     Other findings: poor R wave progression               ED Course  ED Course as of 11/25/22 1540   Thu Nov 24, 2022 1953 Patient feeling improved on re-evaluation  Will trial PO challenge  2112 Lactate 2 4 although patient is currently asymptomatic  She was able to tolerate cranberry juice and crackers without issue  Abdominal exam repeated and she continues to be non-tender  She is stable for discharge                  Identification of Seniors at Risk    Flowsheet Row Most Recent Value   (ISAR) Identification of Seniors at Risk    Before the illness or injury that brought you to the Emergency, did you need someone to help you on a regular basis? 0 Filed at: 11/24/2022 1807   In the last 24 hours, have you needed more help than usual? 0 Filed at: 11/24/2022 1807   Have you been hospitalized for one or more nights during the past 6 months? 0 Filed at: 11/24/2022 1807   In general, do you see well? 0 Filed at: 11/24/2022 1807   In general, do you have serious problems with your memory? 0 Filed at: 11/24/2022 1807   Do you take more than three different medications every day? 1 Filed at: 11/24/2022 1807   ISAR Score 1 Filed at: 11/24/2022 1807              SBIRT 20yo+    Flowsheet Row Most Recent Value   SBIRT (25 yo +)    In order to provide better care to our patients, we are screening all of our patients for alcohol and drug use  Would it be okay to ask you these screening questions? No Filed at: 11/24/2022 1858                    MDM  Number of Diagnoses or Management Options  Nausea and vomiting: new and requires workup  Diagnosis management comments: 67yo female presenting for nausea and vomiting that began earlier today  No abdominal pain or diarrhea  No chest pain  She is hypertensive on arrival with otherwise normal vital signs  She is well appearing in no distress  Abdominal exam is benign  Initial ED plan: Check abdominal labs, magnesium, lactate, troponin/EKG, and CXR  CT from last week reviewed no indication for repeat imaging  IV Zofran and fluid bolus for symptoms  Final assessment: White count, renal function, electrolytes, LFTs, lipase are normal  EKG without ischemic changes and troponin is normal  CXR appears normal  Lactate mildly elevated at 2 1 which is likely related to dehydration  Repeat lactate 2 4 although patient is feeling significantly improved  Her nausea has resolved and she is now tolerating PO   Abdomen remains non-tender and vitals stable  No indication for admission at this time  Advised hydration and bland diet  Advised close PCP follow-up  ED return precautions discussed  Patient expressed understanding and is agreeable to plan  Patient discharged in stable condition  Amount and/or Complexity of Data Reviewed  Clinical lab tests: ordered and reviewed  Tests in the radiology section of CPT®: reviewed and ordered  Tests in the medicine section of CPT®: ordered and reviewed  Obtain history from someone other than the patient: yes  Review and summarize past medical records: yes  Independent visualization of images, tracings, or specimens: yes    Risk of Complications, Morbidity, and/or Mortality  Presenting problems: moderate  Diagnostic procedures: moderate  Management options: moderate    Patient Progress  Patient progress: improved      Disposition  Final diagnoses:   Nausea and vomiting     Time reflects when diagnosis was documented in both MDM as applicable and the Disposition within this note     Time User Action Codes Description Comment    11/24/2022  9:14  [facility] [R11 2] Nausea and vomiting       ED Disposition     ED Disposition   Discharge    Condition   Stable    Date/Time   Thu Nov 24, 2022  9:14 PM    Ronda Mccrary Escort discharge to home/self care                 Follow-up Information     Follow up With Specialties Details Why Contact Info Additional Information    Marialuisa Hanley MD Internal Medicine Schedule an appointment as soon as possible for a visit   Fredis Chapman [phone]       [address] [facility] Emergency Department Emergency Medicine  If symptoms worsen [address] Emergency Department, [address] [place] [address], [id number]          Discharge Medication List as of 11/24/2022  9:15 PM      CONTINUE these medications which have NOT CHANGED    Details amLODIPine (NORVASC) 2 5 mg tablet Take 2 tablets (5 mg total) by mouth daily, Starting Mon 10/31/2022, Normal      anastrozole (ARIMIDEX) 1 mg tablet Take 1 tablet (1 mg total) by mouth daily, Starting Mon 10/31/2022, Normal      aspirin 325 mg tablet Take 325 mg by mouth daily , Starting Mon 3/19/2018, Historical Med      cephalexin (Keflex) 500 mg capsule Take 1 capsule (500 mg total) by mouth every 12 (twelve) hours for 7 days, Starting Thu 11/17/2022, Until Thu 11/24/2022, Print      cholecalciferol (VITAMIN D3) 1,000 units tablet Take 1 tablet (1,000 Units total) by mouth daily, Starting Fri 4/22/2022, Until Mon 11/14/2022, Normal      cholestyramine (QUESTRAN) 4 g packet TAKE 1 PACKET (4 G TOTAL) BY MOUTH DAILY, Starting Mon 8/9/2021, Normal      diphenhydrAMINE (BENADRYL) 25 mg tablet Take 25 mg by mouth in the morning   1/2 tablet with Paxil  , Historical Med      fluticasone (FLONASE) 50 mcg/act nasal spray INSTILL 1 SPRAY INTO EACH NOSTRIL AS NEEDED FOR RHINITIS OR ALLERGIES, Normal      furosemide (LASIX) 40 mg tablet Take 1 tablet (40 mg total) by mouth daily for 2 days, Starting Mon 8/15/2022, Until Mon 11/14/2022, Normal      levothyroxine 125 mcg tablet Take 1 tablet (125 mcg total) by mouth daily, Starting Mon 8/15/2022, Normal      meclizine (ANTIVERT) 25 mg tablet TAKE 1 TABLET (25 MG TOTAL) BY MOUTH EVERY 8 (EIGHT) HOURS AS NEEDED FOR DIZZINESS , Starting Thu 9/22/2022, Normal      memantine (NAMENDA) 10 mg tablet Take 1 tablet (10 mg total) by mouth 2 (two) times a day, Starting Mon 8/15/2022, Normal      Mirabegron ER (Myrbetriq) 50 MG TB24 Take 1 tablet (50 mg total) by mouth daily, Starting Mon 10/31/2022, Normal      multivitamin (THERAGRAN) TABS Take 1 tablet by mouth, Historical Med      omeprazole (PriLOSEC) 20 mg delayed release capsule Take 1 capsule (20 mg total) by mouth 2 (two) times a day, Starting Mon 8/15/2022, Normal      ondansetron (ZOFRAN-ODT) 4 mg disintegrating tablet Take 1 tablet (4 mg total) by mouth every 8 (eight) hours as needed for nausea, Starting Thu 11/17/2022, Print      PARoxetine (PAXIL) 20 mg tablet Take 1 tablet (20 mg total) by mouth daily, Starting Mon 8/15/2022, Normal      PROAIR  (90 Base) MCG/ACT inhaler as needed , Starting Wed 8/7/2019, Historical Med      rivastigmine (EXELON) 9 5 mg/24 hr TD 24 hr patch Place 1 patch on the skin daily, Starting Mon 10/17/2022, Normal             No discharge procedures on file      PDMP Review       Value Time User    PDMP Reviewed  Yes 3/14/2022  1:24 PM Sabrina Hoover MD          ED Provider  Electronically Signed by           Haily Lugo PA-C  11/25/22 8701

## 2022-11-25 ENCOUNTER — VBI (OUTPATIENT)
Dept: INTERNAL MEDICINE CLINIC | Facility: CLINIC | Age: 77
End: 2022-11-25

## 2022-11-25 ENCOUNTER — TELEPHONE (OUTPATIENT)
Dept: INTERNAL MEDICINE CLINIC | Facility: CLINIC | Age: 77
End: 2022-11-25

## 2022-11-25 LAB
ATRIAL RATE: 78 BPM
P AXIS: 35 DEGREES
PR INTERVAL: 162 MS
QRS AXIS: 0 DEGREES
QRSD INTERVAL: 82 MS
QT INTERVAL: 378 MS
QTC INTERVAL: 430 MS
T WAVE AXIS: 12 DEGREES
VENTRICULAR RATE: 78 BPM

## 2022-11-25 NOTE — TELEPHONE ENCOUNTER
----- Message from Yeison Lew sent at 11/25/2022 10:38 AM EST -----  Regarding: Patient requested (return telephone call & ED FU Appt)  Good morning,    I had a personal communication with patient daughter (communication consent 2022) regarding recent ED visit on 11-  Patient daughter stated that patient does not have dry heaves currenly, and medication in ED helped stop this, but it stated that dry heaves started up again yesterday  Patient daughter would like PCP follow-up at this time         Would you kindly review the patients chart and call the patient to make a PCP ED follow up at your earliest convenience?  Thank you for your hard work and have a wonderful day       Yeison Lew

## 2022-11-25 NOTE — TELEPHONE ENCOUNTER
Jessi Farley    ED Visit Information     Ed visit date: 11-   Diagnosis Description: Nausea and vomiting    In Network? Yes [place]  Discharge status: Home  Discharged with meds ? No  Number of ED visits to date: 3  ED Severity:3     Outreach Information    Outreach successful: Yes 1  Date letter mailed:n/a  Date Finalized: 11-    Care Coordination    Follow up appointment with pcp: no    Transportation issues ? NA    Value Base Outreach    Outreach type: 3 Day Outreach  Patient refsued the answer questions: Yes  Emergent necessity warranted by diagnosis: Yes  [facility] PCP: Yes  Transportation: Friend/Family Transport  Practice Contacted Patient ?: No  Pt had ED follow up with pcp/staff ?: No    Seen for follow-up out of network ?: No  Reason Patient went to ED instead of Urgent Care or PCP?: Patient Refused to Answer Questions  Urgent care Education?: No    11/25/2022 10:28 AM EST by Renny Sigala MA 11/25/2022 10:28 AM EST by IVON Vines Heather (EC) [phone] (JLYX)[phone] (Home)  [phone] (Home) Remove  - Call Complete    Personal communication with patient daughter (communication consent 2022) regarding recent ED visit on 11-  Patient daughter stated that patient does not have dry heaves currenly, and medication in ED helped stop this, but it stated dry heaves started up again yesterday  Patient daughter stated patient has medication previously prescribed (not in ED visit)- would like PCP follow-up at this time  Patient was referred to [address] [place] [address] (see 11- Care Coordinator notes) Patient daughter declined further survey questions  Patient stated she called PCP office and received a message stating they had high call volumes           Staff message sent to PCP clinical pool at 10:40 AM requesting call back for scheduling ED FU

## 2022-11-25 NOTE — DISCHARGE INSTRUCTIONS
Take Zofran as needed for nausea  Drink small sips of fluids every 10-15 minutes for hydrate  Advance slowly to a bland diet (rice, applesauce, toast)  Please call tomorrow to schedule a follow-up with your family doctor  Return to the ER with any worsening symptoms

## 2022-11-28 ENCOUNTER — OFFICE VISIT (OUTPATIENT)
Dept: INTERNAL MEDICINE CLINIC | Facility: CLINIC | Age: 77
End: 2022-11-28

## 2022-11-28 VITALS
OXYGEN SATURATION: 99 % | WEIGHT: 163 LBS | HEIGHT: 62 IN | RESPIRATION RATE: 14 BRPM | SYSTOLIC BLOOD PRESSURE: 130 MMHG | BODY MASS INDEX: 30 KG/M2 | HEART RATE: 98 BPM | TEMPERATURE: 98.5 F | DIASTOLIC BLOOD PRESSURE: 92 MMHG

## 2022-11-28 DIAGNOSIS — Z17.0 MALIGNANT NEOPLASM OF OVERLAPPING SITES OF RIGHT BREAST IN FEMALE, ESTROGEN RECEPTOR POSITIVE (HCC): ICD-10-CM

## 2022-11-28 DIAGNOSIS — C50.811 MALIGNANT NEOPLASM OF OVERLAPPING SITES OF RIGHT BREAST IN FEMALE, ESTROGEN RECEPTOR POSITIVE (HCC): ICD-10-CM

## 2022-11-28 DIAGNOSIS — R11.2 NAUSEA AND VOMITING, UNSPECIFIED VOMITING TYPE: Primary | ICD-10-CM

## 2022-11-28 DIAGNOSIS — N39.0 ACUTE URINARY TRACT INFECTION: ICD-10-CM

## 2022-11-28 NOTE — PROGRESS NOTES
Assessment/Plan:      her initial nausea and vomiting may have been from the UTI  The persistence may have been from the antibiotic  She just finished those 2 days ago  She has only taken 1 dose of Zofran since the ER  Will recheck labs tomorrow  Hold her water pill tomorrow  Quality Measures:       No follow-ups on file  No problem-specific Assessment & Plan notes found for this encounter  Diagnoses and all orders for this visit:    Nausea and vomiting, unspecified vomiting type  -     Comprehensive metabolic panel; Future  -     CBC and differential; Future    Acute urinary tract infection        Subjective:      Patient ID: Alysa Katz is a 67 y o  female  Patient comes in today with her daughter for ER follow-up  She had gone to the ER with nausea and vomiting and was found have a UTI  She was sent home with Keflex and Zofran  Started again Thanksgiving Day with vomiting and did not feel well  Daughter took her back to the ER  Treated with IV fluids and the Zofran was increased  That seemed to help  The Keflex was continued  She completed that 2 days ago  No further nausea or vomiting but just feels "blah"  Poor appetite but her daughter clarifies that her appetite has not been great for a long while  ALLERGIES:  Allergies   Allergen Reactions   • Ciprofloxacin Hives   • Sulfa Antibiotics Itching   • Other    • Penicillins Rash     Category: Allergy;   --  Pt received unasyn 1 5 mg IV 11-12-18 to 11-15-18   • Sulfacetamide Rash     Category:  Allergy;        CURRENT MEDICATIONS:    Current Outpatient Medications:   •  amLODIPine (NORVASC) 2 5 mg tablet, Take 2 tablets (5 mg total) by mouth daily, Disp: 180 tablet, Rfl: 3  •  anastrozole (ARIMIDEX) 1 mg tablet, Take 1 tablet (1 mg total) by mouth daily, Disp: 90 tablet, Rfl: 3  •  aspirin 325 mg tablet, Take 325 mg by mouth daily , Disp: , Rfl:   •  cholecalciferol (VITAMIN D3) 1,000 units tablet, Take 1 tablet (1,000 Units total) by mouth daily, Disp: 30 tablet, Rfl: 6  •  cholestyramine (QUESTRAN) 4 g packet, TAKE 1 PACKET (4 G TOTAL) BY MOUTH DAILY, Disp: 90 packet, Rfl: 1  •  diphenhydrAMINE (BENADRYL) 25 mg tablet, Take 25 mg by mouth in the morning   1/2 tablet with Paxil  , Disp: , Rfl:   •  fluticasone (FLONASE) 50 mcg/act nasal spray, INSTILL 1 SPRAY INTO EACH NOSTRIL AS NEEDED FOR RHINITIS OR ALLERGIES, Disp: 48 mL, Rfl: 1  •  furosemide (LASIX) 40 mg tablet, Take 1 tablet (40 mg total) by mouth daily for 2 days, Disp: 2 tablet, Rfl: 0  •  levothyroxine 125 mcg tablet, Take 1 tablet (125 mcg total) by mouth daily, Disp: 90 tablet, Rfl: 1  •  meclizine (ANTIVERT) 25 mg tablet, TAKE 1 TABLET (25 MG TOTAL) BY MOUTH EVERY 8 (EIGHT) HOURS AS NEEDED FOR DIZZINESS , Disp: 50 tablet, Rfl: 1  •  Mirabegron ER (Myrbetriq) 50 MG TB24, Take 1 tablet (50 mg total) by mouth daily, Disp: 90 tablet, Rfl: 3  •  multivitamin (THERAGRAN) TABS, Take 1 tablet by mouth, Disp: , Rfl:   •  omeprazole (PriLOSEC) 20 mg delayed release capsule, Take 1 capsule (20 mg total) by mouth 2 (two) times a day, Disp: 180 capsule, Rfl: 3  •  ondansetron (ZOFRAN-ODT) 4 mg disintegrating tablet, Take 1 tablet (4 mg total) by mouth every 8 (eight) hours as needed for nausea, Disp: 15 tablet, Rfl: 0  •  PARoxetine (PAXIL) 20 mg tablet, Take 1 tablet (20 mg total) by mouth daily, Disp: 90 tablet, Rfl: 1  •  PROAIR  (90 Base) MCG/ACT inhaler, as needed , Disp: , Rfl:   •  rivastigmine (EXELON) 9 5 mg/24 hr TD 24 hr patch, Place 1 patch on the skin daily, Disp: 90 patch, Rfl: 1  •  memantine (NAMENDA) 10 mg tablet, Take 1 tablet (10 mg total) by mouth 2 (two) times a day (Patient not taking: Reported on 11/28/2022), Disp: 180 tablet, Rfl: 1    ACTIVE PROBLEM LIST:  Patient Active Problem List   Diagnosis   • Depression   • Gastroesophageal reflux disease without esophagitis   • Mixed hyperlipidemia   • Essential hypertension   • Hypothyroid   • Impaired fasting glucose   • Lymphoma (HCC)   • Cerebral aneurysm, nonruptured   • Mild cognitive impairment   • Flexor tenosynovitis of finger   • Macrocytosis without anemia   • Paresthesias   • Non-intractable vomiting with nausea   • Hypokalemia   • Late onset Alzheimer's disease without behavioral disturbance (HCC)   • Chest pain   • Near syncope   • Immunocompromised (HCC)   • Malignant neoplasm of overlapping sites of right breast in female, estrogen receptor positive ([facility] )   • Use of anastrozole   • Recurrent major depressive disorder, remission status unspecified ([place] [address] )   • Stage 3 chronic kidney disease, unspecified whether stage 3a or 3b CKD ([facility] )       PAST MEDICAL HISTORY:  Past Medical History:   Diagnosis Date   • Anxiety disorder    • Aortic valve disorder    • Breast cancer ([place] [address] )    • Cancer ([place] [address] )     breast   • Cellulitis of finger of left hand 11/20/2018   • Cellulitis of left hand 11/10/2018    Added automatically from request for surgery [id number]   • Depression    • Disease of thyroid gland    • GERD (gastroesophageal reflux disease)    • History of gastroesophageal reflux (GERD)    • History of kidney cancer 03/06/2014   • History of transfusion    • Hyperlipidemia 03/06/2014   • Hypertension 03/06/2014   • Hypothyroidism 03/06/2014   • Malignant neoplasm of overlapping sites of right breast in female, estrogen receptor positive ([facility] ) 3/10/2022   • Shortness of breath    • TIA (transient ischemic attack)    • UTI (urinary tract infection)        PAST SURGICAL HISTORY:  Past Surgical History:   Procedure Laterality Date   • BREAST LUMPECTOMY Right 3/29/2022    Procedure: ITZEL  DIRECTED LUMPECTOMY;  Surgeon: EDSON Kong MD;  Location: [facility];  Service: Surgical Oncology   • ESOPHAGOGASTRIC FUNDOPLASTY      Nissen Fundoplication   • HERNIA REPAIR     • LYMPH NODE BIOPSY Right 3/29/2022    Procedure: LYMPHATIC MAPPING WITH BLUE AND RADIOACTIVE DYES SENTINEL LYMPH NODE BIOPSY, INJECTION IN OR AT 0800 BY DR Cade Miller;  Surgeon: Cassius Garza MD;  Location: [facility];  Service: Surgical Oncology   • MAMMO STEREOTACTIC BREAST BIOPSY RIGHT (ALL INC) Right 3/7/2022   • US BREAST ITZEL  NEEDLE LOC RIGHT Right 3/24/2022   • US GUIDED BREAST BIOPSY RIGHT COMPLETE Right 3/7/2022   • WRIST SURGERY Left        FAMILY HISTORY:  Family History   Problem Relation Age of Onset   • Stroke Father    • Leukemia Sister    • No Known Problems Brother    • Skin cancer Mother    • Stroke Maternal Grandmother    • No Known Problems Maternal Grandfather    • No Known Problems Paternal Grandmother    • No Known Problems Paternal Grandfather    • Breast cancer Sister    • Breast cancer Sister    • Kidney cancer Brother    • Alcohol abuse Brother    • Kidney cancer Brother    • Stomach cancer Brother    • No Known Problems Daughter    • No Known Problems Daughter        SOCIAL HISTORY:  Social History     Socioeconomic History   • Marital status:      Spouse name: Not on file   • Number of children: 2   • Years of education: Not on file   • Highest education level: Not on file   Occupational History   • Not on file   Tobacco Use   • Smoking status: Never   • Smokeless tobacco: Never   Vaping Use   • Vaping Use: Never used   Substance and Sexual Activity   • Alcohol use: Yes     Comment: social-rare   • Drug use: Never   • Sexual activity: Not Currently   Other Topics Concern   • Not on file   Social History Narrative   • Not on file     Social Determinants of Health     Financial Resource Strain: Low Risk    • Difficulty of Paying Living Expenses: Not very hard   Food Insecurity: Not on file   Transportation Needs: No Transportation Needs   • Lack of Transportation (Medical): No   • Lack of Transportation (Non-Medical):  No   Physical Activity: Not on file   Stress: Not on file   Social Connections: Not on file   Intimate Partner Violence: Not on file   Housing Stability: Not on file       Review of Systems Constitutional: Negative for fever  Gastrointestinal: Negative for abdominal pain  Objective:  Vitals:    11/28/22 1540   BP: 130/92   BP Location: Left arm   Patient Position: Sitting   Cuff Size: Standard   Pulse: 98   Resp: 14   Temp: 98 5 °F (36 9 °C)   TempSrc: Tympanic   SpO2: 99%   Weight: 73 9 kg (163 lb)   Height: 5' 2" (1 575 m)     Body mass index is 29 81 kg/m²  Physical Exam  Vitals and nursing note reviewed  Constitutional:       Appearance: Normal appearance  Cardiovascular:      Rate and Rhythm: Normal rate and regular rhythm  Pulmonary:      Effort: Pulmonary effort is normal       Breath sounds: Normal breath sounds  Abdominal:      General: There is no distension  Tenderness: There is no abdominal tenderness  Neurological:      Mental Status: She is alert             RESULTS:    Recent Results (from the past 1008 hour(s))   POCT urine dip    Collection Time: 10/28/22  9:18 AM   Result Value Ref Range    LEUKOCYTE ESTERASE,     NITRITE,UA neg     SL AMB POCT UROBILINOGEN 3 2     POCT URINE PROTEIN trace      PH,UA 6 5     BLOOD,UA neg     SPECIFIC GRAVITY,UA 1 025     KETONES,UA neg     BILIRUBIN,UA neg     GLUCOSE, UA neg      COLOR,UA n/a     CLARITY,UA n/a    Urine culture    Collection Time: 10/28/22  9:18 AM    Specimen: Urine, Other   Result Value Ref Range    Urine Culture 10,000-19,000 cfu/ml    FLU/RSV/COVID - if FLU/RSV clinically relevant    Collection Time: 11/17/22  6:35 PM    Specimen: Nose; Nares   Result Value Ref Range    SARS-CoV-2 Negative Negative    INFLUENZA A PCR Negative Negative    INFLUENZA B PCR Negative Negative    RSV PCR Negative Negative   CBC and differential    Collection Time: 11/17/22  6:35 PM   Result Value Ref Range    WBC 10 82 (H) 4 31 - 10 16 Thousand/uL    RBC 3 44 (L) 3 81 - 5 12 Million/uL    Hemoglobin 12 7 11 5 - 15 4 g/dL    Hematocrit 37 4 34 8 - 46 1 %     (H) 82 - 98 fL    MCH 36 9 (H) 26 8 - 34 3 pg    MCHC 34 0 31 4 - 37 4 g/dL    RDW 13 5 11 6 - 15 1 %    MPV 11 4 8 9 - 12 7 fL    Platelets 431 034 - 483 Thousands/uL    nRBC 0 /100 WBCs    Neutrophils Relative 71 43 - 75 %    Immat GRANS % 14 (H) 0 - 2 %    Lymphocytes Relative 7 (L) 14 - 44 %    Monocytes Relative 6 4 - 12 %    Eosinophils Relative 1 0 - 6 %    Basophils Relative 1 0 - 1 %    Neutrophils Absolute 7 79 (H) 1 85 - 7 62 Thousands/µL    Immature Grans Absolute >0 50 (H) 0 00 - 0 20 Thousand/uL    Lymphocytes Absolute 0 73 0 60 - 4 47 Thousands/µL    Monocytes Absolute 0 64 0 17 - 1 22 Thousand/µL    Eosinophils Absolute 0 09 0 00 - 0 61 Thousand/µL    Basophils Absolute 0 08 0 00 - 0 10 Thousands/µL   Comprehensive metabolic panel    Collection Time: 11/17/22  6:35 PM   Result Value Ref Range    Sodium 138 135 - 147 mmol/L    Potassium 3 6 3 5 - 5 3 mmol/L    Chloride 103 96 - 108 mmol/L    CO2 24 21 - 32 mmol/L    ANION GAP 11 4 - 13 mmol/L    BUN 18 5 - 25 mg/dL    Creatinine 0 97 0 60 - 1 30 mg/dL    Glucose 129 65 - 140 mg/dL    Calcium 9 1 8 3 - 10 1 mg/dL    AST 18 5 - 45 U/L    ALT 17 12 - 78 U/L    Alkaline Phosphatase 91 46 - 116 U/L    Total Protein 7 6 6 4 - 8 4 g/dL    Albumin 3 7 3 5 - 5 0 g/dL    Total Bilirubin 0 45 0 20 - 1 00 mg/dL    eGFR 56 ml/min/1 73sq m   Lipase    Collection Time: 11/17/22  6:35 PM   Result Value Ref Range    Lipase 61 (L) 73 - 393 u/L   Lactic acid    Collection Time: 11/17/22  6:35 PM   Result Value Ref Range    LACTIC ACID 2 5 (HH) 0 5 - 2 0 mmol/L   HS Troponin 0hr (reflex protocol)    Collection Time: 11/17/22  6:35 PM   Result Value Ref Range    hs TnI 0hr 3 "Refer to ACS Flowchart"- see link ng/L   UA w Reflex to Microscopic w Reflex to Culture    Collection Time: 11/17/22  6:43 PM    Specimen: Urine, Clean Catch   Result Value Ref Range    Color, UA Yellow     Clarity, UA Clear     Specific Gravity, UA 1 023 1 003 - 1 030    pH, UA 6 5 4 5, 5 0, 5 5, 6 0, 6 5, 7 0, 7 5, 8 0    Leukocytes, UA Large (A) Negative    Nitrite, UA Negative Negative    Protein, UA Trace (A) Negative mg/dl    Glucose, UA Negative Negative mg/dl    Ketones, UA 40 (2+) (A) Negative mg/dl    Urobilinogen, UA <2 0 <2 0 mg/dl mg/dl    Bilirubin, UA Negative Negative    Occult Blood, UA Negative Negative   Urine Microscopic    Collection Time: 11/17/22  6:43 PM   Result Value Ref Range    RBC, UA 4-10 (A) None Seen, 1-2 /hpf    WBC, UA Innumerable (A) None Seen, 1-2 /hpf    Epithelial Cells Occasional None Seen, Occasional /hpf    Bacteria, UA Occasional None Seen, Occasional /hpf    MUCUS THREADS Occasional (A) None Seen    Hyaline Casts, UA 0-3 (A) None Seen /lpf   Urine culture    Collection Time: 11/17/22  6:43 PM    Specimen: Urine, Clean Catch   Result Value Ref Range    Urine Culture <10,000 cfu/ml Gram Negative Mina Enteric Like (A)    ECG 12 lead    Collection Time: 11/17/22  6:49 PM   Result Value Ref Range    Ventricular Rate 76 BPM    Atrial Rate 76 BPM    ID Interval 152 ms    QRSD Interval 84 ms    QT Interval 424 ms    QTC Interval 477 ms    P Axis 36 degrees    QRS Axis 12 degrees    T Wave [place] 29 degrees   HS Troponin I 2hr    Collection Time: 11/17/22  8:35 PM   Result Value Ref Range    hs TnI 2hr 3 "Refer to ACS Flowchart"- see link ng/L    Delta 2hr hsTnI 0 <20 ng/L   Lactic acid 2 Hours    Collection Time: 11/17/22  9:43 PM   Result Value Ref Range    LACTIC ACID 1 5 0 5 - 2 0 mmol/L   ECG 12 lead    Collection Time: 11/24/22  6:22 PM   Result Value Ref Range    Ventricular Rate 78 BPM    Atrial Rate 78 BPM    ID Interval 162 ms    QRSD Interval 82 ms    QT Interval 378 ms    QTC Interval 430 ms    P Axis 35 degrees    QRS Axis 0 degrees    T Wave Axis 12 degrees   CBC and differential    Collection Time: 11/24/22  6:34 PM   Result Value Ref Range    WBC 8 22 4 31 - 10 16 Thousand/uL    RBC 3 30 (L) 3 81 - 5 12 Million/uL    Hemoglobin 12 1 11 5 - 15 4 g/dL    Hematocrit 36 5 34 8 - 46 1 %     (H) 82 - 98 fL    MCH 36  7 (H) 26 8 - 34 3 pg    MCHC 33 2 31 4 - 37 4 g/dL    RDW 13 7 11 6 - 15 1 %    MPV 11 2 8 9 - 12 7 fL    Platelets 895 881 - 492 Thousands/uL   Comprehensive metabolic panel    Collection Time: 11/24/22  6:34 PM   Result Value Ref Range    Sodium 142 135 - 147 mmol/L    Potassium 3 7 3 5 - 5 3 mmol/L    Chloride 105 96 - 108 mmol/L    CO2 27 21 - 32 mmol/L    ANION GAP 10 4 - 13 mmol/L    BUN 27 (H) 5 - 25 mg/dL    Creatinine 1 17 0 60 - 1 30 mg/dL    Glucose 134 65 - 140 mg/dL    Calcium 9 0 8 3 - 10 1 mg/dL    AST 17 5 - 45 U/L    ALT 16 12 - 78 U/L    Alkaline Phosphatase 85 46 - 116 U/L    Total Protein 7 2 6 4 - 8 4 g/dL    Albumin 3 5 3 5 - 5 0 g/dL    Total Bilirubin 0 23 0 20 - 1 00 mg/dL    eGFR 45 ml/min/1 73sq m   Magnesium    Collection Time: 11/24/22  6:34 PM   Result Value Ref Range    Magnesium 2 0 1 6 - 2 6 mg/dL   Lactic acid    Collection Time: 11/24/22  6:34 PM   Result Value Ref Range    LACTIC ACID 2 1 (HH) 0 5 - 2 0 mmol/L   Lipase    Collection Time: 11/24/22  6:34 PM   Result Value Ref Range    Lipase 74 73 - 393 u/L   HS Troponin 0hr (reflex protocol)    Collection Time: 11/24/22  6:34 PM   Result Value Ref Range    hs TnI 0hr 3 "Refer to ACS Flowchart"- see link ng/L   Manual Differential(PHLEBS Do Not Order)    Collection Time: 11/24/22  6:34 PM   Result Value Ref Range    Segmented % 86 (H) 43 - 75 %    Bands %      Lymphocytes % 9 (L) 14 - 44 %    Monocytes % 2 (L) 4 - 12 %    Eosinophils, %      Basophils % 3 (H) 0 - 1 %    Metamyelocytes%      Absolute Neutrophils 7 07 1 85 - 7 62 Thousand/uL    Lymphocytes Absolute 0 74 0 60 - 4 47 Thousand/uL    Monocytes Absolute 0 16 0 00 - 1 22 Thousand/uL    Eosinophils Absolute      Basophils Absolute 0 25 (H) 0 00 - 0 10 Thousand/uL    Total Counted      Anisocytosis Present     [place] Cells      Macrocytes Present     Poikilocytes      Platelet Estimate Adequate Adequate   Lactic acid 2 Hours    Collection Time: 11/24/22  8:30 PM   Result Value Ref Range    LACTIC ACID 2 4 (HH) 0 5 - 2 0 mmol/L       This note was created with voice recognition software  Phonic, grammatical and spelling errors may be present within the note as a result

## 2022-11-29 ENCOUNTER — APPOINTMENT (OUTPATIENT)
Dept: LAB | Facility: HOSPITAL | Age: 77
End: 2022-11-29

## 2022-11-29 DIAGNOSIS — R11.2 NAUSEA AND VOMITING, UNSPECIFIED VOMITING TYPE: ICD-10-CM

## 2022-11-29 LAB
ALBUMIN SERPL BCP-MCNC: 3.1 G/DL (ref 3.5–5)
ALP SERPL-CCNC: 78 U/L (ref 46–116)
ALT SERPL W P-5'-P-CCNC: 15 U/L (ref 12–78)
ANION GAP SERPL CALCULATED.3IONS-SCNC: 6 MMOL/L (ref 4–13)
ANISOCYTOSIS BLD QL SMEAR: PRESENT
AST SERPL W P-5'-P-CCNC: 13 U/L (ref 5–45)
BASOPHILS # BLD MANUAL: 0.05 THOUSAND/UL (ref 0–0.1)
BASOPHILS NFR MAR MANUAL: 1 % (ref 0–1)
BILIRUB SERPL-MCNC: 0.4 MG/DL (ref 0.2–1)
BUN SERPL-MCNC: 22 MG/DL (ref 5–25)
CALCIUM ALBUM COR SERPL-MCNC: 9.9 MG/DL (ref 8.3–10.1)
CALCIUM SERPL-MCNC: 9.2 MG/DL (ref 8.3–10.1)
CHLORIDE SERPL-SCNC: 107 MMOL/L (ref 96–108)
CO2 SERPL-SCNC: 27 MMOL/L (ref 21–32)
CREAT SERPL-MCNC: 0.95 MG/DL (ref 0.6–1.3)
EOSINOPHIL # BLD MANUAL: 0.05 THOUSAND/UL (ref 0–0.4)
EOSINOPHIL NFR BLD MANUAL: 1 % (ref 0–6)
ERYTHROCYTE [DISTWIDTH] IN BLOOD BY AUTOMATED COUNT: 13.9 % (ref 11.6–15.1)
GFR SERPL CREATININE-BSD FRML MDRD: 57 ML/MIN/1.73SQ M
GLUCOSE SERPL-MCNC: 107 MG/DL (ref 65–140)
HCT VFR BLD AUTO: 36.2 % (ref 34.8–46.1)
HGB BLD-MCNC: 12.1 G/DL (ref 11.5–15.4)
LYMPHOCYTES # BLD AUTO: 1.12 THOUSAND/UL (ref 0.6–4.47)
LYMPHOCYTES # BLD AUTO: 21 % (ref 14–44)
MACROCYTES BLD QL AUTO: PRESENT
MCH RBC QN AUTO: 36.3 PG (ref 26.8–34.3)
MCHC RBC AUTO-ENTMCNC: 33.4 G/DL (ref 31.4–37.4)
MCV RBC AUTO: 109 FL (ref 82–98)
MONOCYTES # BLD AUTO: 0.43 THOUSAND/UL (ref 0–1.22)
MONOCYTES NFR BLD: 8 % (ref 4–12)
NEUTROPHILS # BLD MANUAL: 3.68 THOUSAND/UL (ref 1.85–7.62)
NEUTS BAND NFR BLD MANUAL: 1 % (ref 0–8)
NEUTS SEG NFR BLD AUTO: 68 % (ref 43–75)
PLATELET # BLD AUTO: 246 THOUSANDS/UL (ref 149–390)
PLATELET BLD QL SMEAR: ADEQUATE
PMV BLD AUTO: 11.4 FL (ref 8.9–12.7)
POTASSIUM SERPL-SCNC: 4.2 MMOL/L (ref 3.5–5.3)
PROT SERPL-MCNC: 7 G/DL (ref 6.4–8.4)
RBC # BLD AUTO: 3.33 MILLION/UL (ref 3.81–5.12)
SODIUM SERPL-SCNC: 140 MMOL/L (ref 135–147)
WBC # BLD AUTO: 5.34 THOUSAND/UL (ref 4.31–10.16)

## 2022-11-29 RX ORDER — ANASTROZOLE 1 MG/1
1 TABLET ORAL DAILY
Qty: 90 TABLET | Refills: 0 | Status: SHIPPED | OUTPATIENT
Start: 2022-11-29

## 2022-12-20 ENCOUNTER — OFFICE VISIT (OUTPATIENT)
Dept: INTERNAL MEDICINE CLINIC | Facility: CLINIC | Age: 77
End: 2022-12-20

## 2022-12-20 VITALS
HEART RATE: 86 BPM | SYSTOLIC BLOOD PRESSURE: 142 MMHG | OXYGEN SATURATION: 94 % | BODY MASS INDEX: 29.81 KG/M2 | WEIGHT: 162 LBS | HEIGHT: 62 IN | DIASTOLIC BLOOD PRESSURE: 88 MMHG | TEMPERATURE: 97.3 F

## 2022-12-20 DIAGNOSIS — K21.9 GASTROESOPHAGEAL REFLUX DISEASE WITHOUT ESOPHAGITIS: Primary | ICD-10-CM

## 2022-12-20 DIAGNOSIS — G30.1 LATE ONSET ALZHEIMER'S DISEASE WITHOUT BEHAVIORAL DISTURBANCE (HCC): ICD-10-CM

## 2022-12-20 DIAGNOSIS — F02.80 LATE ONSET ALZHEIMER'S DISEASE WITHOUT BEHAVIORAL DISTURBANCE (HCC): ICD-10-CM

## 2022-12-20 NOTE — PROGRESS NOTES
Assessment/Plan:     Chronic problems appear to be stable but unclear regarding this nausea again  Suggested her daughter hold the Namenda for a few days and see what happens  But also suggested they might be eating out a little too much  Continue other medications  If nausea persists after this, will refer to GI  Quality Measures:       Return in about 4 months (around 4/20/2023) for Recheck  No problem-specific Assessment & Plan notes found for this encounter  Diagnoses and all orders for this visit:    Gastroesophageal reflux disease without esophagitis    Late onset Alzheimer's disease without behavioral disturbance (HCC)        Subjective:      Patient ID: Colton Clark is a 65 y o  female  Patient comes in today for routine follow-up with her daughter  She started again today with nausea and her stomach being upset  They ate out last night  And upon further questioning, they seem to eat out frequently  She is on no new medicines  No medicine changes recently  Her daughter is not sick  ALLERGIES:  Allergies   Allergen Reactions   • Ciprofloxacin Hives   • Sulfa Antibiotics Itching   • Other    • Penicillins Rash     Category: Allergy;   --  Pt received unasyn 1 5 mg IV 11-12-18 to 11-15-18   • Sulfacetamide Rash     Category:  Allergy;        CURRENT MEDICATIONS:    Current Outpatient Medications:   •  amLODIPine (NORVASC) 2 5 mg tablet, Take 2 tablets (5 mg total) by mouth daily, Disp: 180 tablet, Rfl: 1  •  anastrozole (ARIMIDEX) 1 mg tablet, Take 1 tablet (1 mg total) by mouth daily, Disp: 90 tablet, Rfl: 0  •  aspirin 325 mg tablet, Take 325 mg by mouth daily , Disp: , Rfl:   •  cholecalciferol (VITAMIN D3) 1,000 units tablet, Take 1 tablet (1,000 Units total) by mouth daily, Disp: 30 tablet, Rfl: 6  •  cholestyramine (QUESTRAN) 4 g packet, TAKE 1 PACKET (4 G TOTAL) BY MOUTH DAILY, Disp: 90 packet, Rfl: 1  •  diphenhydrAMINE (BENADRYL) 25 mg tablet, Take 25 mg by mouth in the morning   1/2 tablet with Paxil  , Disp: , Rfl:   •  fluticasone (FLONASE) 50 mcg/act nasal spray, INSTILL 1 SPRAY INTO EACH NOSTRIL AS NEEDED FOR RHINITIS OR ALLERGIES, Disp: 48 mL, Rfl: 1  •  furosemide (LASIX) 40 mg tablet, Take 1 tablet (40 mg total) by mouth daily for 2 days, Disp: 2 tablet, Rfl: 0  •  levothyroxine 125 mcg tablet, Take 1 tablet (125 mcg total) by mouth daily, Disp: 90 tablet, Rfl: 1  •  meclizine (ANTIVERT) 25 mg tablet, Take 1 tablet (25 mg total) by mouth every 8 (eight) hours as needed for dizziness, Disp: 50 tablet, Rfl: 0  •  memantine (NAMENDA) 10 mg tablet, Take 1 tablet (10 mg total) by mouth 2 (two) times a day, Disp: 180 tablet, Rfl: 1  •  Mirabegron ER (Myrbetriq) 50 MG TB24, Take 1 tablet (50 mg total) by mouth daily, Disp: 90 tablet, Rfl: 1  •  multivitamin (THERAGRAN) TABS, Take 1 tablet by mouth, Disp: , Rfl:   •  omeprazole (PriLOSEC) 20 mg delayed release capsule, Take 1 capsule (20 mg total) by mouth 2 (two) times a day, Disp: 180 capsule, Rfl: 1  •  ondansetron (ZOFRAN-ODT) 4 mg disintegrating tablet, Take 1 tablet (4 mg total) by mouth every 8 (eight) hours as needed for nausea, Disp: 15 tablet, Rfl: 0  •  PARoxetine (PAXIL) 20 mg tablet, Take 1 tablet (20 mg total) by mouth daily, Disp: 90 tablet, Rfl: 1  •  PROAIR  (90 Base) MCG/ACT inhaler, as needed , Disp: , Rfl:   •  rivastigmine (EXELON) 9 5 mg/24 hr TD 24 hr patch, Place 1 patch on the skin daily, Disp: 90 patch, Rfl: 1    ACTIVE PROBLEM LIST:  Patient Active Problem List   Diagnosis   • Depression   • Gastroesophageal reflux disease without esophagitis   • Mixed hyperlipidemia   • Essential hypertension   • Hypothyroid   • Impaired fasting glucose   • Lymphoma (HCC)   • Cerebral aneurysm, nonruptured   • Mild cognitive impairment   • Flexor tenosynovitis of finger   • Macrocytosis without anemia   • Paresthesias   • Non-intractable vomiting with nausea   • Hypokalemia   • Late onset Alzheimer's disease without behavioral disturbance (HCC)   • Chest pain   • Near syncope   • Immunocompromised (HCC)   • Malignant neoplasm of overlapping sites of right breast in female, estrogen receptor positive ([place] [address] )   • Use of anastrozole   • Recurrent major depressive disorder, remission status unspecified ([place] [address] )   • Stage 3 chronic kidney disease, unspecified whether stage 3a or 3b CKD ([place] [address] )       PAST MEDICAL HISTORY:  Past Medical History:   Diagnosis Date   • Anxiety disorder    • Aortic valve disorder    • Breast cancer ([place] [address] )    • Cancer ([place] [address] )     breast   • Cellulitis of finger of left hand 11/20/2018   • Cellulitis of left hand 11/10/2018    Added automatically from request for surgery [id number]   • Depression    • Disease of thyroid gland    • GERD (gastroesophageal reflux disease)    • History of gastroesophageal reflux (GERD)    • History of kidney cancer 03/06/2014   • History of transfusion    • Hyperlipidemia 03/06/2014   • Hypertension 03/06/2014   • Hypothyroidism 03/06/2014   • Malignant neoplasm of overlapping sites of right breast in female, estrogen receptor positive ([place] [address] ) 03/10/2022   • Shortness of breath    • Stroke ([place] [address] ) [id number]    Min stroke found wile going through chemo and radiation   • TIA (transient ischemic attack)    • UTI (urinary tract infection)        PAST SURGICAL HISTORY:  Past Surgical History:   Procedure Laterality Date   • BREAST LUMPECTOMY Right 3/29/2022    Procedure: ITZEL  DIRECTED LUMPECTOMY;  Surgeon: Julieth Paniagua MD;  Location: [facility];  Service: Surgical Oncology   • ESOPHAGOGASTRIC FUNDOPLASTY      Nissen Fundoplication   • HERNIA REPAIR     • LYMPH NODE BIOPSY Right 3/29/2022    Procedure: LYMPHATIC MAPPING WITH BLUE AND RADIOACTIVE DYES SENTINEL LYMPH NODE BIOPSY, INJECTION IN OR AT 0800 BY DR Artur Mcgraw;  Surgeon: Julieth Paniagua MD;  Location: [facility];  Service: Surgical Oncology   • MAMMO STEREOTACTIC BREAST BIOPSY RIGHT (ALL INC) Right 3/7/2022 • US BREAST ITZEL  NEEDLE LOC RIGHT Right 3/24/2022   • US GUIDED BREAST BIOPSY RIGHT COMPLETE Right 3/7/2022   • WRIST SURGERY Left        FAMILY HISTORY:  Family History   Problem Relation Age of Onset   • Stroke Father    • Leukemia Sister    • No Known Problems Brother    • Skin cancer Mother    • Stroke Maternal Grandmother    • No Known Problems Maternal Grandfather    • No Known Problems Paternal Grandmother    • No Known Problems Paternal Grandfather    • Breast cancer Sister    • Breast cancer Sister         Colon cancer   • Kidney cancer Brother    • Alcohol abuse Brother    • Kidney cancer Brother    • Stomach cancer Brother    • No Known Problems Daughter    • No Known Problems Daughter        SOCIAL HISTORY:  Social History     Socioeconomic History   • Marital status:      Spouse name: Not on file   • Number of children: 2   • Years of education: Not on file   • Highest education level: Not on file   Occupational History   • Not on file   Tobacco Use   • Smoking status: Never   • Smokeless tobacco: Never   • Tobacco comments:     None   Vaping Use   • Vaping Use: Never used   Substance and Sexual Activity   • Alcohol use: Yes     Comment: Social holidays   • Drug use: Never   • Sexual activity: Not Currently     Comment: [place] over   Other Topics Concern   • Not on file   Social History Narrative   • Not on file     Social Determinants of Health     Financial Resource Strain: Low Risk    • Difficulty of Paying Living Expenses: Not very hard   Food Insecurity: Not on file   Transportation Needs: No Transportation Needs   • Lack of Transportation (Medical): No   • Lack of Transportation (Non-Medical): No   Physical Activity: Not on file   Stress: Not on file   Social Connections: Not on file   Intimate Partner Violence: Not on file   Housing Stability: Not on file       Review of Systems   Respiratory: Negative for shortness of breath  Cardiovascular: Negative for chest pain  Gastrointestinal: Positive for nausea  Negative for abdominal pain and vomiting  Objective:  Vitals:    12/20/22 1329   BP: 142/88   Pulse: 86   Temp: (!) 97 3 °F (36 3 °C)   SpO2: 94%   Weight: 73 5 kg (162 lb)   Height: 5' 2" (1 575 m)     Body mass index is 29 63 kg/m²  Physical Exam  Vitals and nursing note reviewed  Constitutional:       Appearance: She is well-developed  Cardiovascular:      Rate and Rhythm: Normal rate and regular rhythm  Heart sounds: Normal heart sounds  Pulmonary:      Effort: Pulmonary effort is normal       Breath sounds: Normal breath sounds  Abdominal:      General: There is no distension  Palpations: Abdomen is soft  There is no mass  Tenderness: There is no abdominal tenderness  Neurological:      Mental Status: She is alert and oriented to person, place, and time             RESULTS:    Recent Results (from the past 1008 hour(s))   FLU/RSV/COVID - if FLU/RSV clinically relevant    Collection Time: 11/17/22  6:35 PM    Specimen: Nose; Nares   Result Value Ref Range    SARS-CoV-2 Negative Negative    INFLUENZA A PCR Negative Negative    INFLUENZA B PCR Negative Negative    RSV PCR Negative Negative   CBC and differential    Collection Time: 11/17/22  6:35 PM   Result Value Ref Range    WBC 10 82 (H) 4 31 - 10 16 Thousand/uL    RBC 3 44 (L) 3 81 - 5 12 Million/uL    Hemoglobin 12 7 11 5 - 15 4 g/dL    Hematocrit 37 4 34 8 - 46 1 %     (H) 82 - 98 fL    MCH 36 9 (H) 26 8 - 34 3 pg    MCHC 34 0 31 4 - 37 4 g/dL    RDW 13 5 11 6 - 15 1 %    MPV 11 4 8 9 - 12 7 fL    Platelets 544 792 - 630 Thousands/uL    nRBC 0 /100 WBCs    Neutrophils Relative 71 43 - 75 %    Immat GRANS % 14 (H) 0 - 2 %    Lymphocytes Relative 7 (L) 14 - 44 %    Monocytes Relative 6 4 - 12 %    Eosinophils Relative 1 0 - 6 %    Basophils Relative 1 0 - 1 %    Neutrophils Absolute 7 79 (H) 1 85 - 7 62 Thousands/µL    Immature Grans Absolute >0 50 (H) 0 00 - 0 20 Thousand/uL Lymphocytes Absolute 0 73 0 60 - 4 47 Thousands/µL    Monocytes Absolute 0 64 0 17 - 1 22 Thousand/µL    Eosinophils Absolute 0 09 0 00 - 0 61 Thousand/µL    Basophils Absolute 0 08 0 00 - 0 10 Thousands/µL   Comprehensive metabolic panel    Collection Time: 11/17/22  6:35 PM   Result Value Ref Range    Sodium 138 135 - 147 mmol/L    Potassium 3 6 3 5 - 5 3 mmol/L    Chloride 103 96 - 108 mmol/L    CO2 24 21 - 32 mmol/L    ANION GAP 11 4 - 13 mmol/L    BUN 18 5 - 25 mg/dL    Creatinine 0 97 0 60 - 1 30 mg/dL    Glucose 129 65 - 140 mg/dL    Calcium 9 1 8 3 - 10 1 mg/dL    AST 18 5 - 45 U/L    ALT 17 12 - 78 U/L    Alkaline Phosphatase 91 46 - 116 U/L    Total Protein 7 6 6 4 - 8 4 g/dL    Albumin 3 7 3 5 - 5 0 g/dL    Total Bilirubin 0 45 0 20 - 1 00 mg/dL    eGFR 56 ml/min/1 73sq m   Lipase    Collection Time: 11/17/22  6:35 PM   Result Value Ref Range    Lipase 61 (L) 73 - 393 u/L   Lactic acid    Collection Time: 11/17/22  6:35 PM   Result Value Ref Range    LACTIC ACID 2 5 (HH) 0 5 - 2 0 mmol/L   HS Troponin 0hr (reflex protocol)    Collection Time: 11/17/22  6:35 PM   Result Value Ref Range    hs TnI 0hr 3 "Refer to ACS Flowchart"- see link ng/L   UA w Reflex to Microscopic w Reflex to Culture    Collection Time: 11/17/22  6:43 PM    Specimen: Urine, Clean Catch   Result Value Ref Range    Color, UA Yellow     Clarity, UA Clear     Specific Gravity, UA 1 023 1 003 - 1 030    pH, UA 6 5 4 5, 5 0, 5 5, 6 0, 6 5, 7 0, 7 5, 8 0    Leukocytes, UA Large (A) Negative    Nitrite, UA Negative Negative    Protein, UA Trace (A) Negative mg/dl    Glucose, UA Negative Negative mg/dl    Ketones, UA 40 (2+) (A) Negative mg/dl    Urobilinogen, UA <2 0 <2 0 mg/dl mg/dl    Bilirubin, UA Negative Negative    Occult Blood, UA Negative Negative   Urine Microscopic    Collection Time: 11/17/22  6:43 PM   Result Value Ref Range    RBC, UA 4-10 (A) None Seen, 1-2 /hpf    WBC, UA Innumerable (A) None Seen, 1-2 /hpf    Epithelial Cells Occasional None Seen, Occasional /hpf    Bacteria, UA Occasional None Seen, Occasional /hpf    MUCUS THREADS Occasional (A) None Seen    Hyaline Casts, UA 0-3 (A) None Seen /lpf   Urine culture    Collection Time: 11/17/22  6:43 PM    Specimen: Urine, Clean Catch   Result Value Ref Range    Urine Culture <10,000 cfu/ml Gram Negative Mina Enteric Like (A)    ECG 12 lead    Collection Time: 11/17/22  6:49 PM   Result Value Ref Range    Ventricular Rate 76 BPM    Atrial Rate 76 BPM    VA Interval 152 ms    QRSD Interval 84 ms    QT Interval 424 ms    QTC Interval 477 ms    P Axis 36 degrees    QRS Axis 12 degrees    T Wave [place] 29 degrees   HS Troponin I 2hr    Collection Time: 11/17/22  8:35 PM   Result Value Ref Range    hs TnI 2hr 3 "Refer to ACS Flowchart"- see link ng/L    Delta 2hr hsTnI 0 <20 ng/L   Lactic acid 2 Hours    Collection Time: 11/17/22  9:43 PM   Result Value Ref Range    LACTIC ACID 1 5 0 5 - 2 0 mmol/L   ECG 12 lead    Collection Time: 11/24/22  6:22 PM   Result Value Ref Range    Ventricular Rate 78 BPM    Atrial Rate 78 BPM    VA Interval 162 ms    QRSD Interval 82 ms    QT Interval 378 ms    QTC Interval 430 ms    P Axis 35 degrees    QRS Axis 0 degrees    T Wave Axis 12 degrees   CBC and differential    Collection Time: 11/24/22  6:34 PM   Result Value Ref Range    WBC 8 22 4 31 - 10 16 Thousand/uL    RBC 3 30 (L) 3 81 - 5 12 Million/uL    Hemoglobin 12 1 11 5 - 15 4 g/dL    Hematocrit 36 5 34 8 - 46 1 %     (H) 82 - 98 fL    MCH 36 7 (H) 26 8 - 34 3 pg    MCHC 33 2 31 4 - 37 4 g/dL    RDW 13 7 11 6 - 15 1 %    MPV 11 2 8 9 - 12 7 fL    Platelets 269 995 - 257 Thousands/uL   Comprehensive metabolic panel    Collection Time: 11/24/22  6:34 PM   Result Value Ref Range    Sodium 142 135 - 147 mmol/L    Potassium 3 7 3 5 - 5 3 mmol/L    Chloride 105 96 - 108 mmol/L    CO2 27 21 - 32 mmol/L    ANION GAP 10 4 - 13 mmol/L    BUN 27 (H) 5 - 25 mg/dL    Creatinine 1 17 0 60 - 1 30 mg/dL Glucose 134 65 - 140 mg/dL    Calcium 9 0 8 3 - 10 1 mg/dL    AST 17 5 - 45 U/L    ALT 16 12 - 78 U/L    Alkaline Phosphatase 85 46 - 116 U/L    Total Protein 7 2 6 4 - 8 4 g/dL    Albumin 3 5 3 5 - 5 0 g/dL    Total Bilirubin 0 23 0 20 - 1 00 mg/dL    eGFR 45 ml/min/1 73sq m   Magnesium    Collection Time: 11/24/22  6:34 PM   Result Value Ref Range    Magnesium 2 0 1 6 - 2 6 mg/dL   Lactic acid    Collection Time: 11/24/22  6:34 PM   Result Value Ref Range    LACTIC ACID 2 1 (HH) 0 5 - 2 0 mmol/L   Lipase    Collection Time: 11/24/22  6:34 PM   Result Value Ref Range    Lipase 74 73 - 393 u/L   HS Troponin 0hr (reflex protocol)    Collection Time: 11/24/22  6:34 PM   Result Value Ref Range    hs TnI 0hr 3 "Refer to ACS Flowchart"- see link ng/L   Manual Differential(PHLEBS Do Not Order)    Collection Time: 11/24/22  6:34 PM   Result Value Ref Range    Segmented % 86 (H) 43 - 75 %    Bands %      Lymphocytes % 9 (L) 14 - 44 %    Monocytes % 2 (L) 4 - 12 %    Eosinophils, %      Basophils % 3 (H) 0 - 1 %    Metamyelocytes%      Absolute Neutrophils 7 07 1 85 - 7 62 Thousand/uL    Lymphocytes Absolute 0 74 0 60 - 4 47 Thousand/uL    Monocytes Absolute 0 16 0 00 - 1 22 Thousand/uL    Eosinophils Absolute      Basophils Absolute 0 25 (H) 0 00 - 0 10 Thousand/uL    Total Counted      Anisocytosis Present     Luz Cells      Macrocytes Present     Poikilocytes      Platelet Estimate Adequate Adequate   Lactic acid 2 Hours    Collection Time: 11/24/22  8:30 PM   Result Value Ref Range    LACTIC ACID 2 4 (HH) 0 5 - 2 0 mmol/L   Comprehensive metabolic panel    Collection Time: 11/29/22  3:49 PM   Result Value Ref Range    Sodium 140 135 - 147 mmol/L    Potassium 4 2 3 5 - 5 3 mmol/L    Chloride 107 96 - 108 mmol/L    CO2 27 21 - 32 mmol/L    ANION GAP 6 4 - 13 mmol/L    BUN 22 5 - 25 mg/dL    Creatinine 0 95 0 60 - 1 30 mg/dL    Glucose 107 65 - 140 mg/dL    Calcium 9 2 8 3 - 10 1 mg/dL    Corrected Calcium 9 9 8 3 - 10 1 mg/dL    AST 13 5 - 45 U/L    ALT 15 12 - 78 U/L    Alkaline Phosphatase 78 46 - 116 U/L    Total Protein 7 0 6 4 - 8 4 g/dL    Albumin 3 1 (L) 3 5 - 5 0 g/dL    Total Bilirubin 0 40 0 20 - 1 00 mg/dL    eGFR 57 ml/min/1 73sq m   CBC and differential    Collection Time: 11/29/22  3:49 PM   Result Value Ref Range    WBC 5 34 4 31 - 10 16 Thousand/uL    RBC 3 33 (L) 3 81 - 5 12 Million/uL    Hemoglobin 12 1 11 5 - 15 4 g/dL    Hematocrit 36 2 34 8 - 46 1 %     (H) 82 - 98 fL    MCH 36 3 (H) 26 8 - 34 3 pg    MCHC 33 4 31 4 - 37 4 g/dL    RDW 13 9 11 6 - 15 1 %    MPV 11 4 8 9 - 12 7 fL    Platelets 917 538 - 114 Thousands/uL   Manual Differential(PHLEBS Do Not Order)    Collection Time: 11/29/22  3:49 PM   Result Value Ref Range    Segmented % 68 43 - 75 %    Bands % 1 0 - 8 %    Lymphocytes % 21 14 - 44 %    Monocytes % 8 4 - 12 %    Eosinophils, % 1 0 - 6 %    Basophils % 1 0 - 1 %    Absolute Neutrophils 3 68 1 85 - 7 62 Thousand/uL    Lymphocytes Absolute 1 12 0 60 - 4 47 Thousand/uL    Monocytes Absolute 0 43 0 00 - 1 22 Thousand/uL    Eosinophils Absolute 0 05 0 00 - 0 40 Thousand/uL    Basophils Absolute 0 05 0 00 - 0 10 Thousand/uL    Total Counted      Anisocytosis Present     Macrocytes Present     Platelet Estimate Adequate Adequate       This note was created with voice recognition software  Phonic, grammatical and spelling errors may be present within the note as a result

## 2023-01-23 ENCOUNTER — RADIATION ONCOLOGY FOLLOW-UP (OUTPATIENT)
Dept: RADIATION ONCOLOGY | Facility: CLINIC | Age: 78
End: 2023-01-23
Attending: RADIOLOGY

## 2023-01-23 ENCOUNTER — CLINICAL SUPPORT (OUTPATIENT)
Dept: RADIATION ONCOLOGY | Facility: CLINIC | Age: 78
End: 2023-01-23
Attending: RADIOLOGY

## 2023-01-23 VITALS
RESPIRATION RATE: 16 BRPM | HEART RATE: 101 BPM | SYSTOLIC BLOOD PRESSURE: 136 MMHG | TEMPERATURE: 97 F | WEIGHT: 157 LBS | OXYGEN SATURATION: 96 % | BODY MASS INDEX: 28.72 KG/M2 | DIASTOLIC BLOOD PRESSURE: 80 MMHG

## 2023-01-23 DIAGNOSIS — Z17.0 MALIGNANT NEOPLASM OF OVERLAPPING SITES OF RIGHT BREAST IN FEMALE, ESTROGEN RECEPTOR POSITIVE (HCC): Primary | ICD-10-CM

## 2023-01-23 DIAGNOSIS — C50.811 MALIGNANT NEOPLASM OF OVERLAPPING SITES OF RIGHT BREAST IN FEMALE, ESTROGEN RECEPTOR POSITIVE (HCC): Primary | ICD-10-CM

## 2023-01-23 NOTE — PROGRESS NOTES
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 extensive low grade DCIS status post resection achieving negative margins on 3/29/22  Tumor cells were ER/MA(+) and [id number](-)  She recently completed a course of adjuvant radiation on 22  She was last seen 22 and returns today for follow up     22 Dr Jes Mccarty  Well-healed right breast and right axillary incisions  She is on anastrozole and doing well  22 ED- dyspnea    22 Dr Breanna Longo  Arimidex 1 mg daily for 5 years  Cont supplemental Vit D3 and calcium as per above  F/u Surg-Onc and screening mammograms    22 ED- UTI, vomiting    22 CT head wo contrast  No acute intracranial abnormality  22 CT A/P w contrast  Mild bladder wall thickening  Correlate for cystitis  Enlarging left fat-containing diaphragmatic hernia measuring 6 1 cm (previous 4 3 cm)  Stable hiatal hernia  22 ED- N&V      Upcomin/15/23 Mammogram  23 Dr Jes Mccarty  23 Dr Breanna Longo      Follow up visit     Oncology History   Malignant neoplasm of overlapping sites of right breast in female, estrogen receptor positive ([address] )   3/7/2022 Initial Diagnosis    Malignant neoplasm of right female breast ([address] )     3/7/2022 Biopsy    Right Breast Ultrasound guided biopsy  12 o'clock, 8 cm from nipple  Invasive mammary carcinoma of no special type with extensive Ductal Carcinoma in situ  Grade 1   ER 95  MA 75  HER2 1+  Lymphovascular invasion not identified    Concordant  Malingnacy appears unifocal  Right axilla clear  Left breast clear  Breast, Right, Stereo bx right breast 10oclock, 4 cores with calcs:  - Fibroadenoma with focal coarse calcifications    - Negative for atypia and in-situ or invasive carcinoma  Breast, Right, Stereo bx right breast 10oclock, 1 core without calcs:  - Fibroadenoma with focal coarse calcifications  - Negative for atypia and in-situ or invasive carcinoma  3/14/2022 Genetic Testing    A stat panel of genes were evaluated, including: SYDNI, BRCA1, BRCA2, CDH1, CHEK2, PALB2, PTEN, STK11, TP53  Negative result  No pathogenic sequence variants or deletions/duplications identified     3/14/2022 Genomic Testing    MammaPrint   Low risk  Mammaprint Index: +0 537  Luminal type A     3/14/2022 -  Cancer Staged    Staging form: Breast, AJCC 8th Edition  - Clinical stage from 3/14/2022: cT1b, cN0, cM0, GX, ER+, CO+, HER2- - Signed by Mayo Osgood, MD on 3/25/2022  Stage prefix: Initial diagnosis  Histologic grading system: 3 grade system       3/29/2022 Surgery    Right breast liyah  directed lumpectomy with sentinel lymph node biopsy  Invasive mammary carcinoma of no special type with associated ductal carcinoma in situ  Grade 1   1 3 cm  All margins negative for invasive carcinoma and ductal carcinoma in situ  0/3 Lymph node  Anatomic/prognostic stage: IA      5/23/2022 - 6/14/2022 Radiation    Treatments:  Course: C1  Plan ID Energy Fractions Dose per Fraction (cGy) Dose Correction (cGy) Total Dose Delivered (cGy) Elapsed Days   R Breast 6X 15 / 15 267 0 4,005 22    Treatment Dates:  5/23/2022 - 6/14/2022  Review of Systems:  Review of Systems   Constitutional: Positive for fatigue  HENT: Negative  Eyes:        Wears glasses   Respiratory: Negative  Cardiovascular: Negative  Gastrointestinal: Positive for nausea (occasionally)  Genitourinary: Negative  Musculoskeletal: Positive for arthralgias (B/L hands) and back pain  Skin: Negative  Allergic/Immunologic: Negative  Neurological: Positive for dizziness and light-headedness  Hematological: Negative  Psychiatric/Behavioral: Negative  Clinical Trial: no        Health Maintenance   Topic Date Due   • Hepatitis C Screening  Never done   • Pneumococcal Vaccine: 65+ Years (3 - PCV) 11/11/2020   • COVID-19 Vaccine (4 - Booster) 01/26/2022   • BMI: Followup Plan  01/05/2023   • Breast Cancer Screening: Mammogram  02/14/2023   • Depression Remission PHQ  02/15/2023   • Fall Risk  08/15/2023   • Urinary Incontinence Screening  08/15/2023   • Medicare Annual Wellness Visit (AWV)  08/15/2023   • BMI: Adult  12/20/2023   • Osteoporosis Screening  Completed   • Influenza Vaccine  Completed   • HIB Vaccine  Aged Out   • IPV Vaccine  Aged Out   • Hepatitis A Vaccine  Aged Out   • Meningococcal ACWY Vaccine  Aged Out   • HPV Vaccine  Aged Out   • Colorectal Cancer Screening  Discontinued     Patient Active Problem List   Diagnosis   • Depression   • Gastroesophageal reflux disease without esophagitis   • Mixed hyperlipidemia   • Essential hypertension   • Hypothyroid   • Impaired fasting glucose   • Lymphoma ([facility] )   • Cerebral aneurysm, nonruptured   • Mild cognitive impairment   • Flexor tenosynovitis of finger   • Macrocytosis without anemia   • Paresthesias   • Non-intractable vomiting with nausea   • Hypokalemia   • Late onset Alzheimer's disease without behavioral disturbance (HCC)   • Chest pain   • Near syncope   • Immunocompromised ([facility] )   • Malignant neoplasm of overlapping sites of right breast in female, estrogen receptor positive ([facility] )   • Use of anastrozole   • Recurrent major depressive disorder, remission status unspecified ([facility] [facility] )   • Stage 3 chronic kidney disease, unspecified whether stage 3a or 3b CKD ([facility] )     Past Medical History:   Diagnosis Date   • Anxiety disorder    • Aortic valve disorder    • Breast cancer ([facility] [address] )    • Cancer ([facility] )     breast   • Cellulitis of finger of left hand 11/20/2018   • Cellulitis of left hand 11/10/2018    Added automatically from request for surgery [id number]   • Depression    • Disease of thyroid gland    • GERD (gastroesophageal reflux disease)    • History of gastroesophageal reflux (GERD)    • History of kidney cancer 03/06/2014   • History of transfusion    • Hyperlipidemia 03/06/2014   • Hypertension 03/06/2014   • Hypothyroidism 03/06/2014   • Malignant neoplasm of overlapping sites of right breast in female, estrogen receptor positive ([facility] ) 03/10/2022   • Shortness of breath    • Stroke ([facility] ) 4/[id number]    Min stroke found wile going through chemo and radiation   • TIA (transient ischemic attack)    • UTI (urinary tract infection)      Past Surgical History:   Procedure Laterality Date   • BREAST LUMPECTOMY Right 3/29/2022    Procedure: ITZEL  DIRECTED LUMPECTOMY;  Surgeon: Jai Mccollum MD;  Location: [facility];  Service: Surgical Oncology   • ESOPHAGOGASTRIC FUNDOPLASTY      Nissen Fundoplication   • HERNIA REPAIR     • LYMPH NODE BIOPSY Right 3/29/2022    Procedure: LYMPHATIC MAPPING WITH BLUE AND RADIOACTIVE DYES SENTINEL LYMPH NODE BIOPSY, INJECTION IN OR AT 0800 BY DR Katie Thurston;  Surgeon: Jai Mccollum MD;  Location: [facility];  Service: Surgical Oncology   • MAMMO STEREOTACTIC BREAST BIOPSY RIGHT (ALL INC) Right 3/7/2022   • US BREAST ITZEL  NEEDLE LOC RIGHT Right 3/24/2022   • US GUIDED BREAST BIOPSY RIGHT COMPLETE Right 3/7/2022   • WRIST SURGERY Left      Family History   Problem Relation Age of Onset   • Stroke Father    • Leukemia Sister    • No Known Problems Brother    • Skin cancer Mother    • Stroke Maternal Grandmother    • No Known Problems Maternal Grandfather    • No Known Problems Paternal Grandmother    • No Known Problems Paternal Grandfather    • Breast cancer Sister    • Breast cancer Sister         Colon cancer   • Kidney cancer Brother    • Alcohol abuse Brother    • Kidney cancer Brother    • Stomach cancer Brother    • No Known Problems Daughter    • No Known Problems Daughter      Social History     Socioeconomic History   • Marital status:   Spouse name: Not on file   • Number of children: 2   • Years of education: Not on file   • Highest education level: Not on file   Occupational History   • Not on file   Tobacco Use   • Smoking status: Never   • Smokeless tobacco: Never   • Tobacco comments:     None   Vaping Use   • Vaping Use: Never used   Substance and Sexual Activity   • Alcohol use: Yes     Comment: Social holidays   • Drug use: Never   • Sexual activity: Not Currently     Comment: [place] over   Other Topics Concern   • Not on file   Social History Narrative   • Not on file     Social Determinants of Health     Financial Resource Strain: Low Risk    • Difficulty of Paying Living Expenses: Not very hard   Food Insecurity: Not on file   Transportation Needs: No Transportation Needs   • Lack of Transportation (Medical): No   • Lack of Transportation (Non-Medical): No   Physical Activity: Not on file   Stress: Not on file   Social Connections: Not on file   Intimate Partner Violence: Not on file   Housing Stability: Not on file       Current Outpatient Medications:   •  Mirabegron ER (Myrbetriq) 50 MG TB24, Take 1 tablet (50 mg total) by mouth daily, Disp: 90 tablet, Rfl: 1  •  amLODIPine (NORVASC) 2 5 mg tablet, Take 2 tablets (5 mg total) by mouth daily, Disp: 180 tablet, Rfl: 1  •  anastrozole (ARIMIDEX) 1 mg tablet, Take 1 tablet (1 mg total) by mouth daily, Disp: 90 tablet, Rfl: 0  •  aspirin 325 mg tablet, Take 325 mg by mouth daily , Disp: , Rfl:   •  cholecalciferol (VITAMIN D3) 1,000 units tablet, Take 1 tablet (1,000 Units total) by mouth daily, Disp: 30 tablet, Rfl: 6  •  cholestyramine (QUESTRAN) 4 g packet, TAKE 1 PACKET (4 G TOTAL) BY MOUTH DAILY, Disp: 90 packet, Rfl: 1  •  diphenhydrAMINE (BENADRYL) 25 mg tablet, Take 25 mg by mouth in the morning   1/2 tablet with Paxil  , Disp: , Rfl:   •  fluticasone (FLONASE) 50 mcg/act nasal spray, INSTILL 1 SPRAY INTO EACH NOSTRIL AS NEEDED FOR RHINITIS OR ALLERGIES, Disp: 48 mL, Rfl: 1  • furosemide (LASIX) 40 mg tablet, Take 1 tablet (40 mg total) by mouth daily for 2 days, Disp: 2 tablet, Rfl: 0  •  levothyroxine 125 mcg tablet, TAKE 1 TABLET BY MOUTH EVERY DAY, Disp: 90 tablet, Rfl: 3  •  meclizine (ANTIVERT) 25 mg tablet, Take 1 tablet (25 mg total) by mouth every 8 (eight) hours as needed for dizziness, Disp: 50 tablet, Rfl: 0  •  memantine (NAMENDA) 10 mg tablet, Take 1 tablet (10 mg total) by mouth 2 (two) times a day, Disp: 180 tablet, Rfl: 1  •  multivitamin (THERAGRAN) TABS, Take 1 tablet by mouth, Disp: , Rfl:   •  omeprazole (PriLOSEC) 20 mg delayed release capsule, Take 1 capsule (20 mg total) by mouth 2 (two) times a day, Disp: 180 capsule, Rfl: 1  •  ondansetron (ZOFRAN-ODT) 4 mg disintegrating tablet, Take 1 tablet (4 mg total) by mouth every 8 (eight) hours as needed for nausea, Disp: 15 tablet, Rfl: 0  •  PARoxetine (PAXIL) 20 mg tablet, Take 1 tablet (20 mg total) by mouth daily, Disp: 90 tablet, Rfl: 1  •  PROAIR  (90 Base) MCG/ACT inhaler, as needed , Disp: , Rfl:   •  rivastigmine (EXELON) 9 5 mg/24 hr TD 24 hr patch, Place 1 patch on the skin daily, Disp: 90 patch, Rfl: 1  Allergies   Allergen Reactions   • Ciprofloxacin Hives   • Sulfa Antibiotics Itching   • Other    • Penicillins Rash     Category: Allergy;   --  Pt received unasyn 1 5 mg IV 11-12-18 to 11-15-18   • Sulfacetamide Rash     Category: Allergy; There were no vitals filed for this visit

## 2023-01-23 NOTE — PROGRESS NOTES
Follow-up - Radiation Oncology   Alfred Snow 1945 65 y o  female [id number]      History of Present Illness   Cancer Staging   Malignant neoplasm of overlapping sites of right breast in female, estrogen receptor positive ([facility] )  Staging form: Breast, AJCC 8th Edition  - Clinical stage from 3/14/2022: cT1b, cN0, cM0, GX, ER+, OK+, HER2- - Signed by Rama Schneider MD on 3/25/2022  Stage prefix: Initial diagnosis  Histologic grading system: 3 grade system      Alfred Snow is a 65 y o   female with multiple comorbidities and excellent performance status, including distant history of NHL of the left breast treated with chemotherapy and radiation and currently ADDI who was diagnosed with clinical stage I, grade 1 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 extensive low grade DCIS in 2022  She underwent lumpectomy and sentinel lymph node biopsy completed on 3/29/22   Tumor cells were ER/OK(+) and VEJ0Acl(-)   She completed a course of adjuvant radiation on 6/14/22  She is maintained on anastrazole  Today, she returns for follow up visit      Interim medical history:  8/17/22 Dr Israel Baird  Arimidex 1 mg daily for 5 years  Cont supplemental Vit D3 and calcium   F/u Surg-Onc and screening mammograms     11/17/22 ED- UTI, vomiting     11/17/22 CT head wo contrast  No acute intracranial abnormality      11/17/22 CT A/P w contrast  Mild bladder wall thickening   Correlate for cystitis    Enlarging left fat-containing diaphragmatic hernia measuring 6 1 cm (previous 4 3 cm)    Stable hiatal hernia      11/24/22 ED- N&V    The patient states that nausea significantly improved and no further vomiting since November  She has anti-emetics, but has not required use of medication  Today, she feels well and offers no breast related complaints  She denies palpable nodules, suspicious skin change, or nipple discharge of the breasts bilaterally  She denies significant breast tenderness or pain    She is tolerating anastrazole well and has no related complaints today  She denies upper extremity edema or shoulder restriction      Upcomin/15/23 Mammogram  23 Dr Nieves Huynh  23 Dr Leal Agent   Oncology History   Malignant neoplasm of overlapping sites of right breast in female, estrogen receptor positive ([facility] )   3/7/2022 Initial Diagnosis    Malignant neoplasm of right female breast ([facility] [address] )     3/7/2022 Biopsy    Right Breast Ultrasound guided biopsy  12 o'clock, 8 cm from nipple  Invasive mammary carcinoma of no special type with extensive Ductal Carcinoma in situ  Grade 1   ER 95  NH 75  HER2 1+  Lymphovascular invasion not identified    Concordant  Malingnacy appears unifocal  Right axilla clear  Left breast clear  Breast, Right, Stereo bx right breast 10oclock, 4 cores with calcs:  - Fibroadenoma with focal coarse calcifications  - Negative for atypia and in-situ or invasive carcinoma  Breast, Right, Stereo bx right breast 10oclock, 1 core without calcs:  - Fibroadenoma with focal coarse calcifications  - Negative for atypia and in-situ or invasive carcinoma  3/14/2022 Genetic Testing    A stat panel of genes were evaluated, including: SYDNI, BRCA1, BRCA2, CDH1, CHEK2, PALB2, PTEN, STK11, TP53  Negative result   No pathogenic sequence variants or deletions/duplications identified     3/14/2022 Genomic Testing    MammaPrint   Low risk  Mammaprint Index: +0 537  Luminal type A     3/14/2022 -  Cancer Staged    Staging form: Breast, AJCC 8th Edition  - Clinical stage from 3/14/2022: cT1b, cN0, cM0, GX, ER+, NH+, HER2- - Signed by Bhakti Troncoso MD on 3/25/2022  Stage prefix: Initial diagnosis  Histologic grading system: 3 grade system       3/29/2022 Surgery    Right breast liyah  directed lumpectomy with sentinel lymph node biopsy  Invasive mammary carcinoma of no special type with associated ductal carcinoma in situ  Grade 1   1 3 cm  All margins negative for invasive carcinoma and ductal carcinoma in situ  0/3 Lymph node  Anatomic/prognostic stage: IA      5/23/2022 - 6/14/2022 Radiation    Treatments:  Course: C1  Plan ID Energy Fractions Dose per Fraction (cGy) Dose Correction (cGy) Total Dose Delivered (cGy) Elapsed Days   R Breast 6X 15 / 15 267 0 4,005 22    Treatment Dates:  5/23/2022 - 6/14/2022              Past Medical History:   Diagnosis Date   • Anxiety disorder    • Aortic valve disorder    • Breast cancer ([facility] )    • Cancer ([facility] )     breast   • Cellulitis of finger of left hand 11/20/2018   • Cellulitis of left hand 11/10/2018    Added automatically from request for surgery [id number]   • Depression    • Disease of thyroid gland    • GERD (gastroesophageal reflux disease)    • History of gastroesophageal reflux (GERD)    • History of kidney cancer 03/06/2014   • History of transfusion    • Hyperlipidemia 03/06/2014   • Hypertension 03/06/2014   • Hypothyroidism 03/06/2014   • Malignant neoplasm of overlapping sites of right breast in female, estrogen receptor positive ([facility] ) 03/10/2022   • Shortness of breath    • Stroke ([facility] ) 4/[id number]    Min stroke found wile going through chemo and radiation   • TIA (transient ischemic attack)    • UTI (urinary tract infection)      Past Surgical History:   Procedure Laterality Date   • BREAST LUMPECTOMY Right 3/29/2022    Procedure: ITZEL  DIRECTED LUMPECTOMY;  Surgeon: Marquita Mccloud MD;  Location: [facility];  Service: Surgical Oncology   • ESOPHAGOGASTRIC FUNDOPLASTY      Nissen Fundoplication   • HERNIA REPAIR     • LYMPH NODE BIOPSY Right 3/29/2022    Procedure: LYMPHATIC MAPPING WITH BLUE AND RADIOACTIVE DYES SENTINEL LYMPH NODE BIOPSY, INJECTION IN OR AT 0800 BY DR Yessy Lunsford;  Surgeon: Marquita Mccloud MD;  Location: [facility];  Service: Surgical Oncology   • MAMMO STEREOTACTIC BREAST BIOPSY RIGHT (ALL INC) Right 3/7/2022   • US BREAST ITZEL  NEEDLE LOC RIGHT Right 3/24/2022   • US GUIDED BREAST BIOPSY RIGHT COMPLETE Right 3/7/2022   • WRIST SURGERY Left        Social History   Social History     Substance and Sexual Activity   Alcohol Use Yes    Comment: Social holidays     Social History     Substance and Sexual Activity   Drug Use Never     Social History     Tobacco Use   Smoking Status Never   Smokeless Tobacco Never   Tobacco Comments    None         Meds/Allergies     Current Outpatient Medications:   •  amLODIPine (NORVASC) 2 5 mg tablet, Take 2 tablets (5 mg total) by mouth daily, Disp: 180 tablet, Rfl: 1  •  anastrozole (ARIMIDEX) 1 mg tablet, Take 1 tablet (1 mg total) by mouth daily, Disp: 90 tablet, Rfl: 0  •  aspirin 325 mg tablet, Take 325 mg by mouth daily , Disp: , Rfl:   •  cholecalciferol (VITAMIN D3) 1,000 units tablet, Take 1 tablet (1,000 Units total) by mouth daily, Disp: 30 tablet, Rfl: 6  •  cholestyramine (QUESTRAN) 4 g packet, TAKE 1 PACKET (4 G TOTAL) BY MOUTH DAILY, Disp: 90 packet, Rfl: 1  •  diphenhydrAMINE (BENADRYL) 25 mg tablet, Take 25 mg by mouth in the morning   1/2 tablet with Paxil  , Disp: , Rfl:   •  fluticasone (FLONASE) 50 mcg/act nasal spray, INSTILL 1 SPRAY INTO EACH NOSTRIL AS NEEDED FOR RHINITIS OR ALLERGIES, Disp: 48 mL, Rfl: 1  •  furosemide (LASIX) 40 mg tablet, Take 1 tablet (40 mg total) by mouth daily for 2 days, Disp: 2 tablet, Rfl: 0  •  levothyroxine 125 mcg tablet, TAKE 1 TABLET BY MOUTH EVERY DAY, Disp: 90 tablet, Rfl: 3  •  meclizine (ANTIVERT) 25 mg tablet, Take 1 tablet (25 mg total) by mouth every 8 (eight) hours as needed for dizziness, Disp: 50 tablet, Rfl: 0  •  memantine (NAMENDA) 10 mg tablet, Take 1 tablet (10 mg total) by mouth 2 (two) times a day, Disp: 180 tablet, Rfl: 1  •  Mirabegron ER (Myrbetriq) 50 MG TB24, Take 1 tablet (50 mg total) by mouth daily, Disp: 90 tablet, Rfl: 1  •  multivitamin (THERAGRAN) TABS, Take 1 tablet by mouth, Disp: , Rfl:   •  omeprazole (PriLOSEC) 20 mg delayed release capsule, Take 1 capsule (20 mg total) by mouth 2 (two) times a day, Disp: 180 capsule, Rfl: 1  •  ondansetron (ZOFRAN-ODT) 4 mg disintegrating tablet, Take 1 tablet (4 mg total) by mouth every 8 (eight) hours as needed for nausea, Disp: 15 tablet, Rfl: 0  •  PARoxetine (PAXIL) 20 mg tablet, Take 1 tablet (20 mg total) by mouth daily, Disp: 90 tablet, Rfl: 1  •  PROAIR  (90 Base) MCG/ACT inhaler, as needed , Disp: , Rfl:   •  rivastigmine (EXELON) 9 5 mg/24 hr TD 24 hr patch, Place 1 patch on the skin daily, Disp: 90 patch, Rfl: 1  Allergies   Allergen Reactions   • Ciprofloxacin Hives   • Sulfa Antibiotics Itching   • Other    • Penicillins Rash     Category: Allergy;   --  Pt received unasyn 1 5 mg IV 11-12-18 to 11-15-18   • Sulfacetamide Rash     Category: Allergy; Review of Systems   Constitutional: Positive for fatigue  HENT: Negative  Eyes:        Wears glasses   Respiratory: Negative  Cardiovascular: Negative  Gastrointestinal: Positive for nausea (occasionally)  Genitourinary: Negative  Musculoskeletal: Positive for arthralgias (B/L hands) and back pain  Skin: Negative  Allergic/Immunologic: Negative  Neurological: Positive for dizziness and light-headedness  Hematological: Negative  Psychiatric/Behavioral: Negative  OBJECTIVE:   /80   Pulse 101   Temp (!) 97 °F (36 1 °C)   Resp 16   Wt 71 2 kg (157 lb)   SpO2 96%   BMI 28 72 kg/m²   Karnofsky: 90 - Able to carry on normal activity; minor signs or symptoms of disease     Physical Exam  Vitals and nursing note reviewed  Constitutional:       General: She is not in acute distress  Appearance: She is well-developed  Cardiovascular:      Rate and Rhythm: Normal rate and regular rhythm  Pulmonary:      Breath sounds: No wheezing, rhonchi or rales     Chest:      Comments: Breast examination demonstrates a well-healed lumpectomy of the upper outer right breast  There is mild diffuse hyperpigmentation and fibrosis of the right breast   There is p'eau d'orange right breast centrally  There are no palpable masses or suspicious skin changes of the breasts bilaterally  Abdominal:      Palpations: Abdomen is soft  Tenderness: There is no abdominal tenderness  Musculoskeletal:      Right lower leg: No edema  Left lower leg: No edema  Comments: Full range of motion upper extremities bilaterally  No upper extremity edema  Lymphadenopathy:      Cervical: No cervical adenopathy  Upper Body:      Right upper body: No supraclavicular or axillary adenopathy  Left upper body: No supraclavicular or axillary adenopathy  Neurological:      Mental Status: She is alert and oriented to person, place, and time  Gait: Gait normal       Comments: Ambulates with cane            Assessment/Plan:  Zac Merlos is a 65 y o   female with multiple comorbidities diagnosed with clinical stage I, grade 1 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 extensive low grade DCIS status post resection achieving negative margins on 3/29/22  Tumor cells were ER/TN(+) and [id number](-)   She had previous breast radiation for NHL of the left breast many years ago  She completed a course of adjuvant radiation on 6/14/22  She is maintained on anastrazole that she is tolerating well  She remains clinically ADDI      I instructed her to practice sun protection to the irradiated skin  I recommended emollients as needed  She will return for follow-up in 6 months  We will see her sooner should the need arise  Jessie Krishnamurthy MD  4/60/5292,57:78 PM    Portions of the record may have been created with voice recognition software   Occasional wrong word or "sound a like" substitutions may have occurred due to the inherent limitations of voice recognition software   Read the chart carefully and recognize, using context, where substitutions have occurred

## 2023-02-15 ENCOUNTER — HOSPITAL ENCOUNTER (OUTPATIENT)
Dept: MAMMOGRAPHY | Facility: CLINIC | Age: 78
Discharge: HOME | End: 2023-02-15

## 2023-02-15 VITALS — WEIGHT: 157 LBS | BODY MASS INDEX: 28.89 KG/M2 | HEIGHT: 62 IN

## 2023-02-15 DIAGNOSIS — C50.811 MALIGNANT NEOPLASM OF OVERLAPPING SITES OF RIGHT BREAST IN FEMALE, ESTROGEN RECEPTOR POSITIVE (HCC): ICD-10-CM

## 2023-02-15 DIAGNOSIS — Z17.0 MALIGNANT NEOPLASM OF OVERLAPPING SITES OF RIGHT BREAST IN FEMALE, ESTROGEN RECEPTOR POSITIVE (HCC): ICD-10-CM

## 2023-02-21 PROBLEM — Z98.890 HISTORY OF LUMPECTOMY OF RIGHT BREAST: Status: ACTIVE | Noted: 2023-02-21

## 2023-02-22 ENCOUNTER — OFFICE VISIT (OUTPATIENT)
Dept: SURGICAL ONCOLOGY | Facility: CLINIC | Age: 78
End: 2023-02-22

## 2023-02-22 VITALS
SYSTOLIC BLOOD PRESSURE: 144 MMHG | BODY MASS INDEX: 29.53 KG/M2 | WEIGHT: 160.5 LBS | HEART RATE: 88 BPM | OXYGEN SATURATION: 95 % | RESPIRATION RATE: 16 BRPM | TEMPERATURE: 97.3 F | DIASTOLIC BLOOD PRESSURE: 82 MMHG | HEIGHT: 62 IN

## 2023-02-22 DIAGNOSIS — C50.811 MALIGNANT NEOPLASM OF OVERLAPPING SITES OF RIGHT BREAST IN FEMALE, ESTROGEN RECEPTOR POSITIVE (HCC): Primary | ICD-10-CM

## 2023-02-22 DIAGNOSIS — Z79.811 USE OF ANASTROZOLE: ICD-10-CM

## 2023-02-22 DIAGNOSIS — Z17.0 MALIGNANT NEOPLASM OF OVERLAPPING SITES OF RIGHT BREAST IN FEMALE, ESTROGEN RECEPTOR POSITIVE (HCC): Primary | ICD-10-CM

## 2023-02-22 NOTE — PROGRESS NOTES
Surgical Oncology Follow Up  [facility]  [facility] [place]  [address]  [place] [address]    Valentín Boo  1945  [id number]      Chief Complaint   Patient presents with   • Follow-up        Assessment & Plan:   Is a 76-year-old female follow-up with right breast cancer she is doing well she denies of any breast pain nipple discharge nipple retraction or skin changes other than surgical scar  She had a mammogram which I reviewed and no worrisome features  She is also on Arimidex and tolerating well with no major side effects I will see her in 6 months    Cancer History:     Oncology History   Malignant neoplasm of overlapping sites of right breast in female, estrogen receptor positive ([facility] [address] )   3/7/2022 Initial Diagnosis    Malignant neoplasm of right female breast ([facility] [address] )     3/7/2022 Biopsy    Right Breast Ultrasound guided biopsy  12 o'clock, 8 cm from nipple  Invasive mammary carcinoma of no special type with extensive Ductal Carcinoma in situ  Grade 1   ER 95  FL 75  HER2 1+  Lymphovascular invasion not identified    Concordant  Malingnacy appears unifocal  Right axilla clear  Left breast clear  Breast, Right, Stereo bx right breast 10oclock, 4 cores with calcs:  - Fibroadenoma with focal coarse calcifications  - Negative for atypia and in-situ or invasive carcinoma  Breast, Right, Stereo bx right breast 10oclock, 1 core without calcs:  - Fibroadenoma with focal coarse calcifications  - Negative for atypia and in-situ or invasive carcinoma  3/14/2022 Genetic Testing    A stat panel of genes were evaluated, including: SYDNI, BRCA1, BRCA2, CDH1, CHEK2, PALB2, PTEN, STK11, TP53  Negative result   No pathogenic sequence variants or deletions/duplications identified     3/14/2022 Genomic Testing    MammaPrint   Low risk  Mammaprint Index: +0 537  Luminal type A     3/14/2022 -  Cancer Staged    Staging form: Breast, AJCC 8th Edition  - Clinical stage from 3/14/2022: cT1b, cN0, cM0, GX, ER+, CO+, HER2- - Signed by Karen Gama MD on 3/25/2022  Stage prefix: Initial diagnosis  Histologic grading system: 3 grade system       3/29/2022 Surgery    Right breast liyah  directed lumpectomy with sentinel lymph node biopsy  Invasive mammary carcinoma of no special type with associated ductal carcinoma in situ  Grade 1   1 3 cm  All margins negative for invasive carcinoma and ductal carcinoma in situ  0/3 Lymph node  Anatomic/prognostic stage: IA      5/23/2022 - 6/14/2022 Radiation    Treatments:  Course: C1  Plan ID Energy Fractions Dose per Fraction (cGy) Dose Correction (cGy) Total Dose Delivered (cGy) Elapsed Days   R Breast 6X 15 / 15 267 0 4,005 22    Treatment Dates:  5/23/2022 - 6/14/2022  Interval History:   Follow-up with right breast cancer and on endocrine therapy    Review of Systems:   Review of Systems   Constitutional: Negative for chills and fever  HENT: Negative for ear pain and sore throat  Eyes: Negative for pain and visual disturbance  Respiratory: Negative for cough and shortness of breath  Cardiovascular: Negative for chest pain and palpitations  Gastrointestinal: Negative for abdominal pain and vomiting  Genitourinary: Negative for dysuria and hematuria  Musculoskeletal: Negative for arthralgias and back pain  Skin: Negative for color change and rash  Neurological: Negative for seizures and syncope  All other systems reviewed and are negative        Past Medical History     Patient Active Problem List   Diagnosis   • Depression   • Gastroesophageal reflux disease without esophagitis   • Mixed hyperlipidemia   • Essential hypertension   • Hypothyroid   • Impaired fasting glucose   • Lymphoma (HCC)   • Cerebral aneurysm, nonruptured   • Mild cognitive impairment   • Flexor tenosynovitis of finger   • Macrocytosis without anemia   • Paresthesias   • Non-intractable vomiting with nausea   • Hypokalemia   • Late onset Alzheimer's disease without behavioral disturbance (HCC)   • Chest pain   • Near syncope   • Immunocompromised (HCC)   • Malignant neoplasm of overlapping sites of right breast in female, estrogen receptor positive ([place] [address] )   • Use of anastrozole   • Recurrent major depressive disorder, remission status unspecified ([place] [address] )   • Stage 3 chronic kidney disease, unspecified whether stage 3a or 3b CKD ([place] [address] )   • History of lumpectomy of right breast     Past Medical History:   Diagnosis Date   • Anxiety disorder    • Aortic valve disorder    • BRCA1 negative    • BRCA2 negative    • Breast cancer ([place] [address] ) 03/2022    right   • Cancer ([place] [address] )     breast   • Cellulitis of finger of left hand 11/20/2018   • Cellulitis of left hand 11/10/2018    Added automatically from request for surgery [id number]   • Depression    • Disease of thyroid gland    • GERD (gastroesophageal reflux disease)    • History of gastroesophageal reflux (GERD)    • History of kidney cancer 03/06/2014   • History of transfusion    • Hyperlipidemia 03/06/2014   • Hypertension 03/06/2014   • Hypothyroidism 03/06/2014   • Malignant neoplasm of overlapping sites of right breast in female, estrogen receptor positive ([place] [address] ) 03/10/2022   • Shortness of breath    • Stroke ([place] [address] ) [id number]    Min stroke found wile going through chemo and radiation   • TIA (transient ischemic attack)    • UTI (urinary tract infection)      Past Surgical History:   Procedure Laterality Date   • BREAST LUMPECTOMY Right 3/29/2022    Procedure: ITZEL  DIRECTED LUMPECTOMY;  Surgeon: Ravinder Keenan MD;  Location: [facility];  Service: Surgical Oncology   • ESOPHAGOGASTRIC FUNDOPLASTY      Nissen Fundoplication   • HERNIA REPAIR     • LYMPH NODE BIOPSY Right 3/29/2022    Procedure: LYMPHATIC MAPPING WITH BLUE AND RADIOACTIVE DYES SENTINEL LYMPH NODE BIOPSY, INJECTION IN OR AT 0800 BY DR Amanda Amanda;  Surgeon: Ravinder Keenan MD;  Location: [facility]; Service: Surgical Oncology   • MAMMO STEREOTACTIC BREAST BIOPSY RIGHT (ALL INC) Right 3/7/2022   • US BREAST ITZEL  NEEDLE LOC RIGHT Right 3/24/2022   • US GUIDED BREAST BIOPSY RIGHT COMPLETE Right 3/7/2022   • WRIST SURGERY Left      Family History   Problem Relation Age of Onset   • Stroke Father    • Leukemia Sister    • No Known Problems Brother    • Skin cancer Mother    • Stroke Maternal Grandmother    • No Known Problems Maternal Grandfather    • No Known Problems Paternal Grandmother    • No Known Problems Paternal Grandfather    • Breast cancer Sister    • Breast cancer Sister         Colon cancer   • Kidney cancer Brother    • Alcohol abuse Brother    • Kidney cancer Brother    • Stomach cancer Brother    • No Known Problems Daughter    • No Known Problems Daughter      Social History     Socioeconomic History   • Marital status:      Spouse name: Not on file   • Number of children: 2   • Years of education: Not on file   • Highest education level: Not on file   Occupational History   • Not on file   Tobacco Use   • Smoking status: Never   • Smokeless tobacco: Never   • Tobacco comments:     None   Vaping Use   • Vaping Use: Never used   Substance and Sexual Activity   • Alcohol use: Yes     Comment: Social holidays   • Drug use: Never   • Sexual activity: Not Currently     Comment: [place] over   Other Topics Concern   • Not on file   Social History Narrative   • Not on file     Social Determinants of Health     Financial Resource Strain: Low Risk    • Difficulty of Paying Living Expenses: Not very hard   Food Insecurity: Not on file   Transportation Needs: No Transportation Needs   • Lack of Transportation (Medical): No   • Lack of Transportation (Non-Medical):  No   Physical Activity: Not on file   Stress: Not on file   Social Connections: Not on file   Intimate Partner Violence: Not on file   Housing Stability: Not on file       Current Outpatient Medications:   •  amLODIPine (NORVASC) 2 5 mg tablet, Take 2 tablets (5 mg total) by mouth daily, Disp: 180 tablet, Rfl: 1  •  anastrozole (ARIMIDEX) 1 mg tablet, Take 1 tablet (1 mg total) by mouth daily, Disp: 90 tablet, Rfl: 0  •  aspirin 325 mg tablet, Take 325 mg by mouth daily , Disp: , Rfl:   •  cholestyramine (QUESTRAN) 4 g packet, TAKE 1 PACKET (4 G TOTAL) BY MOUTH DAILY, Disp: 90 packet, Rfl: 1  •  diphenhydrAMINE (BENADRYL) 25 mg tablet, Take 25 mg by mouth in the morning   1/2 tablet with Paxil  , Disp: , Rfl:   •  fluticasone (FLONASE) 50 mcg/act nasal spray, INSTILL 1 SPRAY INTO EACH NOSTRIL AS NEEDED FOR RHINITIS OR ALLERGIES, Disp: 48 mL, Rfl: 1  •  levothyroxine 125 mcg tablet, TAKE 1 TABLET BY MOUTH EVERY DAY, Disp: 90 tablet, Rfl: 3  •  meclizine (ANTIVERT) 25 mg tablet, Take 1 tablet (25 mg total) by mouth every 8 (eight) hours as needed for dizziness, Disp: 50 tablet, Rfl: 0  •  memantine (NAMENDA) 10 mg tablet, Take 1 tablet (10 mg total) by mouth 2 (two) times a day, Disp: 180 tablet, Rfl: 1  •  Mirabegron ER (Myrbetriq) 50 MG TB24, Take 1 tablet (50 mg total) by mouth daily, Disp: 90 tablet, Rfl: 1  •  multivitamin (THERAGRAN) TABS, Take 1 tablet by mouth, Disp: , Rfl:   •  omeprazole (PriLOSEC) 20 mg delayed release capsule, Take 1 capsule (20 mg total) by mouth 2 (two) times a day, Disp: 180 capsule, Rfl: 1  •  ondansetron (ZOFRAN-ODT) 4 mg disintegrating tablet, Take 1 tablet (4 mg total) by mouth every 8 (eight) hours as needed for nausea, Disp: 15 tablet, Rfl: 0  •  PARoxetine (PAXIL) 20 mg tablet, Take 1 tablet (20 mg total) by mouth daily, Disp: 90 tablet, Rfl: 1  •  PROAIR  (90 Base) MCG/ACT inhaler, as needed , Disp: , Rfl:   •  rivastigmine (EXELON) 9 5 mg/24 hr TD 24 hr patch, Place 1 patch on the skin daily, Disp: 90 patch, Rfl: 1  •  cholecalciferol (VITAMIN D3) 1,000 units tablet, Take 1 tablet (1,000 Units total) by mouth daily, Disp: 30 tablet, Rfl: 6  •  furosemide (LASIX) 40 mg tablet, Take 1 tablet (40 mg total) by mouth daily for 2 days, Disp: 2 tablet, Rfl: 0  Allergies   Allergen Reactions   • Ciprofloxacin Hives   • Sulfa Antibiotics Itching   • Other    • Penicillins Rash     Category: Allergy;   --  Pt received unasyn 1 5 mg IV 11-12-18 to 11-15-18   • Sulfacetamide Rash     Category: Allergy; Physical Exam:     Vitals:    02/22/23 0924   BP: 144/82   Pulse: 88   Resp: 16   Temp: (!) 97 3 °F (36 3 °C)   SpO2: 95%     Physical Exam  Constitutional:       Appearance: Normal appearance  HENT:      Head: Normocephalic and atraumatic  Nose: Nose normal       Mouth/Throat:      Mouth: Mucous membranes are moist    Eyes:      Pupils: Pupils are equal, round, and reactive to light  Cardiovascular:      Rate and Rhythm: Normal rate  Pulses: Normal pulses  Heart sounds: Normal heart sounds  Pulmonary:      Effort: Pulmonary effort is normal       Breath sounds: Normal breath sounds  Chest:      Comments: The bilateral Breast(s) were examined  There was not any sign of an inverted nipple, mass, nipple discharge, skin changes or tenderness  The bilateral  breast(s) were examined in the sitting and supine position  There are not any worrisome skin changes, tenderness, nipple changes, swelling ,bleeding or evidence of mass/s in all four quadrants  Ani survey demonstrated that there is not any evidence of any clinically suspicious axillary, pectoral or supraclavicular lymph nodes  Abdominal:      General: Bowel sounds are normal       Palpations: Abdomen is soft  Musculoskeletal:         General: Normal range of motion  Cervical back: Normal range of motion and neck supple  Skin:     General: Skin is warm  Neurological:      General: No focal deficit present  Mental Status: She is alert and oriented to person, place, and time  Psychiatric:         Mood and Affect: Mood normal          Behavior: Behavior normal          Thought Content:  Thought content normal  Judgment: Judgment normal            Results & Discussion:   Mammogram:FINDINGS:   Bilateral  There are no suspicious masses, grouped microcalcifications or areas of unexplained architectural distortion  The skin and nipple areolar complex are unremarkable  There are postsurgical/posttreatment changes of the right breast   There are scattered calcifications present bilaterally      IMPRESSION:   No evidence of malignancy  Postsurgical/posttreatment changes of the right breast            ASSESSMENT/BI-RADS CATEGORY:  Left: 2 - Benign  Right: 2 - Benign  Overall: 2 - Benign     RECOMMENDATION:       - Diagnostic mammogram in 1 year for both breasts  I did review mammogram films personally and agree with the report this was discussed the patient with the patient  She is on room EDX she is tolerating well  She has no major side effects such as shortness of breath vaginal spotting or leg swelling I did discussed in detail nature of breast cancer follow-up  I will see her in 6 months she understands and  agrees   All patient questions were answered  Advance Care Planning/Advance Directives: Karlie Pablo MD discussed the disease status with Sumi Valdovinos  today 02/22/23  treatment plans and follow-up with the patient

## 2023-04-07 NOTE — PLAN OF CARE
Problem: DISCHARGE PLANNING - CARE MANAGEMENT  Goal: Discharge to post-acute care or home with appropriate resources  INTERVENTIONS:  - Conduct assessment to determine patient/family and health care team treatment goals, and need for post-acute services based on payer coverage, community resources, and patient preferences, and barriers to discharge  - Address psychosocial, clinical, and financial barriers to discharge as identified in assessment in conjunction with the patient/family and health care team  - Arrange appropriate level of post-acute services according to patients   needs and preference and payer coverage in collaboration with the physician and health care team  - Communicate with and update the patient/family, physician, and health care team regarding progress on the discharge plan  - Arrange appropriate transportation to post-acute venues  Outcome: Progressing  LOS: 1 CM met with pt at bedside  Pt lives in [facility] [place] [facility] with her  and daughter  [place]  Pt uses cane and completes ADL's independently  Pt denies hx of rehab and had [facility] years ago but cannot recall agency  Pt uses [facility]  Pt denies hx of MH and SA  Pt reports  and daughter are POA and she is unsure if she has AD  Pt reports she can drive but her  and daughter usually do instead  CM reviewed discharge planning process including the following: identifying help at home, patient preference for discharge planning needs, pharmacy preference, and availability of treatment team to discuss questions or concerns patient and/or family may have regarding understanding medications and recognizing signs and symptoms once discharged  CM also encouraged patient to follow up with all recommended appointments after discharge  Patient advised of importance for patient and family to participate in managing patients medical well being  CM name and role reviewed   Discharge Checklist reviewed and CM will continue to monitor for progress toward discharge goals in nursing and provider rounds  CM discussed HRR  Pt is open to late morning appointment with PCP, Dr Luis Felipe Simmons  Pt has no other needs at this time  CM department will follow pt through dc and schedule HRR  07-Apr-2023 14:07

## 2023-05-09 ENCOUNTER — TELEPHONE (OUTPATIENT)
Dept: HEMATOLOGY ONCOLOGY | Facility: CLINIC | Age: 78
End: 2023-05-09

## 2023-05-09 NOTE — TELEPHONE ENCOUNTER
Appointment Change  Cancel, Reschedule, Change to Virtual      Who are you speaking with? Patient   If it is not the patient, are they listed on an active communication consent form? N/A   Which provider is the appointment scheduled with? Dr Antoine Gaucher   When is the appointment scheduled? Please list date and time  09/07/23   At which location is the appointment scheduled to take place? [facility]   Was the appointment rescheduled or changed from an in person visit to a virtual visit? If so, please list the details of the change  10/18/23 3PM   What is the reason for the appointment change? Provider out of office   Was [facility] transport scheduled for this visit? No   Does [facility] transport need to be scheduled for the new visit (if applicable) N/A   Does the patient need an infusion appointment rescheduled? No   Does the patient have an infusion appointment scheduled? If so, when? No   Is the patient undergoing chemotherapy? No   Was the no-show policy reviewed for appointments being changed with less then 24 hours of notice?  N/A

## 2023-05-13 DIAGNOSIS — E03.9 HYPOTHYROIDISM, UNSPECIFIED TYPE: ICD-10-CM

## 2023-05-13 DIAGNOSIS — G30.1 LATE ONSET ALZHEIMER'S DISEASE WITHOUT BEHAVIORAL DISTURBANCE (HCC): ICD-10-CM

## 2023-05-13 DIAGNOSIS — R06.02 SOB (SHORTNESS OF BREATH) ON EXERTION: ICD-10-CM

## 2023-05-13 DIAGNOSIS — F02.80 LATE ONSET ALZHEIMER'S DISEASE WITHOUT BEHAVIORAL DISTURBANCE (HCC): ICD-10-CM

## 2023-05-13 DIAGNOSIS — R42 VERTIGO: ICD-10-CM

## 2023-05-13 DIAGNOSIS — K44.9 HIATAL HERNIA: ICD-10-CM

## 2023-05-13 DIAGNOSIS — F32.A DEPRESSION, UNSPECIFIED DEPRESSION TYPE: ICD-10-CM

## 2023-05-13 DIAGNOSIS — K21.9 GASTROESOPHAGEAL REFLUX DISEASE WITHOUT ESOPHAGITIS: ICD-10-CM

## 2023-05-13 DIAGNOSIS — N39.0 RECURRENT UTI: ICD-10-CM

## 2023-05-13 DIAGNOSIS — Z17.0 MALIGNANT NEOPLASM OF OVERLAPPING SITES OF RIGHT BREAST IN FEMALE, ESTROGEN RECEPTOR POSITIVE (HCC): ICD-10-CM

## 2023-05-13 DIAGNOSIS — I10 ESSENTIAL HYPERTENSION: ICD-10-CM

## 2023-05-13 DIAGNOSIS — C50.811 MALIGNANT NEOPLASM OF OVERLAPPING SITES OF RIGHT BREAST IN FEMALE, ESTROGEN RECEPTOR POSITIVE (HCC): ICD-10-CM

## 2023-05-13 DIAGNOSIS — K52.9 CHRONIC DIARRHEA: ICD-10-CM

## 2023-05-15 RX ORDER — RIVASTIGMINE 9.5 MG/24H
1 PATCH, EXTENDED RELEASE TRANSDERMAL DAILY
Qty: 90 PATCH | Refills: 1 | Status: SHIPPED | OUTPATIENT
Start: 2023-05-15

## 2023-05-15 RX ORDER — FUROSEMIDE 40 MG/1
40 TABLET ORAL DAILY
Qty: 2 TABLET | Refills: 1 | Status: SHIPPED | OUTPATIENT
Start: 2023-05-15 | End: 2023-05-17

## 2023-05-15 RX ORDER — ANASTROZOLE 1 MG/1
1 TABLET ORAL DAILY
Qty: 90 TABLET | Refills: 0 | Status: SHIPPED | OUTPATIENT
Start: 2023-05-15

## 2023-05-15 RX ORDER — MECLIZINE HYDROCHLORIDE 25 MG/1
25 TABLET ORAL EVERY 8 HOURS PRN
Qty: 50 TABLET | Refills: 1 | Status: SHIPPED | OUTPATIENT
Start: 2023-05-15

## 2023-05-15 RX ORDER — LEVOTHYROXINE SODIUM 0.12 MG/1
125 TABLET ORAL DAILY
Qty: 90 TABLET | Refills: 1 | Status: SHIPPED | OUTPATIENT
Start: 2023-05-15

## 2023-05-15 RX ORDER — PAROXETINE HYDROCHLORIDE 20 MG/1
20 TABLET, FILM COATED ORAL DAILY
Qty: 90 TABLET | Refills: 1 | Status: SHIPPED | OUTPATIENT
Start: 2023-05-15

## 2023-05-15 RX ORDER — CHOLESTYRAMINE 4 G/9G
1 POWDER, FOR SUSPENSION ORAL DAILY
Qty: 90 PACKET | Refills: 1 | Status: SHIPPED | OUTPATIENT
Start: 2023-05-15

## 2023-05-15 RX ORDER — MEMANTINE HYDROCHLORIDE 10 MG/1
10 TABLET ORAL 2 TIMES DAILY
Qty: 180 TABLET | Refills: 1 | Status: SHIPPED | OUTPATIENT
Start: 2023-05-15

## 2023-05-15 RX ORDER — OMEPRAZOLE 20 MG/1
20 CAPSULE, DELAYED RELEASE ORAL 2 TIMES DAILY
Qty: 180 CAPSULE | Refills: 1 | Status: SHIPPED | OUTPATIENT
Start: 2023-05-15

## 2023-05-15 RX ORDER — AMLODIPINE BESYLATE 2.5 MG/1
5 TABLET ORAL DAILY
Qty: 180 TABLET | Refills: 1 | Status: SHIPPED | OUTPATIENT
Start: 2023-05-15

## 2023-05-15 RX ORDER — MIRABEGRON 50 MG/1
1 TABLET, FILM COATED, EXTENDED RELEASE ORAL DAILY
Qty: 90 TABLET | Refills: 1 | Status: SHIPPED | OUTPATIENT
Start: 2023-05-15

## 2023-05-22 ENCOUNTER — TELEPHONE (OUTPATIENT)
Dept: NEUROLOGY | Facility: CLINIC | Age: 78
End: 2023-05-22

## 2023-05-22 NOTE — TELEPHONE ENCOUNTER
Hello,     Patient has called to re-schedule her appt with [facility] for now at 12:30 pm, Carlo, 05/22/2023    She stated she is not feeling well and declined VV     Re-scheduled to 10/27/2023 at 3 pm, Yudith Iglesias     Thank you,     Mee Morton

## 2023-05-31 ENCOUNTER — HOSPITAL ENCOUNTER (EMERGENCY)
Facility: HOSPITAL | Age: 78
Discharge: HOME | End: 2023-06-01
Attending: EMERGENCY MEDICINE

## 2023-05-31 ENCOUNTER — APPOINTMENT (EMERGENCY)
Dept: CT IMAGING | Facility: HOSPITAL | Age: 78
End: 2023-05-31

## 2023-05-31 VITALS
HEART RATE: 89 BPM | RESPIRATION RATE: 18 BRPM | SYSTOLIC BLOOD PRESSURE: 133 MMHG | DIASTOLIC BLOOD PRESSURE: 66 MMHG | OXYGEN SATURATION: 95 % | BODY MASS INDEX: 29.63 KG/M2 | WEIGHT: 162 LBS | TEMPERATURE: 98.4 F

## 2023-05-31 DIAGNOSIS — R11.2 NAUSEA VOMITING AND DIARRHEA: ICD-10-CM

## 2023-05-31 DIAGNOSIS — E83.42 HYPOMAGNESEMIA: ICD-10-CM

## 2023-05-31 DIAGNOSIS — K52.9 COLITIS: Primary | ICD-10-CM

## 2023-05-31 DIAGNOSIS — E87.6 HYPOKALEMIA: ICD-10-CM

## 2023-05-31 DIAGNOSIS — R19.7 NAUSEA VOMITING AND DIARRHEA: ICD-10-CM

## 2023-05-31 LAB
ALBUMIN SERPL BCP-MCNC: 3.7 G/DL (ref 3.5–5)
ALP SERPL-CCNC: 60 U/L (ref 34–104)
ALT SERPL W P-5'-P-CCNC: 14 U/L (ref 7–52)
ANION GAP SERPL CALCULATED.3IONS-SCNC: 9 MMOL/L (ref 4–13)
AST SERPL W P-5'-P-CCNC: 20 U/L (ref 13–39)
BASOPHILS # BLD AUTO: 0.05 THOUSANDS/ÂΜL (ref 0–0.1)
BASOPHILS NFR BLD AUTO: 1 % (ref 0–1)
BILIRUB SERPL-MCNC: 0.28 MG/DL (ref 0.2–1)
BUN SERPL-MCNC: 15 MG/DL (ref 5–25)
CALCIUM SERPL-MCNC: 8.9 MG/DL (ref 8.4–10.2)
CHLORIDE SERPL-SCNC: 105 MMOL/L (ref 96–108)
CO2 SERPL-SCNC: 24 MMOL/L (ref 21–32)
CREAT SERPL-MCNC: 0.78 MG/DL (ref 0.6–1.3)
EOSINOPHIL # BLD AUTO: 0.18 THOUSAND/ÂΜL (ref 0–0.61)
EOSINOPHIL NFR BLD AUTO: 3 % (ref 0–6)
ERYTHROCYTE [DISTWIDTH] IN BLOOD BY AUTOMATED COUNT: 12.5 % (ref 11.6–15.1)
GFR SERPL CREATININE-BSD FRML MDRD: 73 ML/MIN/1.73SQ M
GLUCOSE SERPL-MCNC: 105 MG/DL (ref 65–140)
HCT VFR BLD AUTO: 34.5 % (ref 34.8–46.1)
HGB BLD-MCNC: 11.9 G/DL (ref 11.5–15.4)
IMM GRANULOCYTES # BLD AUTO: >0.5 THOUSAND/UL (ref 0–0.2)
IMM GRANULOCYTES NFR BLD AUTO: 11 % (ref 0–2)
LACTATE SERPL-SCNC: 0.9 MMOL/L (ref 0.5–2)
LIPASE SERPL-CCNC: 15 U/L (ref 11–82)
LYMPHOCYTES # BLD AUTO: 0.9 THOUSANDS/ÂΜL (ref 0.6–4.47)
LYMPHOCYTES NFR BLD AUTO: 16 % (ref 14–44)
MAGNESIUM SERPL-MCNC: 1.8 MG/DL (ref 1.9–2.7)
MCH RBC QN AUTO: 37.5 PG (ref 26.8–34.3)
MCHC RBC AUTO-ENTMCNC: 34.5 G/DL (ref 31.4–37.4)
MCV RBC AUTO: 109 FL (ref 82–98)
MONOCYTES # BLD AUTO: 0.64 THOUSAND/ÂΜL (ref 0.17–1.22)
MONOCYTES NFR BLD AUTO: 11 % (ref 4–12)
NEUTROPHILS # BLD AUTO: 3.33 THOUSANDS/ÂΜL (ref 1.85–7.62)
NEUTS SEG NFR BLD AUTO: 58 % (ref 43–75)
NRBC BLD AUTO-RTO: 0 /100 WBCS
PLATELET # BLD AUTO: 213 THOUSANDS/UL (ref 149–390)
PMV BLD AUTO: 11.3 FL (ref 8.9–12.7)
POTASSIUM SERPL-SCNC: 2.9 MMOL/L (ref 3.5–5.3)
PROT SERPL-MCNC: 6.6 G/DL (ref 6.4–8.4)
RBC # BLD AUTO: 3.17 MILLION/UL (ref 3.81–5.12)
SODIUM SERPL-SCNC: 138 MMOL/L (ref 135–147)
WBC # BLD AUTO: 5.73 THOUSAND/UL (ref 4.31–10.16)

## 2023-05-31 RX ORDER — POTASSIUM CHLORIDE 20 MEQ/1
40 TABLET, EXTENDED RELEASE ORAL ONCE
Status: COMPLETED | OUTPATIENT
Start: 2023-05-31 | End: 2023-05-31

## 2023-05-31 RX ORDER — POTASSIUM CHLORIDE 14.9 MG/ML
20 INJECTION INTRAVENOUS ONCE
Status: COMPLETED | OUTPATIENT
Start: 2023-05-31 | End: 2023-06-01

## 2023-05-31 RX ORDER — MAGNESIUM SULFATE HEPTAHYDRATE 40 MG/ML
2 INJECTION, SOLUTION INTRAVENOUS ONCE
Status: COMPLETED | OUTPATIENT
Start: 2023-06-01 | End: 2023-06-01

## 2023-05-31 RX ORDER — DICYCLOMINE HCL 20 MG
20 TABLET ORAL ONCE
Status: COMPLETED | OUTPATIENT
Start: 2023-05-31 | End: 2023-05-31

## 2023-05-31 RX ADMIN — POTASSIUM CHLORIDE 20 MEQ: 14.9 INJECTION, SOLUTION INTRAVENOUS at 23:31

## 2023-05-31 RX ADMIN — DICYCLOMINE HYDROCHLORIDE 20 MG: 20 TABLET ORAL at 23:04

## 2023-05-31 RX ADMIN — POTASSIUM CHLORIDE 40 MEQ: 1500 TABLET, EXTENDED RELEASE ORAL at 23:31

## 2023-05-31 RX ADMIN — SODIUM CHLORIDE 1000 ML: 0.9 INJECTION, SOLUTION INTRAVENOUS at 22:54

## 2023-06-01 ENCOUNTER — RA CDI HCC (OUTPATIENT)
Dept: OTHER | Facility: HOSPITAL | Age: 78
End: 2023-06-01

## 2023-06-01 LAB
2HR DELTA HS TROPONIN: 0 NG/L
ATRIAL RATE: 77 BPM
CARDIAC TROPONIN I PNL SERPL HS: 4 NG/L
CARDIAC TROPONIN I PNL SERPL HS: 4 NG/L
P AXIS: 43 DEGREES
PR INTERVAL: 152 MS
QRS AXIS: 16 DEGREES
QRSD INTERVAL: 90 MS
QT INTERVAL: 394 MS
QTC INTERVAL: 445 MS
T WAVE AXIS: 15 DEGREES
VENTRICULAR RATE: 77 BPM

## 2023-06-01 RX ORDER — AMOXICILLIN AND CLAVULANATE POTASSIUM 875; 125 MG/1; MG/1
1 TABLET, FILM COATED ORAL EVERY 12 HOURS
Qty: 14 TABLET | Refills: 0 | Status: SHIPPED | OUTPATIENT
Start: 2023-06-01 | End: 2023-06-08

## 2023-06-01 RX ORDER — LOPERAMIDE HYDROCHLORIDE 2 MG/1
2 CAPSULE ORAL 4 TIMES DAILY PRN
Qty: 12 CAPSULE | Refills: 0 | Status: SHIPPED | OUTPATIENT
Start: 2023-06-01

## 2023-06-01 RX ORDER — AMOXICILLIN AND CLAVULANATE POTASSIUM 875; 125 MG/1; MG/1
1 TABLET, FILM COATED ORAL ONCE
Status: COMPLETED | OUTPATIENT
Start: 2023-06-01 | End: 2023-06-01

## 2023-06-01 RX ORDER — ONDANSETRON 4 MG/1
4 TABLET, ORALLY DISINTEGRATING ORAL EVERY 8 HOURS PRN
Qty: 20 TABLET | Refills: 0 | Status: SHIPPED | OUTPATIENT
Start: 2023-06-01

## 2023-06-01 RX ORDER — POTASSIUM CHLORIDE 750 MG/1
10 TABLET, EXTENDED RELEASE ORAL DAILY
Qty: 10 TABLET | Refills: 0 | Status: SHIPPED | OUTPATIENT
Start: 2023-06-01 | End: 2023-06-11

## 2023-06-01 RX ORDER — DICYCLOMINE HCL 20 MG
20 TABLET ORAL 2 TIMES DAILY
Qty: 20 TABLET | Refills: 0 | Status: SHIPPED | OUTPATIENT
Start: 2023-06-01

## 2023-06-01 RX ADMIN — AMOXICILLIN AND CLAVULANATE POTASSIUM 1 TABLET: 875; 125 TABLET, FILM COATED ORAL at 04:01

## 2023-06-01 RX ADMIN — IOHEXOL 100 ML: 350 INJECTION, SOLUTION INTRAVENOUS at 00:01

## 2023-06-01 RX ADMIN — MAGNESIUM SULFATE HEPTAHYDRATE 2 G: 40 INJECTION, SOLUTION INTRAVENOUS at 03:19

## 2023-06-01 NOTE — PROGRESS NOTES
[facility]  coding opportunities       Chart reviewed, no opportunity found:   [address]        Patients Insurance     Medicare Insurance: [employer]

## 2023-06-01 NOTE — ED PROVIDER NOTES
History  Chief Complaint   Patient presents with   • Diarrhea     No relief w/ imodium, mild abdominal pain w/o n/v       65 y o  F presents w intermittent N/V, but + diarrhea x 8 days  Abdominal cramping without focal tenderness  No bloody / melana stool  No F/C  No known sick contacts  No hx of similar in the past  No travel  Prior to Admission Medications   Prescriptions Last Dose Informant Patient Reported? Taking? Mirabegron ER (Myrbetriq) 50 MG TB24   No No   Sig: Take 1 tablet (50 mg total) by mouth daily   PARoxetine (PAXIL) 20 mg tablet   No No   Sig: Take 1 tablet (20 mg total) by mouth daily   PROAIR  (90 Base) MCG/ACT inhaler  Self Yes No   Sig: as needed    amLODIPine (NORVASC) 2 5 mg tablet   No No   Sig: Take 2 tablets (5 mg total) by mouth daily   anastrozole (ARIMIDEX) 1 mg tablet   No No   Sig: Take 1 tablet (1 mg total) by mouth daily   aspirin 325 mg tablet  Self Yes No   Sig: Take 325 mg by mouth daily    cholecalciferol (VITAMIN D3) 1,000 units tablet  Self No No   Sig: Take 1 tablet (1,000 Units total) by mouth daily   cholestyramine (QUESTRAN) 4 g packet   No No   Sig: Take 1 packet (4 g total) by mouth daily   diphenhydrAMINE (BENADRYL) 25 mg tablet  Self Yes No   Sig: Take 25 mg by mouth in the morning  1/2 tablet with Paxil      fluticasone (FLONASE) 50 mcg/act nasal spray  Self No No   Sig: INSTILL 1 SPRAY INTO EACH NOSTRIL AS NEEDED FOR RHINITIS OR ALLERGIES   furosemide (LASIX) 40 mg tablet   No No   Sig: Take 1 tablet (40 mg total) by mouth daily for 2 days   levothyroxine 125 mcg tablet   No No   Sig: Take 1 tablet (125 mcg total) by mouth daily   meclizine (ANTIVERT) 25 mg tablet   No No   Sig: Take 1 tablet (25 mg total) by mouth every 8 (eight) hours as needed for dizziness   memantine (NAMENDA) 10 mg tablet   No No   Sig: Take 1 tablet (10 mg total) by mouth 2 (two) times a day   multivitamin (THERAGRAN) TABS  Self Yes No   Sig: Take 1 tablet by mouth omeprazole (PriLOSEC) 20 mg delayed release capsule   No No   Sig: Take 1 capsule (20 mg total) by mouth 2 (two) times a day   ondansetron (ZOFRAN-ODT) 4 mg disintegrating tablet  Self No No   Sig: Take 1 tablet (4 mg total) by mouth every 8 (eight) hours as needed for nausea   rivastigmine (EXELON) 9 5 mg/24 hr TD 24 hr patch   No No   Sig: Place 1 patch on the skin over 24 hours daily      Facility-Administered Medications: None       Past Medical History:   Diagnosis Date   • Anxiety disorder    • Aortic valve disorder    • BRCA1 negative    • BRCA2 negative    • Breast cancer ([facility] ) 03/2022    right   • Cancer ([place] [address] )     breast   • Cellulitis of finger of left hand 11/20/2018   • Cellulitis of left hand 11/10/2018    Added automatically from request for surgery [id number]   • Depression    • Disease of thyroid gland    • GERD (gastroesophageal reflux disease)    • History of gastroesophageal reflux (GERD)    • History of kidney cancer 03/06/2014   • History of transfusion    • Hyperlipidemia 03/06/2014   • Hypertension 03/06/2014   • Hypothyroidism 03/06/2014   • Malignant neoplasm of overlapping sites of right breast in female, estrogen receptor positive ([place] [address] ) 03/10/2022   • Shortness of breath    • Stroke ([place] [address] ) [id number]    Min stroke found wile going through chemo and radiation   • TIA (transient ischemic attack)    • UTI (urinary tract infection)        Past Surgical History:   Procedure Laterality Date   • BREAST LUMPECTOMY Right 3/29/2022    Procedure: ITZEL  DIRECTED LUMPECTOMY;  Surgeon: Moni Smith MD;  Location: [facility];  Service: Surgical Oncology   • ESOPHAGOGASTRIC FUNDOPLASTY      Nissen Fundoplication   • HERNIA REPAIR     • LYMPH NODE BIOPSY Right 3/29/2022    Procedure: LYMPHATIC MAPPING WITH BLUE AND RADIOACTIVE DYES SENTINEL LYMPH NODE BIOPSY, INJECTION IN OR AT 0800 BY DR Herbert De Leon;  Surgeon: Moni Smith MD;  Location: [facility];  Service: Surgical Oncology   • MAMMO STEREOTACTIC BREAST BIOPSY RIGHT (ALL INC) Right 3/7/2022   • US BREAST ITZEL  NEEDLE LOC RIGHT Right 3/24/2022   • US GUIDED BREAST BIOPSY RIGHT COMPLETE Right 3/7/2022   • WRIST SURGERY Left        Family History   Problem Relation Age of Onset   • Stroke Father    • Leukemia Sister    • No Known Problems Brother    • Skin cancer Mother    • Stroke Maternal Grandmother    • No Known Problems Maternal Grandfather    • No Known Problems Paternal Grandmother    • No Known Problems Paternal Grandfather    • Breast cancer Sister    • Breast cancer Sister         Colon cancer   • Kidney cancer Brother    • Alcohol abuse Brother    • Kidney cancer Brother    • Stomach cancer Brother    • No Known Problems Daughter    • No Known Problems Daughter      I have reviewed and agree with the history as documented  E-Cigarette/Vaping   • E-Cigarette Use Never User      E-Cigarette/Vaping Substances   • Nicotine No    • THC No    • CBD No    • Flavoring No    • Other No    • Unknown No      Social History     Tobacco Use   • Smoking status: Never   • Smokeless tobacco: Never   • Tobacco comments:     None   Vaping Use   • Vaping Use: Never used   Substance Use Topics   • Alcohol use: Yes     Comment: Social holidays   • Drug use: Never       Review of Systems   Constitutional: Positive for appetite change and fatigue  Negative for chills and fever  HENT: Negative for congestion and rhinorrhea  Respiratory: Negative for chest tightness and shortness of breath  Cardiovascular: Negative for chest pain and leg swelling  Gastrointestinal: Positive for diarrhea, nausea and vomiting  Negative for abdominal pain, blood in stool and constipation  Abd cramping without acute focal pain   Genitourinary: Negative for dysuria and flank pain  Musculoskeletal: Negative for back pain and neck pain  Skin: Negative for wound  Neurological: Negative for dizziness and headaches         Physical Exam  Physical Exam  Vitals reviewed  Constitutional:       General: She is not in acute distress  Appearance: She is well-developed  She is not ill-appearing, toxic-appearing or diaphoretic  HENT:      Head: Normocephalic and atraumatic  Eyes:      General: No scleral icterus  Right eye: No discharge  Left eye: No discharge  Conjunctiva/sclera: Conjunctivae normal       Pupils: Pupils are equal, round, and reactive to light  Neck:      Vascular: No JVD  Cardiovascular:      Rate and Rhythm: Normal rate and regular rhythm  Heart sounds: Normal heart sounds  No murmur heard  No friction rub  No gallop  Pulmonary:      Effort: Pulmonary effort is normal  No respiratory distress  Breath sounds: Normal breath sounds  No wheezing or rales  Chest:      Chest wall: No tenderness  Abdominal:      General: Bowel sounds are normal  There is no distension  Palpations: Abdomen is soft  Tenderness: There is abdominal tenderness  There is no right CVA tenderness, left CVA tenderness, guarding or rebound  Hernia: No hernia is present  Musculoskeletal:         General: No tenderness or deformity  Normal range of motion  Cervical back: Normal range of motion and neck supple  Right lower leg: No edema  Left lower leg: No edema  Skin:     General: Skin is warm and dry  Coloration: Skin is not pale  Findings: No erythema or rash  Neurological:      Mental Status: She is alert and oriented to person, place, and time  Cranial Nerves: No cranial nerve deficit     Psychiatric:         Behavior: Behavior normal          Vital Signs  ED Triage Vitals   Temperature Pulse Respirations Blood Pressure SpO2   05/31/23 2153 05/31/23 2152 05/31/23 2152 05/31/23 2152 05/31/23 2152   98 4 °F (36 9 °C) 89 18 133/66 95 %      Temp Source Heart Rate Source Patient Position - Orthostatic VS BP Location FiO2 (%)   05/31/23 2153 05/31/23 2152 05/31/23 2152 05/31/23 2152 --   Oral Monitor Sitting Left arm       Pain Score       05/31/23 2152       4           Vitals:    05/31/23 2152   BP: 133/66   Pulse: 89   Patient Position - Orthostatic VS: Sitting         Visual Acuity      ED Medications  Medications   sodium chloride 0 9 % bolus 1,000 mL (0 mL Intravenous Stopped 6/1/23 0238)   dicyclomine (BENTYL) tablet 20 mg (20 mg Oral Given 5/31/23 2304)   potassium chloride (K-DUR,KLOR-CON) CR tablet 40 mEq (40 mEq Oral Given 5/31/23 2331)   potassium chloride 20 mEq IVPB (premix) (0 mEq Intravenous Stopped 6/1/23 0315)   magnesium sulfate 2 g/50 mL IVPB (premix) 2 g (0 g Intravenous Stopped 6/1/23 0344)   iohexol (OMNIPAQUE) 350 MG/ML injection (SINGLE-DOSE) 100 mL (100 mL Intravenous Given 6/1/23 0001)   amoxicillin-clavulanate (AUGMENTIN) 875-125 mg per tablet 1 tablet (1 tablet Oral Given 6/1/23 0401)       Diagnostic Studies  Results Reviewed     Procedure Component Value Units Date/Time    HS Troponin I 2hr [[id number]]  (Normal) Collected: 06/01/23 0145    Lab Status: Final result Specimen: Blood Updated: 06/01/23 0210     hs TnI 2hr 4 ng/L      Delta 2hr hsTnI 0 ng/L     HS Troponin 0hr (reflex protocol) [[id number]]  (Normal) Collected: 05/31/23 2349    Lab Status: Final result Specimen: Blood from Arm, Left Updated: 06/01/23 0018     hs TnI 0hr 4 ng/L     Magnesium [[id number]]  (Abnormal) Collected: 05/31/23 2254    Lab Status: Final result Specimen: Blood from Arm, Left Updated: 05/31/23 2345     Magnesium 1 8 mg/dL     CBC and differential [[id number]]  (Abnormal) Collected: 05/31/23 2254    Lab Status: Final result Specimen: Blood from Arm, Left Updated: 05/31/23 2339     WBC 5 73 Thousand/uL      RBC 3 17 Million/uL      Hemoglobin 11 9 g/dL      Hematocrit 34 5 %       fL      MCH 37 5 pg      MCHC 34 5 g/dL      RDW 12 5 %      MPV 11 3 fL      Platelets 072 Thousands/uL      nRBC 0 /100 WBCs      Neutrophils Relative 58 %      Immat GRANS % 11 % Lymphocytes Relative 16 %      Monocytes Relative 11 %      Eosinophils Relative 3 %      Basophils Relative 1 %      Neutrophils Absolute 3 33 Thousands/µL      Immature Grans Absolute >0 50 Thousand/uL      Lymphocytes Absolute 0 90 Thousands/µL      Monocytes Absolute 0 64 Thousand/µL      Eosinophils Absolute 0 18 Thousand/µL      Basophils Absolute 0 05 Thousands/µL     Narrative: This is an appended report  These results have been appended to a previously verified report  Lactic acid, plasma (w/reflex if result > 2 0) [[id number]]  (Normal) Collected: 05/31/23 2254    Lab Status: Final result Specimen: Blood from Arm, Left Updated: 05/31/23 2321     LACTIC ACID 0 9 mmol/L     Narrative:      Result may be elevated if tourniquet was used during collection      Comprehensive metabolic panel [[id number]]  (Abnormal) Collected: 05/31/23 2254    Lab Status: Final result Specimen: Blood from Arm, Left Updated: 05/31/23 2321     Sodium 138 mmol/L      Potassium 2 9 mmol/L      Chloride 105 mmol/L      CO2 24 mmol/L      ANION GAP 9 mmol/L      BUN 15 mg/dL      Creatinine 0 78 mg/dL      Glucose 105 mg/dL      Calcium 8 9 mg/dL      AST 20 U/L      ALT 14 U/L      Alkaline Phosphatase 60 U/L      Total Protein 6 6 g/dL      Albumin 3 7 g/dL      Total Bilirubin 0 28 mg/dL      eGFR 73 ml/min/1 73sq m     Narrative:      [place] guidelines for Chronic Kidney Disease (CKD):   •  Stage 1 with normal or high GFR (GFR > 90 mL/min/1 73 square meters)  •  Stage 2 Mild CKD (GFR = 60-89 mL/min/1 73 square meters)  •  Stage 3A Moderate CKD (GFR = 45-59 mL/min/1 73 square meters)  •  Stage 3B Moderate CKD (GFR = 30-44 mL/min/1 73 square meters)  •  Stage 4 Severe CKD (GFR = 15-29 mL/min/1 73 square meters)  •  Stage 5 End Stage CKD (GFR <15 mL/min/1 73 square meters)  Note: GFR calculation is accurate only with a steady state creatinine    Lipase [[id number]]  (Normal) Collected: 05/31/23 2254 Lab Status: Final result Specimen: Blood from Arm, Left Updated: 05/31/23 2321     Lipase 15 u/L                  CT abdomen pelvis with contrast   ED Interpretation by Chip Roberts DO (06/01 0116)   Short segment wall thickening of the distal transverse colon  This could be secondary to contraction or focal colitis, however, recommend colonoscopy on an outpatient basis if not recently performed to exclude underlying mass      Large hiatal hernia  Final Result by Maria Teresa Cunningham DO (06/01 0040)      Short segment wall thickening of the distal transverse colon  This could be secondary to contraction or focal colitis, however, recommend colonoscopy on an outpatient basis if not recently performed to exclude underlying mass  Large hiatal hernia  The study was marked in [facility] for immediate notification  Workstation performed: [id number]                    Procedures  Procedures         ED Course  ED Course as of 06/02/23 2124   Wed May 31, 2023   2323 Potassium(!): 2 9   2347 Magnesium(!): 1 8                                             Medical Decision Making  Diarrhea x 8 days  CBC to eval infection / anemia  CMP to eval for lyte abnormality  Found to have hypokalemia and hypomag  Repeleted  CT scan to r/o Diverticulitis, SBO, other intraabd pathology  Colitis  Treated  Advised f/u GI for colonoscopy to ensure improvement  Discussed with patient and they understood the risks and benefits of discharge  Patient had opportunity to ask questions regarding care and discharge instructions and had no further questions  Advised follow up with PCP, advised returning if worsening, and discussed disease specific return precautions  Patient understood discharge instructions  Amount and/or Complexity of Data Reviewed  Labs: ordered  Decision-making details documented in ED Course  Radiology: ordered  Risk  Prescription drug management            Disposition  Final diagnoses: Colitis   Nausea vomiting and diarrhea   Hypomagnesemia   Hypokalemia     Time reflects when diagnosis was documented in both MDM as applicable and the Disposition within this note     Time User Action Codes Description Comment    6/1/2023  3:38 AM Coppersmith, Luciana Yaron Add [K52 9] Colitis     6/1/2023  3:38 AM Coppersmith, Luciana Yaron Add [R11 2,  R19 7] Nausea vomiting and diarrhea     6/1/2023  3:38 AM Coppersmith, Ronita Solid L Add [E83 42] Hypomagnesemia     6/1/2023  3:38 AM Coppersmith, Luciana Yaron Add [E87 6] Hypokalemia       ED Disposition     ED Disposition   Discharge    Condition   Stable    Date/Time   Thu Jun 1, 2023  3:38 AM    Comment   Hannah Bates discharge to home/self care                 Follow-up Information     Follow up With Specialties Details Why Contact Info Additional Information    Kushal Gary MD Internal Medicine Schedule an appointment as soon as possible for a visit  For follow up to ensure improvement, and for further testing and treatment as needed [address]  [facility] [phone]       Nicole [facility] Gastroenterology Schedule an appointment as soon as possible for a visit  for follow up colonoscopy after colitis [address]  [address] [address]  Parth Amaya [facility] [address]  [address], [address] [facility] Emergency Medicine  If symptoms worsen [address] Emergency Department, [address] [place], [place], [address]          Discharge Medication List as of 6/1/2023  3:42 AM      START taking these medications    Details   amoxicillin-clavulanate (AUGMENTIN) 875-125 mg per tablet Take 1 tablet by mouth every 12 (twelve) hours for 7 days, Starting Thu 6/1/2023, Until Thu 6/8/2023, Normal      dicyclomine (BENTYL) 20 mg tablet Take 1 tablet (20 mg total) by mouth 2 (two) times a day, Starting u 6/1/2023, Normal      loperamide (IMODIUM) 2 mg capsule Take 1 capsule (2 mg total) by mouth 4 (four) times a day as needed for diarrhea, Starting Thu 6/1/2023, Normal      !! ondansetron (ZOFRAN-ODT) 4 mg disintegrating tablet Take 1 tablet (4 mg total) by mouth every 8 (eight) hours as needed for nausea or vomiting, Starting u 6/1/2023, Normal      potassium chloride (K-DUR,KLOR-CON) 10 mEq tablet Take 1 tablet (10 mEq total) by mouth daily for 10 days, Starting u 6/1/2023, Until Sun 6/11/2023, Normal       !! - Potential duplicate medications found  Please discuss with provider  CONTINUE these medications which have NOT CHANGED    Details   amLODIPine (NORVASC) 2 5 mg tablet Take 2 tablets (5 mg total) by mouth daily, Starting Mon 5/15/2023, Normal      anastrozole (ARIMIDEX) 1 mg tablet Take 1 tablet (1 mg total) by mouth daily, Starting Mon 5/15/2023, Normal      aspirin 325 mg tablet Take 325 mg by mouth daily , Starting Mon 3/19/2018, Historical Med      cholecalciferol (VITAMIN D3) 1,000 units tablet Take 1 tablet (1,000 Units total) by mouth daily, Starting Fri 4/22/2022, Until u 4/20/2023, Normal      cholestyramine (QUESTRAN) 4 g packet Take 1 packet (4 g total) by mouth daily, Starting Mon 5/15/2023, Normal      diphenhydrAMINE (BENADRYL) 25 mg tablet Take 25 mg by mouth in the morning   1/2 tablet with Paxil  , Historical Med      fluticasone (FLONASE) 50 mcg/act nasal spray INSTILL 1 SPRAY INTO EACH NOSTRIL AS NEEDED FOR RHINITIS OR ALLERGIES, Normal      furosemide (LASIX) 40 mg tablet Take 1 tablet (40 mg total) by mouth daily for 2 days, Starting Mon 5/15/2023, Until Wed 5/17/2023, Normal      levothyroxine 125 mcg tablet Take 1 tablet (125 mcg total) by mouth daily, Starting Mon 5/15/2023, Normal      meclizine (ANTIVERT) 25 mg tablet Take 1 tablet (25 mg total) by mouth every 8 (eight) hours as needed for dizziness, Starting Mon 5/15/2023, Normal      memantine (NAMENDA) 10 mg tablet Take 1 tablet (10 mg total) by mouth 2 (two) times a day, Starting Mon 5/15/2023, Normal      Mirabegron ER (Myrbetriq) 50 MG TB24 Take 1 tablet (50 mg total) by mouth daily, Starting Mon 5/15/2023, Normal      multivitamin (THERAGRAN) TABS Take 1 tablet by mouth, Historical Med      omeprazole (PriLOSEC) 20 mg delayed release capsule Take 1 capsule (20 mg total) by mouth 2 (two) times a day, Starting Mon 5/15/2023, Normal      !! ondansetron (ZOFRAN-ODT) 4 mg disintegrating tablet Take 1 tablet (4 mg total) by mouth every 8 (eight) hours as needed for nausea, Starting Thu 11/17/2022, Print      PARoxetine (PAXIL) 20 mg tablet Take 1 tablet (20 mg total) by mouth daily, Starting Mon 5/15/2023, Normal      PROAIR  (90 Base) MCG/ACT inhaler as needed , Starting Wed 8/7/2019, Historical Med      rivastigmine (EXELON) 9 5 mg/24 hr TD 24 hr patch Place 1 patch on the skin over 24 hours daily, Starting Mon 5/15/2023, Normal       !! - Potential duplicate medications found  Please discuss with provider  No discharge procedures on file      PDMP Review       Value Time User    PDMP Reviewed  Yes 3/14/2022  1:24 PM Vaughn Mcginnis MD          ED Provider  Electronically Signed by           Casie Guerrero DO  06/02/23 1285

## 2023-06-02 ENCOUNTER — VBI (OUTPATIENT)
Dept: ADMINISTRATIVE | Facility: OTHER | Age: 78
End: 2023-06-02

## 2023-06-02 DIAGNOSIS — Z71.89 COMPLEX CARE COORDINATION: Primary | ICD-10-CM

## 2023-06-02 NOTE — TELEPHONE ENCOUNTER
Mary Felicia    ED Visit Information     Ed visit date: 5/31/2023  Diagnosis Description: Colitis  In Network? Yes [address]  Discharge status: Home  Discharged with meds ? Yes  Number of ED visits to date: 1  ED Severity:3     Outreach Information    Outreach successful: No 3  Date letter [facility]  Date [address] Coordination    Follow up appointment with pcp: yes 6-8-23  Transportation issues ?  NA    [facility] Outreach    Outreach type: 7 Day Outreach  Care Coordination Status: Referred to [facility] PCP: Yes  06/02/2023 08:44 AM EDT by Mimi Castillo 06/02/2023 - Left MessageCommunicated - Att x1    06/05/2023 09:39 AM EDT by Whitley Fierro 06/05/2023 09:39 AM EDT by Whitley Olivier (Self) [phone] (ZWRB)[phone] (Home)  [phone] (Home) Remove  - Left MessageCommunicated - Attempt 2    06/06/2023 09:39 AM EDT by Whitley Fierro 06/06/2023 09:39 AM EDT by Whitley Olivier (Self) [phone] (MAWM)[phone] (Home)  [phone] (Home) Remove  - Left MessageCommunicated - Att x3

## 2023-06-02 NOTE — LETTER
[facility]  [address] [facility] [place] [address]    Date: 06/06/23  [address] [address]    Dear Rosey Dang: Thank you for choosing [facility] emergency department for care  Your primary care provider wants to make sure that your ongoing medical care is being addressed  If you require follow up care as a result of your emergency department visit, there are a few things the practice would like you to know  As part of the network's continuing commitment to caring for our patients, we have added more same day appointments and have extended office hours to meet your medical needs  After hours, on-call physicians are available via your primary care provider's main office line  We encourage you to contact our office prior to seeking treatment to discuss your symptoms with the medical staff  Together, we can determine the correct course of action  A majority of non-emergent conditions such as: common cold, flu-like symptoms, fevers, strains/sprains, dislocations, minor burns, cuts and animal bites can be treated at [facility] facilities  Diagnostic testing is available at some sites  Of course, if you are experiencing a life threatening medical emergency call 911 or proceed directly to the nearest emergency room  Your nearest [facility] facility is conveniently located at:    [address] [facility] [address] [place] [address] offered at most [facility]  [employer] your spot online at [email or web address] or on the [facility]    Sincerely,    [facility]  No information on file

## 2023-06-06 ENCOUNTER — PATIENT OUTREACH (OUTPATIENT)
Dept: INTERNAL MEDICINE CLINIC | Facility: CLINIC | Age: 78
End: 2023-06-06

## 2023-06-06 NOTE — PROGRESS NOTES
"Complex Care Management Note:  Inbasket notification for complex outpatient care management received from  with message [facility] Medicare patient w/ ED risk score 61+\"  Chart review completed  ED visits and Hospitalizations:   Patient recently in the ED at [facility] on 5/31/23 for evaluation diarrhea and abd pain X 8 days  Patient was diagnosed with colitis and was instructed to follow up with PCP, and GI  There have been no other ED visits or admissions in the past 6 months  PMH Includes: late onset alzheimer's disease, CKD stage 3, depression, hx of non Hodgkin's lymphoma, hx of breast ca diagnosed in march 2022  Patient is s/p lumpectomy  Refer to problem list for complete list of medical diagnosis  Admission or ED risk score is 66  Future Appointments are:    06/08/23 with PCP  06/14/23 with Desiree Trenton  08/04/23 with Rad Onc  08/09/23 with Hem/Onc    Case meets screening criteria for complex care management  I plan to attempt first outreach within the next 7 days     "

## 2023-06-08 ENCOUNTER — OFFICE VISIT (OUTPATIENT)
Dept: INTERNAL MEDICINE CLINIC | Facility: CLINIC | Age: 78
End: 2023-06-08
Payer: COMMERCIAL

## 2023-06-08 VITALS
BODY MASS INDEX: 30.47 KG/M2 | OXYGEN SATURATION: 95 % | HEART RATE: 87 BPM | HEIGHT: 62 IN | SYSTOLIC BLOOD PRESSURE: 128 MMHG | DIASTOLIC BLOOD PRESSURE: 82 MMHG | TEMPERATURE: 97.9 F | WEIGHT: 165.6 LBS | RESPIRATION RATE: 16 BRPM

## 2023-06-08 DIAGNOSIS — K52.9 COLITIS: Primary | ICD-10-CM

## 2023-06-08 DIAGNOSIS — E87.6 HYPOKALEMIA: ICD-10-CM

## 2023-06-08 PROCEDURE — 99214 OFFICE O/P EST MOD 30 MIN: CPT | Performed by: INTERNAL MEDICINE

## 2023-06-08 NOTE — PROGRESS NOTES
Assessment/Plan:     Overall improved  Finish out the antibiotics and the potassium  Recheck electrolytes on Monday or Tuesday  Follow-up with GI as planned  Quality Measures:       Return for Next scheduled follow up  No problem-specific Assessment & Plan notes found for this encounter  Diagnoses and all orders for this visit:    Colitis    Hypokalemia  -     Basic metabolic panel; Future  -     Magnesium; Future          Subjective:      Patient ID: Echo Dillard is a 65 y o  female  Patient comes in today for follow-up with her daughter  She had gone in with severe diarrhea and was diagnosed with colitis  Started on antibiotics and she does have GI follow-up  She will require colonoscopy  She is feeling a lot better  Has a day or 2 left of the antibiotics and potassium  Abdominal pain is gone  Diarrhea is gone  ALLERGIES:  Allergies   Allergen Reactions   • Ciprofloxacin Hives   • Sulfa Antibiotics Itching   • Other    • Penicillins Rash     Category: Allergy;   --  Pt received unasyn 1 5 mg IV 11-12-18 to 11-15-18   • Sulfacetamide Rash     Category:  Allergy;        CURRENT MEDICATIONS:    Current Outpatient Medications:   •  amLODIPine (NORVASC) 2 5 mg tablet, Take 2 tablets (5 mg total) by mouth daily, Disp: 180 tablet, Rfl: 1  •  amoxicillin-clavulanate (AUGMENTIN) 875-125 mg per tablet, Take 1 tablet by mouth every 12 (twelve) hours for 7 days, Disp: 14 tablet, Rfl: 0  •  anastrozole (ARIMIDEX) 1 mg tablet, Take 1 tablet (1 mg total) by mouth daily, Disp: 90 tablet, Rfl: 0  •  aspirin 325 mg tablet, Take 325 mg by mouth daily , Disp: , Rfl:   •  cholecalciferol (VITAMIN D3) 1,000 units tablet, Take 1 tablet (1,000 Units total) by mouth daily, Disp: 30 tablet, Rfl: 6  •  cholestyramine (QUESTRAN) 4 g packet, Take 1 packet (4 g total) by mouth daily, Disp: 90 packet, Rfl: 1  •  dicyclomine (BENTYL) 20 mg tablet, Take 1 tablet (20 mg total) by mouth 2 (two) times a day, Disp: 20 tablet, Rfl: 0  •  diphenhydrAMINE (BENADRYL) 25 mg tablet, Take 25 mg by mouth in the morning   1/2 tablet with Paxil  , Disp: , Rfl:   •  fluticasone (FLONASE) 50 mcg/act nasal spray, INSTILL 1 SPRAY INTO EACH NOSTRIL AS NEEDED FOR RHINITIS OR ALLERGIES, Disp: 48 mL, Rfl: 1  •  furosemide (LASIX) 40 mg tablet, Take 1 tablet (40 mg total) by mouth daily for 2 days, Disp: 2 tablet, Rfl: 1  •  levothyroxine 125 mcg tablet, Take 1 tablet (125 mcg total) by mouth daily, Disp: 90 tablet, Rfl: 1  •  loperamide (IMODIUM) 2 mg capsule, Take 1 capsule (2 mg total) by mouth 4 (four) times a day as needed for diarrhea, Disp: 12 capsule, Rfl: 0  •  meclizine (ANTIVERT) 25 mg tablet, Take 1 tablet (25 mg total) by mouth every 8 (eight) hours as needed for dizziness, Disp: 50 tablet, Rfl: 1  •  memantine (NAMENDA) 10 mg tablet, Take 1 tablet (10 mg total) by mouth 2 (two) times a day, Disp: 180 tablet, Rfl: 1  •  Mirabegron ER (Myrbetriq) 50 MG TB24, Take 1 tablet (50 mg total) by mouth daily, Disp: 90 tablet, Rfl: 1  •  multivitamin (THERAGRAN) TABS, Take 1 tablet by mouth, Disp: , Rfl:   •  omeprazole (PriLOSEC) 20 mg delayed release capsule, Take 1 capsule (20 mg total) by mouth 2 (two) times a day, Disp: 180 capsule, Rfl: 1  •  ondansetron (ZOFRAN-ODT) 4 mg disintegrating tablet, Take 1 tablet (4 mg total) by mouth every 8 (eight) hours as needed for nausea, Disp: 15 tablet, Rfl: 0  •  ondansetron (ZOFRAN-ODT) 4 mg disintegrating tablet, Take 1 tablet (4 mg total) by mouth every 8 (eight) hours as needed for nausea or vomiting, Disp: 20 tablet, Rfl: 0  •  PARoxetine (PAXIL) 20 mg tablet, Take 1 tablet (20 mg total) by mouth daily, Disp: 90 tablet, Rfl: 1  •  potassium chloride (K-DUR,KLOR-CON) 10 mEq tablet, Take 1 tablet (10 mEq total) by mouth daily for 10 days, Disp: 10 tablet, Rfl: 0  •  PROAIR  (90 Base) MCG/ACT inhaler, as needed , Disp: , Rfl:   •  rivastigmine (EXELON) 9 5 mg/24 hr TD 24 hr patch, Place 1 patch on the skin over 24 hours daily, Disp: 90 patch, Rfl: 1    ACTIVE PROBLEM LIST:  Patient Active Problem List   Diagnosis   • Depression   • Gastroesophageal reflux disease without esophagitis   • Mixed hyperlipidemia   • Essential hypertension   • Hypothyroid   • Impaired fasting glucose   • Lymphoma (HCC)   • Cerebral aneurysm, nonruptured   • Mild cognitive impairment   • Flexor tenosynovitis of finger   • Macrocytosis without anemia   • Paresthesias   • Non-intractable vomiting with nausea   • Hypokalemia   • Late onset Alzheimer's disease without behavioral disturbance (HCC)   • Chest pain   • Near syncope   • Immunocompromised (HCC)   • Malignant neoplasm of overlapping sites of right breast in female, estrogen receptor positive ([place] [address] )   • Use of anastrozole   • Recurrent major depressive disorder, remission status unspecified ([place] [address] )   • Stage 3 chronic kidney disease, unspecified whether stage 3a or 3b CKD ([place] [address] )   • History of lumpectomy of right breast       PAST MEDICAL HISTORY:  Past Medical History:   Diagnosis Date   • Anxiety disorder    • Aortic valve disorder    • BRCA1 negative    • BRCA2 negative    • Breast cancer ([place] [address] ) 03/2022    right   • Cancer ([place] [address] )     breast   • Cellulitis of finger of left hand 11/20/2018   • Cellulitis of left hand 11/10/2018    Added automatically from request for surgery [id number]   • Depression    • Disease of thyroid gland    • GERD (gastroesophageal reflux disease)    • History of gastroesophageal reflux (GERD)    • History of kidney cancer 03/06/2014   • History of transfusion    • Hyperlipidemia 03/06/2014   • Hypertension 03/06/2014   • Hypothyroidism 03/06/2014   • Malignant neoplasm of overlapping sites of right breast in female, estrogen receptor positive ([place] [address] ) 03/10/2022   • Shortness of breath    • Stroke ([place] [address] ) 4/[id number]    Min stroke found wile going through chemo and radiation   • TIA (transient ischemic attack)    • UTI (urinary tract infection)        PAST SURGICAL HISTORY:  Past Surgical History:   Procedure Laterality Date   • BREAST LUMPECTOMY Right 3/29/2022    Procedure: ITZEL  DIRECTED LUMPECTOMY;  Surgeon: Sánchez Zurita MD;  Location: [facility];  Service: Surgical Oncology   • ESOPHAGOGASTRIC FUNDOPLASTY      Nissen Fundoplication   • HERNIA REPAIR     • LYMPH NODE BIOPSY Right 3/29/2022    Procedure: LYMPHATIC MAPPING WITH BLUE AND RADIOACTIVE DYES SENTINEL LYMPH NODE BIOPSY, INJECTION IN OR AT 0800 BY DR Jessy Washburn;  Surgeon: Sánchez Zurita MD;  Location: [facility];  Service: Surgical Oncology   • MAMMO STEREOTACTIC BREAST BIOPSY RIGHT (ALL INC) Right 3/7/2022   • US BREAST ITZEL  NEEDLE LOC RIGHT Right 3/24/2022   • US GUIDED BREAST BIOPSY RIGHT COMPLETE Right 3/7/2022   • WRIST SURGERY Left        FAMILY HISTORY:  Family History   Problem Relation Age of Onset   • Stroke Father    • Leukemia Sister    • No Known Problems Brother    • Skin cancer Mother    • Stroke Maternal Grandmother    • No Known Problems Maternal Grandfather    • No Known Problems Paternal Grandmother    • No Known Problems Paternal Grandfather    • Breast cancer Sister    • Breast cancer Sister         Colon cancer   • Kidney cancer Brother    • Alcohol abuse Brother    • Kidney cancer Brother    • Stomach cancer Brother    • No Known Problems Daughter    • No Known Problems Daughter        SOCIAL HISTORY:  Social History     Socioeconomic History   • Marital status:       Spouse name: Not on file   • Number of children: 2   • Years of education: Not on file   • Highest education level: Not on file   Occupational History   • Not on file   Tobacco Use   • Smoking status: Never   • Smokeless tobacco: Never   • Tobacco comments:     None   Vaping Use   • Vaping Use: Never used   Substance and Sexual Activity   • Alcohol use: Yes     Comment: Social holidays   • Drug use: Never   • Sexual activity: Not Currently     Comment: [place] over   Other Topics "Concern   • Not on file   Social History Narrative   • Not on file     Social Determinants of Health     Financial Resource Strain: Low Risk  (8/15/2022)    Overall Financial Resource Strain (CARDIA)    • Difficulty of Paying Living Expenses: Not very hard   Food Insecurity: Not on file   Transportation Needs: No Transportation Needs (8/15/2022)    PRAPARE - Transportation    • Lack of Transportation (Medical): No    • Lack of Transportation (Non-Medical): No   Physical Activity: Not on file   Stress: Not on file   Social Connections: Not on file   Intimate Partner Violence: Not on file   Housing Stability: Not on file       Review of Systems   Gastrointestinal: Negative for abdominal pain and diarrhea  Objective:  Vitals:    06/08/23 0833   BP: 128/82   BP Location: Left arm   Patient Position: Sitting   Cuff Size: Adult   Pulse: 87   Resp: 16   Temp: 97 9 °F (36 6 °C)   TempSrc: Tympanic   SpO2: 95%   Weight: 75 1 kg (165 lb 9 6 oz)   Height: 5' 2\" (1 575 m)     Body mass index is 30 29 kg/m²  Physical Exam  Vitals and nursing note reviewed  Constitutional:       Appearance: Normal appearance  Pulmonary:      Effort: Pulmonary effort is normal       Breath sounds: Normal breath sounds  Abdominal:      General: There is no distension  Palpations: Abdomen is soft  Tenderness: There is no abdominal tenderness  Neurological:      Mental Status: She is alert  RESULTS:    In chart    This note was created with voice recognition software  Phonic, grammatical and spelling errors may be present within the note as a result      "

## 2023-06-12 ENCOUNTER — APPOINTMENT (OUTPATIENT)
Dept: LAB | Facility: HOSPITAL | Age: 78
End: 2023-06-12
Attending: INTERNAL MEDICINE
Payer: COMMERCIAL

## 2023-06-12 DIAGNOSIS — E87.6 HYPOKALEMIA: ICD-10-CM

## 2023-06-12 LAB
ANION GAP SERPL CALCULATED.3IONS-SCNC: 5 MMOL/L (ref 4–13)
BUN SERPL-MCNC: 19 MG/DL (ref 5–25)
CALCIUM SERPL-MCNC: 9.4 MG/DL (ref 8.4–10.2)
CHLORIDE SERPL-SCNC: 103 MMOL/L (ref 96–108)
CO2 SERPL-SCNC: 31 MMOL/L (ref 21–32)
CREAT SERPL-MCNC: 0.77 MG/DL (ref 0.6–1.3)
GFR SERPL CREATININE-BSD FRML MDRD: 74 ML/MIN/1.73SQ M
GLUCOSE P FAST SERPL-MCNC: 95 MG/DL (ref 65–99)
MAGNESIUM SERPL-MCNC: 2 MG/DL (ref 1.9–2.7)
POTASSIUM SERPL-SCNC: 3.5 MMOL/L (ref 3.5–5.3)
SODIUM SERPL-SCNC: 139 MMOL/L (ref 135–147)

## 2023-06-12 PROCEDURE — 83735 ASSAY OF MAGNESIUM: CPT

## 2023-06-12 PROCEDURE — 80048 BASIC METABOLIC PNL TOTAL CA: CPT

## 2023-06-12 PROCEDURE — 36415 COLL VENOUS BLD VENIPUNCTURE: CPT

## 2023-06-14 ENCOUNTER — OFFICE VISIT (OUTPATIENT)
Dept: GASTROENTEROLOGY | Facility: CLINIC | Age: 78
End: 2023-06-14
Payer: COMMERCIAL

## 2023-06-14 VITALS
DIASTOLIC BLOOD PRESSURE: 79 MMHG | OXYGEN SATURATION: 95 % | HEIGHT: 62 IN | SYSTOLIC BLOOD PRESSURE: 124 MMHG | HEART RATE: 76 BPM | WEIGHT: 165 LBS | BODY MASS INDEX: 30.36 KG/M2

## 2023-06-14 DIAGNOSIS — K21.9 GASTROESOPHAGEAL REFLUX DISEASE WITHOUT ESOPHAGITIS: ICD-10-CM

## 2023-06-14 DIAGNOSIS — Z80.0 FAMILY HISTORY OF COLON CANCER: Primary | ICD-10-CM

## 2023-06-14 PROCEDURE — 99203 OFFICE O/P NEW LOW 30 MIN: CPT | Performed by: INTERNAL MEDICINE

## 2023-06-14 NOTE — LETTER
June 16, 2023     Jody Dwyer, [address] [facility]    Patient: Bee Childress   YOB: 1945   Date of Visit: 6/14/2023       Dear Dr Toro Home: Thank you for referring Bee Childress to me for evaluation  Below are my notes for this consultation  If you have questions, please do not hesitate to call me  I look forward to following your patient along with you  Sincerely,        Demarco Miller DO        CC: No Recipients    Demarco Miller DO  6/14/2023  9:11 AM  Signed  [facility] - Outpatient Consultation  Bee Childress 65 y o  female MRN: [id number]  Encounter: [id number]          ASSESSMENT AND PLAN:      1  Family history of colon cancer  - Colonoscopy; Future    2  Gastroesophageal reflux disease without esophagitis  -Continue omeprazole as prescribed    ______________________________________________________________________    HPI: Cortney Handy is a 18-year-old female who comes the office to schedule a routine surveillance/screening colonoscopy based on a positive family history for colon cancer  Her sister was diagnosed with colon cancer  Her last colonoscopy was between 5 and 10 years ago and it was apparently performed at the ambulatory surgical center  There were no polyps at that time  Dr Agustin Clifton performed that procedure  The patient denies any problems with her bowel movement  She denies diarrhea or constipation  She denies any abdominal pain, nausea, vomiting  She states that she had some scant amount blood with wiping a couple of days ago  There is no heartburn, dysphagia, odynophagia  She takes omeprazole 20 mg daily for her gastroesophageal reflux disease and for the most part her heartburn symptoms are under good control  She denies any problems with her previous colonoscopy to include problems with the anesthesia, problems with the bowel prep, or problems with the procedure itself        REVIEW OF SYSTEMS:    CONSTITUTIONAL: Denies any fever, chills, rigors, and weight loss  HEENT: No earache or tinnitus  Denies hearing loss or visual disturbances  CARDIOVASCULAR: No chest pain or palpitations  RESPIRATORY: Denies any cough, hemoptysis, shortness of breath or dyspnea on exertion  GASTROINTESTINAL: As noted in the History of Present Illness  GENITOURINARY: No problems with urination  Denies any hematuria or dysuria  NEUROLOGIC: No dizziness or vertigo, denies headaches  MUSCULOSKELETAL: Denies any muscle or joint pain  SKIN: Denies skin rashes or itching  ENDOCRINE: Denies excessive thirst  Denies intolerance to heat or cold  PSYCHOSOCIAL: Denies depression or anxiety  Denies any recent memory loss         Historical Information   Past Medical History:   Diagnosis Date   • Anxiety disorder    • Aortic valve disorder    • BRCA1 negative    • BRCA2 negative    • Breast cancer ([place] [address] ) 03/2022    right   • Cancer ([place] [address] )     breast   • Cellulitis of finger of left hand 11/20/2018   • Cellulitis of left hand 11/10/2018    Added automatically from request for surgery [id number]   • Depression    • Disease of thyroid gland    • GERD (gastroesophageal reflux disease)    • History of gastroesophageal reflux (GERD)    • History of kidney cancer 03/06/2014   • History of transfusion    • Hyperlipidemia 03/06/2014   • Hypertension 03/06/2014   • Hypothyroidism 03/06/2014   • Malignant neoplasm of overlapping sites of right breast in female, estrogen receptor positive ([place] [address] ) 03/10/2022   • Shortness of breath    • Stroke ([place] [address] ) 4/[id number]    Min stroke found wile going through chemo and radiation   • TIA (transient ischemic attack)    • UTI (urinary tract infection)      Past Surgical History:   Procedure Laterality Date   • BREAST LUMPECTOMY Right 3/29/2022    Procedure: ITZEL  DIRECTED LUMPECTOMY;  Surgeon: Eddy Mg MD;  Location: [facility] OR;  Service: Surgical Oncology   • ESOPHAGOGASTRIC FUNDOPLASTY      Nissen Fundoplication   • HERNIA REPAIR     • LYMPH NODE BIOPSY Right 3/29/2022    Procedure: LYMPHATIC MAPPING WITH BLUE AND RADIOACTIVE DYES SENTINEL LYMPH NODE BIOPSY, INJECTION IN OR AT 0800 BY DR Martin Danielle;  Surgeon: Trygve Dakin, MD;  Location: [facility];  Service: Surgical Oncology   • MAMMO STEREOTACTIC BREAST BIOPSY RIGHT (ALL INC) Right 3/7/2022   • US BREAST ITZEL  NEEDLE LOC RIGHT Right 3/24/2022   • US GUIDED BREAST BIOPSY RIGHT COMPLETE Right 3/7/2022   • WRIST SURGERY Left      Social History   Social History     Substance and Sexual Activity   Alcohol Use Yes    Comment: Social holidays     Social History     Substance and Sexual Activity   Drug Use Never     Social History     Tobacco Use   Smoking Status Never   Smokeless Tobacco Never   Tobacco Comments    None     Family History   Problem Relation Age of Onset   • Stroke Father    • Leukemia Sister    • No Known Problems Brother    • Skin cancer Mother    • Stroke Maternal Grandmother    • No Known Problems Maternal Grandfather    • No Known Problems Paternal Grandmother    • No Known Problems Paternal Grandfather    • Breast cancer Sister    • Breast cancer Sister         Colon cancer   • Kidney cancer Brother    • Alcohol abuse Brother    • Kidney cancer Brother    • Stomach cancer Brother    • No Known Problems Daughter    • No Known Problems Daughter        Meds/Allergies       Current Outpatient Medications:   •  amLODIPine (NORVASC) 2 5 mg tablet  •  anastrozole (ARIMIDEX) 1 mg tablet  •  aspirin 325 mg tablet  •  cholecalciferol (VITAMIN D3) 1,000 units tablet  •  cholestyramine (QUESTRAN) 4 g packet  •  dicyclomine (BENTYL) 20 mg tablet  •  diphenhydrAMINE (BENADRYL) 25 mg tablet  •  fluticasone (FLONASE) 50 mcg/act nasal spray  •  furosemide (LASIX) 40 mg tablet  •  levothyroxine 125 mcg tablet  •  loperamide (IMODIUM) 2 mg capsule  •  meclizine (ANTIVERT) 25 mg tablet  •  memantine (NAMENDA) 10 mg tablet  •  Mirabegron ER "(Myrbetriq) 50 MG TB24  •  multivitamin (THERAGRAN) TABS  •  omeprazole (PriLOSEC) 20 mg delayed release capsule  •  ondansetron (ZOFRAN-ODT) 4 mg disintegrating tablet  •  PARoxetine (PAXIL) 20 mg tablet  •  PROAIR  (90 Base) MCG/ACT inhaler  •  rivastigmine (EXELON) 9 5 mg/24 hr TD 24 hr patch  •  ondansetron (ZOFRAN-ODT) 4 mg disintegrating tablet  •  potassium chloride (K-DUR,KLOR-CON) 10 mEq tablet    Allergies   Allergen Reactions   • Ciprofloxacin Hives   • Sulfa Antibiotics Itching   • Other    • Penicillins Rash     Category: Allergy;   --  Pt received unasyn 1 5 mg IV 11-12-18 to 11-15-18   • Sulfacetamide Rash     Category: Allergy;            Objective     Blood pressure 124/79, pulse 76, height 5' 2\" (1 575 m), weight 74 8 kg (165 lb), SpO2 95 %, not currently breastfeeding  Body mass index is 30 18 kg/m²  PHYSICAL EXAM:      General Appearance:   Alert, cooperative, no distress   HEENT:   Normocephalic, atraumatic, anicteric  Neck:  Supple, symmetrical, trachea midline   Lungs:   Clear to auscultation bilaterally; no rales, rhonchi or wheezing; respirations unlabored    Heart[de-identified]   Regular rate and rhythm; no murmur, rub, or gallop  Abdomen:   Soft, non-tender, non-distended; normal bowel sounds; no masses, no organomegaly    Genitalia:   Deferred    Rectal:   Deferred    Extremities:  No cyanosis, clubbing or edema    Pulses:  2+ and symmetric    Skin:  No jaundice, rashes, or lesions    Lymph nodes:  No palpable cervical lymphadenopathy        Lab Results:   No visits with results within 1 Day(s) from this visit     Latest known visit with results is:   Appointment on 06/12/2023   Component Date Value   • Sodium 06/12/2023 139    • Potassium 06/12/2023 3 5    • Chloride 06/12/2023 103    • CO2 06/12/2023 31    • ANION GAP 06/12/2023 5    • BUN 06/12/2023 19    • Creatinine 06/12/2023 0 77    • Glucose, Fasting 06/12/2023 95    • Calcium 06/12/2023 9 4    • eGFR 06/12/2023 74    • " Magnesium 06/12/2023 2 0          Radiology Results:   CT abdomen pelvis with contrast    Result Date: 6/1/2023  Narrative: CT ABDOMEN AND PELVIS WITH IV CONTRAST INDICATION:   abd pain  COMPARISON: Multiple priors most recently 11/17/2022 TECHNIQUE:  CT examination of the abdomen and pelvis was performed  Multiplanar 2D reformatted images were created from the source data  This examination, like all CT scans performed in the [facility], was performed utilizing techniques to minimize radiation dose exposure, including the use of iterative reconstruction and automated exposure control  Radiation dose length product (DLP) for this visit:  582 mGy-cm IV Contrast:  100 mL of iohexol (OMNIPAQUE) Enteric Contrast:  Enteric contrast was not administered  FINDINGS: ABDOMEN LOWER CHEST: Large hiatal hernia  Left lower lobe subpleural scarring unchanged since 2020  Fat-containing left-sided diaphragmatic hernia again noted LIVER/BILIARY TREE:  Unremarkable  GALLBLADDER:  No calcified gallstones  No pericholecystic inflammatory change  SPLEEN:  Unremarkable  PANCREAS:  Unremarkable  ADRENAL GLANDS:  Unremarkable  KIDNEYS/URETERS:  One or more simple renal cyst(s) is noted  Otherwise unremarkable kidneys  No hydronephrosis  STOMACH AND BOWEL:  There is colonic diverticulosis without evidence of acute diverticulitis  Short segment wall thickening of the distal transverse colon near the splenic flecture (series 2, image 43)  APPENDIX:  No findings to suggest appendicitis  ABDOMINOPELVIC CAVITY:  No ascites  No pneumoperitoneum  No lymphadenopathy  VESSELS:  Atherosclerotic changes are present  No evidence of aneurysm  PELVIS REPRODUCTIVE ORGANS:  Unremarkable for patient's age  URINARY BLADDER:  Unremarkable  ABDOMINAL WALL/INGUINAL REGIONS:  Unremarkable  OSSEOUS STRUCTURES:  No acute fracture or destructive osseous lesion  Spinal degenerative changes are noted       Impression: Short segment wall thickening of the distal transverse colon  This could be secondary to contraction or focal colitis, however, recommend colonoscopy on an outpatient basis if not recently performed to exclude underlying mass  Large hiatal hernia  The study was marked in [facility] for immediate notification   Workstation performed: [id number]

## 2023-06-14 NOTE — PATIENT INSTRUCTIONS
Scheduled date of colonoscopy (as of today):8/28/23  Physician performing colonoscopy:Shasta  Location of colonoscopy:[place]  Bowel prep reviewed with patient:Massimo/miralax  Instructions reviewed with patient by:Antonio decker  Clearances:  none

## 2023-06-14 NOTE — PROGRESS NOTES
[facility] - Outpatient Consultation  Arvind Good 65 y o  female MRN: [id number]  Encounter: [id number]          ASSESSMENT AND PLAN:      1  Family history of colon cancer  - Colonoscopy; Future    2  Gastroesophageal reflux disease without esophagitis  -Continue omeprazole as prescribed    ______________________________________________________________________    HPI: Edel Lares is a 59-year-old female who comes the office to schedule a routine surveillance/screening colonoscopy based on a positive family history for colon cancer  Her sister was diagnosed with colon cancer  Her last colonoscopy was between 5 and 10 years ago and it was apparently performed at the ambulatory surgical center  There were no polyps at that time  Dr Carmen Santana performed that procedure  The patient denies any problems with her bowel movement  She denies diarrhea or constipation  She denies any abdominal pain, nausea, vomiting  She states that she had some scant amount blood with wiping a couple of days ago  There is no heartburn, dysphagia, odynophagia  She takes omeprazole 20 mg daily for her gastroesophageal reflux disease and for the most part her heartburn symptoms are under good control  She denies any problems with her previous colonoscopy to include problems with the anesthesia, problems with the bowel prep, or problems with the procedure itself  REVIEW OF SYSTEMS:    CONSTITUTIONAL: Denies any fever, chills, rigors, and weight loss  HEENT: No earache or tinnitus  Denies hearing loss or visual disturbances  CARDIOVASCULAR: No chest pain or palpitations  RESPIRATORY: Denies any cough, hemoptysis, shortness of breath or dyspnea on exertion  GASTROINTESTINAL: As noted in the History of Present Illness  GENITOURINARY: No problems with urination  Denies any hematuria or dysuria  NEUROLOGIC: No dizziness or vertigo, denies headaches  MUSCULOSKELETAL: Denies any muscle or joint pain  SKIN: Denies skin rashes or itching  ENDOCRINE: Denies excessive thirst  Denies intolerance to heat or cold  PSYCHOSOCIAL: Denies depression or anxiety  Denies any recent memory loss         Historical Information   Past Medical History:   Diagnosis Date   • Anxiety disorder    • Aortic valve disorder    • BRCA1 negative    • BRCA2 negative    • Breast cancer ([facility] ) 03/2022    right   • Cancer [facility])     breast   • Cellulitis of finger of left hand 11/20/2018   • Cellulitis of left hand 11/10/2018    Added automatically from request for surgery [id number]   • Depression    • Disease of thyroid gland    • GERD (gastroesophageal reflux disease)    • History of gastroesophageal reflux (GERD)    • History of kidney cancer 03/06/2014   • History of transfusion    • Hyperlipidemia 03/06/2014   • Hypertension 03/06/2014   • Hypothyroidism 03/06/2014   • Malignant neoplasm of overlapping sites of right breast in female, estrogen receptor positive ([facility] ) 03/10/2022   • Shortness of breath    • Stroke ([place] [address] ) 4/[id number]    Min stroke found wile going through chemo and radiation   • TIA (transient ischemic attack)    • UTI (urinary tract infection)      Past Surgical History:   Procedure Laterality Date   • BREAST LUMPECTOMY Right 3/29/2022    Procedure: ITZEL  DIRECTED LUMPECTOMY;  Surgeon: Apurva Bowden MD;  Location: [facility];  Service: Surgical Oncology   • ESOPHAGOGASTRIC FUNDOPLASTY      Nissen Fundoplication   • HERNIA REPAIR     • LYMPH NODE BIOPSY Right 3/29/2022    Procedure: LYMPHATIC MAPPING WITH BLUE AND RADIOACTIVE DYES SENTINEL LYMPH NODE BIOPSY, INJECTION IN OR AT 0800 BY DR Jadyn Paulino;  Surgeon: Apurva Bowden MD;  Location: [facility];  Service: Surgical Oncology   • MAMMO STEREOTACTIC BREAST BIOPSY RIGHT (ALL INC) Right 3/7/2022   • US BREAST ITZEL  NEEDLE LOC RIGHT Right 3/24/2022   • US GUIDED BREAST BIOPSY RIGHT COMPLETE Right 3/7/2022   • WRIST SURGERY Left      Social History Social History     Substance and Sexual Activity   Alcohol Use Yes    Comment: Social holidays     Social History     Substance and Sexual Activity   Drug Use Never     Social History     Tobacco Use   Smoking Status Never   Smokeless Tobacco Never   Tobacco Comments    None     Family History   Problem Relation Age of Onset   • Stroke Father    • Leukemia Sister    • No Known Problems Brother    • Skin cancer Mother    • Stroke Maternal Grandmother    • No Known Problems Maternal Grandfather    • No Known Problems Paternal Grandmother    • No Known Problems Paternal Grandfather    • Breast cancer Sister    • Breast cancer Sister         Colon cancer   • Kidney cancer Brother    • Alcohol abuse Brother    • Kidney cancer Brother    • Stomach cancer Brother    • No Known Problems Daughter    • No Known Problems Daughter        Meds/Allergies       Current Outpatient Medications:   •  amLODIPine (NORVASC) 2 5 mg tablet  •  anastrozole (ARIMIDEX) 1 mg tablet  •  aspirin 325 mg tablet  •  cholecalciferol (VITAMIN D3) 1,000 units tablet  •  cholestyramine (QUESTRAN) 4 g packet  •  dicyclomine (BENTYL) 20 mg tablet  •  diphenhydrAMINE (BENADRYL) 25 mg tablet  •  fluticasone (FLONASE) 50 mcg/act nasal spray  •  furosemide (LASIX) 40 mg tablet  •  levothyroxine 125 mcg tablet  •  loperamide (IMODIUM) 2 mg capsule  •  meclizine (ANTIVERT) 25 mg tablet  •  memantine (NAMENDA) 10 mg tablet  •  Mirabegron ER (Myrbetriq) 50 MG TB24  •  multivitamin (THERAGRAN) TABS  •  omeprazole (PriLOSEC) 20 mg delayed release capsule  •  ondansetron (ZOFRAN-ODT) 4 mg disintegrating tablet  •  PARoxetine (PAXIL) 20 mg tablet  •  PROAIR  (90 Base) MCG/ACT inhaler  •  rivastigmine (EXELON) 9 5 mg/24 hr TD 24 hr patch  •  ondansetron (ZOFRAN-ODT) 4 mg disintegrating tablet  •  potassium chloride (K-DUR,KLOR-CON) 10 mEq tablet    Allergies   Allergen Reactions   • Ciprofloxacin Hives   • Sulfa Antibiotics Itching   • Other    • "Penicillins Rash     Category: Allergy;   --  Pt received unasyn 1 5 mg IV 11-12-18 to 11-15-18   • Sulfacetamide Rash     Category: Allergy;            Objective     Blood pressure 124/79, pulse 76, height 5' 2\" (1 575 m), weight 74 8 kg (165 lb), SpO2 95 %, not currently breastfeeding  Body mass index is 30 18 kg/m²  PHYSICAL EXAM:      General Appearance:   Alert, cooperative, no distress   HEENT:   Normocephalic, atraumatic, anicteric      Neck:  Supple, symmetrical, trachea midline   Lungs:   Clear to auscultation bilaterally; no rales, rhonchi or wheezing; respirations unlabored    Heart[de-identified]   Regular rate and rhythm; no murmur, rub, or gallop  Abdomen:   Soft, non-tender, non-distended; normal bowel sounds; no masses, no organomegaly    Genitalia:   Deferred    Rectal:   Deferred    Extremities:  No cyanosis, clubbing or edema    Pulses:  2+ and symmetric    Skin:  No jaundice, rashes, or lesions    Lymph nodes:  No palpable cervical lymphadenopathy        Lab Results:   No visits with results within 1 Day(s) from this visit  Latest known visit with results is:   Appointment on 06/12/2023   Component Date Value   • Sodium 06/12/2023 139    • Potassium 06/12/2023 3 5    • Chloride 06/12/2023 103    • CO2 06/12/2023 31    • ANION GAP 06/12/2023 5    • BUN 06/12/2023 19    • Creatinine 06/12/2023 0 77    • Glucose, Fasting 06/12/2023 95    • Calcium 06/12/2023 9 4    • eGFR 06/12/2023 74    • Magnesium 06/12/2023 2 0          Radiology Results:   CT abdomen pelvis with contrast    Result Date: 6/1/2023  Narrative: CT ABDOMEN AND PELVIS WITH IV CONTRAST INDICATION:   abd pain  COMPARISON: Multiple priors most recently 11/17/2022 TECHNIQUE:  CT examination of the abdomen and pelvis was performed  Multiplanar 2D reformatted images were created from the source data   This examination, like all CT scans performed in the [facility], was performed utilizing techniques to minimize radiation dose " exposure, including the use of iterative reconstruction and automated exposure control  Radiation dose length product (DLP) for this visit:  582 mGy-cm IV Contrast:  100 mL of iohexol (OMNIPAQUE) Enteric Contrast:  Enteric contrast was not administered  FINDINGS: ABDOMEN LOWER CHEST: Large hiatal hernia  Left lower lobe subpleural scarring unchanged since 2020  Fat-containing left-sided diaphragmatic hernia again noted LIVER/BILIARY TREE:  Unremarkable  GALLBLADDER:  No calcified gallstones  No pericholecystic inflammatory change  SPLEEN:  Unremarkable  PANCREAS:  Unremarkable  ADRENAL GLANDS:  Unremarkable  KIDNEYS/URETERS:  One or more simple renal cyst(s) is noted  Otherwise unremarkable kidneys  No hydronephrosis  STOMACH AND BOWEL:  There is colonic diverticulosis without evidence of acute diverticulitis  Short segment wall thickening of the distal transverse colon near the splenic flecture (series 2, image 43)  APPENDIX:  No findings to suggest appendicitis  ABDOMINOPELVIC CAVITY:  No ascites  No pneumoperitoneum  No lymphadenopathy  VESSELS:  Atherosclerotic changes are present  No evidence of aneurysm  PELVIS REPRODUCTIVE ORGANS:  Unremarkable for patient's age  URINARY BLADDER:  Unremarkable  ABDOMINAL WALL/INGUINAL REGIONS:  Unremarkable  OSSEOUS STRUCTURES:  No acute fracture or destructive osseous lesion  Spinal degenerative changes are noted  Impression: Short segment wall thickening of the distal transverse colon  This could be secondary to contraction or focal colitis, however, recommend colonoscopy on an outpatient basis if not recently performed to exclude underlying mass  Large hiatal hernia  The study was marked in [facility] for immediate notification   Workstation performed: [id number]

## 2023-06-21 ENCOUNTER — TELEPHONE (OUTPATIENT)
Dept: INTERNAL MEDICINE CLINIC | Facility: CLINIC | Age: 78
End: 2023-06-21

## 2023-06-21 DIAGNOSIS — N39.0 RECURRENT UTI: Primary | ICD-10-CM

## 2023-06-21 RX ORDER — CEPHALEXIN 250 MG/1
250 CAPSULE ORAL EVERY 12 HOURS SCHEDULED
Qty: 10 CAPSULE | Refills: 0 | Status: SHIPPED | OUTPATIENT
Start: 2023-06-21 | End: 2023-06-26

## 2023-06-21 NOTE — TELEPHONE ENCOUNTER
Pt has back pain, pain when urinating,  Feels off    They are thinking she has a uti  ?     If possible to send something in     Uses [facility]     Phone - [facility]

## 2023-07-17 ENCOUNTER — TELEPHONE (OUTPATIENT)
Dept: INTERNAL MEDICINE CLINIC | Facility: CLINIC | Age: 78
End: 2023-07-17

## 2023-07-17 DIAGNOSIS — N39.0 RECURRENT UTI: Primary | ICD-10-CM

## 2023-07-17 NOTE — TELEPHONE ENCOUNTER
Patient did a OTC urine test (Azzo) and it came up positive. Is requesting an antibiotic, cephalexin was given last time, and it helped. She has allergies to certain antibiotics. She does get re-occuring uti'. s    [facility]    Call back #[phone]  Sarah/daughter

## 2023-07-18 ENCOUNTER — APPOINTMENT (OUTPATIENT)
Dept: LAB | Facility: HOSPITAL | Age: 78
End: 2023-07-18
Payer: COMMERCIAL

## 2023-07-18 DIAGNOSIS — E03.9 HYPOTHYROIDISM, UNSPECIFIED TYPE: ICD-10-CM

## 2023-07-18 DIAGNOSIS — N39.0 RECURRENT UTI: ICD-10-CM

## 2023-07-18 DIAGNOSIS — N39.0 ACUTE URINARY TRACT INFECTION: ICD-10-CM

## 2023-07-18 DIAGNOSIS — I10 ESSENTIAL HYPERTENSION: ICD-10-CM

## 2023-07-18 LAB
ALBUMIN SERPL BCP-MCNC: 3.8 G/DL (ref 3.5–5)
ALP SERPL-CCNC: 66 U/L (ref 34–104)
ALT SERPL W P-5'-P-CCNC: 10 U/L (ref 7–52)
ANION GAP SERPL CALCULATED.3IONS-SCNC: 8 MMOL/L
AST SERPL W P-5'-P-CCNC: 17 U/L (ref 13–39)
BASOPHILS # BLD MANUAL: 0.05 THOUSAND/UL (ref 0–0.1)
BASOPHILS NFR MAR MANUAL: 1 % (ref 0–1)
BILIRUB SERPL-MCNC: 0.42 MG/DL (ref 0.2–1)
BUN SERPL-MCNC: 19 MG/DL (ref 5–25)
CALCIUM SERPL-MCNC: 9.3 MG/DL (ref 8.4–10.2)
CHLORIDE SERPL-SCNC: 103 MMOL/L (ref 96–108)
CO2 SERPL-SCNC: 28 MMOL/L (ref 21–32)
CREAT SERPL-MCNC: 0.84 MG/DL (ref 0.6–1.3)
EOSINOPHIL # BLD MANUAL: 0.19 THOUSAND/UL (ref 0–0.4)
EOSINOPHIL NFR BLD MANUAL: 4 % (ref 0–6)
ERYTHROCYTE [DISTWIDTH] IN BLOOD BY AUTOMATED COUNT: 14.6 % (ref 11.6–15.1)
GFR SERPL CREATININE-BSD FRML MDRD: 66 ML/MIN/1.73SQ M
GLUCOSE P FAST SERPL-MCNC: 151 MG/DL (ref 65–99)
HCT VFR BLD AUTO: 34.2 % (ref 34.8–46.1)
HGB BLD-MCNC: 11.5 G/DL (ref 11.5–15.4)
LYMPHOCYTES # BLD AUTO: 0.88 THOUSAND/UL (ref 0.6–4.47)
LYMPHOCYTES # BLD AUTO: 18 % (ref 14–44)
MACROCYTES BLD QL AUTO: PRESENT
MCH RBC QN AUTO: 37.3 PG (ref 26.8–34.3)
MCHC RBC AUTO-ENTMCNC: 33.6 G/DL (ref 31.4–37.4)
MCV RBC AUTO: 111 FL (ref 82–98)
METAMYELOCYTES NFR BLD MANUAL: 1 % (ref 0–1)
MONOCYTES # BLD AUTO: 0.34 THOUSAND/UL (ref 0–1.22)
MONOCYTES NFR BLD: 7 % (ref 4–12)
NEUTROPHILS # BLD MANUAL: 3.26 THOUSAND/UL (ref 1.85–7.62)
NEUTS BAND NFR BLD MANUAL: 1 % (ref 0–8)
NEUTS SEG NFR BLD AUTO: 66 % (ref 43–75)
PLATELET # BLD AUTO: 255 THOUSANDS/UL (ref 149–390)
PLATELET BLD QL SMEAR: ADEQUATE
PMV BLD AUTO: 11.2 FL (ref 8.9–12.7)
POTASSIUM SERPL-SCNC: 3.7 MMOL/L (ref 3.5–5.3)
PROT SERPL-MCNC: 6.7 G/DL (ref 6.4–8.4)
RBC # BLD AUTO: 3.08 MILLION/UL (ref 3.81–5.12)
SODIUM SERPL-SCNC: 139 MMOL/L (ref 135–147)
T4 FREE SERPL-MCNC: 1.07 NG/DL (ref 0.61–1.12)
TSH SERPL DL<=0.05 MIU/L-ACNC: 2.6 UIU/ML (ref 0.45–4.5)
VARIANT LYMPHS # BLD AUTO: 2 %
WBC # BLD AUTO: 4.87 THOUSAND/UL (ref 4.31–10.16)

## 2023-07-18 PROCEDURE — 87077 CULTURE AEROBIC IDENTIFY: CPT

## 2023-07-18 PROCEDURE — 80053 COMPREHEN METABOLIC PANEL: CPT

## 2023-07-18 PROCEDURE — 87086 URINE CULTURE/COLONY COUNT: CPT

## 2023-07-18 PROCEDURE — 36415 COLL VENOUS BLD VENIPUNCTURE: CPT

## 2023-07-18 PROCEDURE — 84439 ASSAY OF FREE THYROXINE: CPT

## 2023-07-18 PROCEDURE — 87186 SC STD MICRODIL/AGAR DIL: CPT

## 2023-07-18 PROCEDURE — 85027 COMPLETE CBC AUTOMATED: CPT

## 2023-07-18 PROCEDURE — 84443 ASSAY THYROID STIM HORMONE: CPT

## 2023-07-18 PROCEDURE — 85007 BL SMEAR W/DIFF WBC COUNT: CPT

## 2023-07-20 ENCOUNTER — TELEPHONE (OUTPATIENT)
Dept: INTERNAL MEDICINE CLINIC | Facility: CLINIC | Age: 78
End: 2023-07-20

## 2023-07-20 DIAGNOSIS — N30.00 ACUTE CYSTITIS WITHOUT HEMATURIA: Primary | ICD-10-CM

## 2023-07-20 LAB — BACTERIA UR CULT: ABNORMAL

## 2023-07-20 RX ORDER — NITROFURANTOIN 25; 75 MG/1; MG/1
100 CAPSULE ORAL 2 TIMES DAILY
Qty: 20 CAPSULE | Refills: 0 | Status: SHIPPED | OUTPATIENT
Start: 2023-07-20 | End: 2023-07-30

## 2023-07-20 NOTE — TELEPHONE ENCOUNTER
Pt did urine for us for uti, but hasnt heard,       Still having sxs thus far,   Please update her if meds needed?

## 2023-07-20 NOTE — TELEPHONE ENCOUNTER
As per Dr. Sirena Spencer, "  Macrobid sent, culture sensitive to [facility] with the patient and made her aware.

## 2023-08-04 ENCOUNTER — CLINICAL SUPPORT (OUTPATIENT)
Dept: RADIATION ONCOLOGY | Facility: CLINIC | Age: 78
End: 2023-08-04
Attending: RADIOLOGY
Payer: COMMERCIAL

## 2023-08-04 VITALS
SYSTOLIC BLOOD PRESSURE: 140 MMHG | HEART RATE: 89 BPM | RESPIRATION RATE: 16 BRPM | DIASTOLIC BLOOD PRESSURE: 84 MMHG | BODY MASS INDEX: 30.91 KG/M2 | TEMPERATURE: 97.2 F | OXYGEN SATURATION: 95 % | WEIGHT: 169 LBS

## 2023-08-04 DIAGNOSIS — C50.811 MALIGNANT NEOPLASM OF OVERLAPPING SITES OF RIGHT BREAST IN FEMALE, ESTROGEN RECEPTOR POSITIVE (HCC): Primary | ICD-10-CM

## 2023-08-04 DIAGNOSIS — Z17.0 MALIGNANT NEOPLASM OF OVERLAPPING SITES OF RIGHT BREAST IN FEMALE, ESTROGEN RECEPTOR POSITIVE (HCC): Primary | ICD-10-CM

## 2023-08-04 PROCEDURE — G0463 HOSPITAL OUTPT CLINIC VISIT: HCPCS | Performed by: RADIOLOGY

## 2023-08-04 PROCEDURE — 99211 OFF/OP EST MAY X REQ PHY/QHP: CPT | Performed by: RADIOLOGY

## 2023-08-04 PROCEDURE — 99213 OFFICE O/P EST LOW 20 MIN: CPT | Performed by: RADIOLOGY

## 2023-08-04 NOTE — PROGRESS NOTES
Kristy Rosario 1945 is a 65 y.o. 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 She underwent lumpectomy and sentinel lymph node biopsy completed on 3/29/22. Tumor cells were ER/WY(+) and HDM1Pwq(-). She completed a course of adjuvant radiation on 22. She is maintained on anastrazole. She was last seen 23 and returns today for follow up.     2/15/23 B/L diagnostic mammogram  Bilateral  There are no suspicious masses, grouped microcalcifications or areas of unexplained architectural distortion. The skin and nipple areolar complex are unremarkable. There are postsurgical/posttreatment changes of the right breast.  There are scattered calcifications present bilaterally. RECOMMENDATION:       - Diagnostic mammogram in 1 year for both breasts. 23 Dr. Shemar Gamez  she is doing well she denies of any breast pain nipple discharge nipple retraction or skin changes other than surgical scar. Reviewed mammogram  Follow up 6 months    23 ED - colitis    23 CT A/P w contrast  Short segment wall thickening of the distal transverse colon. This could be secondary to contraction or focal colitis, however, recommend colonoscopy on an outpatient basis if not recently performed to exclude underlying mass. Large hiatal hernia.       Upcomin23 Dr. Shemar Gamez  10/18/23 Dr. Nehemiah Kussmaul  24 Mammogram      Follow up visit     Oncology History   Malignant neoplasm of overlapping sites of right breast in female, estrogen receptor positive ([address])   3/7/2022 Initial Diagnosis    Malignant neoplasm of right female breast ([address])     3/7/2022 Biopsy    Right Breast Ultrasound guided biopsy  12 o'clock, 8 cm from nipple  Invasive mammary carcinoma of no special type with extensive Ductal Carcinoma in situ  Grade 1   ER 95  MN 75  HER2 1+  Lymphovascular invasion not identified    Concordant. Malingnacy appears unifocal. Right axilla clear. Left breast clear. Breast, Right, Stereo bx right breast 10oclock, 4 cores with calcs:  - Fibroadenoma with focal coarse calcifications. - Negative for atypia and in-situ or invasive carcinoma. Breast, Right, Stereo bx right breast 10oclock, 1 core without calcs:  - Fibroadenoma with focal coarse calcifications. - Negative for atypia and in-situ or invasive carcinoma. 3/14/2022 Genetic Testing    A stat panel of genes were evaluated, including: SYDNI, BRCA1, BRCA2, CDH1, CHEK2, PALB2, PTEN, STK11, TP53  Negative result. No pathogenic sequence variants or deletions/duplications identified     3/14/2022 Genomic Testing    MammaPrint   Low risk  Mammaprint Index: +0.537  Luminal type A     3/14/2022 -  Cancer Staged    Staging form: Breast, AJCC 8th Edition  - Clinical stage from 3/14/2022: cT1b, cN0, cM0, GX, ER+, MN+, HER2- - Signed by Mindy Acosta MD on 3/25/2022  Stage prefix: Initial diagnosis  Histologic grading system: 3 grade system       3/29/2022 Surgery    Right breast liyah  directed lumpectomy with sentinel lymph node biopsy  Invasive mammary carcinoma of no special type with associated ductal carcinoma in situ  Grade 1   1.3 cm  All margins negative for invasive carcinoma and ductal carcinoma in situ  0/3 Lymph node  Anatomic/prognostic stage: IA      5/23/2022 - 6/14/2022 Radiation    Treatments:  Course: C1  Plan ID Energy Fractions Dose per Fraction (cGy) Dose Correction (cGy) Total Dose Delivered (cGy) Elapsed Days   R Breast 6X 15 / 15 267 0 4,005 22    Treatment Dates:  5/23/2022 - 6/14/2022. Review of Systems:  Review of Systems   Constitutional: Positive for fatigue. HENT: Negative. Eyes:        Wears glasses   Respiratory: Negative.     Cardiovascular: Negative. Gastrointestinal: Positive for nausea (occasionally). Genitourinary: Positive for frequency and urgency. Musculoskeletal: Positive for arthralgias (B/L hands), back pain and gait problem (walking with a cane). Skin: Negative. Allergic/Immunologic: Negative. Neurological: Negative for dizziness, tremors, weakness, numbness and headaches. Hematological: Negative. Psychiatric/Behavioral: Negative.         Clinical Trial: no      Health Maintenance   Topic Date Due   • Hepatitis C Screening  Never done   • Pneumococcal Vaccine: 65+ Years (3 - PCV) 11/11/2020   • COVID-19 Vaccine (5 - Booster) 01/26/2022   • BMI: Followup Plan  01/05/2023   • Medicare Annual Wellness Visit (AWV)  08/15/2023   • Influenza Vaccine (1) 09/01/2023   • Urinary Incontinence Screening  08/15/2023   • Depression Remission PHQ  12/08/2023   • Breast Cancer Screening: Mammogram  02/15/2024   • Fall Risk  06/08/2024   • BMI: Adult  06/14/2024   • Osteoporosis Screening  Completed   • HIB Vaccine  Aged Out   • IPV Vaccine  Aged Out   • Hepatitis A Vaccine  Aged Out   • Meningococcal ACWY Vaccine  Aged Out   • HPV Vaccine  Aged Out   • Colorectal Cancer Screening  Discontinued     Patient Active Problem List   Diagnosis   • Depression   • Gastroesophageal reflux disease without esophagitis   • Mixed hyperlipidemia   • Essential hypertension   • Hypothyroid   • Impaired fasting glucose   • Lymphoma ([address])   • Cerebral aneurysm, nonruptured   • Mild cognitive impairment   • Flexor tenosynovitis of finger   • Macrocytosis without anemia   • Paresthesias   • Non-intractable vomiting with nausea   • Hypokalemia   • Late onset Alzheimer's disease without behavioral disturbance (HCC)   • Chest pain   • Near syncope   • Immunocompromised (HCC)   • Malignant neoplasm of overlapping sites of right breast in female, estrogen receptor positive ([address])   • Use of anastrozole   • Recurrent major depressive disorder, remission status unspecified ([address])   • Stage 3 chronic kidney disease, unspecified whether stage 3a or 3b CKD ([address])   • History of lumpectomy of right breast     Past Medical History:   Diagnosis Date   • Anxiety disorder    • Aortic valve disorder    • BRCA1 negative    • BRCA2 negative    • Breast cancer ([address]) 03/2022    right   • Cancer (HCC)     breast   • Cellulitis of finger of left hand 11/20/2018   • Cellulitis of left hand 11/10/2018    Added automatically from request for surgery [id number]   • Depression    • Disease of thyroid gland    • GERD (gastroesophageal reflux disease)    • History of gastroesophageal reflux (GERD)    • History of kidney cancer 03/06/2014   • History of transfusion    • Hyperlipidemia 03/06/2014   • Hypertension 03/06/2014   • Hypothyroidism 03/06/2014   • Malignant neoplasm of overlapping sites of right breast in female, estrogen receptor positive ([address]) 03/10/2022   • Shortness of breath    • Stroke ([address]) 4/20125836-0089    Min stroke found wile going through chemo and radiation   • TIA (transient ischemic attack)    • UTI (urinary tract infection)      Past Surgical History:   Procedure Laterality Date   • BREAST LUMPECTOMY Right 3/29/2022    Procedure: ITZEL  DIRECTED LUMPECTOMY;  Surgeon: Ghazal Evans MD;  Location: [facility];  Service: Surgical Oncology   • ESOPHAGOGASTRIC FUNDOPLASTY      Nissen Fundoplication   • HERNIA REPAIR     • LYMPH NODE BIOPSY Right 3/29/2022    Procedure: LYMPHATIC MAPPING WITH BLUE AND RADIOACTIVE DYES SENTINEL LYMPH NODE BIOPSY, INJECTION IN OR AT 0800 BY DR Mirta Fleischer;  Surgeon: Ghazal Evans MD;  Location: [facility];  Service: Surgical Oncology   • MAMMO STEREOTACTIC BREAST BIOPSY RIGHT (ALL INC) Right 3/7/2022   • US BREAST ITZEL  NEEDLE LOC RIGHT Right 3/24/2022   • US GUIDED BREAST BIOPSY RIGHT COMPLETE Right 3/7/2022   • WRIST SURGERY Left      Family History   Problem Relation Age of Onset   • Stroke Father    • Leukemia Sister    • No Known Problems Brother    • Skin cancer Mother    • Stroke Maternal Grandmother    • No Known Problems Maternal Grandfather    • No Known Problems Paternal Grandmother    • No Known Problems Paternal Grandfather    • Breast cancer Sister    • Breast cancer Sister         Colon cancer   • Kidney cancer Brother    • Alcohol abuse Brother    • Kidney cancer Brother    • Stomach cancer Brother    • No Known Problems Daughter    • No Known Problems Daughter      Social History     Socioeconomic History   • Marital status:      Spouse name: Not on file   • Number of children: 2   • Years of education: Not on file   • Highest education level: Not on file   Occupational History   • Not on file   Tobacco Use   • Smoking status: Never   • Smokeless tobacco: Never   • Tobacco comments:     None   Vaping Use   • Vaping Use: Never used   Substance and Sexual Activity   • Alcohol use: Yes     Comment: Social holidays   • Drug use: Never   • Sexual activity: Not Currently     Comment: Belvia Belts over   Other Topics Concern   • Not on file   Social History Narrative   • Not on file     Social Determinants of Health     Financial Resource Strain: Low Risk  (8/15/2022)    Overall Financial Resource Strain (CARDIA)    • Difficulty of Paying Living Expenses: Not very hard   Food Insecurity: Not on file   Transportation Needs: No Transportation Needs (8/15/2022)    PRAPARE - Transportation    • Lack of Transportation (Medical): No    • Lack of Transportation (Non-Medical):  No   Physical Activity: Not on file   Stress: Not on file   Social Connections: Not on file   Intimate Partner Violence: Not on file   Housing Stability: Not on file       Current Outpatient Medications:   •  amLODIPine (NORVASC) 2.5 mg tablet, Take 2 tablets (5 mg total) by mouth daily, Disp: 180 tablet, Rfl: 1  •  anastrozole (ARIMIDEX) 1 mg tablet, Take 1 tablet (1 mg total) by mouth daily, Disp: 90 tablet, Rfl: 0  •  aspirin 325 mg tablet, Take 325 mg by mouth daily , Disp: , Rfl:   •  cholecalciferol (VITAMIN D3) 1,000 units tablet, Take 1 tablet (1,000 Units total) by mouth daily, Disp: 30 tablet, Rfl: 6  •  cholestyramine (QUESTRAN) 4 g packet, Take 1 packet (4 g total) by mouth daily, Disp: 90 packet, Rfl: 1  •  dicyclomine (BENTYL) 20 mg tablet, Take 1 tablet (20 mg total) by mouth 2 (two) times a day, Disp: 20 tablet, Rfl: 0  •  diphenhydrAMINE (BENADRYL) 25 mg tablet, Take 25 mg by mouth in the morning.  1/2 tablet with Paxil ., Disp: , Rfl:   •  fluticasone (FLONASE) 50 mcg/act nasal spray, INSTILL 1 SPRAY INTO EACH NOSTRIL AS NEEDED FOR RHINITIS OR ALLERGIES, Disp: 48 mL, Rfl: 1  •  furosemide (LASIX) 40 mg tablet, Take 1 tablet (40 mg total) by mouth daily for 2 days, Disp: 2 tablet, Rfl: 1  •  levothyroxine 125 mcg tablet, Take 1 tablet (125 mcg total) by mouth daily, Disp: 90 tablet, Rfl: 1  •  loperamide (IMODIUM) 2 mg capsule, Take 1 capsule (2 mg total) by mouth 4 (four) times a day as needed for diarrhea, Disp: 12 capsule, Rfl: 0  •  meclizine (ANTIVERT) 25 mg tablet, Take 1 tablet (25 mg total) by mouth every 8 (eight) hours as needed for dizziness, Disp: 50 tablet, Rfl: 1  •  memantine (NAMENDA) 10 mg tablet, Take 1 tablet (10 mg total) by mouth 2 (two) times a day, Disp: 180 tablet, Rfl: 1  •  Mirabegron ER (Myrbetriq) 50 MG TB24, Take 1 tablet (50 mg total) by mouth daily, Disp: 90 tablet, Rfl: 1  •  multivitamin (THERAGRAN) TABS, Take 1 tablet by mouth, Disp: , Rfl:   •  omeprazole (PriLOSEC) 20 mg delayed release capsule, Take 1 capsule (20 mg total) by mouth 2 (two) times a day, Disp: 180 capsule, Rfl: 1  •  ondansetron (ZOFRAN-ODT) 4 mg disintegrating tablet, Take 1 tablet (4 mg total) by mouth every 8 (eight) hours as needed for nausea (Patient not taking: Reported on 6/14/2023), Disp: 15 tablet, Rfl: 0  •  ondansetron (ZOFRAN-ODT) 4 mg disintegrating tablet, Take 1 tablet (4 mg total) by mouth every 8 (eight) hours as needed for nausea or vomiting, Disp: 20 tablet, Rfl: 0  •  PARoxetine (PAXIL) 20 mg tablet, Take 1 tablet (20 mg total) by mouth daily, Disp: 90 tablet, Rfl: 1  •  potassium chloride (K-DUR,KLOR-CON) 10 mEq tablet, Take 1 tablet (10 mEq total) by mouth daily for 10 days (Patient not taking: Reported on 6/14/2023), Disp: 10 tablet, Rfl: 0  •  PROAIR  (90 Base) MCG/ACT inhaler, as needed , Disp: , Rfl:   •  rivastigmine (EXELON) 9.5 mg/24 hr TD 24 hr patch, Place 1 patch on the skin over 24 hours daily, Disp: 90 patch, Rfl: 1  Allergies   Allergen Reactions   • Ciprofloxacin Hives   • Sulfa Antibiotics Itching   • Other    • Penicillins Rash     Category: Allergy;   --  Pt received unasyn 1.5 mg IV 11-12-18 to 11-15-18   • Sulfacetamide Rash     Category: Allergy; There were no vitals filed for this visit.

## 2023-08-04 NOTE — PROGRESS NOTES
Follow-up - Radiation Oncology   Sadiq Arrieta 1945 65 y.o. female [id number]      History of Present Illness   Cancer Staging   Malignant neoplasm of overlapping sites of right breast in female, estrogen receptor positive ([address])  Staging form: Breast, AJCC 8th Edition  - Clinical stage from 3/14/2022: cT1b, cN0, cM0, GX, ER+, CO+, HER2- - Signed by Twan Solomon MD on 3/25/2022  Stage prefix: Initial diagnosis  Histologic grading system: 3 grade system      Sadiq Arrieta is a 65 y.o. 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  She underwent lumpectomy and sentinel lymph node biopsy completed on 3/29/22.  Tumor cells were ER/CO(+) and [id number](-).  She completed a course of adjuvant radiation on 6/14/22. Amanda Dean is maintained on anastrazole.  She was last seen 1/23/23 and returns today for follow up.      2/15/23 B/L diagnostic mammogram  Bilateral  There are no suspicious masses, grouped microcalcifications or areas of unexplained architectural distortion. The skin and nipple areolar complex are unremarkable. There are postsurgical/posttreatment changes of the right breast.  There are scattered calcifications present bilaterally. RECOMMENDATION:       - Diagnostic mammogram in 1 year for both breasts.     2/22/23 Dr. Ronny Chacon  she is doing well she denies of any breast pain nipple discharge nipple retraction or skin changes other than surgical scar. Reviewed mammogram  Follow up 6 months     5/31/23 ED - colitis     6/1/23 CT A/P w contrast  Short segment wall thickening of the distal transverse colon.  This could be secondary to contraction or focal colitis, however, recommend colonoscopy on an outpatient basis if not recently performed to exclude underlying mass.   Large hiatal hernia.     The patient offers no new breast complaints. She denies new palpable nodules, suspicious skin changes, or nipple discharge of the breast bilaterally. She denies any upper extremity edema or shoulder restriction. She denies significant breast tenderness or pain. Tolerating anastrozole well. Denies diarrhea or rectal bleeding or abdominal pain today. She states that she is scheduled for a colonoscopy at the end of the month for further evaluation.     Upcomin23 Dr. Vazquez Friday  10/18/23 Dr. Robert Dumont  24 Mammogram    Historical Information   Oncology History   Malignant neoplasm of overlapping sites of right breast in female, estrogen receptor positive ([address])   3/7/2022 Initial Diagnosis    Malignant neoplasm of right female breast ([address])     3/7/2022 Biopsy    Right Breast Ultrasound guided biopsy  12 o'clock, 8 cm from nipple  Invasive mammary carcinoma of no special type with extensive Ductal Carcinoma in situ  Grade 1   ER 95  NJ 75  HER2 1+  Lymphovascular invasion not identified    Concordant. Malingnacy appears unifocal. Right axilla clear. Left breast clear. Breast, Right, Stereo bx right breast 10oclock, 4 cores with calcs:  - Fibroadenoma with focal coarse calcifications. - Negative for atypia and in-situ or invasive carcinoma. Breast, Right, Stereo bx right breast 10oclock, 1 core without calcs:  - Fibroadenoma with focal coarse calcifications. - Negative for atypia and in-situ or invasive carcinoma. 3/14/2022 Genetic Testing    A stat panel of genes were evaluated, including: SYDNI, BRCA1, BRCA2, CDH1, CHEK2, PALB2, PTEN, STK11, TP53  Negative result.  No pathogenic sequence variants or deletions/duplications identified     3/14/2022 Genomic Testing    MammaPrint   Low risk  Mammaprint Index: +0.537  Luminal type A     3/14/2022 -  Cancer Staged    Staging form: Breast, AJCC 8th Edition  - Clinical stage from 3/14/2022: cT1b, cN0, cM0, GX, ER+, NJ+, HER2- - Signed by Kraig Farrell MD on 3/25/2022  Stage prefix: Initial diagnosis  Histologic grading system: 3 grade system       3/29/2022 Surgery    Right breast liyah  directed lumpectomy with sentinel lymph node biopsy  Invasive mammary carcinoma of no special type with associated ductal carcinoma in situ  Grade 1   1.3 cm  All margins negative for invasive carcinoma and ductal carcinoma in situ  0/3 Lymph node  Anatomic/prognostic stage: IA      5/23/2022 - 6/14/2022 Radiation    Treatments:  Course: C1  Plan ID Energy Fractions Dose per Fraction (cGy) Dose Correction (cGy) Total Dose Delivered (cGy) Elapsed Days   R Breast 6X 15 / 15 267 0 4,005 22    Treatment Dates:  5/23/2022 - 6/14/2022.             Past Medical History:   Diagnosis Date   • Anxiety disorder    • Aortic valve disorder    • BRCA1 negative    • BRCA2 negative    • Breast cancer ([address]) 03/2022    right   • Cancer [facility])     breast   • Cellulitis of finger of left hand 11/20/2018   • Cellulitis of left hand 11/10/2018    Added automatically from request for surgery [id number]   • Depression    • Disease of thyroid gland    • GERD (gastroesophageal reflux disease)    • History of gastroesophageal reflux (GERD)    • History of kidney cancer 03/06/2014   • History of transfusion    • Hyperlipidemia 03/06/2014   • Hypertension 03/06/2014   • Hypothyroidism 03/06/2014   • Malignant neoplasm of overlapping sites of right breast in female, estrogen receptor positive ([address]) 03/10/2022   • Shortness of breath    • Stroke ([address]) [id number]    Min stroke found wile going through chemo and radiation   • TIA (transient ischemic attack)    • UTI (urinary tract infection)      Past Surgical History:   Procedure Laterality Date   • BREAST LUMPECTOMY Right 3/29/2022    Procedure: LIYAH  DIRECTED LUMPECTOMY;  Surgeon: Shante Chiu MD;  Location: [facility];  Service: Surgical Oncology   • ESOPHAGOGASTRIC FUNDOPLASTY      Nissen Fundoplication   • HERNIA REPAIR     • LYMPH NODE BIOPSY Right 3/29/2022    Procedure: LYMPHATIC MAPPING WITH BLUE AND RADIOACTIVE DYES SENTINEL LYMPH NODE BIOPSY, INJECTION IN OR AT 0800 BY DR Mirta Fleischer;  Surgeon: Ghazal Evans MD;  Location: [facility];  Service: Surgical Oncology   • MAMMO STEREOTACTIC BREAST BIOPSY RIGHT (ALL INC) Right 3/7/2022   • US BREAST ITZEL  NEEDLE LOC RIGHT Right 3/24/2022   • US GUIDED BREAST BIOPSY RIGHT COMPLETE Right 3/7/2022   • WRIST SURGERY Left        Social History   Social History     Substance and Sexual Activity   Alcohol Use Yes    Comment: Social holidays     Social History     Substance and Sexual Activity   Drug Use Never     Social History     Tobacco Use   Smoking Status Never   Smokeless Tobacco Never   Tobacco Comments    None         Meds/Allergies     Current Outpatient Medications:   •  amLODIPine (NORVASC) 2.5 mg tablet, Take 2 tablets (5 mg total) by mouth daily, Disp: 180 tablet, Rfl: 1  •  anastrozole (ARIMIDEX) 1 mg tablet, Take 1 tablet (1 mg total) by mouth daily, Disp: 90 tablet, Rfl: 0  •  aspirin 325 mg tablet, Take 325 mg by mouth daily , Disp: , Rfl:   •  cholecalciferol (VITAMIN D3) 1,000 units tablet, Take 1 tablet (1,000 Units total) by mouth daily, Disp: 30 tablet, Rfl: 6  •  cholestyramine (QUESTRAN) 4 g packet, Take 1 packet (4 g total) by mouth daily, Disp: 90 packet, Rfl: 1  •  dicyclomine (BENTYL) 20 mg tablet, Take 1 tablet (20 mg total) by mouth 2 (two) times a day, Disp: 20 tablet, Rfl: 0  •  diphenhydrAMINE (BENADRYL) 25 mg tablet, Take 25 mg by mouth in the morning.  1/2 tablet with Paxil ., Disp: , Rfl:   •  fluticasone (FLONASE) 50 mcg/act nasal spray, INSTILL 1 SPRAY INTO EACH NOSTRIL AS NEEDED FOR RHINITIS OR ALLERGIES, Disp: 48 mL, Rfl: 1  •  furosemide (LASIX) 40 mg tablet, Take 1 tablet (40 mg total) by mouth daily for 2 days, Disp: 2 tablet, Rfl: 1  •  levothyroxine 125 mcg tablet, Take 1 tablet (125 mcg total) by mouth daily, Disp: 90 tablet, Rfl: 1  •  meclizine (ANTIVERT) 25 mg tablet, Take 1 tablet (25 mg total) by mouth every 8 (eight) hours as needed for dizziness, Disp: 50 tablet, Rfl: 1  •  memantine (NAMENDA) 10 mg tablet, Take 1 tablet (10 mg total) by mouth 2 (two) times a day, Disp: 180 tablet, Rfl: 1  •  Mirabegron ER (Myrbetriq) 50 MG TB24, Take 1 tablet (50 mg total) by mouth daily, Disp: 90 tablet, Rfl: 1  •  multivitamin (THERAGRAN) TABS, Take 1 tablet by mouth, Disp: , Rfl:   •  omeprazole (PriLOSEC) 20 mg delayed release capsule, Take 1 capsule (20 mg total) by mouth 2 (two) times a day, Disp: 180 capsule, Rfl: 1  •  ondansetron (ZOFRAN-ODT) 4 mg disintegrating tablet, Take 1 tablet (4 mg total) by mouth every 8 (eight) hours as needed for nausea or vomiting, Disp: 20 tablet, Rfl: 0  •  PARoxetine (PAXIL) 20 mg tablet, Take 1 tablet (20 mg total) by mouth daily, Disp: 90 tablet, Rfl: 1  •  potassium chloride (K-DUR,KLOR-CON) 10 mEq tablet, Take 1 tablet (10 mEq total) by mouth daily for 10 days, Disp: 10 tablet, Rfl: 0  •  PROAIR  (90 Base) MCG/ACT inhaler, as needed , Disp: , Rfl:   •  rivastigmine (EXELON) 9.5 mg/24 hr TD 24 hr patch, Place 1 patch on the skin over 24 hours daily, Disp: 90 patch, Rfl: 1  •  loperamide (IMODIUM) 2 mg capsule, Take 1 capsule (2 mg total) by mouth 4 (four) times a day as needed for diarrhea, Disp: 12 capsule, Rfl: 0  •  ondansetron (ZOFRAN-ODT) 4 mg disintegrating tablet, Take 1 tablet (4 mg total) by mouth every 8 (eight) hours as needed for nausea (Patient not taking: Reported on 6/14/2023), Disp: 15 tablet, Rfl: 0  Allergies   Allergen Reactions   • Ciprofloxacin Hives   • Sulfa Antibiotics Itching   • Other    • Penicillins Rash     Category: Allergy;   --  Pt received unasyn 1.5 mg IV 11-12-18 to 11-15-18   • Sulfacetamide Rash     Category: Allergy; Review of Systems   Constitutional: Positive for fatigue. HENT: Negative. Eyes:        Wears glasses   Respiratory: Negative.     Cardiovascular: Negative. Gastrointestinal: Positive for nausea (occasionally). Genitourinary: Positive for frequency and urgency. Musculoskeletal: Positive for arthralgias (B/L hands), back pain and gait problem (walking with a cane). Skin: Negative. Allergic/Immunologic: Negative. Neurological: Negative for dizziness, tremors, weakness, numbness and headaches. Hematological: Negative. Psychiatric/Behavioral: Negative. OBJECTIVE:   /84   Pulse 89   Temp (!) 97.2 °F (36.2 °C)   Resp 16   Wt 76.7 kg (169 lb)   SpO2 95%   BMI 30.91 kg/m²   Karnofsky: 90 - Able to carry on normal activity; minor signs or symptoms of disease     Physical Exam  Vitals and nursing note reviewed. Constitutional:       General: She is not in acute distress. Appearance: She is well-developed. Cardiovascular:      Rate and Rhythm: Normal rate and regular rhythm. Heart sounds: No murmur heard. Pulmonary:      Breath sounds: No wheezing, rhonchi or rales. Chest:      Comments: Breast examination demonstrates that the right breast is slightly smaller in size than the left. Contours are symmetric. There is a well-healed upper outer right lumpectomy scar associated with mild fibrosis and central fibrosis of the right breast.  There is slight diffuse hyperpigmentation of the right breast and central peau d'orange. There are no palpable nodules or suspicious skin changes of the breast bilaterally. Abdominal:      Palpations: Abdomen is soft. Tenderness: There is no abdominal tenderness. Musculoskeletal:      Right lower leg: No edema. Left lower leg: No edema. Comments: No upper extremity edema and FROM of upper extremities bilaterally. Lymphadenopathy:      Cervical: No cervical adenopathy. Upper Body:      Right upper body: No supraclavicular or axillary adenopathy. Left upper body: No supraclavicular or axillary adenopathy.    Neurological:      Mental Status: She is alert and oriented to person, place, and time. Gait: Gait normal.           Assessment/Plan:  Tessa Hirsch is a 65 y.o. woman with multiple comorbidities diagnosed with clinical stage I, grade 1 invasive mammary carcinoma of the right breast status post resection achieving negative margins on 3/29/22.  Tumor cells were ER/IN(+) and [id number](-).  She had previous breast radiation for NHL of the left breast many years ago.  She completed a course of adjuvant radiation on 6/14/22. She is maintained on anastrazole that she is tolerating well. She remains clinically ADDI. She is compliant with follow-up with Medical and Surgical Oncology. Next mammogram is scheduled February 2024. She is undergoing work-up with GI for colitis in May 2023, but denies lower GI symptoms today.      I instructed her to practice sun protection to the irradiated skin and to use emollients as needed. She will return for follow-up in 1 year. Ismael Mclaughlin will see her sooner should the need arise. Fito Triana MD  1/3/6974,93:48 PM    Portions of the record may have been created with voice recognition software.  Occasional wrong word or "sound a like" substitutions may have occurred due to the inherent limitations of voice recognition software.  Read the chart carefully and recognize, using context, where substitutions have occurred.

## 2023-08-05 DIAGNOSIS — C50.811 MALIGNANT NEOPLASM OF OVERLAPPING SITES OF RIGHT BREAST IN FEMALE, ESTROGEN RECEPTOR POSITIVE (HCC): ICD-10-CM

## 2023-08-05 DIAGNOSIS — Z17.0 MALIGNANT NEOPLASM OF OVERLAPPING SITES OF RIGHT BREAST IN FEMALE, ESTROGEN RECEPTOR POSITIVE (HCC): ICD-10-CM

## 2023-08-05 RX ORDER — ANASTROZOLE 1 MG/1
1 TABLET ORAL DAILY
Qty: 90 TABLET | Refills: 0 | Status: SHIPPED | OUTPATIENT
Start: 2023-08-05

## 2023-08-23 ENCOUNTER — OFFICE VISIT (OUTPATIENT)
Dept: SURGICAL ONCOLOGY | Facility: CLINIC | Age: 78
End: 2023-08-23
Payer: COMMERCIAL

## 2023-08-23 VITALS
OXYGEN SATURATION: 92 % | HEIGHT: 62 IN | RESPIRATION RATE: 16 BRPM | BODY MASS INDEX: 31.47 KG/M2 | TEMPERATURE: 97.9 F | HEART RATE: 73 BPM | SYSTOLIC BLOOD PRESSURE: 128 MMHG | DIASTOLIC BLOOD PRESSURE: 80 MMHG | WEIGHT: 171 LBS

## 2023-08-23 DIAGNOSIS — Z17.0 MALIGNANT NEOPLASM OF OVERLAPPING SITES OF RIGHT BREAST IN FEMALE, ESTROGEN RECEPTOR POSITIVE (HCC): Primary | ICD-10-CM

## 2023-08-23 DIAGNOSIS — Z98.890 HISTORY OF LUMPECTOMY OF RIGHT BREAST: ICD-10-CM

## 2023-08-23 DIAGNOSIS — C50.811 MALIGNANT NEOPLASM OF OVERLAPPING SITES OF RIGHT BREAST IN FEMALE, ESTROGEN RECEPTOR POSITIVE (HCC): Primary | ICD-10-CM

## 2023-08-23 DIAGNOSIS — Z79.811 USE OF ANASTROZOLE: ICD-10-CM

## 2023-08-23 PROCEDURE — 99214 OFFICE O/P EST MOD 30 MIN: CPT | Performed by: SURGERY

## 2023-08-23 NOTE — PROGRESS NOTES
Surgical Oncology Follow Up  [facility]  [facility] SURGICAL [facility]COL[facility]Y [facility] [id number]    Tong Parsons  1945  [id number]      No chief complaint on file. Assessment & Plan:   She is here for follow-up with right breast cancer s/p breast conservation surgery for invasive mammary carcinoma with extensive ductal carcinoma in situ ER/NY positive HER2 negative with no lymphovascular invasion. Is on anastrozole 1 mg daily she is tolerating well denies of shortness of breath vaginal spotting or leg swelling. Her next mammogram is in January. Today's visit I do not appreciate any suspicious mass or masses. I do not appreciate any worrisome features. We will follow her in 6 months time    Cancer History:     Oncology History   Malignant neoplasm of overlapping sites of right breast in female, estrogen receptor positive ([address])   3/7/2022 Initial Diagnosis    Malignant neoplasm of right female breast ([address])     3/7/2022 Biopsy    Right Breast Ultrasound guided biopsy  12 o'clock, 8 cm from nipple  Invasive mammary carcinoma of no special type with extensive Ductal Carcinoma in situ  Grade 1   ER 95  NY 75  HER2 1+  Lymphovascular invasion not identified    Concordant. Malingnacy appears unifocal. Right axilla clear. Left breast clear. Breast, Right, Stereo bx right breast 10oclock, 4 cores with calcs:  - Fibroadenoma with focal coarse calcifications. - Negative for atypia and in-situ or invasive carcinoma. Breast, Right, Stereo bx right breast 10oclock, 1 core without calcs:  - Fibroadenoma with focal coarse calcifications. - Negative for atypia and in-situ or invasive carcinoma. 3/14/2022 Genetic Testing    A stat panel of genes were evaluated, including: SYDNI, BRCA1, BRCA2, CDH1, CHEK2, PALB2, PTEN, STK11, TP53  Negative result.  No pathogenic sequence variants or deletions/duplications identified     3/14/2022 Genomic Testing MammaPrint   Low risk  Mammaprint Index: +0.537  Luminal type A     3/14/2022 -  Cancer Staged    Staging form: Breast, AJCC 8th Edition  - Clinical stage from 3/14/2022: cT1b, cN0, cM0, GX, ER+, WA+, HER2- - Signed by Laurie Nava MD on 3/25/2022  Stage prefix: Initial diagnosis  Histologic grading system: 3 grade system       3/29/2022 Surgery    Right breast liyah  directed lumpectomy with sentinel lymph node biopsy  Invasive mammary carcinoma of no special type with associated ductal carcinoma in situ  Grade 1   1.3 cm  All margins negative for invasive carcinoma and ductal carcinoma in situ  0/3 Lymph node  Anatomic/prognostic stage: IA      5/23/2022 - 6/14/2022 Radiation    Treatments:  Course: C1  Plan ID Energy Fractions Dose per Fraction (cGy) Dose Correction (cGy) Total Dose Delivered (cGy) Elapsed Days   R Breast 6X 15 / 15 267 0 4,005 22    Treatment Dates:  5/23/2022 - 6/14/2022. Interval History:   Follow-up with right breast cancer    Review of Systems:   Review of Systems   Constitutional: Negative for chills and fever. HENT: Negative for ear pain and sore throat. Eyes: Negative for pain and visual disturbance. Respiratory: Negative for cough and shortness of breath. Cardiovascular: Negative for chest pain and palpitations. Gastrointestinal: Negative for abdominal pain and vomiting. Genitourinary: Negative for dysuria and hematuria. Musculoskeletal: Negative for arthralgias and back pain. Skin: Negative for color change and rash. Neurological: Negative for seizures and syncope. All other systems reviewed and are negative.       Past Medical History     Patient Active Problem List   Diagnosis   • Depression   • Gastroesophageal reflux disease without esophagitis   • Mixed hyperlipidemia   • Essential hypertension   • Hypothyroid   • Impaired fasting glucose   • Lymphoma (HCC)   • Cerebral aneurysm, nonruptured   • Mild cognitive impairment   • Flexor tenosynovitis of finger   • Macrocytosis without anemia   • Paresthesias   • Non-intractable vomiting with nausea   • Hypokalemia   • Late onset Alzheimer's disease without behavioral disturbance (HCC)   • Chest pain   • Near syncope   • Immunocompromised (HCC)   • Malignant neoplasm of overlapping sites of right breast in female, estrogen receptor positive ([address])   • Use of anastrozole   • Recurrent major depressive disorder, remission status unspecified ([address])   • Stage 3 chronic kidney disease, unspecified whether stage 3a or 3b CKD ([address])   • History of lumpectomy of right breast     Past Medical History:   Diagnosis Date   • Anxiety disorder    • Aortic valve disorder    • BRCA1 negative    • BRCA2 negative    • Breast cancer ([address]) 03/2022    right   • Cancer ([address])     breast   • Cellulitis of finger of left hand 11/20/2018   • Cellulitis of left hand 11/10/2018    Added automatically from request for surgery [id number]   • Depression    • Disease of thyroid gland    • GERD (gastroesophageal reflux disease)    • History of gastroesophageal reflux (GERD)    • History of kidney cancer 03/06/2014   • History of transfusion    • Hyperlipidemia 03/06/2014   • Hypertension 03/06/2014   • Hypothyroidism 03/06/2014   • Lymphoma ([address])    • Malignant neoplasm of overlapping sites of right breast in female, estrogen receptor positive ([address]) 03/10/2022   • Shortness of breath    • Stroke ([address]) 4/20123790-6433    Min stroke found wile going through chemo and radiation   • TIA (transient ischemic attack)    • UTI (urinary tract infection)      Past Surgical History:   Procedure Laterality Date   • BREAST LUMPECTOMY Right 3/29/2022    Procedure: ITZEL  DIRECTED LUMPECTOMY;  Surgeon: Jeff Aranda MD;  Location: [facility] OR;  Service: Surgical Oncology   • ESOPHAGOGASTRIC FUNDOPLASTY      Nissen Fundoplication   • HERNIA REPAIR     • LYMPH NODE BIOPSY Right 3/29/2022    Procedure: LYMPHATIC MAPPING WITH BLUE AND RADIOACTIVE DYES SENTINEL LYMPH NODE BIOPSY, INJECTION IN OR AT 0800 BY DR Mauri Badillo;  Surgeon: Ananda Cage MD;  Location: [facility];  Service: Surgical Oncology   • MAMMO STEREOTACTIC BREAST BIOPSY RIGHT (ALL INC) Right 3/7/2022   • US BREAST ITZEL  NEEDLE LOC RIGHT Right 3/24/2022   • US GUIDED BREAST BIOPSY RIGHT COMPLETE Right 3/7/2022   • WRIST SURGERY Left      Family History   Problem Relation Age of Onset   • Stroke Father    • Leukemia Sister    • No Known Problems Brother    • Skin cancer Mother    • Stroke Maternal Grandmother    • No Known Problems Maternal Grandfather    • No Known Problems Paternal Grandmother    • No Known Problems Paternal Grandfather    • Breast cancer Sister    • Breast cancer Sister         Colon cancer   • Kidney cancer Brother    • Alcohol abuse Brother    • Kidney cancer Brother    • Stomach cancer Brother    • No Known Problems Daughter    • No Known Problems Daughter      Social History     Socioeconomic History   • Marital status:      Spouse name: Not on file   • Number of children: 2   • Years of education: Not on file   • Highest education level: Not on file   Occupational History   • Not on file   Tobacco Use   • Smoking status: Never   • Smokeless tobacco: Never   • Tobacco comments:     None   Vaping Use   • Vaping Use: Never used   Substance and Sexual Activity   • Alcohol use: Yes     Comment: Social holidays   • Drug use: Never   • Sexual activity: Not Currently     Comment: Anthony Colón over   Other Topics Concern   • Not on file   Social History Narrative   • Not on file     Social Determinants of Health     Financial Resource Strain: Low Risk  (8/15/2022)    Overall Financial Resource Strain (CARDIA)    • Difficulty of Paying Living Expenses: Not very hard   Food Insecurity: Not on file   Transportation Needs: No Transportation Needs (8/15/2022)    PRAPARE - Transportation    • Lack of Transportation (Medical):  No    • Lack of Transportation (Non-Medical): No   Physical Activity: Not on file   Stress: Not on file   Social Connections: Not on file   Intimate Partner Violence: Not on file   Housing Stability: Not on file       Current Outpatient Medications:   •  amLODIPine (NORVASC) 2.5 mg tablet, Take 2 tablets (5 mg total) by mouth daily, Disp: 180 tablet, Rfl: 1  •  anastrozole (ARIMIDEX) 1 mg tablet, TAKE 1 TABLET BY MOUTH EVERY DAY, Disp: 90 tablet, Rfl: 0  •  aspirin 325 mg tablet, Take 325 mg by mouth daily , Disp: , Rfl:   •  cholestyramine (QUESTRAN) 4 g packet, Take 1 packet (4 g total) by mouth daily, Disp: 90 packet, Rfl: 1  •  dicyclomine (BENTYL) 20 mg tablet, Take 1 tablet (20 mg total) by mouth 2 (two) times a day, Disp: 20 tablet, Rfl: 0  •  diphenhydrAMINE (BENADRYL) 25 mg tablet, Take 25 mg by mouth in the morning.  1/2 tablet with Paxil ., Disp: , Rfl:   •  fluticasone (FLONASE) 50 mcg/act nasal spray, INSTILL 1 SPRAY INTO EACH NOSTRIL AS NEEDED FOR RHINITIS OR ALLERGIES, Disp: 48 mL, Rfl: 1  •  levothyroxine 125 mcg tablet, Take 1 tablet (125 mcg total) by mouth daily, Disp: 90 tablet, Rfl: 1  •  loperamide (IMODIUM) 2 mg capsule, Take 1 capsule (2 mg total) by mouth 4 (four) times a day as needed for diarrhea, Disp: 12 capsule, Rfl: 0  •  meclizine (ANTIVERT) 25 mg tablet, Take 1 tablet (25 mg total) by mouth every 8 (eight) hours as needed for dizziness, Disp: 50 tablet, Rfl: 1  •  memantine (NAMENDA) 10 mg tablet, Take 1 tablet (10 mg total) by mouth 2 (two) times a day, Disp: 180 tablet, Rfl: 1  •  Mirabegron ER (Myrbetriq) 50 MG TB24, Take 1 tablet (50 mg total) by mouth daily, Disp: 90 tablet, Rfl: 1  •  multivitamin (THERAGRAN) TABS, Take 1 tablet by mouth, Disp: , Rfl:   •  omeprazole (PriLOSEC) 20 mg delayed release capsule, Take 1 capsule (20 mg total) by mouth 2 (two) times a day, Disp: 180 capsule, Rfl: 1  •  ondansetron (ZOFRAN-ODT) 4 mg disintegrating tablet, Take 1 tablet (4 mg total) by mouth every 8 (eight) hours as needed for nausea or vomiting, Disp: 20 tablet, Rfl: 0  •  PARoxetine (PAXIL) 20 mg tablet, Take 1 tablet (20 mg total) by mouth daily, Disp: 90 tablet, Rfl: 1  •  PROAIR  (90 Base) MCG/ACT inhaler, as needed , Disp: , Rfl:   •  rivastigmine (EXELON) 9.5 mg/24 hr TD 24 hr patch, Place 1 patch on the skin over 24 hours daily, Disp: 90 patch, Rfl: 1  •  cholecalciferol (VITAMIN D3) 1,000 units tablet, Take 1 tablet (1,000 Units total) by mouth daily, Disp: 30 tablet, Rfl: 6  •  furosemide (LASIX) 40 mg tablet, Take 1 tablet (40 mg total) by mouth daily for 2 days, Disp: 2 tablet, Rfl: 1  •  ondansetron (ZOFRAN-ODT) 4 mg disintegrating tablet, Take 1 tablet (4 mg total) by mouth every 8 (eight) hours as needed for nausea (Patient not taking: Reported on 6/14/2023), Disp: 15 tablet, Rfl: 0  •  potassium chloride (K-DUR,KLOR-CON) 10 mEq tablet, Take 1 tablet (10 mEq total) by mouth daily for 10 days, Disp: 10 tablet, Rfl: 0  Allergies   Allergen Reactions   • Ciprofloxacin Hives   • Sulfa Antibiotics Itching   • Other    • Penicillins Rash     Category: Allergy;   --  Pt received unasyn 1.5 mg IV 11-12-18 to 11-15-18   • Sulfacetamide Rash     Category: Allergy; Physical Exam:     Vitals:    08/23/23 0951   BP: 128/80   Pulse: 73   Resp: 16   Temp: 97.9 °F (36.6 °C)   SpO2: 92%     Physical Exam  Constitutional:       Appearance: Normal appearance. HENT:      Head: Normocephalic and atraumatic. Nose: Nose normal.      Mouth/Throat:      Mouth: Mucous membranes are moist.   Eyes:      Pupils: Pupils are equal, round, and reactive to light. Cardiovascular:      Rate and Rhythm: Normal rate. Pulses: Normal pulses. Heart sounds: Normal heart sounds. Pulmonary:      Effort: Pulmonary effort is normal.      Breath sounds: Normal breath sounds. Chest:          Comments: Right breast well-healed surgical scars no palpable mass masses nipple discharge nipple retraction or skin changes.   Right axillary and supraclavicular examination no palpable adenopathy. Left breast no palpable mass masses nipple discharge nipple retraction is skin changes. Left axillary and supraclavicular examination no palpable adenopathy. Patient was examined seated as well as supine position. Abdominal:      General: Bowel sounds are normal.      Palpations: Abdomen is soft. Musculoskeletal:         General: Normal range of motion. Cervical back: Normal range of motion and neck supple. Skin:     General: Skin is warm. Neurological:      General: No focal deficit present. Mental Status: She is alert and oriented to person, place, and time. Psychiatric:         Mood and Affect: Mood normal.         Behavior: Behavior normal.         Thought Content: Thought content normal.         Judgment: Judgment normal.           Results & Discussion:   I did review the benefits of endocrine therapy alternative and possible complications. I did discussed in detail nature of breast cancer and the risk of local as well as distant disease recurrence. Need for breast examination as well as routine mammogram annually diagnostic were discussed. she understands and  agrees . All patient questions were answered. Advance Care Planning/Advance Directives: Regis Joseph MD discussed the disease status with Deloris Rodriguez  today 08/23/23  treatment plans and follow-up with the patient.

## 2023-08-28 ENCOUNTER — ANESTHESIA (OUTPATIENT)
Dept: GASTROENTEROLOGY | Facility: HOSPITAL | Age: 78
End: 2023-08-28

## 2023-08-28 ENCOUNTER — ANESTHESIA EVENT (OUTPATIENT)
Dept: GASTROENTEROLOGY | Facility: HOSPITAL | Age: 78
End: 2023-08-28

## 2023-08-28 ENCOUNTER — HOSPITAL ENCOUNTER (OUTPATIENT)
Dept: GASTROENTEROLOGY | Facility: HOSPITAL | Age: 78
Setting detail: OUTPATIENT SURGERY
Discharge: HOME | End: 2023-08-28
Attending: INTERNAL MEDICINE
Payer: COMMERCIAL

## 2023-08-28 VITALS
OXYGEN SATURATION: 94 % | SYSTOLIC BLOOD PRESSURE: 143 MMHG | TEMPERATURE: 98.5 F | HEART RATE: 72 BPM | DIASTOLIC BLOOD PRESSURE: 64 MMHG | RESPIRATION RATE: 16 BRPM

## 2023-08-28 DIAGNOSIS — Z80.0 FAMILY HISTORY OF COLON CANCER: ICD-10-CM

## 2023-08-28 RX ORDER — PROPOFOL 10 MG/ML
INJECTION, EMULSION INTRAVENOUS AS NEEDED
Status: DISCONTINUED | OUTPATIENT
Start: 2023-08-28 | End: 2023-08-28

## 2023-08-28 RX ORDER — ONDANSETRON 2 MG/ML
4 INJECTION INTRAMUSCULAR; INTRAVENOUS ONCE AS NEEDED
Status: CANCELLED | OUTPATIENT
Start: 2023-08-28

## 2023-08-28 RX ORDER — SODIUM CHLORIDE, SODIUM LACTATE, POTASSIUM CHLORIDE, CALCIUM CHLORIDE 600; 310; 30; 20 MG/100ML; MG/100ML; MG/100ML; MG/100ML
INJECTION, SOLUTION INTRAVENOUS CONTINUOUS PRN
Status: DISCONTINUED | OUTPATIENT
Start: 2023-08-28 | End: 2023-08-28

## 2023-08-28 RX ORDER — LIDOCAINE HYDROCHLORIDE 10 MG/ML
INJECTION, SOLUTION EPIDURAL; INFILTRATION; INTRACAUDAL; PERINEURAL AS NEEDED
Status: DISCONTINUED | OUTPATIENT
Start: 2023-08-28 | End: 2023-08-28

## 2023-08-28 RX ADMIN — SODIUM CHLORIDE, SODIUM LACTATE, POTASSIUM CHLORIDE, AND CALCIUM CHLORIDE: .6; .31; .03; .02 INJECTION, SOLUTION INTRAVENOUS at 08:35

## 2023-08-28 RX ADMIN — PROPOFOL 70 MG: 10 INJECTION, EMULSION INTRAVENOUS at 08:38

## 2023-08-28 RX ADMIN — LIDOCAINE HYDROCHLORIDE 50 MG: 10 INJECTION, SOLUTION EPIDURAL; INFILTRATION; INTRACAUDAL at 08:38

## 2023-08-28 RX ADMIN — PROPOFOL 30 MG: 10 INJECTION, EMULSION INTRAVENOUS at 08:40

## 2023-08-28 RX ADMIN — PROPOFOL 50 MG: 10 INJECTION, EMULSION INTRAVENOUS at 08:54

## 2023-08-28 RX ADMIN — PROPOFOL 30 MG: 10 INJECTION, EMULSION INTRAVENOUS at 08:43

## 2023-08-28 NOTE — H&P
History and Physical - [facility]  Syeda Fournier 65 y.o. female MRN: [id number]      HPI: Syeda Fournier is a 65y.o. year old female who presents for a positive family history of colon cancer      REVIEW OF SYSTEMS: Per the HPI, and otherwise unremarkable.     Historical Information   Past Medical History:   Diagnosis Date   • Anxiety disorder    • Aortic valve disorder    • BRCA1 negative    • BRCA2 negative    • Breast cancer ([address]) 03/2022    right   • Cancer ([address])     breast   • Cellulitis of finger of left hand 11/20/2018   • Cellulitis of left hand 11/10/2018    Added automatically from request for surgery [id number]   • Depression    • Disease of thyroid gland    • GERD (gastroesophageal reflux disease)    • History of gastroesophageal reflux (GERD)    • History of kidney cancer 03/06/2014   • History of transfusion    • Hyperlipidemia 03/06/2014   • Hypertension 03/06/2014   • Hypothyroidism 03/06/2014   • Lymphoma ([address])    • Malignant neoplasm of overlapping sites of right breast in female, estrogen receptor positive ([address]) 03/10/2022   • Shortness of breath    • Stroke ([address]) 4/20125816-2729    Min stroke found wile going through chemo and radiation   • TIA (transient ischemic attack)    • UTI (urinary tract infection)      Past Surgical History:   Procedure Laterality Date   • BREAST LUMPECTOMY Right 3/29/2022    Procedure: ITZEL  DIRECTED LUMPECTOMY;  Surgeon: Karoline Mendez MD;  Location: [facility];  Service: Surgical Oncology   • ESOPHAGOGASTRIC FUNDOPLASTY      Nissen Fundoplication   • HERNIA REPAIR     • LYMPH NODE BIOPSY Right 3/29/2022    Procedure: LYMPHATIC MAPPING WITH BLUE AND RADIOACTIVE DYES SENTINEL LYMPH NODE BIOPSY, INJECTION IN OR AT 0800 BY DR Mariam Puckett;  Surgeon: Karoline Mendez MD;  Location: [facility];  Service: Surgical Oncology   • MAMMO STEREOTACTIC BREAST BIOPSY RIGHT (ALL INC) Right 3/7/2022   • US BREAST ITZEL  NEEDLE LOC RIGHT Right 3/24/2022   • US GUIDED BREAST BIOPSY RIGHT COMPLETE Right 3/7/2022   • WRIST SURGERY Left      Social History   Social History     Substance and Sexual Activity   Alcohol Use Yes    Comment: Social holidays     Social History     Substance and Sexual Activity   Drug Use Never     Social History     Tobacco Use   Smoking Status Never   Smokeless Tobacco Never   Tobacco Comments    None     Family History   Problem Relation Age of Onset   • Stroke Father    • Leukemia Sister    • No Known Problems Brother    • Skin cancer Mother    • Stroke Maternal Grandmother    • No Known Problems Maternal Grandfather    • No Known Problems Paternal Grandmother    • No Known Problems Paternal Grandfather    • Breast cancer Sister    • Breast cancer Sister         Colon cancer   • Kidney cancer Brother    • Alcohol abuse Brother    • Kidney cancer Brother    • Stomach cancer Brother    • No Known Problems Daughter    • No Known Problems Daughter        Meds/Allergies     (Not in a hospital admission)      Allergies   Allergen Reactions   • Ciprofloxacin Hives   • Sulfa Antibiotics Itching   • Other    • Penicillins Rash     Category: Allergy;   --  Pt received unasyn 1.5 mg IV 11-12-18 to 11-15-18   • Sulfacetamide Rash     Category: Allergy;        Objective     Blood pressure 134/64, pulse 81, temperature 98.8 °F (37.1 °C), temperature source Oral, resp. rate 16, SpO2 92 %, not currently breastfeeding. PHYSICAL EXAM    Gen: NAD  CV: RRR  CHEST: Clear  ABD: soft, NT/ND  EXT: no edema      ASSESSMENT/PLAN:  This is a 65y.o. year old female here for colonoscopy, and she is stable and optimized for her procedure.

## 2023-08-28 NOTE — ANESTHESIA PREPROCEDURE EVALUATION
Procedure:  COLONOSCOPY    Relevant Problems   ANESTHESIA (within normal limits)      CARDIO   (+) Chest pain   (+) Essential hypertension   (+) Mixed hyperlipidemia      ENDO   (+) Hypothyroid      GI/HEPATIC   (+) Gastroesophageal reflux disease without esophagitis      /RENAL   (+) Stage 3 chronic kidney disease, unspecified whether stage 3a or 3b CKD (HCC)      GYN   (+) Malignant neoplasm of overlapping sites of right breast in female, estrogen receptor positive (HCC)      HEMATOLOGY   (+) Immunocompromised (HCC)   (+) Lymphoma (HCC)      NEURO/PSYCH   (+) Depression   (+) Late onset Alzheimer's disease without behavioral disturbance (HCC)   (+) Paresthesias   (+) Recurrent major depressive disorder, remission status unspecified (HCC)        Physical Exam    Airway    Mallampati score: II  TM Distance: >3 FB  Neck ROM: full     Dental   No notable dental hx     Cardiovascular  Rhythm: regular, Rate: normal,     Pulmonary  Breath sounds clear to auscultation,     Other Findings        Anesthesia Plan  ASA Score- 3     Anesthesia Type- IV sedation with anesthesia with ASA Monitors. Additional Monitors:   Airway Plan:           Plan Factors-Exercise tolerance (METS): >4 METS. Chart reviewed. EKG reviewed. Existing labs reviewed. Patient summary reviewed. Patient is not a current smoker. Induction-     Postoperative Plan-     Informed Consent- Anesthetic plan and risks discussed with patient. I personally reviewed this patient with the CRNA. Discussed and agreed on the Anesthesia Plan with the CRNA. Bethany Monreal

## 2023-08-28 NOTE — ANESTHESIA POSTPROCEDURE EVALUATION
Post-Op Assessment Note    CV Status:  Stable  Pain Score: 0    Pain management: adequate     Mental Status:  Awake   Hydration Status:  Stable   PONV Controlled:  None   Airway Patency:  Patent      Post Op Vitals Reviewed: Yes      Staff: CRNA         No notable events documented.     /64 (08/28/23 0900)    Temp 98.6 °F (37 °C) (08/28/23 0900)    Pulse 80 (08/28/23 0900)   Resp 16 (08/28/23 0900)    SpO2 98 % (08/28/23 0900)

## 2023-09-19 ENCOUNTER — OFFICE VISIT (OUTPATIENT)
Dept: INTERNAL MEDICINE CLINIC | Facility: CLINIC | Age: 78
End: 2023-09-19
Payer: COMMERCIAL

## 2023-09-19 VITALS
DIASTOLIC BLOOD PRESSURE: 76 MMHG | RESPIRATION RATE: 16 BRPM | SYSTOLIC BLOOD PRESSURE: 130 MMHG | HEIGHT: 62 IN | BODY MASS INDEX: 32.06 KG/M2 | OXYGEN SATURATION: 94 % | TEMPERATURE: 99.5 F | WEIGHT: 174.2 LBS | HEART RATE: 81 BPM

## 2023-09-19 DIAGNOSIS — I10 ESSENTIAL HYPERTENSION: ICD-10-CM

## 2023-09-19 DIAGNOSIS — E78.2 MIXED HYPERLIPIDEMIA: ICD-10-CM

## 2023-09-19 DIAGNOSIS — R39.15 URGENCY OF URINATION: ICD-10-CM

## 2023-09-19 DIAGNOSIS — F02.80 LATE ONSET ALZHEIMER'S DISEASE WITHOUT BEHAVIORAL DISTURBANCE (HCC): ICD-10-CM

## 2023-09-19 DIAGNOSIS — G30.1 LATE ONSET ALZHEIMER'S DISEASE WITHOUT BEHAVIORAL DISTURBANCE (HCC): ICD-10-CM

## 2023-09-19 DIAGNOSIS — N18.30 STAGE 3 CHRONIC KIDNEY DISEASE, UNSPECIFIED WHETHER STAGE 3A OR 3B CKD (HCC): ICD-10-CM

## 2023-09-19 DIAGNOSIS — Z00.00 MEDICARE ANNUAL WELLNESS VISIT, SUBSEQUENT: Primary | ICD-10-CM

## 2023-09-19 DIAGNOSIS — E87.6 HYPOKALEMIA: ICD-10-CM

## 2023-09-19 DIAGNOSIS — E03.9 HYPOTHYROIDISM, UNSPECIFIED TYPE: ICD-10-CM

## 2023-09-19 PROCEDURE — G0439 PPPS, SUBSEQ VISIT: HCPCS | Performed by: PHYSICIAN ASSISTANT

## 2023-09-19 PROCEDURE — 99214 OFFICE O/P EST MOD 30 MIN: CPT | Performed by: PHYSICIAN ASSISTANT

## 2023-09-19 RX ORDER — POTASSIUM CHLORIDE 750 MG/1
10 TABLET, EXTENDED RELEASE ORAL DAILY
Qty: 10 TABLET | Refills: 0 | Status: SHIPPED | OUTPATIENT
Start: 2023-09-19 | End: 2023-09-29

## 2023-09-19 NOTE — PROGRESS NOTES
Assessment and Plan:   Continue current medications. Continue follow-up with specialist.  Try completing 5 wishes and bring to next visit. Schedule follow-up in 4 months with repeat labs for that visit. If urinary symptoms persist, have urinalysis and culture done. We will then contact you with results. Problem List Items Addressed This Visit        Endocrine    Hypothyroid    Relevant Orders    TSH, 3rd generation    T4, free       Cardiovascular and Mediastinum    Essential hypertension    Relevant Orders    CBC and differential    Comprehensive metabolic panel       Nervous and Auditory    Late onset Alzheimer's disease without behavioral disturbance (HCC)       Genitourinary    Stage 3 chronic kidney disease, unspecified whether stage 3a or 3b CKD ([address])       Other    Mixed hyperlipidemia    Relevant Orders    Lipid panel    Hypokalemia    Relevant Medications    potassium chloride (K-DUR,KLOR-CON) 10 mEq tablet   Other Visit Diagnoses     Medicare annual wellness visit, subsequent    -  Primary    Urgency of urination        Relevant Orders    Urinalysis with microscopic    Urine culture        BMI Counseling: Body mass index is 31.86 kg/m². Follow-up plan was not completed due to elderly patient (71 years old) where weight reduction/weight gain would complicate underlying health condition such as: illness or physical disability. Preventive health issues were discussed with patient, and age appropriate screening tests were ordered as noted in patient's After Visit Summary. Personalized health advice and appropriate referrals for health education or preventive services given if needed, as noted in patient's After Visit Summary. History of Present Illness:     Patient presents for a Medicare Wellness Visit. Questionnaire has been reviewed, clarified, and updated with the patient today. Follow-up, no labs done for this visit, patient present with her daughter. Hypothyroid on replacement. Asymptomatic. Euthyroid by previous labs. Continue levothyroxine 125 mcg daily. Labs to be done for next visit. Hypertension: Good control on current medication of amlodipine, and furosemide. [place] cp, palp, sob, edema, HA, dizziness, diaphoresis, syncope, visual disturbance. Patient will continue these medications. Alzheimer's disease: Followed by neurology, on Exelon patch, Arimidex, and Namenda. Daughter reports stable behavior. Patient cannot remember her medications. Stage III CKD: Staying hydrated, avoiding NSAIDs. Previous labs stable. Hyperlipidemia: Not currently on any statin medication. Having what she is describing as some urinary spasms after she urinates. She is concerned about a urinary tract infection. We will make orders available to do urinalysis and culture if needed. Patient Care Team:  Bernie Jasso MD as PCP - Soumya Long MD as Surgeon (Surgical Oncology)  Tremayne Campbell MD (Hematology and Oncology)  Cierra Colbert MD (Radiation Oncology)     Review of Systems:     Review of Systems   Constitutional: Negative for activity change, chills, fatigue and fever. HENT: Negative for congestion. Eyes: Negative for discharge. Respiratory: Negative for cough, chest tightness and shortness of breath. Cardiovascular: Negative for chest pain, palpitations and leg swelling. Gastrointestinal: Positive for constipation and diarrhea. Negative for abdominal pain, blood in stool, nausea and vomiting. Endocrine: Negative for polydipsia, polyphagia and polyuria. Genitourinary: Negative for difficulty urinating. Musculoskeletal: Negative for arthralgias and myalgias. Skin: Negative for rash. Allergic/Immunologic: Negative for immunocompromised state. Neurological: Negative for dizziness, syncope, weakness, light-headedness and headaches. Hematological: Negative for adenopathy. Does not bruise/bleed easily.    Psychiatric/Behavioral: Negative for dysphoric mood, sleep disturbance and suicidal ideas. The patient is not nervous/anxious.          Problem List:     Patient Active Problem List   Diagnosis   • Depression   • Gastroesophageal reflux disease without esophagitis   • Mixed hyperlipidemia   • Essential hypertension   • Hypothyroid   • Impaired fasting glucose   • Lymphoma (HCC)   • Cerebral aneurysm, nonruptured   • Mild cognitive impairment   • Flexor tenosynovitis of finger   • Macrocytosis without anemia   • Paresthesias   • Non-intractable vomiting with nausea   • Hypokalemia   • Late onset Alzheimer's disease without behavioral disturbance (HCC)   • Chest pain   • Near syncope   • Immunocompromised (HCC)   • Malignant neoplasm of overlapping sites of right breast in female, estrogen receptor positive ([address])   • Use of anastrozole   • Recurrent major depressive disorder, remission status unspecified ([address])   • Stage 3 chronic kidney disease, unspecified whether stage 3a or 3b CKD ([address])   • History of lumpectomy of right breast      Past Medical and Surgical History:     Past Medical History:   Diagnosis Date   • Anxiety disorder    • Aortic valve disorder    • BRCA1 negative    • BRCA2 negative    • Breast cancer ([address]) 03/2022    right   • Cancer ([address])     breast   • Cellulitis of finger of left hand 11/20/2018   • Cellulitis of left hand 11/10/2018    Added automatically from request for surgery [id number]   • Depression    • Disease of thyroid gland    • GERD (gastroesophageal reflux disease)    • History of gastroesophageal reflux (GERD)    • History of kidney cancer 03/06/2014   • History of transfusion    • Hyperlipidemia 03/06/2014   • Hypertension 03/06/2014   • Hypothyroidism 03/06/2014   • Lymphoma ([address])    • Malignant neoplasm of overlapping sites of right breast in female, estrogen receptor positive ([address]) 03/10/2022   • Shortness of breath    • Stroke ([address]) [id number]/[id number]    Min stroke found wile going through chemo and radiation   • TIA (transient ischemic attack)    • UTI (urinary tract infection)      Past Surgical History:   Procedure Laterality Date   • BREAST LUMPECTOMY Right 3/29/2022    Procedure: ITZEL  DIRECTED LUMPECTOMY;  Surgeon: Prosper Swanson MD;  Location: [facility];  Service: Surgical Oncology   • ESOPHAGOGASTRIC FUNDOPLASTY      Nissen Fundoplication   • HERNIA REPAIR     • LYMPH NODE BIOPSY Right 3/29/2022    Procedure: LYMPHATIC MAPPING WITH BLUE AND RADIOACTIVE DYES SENTINEL LYMPH NODE BIOPSY, INJECTION IN OR AT 0800 BY DR Lord Xie;  Surgeon: Prosper Swanson MD;  Location: [facility];  Service: Surgical Oncology   • MAMMO STEREOTACTIC BREAST BIOPSY RIGHT (ALL INC) Right 3/7/2022   • US BREAST ITZEL  NEEDLE LOC RIGHT Right 3/24/2022   • US GUIDED BREAST BIOPSY RIGHT COMPLETE Right 3/7/2022   • WRIST SURGERY Left       Family History:     Family History   Problem Relation Age of Onset   • Stroke Father    • Leukemia Sister    • No Known Problems Brother    • Skin cancer Mother    • Stroke Maternal Grandmother    • No Known Problems Maternal Grandfather    • No Known Problems Paternal Grandmother    • No Known Problems Paternal Grandfather    • Breast cancer Sister    • Breast cancer Sister         Colon cancer   • Kidney cancer Brother    • Alcohol abuse Brother    • Kidney cancer Brother    • Stomach cancer Brother    • No Known Problems Daughter    • No Known Problems Daughter       Social History:     Social History     Socioeconomic History   • Marital status:       Spouse name: None   • Number of children: 2   • Years of education: None   • Highest education level: None   Occupational History   • None   Tobacco Use   • Smoking status: Never   • Smokeless tobacco: Never   • Tobacco comments:     None   Vaping Use   • Vaping Use: Never used   Substance and Sexual Activity   • Alcohol use: Yes     Comment: Social holidays   • Drug use: Never   • Sexual activity: Not Currently     Comment: [place] over   Other Topics Concern   • None   Social History Narrative   • None     Social Determinants of Health     Financial Resource Strain: Low Risk  (9/19/2023)    Overall Financial Resource Strain (CARDIA)    • Difficulty of Paying Living Expenses: Not hard at all   Food Insecurity: Not on file   Transportation Needs: No Transportation Needs (9/19/2023)    PRAPARE - Transportation    • Lack of Transportation (Medical): No    • Lack of Transportation (Non-Medical): No   Physical Activity: Not on file   Stress: Not on file   Social Connections: Not on file   Intimate Partner Violence: Not on file   Housing Stability: Not on file      Medications and Allergies:     Current Outpatient Medications   Medication Sig Dispense Refill   • amLODIPine (NORVASC) 2.5 mg tablet Take 2 tablets (5 mg total) by mouth daily 180 tablet 1   • anastrozole (ARIMIDEX) 1 mg tablet TAKE 1 TABLET BY MOUTH EVERY DAY 90 tablet 0   • aspirin 325 mg tablet Take 325 mg by mouth daily      • cholecalciferol (VITAMIN D3) 1,000 units tablet Take 1 tablet (1,000 Units total) by mouth daily 30 tablet 6   • dicyclomine (BENTYL) 20 mg tablet Take 1 tablet (20 mg total) by mouth 2 (two) times a day 20 tablet 0   • diphenhydrAMINE (BENADRYL) 25 mg tablet Take 25 mg by mouth in the morning. 1/2 tablet with Paxil .      • fluticasone (FLONASE) 50 mcg/act nasal spray INSTILL 1 SPRAY INTO EACH NOSTRIL AS NEEDED FOR RHINITIS OR ALLERGIES 48 mL 1   • furosemide (LASIX) 40 mg tablet Take 1 tablet (40 mg total) by mouth daily for 2 days 2 tablet 1   • levothyroxine 125 mcg tablet Take 1 tablet (125 mcg total) by mouth daily 90 tablet 1   • loperamide (IMODIUM) 2 mg capsule Take 1 capsule (2 mg total) by mouth 4 (four) times a day as needed for diarrhea 12 capsule 0   • meclizine (ANTIVERT) 25 mg tablet Take 1 tablet (25 mg total) by mouth every 8 (eight) hours as needed for dizziness 50 tablet 1   • memantine (NAMENDA) 10 mg tablet Take 1 tablet (10 mg total) by mouth 2 (two) times a day 180 tablet 1   • Mirabegron ER (Myrbetriq) 50 MG TB24 Take 1 tablet (50 mg total) by mouth daily 90 tablet 1   • multivitamin (THERAGRAN) TABS Take 1 tablet by mouth     • omeprazole (PriLOSEC) 20 mg delayed release capsule Take 1 capsule (20 mg total) by mouth 2 (two) times a day 180 capsule 1   • ondansetron (ZOFRAN-ODT) 4 mg disintegrating tablet Take 1 tablet (4 mg total) by mouth every 8 (eight) hours as needed for nausea 15 tablet 0   • PARoxetine (PAXIL) 20 mg tablet Take 1 tablet (20 mg total) by mouth daily 90 tablet 1   • potassium chloride (K-DUR,KLOR-CON) 10 mEq tablet Take 1 tablet (10 mEq total) by mouth daily for 10 days 10 tablet 0   • PROAIR  (90 Base) MCG/ACT inhaler as needed      • rivastigmine (EXELON) 9.5 mg/24 hr TD 24 hr patch Place 1 patch on the skin over 24 hours daily 90 patch 1   • cholestyramine (QUESTRAN) 4 g packet Take 1 packet (4 g total) by mouth daily (Patient not taking: Reported on 9/19/2023) 90 packet 1     No current facility-administered medications for this visit. Allergies   Allergen Reactions   • Ciprofloxacin Hives   • Sulfa Antibiotics Itching   • Other    • Penicillins Rash     Category: Allergy;   --  Pt received unasyn 1.5 mg IV 11-12-18 to 11-15-18   • Sulfacetamide Rash     Category:  Allergy;       Immunizations:     Immunization History   Administered Date(s) Administered   • COVID-19 MODERNA VACC 0.5 ML IM 02/10/2021, 03/08/2021   • COVID-19 PFIZER VACCINE 0.3 ML IM 12/01/2021, 12/01/2021   • INFLUENZA 11/03/2010, 11/12/2012, 10/28/2022   • Influenza Split High Dose Preservative Free IM 01/19/2018   • Influenza, high dose seasonal 0.7 mL 01/21/2019, 11/11/2019, 10/05/2020, 10/11/2021, 10/28/2022   • Pneumococcal Polysaccharide PPV23 09/16/2011, 11/11/2019   • Rabies-IM Human Diploid Cell Culture 11/10/2018   • Tdap 11/10/2018      Health Maintenance:         Topic Date Due   • Hepatitis C Screening  Never done   • Breast Cancer Screening: Mammogram  02/15/2024   • Colorectal Cancer Screening  Discontinued         Topic Date Due   • Pneumococcal Vaccine: 65+ Years (3 - PCV) 11/11/2020   • COVID-19 Vaccine (5 - Booster) 01/26/2022   • Influenza Vaccine (1) 09/01/2023      Medicare Screening Tests and Risk Assessments:     Rick Urena is here for her Subsequent Wellness visit. Last Medicare Wellness visit information reviewed, patient interviewed and updates made to the record today. Health Risk Assessment:   Patient rates overall health as good. Patient feels that their physical health rating is same. Patient is satisfied with their life. Eyesight was rated as same. Hearing was rated as same. Patient feels that their emotional and mental health rating is same. Patients states they are never, rarely angry. Patient states they are often unusually tired/fatigued. Pain experienced in the last 7 days has been none. Patient states that she has experienced no weight loss or gain in last 6 months. Depression Screening:   PHQ-9 Score: 0      Fall Risk Screening: In the past year, patient has experienced: no history of falling in past year      Urinary Incontinence Screening:   Patient has leaked urine accidently in the last six months. Home Safety:  Patient does not have trouble with stairs inside or outside of their home. Patient has working smoke alarms and has working carbon monoxide detector. Home safety hazards include: none. Nutrition:   Current diet is Regular. Medications:   Patient is currently taking over-the-counter supplements. OTC medications include: see medication list. Patient is able to manage medications. Pt daughter does help with medications. Activities of Daily Living (ADLs)/Instrumental Activities of Daily Living (IADLs):   Walk and transfer into and out of bed and chair?: Yes  Dress and groom yourself?: Yes    Bathe or shower yourself?: Yes    Feed yourself?  Yes  Do your laundry/housekeeping?: Yes  Manage your money, pay your bills and track your expenses?: Yes  Make your own meals?: Yes    Do your own shopping?: No    ADL comments: Pt daughter helps with the shopping. Previous Hospitalizations:   Any hospitalizations or ED visits within the last 12 months?: No      Advance Care Planning:   Living will: Yes    Advanced directive: Yes    Five wishes given: Yes      Cognitive Screening:   Provider or family/friend/caregiver concerned regarding cognition?: Yes    Cognition Comments: Follows with [facility]    PREVENTIVE SCREENINGS      Cardiovascular Screening:    General: Screening Not Indicated and History Lipid Disorder    Due for: Lipid Panel      Diabetes Screening:     General: Screening Current    Due for: Blood Glucose      Breast Cancer Screening:     General: History Breast Cancer and Screening Current      Cervical Cancer Screening:    General: Screening Not Indicated      Osteoporosis Screening:    General: Screening Current      Abdominal Aortic Aneurysm (AAA) Screening:        General: Screening Current      Lung Cancer Screening:     General: Screening Not Indicated      Hepatitis C Screening:    General: Screening Not Indicated    Screening, Brief Intervention, and Referral to Treatment (SBIRT)    Screening  Typical number of drinks in a day: 0  Typical number of drinks in a week: 0  Interpretation: Low risk drinking behavior.     AUDIT-C Screenin) How often did you have a drink containing alcohol in the past year? never  2) How many drinks did you have on a typical day when you were drinking in the past year? 0  3) How often did you have 6 or more drinks on one occasion in the past year? never    AUDIT-C Score: 0  Interpretation: Score 0-2 (female): Negative screen for alcohol misuse    Single Item Drug Screening:  How often have you used an illegal drug (including marijuana) or a prescription medication for non-medical reasons in the past year? never    Single Item Drug Screen Score: 0  Interpretation: Negative screen for possible drug use disorder    Brief Intervention  Alcohol & drug use screenings were reviewed. No concerns regarding substance use disorder identified. No results found. Physical Exam:     /76 (BP Location: Left arm, Patient Position: Sitting, Cuff Size: Adult)   Pulse 81   Temp 99.5 °F (37.5 °C) (Tympanic)   Resp 16   Ht 5' 2" (1.575 m)   Wt 79 kg (174 lb 3.2 oz)   SpO2 94%   BMI 31.86 kg/m²     Physical Exam  Constitutional:       General: She is not in acute distress. Appearance: Normal appearance. She is not ill-appearing. HENT:      Head: Normocephalic. Eyes:      Extraocular Movements: Extraocular movements intact. Pupils: Pupils are equal, round, and reactive to light. Neck:      Thyroid: No thyromegaly. Vascular: No carotid bruit. Trachea: Trachea normal.   Cardiovascular:      Rate and Rhythm: Normal rate and regular rhythm. Heart sounds: No murmur heard. Pulmonary:      Effort: Pulmonary effort is normal. No respiratory distress. Breath sounds: Normal breath sounds. Musculoskeletal:         General: No swelling. Cervical back: Neck supple. Right lower leg: No edema. Left lower leg: No edema. Lymphadenopathy:      Cervical: No cervical adenopathy. Skin:     General: Skin is warm and dry. Findings: No rash. Neurological:      General: No focal deficit present. Mental Status: She is alert. Mental status is at baseline.       Comments: Ambulates with use of a cane   Psychiatric:         Mood and Affect: Mood normal.         Behavior: Behavior normal.          Mariam Luna PA-C

## 2023-09-19 NOTE — PATIENT INSTRUCTIONS
Continue current medications. Continue follow-up with specialist.  Try completing 5 wishes and bring to next visit. Schedule follow-up in 4 months with repeat labs for that visit. If urinary symptoms persist, have urinalysis and culture done. We will then contact you with results. Medicare Preventive Visit Patient Instructions  Thank you for completing your Welcome to Medicare Visit or Medicare Annual Wellness Visit today. Your next wellness visit will be due in one year (9/19/2024). The screening/preventive services that you may require over the next 5-10 years are detailed below. Some tests may not apply to you based off risk factors and/or age. Screening tests ordered at today's visit but not completed yet may show as past due. Also, please note that scanned in results may not display below. Preventive Screenings:  Service Recommendations Previous Testing/Comments   Colorectal Cancer Screening  * Colonoscopy    * Fecal Occult Blood Test (FOBT)/Fecal Immunochemical Test (FIT)  * Fecal DNA/Cologuard Test  * Flexible Sigmoidoscopy Age: 43-73 years old   Colonoscopy: every 10 years (may be performed more frequently if at higher risk)  OR  FOBT/FIT: every 1 year  OR  Cologuard: every 3 years  OR  Sigmoidoscopy: every 5 years  Screening may be recommended earlier than age 39 if at higher risk for colorectal cancer. Also, an individualized decision between you and your healthcare provider will decide whether screening between the ages of 77-80 would be appropriate. Colonoscopy: 08/28/2023  FOBT/FIT: Not on file  Cologuard: Not on file  Sigmoidoscopy: Not on file          Breast Cancer Screening Age: 36 years old  Frequency: every 1-2 years  Not required if history of left and right mastectomy Mammogram: 02/15/2023    History Breast Cancer   Cervical Cancer Screening Between the ages of 21-29, pap smear recommended once every 3 years.    Between the ages of 32-69, can perform pap smear with HPV co-testing every 5 years. Recommendations may differ for women with a history of total hysterectomy, cervical cancer, or abnormal pap smears in past. Pap Smear: Not on file    Screening Not Indicated   Hepatitis C Screening Once for adults born between 1945 and 1965  More frequently in patients at high risk for Hepatitis C Hep C Antibody: Not on file        Diabetes Screening 1-2 times per year if you're at risk for diabetes or have pre-diabetes Fasting glucose: 151 mg/dL (7/18/2023)  A1C: 5.4 % (9/14/2021)  Screening Current   Cholesterol Screening Once every 5 years if you don't have a lipid disorder. May order more often based on risk factors. Lipid panel: 09/14/2021    Screening Not Indicated  History Lipid Disorder     Other Preventive Screenings Covered by Medicare:  Abdominal Aortic Aneurysm (AAA) Screening: covered once if your at risk. You're considered to be at risk if you have a family history of AAA. Lung Cancer Screening: covers low dose CT scan once per year if you meet all of the following conditions: (1) Age 48-67; (2) No signs or symptoms of lung cancer; (3) Current smoker or have quit smoking within the last 15 years; (4) You have a tobacco smoking history of at least 20 pack years (packs per day multiplied by number of years you smoked); (5) You get a written order from a healthcare provider. Glaucoma Screening: covered annually if you're considered high risk: (1) You have diabetes OR (2) Family history of glaucoma OR (3)  aged 48 and older OR (3)  American aged 72 and older  Osteoporosis Screening: covered every 2 years if you meet one of the following conditions: (1) You're estrogen deficient and at risk for osteoporosis based off medical history and other findings; (2) Have a vertebral abnormality; (3) On glucocorticoid therapy for more than 3 months; (4) Have primary hyperparathyroidism; (5) On osteoporosis medications and need to assess response to drug therapy. Last bone density test (DXA Scan): 08/02/2022. HIV Screening: covered annually if you're between the age of 14-79. Also covered annually if you are younger than 13 and older than 72 with risk factors for HIV infection. For pregnant patients, it is covered up to 3 times per pregnancy. Immunizations:  Immunization Recommendations   Influenza Vaccine Annual influenza vaccination during flu season is recommended for all persons aged >= 6 months who do not have contraindications   Pneumococcal Vaccine   * Pneumococcal conjugate vaccine = PCV13 (Prevnar 13), PCV15 (Vaxneuvance), PCV20 (Prevnar 20)  * Pneumococcal polysaccharide vaccine = PPSV23 (Pneumovax) Adults 20-63 years old: 1-3 doses may be recommended based on certain risk factors  Adults 72 years old: 1-2 doses may be recommended based off what pneumonia vaccine you previously received   Hepatitis B Vaccine 3 dose series if at intermediate or high risk (ex: diabetes, end stage renal disease, liver disease)   Tetanus (Td) Vaccine - COST NOT COVERED BY MEDICARE PART B Following completion of primary series, a booster dose should be given every 10 years to maintain immunity against tetanus. Td may also be given as tetanus wound prophylaxis. Tdap Vaccine - COST NOT COVERED BY MEDICARE PART B Recommended at least once for all adults. For pregnant patients, recommended with each pregnancy. Shingles Vaccine (Shingrix) - COST NOT COVERED BY MEDICARE PART B  2 shot series recommended in those aged 48 and above     Health Maintenance Due:      Topic Date Due    Hepatitis C Screening  Never done    Breast Cancer Screening: Mammogram  02/15/2024    Colorectal Cancer Screening  Discontinued     Immunizations Due:      Topic Date Due    Pneumococcal Vaccine: 65+ Years (3 - PCV) 11/11/2020    COVID-19 Vaccine (5 - Booster) 01/26/2022    Influenza Vaccine (1) 09/01/2023     Advance Directives   What are advance directives?   Advance directives are legal documents that state your wishes and plans for medical care. These plans are made ahead of time in case you lose your ability to make decisions for yourself. Advance directives can apply to any medical decision, such as the treatments you want, and if you want to donate organs. What are the types of advance directives? There are many types of advance directives, and each state has rules about how to use them. You may choose a combination of any of the following:  Living will: This is a written record of the treatment you want. You can also choose which treatments you do not want, which to limit, and which to stop at a certain time. This includes surgery, medicine, IV fluid, and tube feedings. Durable power of  for [facility]): This is a written record that states who you want to make healthcare choices for you when you are unable to make them for yourself. This person, called a proxy, is usually a family member or a friend. You may choose more than 1 proxy. Do not resuscitate (DNR) order:  A DNR order is used in case your heart stops beating or you stop breathing. It is a request not to have certain forms of treatment, such as CPR. A DNR order may be included in other types of advance directives. Medical directive: This covers the care that you want if you are in a coma, near death, or unable to make decisions for yourself. You can list the treatments you want for each condition. Treatment may include pain medicine, surgery, blood transfusions, dialysis, IV or tube feedings, and a ventilator (breathing machine). Values history: This document has questions about your views, beliefs, and how you feel and think about life. This information can help others choose the care that you would choose. Why are advance directives important? An advance directive helps you control your care. Although spoken wishes may be used, it is better to have your wishes written down.  Spoken wishes can be misunderstood, or not followed. Treatments may be given even if you do not want them. An advance directive may make it easier for your family to make difficult choices about your care. Urinary Incontinence   Urinary incontinence (UI)  is when you lose control of your bladder. UI develops because your bladder cannot store or empty urine properly. The 3 most common types of UI are stress incontinence, urge incontinence, or both. Medicines:   May be given to help strengthen your bladder control. Report any side effects of medication to your healthcare provider. Do pelvic muscle exercises often:  Your pelvic muscles help you stop urinating. Squeeze these muscles tight for 5 seconds, then relax for 5 seconds. Gradually work up to squeezing for 10 seconds. Do 3 sets of 15 repetitions a day, or as directed. This will help strengthen your pelvic muscles and improve bladder control. Train your bladder:  Go to the bathroom at set times, such as every 2 hours, even if you do not feel the urge to go. You can also try to hold your urine when you feel the urge to go. For example, hold your urine for 5 minutes when you feel the urge to go. As that becomes easier, hold your urine for 10 minutes. Self-care:   Keep a UI record. Write down how often you leak urine and how much you leak. Make a note of what you were doing when you leaked urine. Drink liquids as directed. You may need to limit the amount of liquid you drink to help control your urine leakage. Do not drink any liquid right before you go to bed. Limit or do not have drinks that contain caffeine or alcohol. Prevent constipation. Eat a variety of high-fiber foods. Good examples are high-fiber cereals, beans, vegetables, and whole-grain breads. Walking is the best way to trigger your intestines to have a bowel movement. Exercise regularly and maintain a healthy weight. Weight loss and exercise will decrease pressure on your bladder and help you control your leakage.    Use a catheter as directed  to help empty your bladder. A catheter is a tiny, plastic tube that is put into your bladder to drain your urine. Go to behavior therapy as directed. Behavior therapy may be used to help you learn to control your urge to urinate. Weight Management   Why it is important to manage your weight:  Being overweight increases your risk of health conditions such as heart disease, high blood pressure, type 2 diabetes, and certain types of cancer. It can also increase your risk for osteoarthritis, sleep apnea, and other respiratory problems. Aim for a slow, steady weight loss. Even a small amount of weight loss can lower your risk of health problems. How to lose weight safely:  A safe and healthy way to lose weight is to eat fewer calories and get regular exercise. You can lose up about 1 pound a week by decreasing the number of calories you eat by 500 calories each day. Healthy meal plan for weight management:  A healthy meal plan includes a variety of foods, contains fewer calories, and helps you stay healthy. A healthy meal plan includes the following:  Eat whole-grain foods more often. A healthy meal plan should contain fiber. Fiber is the part of grains, fruits, and vegetables that is not broken down by your body. Whole-grain foods are healthy and provide extra fiber in your diet. Some examples of whole-grain foods are whole-wheat breads and pastas, oatmeal, brown rice, and bulgur. Eat a variety of vegetables every day. Include dark, leafy greens such as spinach, kale, neil greens, and mustard greens. Eat yellow and orange vegetables such as carrots, sweet potatoes, and winter squash. Eat a variety of fruits every day. Choose fresh or canned fruit (canned in its own juice or light syrup) instead of juice. Fruit juice has very little or no fiber. Eat low-fat dairy foods. Drink fat-free (skim) milk or 1% milk. Eat fat-free yogurt and low-fat cottage cheese.  Try low-fat cheeses such as mozzarella and other reduced-fat cheeses. Choose meat and other protein foods that are low in fat. Choose beans or other legumes such as split peas or lentils. Choose fish, skinless poultry (chicken or turkey), or lean cuts of red meat (beef or pork). Before you cook meat or poultry, cut off any visible fat. Use less fat and oil. Try baking foods instead of frying them. Add less fat, such as margarine, sour cream, regular salad dressing and mayonnaise to foods. Eat fewer high-fat foods. Some examples of high-fat foods include french fries, doughnuts, ice cream, and cakes. Eat fewer sweets. Limit foods and drinks that are high in sugar. This includes candy, cookies, regular soda, and sweetened drinks. Exercise:  Exercise at least 30 minutes per day on most days of the week. Some examples of exercise include walking, biking, dancing, and swimming. You can also fit in more physical activity by taking the stairs instead of the elevator or parking farther away from stores. Ask your healthcare provider about the best exercise plan for you. © Copyright [employer] 2018 Information is for End User's use only and may not be sold, redistributed or otherwise used for commercial purposes.  All illustrations and images included in CareNotes® are the copyrighted property of [employer] or [address] [place] [address]

## 2023-09-20 ENCOUNTER — TELEPHONE (OUTPATIENT)
Dept: HEMATOLOGY ONCOLOGY | Facility: CLINIC | Age: 78
End: 2023-09-20

## 2023-09-20 NOTE — TELEPHONE ENCOUNTER
I called [facility] regarding an appointment that they have scheduled with Dr. Gricelda Shin scheduled on 10/18/2023     I left a voicemail explaining to patient that this appointment will need to be rescheduled due to a change in the providers schedule. Patient was advised to call [facility] [place] to reschedule.   Another attempt will be made to reschedule

## 2023-09-21 ENCOUNTER — TELEPHONE (OUTPATIENT)
Dept: HEMATOLOGY ONCOLOGY | Facility: CLINIC | Age: 78
End: 2023-09-21

## 2023-09-21 NOTE — TELEPHONE ENCOUNTER
ALEN  DR candace HENRIQUEZ   Who are you speaking with? Patient   If it is not the patient, are they listed on an active communication consent form? N/A   Is this a ALEN or DR candace HENRIQUEZ ALEN   Which provider is patient currently scheduled or established with? Dr. Candis Vasquez   What is the original appointment date and time? 10/18/23 3:00pm   At which location is the appointment scheduled to take place[facility]   Which provider is the patient transitioning care to? Dr. José Miguel Meeks   What is the new appointment date and time? 10/30/23 2:20pm   At which location is the new appointment scheduled to take place[facility]   What is the reason for this change?  Change in provider's schedule

## 2023-10-12 DIAGNOSIS — K52.9 CHRONIC DIARRHEA: ICD-10-CM

## 2023-10-12 DIAGNOSIS — F32.A DEPRESSION, UNSPECIFIED DEPRESSION TYPE: ICD-10-CM

## 2023-10-12 RX ORDER — CHOLESTYRAMINE 4 G/9G
POWDER, FOR SUSPENSION ORAL
Qty: 90 PACKET | Refills: 3 | Status: SHIPPED | OUTPATIENT
Start: 2023-10-12

## 2023-10-12 RX ORDER — PAROXETINE HYDROCHLORIDE 20 MG/1
20 TABLET, FILM COATED ORAL DAILY
Qty: 90 TABLET | Refills: 3 | Status: SHIPPED | OUTPATIENT
Start: 2023-10-12

## 2023-10-23 ENCOUNTER — TELEPHONE (OUTPATIENT)
Dept: NEUROLOGY | Facility: CLINIC | Age: 78
End: 2023-10-23

## 2023-10-27 ENCOUNTER — OFFICE VISIT (OUTPATIENT)
Dept: NEUROLOGY | Facility: CLINIC | Age: 78
End: 2023-10-27
Payer: COMMERCIAL

## 2023-10-27 VITALS
HEART RATE: 84 BPM | BODY MASS INDEX: 32.91 KG/M2 | SYSTOLIC BLOOD PRESSURE: 140 MMHG | DIASTOLIC BLOOD PRESSURE: 90 MMHG | HEIGHT: 61 IN | OXYGEN SATURATION: 96 %

## 2023-10-27 DIAGNOSIS — E78.2 MIXED HYPERLIPIDEMIA: ICD-10-CM

## 2023-10-27 DIAGNOSIS — I10 ESSENTIAL HYPERTENSION: ICD-10-CM

## 2023-10-27 DIAGNOSIS — F02.80 LATE ONSET ALZHEIMER'S DISEASE WITHOUT BEHAVIORAL DISTURBANCE (HCC): Primary | ICD-10-CM

## 2023-10-27 DIAGNOSIS — G30.1 LATE ONSET ALZHEIMER'S DISEASE WITHOUT BEHAVIORAL DISTURBANCE (HCC): Primary | ICD-10-CM

## 2023-10-27 PROCEDURE — 99214 OFFICE O/P EST MOD 30 MIN: CPT | Performed by: PSYCHIATRY & NEUROLOGY

## 2023-10-27 NOTE — PROGRESS NOTES
Willim Siemens is a 65 y.o. female. Chief Complaint   Patient presents with    Late onset Alzheimer's disease without behavioral disturbanc       Assessment:  1. Late onset Alzheimer's disease without behavioral disturbance ([address])    2. Essential hypertension    3. Mixed hyperlipidemia          Plan:  Check blood work. Continue with Exelon patch. Continue with Namenda 10 mg twice a day. Discussion:  Patient does have moderate dementia with a MoCA of 16/30, her last Dyer was 21/30 she is currently on Exelon patch and Namenda have advised him to continue with same continue with the multivitamin continue with mentally stimulating exercises at home would refer patient to go for cognitive behavioral therapy and check routine blood work, to keep a blood pressure cholesterol sugar and weight under control to eat healthy nutritious food to take a multivitamin ,to go to the hospital if has any worsening symptoms and call me otherwise to see me back in 6 months or sooner if needed and follow-up with the other physicians    Subjective:    HPI   Patient is here in follow-up for her memory difficulty, since her last visit she feels her memory is doing about the same she stays with her daughter her mood is good except at times she feels slightly lost thinking about her  and grandson but otherwise she is okay no suicidal or homicidal thoughts denies any headaches no vision difficulty she continues to do word search at home no speech difficulty no hallucination she ambulates with a cane her appetite is good weight has been stable, no other complaints.     Vitals:    10/27/23 1424   BP: 140/90   BP Location: Left arm   Patient Position: Sitting   Cuff Size: Standard   Pulse: 84   SpO2: 96%   Height: 5' 1" (1.549 m)       Current Medications    Current Outpatient Medications:     amLODIPine (NORVASC) 2.5 mg tablet, Take 2 tablets (5 mg total) by mouth daily, Disp: 180 tablet, Rfl: 1    anastrozole (ARIMIDEX) 1 mg tablet, TAKE 1 TABLET BY MOUTH EVERY DAY, Disp: 90 tablet, Rfl: 0    aspirin 325 mg tablet, Take 325 mg by mouth daily , Disp: , Rfl:     cholecalciferol (VITAMIN D3) 1,000 units tablet, Take 1 tablet (1,000 Units total) by mouth daily, Disp: 30 tablet, Rfl: 6    cholestyramine (QUESTRAN) 4 g packet, TAKE 1 PACKET BY MOUTH DAILY. (Patient taking differently: if needed), Disp: 90 packet, Rfl: 3    dicyclomine (BENTYL) 20 mg tablet, Take 1 tablet (20 mg total) by mouth 2 (two) times a day, Disp: 20 tablet, Rfl: 0    diphenhydrAMINE (BENADRYL) 25 mg tablet, Take 25 mg by mouth in the morning.  1/2 tablet with Paxil ., Disp: , Rfl:     fluticasone (FLONASE) 50 mcg/act nasal spray, INSTILL 1 SPRAY INTO EACH NOSTRIL AS NEEDED FOR RHINITIS OR ALLERGIES, Disp: 48 mL, Rfl: 1    furosemide (LASIX) 40 mg tablet, Take 1 tablet (40 mg total) by mouth daily for 2 days, Disp: 2 tablet, Rfl: 1    levothyroxine 125 mcg tablet, Take 1 tablet (125 mcg total) by mouth daily, Disp: 90 tablet, Rfl: 1    loperamide (IMODIUM) 2 mg capsule, Take 1 capsule (2 mg total) by mouth 4 (four) times a day as needed for diarrhea, Disp: 12 capsule, Rfl: 0    meclizine (ANTIVERT) 25 mg tablet, Take 1 tablet (25 mg total) by mouth every 8 (eight) hours as needed for dizziness, Disp: 50 tablet, Rfl: 1    memantine (NAMENDA) 10 mg tablet, Take 1 tablet (10 mg total) by mouth 2 (two) times a day, Disp: 180 tablet, Rfl: 1    multivitamin (THERAGRAN) TABS, Take 1 tablet by mouth, Disp: , Rfl:     Myrbetriq 50 MG TB24, TAKE 1 TABLET BY MOUTH EVERY DAY, Disp: 90 tablet, Rfl: 3    omeprazole (PriLOSEC) 20 mg delayed release capsule, Take 1 capsule (20 mg total) by mouth 2 (two) times a day, Disp: 180 capsule, Rfl: 1    ondansetron (ZOFRAN-ODT) 4 mg disintegrating tablet, Take 1 tablet (4 mg total) by mouth every 8 (eight) hours as needed for nausea, Disp: 15 tablet, Rfl: 0    PARoxetine (PAXIL) 20 mg tablet, TAKE 1 TABLET BY MOUTH EVERY DAY, Disp: 90 tablet, Rfl: 3 potassium chloride (K-DUR,KLOR-CON) 10 mEq tablet, Take 1 tablet (10 mEq total) by mouth daily for 10 days, Disp: 10 tablet, Rfl: 0    PROAIR  (90 Base) MCG/ACT inhaler, as needed , Disp: , Rfl:     rivastigmine (EXELON) 9.5 mg/24 hr TD 24 hr patch, Place 1 patch on the skin over 24 hours daily, Disp: 90 patch, Rfl: 1      Allergies  Ciprofloxacin, Sulfa antibiotics, Other, Penicillins, and Sulfacetamide    Past Medical History  Past Medical History:   Diagnosis Date    Anxiety disorder     Aortic valve disorder     BRCA1 negative     BRCA2 negative     Breast cancer ([address]) 03/2022    right    Cancer ([address])     breast    Cellulitis of finger of left hand 11/20/2018    Cellulitis of left hand 11/10/2018    Added automatically from request for surgery [id number]    Depression     Disease of thyroid gland     GERD (gastroesophageal reflux disease)     History of gastroesophageal reflux (GERD)     History of kidney cancer 03/06/2014    History of transfusion     Hyperlipidemia 03/06/2014    Hypertension 03/06/2014    Hypothyroidism 03/06/2014    Lymphoma ([address])     Malignant neoplasm of overlapping sites of right breast in female, estrogen receptor positive  03/10/2022    Shortness of breath     Stroke ([address]) [id number]    Min stroke found wile going through chemo and radiation    TIA (transient ischemic attack)     UTI (urinary tract infection)          Past Surgical History:  Past Surgical History:   Procedure Laterality Date    BREAST LUMPECTOMY Right 3/29/2022    Procedure: ITZEL  DIRECTED LUMPECTOMY;  Surgeon: Gaurav Jules MD;  Location: [facility];  Service: Surgical Oncology    ESOPHAGOGASTRIC FUNDOPLASTY      Nissen Fundoplication    HERNIA REPAIR      LYMPH NODE BIOPSY Right 3/29/2022    Procedure: LYMPHATIC MAPPING WITH BLUE AND RADIOACTIVE DYES SENTINEL LYMPH NODE BIOPSY, INJECTION IN OR AT 0800 BY DR Elvira Mccollum;  Surgeon: Gaurav Jules MD;  Location: [facility];  Service: Surgical Oncology    MAMMO STEREOTACTIC BREAST BIOPSY RIGHT (ALL INC) Right 3/7/2022    US BREAST ITZEL  NEEDLE LOC RIGHT Right 3/24/2022    US GUIDED BREAST BIOPSY RIGHT COMPLETE Right 3/7/2022    WRIST SURGERY Left          Family History:  Family History   Problem Relation Age of Onset    Stroke Father     Leukemia Sister     No Known Problems Brother     Skin cancer Mother     Stroke Maternal Grandmother     No Known Problems Maternal Grandfather     No Known Problems Paternal Grandmother     No Known Problems Paternal Grandfather     Breast cancer Sister     Breast cancer Sister         Colon cancer    Kidney cancer Brother     Alcohol abuse Brother     Kidney cancer Brother     Stomach cancer Brother     No Known Problems Daughter     No Known Problems Daughter        Social History:   reports that she has never smoked. She has never used smokeless tobacco. She reports current alcohol use. She reports that she does not use drugs. I have reviewed the past medical history, surgical history, social and family history, current medications, allergies vitals, review of systems, and updated this information as appropriate today. Objective:    Physical Exam    Neurological Exam     GENERAL:  Cooperative in no acute distress. Well-developed and well-nourished     HEAD and NECK   Head is atraumatic normocephalic with no lesions or masses. Neck is supple with full range of motion     CARDIOVASCULAR  Carotid Arteries-no carotid bruits. NEUROLOGIC:  Mental Status-the patient is awake alert and oriented without aphasia or apraxia, MoCA 16/30  Cranial Nerves: Visual fields are full to confrontation. Extraocular movements are full without nystagmus. Pupils are 2-1/2 mm and reactive. Face is symmetrical to light touch. Movements of facial expression move symmetrically. Hearing is normal to finger rub bilaterally. Soft palate lifts symmetrically. Shoulder shrug is symmetrical. Tongue is midline without atrophy.   Motor: No drift is noted on arm extension. Strength is full in the upper and lower extremities with normal bulk and tone. Ambulates with a cane    ROS:  Review of Systems   Constitutional:  Negative for appetite change, fatigue and fever. HENT: Negative. Negative for hearing loss, tinnitus, trouble swallowing and voice change. Eyes: Negative. Negative for photophobia, pain and visual disturbance. Respiratory: Negative. Negative for shortness of breath. Cardiovascular: Negative. Negative for palpitations. Gastrointestinal: Negative. Negative for nausea and vomiting. Endocrine: Negative. Negative for cold intolerance. Genitourinary: Negative. Negative for dysuria, frequency and urgency. Musculoskeletal:  Negative for back pain, gait problem, myalgias and neck pain. Skin: Negative. Negative for rash. Allergic/Immunologic: Negative. Neurological: Negative. Negative for dizziness, tremors, seizures, syncope, facial asymmetry, speech difficulty, weakness, light-headedness, numbness and headaches. Hematological: Negative. Does not bruise/bleed easily. Psychiatric/Behavioral:  Positive for confusion. Negative for hallucinations and sleep disturbance.

## 2023-10-30 ENCOUNTER — OFFICE VISIT (OUTPATIENT)
Dept: HEMATOLOGY ONCOLOGY | Facility: CLINIC | Age: 78
End: 2023-10-30
Payer: COMMERCIAL

## 2023-10-30 VITALS
TEMPERATURE: 97.6 F | DIASTOLIC BLOOD PRESSURE: 86 MMHG | HEIGHT: 61 IN | BODY MASS INDEX: 32.85 KG/M2 | WEIGHT: 174 LBS | RESPIRATION RATE: 18 BRPM | OXYGEN SATURATION: 94 % | SYSTOLIC BLOOD PRESSURE: 124 MMHG | HEART RATE: 84 BPM

## 2023-10-30 DIAGNOSIS — C50.811 MALIGNANT NEOPLASM OF OVERLAPPING SITES OF RIGHT BREAST IN FEMALE, ESTROGEN RECEPTOR POSITIVE: Primary | ICD-10-CM

## 2023-10-30 DIAGNOSIS — Z17.0 MALIGNANT NEOPLASM OF OVERLAPPING SITES OF RIGHT BREAST IN FEMALE, ESTROGEN RECEPTOR POSITIVE: Primary | ICD-10-CM

## 2023-10-30 PROCEDURE — 1160F RVW MEDS BY RX/DR IN RCRD: CPT | Performed by: INTERNAL MEDICINE

## 2023-10-30 PROCEDURE — 3074F SYST BP LT 130 MM HG: CPT | Performed by: INTERNAL MEDICINE

## 2023-10-30 PROCEDURE — 99214 OFFICE O/P EST MOD 30 MIN: CPT | Performed by: INTERNAL MEDICINE

## 2023-10-30 PROCEDURE — 1159F MED LIST DOCD IN RCRD: CPT | Performed by: INTERNAL MEDICINE

## 2023-10-30 PROCEDURE — 3079F DIAST BP 80-89 MM HG: CPT | Performed by: INTERNAL MEDICINE

## 2023-10-30 NOTE — PROGRESS NOTES
[address] [facility] [address] [facility]  [address] [facility] [id number]    Alcides Mesa  1945      PRIMARY HEMATOLOGIC/ONCOLOGIC DIAGNOSIS:  Invasive ductal carcinoma of the right breast Stage IA (pT1c,pN0,cM0)ER positive, NE positive, [id number] negative.  Date of diagnosis 3/7/22    PATHOLOGY:   Case Report   Surgical Pathology Report                         Case: [id number]                                   Authorizing Provider:  Virgen Cortes,  Collected:           03/29/2022 4540                                      MD                                                                           Ordering Location:     [facility] Mention Received:            03/29/2022 1203                                      Operating Room                                                               Pathologist:           James Navarro MD                                                                 Specimens:   A) - Axillary lymph node, Right axillary sentinel lymph node #1                                      B) - Breast, Right, Additional anterior margin, inked most distal margin (green)                    C) - Breast, Right, additional inferior margin,  inked most distal margin (blue)                    D) - Breast, Right, additional lateral margin, inked most distal margin (red)                        E) - Breast, Right, additional medial margin, inked most distal margin (yellow)                      F) - Breast, Right, additional posterior margin, inked most distal margin (black)                    G) - Breast, Right, additional superior margin, inked most distal margin (orange)                    H) - Breast, Right, Right breast liyah  directed lumpectomy marked per margin by                protocol I) - Soft Tissue, Other, right axillary adipose tissue                                    Final Diagnosis   A. Right axillary sentinel lymph node #1:  - Fibroadipose tissue with no specific histopathologic abnormality.  - No lymph node identified. B. Breast, Right, Additional anterior margin:  - Benign fibroadipose tissue with focal fat necrosis. C. Breast, Right, additional inferior margin:  - Benign fibroadipose tissue. D. Breast, Right, additional lateral margin:  - Benign breast tissue with focal sclerosing adenosis and ectatic ducts. E. Breast, Right, additional medial margin:  - Benign breast tissue. F. Breast, Right, additional posterior margin:  - Benign fibroadipose tissue. G. Breast, Right, additional superior margin:  - Benign fibroadipose tissue. H. Breast, Right, liyah  directed lumpectomy:  - Invasive mammary carcinoma of no special type, 13 mm in greatest dimension, with 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). See comment and synoptic report. I. Right axillary adipose tissue:  - Three benign lymph nodes (0/3). Comment:  1. Ancillary Studies:  Performed on original biopsy material, [id number], and reported to be:     - ER:  90-95% / Positive     - ID:  65-75% / Positive     - HER2 (IHC):  1+ / Negative     - Repeat HER2 testing (2013 ASCO/CAP Recommendations):  Not Indicated      - Best representative tumor block:  H10       -- Sufficient tumor present for          Agendia Mammaprint/Blueprint (1 cm2 of invasive tumor in aggregate):  Yes          MI Profile/Foundation One (at least 5 x 5 mm of tumor):  Yes     2. Pathologic Stage Classification (pTNM, AJCC 8th Edition):       8th ed, AJCC Anatomic Stage:  at least Stage IA - pT1c, pN0, cM0, G1     3.  8th ed.  AJCC Pathologic Prognostic Stage (use AJCC update):  IA   Electronically signed by Blanca Contreras MD on 4/5/2022 at  1:41 PM   Additional Information    All reported additional testing was performed with appropriately reactive controls. These tests were developed and their performance characteristics determined by [facility] or appropriate performing facility, though some tests may be performed on tissues which have not been validated for performance characteristics (such as staining performed on alcohol exposed cell blocks and decalcified tissues). Results should be interpreted with caution and in the context of the patients’ clinical condition. These tests may not be cleared or approved by the U.S. Food and Drug Administration, though the FDA has determined that such clearance or approval is not necessary. These tests are used for clinical purposes and they should not be regarded as investigational or for research. This laboratory has been approved by [facility], designated as a high-complexity laboratory and is qualified to perform these tests.   .  Interpretation performed at [facility], [address] [place] [address]   Synoptic Checklist   INVASIVE CARCINOMA OF THE BREAST: Resection   8th Edition - Protocol posted: 12/17/2021INVASIVE CARCINOMA OF THE BREAST: COMPLETE EXCISION - All Specimens  SPECIMEN   Procedure  Excision (less than total mastectomy)   Specimen Laterality  Right   TUMOR   Histologic Type  Invasive carcinoma of no special type (ductal)   Histologic Grade (Ladonna Histologic Score)     Glandular (Acinar) / Tubular Differentiation  Score 2   Nuclear Pleomorphism  Score 1   Mitotic Rate  Score 1   Overall Grade  Grade 1 (scores of 3, 4 or 5)   Tumor Size  Greatest dimension of largest invasive focus (Millimeters): 13 mm   Tumor Focality  Single focus of invasive carcinoma   Ductal Carcinoma In Situ (DCIS)  Present     Negative for extensive intraductal component (EIC)   Architectural Patterns  Solid   Nuclear Grade  Grade I (low)   Lymphovascular Invasion  Not identified   Treatment Effect in the Breast No known presurgical therapy   MARGINS   Margin Status for Invasive Carcinoma  All margins negative for invasive carcinoma   Distance from Invasive Carcinoma to Closest Margin  Greater than: 10 mm   Closest Margin(s) to Invasive Carcinoma  >10 mm all margins   Margin Status for DCIS  All margins negative for DCIS   Distance from DCIS to Closest Margin  Greater than: 10 mm   Closest Margin(s) to DCIS  >10 mm all margins   REGIONAL LYMPH NODES   Regional Lymph Node Status  All regional lymph nodes negative for tumor   Total Number of Lymph Nodes Examined (sentinel and non-sentinel)  3   PATHOLOGIC STAGE CLASSIFICATION (pTNM, AJCC 8th Edition)   Reporting of pT, pN, and (when applicable) pM categories is based on information available to the pathologist at the time the report is issued. As per the AJCC (Chapter 1, 8th Ed.) it is the managing physician’s responsibility to establish the final pathologic stage based upon all pertinent information, including but potentially not limited to this pathology report. pT Category  pT1c   pN Category  pN0   . STAGING: Cancer Staging   Malignant neoplasm of overlapping sites of right breast in female, estrogen receptor positive   Staging form: Breast, AJCC 8th Edition  - Clinical stage from 3/14/2022: cT1b, cN0, cM0, GX, ER+, WI+, HER2- - Signed by Jai Calloway MD on 3/25/2022  Stage prefix: Initial diagnosis  Histologic grading system: 3 grade system         Oncology History   Malignant neoplasm of overlapping sites of right breast in female, estrogen receptor positive    3/7/2022 Initial Diagnosis    Malignant neoplasm of right female breast ([address])     3/7/2022 Biopsy    Right Breast Ultrasound guided biopsy  12 o'clock, 8 cm from nipple  Invasive mammary carcinoma of no special type with extensive Ductal Carcinoma in situ  Grade 1   ER 95  WI 75  HER2 1+  Lymphovascular invasion not identified    Concordant. Malingnacy appears unifocal. Right axilla clear.  Left breast clear.     Breast, Right, Stereo bx right breast 10oclock, 4 cores with calcs:  - Fibroadenoma with focal coarse calcifications. - Negative for atypia and in-situ or invasive carcinoma. Breast, Right, Stereo bx right breast 10oclock, 1 core without calcs:  - Fibroadenoma with focal coarse calcifications. - Negative for atypia and in-situ or invasive carcinoma. 3/14/2022 Genetic Testing    A stat panel of genes were evaluated, including: SYDNI, BRCA1, BRCA2, CDH1, CHEK2, PALB2, PTEN, STK11, TP53  Negative result. No pathogenic sequence variants or deletions/duplications identified     3/14/2022 Genomic Testing    MammaPrint   Low risk  Mammaprint Index: +0.537  Luminal type A     3/14/2022 -  Cancer Staged    Staging form: Breast, AJCC 8th Edition  - Clinical stage from 3/14/2022: cT1b, cN0, cM0, GX, ER+, MS+, HER2- - Signed by Sera Matta MD on 3/25/2022  Stage prefix: Initial diagnosis  Histologic grading system: 3 grade system       3/29/2022 Surgery    Right breast liyah  directed lumpectomy with sentinel lymph node biopsy  Invasive mammary carcinoma of no special type with associated ductal carcinoma in situ  Grade 1   1.3 cm  All margins negative for invasive carcinoma and ductal carcinoma in situ  0/3 Lymph node  Anatomic/prognostic stage: IA      5/23/2022 - 6/14/2022 Radiation    Treatments:  Course: C1  Plan ID Energy Fractions Dose per Fraction (cGy) Dose Correction (cGy) Total Dose Delivered (cGy) Elapsed Days   R Breast 6X 15 / 15 267 0 4,005 22    Treatment Dates:  5/23/2022 - 6/14/2022. INTERIM HISTORY:  Pt presents for a follow-up. Tolerating Arimidex.     PAST MEDICAL,PAST SURGICAL, FAMILY AND SOCIAL HISTORY:    Patient Active Problem List   Diagnosis    Depression    Gastroesophageal reflux disease without esophagitis    Mixed hyperlipidemia    Essential hypertension    Hypothyroid    Impaired fasting glucose    Lymphoma (HCC)    Cerebral aneurysm, nonruptured    Mild cognitive impairment    Flexor tenosynovitis of finger    Macrocytosis without anemia    Paresthesias    Non-intractable vomiting with nausea    Hypokalemia    Late onset Alzheimer's disease without behavioral disturbance (HCC)    Chest pain    Near syncope    Immunocompromised (HCC)    Malignant neoplasm of overlapping sites of right breast in female, estrogen receptor positive     Use of anastrozole    Recurrent major depressive disorder, remission status unspecified ([address])    Stage 3 chronic kidney disease, unspecified whether stage 3a or 3b CKD ([address])    History of lumpectomy of right breast     Past Medical History:   Diagnosis Date    Anxiety disorder     Aortic valve disorder     BRCA1 negative     BRCA2 negative     Breast cancer ([address]) 03/2022    right    Cancer ([address])     breast    Cellulitis of finger of left hand 11/20/2018    Cellulitis of left hand 11/10/2018    Added automatically from request for surgery [id number]    Depression     Disease of thyroid gland     GERD (gastroesophageal reflux disease)     History of gastroesophageal reflux (GERD)     History of kidney cancer 03/06/2014    History of transfusion     Hyperlipidemia 03/06/2014    Hypertension 03/06/2014    Hypothyroidism 03/06/2014    Lymphoma ([address])     Malignant neoplasm of overlapping sites of right breast in female, estrogen receptor positive  03/10/2022    Shortness of breath     Stroke ([address]) 4/20121344-2014    Min stroke found wile going through chemo and radiation    TIA (transient ischemic attack)     UTI (urinary tract infection)      Past Surgical History:   Procedure Laterality Date    BREAST LUMPECTOMY Right 3/29/2022    Procedure: ITZEL  DIRECTED LUMPECTOMY;  Surgeon: Terrance Alves MD;  Location: [facility] OR;  Service: Surgical Oncology    ESOPHAGOGASTRIC FUNDOPLASTY      Nissen Fundoplication    HERNIA REPAIR      LYMPH NODE BIOPSY Right 3/29/2022    Procedure: LYMPHATIC MAPPING WITH BLUE AND RADIOACTIVE DYES SENTINEL LYMPH NODE BIOPSY, INJECTION IN OR AT 0800 BY DR Elio Melendez;  Surgeon: Carl Cruz MD;  Location: [facility];  Service: Surgical Oncology    MAMMO STEREOTACTIC BREAST BIOPSY RIGHT (ALL INC) Right 3/7/2022    US BREAST ITZEL  NEEDLE LOC RIGHT Right 3/24/2022    US GUIDED BREAST BIOPSY RIGHT COMPLETE Right 3/7/2022    WRIST SURGERY Left      Family History   Problem Relation Age of Onset    Stroke Father     Leukemia Sister     No Known Problems Brother     Skin cancer Mother     Stroke Maternal Grandmother     No Known Problems Maternal Grandfather     No Known Problems Paternal Grandmother     No Known Problems Paternal Grandfather     Breast cancer Sister     Breast cancer Sister         Colon cancer    Kidney cancer Brother     Alcohol abuse Brother     Kidney cancer Brother     Stomach cancer Brother     No Known Problems Daughter     No Known Problems Daughter      Social History     Socioeconomic History    Marital status:      Spouse name: Not on file    Number of children: 2    Years of education: Not on file    Highest education level: Not on file   Occupational History    Not on file   Tobacco Use    Smoking status: Never    Smokeless tobacco: Never    Tobacco comments:     None   Vaping Use    Vaping Use: Never used   Substance and Sexual Activity    Alcohol use: Yes     Comment: Social holidays    Drug use: Never    Sexual activity: Not Currently     Comment: Sanchez Sánchez over   Other Topics Concern    Not on file   Social History Narrative    Not on file     Social Determinants of Health     Financial Resource Strain: Low Risk  (9/19/2023)    Overall Financial Resource Strain (CARDIA)     Difficulty of Paying Living Expenses: Not hard at all   Food Insecurity: Not on file   Transportation Needs: No Transportation Needs (9/19/2023)    PRAPARE - Transportation     Lack of Transportation (Medical): No     Lack of Transportation (Non-Medical):  No   Physical Activity: Not on file   Stress: Not on file   Social Connections: Not on file   Intimate Partner Violence: Not on file   Housing Stability: Not on file       Current Outpatient Medications:     amLODIPine (NORVASC) 2.5 mg tablet, Take 2 tablets (5 mg total) by mouth daily, Disp: 180 tablet, Rfl: 1    anastrozole (ARIMIDEX) 1 mg tablet, TAKE 1 TABLET BY MOUTH EVERY DAY, Disp: 90 tablet, Rfl: 0    aspirin 325 mg tablet, Take 325 mg by mouth daily , Disp: , Rfl:     cholestyramine (QUESTRAN) 4 g packet, TAKE 1 PACKET BY MOUTH DAILY. (Patient taking differently: if needed), Disp: 90 packet, Rfl: 3    dicyclomine (BENTYL) 20 mg tablet, Take 1 tablet (20 mg total) by mouth 2 (two) times a day, Disp: 20 tablet, Rfl: 0    diphenhydrAMINE (BENADRYL) 25 mg tablet, Take 25 mg by mouth in the morning.  1/2 tablet with Paxil ., Disp: , Rfl:     fluticasone (FLONASE) 50 mcg/act nasal spray, INSTILL 1 SPRAY INTO EACH NOSTRIL AS NEEDED FOR RHINITIS OR ALLERGIES, Disp: 48 mL, Rfl: 1    levothyroxine 125 mcg tablet, Take 1 tablet (125 mcg total) by mouth daily, Disp: 90 tablet, Rfl: 1    loperamide (IMODIUM) 2 mg capsule, Take 1 capsule (2 mg total) by mouth 4 (four) times a day as needed for diarrhea, Disp: 12 capsule, Rfl: 0    meclizine (ANTIVERT) 25 mg tablet, Take 1 tablet (25 mg total) by mouth every 8 (eight) hours as needed for dizziness, Disp: 50 tablet, Rfl: 1    memantine (NAMENDA) 10 mg tablet, Take 1 tablet (10 mg total) by mouth 2 (two) times a day, Disp: 180 tablet, Rfl: 1    multivitamin (THERAGRAN) TABS, Take 1 tablet by mouth, Disp: , Rfl:     Myrbetriq 50 MG TB24, TAKE 1 TABLET BY MOUTH EVERY DAY, Disp: 90 tablet, Rfl: 3    omeprazole (PriLOSEC) 20 mg delayed release capsule, Take 1 capsule (20 mg total) by mouth 2 (two) times a day, Disp: 180 capsule, Rfl: 1    ondansetron (ZOFRAN-ODT) 4 mg disintegrating tablet, Take 1 tablet (4 mg total) by mouth every 8 (eight) hours as needed for nausea, Disp: 15 tablet, Rfl: 0    PARoxetine (PAXIL) 20 mg tablet, TAKE 1 TABLET BY MOUTH EVERY DAY, Disp: 90 tablet, Rfl: 3    PROAIR  (90 Base) MCG/ACT inhaler, as needed , Disp: , Rfl:     rivastigmine (EXELON) 9.5 mg/24 hr TD 24 hr patch, Place 1 patch on the skin over 24 hours daily, Disp: 90 patch, Rfl: 1    cholecalciferol (VITAMIN D3) 1,000 units tablet, Take 1 tablet (1,000 Units total) by mouth daily, Disp: 30 tablet, Rfl: 6    furosemide (LASIX) 40 mg tablet, Take 1 tablet (40 mg total) by mouth daily for 2 days, Disp: 2 tablet, Rfl: 1    potassium chloride (K-DUR,KLOR-CON) 10 mEq tablet, Take 1 tablet (10 mEq total) by mouth daily for 10 days, Disp: 10 tablet, Rfl: 0  Allergies   Allergen Reactions    Ciprofloxacin Hives    Sulfa Antibiotics Itching    Other     Penicillins Rash     Category: Allergy;   --  Pt received unasyn 1.5 mg IV 11-12-18 to 11-15-18    Sulfacetamide Rash     Category: Allergy; There were no vitals filed for this visit. ROS:  CONSTITUTIONAL:  No fever. No chills. No dizziness. No weakness. EYES:  No pain, erythema, or discharge. No blurring of vision. ENT:  No sore throat, URI symptoms. No epistaxis. No tinnitus. CARDIOVASCULAR:  No chest pain. No palpitations. No lower extremity edema. RESPIRATORY:  No shortness of breath, cough, pain with respiration, pleuritic chest pain. No hemoptysis. No dyspnea. No paroxysmal nocturnal dyspnea. GASTROINTESTINAL:  Normal appetite. No nausea, vomiting, diarrhea. No pain. No bloating. No melena. GENITOURINARY:  No frequency, urgency, nocturia. No hematuria or dysuria. MUSCULOSKELETAL:  No arthralgias or myalgias. INTEGUMENTARY:  No swelling. No bruising. No contusions. No abrasions. No lymphangitis. NEUROLOGIC:  No headache. No neck pain. No numbness or tingling of the extremities. No weakness. PSYCHIATRIC:  No confusion. ENDOCRINE:  No fatigue. No weakness. No history of thyroid, diabetes or adrenal problems. HEMATOLOGICAL:  No bleeding. No petechiae. No bruising.     PHYSICAL EXAM:    GENERAL: AAO x 3  HEENT: AT,NC  CVS: S1S2 RRR  LUNGS: CTA b/l  ABD: NT,ND, +BS  EXTR: no edema  NEURO: CN II-XII grossly intact    LABS:  I have reviewed pertinent labs:  CBC:   Lab Results   Component Value Date    WBC 4.87 07/18/2023    RBC 3.08 (L) 07/18/2023    HGB 11.5 07/18/2023    HCT 34.2 (L) 07/18/2023     (H) 07/18/2023     07/18/2023    MCH 37.3 (H) 07/18/2023    MCHC 33.6 07/18/2023    RDW 14.6 07/18/2023    MPV 11.2 07/18/2023    NEUTROABS 3.33 05/31/2023     CMP:   Lab Results   Component Value Date    SODIUM 139 07/18/2023    K 3.7 07/18/2023     07/18/2023    CO2 28 07/18/2023    AGAP 8 07/18/2023    BUN 19 07/18/2023    CREATININE 0.84 07/18/2023    GLUC 105 05/31/2023    GLUF 151 (H) 07/18/2023    CALCIUM 9.3 07/18/2023    AST 17 07/18/2023    ALT 10 07/18/2023    ALKPHOS 66 07/18/2023    TP 6.7 07/18/2023    ALB 3.8 07/18/2023    TBILI 0.42 07/18/2023    EGFR 66 07/18/2023     Liver Enzymes:   Lab Results   Component Value Date    AST 17 07/18/2023    ALT 10 07/18/2023    ALKPHOS 66 07/18/2023    TP 6.7 07/18/2023    ALB 3.8 07/18/2023    TBILI 0.42 07/18/2023     Vitamin B12   Lab Results   Component Value Date    [id number] 955 (H) 04/15/2020     Iron Study No results found for: "RETIC", "RETICCTPCT", "FERRITIN", "CONCFE", "TIBC", "PRIMIDONE", "FOLATEHEMO", "IRON"  Folate   Lab Results   Component Value Date    FOLATE >20.0 (H) 04/15/2020     Magnesium   Lab Results   Component Value Date    MG 2.0 06/12/2023     Phosphorus No results found for: "PHOS"  Coagulation Panel   Lab Results   Component Value Date    DDIMER 326 03/08/2018    PROTIME 11.9 07/30/2022    INR 0.89 07/30/2022    PTT 25 07/30/2022       IMAGING:  No results found. I reviewed the above laboratory and imaging data. ASSESSMENT/PLAN:  1. Invasive ductal carcinoma of the right breast Stage IA (pT1c,pN0,cM0)ER positive, ME positive, Dbq5mmh negative. Date of diagnosis 3/7/22.  S/p right breast lumpectomy, SLN biopsy. S/p adjuvant RT. On adjuvant Arimidex. Tolerating it well. Mammogram 2/15/23--no evidence of malignancy. F/u in 1 yr. Labs Q6m.

## 2023-11-02 DIAGNOSIS — Z17.0 MALIGNANT NEOPLASM OF OVERLAPPING SITES OF RIGHT BREAST IN FEMALE, ESTROGEN RECEPTOR POSITIVE: ICD-10-CM

## 2023-11-02 DIAGNOSIS — C50.811 MALIGNANT NEOPLASM OF OVERLAPPING SITES OF RIGHT BREAST IN FEMALE, ESTROGEN RECEPTOR POSITIVE: ICD-10-CM

## 2023-11-02 RX ORDER — ANASTROZOLE 1 MG/1
1 TABLET ORAL DAILY
Qty: 90 TABLET | Refills: 0 | Status: SHIPPED | OUTPATIENT
Start: 2023-11-02

## 2023-11-15 ENCOUNTER — RA CDI HCC (OUTPATIENT)
Dept: OTHER | Facility: HOSPITAL | Age: 78
End: 2023-11-15

## 2023-11-15 NOTE — PROGRESS NOTES
RIAN chart audit  Oncology    L83.364 on problem list and billed 3/25/22 & 2023. C85.90 billed 3/25/22 by Hem Onc. No MEAT in note. Not documented anywhere else in chart.

## 2023-11-16 NOTE — PROGRESS NOTES
Speech-Language Pathology Initial Evaluation    Today's date: 2023   Patient’s name: Saul Kramer  : 1945  MRN: [id number]  Safety measures: HTN, memory  Referring provider: Earlis Moritz, MD    Encounter Diagnosis     ICD-10-CM    1. Other symbolic dysfunctions  W24.9       2. Late onset Alzheimer's disease without behavioral disturbance ([address])  G30.1 Ambulatory Referral to Speech Therapy    F02.80         Visit tracking:  -Referring provider: Epic  -Billing guidelines: CMS  -Visit #1  -Insurance: [employer]ear[employer] due 1/15/2023    POC expires Unit limit Auth Expiration date PT/OT + Visit Limit? Visit/Unit Tracking  AUTH Status:  Date   IE               Used                Remaining                        Subjective comments: Patient reports minor difficulties with memory. Daughter was present for today's session. Patient reports she reads newspapers and completes crossword puzzles often to engage in cognitive stimulating activities. Patient and daughter agree occasional word-finding errors. Patient and daughter report patient loses things occasionally, however, not often. ADLs are not being forgotten per patient and daughter report. Finances are being managed independently by patient. Denies any difficulties with doing so. Patient and daughter report patient has difficulties recalling events. Daughter reports she is dependent on utilizing a calendar to recall when events are (appointments, etc.). Patient reports engagement in conversation is [facility]. Patient and daughter report patient repeats herself often as in retelling the same story or asking the same questions in the same day. Denies difficulties multi-tasking. Patient reports she continues to cook. How did the patient hear about us?  Physician    Patient's goal(s): "to improve memory"    Reason for referral: Change in cognitive status  Prior functional status: Communication effective and appropriate in all situations  Clinically complex situations: N/A    History: Patient is a 65 y.o. female who was referred to outpatient skilled Speech Therapy services for a cognitive-linguistic evaluation. Patient was referred from neurology. Patient reported to have moderate dementia with a score of 16/30 from MoCA and decrease in score from previous MoCA administration (21/30). PMH: anxiety, aortic valve disorder, R breast cancer, depression, GERD, HTN, lumpectomy,  Stroke/TIA      Hearing: WFL for testing  Vision: WFL for testing (reading glasses)    Home environment/lifestyle: lives with daughter  Highest level of education: High school  Vocational status: retired  & /    Mental status: Alert  Behavior status: Cooperative  Patient reported symptoms of:  n/a  Communication modalities: Verbal  Rehabilitation prognosis: Good rehab potential to reach the established goals    Assessments    The Cognitive Linguistic Quick Test (CLQT) is designed to measure an individual’s five cognitive domains (attention, memory, executive functions, language, and visuospatial skills). This norm-referenced tool has been standardized on adults ages 25 through 80years old with neurological impairment as a result of CVA, TBI, or dementia. The following results were obtained during the administration of the assessment. Cognitive Domain: Score: Range of Severity:   Attention 169/215 WNL   Memory 133/185 Mild   Executive Functions 18/40 Mild   Language  26/37 Mild   Visuospatial Skills  75/105 WFL        *Composite Severity Rating: 3.4/4.0  Mild             Clock Drawin/[facility]     Patient scored below Criterion Cut Scores on the following subtests: Personal Facts, Symbol Trails, and Design Generation. *Patient named 12 concrete category members (animals) in 60 sec (norm=15+). -- BELOW AVERAGE    *Patient named 6 abstract category members (words beginning with letter 'm') in 60 sec (norm=10+).  -- BELOW AVERAGE                Goals    Short-term goals:  Patient and caregiver will be educated on the use of internal and external memory aids/compensatory strategies to facilitate increased recall of routine, daily events, names, orientation to time, etc. (to be achieved in 1-2 weeks). Patient will complete auditory immediate and short-term memory tasks with 80% accuracy to facilitate increased ability to retell narratives and recall information within functional living environment (to be achieved in 4-6 weeks). Patient will complete complex auditory attention processing tasks (e.g., sentence unscrambles, ranking numbers/words, etc.) with 80% accuracy to facilitate increased processing, storage, and retrieval of functional information (to be achieved in 4-6 weeks). Patient will complete thought organization tasks (e.g., sequencing, deduction puzzles, etc.) with 80% accuracy to facilitate increased executive functioning skills (to be achieved in 4-6 weeks). Patient will be educated on word finding strategies (i.e., circumlocution) for improved generative naming and verbal expression skills (to be achieved in 1-2 weeks). Patient will complete word generation tasks (e.g., verbal fluency, categorization, analogies, synonyms/antonyms, , etc.) with 80% accuracy using word finding strategies to facilitate improved word retrieval skills (to be achieved in 4-6 weeks). Long-term goals:  Patient will demonstrate cognitive-linguistic skills consistent with age and education given use of compensatory strategies when needed to resume baseline activities and responsibilities in home and community settings (to be achieved by discharge). Patient will demonstrate adequate verbal expression during conversation with fewer than 2 breakdowns or word finding deficits provided the use of compensatory strategies (to be achieved by discharge).        Impressions/Recommendations    Impressions:   -Patient presents with severe cognitive-linguistic deficits c/b decreased orientation, awareness of deficits, immediate memory, short-term memory, processing speed, direction following, attention, , thought organization, abstraction, and verbal expression skills. Patient was administered CLQT. Patient scored "WNL" for Attention, Visuospatial Awareness, Clock Drawing. Patient scored "Mild" for Memory, Executive Functioning, and Langauge sections. Overall, patient scored Mild. Noted by clinician to determine if patient qualifiies for this standardizaed test, deficits are apparent. Patient is highly motivated for therapy and daughter is motivated to facilitate safety and improve patient's QOL. Patient would benefit from outpatient skilled Speech Therapy services to increased functional recall with external supports, improved orientation with external supports, successful completion of daily tasks with cues, follow directions within functional activities, support positive communication interactions with both familiar and unfamiliar listeners, verbal fluency, immediate/recent memory, word-finding, executive functioning,  promote safety, facilitate overall improved quality of life, reduce caregiver burden, and complete caregiver education/training.       Recommendations:  -Patient would benefit from outpatient skilled Speech Therapy services: Cognitive-linguistic therapy    -Frequency: 2 weekly  -Duration: 6-8 weeks    -Intervention certification from: 17/72/7398  -Intervention certification to: 42/38/7732    -Intervention comments:   54 mins of standard cog test  60 mins of scoring, documentation, POC established

## 2023-11-20 ENCOUNTER — EVALUATION (OUTPATIENT)
Age: 78
End: 2023-11-20
Payer: COMMERCIAL

## 2023-11-20 DIAGNOSIS — G30.1 LATE ONSET ALZHEIMER'S DISEASE WITHOUT BEHAVIORAL DISTURBANCE (HCC): ICD-10-CM

## 2023-11-20 DIAGNOSIS — R48.8 OTHER SYMBOLIC DYSFUNCTIONS: Primary | ICD-10-CM

## 2023-11-20 DIAGNOSIS — F02.80 LATE ONSET ALZHEIMER'S DISEASE WITHOUT BEHAVIORAL DISTURBANCE (HCC): ICD-10-CM

## 2023-11-20 PROCEDURE — 96125 COGNITIVE TEST BY HC PRO: CPT

## 2023-11-24 NOTE — PROGRESS NOTES
Daily Speech Treatment Note    Today's date: 2023  Patient’s name: Tesfaye Ontiveros  : 1945  MRN: [id number]  Safety measures: HTN, memory  Referring provider: Evangelina Jeter MD    Encounter Diagnosis     ICD-10-CM    1. Other symbolic dysfunctions  W82.6       2. Late onset Alzheimer's disease without behavioral disturbance ([address])  G30.1     F02.80         Visit tracking:    POC expires Unit limit Auth Expiration date PT/OT + Visit Limit?     23 8                           Visit/Unit Tracking  AUTH Status:  Date   IE              After 8 visits Used 1 2              Remaining  7 6                 Subjective/Behavioral:  -Patient reported no new concerns for today's session. Objective/Assessment:  -Reviewed testing results and goals in plan care with patient. Patient is in agreement at this time. Short-term goals:  Patient and caregiver will be educated on the use of internal and external memory aids/compensatory strategies to facilitate increased recall of routine, daily events, names, orientation to time, etc. (to be achieved in 1-2 weeks). To target utilization of internal and external memory strategies- patient was given a handout targeting different memory strategies. Strategies addressed remembering names, events, items, personal information, conversations, reading materials, memory aids (pill boxes, calendars, smart phone alarms/reminders), and auditory processing tips (repeating things aloud). Patient and clinician reviewed the different strategies. Reciprocal comprehension of strategies was noted. Patient will complete auditory immediate and short-term memory tasks with 80% accuracy to facilitate increased ability to retell narratives and recall information within functional living environment (to be achieved in 4-6 weeks).       Patient will complete complex auditory attention processing tasks (e.g., sentence unscrambles, ranking numbers/words, etc.) with 80% accuracy to facilitate increased processing, storage, and retrieval of functional information (to be achieved in 4-6 weeks). Patient will complete thought organization tasks (e.g., sequencing, deduction puzzles, etc.) with 80% accuracy to facilitate increased executive functioning skills (to be achieved in 4-6 weeks). Patient will be educated on word finding strategies (i.e., circumlocution) for improved generative naming and verbal expression skills (to be achieved in 1-2 weeks). Patient will complete word generation tasks (e.g., verbal fluency, categorization, analogies, synonyms/antonyms, , etc.) with 80% accuracy using word finding strategies to facilitate improved word retrieval skills (to be achieved in 4-6 weeks). To target word-retrieval and language processing- patient completed the game "anomia." Patient was given cards with abstract and concrete categories and had to identify items in that category. Patient began at level 3 and was given 1 minute for each trial.  Trial 1--> 11/12 opp (91% acc) independently increasing to 100% acc from min semantic   Trial 2--> 8/10 opp (80% acc) independently, increasing to 100% acc from min semantic  Trial 3--> 10/12 opp (83% acc) independently, increasing to 100% acc from min semantic & phonemic cues  Trial 4--> 9/10 opp (90% acc) independently, increasing to 100% acc from min semantic & phonemic cues          Plan:  -Patient was provided with home exercises/activities to target goals in plan of care at the end of today's session.  -Continue with current plan of care.

## 2023-11-28 DIAGNOSIS — E03.9 HYPOTHYROIDISM, UNSPECIFIED TYPE: ICD-10-CM

## 2023-11-28 DIAGNOSIS — I10 ESSENTIAL HYPERTENSION: ICD-10-CM

## 2023-11-28 RX ORDER — LEVOTHYROXINE SODIUM 0.12 MG/1
125 TABLET ORAL DAILY
Qty: 90 TABLET | Refills: 1 | Status: SHIPPED | OUTPATIENT
Start: 2023-11-28

## 2023-11-28 RX ORDER — AMLODIPINE BESYLATE 2.5 MG/1
5 TABLET ORAL DAILY
Qty: 180 TABLET | Refills: 1 | Status: SHIPPED | OUTPATIENT
Start: 2023-11-28

## 2023-11-30 ENCOUNTER — OFFICE VISIT (OUTPATIENT)
Age: 78
End: 2023-11-30
Payer: COMMERCIAL

## 2023-11-30 DIAGNOSIS — G30.1 LATE ONSET ALZHEIMER'S DISEASE WITHOUT BEHAVIORAL DISTURBANCE (HCC): ICD-10-CM

## 2023-11-30 DIAGNOSIS — F02.80 LATE ONSET ALZHEIMER'S DISEASE WITHOUT BEHAVIORAL DISTURBANCE (HCC): ICD-10-CM

## 2023-11-30 DIAGNOSIS — R48.8 OTHER SYMBOLIC DYSFUNCTIONS: Primary | ICD-10-CM

## 2023-11-30 PROCEDURE — 92507 TX SP LANG VOICE COMM INDIV: CPT

## 2023-12-05 ENCOUNTER — OFFICE VISIT (OUTPATIENT)
Age: 78
End: 2023-12-05
Payer: COMMERCIAL

## 2023-12-05 DIAGNOSIS — F02.80 LATE ONSET ALZHEIMER'S DISEASE WITHOUT BEHAVIORAL DISTURBANCE (HCC): ICD-10-CM

## 2023-12-05 DIAGNOSIS — G30.1 LATE ONSET ALZHEIMER'S DISEASE WITHOUT BEHAVIORAL DISTURBANCE (HCC): ICD-10-CM

## 2023-12-05 DIAGNOSIS — R48.8 OTHER SYMBOLIC DYSFUNCTIONS: Primary | ICD-10-CM

## 2023-12-05 PROCEDURE — 92507 TX SP LANG VOICE COMM INDIV: CPT

## 2023-12-05 NOTE — PROGRESS NOTES
Daily Speech Treatment Note    Today's date: 2023  Patient’s name: Aníbal Montejo  : 1945  MRN: [id number]  Safety measures: HTN, memory  Referring provider: Siri Poon MD    Encounter Diagnosis     ICD-10-CM    1. Other symbolic dysfunctions  L38.8       2. Late onset Alzheimer's disease without behavioral disturbance ([address])  G30.1     F02.80           Visit tracking:    POC expires Unit limit Auth Expiration date PT/OT + Visit Limit? 1/15/24 N/a 23 8                           Visit/Unit Tracking  AUTH Status:  Date   IE    RE         After 8 visits Used 1 2 3             Remaining  7 6 5                Subjective/Behavioral:  -Patient reported no new concerns for today's session. Objective/Assessment:  -Reviewed patient's home exercises/activities completed since last appointment. Noted few spelling errors and difficulties with identify units of measurement category members. Short-term goals:  Patient and caregiver will be educated on the use of internal and external memory aids/compensatory strategies to facilitate increased recall of routine, daily events, names, orientation to time, etc. (to be achieved in 1-2 weeks). Patient will complete auditory immediate and short-term memory tasks with 80% accuracy to facilitate increased ability to retell narratives and recall information within functional living environment (to be achieved in 4-6 weeks). Immediate Memory task- patient was given 4 words verbally and had to recall in reverse order. Task completed in /15 opp (60% acc) independently, increasing to 14/15 opp (93% acc) from min cueing. Patient benefited from repetition and semantic cues. Patient will complete complex auditory attention processing tasks (e.g., sentence unscrambles, ranking numbers/words, etc.) with 80% accuracy to facilitate increased processing, storage, and retrieval of functional information (to be achieved in 4-6 weeks). Patient will complete thought organization tasks (e.g., sequencing, deduction puzzles, etc.) with 80% accuracy to facilitate increased executive functioning skills (to be achieved in 4-6 weeks). Working memory: Patient completed exercise where she was given pictures for every letter in the alphabet in a scrambled array and was instructed to organize the pictures from A to Z. Task completed in 23/26 opp (88% acc) independently, increasing to 100% acc from min cueing. Patient benefited from repetition of alphabet and visual scanning from left to right. Task completed in approx. 6.5 mins. Patient will be educated on word finding strategies (i.e., circumlocution) for improved generative naming and verbal expression skills (to be achieved in 1-2 weeks). Patient will complete word generation tasks (e.g., verbal fluency, categorization, analogies, synonyms/antonyms, , etc.) with 80% accuracy using word finding strategies to facilitate improved word retrieval skills (to be achieved in 4-6 weeks). Word-generation- patient was given a category and had to identify a word from A-Z from given category. Foods-> 19/24 opp (79% acc) independently, increasing to 100% acc from min semantic/phonemic (did not complete U & X)   Countries/States/Cities--> 21/25 opp (68% acc) independently, increasing to 24/25 opp (96% acc) from min semantic/phonemic cues (did not complete X)        Plan:  -Patient was provided with home exercises/activities to target goals in plan of care at the end of today's session.  -Continue with current plan of care.

## 2023-12-07 ENCOUNTER — OFFICE VISIT (OUTPATIENT)
Age: 78
End: 2023-12-07
Payer: COMMERCIAL

## 2023-12-07 DIAGNOSIS — F02.80 LATE ONSET ALZHEIMER'S DISEASE WITHOUT BEHAVIORAL DISTURBANCE (HCC): ICD-10-CM

## 2023-12-07 DIAGNOSIS — G30.1 LATE ONSET ALZHEIMER'S DISEASE WITHOUT BEHAVIORAL DISTURBANCE (HCC): ICD-10-CM

## 2023-12-07 DIAGNOSIS — R48.8 OTHER SYMBOLIC DYSFUNCTIONS: Primary | ICD-10-CM

## 2023-12-07 PROCEDURE — 92507 TX SP LANG VOICE COMM INDIV: CPT

## 2023-12-07 NOTE — PROGRESS NOTES
Daily Speech Treatment Note    Today's date: 2023  Patient’s name: Tesfaye Ontiveros  : 1945  MRN: [id number]  Safety measures: HTN, memory  Referring provider: Evangelina Jeter MD    Encounter Diagnosis     ICD-10-CM    1. Other symbolic dysfunctions  T95.1       2. Late onset Alzheimer's disease without behavioral disturbance ([address])  G30.1     F02.80             Visit tracking:    POC expires Unit limit Auth Expiration date PT/OT + Visit Limit? 1/15/24 N/a 23 8                           Visit/Unit Tracking  AUTH Status:  Date   IE   RE         After 8 visits Used 1 2 3 4            Remaining  7 6 5 4               Subjective/Behavioral:  -Patient reported no new concerns for today's session. Objective/Assessment:      Short-term goals:  Patient and caregiver will be educated on the use of internal and external memory aids/compensatory strategies to facilitate increased recall of routine, daily events, names, orientation to time, etc. (to be achieved in 1-2 weeks). Patient was provided multiple handouts of a memory journal. Provided education of the purpose of a memory journal.        Patient will complete auditory immediate and short-term memory tasks with 80% accuracy to facilitate increased ability to retell narratives and recall information within functional living environment (to be achieved in 4-6 weeks). Immediate memory task- patient was given 3 words orally and had to list the words in the order they would occur. Task completed in  opp (73% acc) independently, increasing to  opp (94% acc) from min cueing. Patient benefited from repetition and semantic cues. Patient will complete complex auditory attention processing tasks (e.g., sentence unscrambles, ranking numbers/words, etc.) with 80% accuracy to facilitate increased processing, storage, an retrieval of functional information (to be achieved in 4-6 weeks).       Patient will complete thought organization tasks (e.g., sequencing, deduction puzzles, etc.) with 80% accuracy to facilitate increased executive functioning skills (to be achieved in 4-6 weeks). Patient will be educated on word finding strategies (i.e., circumlocution) for improved generative naming and verbal expression skills (to be achieved in 1-2 weeks). Patient will complete word generation tasks (e.g., verbal fluency, categorization, analogies, synonyms/antonyms, , etc.) with 80% accuracy using word finding strategies to facilitate improved word retrieval skills (to be achieved in 4-6 weeks). Word finding- patient was given scrambled synonym pairs with the first letter underlined. Patient had to unscramble the opposite pairs. Task completed in 11/15 opp (73% acc) from min cueing, increasing to 100% acc with mod-max cueing. Patient benefited from written cues, repetition of strategy to write the letter first, cross the letters out as you go, and semantic cues. Patient had difficulties with spelling during this task. Plan:  -Patient was provided with home exercises/activities to target goals in plan of care at the end of today's session.  -Continue with current plan of care.

## 2023-12-12 ENCOUNTER — OFFICE VISIT (OUTPATIENT)
Age: 78
End: 2023-12-12
Payer: COMMERCIAL

## 2023-12-12 DIAGNOSIS — F02.80 LATE ONSET ALZHEIMER'S DISEASE WITHOUT BEHAVIORAL DISTURBANCE (HCC): ICD-10-CM

## 2023-12-12 DIAGNOSIS — G30.1 LATE ONSET ALZHEIMER'S DISEASE WITHOUT BEHAVIORAL DISTURBANCE (HCC): ICD-10-CM

## 2023-12-12 DIAGNOSIS — R48.8 OTHER SYMBOLIC DYSFUNCTIONS: Primary | ICD-10-CM

## 2023-12-12 PROCEDURE — 92507 TX SP LANG VOICE COMM INDIV: CPT

## 2023-12-12 NOTE — PROGRESS NOTES
Speech-Language Pathology Re-Evaluation    Today's date: 2023   Patient’s name: Saul Kramer  : 1945  MRN: [id number]  Safety measures: HTN, memory  Referring provider: Earlis Moritz, MD    Encounter Diagnosis     ICD-10-CM    1. Other symbolic dysfunctions  Z95.7       2. Late onset Alzheimer's disease without behavioral disturbance ([address])  G30.1     F02.80         Visit tracking:    POC expires Unit limit Auth Expiration date PT/OT + Visit Limit? 1/15/24 N/a 23 8                           Visit/Unit Tracking  AUTH Status:  Date   IE   RE         After 8 visits Used 1 2 3 4 5 6          Remaining  7 6 5 4 3 2             Subjective comments: Patient reports skilled OP ST has aided her with memory. Patient reports therapy makes her "think more" and recall items & names using the strategies from clinician. Patient reports memory journal entries has aided with recall of events of the previous days & decreased confusion. Patient reports daughter has noted a decrease in going into rooms and forgetting what the reasoning was. Patient reports she feels like she is progressing with the therapy and would like to continue due to the gains already made. Patient's goal(s): "to not struggle with memory"    Assessments    The [employer] Edition (BNT-2) is a confrontational naming assessment that asks patients to provide the best name for a given picture. It was designed to detect word-finding impairments. The BNT-2 consists of 60 pictures, ordered from easiest to most difficult. Stimulus cues, including semantic, phonemic, and written information, are provided as necessary. The following results were obtained during the administration of the assessment:     Summary of Scores: Score:   1. Number of spontaneously given correct response: 51       2. Number of stimulus cues given:  9   3.  Number of correct responses following a stimulus cue:  1 *TOTAL SCORE: 52   Percentile Rank:  80%ile       Additional Cuein. Number of phonemic cues: 8   5. Number of correct responses following the phonemic cue: 2       6. Number of multiple choices given:  3   7. Number of correct choices: 3         Treatments    Engaged in arranging memory journal into binder. Patient and clinician organized binder in chronological order of dates completed with min-mod verbal prompting. Patient wrote independently tasks/activities that were completed today. Recommended to continue to utilize memory journal to aid with recall of events. At the end of the session, provided education to daughter of the purpose of memory journal.    Goals    Short-term Goals:  Patient and caregiver will be educated on the use of internal and external memory aids/compensatory strategies to facilitate increased recall of routine, daily events, names, orientation to time, etc. (to be achieved in 1-2 weeks). -PARTIALLY MET/ONGOING     Patient will complete auditory immediate and short-term memory tasks with 80% accuracy to facilitate increased ability to retell narratives and recall information within functional living environment (to be achieved in 4-6 weeks). -PARTIALLY MET/ONGOING     Patient will complete complex auditory attention processing tasks (e.g., sentence unscrambles, ranking numbers/words, etc.) with 80% accuracy to facilitate increased processing, storage, and retrieval of functional information (to be achieved in 4-6 weeks). -PARTIALLY MET/ONGOING     Patient will complete thought organization tasks (e.g., sequencing, deduction puzzles, etc.) with 80% accuracy to facilitate increased executive functioning skills (to be achieved in 4-6 weeks). -PARTIALLY MET/ONGOING     Patient will be educated on word finding strategies (i.e., circumlocution) for improved generative naming and verbal expression skills (to be achieved in 1-2 weeks).  -PARTIALLY MET/ONGOING     Patient will complete word generation tasks (e.g., verbal fluency, categorization, analogies, synonyms/antonyms, , etc.) with 80% accuracy using word finding strategies to facilitate improved word retrieval skills (to be achieved in 4-6 weeks). -PARTIALLY MET/ONGOING     Long-term goals:  Patient will demonstrate cognitive-linguistic skills consistent with age and education given use of compensatory strategies when needed to resume baseline activities and responsibilities in home and community settings (to be achieved by discharge). -PARTIALLY MET/ONGOING     Patient will demonstrate adequate verbal expression during conversation with fewer than 2 breakdowns or word finding deficits provided the use of compensatory strategies (to be achieved by discharge). -PARTIALLY MET/ONGOING       Impressions/Recommendations    Impressions:   -Patient continues to present with moderate-severe cognitive-linguistic deficits c/b decreased orientation, awareness of deficits, immediate memory, short-term memory, processing speed, direction following, attention, , thought organization, abstraction, and verbal expression skills. Patient was administered the BNT which is a word-confrontation assessment. Patient scored in the 80%ile. Patient was receptive to phonemic cues and multiple which in future sessions will be a skill taught to daughter to reduce caregiver burden and promote positive communication exchanges. Patient is tolerating treatment well and is consistently completing HEP & memory journal tasks. From initiating memory journal tasks, patient has improved recall of events which at the initial evaluation was reported to be a deficit. Patient continues to be highly motivated for therapy  to facilitate safety and improve patient's QOL. Patient reports progression from initiating OP skilled ST services.  Patient would benefit from continued outpatient skilled Speech Therapy services to increased functional recall with external supports, improved orientation with external supports, successful completion of daily tasks with cues, follow directions within functional activities, support positive communication interactions with both familiar and unfamiliar listeners, verbal fluency, immediate/recent memory, word-finding, executive functioning,  promote safety, facilitate overall improved quality of life, reduce caregiver burden, and complete caregiver education/training.       Recommendations:  -Patient would benefit from outpatient skilled Speech Therapy services: Cognitive-linguistic therapy    -Frequency: 1-3x weekly  -Duration: 6-8 weeks    -Intervention certification from: 63/20/0877  -Intervention certification to: 04/69/5399    -Intervention comments:   31 mins of speech language production evaluation  10 mins of speech language treatment

## 2023-12-12 NOTE — PROGRESS NOTES
Daily Speech Treatment Note    Today's date: 2023  Patient’s name: Crys Mcclendon  : 1945  MRN: [id number]  Safety measures: HTN, memory  Referring provider: Sue Cruz MD    Encounter Diagnosis     ICD-10-CM    1. Other symbolic dysfunctions  N19.3       2. Late onset Alzheimer's disease without behavioral disturbance ([address])  G30.1     F02.80           Visit tracking:    POC expires Unit limit Auth Expiration date PT/OT + Visit Limit? 1/15/24 N/a 23 8                           Visit/Unit Tracking  AUTH Status:  Date   IE  RE         After 8 visits Used 1 2 3 4 5           Remaining  7 6 5 4 3              Subjective/Behavioral:  -Patient reported no new concerns for today's session. Objective/Assessment:  Short-term goals:  Patient and caregiver will be educated on the use of internal and external memory aids/compensatory strategies to facilitate increased recall of routine, daily events, names, orientation to time, etc. (to be achieved in 1-2 weeks). Patient and clinician reviewed completed memory journal entries. Patient wrote the same date twice. Patient did not write today's date correctly. Provided min prompting to adjust dates for memory journal. Recommended patient bring a binder to out memory journal     Patient will complete auditory immediate and short-term memory tasks with 80% accuracy to facilitate increased ability to retell narratives and recall information within functional living environment (to be achieved in 4-6 weeks). Immediate memory task- patient was given 3-4 words and had to identify the smallest. Task completed in 18/20 opp (90% acc) independently, increasing to 20/20 opp (100% acc) from min cueing. Patient benefited from repetition.       Patient will complete complex auditory attention processing tasks (e.g., sentence unscrambles, ranking numbers/words, etc.) with 80% accuracy to facilitate increased processing, storage, an retrieval of functional information (to be achieved in 4-6 weeks). Patient will complete thought organization tasks (e.g., sequencing, deduction puzzles, etc.) with 80% accuracy to facilitate increased executive functioning skills (to be achieved in 4-6 weeks). Patient will be educated on word finding strategies (i.e., circumlocution) for improved generative naming and verbal expression skills (to be achieved in 1-2 weeks). Patient will complete word generation tasks (e.g., verbal fluency, categorization, analogies, synonyms/antonyms, , etc.) with 80% accuracy using word finding strategies to facilitate improved word retrieval skills (to be achieved in 4-6 weeks). Word-generation- patient was given definitions of words and had to complete the words. Task completed in 14/20 opp (70% acc) independently, increasing to 19/20 opp (95% acc) from min-mod cueing. Patient benefited from semantic cues, verbal models, & carrier phrases. Word-search-   Puzzle 1: 7/8 independently increasing to 8/8 from min visual cues  Puzzle 2: 8/8 independently        Plan:  -Patient was provided with home exercises/activities to target goals in plan of care at the end of today's session.  -Continue with current plan of care.

## 2023-12-14 ENCOUNTER — EVALUATION (OUTPATIENT)
Age: 78
End: 2023-12-14
Payer: COMMERCIAL

## 2023-12-14 DIAGNOSIS — G30.1 LATE ONSET ALZHEIMER'S DISEASE WITHOUT BEHAVIORAL DISTURBANCE (HCC): ICD-10-CM

## 2023-12-14 DIAGNOSIS — F02.80 LATE ONSET ALZHEIMER'S DISEASE WITHOUT BEHAVIORAL DISTURBANCE (HCC): ICD-10-CM

## 2023-12-14 DIAGNOSIS — R48.8 OTHER SYMBOLIC DYSFUNCTIONS: Primary | ICD-10-CM

## 2023-12-14 PROCEDURE — 92523 SPEECH SOUND LANG COMPREHEN: CPT

## 2023-12-14 PROCEDURE — 92507 TX SP LANG VOICE COMM INDIV: CPT

## 2023-12-19 ENCOUNTER — OFFICE VISIT (OUTPATIENT)
Age: 78
End: 2023-12-19
Payer: COMMERCIAL

## 2023-12-19 ENCOUNTER — APPOINTMENT (OUTPATIENT)
Dept: LAB | Facility: HOSPITAL | Age: 78
End: 2023-12-19
Payer: COMMERCIAL

## 2023-12-19 DIAGNOSIS — E03.9 HYPOTHYROIDISM, UNSPECIFIED TYPE: ICD-10-CM

## 2023-12-19 DIAGNOSIS — R48.8 OTHER SYMBOLIC DYSFUNCTIONS: Primary | ICD-10-CM

## 2023-12-19 DIAGNOSIS — G30.1 LATE ONSET ALZHEIMER'S DISEASE WITHOUT BEHAVIORAL DISTURBANCE (HCC): ICD-10-CM

## 2023-12-19 DIAGNOSIS — E78.2 MIXED HYPERLIPIDEMIA: ICD-10-CM

## 2023-12-19 DIAGNOSIS — F02.80 LATE ONSET ALZHEIMER'S DISEASE WITHOUT BEHAVIORAL DISTURBANCE (HCC): ICD-10-CM

## 2023-12-19 DIAGNOSIS — I10 ESSENTIAL HYPERTENSION: ICD-10-CM

## 2023-12-19 LAB
ALBUMIN SERPL BCP-MCNC: 3.9 G/DL (ref 3.5–5)
ALP SERPL-CCNC: 70 U/L (ref 34–104)
ALT SERPL W P-5'-P-CCNC: 9 U/L (ref 7–52)
ANION GAP SERPL CALCULATED.3IONS-SCNC: 8 MMOL/L
AST SERPL W P-5'-P-CCNC: 17 U/L (ref 13–39)
BACTERIA UR QL AUTO: ABNORMAL /HPF
BASOPHILS # BLD MANUAL: 0.09 THOUSAND/UL (ref 0–0.1)
BASOPHILS NFR MAR MANUAL: 2 % (ref 0–1)
BILIRUB SERPL-MCNC: 0.42 MG/DL (ref 0.2–1)
BILIRUB UR QL STRIP: NEGATIVE
BUDDING YEAST: PRESENT
BUN SERPL-MCNC: 17 MG/DL (ref 5–25)
CALCIUM SERPL-MCNC: 9.1 MG/DL (ref 8.4–10.2)
CHLORIDE SERPL-SCNC: 104 MMOL/L (ref 96–108)
CHOLEST SERPL-MCNC: 204 MG/DL
CLARITY UR: ABNORMAL
CO2 SERPL-SCNC: 29 MMOL/L (ref 21–32)
COLOR UR: YELLOW
CREAT SERPL-MCNC: 0.74 MG/DL (ref 0.6–1.3)
EOSINOPHIL # BLD MANUAL: 0.14 THOUSAND/UL (ref 0–0.4)
EOSINOPHIL NFR BLD MANUAL: 3 % (ref 0–6)
ERYTHROCYTE [DISTWIDTH] IN BLOOD BY AUTOMATED COUNT: 14.3 % (ref 11.6–15.1)
GFR SERPL CREATININE-BSD FRML MDRD: 77 ML/MIN/1.73SQ M
GLUCOSE P FAST SERPL-MCNC: 94 MG/DL (ref 65–99)
GLUCOSE UR STRIP-MCNC: NEGATIVE MG/DL
HCT VFR BLD AUTO: 34.8 % (ref 34.8–46.1)
HDLC SERPL-MCNC: 88 MG/DL
HGB BLD-MCNC: 11.9 G/DL (ref 11.5–15.4)
HGB UR QL STRIP.AUTO: NEGATIVE
HYALINE CASTS #/AREA URNS LPF: ABNORMAL /LPF
KETONES UR STRIP-MCNC: NEGATIVE MG/DL
LDLC SERPL CALC-MCNC: 102 MG/DL (ref 0–100)
LEUKOCYTE ESTERASE UR QL STRIP: ABNORMAL
LYMPHOCYTES # BLD AUTO: 1.09 THOUSAND/UL (ref 0.6–4.47)
LYMPHOCYTES # BLD AUTO: 21 % (ref 14–44)
MACROCYTES BLD QL AUTO: PRESENT
MCH RBC QN AUTO: 38 PG (ref 26.8–34.3)
MCHC RBC AUTO-ENTMCNC: 34.2 G/DL (ref 31.4–37.4)
MCV RBC AUTO: 111 FL (ref 82–98)
MONOCYTES # BLD AUTO: 0.23 THOUSAND/UL (ref 0–1.22)
MONOCYTES NFR BLD: 5 % (ref 4–12)
MUCOUS THREADS UR QL AUTO: ABNORMAL
NEUTROPHILS # BLD MANUAL: 3 THOUSAND/UL (ref 1.85–7.62)
NEUTS BAND NFR BLD MANUAL: 1 % (ref 0–8)
NEUTS SEG NFR BLD AUTO: 65 % (ref 43–75)
NITRITE UR QL STRIP: NEGATIVE
NON-SQ EPI CELLS URNS QL MICRO: ABNORMAL /HPF
NONHDLC SERPL-MCNC: 116 MG/DL
PH UR STRIP.AUTO: 7 [PH]
PLATELET # BLD AUTO: 257 THOUSANDS/UL (ref 149–390)
PLATELET BLD QL SMEAR: ADEQUATE
PMV BLD AUTO: 11.8 FL (ref 8.9–12.7)
POTASSIUM SERPL-SCNC: 3.7 MMOL/L (ref 3.5–5.3)
PROT SERPL-MCNC: 6.8 G/DL (ref 6.4–8.4)
PROT UR STRIP-MCNC: ABNORMAL MG/DL
RBC # BLD AUTO: 3.13 MILLION/UL (ref 3.81–5.12)
RBC #/AREA URNS AUTO: ABNORMAL /HPF
RBC MORPH BLD: PRESENT
SODIUM SERPL-SCNC: 141 MMOL/L (ref 135–147)
SP GR UR STRIP.AUTO: 1.02 (ref 1–1.03)
T4 FREE SERPL-MCNC: 1.16 NG/DL (ref 0.61–1.12)
TRIGL SERPL-MCNC: 68 MG/DL
TSH SERPL DL<=0.05 MIU/L-ACNC: 3.02 UIU/ML (ref 0.45–4.5)
UROBILINOGEN UR STRIP-ACNC: <2 MG/DL
VARIANT LYMPHS # BLD AUTO: 3 %
WBC # BLD AUTO: 4.54 THOUSAND/UL (ref 4.31–10.16)
WBC #/AREA URNS AUTO: ABNORMAL /HPF

## 2023-12-19 PROCEDURE — 84443 ASSAY THYROID STIM HORMONE: CPT

## 2023-12-19 PROCEDURE — 80061 LIPID PANEL: CPT

## 2023-12-19 PROCEDURE — 92507 TX SP LANG VOICE COMM INDIV: CPT

## 2023-12-19 PROCEDURE — 81001 URINALYSIS AUTO W/SCOPE: CPT

## 2023-12-19 PROCEDURE — 84439 ASSAY OF FREE THYROXINE: CPT

## 2023-12-19 NOTE — PROGRESS NOTES
Daily Speech Treatment Note    Today's date: 2023  Patient’s name: Paula Lubin  : 1945  MRN: [id number]  Safety measures: HTN, memory  Referring provider: Liv Mc MD    Encounter Diagnosis     ICD-10-CM    1. Other symbolic dysfunctions  R48.8       2. Late onset Alzheimer's disease without behavioral disturbance ([facility])  G30.1     F02.80           POC expires Unit limit Auth Expiration date PT/OT + Visit Limit?   1/15/24 N/a 23 8                           Visit/Unit Tracking  AUTH Status:  Date   IE   RE         After 8 visits Used 1 2 3 4 5 6 5         Remaining  7 6 5 4 3 2 1            Subjective/Behavioral:  -Patient reported no new concerns for today's session.      Objective/Assessment:  Short-term goals:  Patient and caregiver will be educated on the use of internal and external memory aids/compensatory strategies to facilitate increased recall of routine, daily events, names, orientation to time, etc. (to be achieved in 1-2 weeks).     Reviewed memory journal entries since previous session on . Patient missed yesterday's entry due to confusion of the day of the week.     Patient will complete auditory immediate and short-term memory tasks with 80% accuracy to facilitate increased ability to retell narratives and recall information within functional living environment (to be achieved in 4-6 weeks).      Patient will complete complex auditory attention processing tasks (e.g., sentence unscrambles, ranking numbers/words, etc.) with 80% accuracy to facilitate increased processing, storage, and retrieval of functional information (to be achieved in 4-6 weeks).      [place] Cancellation task:  Hits: 33  Time:1:55 min  Misses:2    Patient benefited from: highlighting and scanning in an organized manner.      Trail Making (numbers): Time: 19 seconds Errors: 0  Trail Making(numbers to letters): Time:3.35 mins Errors: 2    Patient will complete  thought organization tasks (e.g., sequencing, deduction puzzles, etc.) with 80% accuracy to facilitate increased executive functioning skills (to be achieved in 4-6 weeks).      Following directions 2 steps: Patient was given a written direction (if a match burns, underline the hottest word below. If a match does not burn, [place] the coldest item). Task completed in 5/8 opp (62% acc) from max cueing. Patient benefited from prompting, repetition of directions, and rephrasing directions. Patient had difficulties comprehending the directions. Difficulties with spatial concepts (ex. Under and over).       Patient will be educated on word finding strategies (i.e., circumlocution) for improved generative naming and verbal expression skills (to be achieved in 1-2 weeks).      Patient will complete word generation tasks (e.g., verbal fluency, categorization, analogies, synonyms/antonyms, , etc.) with 80% accuracy using word finding strategies to facilitate improved word retrieval skills (to be achieved in 4-6 weeks).        Plan:  -Patient was provided with home exercises/activities to target goals in plan of care at the end of today's session.  -Continue with current plan of care.

## 2023-12-21 LAB — CANCER AG27-29 SERPL-ACNC: 21.8 U/ML (ref 0–38.6)

## 2023-12-26 ENCOUNTER — APPOINTMENT (OUTPATIENT)
Age: 78
End: 2023-12-26
Payer: COMMERCIAL

## 2023-12-28 ENCOUNTER — OFFICE VISIT (OUTPATIENT)
Age: 78
End: 2023-12-28
Payer: COMMERCIAL

## 2023-12-28 DIAGNOSIS — F02.80 LATE ONSET ALZHEIMER'S DISEASE WITHOUT BEHAVIORAL DISTURBANCE (HCC): ICD-10-CM

## 2023-12-28 DIAGNOSIS — G30.1 LATE ONSET ALZHEIMER'S DISEASE WITHOUT BEHAVIORAL DISTURBANCE (HCC): ICD-10-CM

## 2023-12-28 DIAGNOSIS — R48.8 OTHER SYMBOLIC DYSFUNCTIONS: Primary | ICD-10-CM

## 2023-12-28 PROCEDURE — 92507 TX SP LANG VOICE COMM INDIV: CPT

## 2023-12-28 NOTE — PROGRESS NOTES
Daily Speech Treatment Note    Today's date: 2023  Patient’s name: Paula Lubin  : 1945  MRN: [id number]  Safety measures: HTN, memory  Referring provider: Liv Mc MD    Encounter Diagnosis     ICD-10-CM    1. Other symbolic dysfunctions  R48.8       2. Late onset Alzheimer's disease without behavioral disturbance ([facility]CC)  G30.1     F02.80             POC expires Unit limit Auth Expiration date PT/OT + Visit Limit?   24 N/a 24 8                           Visit/Unit Tracking  AUTH Status:  Date   IE   RE        RE    After 8 visits Used 1 2 3 4 5 6 5 1          Remaining  7 6 5 4 3 2 1 7             Subjective/Behavioral:  -Patient reported no new concerns for today's session.      Objective/Assessment:  Short-term goals:  Patient and caregiver will be educated on the use of internal and external memory aids/compensatory strategies to facilitate increased recall of routine, daily events, names, orientation to time, etc. (to be achieved in 1-2 weeks).     Reviewed memory journal entries since previous session on . Patient often did not include the day of the week. Prompted patient to include days of the week to improve orientation.     Patient will complete auditory immediate and short-term memory tasks with 80% accuracy to facilitate increased ability to retell narratives and recall information within functional living environment (to be achieved in 4-6 weeks).      Patient will complete complex auditory attention processing tasks (e.g., sentence unscrambles, ranking numbers/words, etc.) with 80% accuracy to facilitate increased processing, storage, and retrieval of functional information (to be achieved in 4-6 weeks).     Patient will complete thought organization tasks (e.g., sequencing, deduction puzzles, etc.) with 80% accuracy to facilitate increased executive functioning skills (to be achieved in 4-6 weeks).        Patient will be  educated on word finding strategies (i.e., circumlocution) for improved generative naming and verbal expression skills (to be achieved in 1-2 weeks).      Patient will complete word generation tasks (e.g., verbal fluency, categorization, analogies, synonyms/antonyms, , etc.) with 80% accuracy using word finding strategies to facilitate improved word retrieval skills (to be achieved in 4-6 weeks).        Word-finding- patient was given a target word (encyclopedia) and had to extract words that were 3 or more letters. Task completed in 16/25 opp (64% acc) independently, increasing to 22/25 opp (88% acc) from mIN-MOD cueing. Patient benefited from utilizing letter tiles, semantic cues and written models from clinician.     Plan:  -Patient was provided with home exercises/activities to target goals in plan of care at the end of today's session.  -Continue with current plan of care.

## 2024-01-09 NOTE — PROGRESS NOTES
Daily Speech Treatment Note    Today's date: 2024  Patient’s name: Paula Lubin  : 1945  MRN: [id number]  Safety measures: HTN, memory  Referring provider: Liv Mc MD    No diagnosis found.        POC expires Unit limit Auth Expiration date PT/OT + Visit Limit?   24 N/a 24 8                           Visit/Unit Tracking  AUTH Status:  Date   IE   RE        RE    After 8 visits Used 1 2 3 4 5 6 5 1          Remaining  7 6 5 4 3 2 1 7             Subjective/Behavioral:  -Patient reported no new concerns for today's session.      Objective/Assessment:  Short-term goals:  Patient and caregiver will be educated on the use of internal and external memory aids/compensatory strategies to facilitate increased recall of routine, daily events, names, orientation to time, etc. (to be achieved in 1-2 weeks).     Reviewed memory journal entries since previous session on . Patient often did not include the day of the week. Prompted patient to include days of the week to improve orientation.     Patient will complete auditory immediate and short-term memory tasks with 80% accuracy to facilitate increased ability to retell narratives and recall information within functional living environment (to be achieved in 4-6 weeks).      Patient will complete complex auditory attention processing tasks (e.g., sentence unscrambles, ranking numbers/words, etc.) with 80% accuracy to facilitate increased processing, storage, and retrieval of functional information (to be achieved in 4-6 weeks).     Patient will complete thought organization tasks (e.g., sequencing, deduction puzzles, etc.) with 80% accuracy to facilitate increased executive functioning skills (to be achieved in 4-6 weeks).        Patient will be educated on word finding strategies (i.e., circumlocution) for improved generative naming and verbal expression skills (to be achieved in 1-2 weeks).       Patient will complete word generation tasks (e.g., verbal fluency, categorization, analogies, synonyms/antonyms, , etc.) with 80% accuracy using word finding strategies to facilitate improved word retrieval skills (to be achieved in 4-6 weeks).        Word-finding- patient was given a target word (encyclopedia) and had to extract words that were 3 or more letters. Task completed in 16/25 opp (64% acc) independently, increasing to 22/25 opp (88% acc) from mIN-MOD cueing. Patient benefited from utilizing letter tiles, semantic cues and written models from clinician.     Plan:  -Patient was provided with home exercises/activities to target goals in plan of care at the end of today's session.  -Continue with current plan of care.

## 2024-01-11 ENCOUNTER — APPOINTMENT (OUTPATIENT)
Age: 79
End: 2024-01-11
Payer: COMMERCIAL

## 2024-01-16 ENCOUNTER — APPOINTMENT (OUTPATIENT)
Age: 79
End: 2024-01-16
Payer: COMMERCIAL

## 2024-01-16 NOTE — PROGRESS NOTES
Daily Speech Treatment Note    Today's date: 2024  Patient’s name: Paula Lubin  : 1945  MRN: [id number]  Safety measures: HTN, memory  Referring provider: Liv Mc MD    Encounter Diagnosis     ICD-10-CM    1. Other symbolic dysfunctions  R48.8       2. Late onset Alzheimer's disease without behavioral disturbance (HCC)  G30.1     F02.80             POC expires Unit limit Auth Expiration date PT/OT + Visit Limit?   24 N/a 24 8                           Visit/Unit Tracking  AUTH Status:  Date   IE   RE     RE    After 8 visits Used 1 2 3 4 5 6 5 1 2         Remaining  7 6 5 4 3 2 1 7 6            Subjective/Behavioral:  -Patient reported no new concerns for today's session.      Objective/Assessment:  Short-term goals:  Patient and caregiver will be educated on the use of internal and external memory aids/compensatory strategies to facilitate increased recall of routine, daily events, names, orientation to time, etc. (to be achieved in 1-2 weeks).     Patient reported when having COVID-19 she was unable to complete memory journals. Patient completed today's memory journal entry to aid with recall of events and orientation to time. Mod-max verbal prompting to specify the tasks completed today.    Patient will complete auditory immediate and short-term memory tasks with 80% accuracy to facilitate increased ability to retell narratives and recall information within functional living environment (to be achieved in 4-6 weeks).      Sorting and Remembering categories: patient was given 16 words and had to sort into 4 different categories. Patient reviewed the words independently. Patient recalled independently 7/16 words. After review with verbal repetition strategy, patient recalled independently 8/16 words increasing to 15/16 with min-mod semantic cues    2nd trial with different set of words:  Patient recalled independently 12/16 words  increasing to 16/16 with min-mod semantic cues    Patient will complete complex auditory attention processing tasks (e.g., sentence unscrambles, ranking numbers/words, etc.) with 80% accuracy to facilitate increased processing, storage, and retrieval of functional information (to be achieved in 4-6 weeks).     Patient will complete thought organization tasks (e.g., sequencing, deduction puzzles, etc.) with 80% accuracy to facilitate increased executive functioning skills (to be achieved in 4-6 weeks).     Sustained attention  patient was given an array of letters and had to cross out the letters assigned by clinician.     Trial 1: Cross out 1 letters: Time: 1:30 mins (14 columns 21 rows of letters) Hit: 10  Missed: 0    Trial 2: Cross out 2 letters: Time: 2:19  mins (14 columns 21 rows of letters) Hit: 27  Missed: 2           Patient will be educated on word finding strategies (i.e., circumlocution) for improved generative naming and verbal expression skills (to be achieved in 1-2 weeks).      Patient will complete word generation tasks (e.g., verbal fluency, categorization, analogies, synonyms/antonyms, , etc.) with 80% accuracy using word finding strategies to facilitate improved word retrieval skills (to be achieved in 4-6 weeks).            Plan:  -Patient was provided with home exercises/activities to target goals in plan of care at the end of today's session.  -Continue with current plan of care.

## 2024-01-17 ENCOUNTER — OFFICE VISIT (OUTPATIENT)
Age: 79
End: 2024-01-17
Payer: COMMERCIAL

## 2024-01-17 DIAGNOSIS — G30.1 LATE ONSET ALZHEIMER'S DISEASE WITHOUT BEHAVIORAL DISTURBANCE (HCC): ICD-10-CM

## 2024-01-17 DIAGNOSIS — R48.8 OTHER SYMBOLIC DYSFUNCTIONS: Primary | ICD-10-CM

## 2024-01-17 DIAGNOSIS — F02.80 LATE ONSET ALZHEIMER'S DISEASE WITHOUT BEHAVIORAL DISTURBANCE (HCC): ICD-10-CM

## 2024-01-17 PROCEDURE — 92507 TX SP LANG VOICE COMM INDIV: CPT

## 2024-01-20 DIAGNOSIS — K44.9 HIATAL HERNIA: ICD-10-CM

## 2024-01-20 DIAGNOSIS — K21.9 GASTROESOPHAGEAL REFLUX DISEASE WITHOUT ESOPHAGITIS: ICD-10-CM

## 2024-01-20 DIAGNOSIS — G30.1 LATE ONSET ALZHEIMER'S DISEASE WITHOUT BEHAVIORAL DISTURBANCE (HCC): ICD-10-CM

## 2024-01-20 DIAGNOSIS — F02.80 LATE ONSET ALZHEIMER'S DISEASE WITHOUT BEHAVIORAL DISTURBANCE (HCC): ICD-10-CM

## 2024-01-22 RX ORDER — MEMANTINE HYDROCHLORIDE 10 MG/1
10 TABLET ORAL 2 TIMES DAILY
Qty: 180 TABLET | Refills: 1 | Status: SHIPPED | OUTPATIENT
Start: 2024-01-22

## 2024-01-22 RX ORDER — OMEPRAZOLE 20 MG/1
20 CAPSULE, DELAYED RELEASE ORAL 2 TIMES DAILY
Qty: 180 CAPSULE | Refills: 1 | Status: SHIPPED | OUTPATIENT
Start: 2024-01-22

## 2024-01-25 ENCOUNTER — OFFICE VISIT (OUTPATIENT)
Dept: INTERNAL MEDICINE CLINIC | Facility: CLINIC | Age: 79
End: 2024-01-25
Payer: COMMERCIAL

## 2024-01-25 VITALS
WEIGHT: 174 LBS | OXYGEN SATURATION: 99 % | SYSTOLIC BLOOD PRESSURE: 124 MMHG | DIASTOLIC BLOOD PRESSURE: 80 MMHG | TEMPERATURE: 98 F | HEART RATE: 88 BPM | RESPIRATION RATE: 18 BRPM | BODY MASS INDEX: 32.85 KG/M2 | HEIGHT: 61 IN

## 2024-01-25 DIAGNOSIS — E03.9 HYPOTHYROIDISM, UNSPECIFIED TYPE: ICD-10-CM

## 2024-01-25 DIAGNOSIS — G30.1 LATE ONSET ALZHEIMER'S DISEASE WITHOUT BEHAVIORAL DISTURBANCE (HCC): ICD-10-CM

## 2024-01-25 DIAGNOSIS — C50.811 MALIGNANT NEOPLASM OF OVERLAPPING SITES OF RIGHT BREAST IN FEMALE, ESTROGEN RECEPTOR POSITIVE: ICD-10-CM

## 2024-01-25 DIAGNOSIS — F02.80 LATE ONSET ALZHEIMER'S DISEASE WITHOUT BEHAVIORAL DISTURBANCE (HCC): ICD-10-CM

## 2024-01-25 DIAGNOSIS — Z17.0 MALIGNANT NEOPLASM OF OVERLAPPING SITES OF RIGHT BREAST IN FEMALE, ESTROGEN RECEPTOR POSITIVE: ICD-10-CM

## 2024-01-25 DIAGNOSIS — I10 ESSENTIAL HYPERTENSION: Primary | ICD-10-CM

## 2024-01-25 PROCEDURE — 99214 OFFICE O/P EST MOD 30 MIN: CPT | Performed by: INTERNAL MEDICINE

## 2024-01-25 NOTE — PROGRESS NOTES
Assessment/Plan:     Appears to be stable at this point.  For her hypertension, continue her amlodipine 5 mg daily.  For her hypothyroidism, continue levothyroxine 125 mcg daily.  For her Alzheimer's, continue follow-up with neurology.  For the breast cancer, continue follow-up with her specialist.  Although appears to be doing very well.  Continue all other medications.    Continue followup with all specialists.   Continue diet and exercise.    Ordered labs for next visit.     Quality Measures:       Return in about 6 months (around 7/25/2024) for Recheck.    No problem-specific Assessment & Plan notes found for this encounter.       Diagnoses and all orders for this visit:    Essential hypertension  -     CBC and differential; Future  -     Comprehensive metabolic panel; Future  -     Lipid panel; Future    Hypothyroidism, unspecified type  -     T4, free; Future  -     TSH, 3rd generation; Future    Late onset Alzheimer's disease without behavioral disturbance (HCC)    Malignant neoplasm of overlapping sites of right breast in female, estrogen receptor positive           Subjective:      Patient ID: Paula Lubin is a 78 y.o. female.    Patient comes in today for routine follow-up with her daughter.  They state she is doing okay.  Her blood pressure is controlled.  Blood work shows her thyroid levels are controlled.  Undergoing specialized speech therapy from neurology because of her dementia.  That has been helpful apparently.  Also, she is current with her specialist for the breast cancer.  Doing well so far.  No other complaints.  No further additions to her history.        ALLERGIES:  Allergies   Allergen Reactions   • Ciprofloxacin Hives   • Sulfa Antibiotics Itching   • Other    • Penicillins Rash     Category: Allergy;   --  Pt received unasyn 1.5 mg IV 11-12-18 to 11-15-18   • Sulfacetamide Rash     Category: Allergy;        CURRENT MEDICATIONS:    Current Outpatient Medications:   •  amLODIPine (NORVASC)  2.5 mg tablet, TAKE 2 TABLETS BY MOUTH DAILY, Disp: 180 tablet, Rfl: 1  •  anastrozole (ARIMIDEX) 1 mg tablet, Take 1 tablet (1 mg total) by mouth daily, Disp: 90 tablet, Rfl: 0  •  aspirin 325 mg tablet, Take 325 mg by mouth daily , Disp: , Rfl:   •  cholecalciferol (VITAMIN D3) 1,000 units tablet, Take 1 tablet (1,000 Units total) by mouth daily, Disp: 30 tablet, Rfl: 6  •  cholestyramine (QUESTRAN) 4 g packet, TAKE 1 PACKET BY MOUTH DAILY. (Patient taking differently: if needed), Disp: 90 packet, Rfl: 3  •  diphenhydrAMINE (BENADRYL) 25 mg tablet, Take 25 mg by mouth in the morning. 1/2 tablet with Paxil ., Disp: , Rfl:   •  fluticasone (FLONASE) 50 mcg/act nasal spray, INSTILL 1 SPRAY INTO EACH NOSTRIL AS NEEDED FOR RHINITIS OR ALLERGIES, Disp: 48 mL, Rfl: 1  •  furosemide (LASIX) 40 mg tablet, Take 1 tablet (40 mg total) by mouth daily for 2 days, Disp: 2 tablet, Rfl: 1  •  levothyroxine 125 mcg tablet, TAKE 1 TABLET BY MOUTH EVERY DAY, Disp: 90 tablet, Rfl: 1  •  loperamide (IMODIUM) 2 mg capsule, Take 1 capsule (2 mg total) by mouth 4 (four) times a day as needed for diarrhea, Disp: 12 capsule, Rfl: 0  •  meclizine (ANTIVERT) 25 mg tablet, Take 1 tablet (25 mg total) by mouth every 8 (eight) hours as needed for dizziness, Disp: 50 tablet, Rfl: 1  •  memantine (NAMENDA) 10 mg tablet, TAKE 1 TABLET BY MOUTH TWICE A DAY, Disp: 180 tablet, Rfl: 1  •  multivitamin (THERAGRAN) TABS, Take 1 tablet by mouth, Disp: , Rfl:   •  Myrbetriq 50 MG TB24, TAKE 1 TABLET BY MOUTH EVERY DAY, Disp: 90 tablet, Rfl: 3  •  omeprazole (PriLOSEC) 20 mg delayed release capsule, TAKE 1 CAPSULE BY MOUTH TWICE A DAY, Disp: 180 capsule, Rfl: 1  •  ondansetron (ZOFRAN-ODT) 4 mg disintegrating tablet, Take 1 tablet (4 mg total) by mouth every 8 (eight) hours as needed for nausea, Disp: 15 tablet, Rfl: 0  •  PARoxetine (PAXIL) 20 mg tablet, TAKE 1 TABLET BY MOUTH EVERY DAY, Disp: 90 tablet, Rfl: 3  •  potassium chloride (K-DUR,KLOR-CON) 10  mEq tablet, Take 1 tablet (10 mEq total) by mouth daily for 10 days, Disp: 10 tablet, Rfl: 0  •  PROAIR  (90 Base) MCG/ACT inhaler, as needed , Disp: , Rfl:   •  rivastigmine (EXELON) 9.5 mg/24 hr TD 24 hr patch, Place 1 patch on the skin over 24 hours daily, Disp: 90 patch, Rfl: 1  •  dicyclomine (BENTYL) 20 mg tablet, Take 1 tablet (20 mg total) by mouth 2 (two) times a day (Patient not taking: Reported on 1/25/2024), Disp: 20 tablet, Rfl: 0    ACTIVE PROBLEM LIST:  Patient Active Problem List   Diagnosis   • Depression   • Gastroesophageal reflux disease without esophagitis   • Mixed hyperlipidemia   • Essential hypertension   • Hypothyroid   • Impaired fasting glucose   • Lymphoma (HCC)   • Cerebral aneurysm, nonruptured   • Mild cognitive impairment   • Flexor tenosynovitis of finger   • Macrocytosis without anemia   • Paresthesias   • Non-intractable vomiting with nausea   • Hypokalemia   • Late onset Alzheimer's disease without behavioral disturbance (HCC)   • Chest pain   • Near syncope   • Immunocompromised (HCC)   • Malignant neoplasm of overlapping sites of right breast in female, estrogen receptor positive    • Use of anastrozole   • Recurrent major depressive disorder, remission status unspecified (HCC)   • Stage 3 chronic kidney disease, unspecified whether stage 3a or 3b CKD (HCC)   • History of lumpectomy of right breast       PAST MEDICAL HISTORY:  Past Medical History:   Diagnosis Date   • Anxiety disorder    • Aortic valve disorder    • BRCA1 negative    • BRCA2 negative    • Breast cancer (HCC) 03/2022    right   • Cancer (HCC)     breast   • Cellulitis of finger of left hand 11/20/2018   • Cellulitis of left hand 11/10/2018    Added automatically from request for surgery [id number]   • Depression    • Disease of thyroid gland    • GERD (gastroesophageal reflux disease)    • History of gastroesophageal reflux (GERD)    • History of kidney cancer 03/06/2014   • History of transfusion    •  Hyperlipidemia 03/06/2014   • Hypertension 03/06/2014   • Hypothyroidism 03/06/2014   • Lymphoma (HCC)    • Malignant neoplasm of overlapping sites of right breast in female, estrogen receptor positive  03/10/2022   • Shortness of breath    • Stroke (HCC) [id number]    Min stroke found wile going through chemo and radiation   • TIA (transient ischemic attack)    • UTI (urinary tract infection)        PAST SURGICAL HISTORY:  Past Surgical History:   Procedure Laterality Date   • BREAST LUMPECTOMY Right 3/29/2022    Procedure: ITZEL  DIRECTED LUMPECTOMY;  Surgeon: Denton Chambers MD;  Location: [facility];  Service: Surgical Oncology   • ESOPHAGOGASTRIC FUNDOPLASTY      Nissen Fundoplication   • HERNIA REPAIR     • LYMPH NODE BIOPSY Right 3/29/2022    Procedure: LYMPHATIC MAPPING WITH BLUE AND RADIOACTIVE DYES SENTINEL LYMPH NODE BIOPSY, INJECTION IN OR AT 0800 BY DR CHAMBERS;  Surgeon: Denton Chambers MD;  Location: [facility];  Service: Surgical Oncology   • MAMMO STEREOTACTIC BREAST BIOPSY RIGHT (ALL INC) Right 3/7/2022   • US BREAST ITZEL  NEEDLE LOC RIGHT Right 3/24/2022   • US GUIDED BREAST BIOPSY RIGHT COMPLETE Right 3/7/2022   • WRIST SURGERY Left        FAMILY HISTORY:  Family History   Problem Relation Age of Onset   • Stroke Father    • Leukemia Sister    • No Known Problems Brother    • Skin cancer Mother    • Stroke Maternal Grandmother    • No Known Problems Maternal Grandfather    • No Known Problems Paternal Grandmother    • No Known Problems Paternal Grandfather    • Breast cancer Sister    • Breast cancer Sister         Colon cancer   • Kidney cancer Brother    • Alcohol abuse Brother    • Kidney cancer Brother    • Stomach cancer Brother    • No Known Problems Daughter    • No Known Problems Daughter        SOCIAL HISTORY:  Social History     Socioeconomic History   • Marital status:      Spouse name: Not on file   • Number of children: 2   • Years of education: Not  "on file   • Highest education level: Not on file   Occupational History   • Not on file   Tobacco Use   • Smoking status: Never   • Smokeless tobacco: Never   • Tobacco comments:     None   Vaping Use   • Vaping status: Never Used   Substance and Sexual Activity   • Alcohol use: Yes     Comment: Social holidays   • Drug use: Never   • Sexual activity: Not Currently     Comment: [place] over   Other Topics Concern   • Not on file   Social History Narrative   • Not on file     Social Determinants of Health     Financial Resource Strain: Low Risk  (9/19/2023)    Overall Financial Resource Strain (CARDIA)    • Difficulty of Paying Living Expenses: Not hard at all   Food Insecurity: Not on file   Transportation Needs: No Transportation Needs (9/19/2023)    PRAPARE - Transportation    • Lack of Transportation (Medical): No    • Lack of Transportation (Non-Medical): No   Physical Activity: Not on file   Stress: Not on file   Social Connections: Not on file   Intimate Partner Violence: Not on file   Housing Stability: Not on file       Review of Systems   Respiratory:  Negative for shortness of breath.    Cardiovascular:  Negative for chest pain.   Gastrointestinal:  Negative for abdominal pain.         Objective:  Vitals:    01/25/24 1021   BP: 124/80   BP Location: Left arm   Patient Position: Sitting   Cuff Size: Standard   Pulse: 88   Resp: 18   Temp: 98 °F (36.7 °C)   TempSrc: Tympanic   SpO2: 99%   Weight: 78.9 kg (174 lb)   Height: 5' 1\" (1.549 m)     Body mass index is 32.88 kg/m².     Physical Exam  Vitals and nursing note reviewed.   Constitutional:       Appearance: Normal appearance. She is well-developed.   Cardiovascular:      Rate and Rhythm: Normal rate and regular rhythm.      Heart sounds: Normal heart sounds.   Pulmonary:      Effort: Pulmonary effort is normal.      Breath sounds: Normal breath sounds.   Abdominal:      Palpations: Abdomen is soft.      Tenderness: There is no abdominal tenderness. "   Neurological:      Mental Status: She is alert and oriented to person, place, and time.   Psychiatric:         Mood and Affect: Mood normal.         Behavior: Behavior normal.           RESULTS:  Hemoglobin A1C   Date/Time Value Ref Range Status   09/14/2021 02:20 PM 5.4 Normal 3.8-5.6%; PreDiabetic 5.7-6.4%; Diabetic >=6.5%; Glycemic control for adults with diabetes <7.0% % Final   02/15/2018 11:30 AM 5.7 (H) <5.7 % Final     Comment:     Reference Range  Non-diabetic                     <5.7  Pre-diabetic                     5.7-6.4  Diabetic                         >=6.5  ADA target for diabetic control  <=7     Cholesterol   Date/Time Value Ref Range Status   12/19/2023 02:05  (H) See Comment mg/dL Final     Comment:     Cholesterol:         Pediatric <18 Years        Desirable          <170 mg/dL      Borderline High    170-199 mg/dL      High               >=200 mg/dL        Adult >=18 Years            Desirable        <200 mg/dL      Borderline High  200-239 mg/dL      High             >239 mg/dL       Triglycerides   Date/Time Value Ref Range Status   12/19/2023 02:05 PM 68 See Comment mg/dL Final     Comment:     Triglyceride:     0-9Y            <75mg/dL     10Y-17Y         <90 mg/dL       >=18Y     Normal          <150 mg/dL     Borderline High 150-199 mg/dL     High            200-499 mg/dL        Very High       >499 mg/dL    Specimen collection should occur prior to Metamizole administration due to the potential for falsely depressed results.   06/08/2015 08:09 AM 98 mg/dL Final     Comment:     TRIGLYCERIDE:       Normal                 <150 mg/dl       Borderline High       150-199 mg/dl       High                  200-499 mg/dl       Very High             >499 mg/dl  _______________________________________       HDL   Date/Time Value Ref Range Status   06/08/2015 08:09 AM 84 mg/dL Final     Comment:     HDL:       High       >59 mg/dl       Low        <41  mg/dl  ______________________________       HDL, Direct   Date/Time Value Ref Range Status   12/19/2023 02:05 PM 88 >=50 mg/dL Final     LDL Calculated   Date/Time Value Ref Range Status   12/19/2023 02:05  (H) 0 - 100 mg/dL Final     Comment:     LDL Cholesterol:     Optimal           <100 mg/dl     Near Optimal      100-129 mg/dl     Above Optimal       Borderline High 130-159 mg/dl       High            160-189 mg/dl       Very High       >189 mg/dl         This screening LDL is a calculated result.   It does not have the accuracy of the Direct Measured LDL in the monitoring of patients with hyperlipidemia and/or statin therapy.   Direct Measure LDL (CGR392) must be ordered separately in these patients.   06/08/2015 08:09  (H) 0 - 100 mg/dL Final     Comment:     LDL, CALCULATED:     This screening LDL is a calculated result.  It does not have the accuracy of the Direct Measured LDL in the  monitoring of patients with hyperlipidemia and/or statin therapy.  Direct Measured LDL (Test Code 3126) must be ordered separately in these  patients.  ______________________________       Hemoglobin   Date/Time Value Ref Range Status   12/19/2023 02:05 PM 11.9 11.5 - 15.4 g/dL Final   07/17/2014 08:40 AM 14.2 11.0 - 15.7 g/dL Final     Hematocrit   Date/Time Value Ref Range Status   12/19/2023 02:05 PM 34.8 34.8 - 46.1 % Final   07/17/2014 08:40 AM 42.3 34.8 - 46.1 % Final     Platelets   Date/Time Value Ref Range Status   12/19/2023 02:05  149 - 390 Thousands/uL Final   07/17/2014 08:40  149 - 390 Thousand/uL Final     TSH 3RD GENERATON   Date/Time Value Ref Range Status   12/19/2023 02:05 PM 3.022 0.450 - 4.500 uIU/mL Final     Comment:     The recommended reference ranges for TSH during pregnancy are as follows:   First trimester 0.100 to 2.500 uIU/mL   Second trimester  0.200 to 3.000 uIU/mL   Third trimester 0.300 to 3.000 uIU/m    Note: Normal ranges may not apply to patients who are transgender,  non-binary, or whose legal sex, sex at birth, and gender identity differ.  Adult TSH (3rd generation) reference range follows the recommended guidelines of the American Thy[employer] Association, January, 2020.   06/08/2015 08:09 AM 6.086 (H) 0.36 - 3.74 uIU/ML Final     Comment:     The above 1 analytes were performed by [facility]   ([facility])  [address] [place] [id number]       Free T4   Date/Time Value Ref Range Status   12/19/2023 02:05 PM 1.16 (H) 0.61 - 1.12 ng/dL Final     Comment:     Specimens with biotin concentrations > 10 ng/mL can lead to significant (> 10%) positive bias in result.     Sodium   Date/Time Value Ref Range Status   12/19/2023 02:05  135 - 147 mmol/L Final     BUN   Date/Time Value Ref Range Status   12/19/2023 02:05 PM 17 5 - 25 mg/dL Final   07/17/2014 08:40 AM 18 7 - 18 mg/dL Final     Creatinine   Date/Time Value Ref Range Status   12/19/2023 02:05 PM 0.74 0.60 - 1.30 mg/dL Final     Comment:     Standardized to IDMS reference method   07/17/2014 08:40 AM 0.6 0.6 - 1.0 mg/dL Final     Comment:     Standardized to IDMS reference method      In chart    This note was created with voice recognition software.  Phonic, grammatical and spelling errors may be present within the note as a result.

## 2024-01-30 ENCOUNTER — OFFICE VISIT (OUTPATIENT)
Age: 79
End: 2024-01-30
Payer: COMMERCIAL

## 2024-01-30 DIAGNOSIS — F02.80 LATE ONSET ALZHEIMER'S DISEASE WITHOUT BEHAVIORAL DISTURBANCE (HCC): ICD-10-CM

## 2024-01-30 DIAGNOSIS — R48.8 OTHER SYMBOLIC DYSFUNCTIONS: Primary | ICD-10-CM

## 2024-01-30 DIAGNOSIS — G30.1 LATE ONSET ALZHEIMER'S DISEASE WITHOUT BEHAVIORAL DISTURBANCE (HCC): ICD-10-CM

## 2024-01-30 PROCEDURE — 92507 TX SP LANG VOICE COMM INDIV: CPT

## 2024-01-30 NOTE — PROGRESS NOTES
Daily Speech Treatment Note    Today's date: 2024  Patient’s name: Paula Lubin  : 1945  MRN: [id number]  Safety measures: HTN, memory  Referring provider: Liv Mc MD    Encounter Diagnosis     ICD-10-CM    1. Other symbolic dysfunctions  R48.8       2. Late onset Alzheimer's disease without behavioral disturbance (HCC)  G30.1     F02.80               POC expires Unit limit Auth Expiration date PT/OT + Visit Limit?   24 N/a 24 8                           Visit/Unit Tracking  AUTH Status:  Date   IE   RE    RE    After 8 visits Used 1 2 3 4 5 6 5 1 2 3        Remaining  7 6 5 4 3 2 1 7 6 5           Subjective/Behavioral:  -Patient reported no new concerns for today's session.      Objective/Assessment:  Short-term goals:  Patient and caregiver will be educated on the use of internal and external memory aids/compensatory strategies to facilitate increased recall of routine, daily events, names, orientation to time, etc. (to be achieved in 1-2 weeks).     Reviewed memory journal entries since previous session. Patient reports she has not been completing entries. Prompted patient to complete today's entry. Patient often did not include specific information. Prompted patient to include more details in entries to aid with recall of daily events.       Patient will complete auditory immediate and short-term memory tasks with 80% accuracy to facilitate increased ability to retell narratives and recall information within functional living environment (to be achieved in 4-6 weeks).          Patient will complete complex auditory attention processing tasks (e.g., sentence unscrambles, ranking numbers/words, etc.) with 80% accuracy to facilitate increased processing, storage, and retrieval of functional information (to be achieved in 4-6 weeks).     Patient will complete thought organization tasks (e.g., sequencing, deduction puzzles, etc.)  with 80% accuracy to facilitate increased executive functioning skills (to be achieved in 4-6 weeks).     Processing/thought organization: patient given deduction puzzles.  Puzzle 1: 100% acc with mod-max verbal and visual prompting  Puzzle 2: 100% acc with faded mod verbal and visual prompting    Divided attention:    Patient was given a deck of standard playing cards and was instructed to sort through cards based on a predetermined rule (e.g. pull out all the red cards).  Time was monitored during task.     One Element   -Black Cards: 26/26 correct cards independently - 53 seconds        Two Elements  -Odd Red Cards:   correct cards: 8 Time: 48 seconds  Only spades & hearts: Time: 3:25 mins. Correct cards: 26 Incorrect: 3 patient benefited from verbal repetition of rule prior to completion of task     Three Elements  Only even red & odd black cards  17 correct cards.   Missed/Error: 1 Time: 2:20 mins  patient benefited from verbal repetition of rule prior to completion of task           Patient will be educated on word finding strategies (i.e., circumlocution) for improved generative naming and verbal expression skills (to be achieved in 1-2 weeks).      Patient will complete word generation tasks (e.g., verbal fluency, categorization, analogies, synonyms/antonyms, , etc.) with 80% accuracy using word finding strategies to facilitate improved word retrieval skills (to be achieved in 4-6 weeks).     Semantic Fluency: given category, patient to generate as many items in 60 seconds.  Articles of Clothin items independently    Provided education when completing this task to utilize subcategory strategy and associations (ex. Visualize your closet)           Plan:  -Patient was provided with home exercises/activities to target goals in plan of care at the end of today's session.  -Continue with current plan of care.

## 2024-02-01 ENCOUNTER — HOSPITAL ENCOUNTER (EMERGENCY)
Facility: HOSPITAL | Age: 79
Discharge: HOME | End: 2024-02-01
Attending: EMERGENCY MEDICINE
Payer: COMMERCIAL

## 2024-02-01 ENCOUNTER — APPOINTMENT (OUTPATIENT)
Age: 79
End: 2024-02-01
Payer: COMMERCIAL

## 2024-02-01 ENCOUNTER — APPOINTMENT (EMERGENCY)
Dept: CT IMAGING | Facility: HOSPITAL | Age: 79
End: 2024-02-01
Payer: COMMERCIAL

## 2024-02-01 ENCOUNTER — APPOINTMENT (EMERGENCY)
Dept: RADIOLOGY | Facility: HOSPITAL | Age: 79
End: 2024-02-01
Payer: COMMERCIAL

## 2024-02-01 VITALS
SYSTOLIC BLOOD PRESSURE: 136 MMHG | OXYGEN SATURATION: 96 % | TEMPERATURE: 97.8 F | DIASTOLIC BLOOD PRESSURE: 71 MMHG | HEART RATE: 94 BPM | RESPIRATION RATE: 22 BRPM

## 2024-02-01 DIAGNOSIS — U07.1 COVID-19 VIRUS INFECTION: ICD-10-CM

## 2024-02-01 DIAGNOSIS — R06.09 EXERTIONAL DYSPNEA: Primary | ICD-10-CM

## 2024-02-01 LAB
2HR DELTA HS TROPONIN: 1 NG/L
ALBUMIN SERPL BCP-MCNC: 3.9 G/DL (ref 3.5–5)
ALP SERPL-CCNC: 65 U/L (ref 34–104)
ALT SERPL W P-5'-P-CCNC: 9 U/L (ref 7–52)
ANION GAP SERPL CALCULATED.3IONS-SCNC: 9 MMOL/L
AST SERPL W P-5'-P-CCNC: 16 U/L (ref 13–39)
BASOPHILS # BLD MANUAL: 0.17 THOUSAND/UL (ref 0–0.1)
BASOPHILS NFR MAR MANUAL: 4 % (ref 0–1)
BILIRUB SERPL-MCNC: 0.34 MG/DL (ref 0.2–1)
BNP SERPL-MCNC: 46 PG/ML (ref 0–100)
BUN SERPL-MCNC: 18 MG/DL (ref 5–25)
CALCIUM SERPL-MCNC: 9.4 MG/DL (ref 8.4–10.2)
CARDIAC TROPONIN I PNL SERPL HS: 2 NG/L
CARDIAC TROPONIN I PNL SERPL HS: 3 NG/L
CHLORIDE SERPL-SCNC: 104 MMOL/L (ref 96–108)
CO2 SERPL-SCNC: 26 MMOL/L (ref 21–32)
CREAT SERPL-MCNC: 0.84 MG/DL (ref 0.6–1.3)
D DIMER PPP FEU-MCNC: 1.52 UG/ML FEU
EOSINOPHIL # BLD MANUAL: 0.26 THOUSAND/UL (ref 0–0.4)
EOSINOPHIL NFR BLD MANUAL: 6 % (ref 0–6)
ERYTHROCYTE [DISTWIDTH] IN BLOOD BY AUTOMATED COUNT: 14 % (ref 11.6–15.1)
FLUAV RNA RESP QL NAA+PROBE: NEGATIVE
FLUBV RNA RESP QL NAA+PROBE: NEGATIVE
GFR SERPL CREATININE-BSD FRML MDRD: 66 ML/MIN/1.73SQ M
GLUCOSE SERPL-MCNC: 123 MG/DL (ref 65–140)
HCT VFR BLD AUTO: 33.8 % (ref 34.8–46.1)
HGB BLD-MCNC: 11.4 G/DL (ref 11.5–15.4)
LYMPHOCYTES # BLD AUTO: 1.03 THOUSAND/UL (ref 0.6–4.47)
LYMPHOCYTES # BLD AUTO: 24 % (ref 14–44)
MACROCYTES BLD QL AUTO: PRESENT
MAGNESIUM SERPL-MCNC: 2 MG/DL (ref 1.9–2.7)
MCH RBC QN AUTO: 37.3 PG (ref 26.8–34.3)
MCHC RBC AUTO-ENTMCNC: 33.7 G/DL (ref 31.4–37.4)
MCV RBC AUTO: 111 FL (ref 82–98)
MONOCYTES # BLD AUTO: 0.26 THOUSAND/UL (ref 0–1.22)
MONOCYTES NFR BLD: 6 % (ref 4–12)
NEUTROPHILS # BLD MANUAL: 2.58 THOUSAND/UL (ref 1.85–7.62)
NEUTS SEG NFR BLD AUTO: 60 % (ref 43–75)
PLATELET # BLD AUTO: 253 THOUSANDS/UL (ref 149–390)
PLATELET BLD QL SMEAR: ADEQUATE
PMV BLD AUTO: 11.3 FL (ref 8.9–12.7)
POTASSIUM SERPL-SCNC: 4.1 MMOL/L (ref 3.5–5.3)
PROT SERPL-MCNC: 6.7 G/DL (ref 6.4–8.4)
RBC # BLD AUTO: 3.06 MILLION/UL (ref 3.81–5.12)
RBC MORPH BLD: PRESENT
RSV RNA RESP QL NAA+PROBE: NEGATIVE
SARS-COV-2 RNA RESP QL NAA+PROBE: POSITIVE
SODIUM SERPL-SCNC: 139 MMOL/L (ref 135–147)
WBC # BLD AUTO: 4.3 THOUSAND/UL (ref 4.31–10.16)

## 2024-02-01 PROCEDURE — 36415 COLL VENOUS BLD VENIPUNCTURE: CPT

## 2024-02-01 PROCEDURE — 85007 BL SMEAR W/DIFF WBC COUNT: CPT | Performed by: EMERGENCY MEDICINE

## 2024-02-01 PROCEDURE — 80053 COMPREHEN METABOLIC PANEL: CPT | Performed by: EMERGENCY MEDICINE

## 2024-02-01 PROCEDURE — 93005 ELECTROCARDIOGRAM TRACING: CPT

## 2024-02-01 PROCEDURE — 99285 EMERGENCY DEPT VISIT HI MDM: CPT

## 2024-02-01 PROCEDURE — 71275 CT ANGIOGRAPHY CHEST: CPT

## 2024-02-01 PROCEDURE — 83735 ASSAY OF MAGNESIUM: CPT | Performed by: EMERGENCY MEDICINE

## 2024-02-01 PROCEDURE — 85027 COMPLETE CBC AUTOMATED: CPT | Performed by: EMERGENCY MEDICINE

## 2024-02-01 PROCEDURE — 85379 FIBRIN DEGRADATION QUANT: CPT | Performed by: EMERGENCY MEDICINE

## 2024-02-01 PROCEDURE — 84484 ASSAY OF TROPONIN QUANT: CPT | Performed by: EMERGENCY MEDICINE

## 2024-02-01 PROCEDURE — 83880 ASSAY OF NATRIURETIC PEPTIDE: CPT | Performed by: EMERGENCY MEDICINE

## 2024-02-01 PROCEDURE — 0241U HB NFCT DS VIR RESP RNA 4 TRGT: CPT | Performed by: EMERGENCY MEDICINE

## 2024-02-01 PROCEDURE — 99285 EMERGENCY DEPT VISIT HI MDM: CPT | Performed by: EMERGENCY MEDICINE

## 2024-02-01 PROCEDURE — 71045 X-RAY EXAM CHEST 1 VIEW: CPT

## 2024-02-01 PROCEDURE — G1004 CDSM NDSC: HCPCS

## 2024-02-01 RX ADMIN — IOHEXOL 85 ML: 350 INJECTION, SOLUTION INTRAVENOUS at 19:51

## 2024-02-01 NOTE — ED PROVIDER NOTES
"History  Chief Complaint   Patient presents with    Shortness of Breath     C/o sob for the past couple of days.      Patient is a 78-year-old female with past medical history of hypertension, hyperlipidemia, hypothyroidism, breast cancer status postlumpectomy, chemo and radiation and currently on antiestrogen hormonal therapy, GERD, prior TIA, presents to the emergency department for exertional dyspnea over the past several weeks.  Patient states for about 3 weeks she has been feeling short of breath, mostly with exertion.  She states when she is sitting at rest she does not feel short of breath.  Denies any worsening when laying flat.  She denies ever feeling this way before.  She thought it would go away but she states the symptoms seem to be getting worse.  Daughter states that she does get unsteady on her feet at times and patient does admit to postural lightheadedness as well as room spinning sensation when she lays flat.  She states the vertigo is very brief and then goes away quickly.  She states the vertigo has been going on for \"a while.\"  Denies any lightheadedness or room spinning sensation currently.  She states she does get intermittent tinnitus but denies any hearing loss.  She denies any associated chest pain, but has had intermittent palpitations.  She denies any diaphoresis, recent cough, hemoptysis, URI symptoms, fevers, chills, headache, abdominal pain, back pain, nausea, vomiting, change in bowel habits, blood per rectum, melena, urinary symptoms, skin rash or color change, leg pain or swelling, new focal neurologic deficits.  Denies any known cardiac history, history of asthma or COPD.  Denies history of VTE.      History provided by:  Patient   used: No    Shortness of Breath  Associated symptoms: no abdominal pain, no chest pain, no cough, no diaphoresis, no ear pain, no fever, no headaches, no neck pain, no rash, no sore throat, no vomiting and no wheezing        Prior to " Admission Medications   Prescriptions Last Dose Informant Patient Reported? Taking?   Myrbetriq 50 MG TB24  Self, Child No No   Sig: TAKE 1 TABLET BY MOUTH EVERY DAY   PARoxetine (PAXIL) 20 mg tablet  Self, Child No No   Sig: TAKE 1 TABLET BY MOUTH EVERY DAY   PROAIR  (90 Base) MCG/ACT inhaler  Self, Child Yes No   Sig: as needed    amLODIPine (NORVASC) 2.5 mg tablet   No No   Sig: TAKE 2 TABLETS BY MOUTH DAILY   anastrozole (ARIMIDEX) 1 mg tablet   No No   Sig: Take 1 tablet (1 mg total) by mouth daily   aspirin 325 mg tablet  Self, Child Yes No   Sig: Take 325 mg by mouth daily    cholecalciferol (VITAMIN D3) 1,000 units tablet  Self, Child No No   Sig: Take 1 tablet (1,000 Units total) by mouth daily   cholestyramine (QUESTRAN) 4 g packet  Self, Child No No   Sig: TAKE 1 PACKET BY MOUTH DAILY.   Patient taking differently: if needed   dicyclomine (BENTYL) 20 mg tablet  Self, Child No No   Sig: Take 1 tablet (20 mg total) by mouth 2 (two) times a day   Patient not taking: Reported on 1/25/2024   diphenhydrAMINE (BENADRYL) 25 mg tablet  Self, Child Yes No   Sig: Take 25 mg by mouth in the morning. 1/2 tablet with Paxil .   fluticasone (FLONASE) 50 mcg/act nasal spray  Self, Child No No   Sig: INSTILL 1 SPRAY INTO EACH NOSTRIL AS NEEDED FOR RHINITIS OR ALLERGIES   furosemide (LASIX) 40 mg tablet  Self, Child No No   Sig: Take 1 tablet (40 mg total) by mouth daily for 2 days   levothyroxine 125 mcg tablet   No No   Sig: TAKE 1 TABLET BY MOUTH EVERY DAY   loperamide (IMODIUM) 2 mg capsule  Self, Child No No   Sig: Take 1 capsule (2 mg total) by mouth 4 (four) times a day as needed for diarrhea   meclizine (ANTIVERT) 25 mg tablet  Self, Child No No   Sig: Take 1 tablet (25 mg total) by mouth every 8 (eight) hours as needed for dizziness   memantine (NAMENDA) 10 mg tablet   No No   Sig: TAKE 1 TABLET BY MOUTH TWICE A DAY   multivitamin (THERAGRAN) TABS  Self, Child Yes No   Sig: Take 1 tablet by mouth    omeprazole (PriLOSEC) 20 mg delayed release capsule   No No   Sig: TAKE 1 CAPSULE BY MOUTH TWICE A DAY   ondansetron (ZOFRAN-ODT) 4 mg disintegrating tablet  Self, Child No No   Sig: Take 1 tablet (4 mg total) by mouth every 8 (eight) hours as needed for nausea   potassium chloride (K-DUR,KLOR-CON) 10 mEq tablet  Self, Child No No   Sig: Take 1 tablet (10 mEq total) by mouth daily for 10 days   rivastigmine (EXELON) 9.5 mg/24 hr TD 24 hr patch  Self, Child No No   Sig: Place 1 patch on the skin over 24 hours daily      Facility-Administered Medications: None       Past Medical History:   Diagnosis Date    Anxiety disorder     Aortic valve disorder     BRCA1 negative     BRCA2 negative     Breast cancer (HCC) 03/2022    right    Cancer (HCC)     breast    Cellulitis of finger of left hand 11/20/2018    Cellulitis of left hand 11/10/2018    Added automatically from request for surgery [id number]    Depression     Disease of thyroid gland     GERD (gastroesophageal reflux disease)     History of gastroesophageal reflux (GERD)     History of kidney cancer 03/06/2014    History of transfusion     Hyperlipidemia 03/06/2014    Hypertension 03/06/2014    Hypothyroidism 03/06/2014    Lymphoma (HCC)     Malignant neoplasm of overlapping sites of right breast in female, estrogen receptor positive  03/10/2022    Shortness of breath     Stroke (HCC) [id number]    Min stroke found wile going through chemo and radiation    TIA (transient ischemic attack)     UTI (urinary tract infection)        Past Surgical History:   Procedure Laterality Date    BREAST LUMPECTOMY Right 3/29/2022    Procedure: ITZEL  DIRECTED LUMPECTOMY;  Surgeon: Denton Chambers MD;  Location: [facility] OR;  Service: Surgical Oncology    ESOPHAGOGASTRIC FUNDOPLASTY      Nissen Fundoplication    HERNIA REPAIR      LYMPH NODE BIOPSY Right 3/29/2022    Procedure: LYMPHATIC MAPPING WITH BLUE AND RADIOACTIVE DYES SENTINEL LYMPH NODE BIOPSY, INJECTION IN OR  AT 0800 BY DR CHAMBERS;  Surgeon: Denton Chambers MD;  Location: [facility] OR;  Service: Surgical Oncology    MAMMO STEREOTACTIC BREAST BIOPSY RIGHT (ALL INC) Right 3/7/2022    US BREAST ITZEL  NEEDLE LOC RIGHT Right 3/24/2022    US GUIDED BREAST BIOPSY RIGHT COMPLETE Right 3/7/2022    WRIST SURGERY Left        Family History   Problem Relation Age of Onset    Stroke Father     Leukemia Sister     No Known Problems Brother     Skin cancer Mother     Stroke Maternal Grandmother     No Known Problems Maternal Grandfather     No Known Problems Paternal Grandmother     No Known Problems Paternal Grandfather     Breast cancer Sister     Breast cancer Sister         Colon cancer    Kidney cancer Brother     Alcohol abuse Brother     Kidney cancer Brother     Stomach cancer Brother     No Known Problems Daughter     No Known Problems Daughter      I have reviewed and agree with the history as documented.    E-Cigarette/Vaping    E-Cigarette Use Never User      E-Cigarette/Vaping Substances    Nicotine No     THC No     CBD No     Flavoring No     Other No     Unknown No      Social History     Tobacco Use    Smoking status: Never    Smokeless tobacco: Never    Tobacco comments:     None   Vaping Use    Vaping status: Never Used   Substance Use Topics    Alcohol use: Yes     Comment: Social holidays    Drug use: Never       Review of Systems   Constitutional:  Negative for chills, diaphoresis and fever.   HENT:  Negative for congestion, ear pain, rhinorrhea and sore throat.    Respiratory:  Positive for shortness of breath. Negative for cough, chest tightness and wheezing.    Cardiovascular:  Negative for chest pain, palpitations and leg swelling.   Gastrointestinal:  Negative for abdominal distention, abdominal pain, blood in stool, constipation, diarrhea, nausea and vomiting.   Genitourinary:  Negative for dysuria, flank pain, frequency and hematuria.   Musculoskeletal:  Negative for back pain, neck pain and  neck stiffness.   Skin:  Negative for color change, pallor, rash and wound.   Allergic/Immunologic: Negative for immunocompromised state.   Neurological:  Negative for dizziness, syncope, weakness, light-headedness, numbness and headaches.   Hematological:  Negative for adenopathy. Does not bruise/bleed easily.   Psychiatric/Behavioral:  Negative for confusion and decreased concentration.    All other systems reviewed and are negative.      Physical Exam  Physical Exam  Vitals and nursing note reviewed.   Constitutional:       General: She is not in acute distress.     Appearance: Normal appearance. She is well-developed. She is not ill-appearing, toxic-appearing or diaphoretic.   HENT:      Head: Normocephalic and atraumatic.      Right Ear: External ear normal.      Left Ear: External ear normal.      Mouth/Throat:      Mouth: Mucous membranes are moist.      Pharynx: Oropharynx is clear.   Eyes:      Extraocular Movements: Extraocular movements intact.      Conjunctiva/sclera: Conjunctivae normal.   Neck:      Vascular: No JVD.   Cardiovascular:      Rate and Rhythm: Normal rate and regular rhythm.      Pulses: Normal pulses.      Heart sounds: Normal heart sounds. No murmur heard.     No friction rub. No gallop.   Pulmonary:      Effort: Pulmonary effort is normal. No respiratory distress.      Breath sounds: No stridor. Rales present. No wheezing or rhonchi.      Comments: Bibasilar Rales.  Abdominal:      General: There is no distension.      Palpations: Abdomen is soft.      Tenderness: There is no abdominal tenderness. There is no guarding or rebound.   Musculoskeletal:         General: No swelling or tenderness. Normal range of motion.      Cervical back: Normal range of motion and neck supple. No rigidity.      Comments: Trace pitting edema by sock lines bilateral lower legs.   Skin:     General: Skin is warm and dry.      Coloration: Skin is not pale.      Findings: No erythema or rash.   Neurological:       General: No focal deficit present.      Mental Status: She is alert and oriented to person, place, and time.      Cranial Nerves: No cranial nerve deficit.      Sensory: No sensory deficit.      Motor: No weakness.   Psychiatric:         Mood and Affect: Mood normal.         Behavior: Behavior normal.         Vital Signs  ED Triage Vitals [02/01/24 1626]   Temperature Pulse Respirations Blood Pressure SpO2   97.8 °F (36.6 °C) 69 22 129/64 95 %      Temp Source Heart Rate Source Patient Position - Orthostatic VS BP Location FiO2 (%)   Oral Monitor Sitting Left arm --      Pain Score       --         Vitals:    02/01/24 1626 02/01/24 2015 02/01/24 2044   BP: 129/64 136/71    BP Location: Left arm Left arm    Pulse: 69 84 94   Resp: 22 22    Temp: 97.8 °F (36.6 °C)     TempSrc: Oral     SpO2: 95% 95% 96%          Visual Acuity      ED Medications  Medications   iohexol (OMNIPAQUE) 350 MG/ML injection (MULTI-DOSE) 85 mL (85 mL Intravenous Given 2/1/24 1951)       Diagnostic Studies  Results Reviewed       Procedure Component Value Units Date/Time    Magnesium [[id number]]  (Normal) Collected: 02/01/24 1633    Lab Status: Final result Specimen: Blood from Arm, Right Updated: 02/01/24 2050     Magnesium 2.0 mg/dL     FLU/RSV/COVID - if FLU/RSV clinically relevant [[id number]]  (Abnormal) Collected: 02/01/24 1839    Lab Status: Final result Specimen: Nares from Nose Updated: 02/01/24 1935     SARS-CoV-2 Positive     INFLUENZA A PCR Negative     INFLUENZA B PCR Negative     RSV PCR Negative    Narrative:      FOR PEDIATRIC PATIENTS - copy/paste COVID Guidelines URL to browser: [email or web address]    SARS-CoV-2 assay is a Nucleic Acid Amplification assay intended for the  qualitative detection of nucleic acid from SARS-CoV-2 in nasopharyngeal  swabs. Results are for the presumptive identification of SARS-CoV-2 RNA.    Positive results are indicative of infection with  SARS-CoV-2, the virus  causing COVID-19, but do not rule out bacterial infection or co-infection  with other viruses. Laboratories within the [place] and its  territories are required to report all positive results to the appropriate  public health authorities. Negative results do not preclude SARS-CoV-2  infection and should not be used as the sole basis for treatment or other  patient management decisions. Negative results must be combined with  clinical observations, patient history, and epidemiological information.  This test has not been FDA cleared or approved.    This test has been authorized by FDA under an Emergency Use Authorization  (EUA). This test is only authorized for the duration of time the  declaration that circumstances exist justifying the authorization of the  emergency use of an in vitro diagnostic tests for detection of SARS-CoV-2  virus and/or diagnosis of COVID-19 infection under section 564(b)(1) of  the Act, 21 U.S.C. 360bbb-3(b)(1), unless the authorization is terminated  or revoked sooner. The test has been validated but independent review by FDA  and CLIA is pending.    Test performed using [employer]ae[employer]: This RT-PCR assay targets N2,  a region unique to SARS-CoV-2. A conserved region in the E-gene was chosen  for pan-Sarbecovirus detection which includes SARS-CoV-2.    According to CMS-2020-01-R, this platform meets the definition of high-throughput technology.    D-Dimer [[id number]]  (Abnormal) Collected: 02/01/24 1839    Lab Status: Final result Specimen: Blood from Arm, Left Updated: 02/01/24 1926     D-Dimer, Quant 1.52 ug/ml FEU     Narrative:      In the evaluation for possible pulmonary embolism, in the appropriate (Well's Score of 4 or less) patient, the age adjusted d-dimer cutoff for this patient can be calculated as:    Age x 0.01 (in ug/mL) for Age-adjusted D-dimer exclusion threshold for a patient over 50 years.    HS Troponin I 2hr [[id number]]  (Normal)  Collected: 02/01/24 1839    Lab Status: Final result Specimen: Blood from Arm, Left Updated: 02/01/24 1923     hs TnI 2hr 3 ng/L      Delta 2hr hsTnI 1 ng/L     B-Type Natriuretic Peptide(BNP) [[id number]]  (Normal) Collected: 02/01/24 1633    Lab Status: Final result Specimen: Blood from Arm, Right Updated: 02/01/24 1849     BNP 46 pg/mL     RBC Morphology Reflex Test [[id number]] Collected: 02/01/24 1633    Lab Status: Final result Specimen: Blood from Arm, Right Updated: 02/01/24 1801    CBC and differential [[id number]]  (Abnormal) Collected: 02/01/24 1633    Lab Status: Final result Specimen: Blood from Arm, Right Updated: 02/01/24 1747     WBC 4.30 Thousand/uL      RBC 3.06 Million/uL      Hemoglobin 11.4 g/dL      Hematocrit 33.8 %       fL      MCH 37.3 pg      MCHC 33.7 g/dL      RDW 14.0 %      MPV 11.3 fL      Platelets 253 Thousands/uL     Narrative:      This is an appended report.  These results have been appended to a previously verified report.    Manual Differential(PHLEBS Do Not Order) [[id number]]  (Abnormal) Collected: 02/01/24 1633    Lab Status: Final result Specimen: Blood from Arm, Right Updated: 02/01/24 1747     Segmented % 60 %      Lymphocytes % 24 %      Monocytes % 6 %      Eosinophils, % 6 %      Basophils % 4 %      Absolute Neutrophils 2.58 Thousand/uL      Lymphocytes Absolute 1.03 Thousand/uL      Monocytes Absolute 0.26 Thousand/uL      Eosinophils Absolute 0.26 Thousand/uL      Basophils Absolute 0.17 Thousand/uL      Total Counted --     RBC Morphology Present     Platelet Estimate Adequate     Macrocytes Present    HS Troponin 0hr (reflex protocol) [[id number]]  (Normal) Collected: 02/01/24 1633    Lab Status: Final result Specimen: Blood from Arm, Right Updated: 02/01/24 1726     hs TnI 0hr 2 ng/L     Comprehensive metabolic panel [[id number]] Collected: 02/01/24 1633    Lab Status: Final result Specimen: Blood from Arm, Right Updated: 02/01/24 1722     Sodium 139 mmol/L       Potassium 4.1 mmol/L      Chloride 104 mmol/L      CO2 26 mmol/L      ANION GAP 9 mmol/L      BUN 18 mg/dL      Creatinine 0.84 mg/dL      Glucose 123 mg/dL      Calcium 9.4 mg/dL      AST 16 U/L      ALT 9 U/L      Alkaline Phosphatase 65 U/L      Total Protein 6.7 g/dL      Albumin 3.9 g/dL      Total Bilirubin 0.34 mg/dL      eGFR 66 ml/min/1.73sq m     Narrative:      National Kidney Disease Foundation guidelines for Chronic Kidney Disease (CKD):     Stage 1 with normal or high GFR (GFR > 90 mL/min/1.73 square meters)    Stage 2 Mild CKD (GFR = 60-89 mL/min/1.73 square meters)    Stage 3A Moderate CKD (GFR = 45-59 mL/min/1.73 square meters)    Stage 3B Moderate CKD (GFR = 30-44 mL/min/1.73 square meters)    Stage 4 Severe CKD (GFR = 15-29 mL/min/1.73 square meters)    Stage 5 End Stage CKD (GFR <15 mL/min/1.73 square meters)  Note: GFR calculation is accurate only with a steady state creatinine                   CTA ED chest PE study   Final Result by Echo Ivan MD (02/01 2026)      No pulmonary embolus.      No acute pulmonary disease.      Large hiatal hernia.            Workstation performed: [id number]         XR chest 1 view portable   ED Interpretation by Melany Bill DO (02/01 2034)   No acute abnormality in the chest.                 Procedures  ECG 12 Lead Documentation Only    Date/Time: 2/1/2024 4:34 PM    Performed by: Melany Bill DO  Authorized by: Melany Bill DO    ECG reviewed by me, the ED Provider: yes    Patient location:  ED  Previous ECG:     Previous ECG:  Compared to current    Comparison ECG info:  5-; PACs are now present.  Nonspecific T wave abnormality no longer evident in anterior/lateral leads.  Rate:     ECG rate:  74    ECG rate assessment: normal    Rhythm:     Rhythm: sinus rhythm    Ectopy:     Ectopy: PAC    QRS:     QRS axis:  Normal    QRS intervals:  Normal  Conduction:     Conduction: normal    ST segments:     ST  segments:  Normal  T waves:     T waves: normal    ECG 12 Lead Documentation Only    Date/Time: 2/1/2024 6:42 PM    Performed by: Melany Bill DO  Authorized by: Melany Bill DO    ECG reviewed by me, the ED Provider: yes    Patient location:  ED  Previous ECG:     Previous ECG:  Compared to current    Similarity:  No change  Rate:     ECG rate:  72    ECG rate assessment: normal    Rhythm:     Rhythm: sinus rhythm    Ectopy:     Ectopy: none    QRS:     QRS axis:  Normal    QRS intervals:  Normal  Conduction:     Conduction: normal    ST segments:     ST segments:  Normal  T waves:     T waves: normal             ED Course  ED Course as of 02/01/24 2234   Thu Feb 01, 2024 1808 WBC(!): 4.30  Slightly lower than 1 month ago at which time WBC count was 4.54.   1808 Hemoglobin(!): 11.4  Hemoglobin fairly stable as it normally runs between 11.5 and 11.9.   1809 hs TnI 0hr: 2   1850 BNP: 46   1926 D-Dimer, Quant(!): 1.52   1933 Updated patient about workup thus far including elevated D-dimer and need for CTA chest.   1952 SARS-COV-2(!): Positive   2040 Updated patient about CT findings of no acute PE or lung pathology as well as incidental hiatal hernia which she was already aware of.  I did discuss positive COVID test.  Although patient has not had any recent cough or flulike symptoms, this could explain her dyspnea over the past several weeks.  Will check ambulatory pulse ox and if it remains above 90%, will discharge home.  Will still place referral to cardiology for outpatient echo and also advised close follow-up with PCP.   2045 Ambulatory pulse ox 96%.   2117 Patient stable for discharge at this time.  Advised close follow-up with PCP as well as cardiology.  Discussed ED return parameters.             HEART Risk Score      Flowsheet Row Most Recent Value   Heart Score Risk Calculator    History 0 Filed at: 02/01/2024 1810   ECG 0 Filed at: 02/01/2024 1810   Age 2 Filed at: 02/01/2024 1810    Risk Factors 2 Filed at: 02/01/2024 1810   Troponin 0 Filed at: 02/01/2024 1810   HEART Score 4 Filed at: 02/01/2024 1810          Identification of Seniors at Risk      Flowsheet Row Most Recent Value   (ISAR) Identification of Seniors at Risk    Before the illness or injury that brought you to the Emergency, did you need someone to help you on a regular basis? 0 Filed at: 02/01/2024 1628   In the last 24 hours, have you needed more help than usual? 0 Filed at: 02/01/2024 1628   Have you been hospitalized for one or more nights during the past 6 months? 0 Filed at: 02/01/2024 1628   In general, do you see well? 0 Filed at: 02/01/2024 1628   In general, do you have serious problems with your memory? 0 Filed at: 02/01/2024 1628   Do you take more than three different medications every day? 0 Filed at: 02/01/2024 1628   ISAR Score 0 Filed at: 02/01/2024 1628                PERC Rule for PE      Flowsheet Row Most Recent Value   PERC Rule for PE    Age >=50 1 Filed at: 02/01/2024 1810   HR >=100 0 Filed at: 02/01/2024 1810   O2 Sat on room air < 95% 0 Filed at: 02/01/2024 1810   History of PE or DVT 0 Filed at: 02/01/2024 1810   Recent trauma or surgery 0 Filed at: 02/01/2024 1810   Hemoptysis 0 Filed at: 02/01/2024 1810   Exogenous estrogen 0 Filed at: 02/01/2024 1810   Unilateral leg swelling 0 Filed at: 02/01/2024 1810   PERC Rule for PE Results 1 Filed at: 02/01/2024 1810                SBIRT 22yo+      Flowsheet Row Most Recent Value   Initial Alcohol Screen: US AUDIT-C     1. How often do you have a drink containing alcohol? 0 Filed at: 02/01/2024 1628   2. How many drinks containing alcohol do you have on a typical day you are drinking?  0 Filed at: 02/01/2024 1628   3a. Male UNDER 65: How often do you have five or more drinks on one occasion? 0 Filed at: 02/01/2024 1628   3b. FEMALE Any Age, or MALE 65+: How often do you have 4 or more drinks on one occassion? 0 Filed at: 02/01/2024 162   Audit-C Score 0  Filed at: 02/01/2024 1628   RENZO: How many times in the past year have you...    Used an illegal drug or used a prescription medication for non-medical reasons? Never Filed at: 02/01/2024 1628            Wells' Criteria for PE      Flowsheet Row Most Recent Value   Wells' Criteria for PE    Clinical signs and symptoms of DVT 0 Filed at: 02/01/2024 1809   PE is primary diagnosis or equally likely 0 Filed at: 02/01/2024 1809   HR >100 0 Filed at: 02/01/2024 1809   Immobilization at least 3 days or Surgery in the previous 4 weeks 0 Filed at: 02/01/2024 1809   Previous, objectively diagnosed PE or DVT 0 Filed at: 02/01/2024 1809   Hemoptysis 0 Filed at: 02/01/2024 1809   Malignancy with treatment within 6 months or palliative 0 Filed at: 02/01/2024 1809   Wells' Criteria Total 0 Filed at: 02/01/2024 1809                  Medical Decision Making  78-year-old female presents to the ED for several weeks of progressively worsening exertional dyspnea.  Differential is vast and includes age-related deconditioning, angina or acute coronary syndrome, new onset heart failure, acute PE, pneumonia, viral infection, bronchospasm, pleural effusion, lung cancer.  Will evaluate with cardiac labs, BNP, D-dimer, chest x-ray, EKG.  Patient not currently experiencing dyspnea while at rest but will check ambulatory pulse ox.  If workup is unremarkable, will ultimately refer to cardiology for outpatient echo and possible stress test.    Amount and/or Complexity of Data Reviewed  Labs: ordered. Decision-making details documented in ED Course.  Radiology: ordered and independent interpretation performed. Decision-making details documented in ED Course.  ECG/medicine tests: ordered and independent interpretation performed. Decision-making details documented in ED Course.    Risk  Prescription drug management.             Disposition  Final diagnoses:   Exertional dyspnea   COVID-19 virus infection     Time reflects when diagnosis was  documented in both MDM as applicable and the Disposition within this note       Time User Action Codes Description Comment    2/1/2024  8:41 PM Melany Bill Add [R06.09] Exertional dyspnea     2/1/2024  8:41 PM Melany Bill Add [U07.1] COVID-19 virus infection           ED Disposition       ED Disposition   Discharge    Condition   Stable    Date/Time   Thu Feb 1, 2024 2118    Comment   Paula Lubin discharge to home/self care.                   Follow-up Information       Follow up With Specialties Details Why Contact Info Additional Information    Jsoe Enrique Elliott MD Internal Medicine Schedule an appointment as soon as possible for a visit   [phone]  [facility] [address] [phone]       [facility] Schedule an appointment as soon as possible for a visit   [address] [address] [phone] [facility], [address] [facility], [place] [address], [place], [place], [address] [phone]            Discharge Medication List as of 2/1/2024  9:18 PM        CONTINUE these medications which have NOT CHANGED    Details   amLODIPine (NORVASC) 2.5 mg tablet TAKE 2 TABLETS BY MOUTH DAILY, Starting Tue 11/28/2023, Normal      anastrozole (ARIMIDEX) 1 mg tablet Take 1 tablet (1 mg total) by mouth daily, Starting Thu 11/2/2023, Normal      aspirin 325 mg tablet Take 325 mg by mouth daily , Starting Mon 3/19/2018, Historical Med      cholecalciferol (VITAMIN D3) 1,000 units tablet Take 1 tablet (1,000 Units total) by mouth daily, Starting Fri 4/22/2022, Until Thu 1/25/2024, Normal      cholestyramine (QUESTRAN) 4 g packet TAKE 1 PACKET BY MOUTH DAILY., Normal      dicyclomine (BENTYL) 20 mg tablet Take 1 tablet (20 mg total) by mouth 2 (two) times a day, Starting Thu 6/1/2023, Normal      diphenhydrAMINE (BENADRYL) 25 mg tablet Take 25 mg by mouth in the morning. 1/2 tablet with Paxil .,  Historical Med      fluticasone (FLONASE) 50 mcg/act nasal spray INSTILL 1 SPRAY INTO EACH NOSTRIL AS NEEDED FOR RHINITIS OR ALLERGIES, Normal      furosemide (LASIX) 40 mg tablet Take 1 tablet (40 mg total) by mouth daily for 2 days, Starting Mon 5/15/2023, Until Thu 1/25/2024, Normal      levothyroxine 125 mcg tablet TAKE 1 TABLET BY MOUTH EVERY DAY, Starting Tue 11/28/2023, Normal      loperamide (IMODIUM) 2 mg capsule Take 1 capsule (2 mg total) by mouth 4 (four) times a day as needed for diarrhea, Starting Thu 6/1/2023, Normal      meclizine (ANTIVERT) 25 mg tablet Take 1 tablet (25 mg total) by mouth every 8 (eight) hours as needed for dizziness, Starting Mon 5/15/2023, Normal      memantine (NAMENDA) 10 mg tablet TAKE 1 TABLET BY MOUTH TWICE A DAY, Starting Mon 1/22/2024, Normal      multivitamin (THERAGRAN) TABS Take 1 tablet by mouth, Historical Med      Myrbetriq 50 MG TB24 TAKE 1 TABLET BY MOUTH EVERY DAY, Starting Wed 9/20/2023, Normal      omeprazole (PriLOSEC) 20 mg delayed release capsule TAKE 1 CAPSULE BY MOUTH TWICE A DAY, Starting Mon 1/22/2024, Normal      ondansetron (ZOFRAN-ODT) 4 mg disintegrating tablet Take 1 tablet (4 mg total) by mouth every 8 (eight) hours as needed for nausea, Starting Thu 11/17/2022, Print      PARoxetine (PAXIL) 20 mg tablet TAKE 1 TABLET BY MOUTH EVERY DAY, Starting Thu 10/12/2023, Normal      potassium chloride (K-DUR,KLOR-CON) 10 mEq tablet Take 1 tablet (10 mEq total) by mouth daily for 10 days, Starting Tue 9/19/2023, Until Thu 1/25/2024, Normal      PROAIR  (90 Base) MCG/ACT inhaler as needed , Starting Wed 8/7/2019, Historical Med      rivastigmine (EXELON) 9.5 mg/24 hr TD 24 hr patch Place 1 patch on the skin over 24 hours daily, Starting Mon 5/15/2023, Normal                 PDMP Review         Value Time User    PDMP Reviewed  Yes 3/14/2022  1:24 PM Denton Chambers MD            ED Provider  Electronically Signed by             Melany Mcconnell  DO [place]  02/01/24 2230

## 2024-02-02 ENCOUNTER — VBI (OUTPATIENT)
Dept: INTERNAL MEDICINE CLINIC | Facility: CLINIC | Age: 79
End: 2024-02-02

## 2024-02-02 LAB
ATRIAL RATE: 72 BPM
ATRIAL RATE: 74 BPM
P AXIS: 67 DEGREES
P AXIS: 71 DEGREES
PR INTERVAL: 146 MS
PR INTERVAL: 152 MS
QRS AXIS: 39 DEGREES
QRS AXIS: 40 DEGREES
QRSD INTERVAL: 78 MS
QRSD INTERVAL: 82 MS
QT INTERVAL: 380 MS
QT INTERVAL: 394 MS
QTC INTERVAL: 421 MS
QTC INTERVAL: 431 MS
T WAVE AXIS: 47 DEGREES
T WAVE AXIS: 49 DEGREES
VENTRICULAR RATE: 72 BPM
VENTRICULAR RATE: 74 BPM

## 2024-02-02 NOTE — TELEPHONE ENCOUNTER
02/02/24 9:13 AM    Patient contacted post ED visit, [facility] department spoke with patient/caregiver and outreach was successful.    Thank you.  John Arias MA  [facility]

## 2024-02-06 ENCOUNTER — APPOINTMENT (OUTPATIENT)
Age: 79
End: 2024-02-06
Payer: COMMERCIAL

## 2024-02-06 ENCOUNTER — OFFICE VISIT (OUTPATIENT)
Dept: INTERNAL MEDICINE CLINIC | Facility: CLINIC | Age: 79
End: 2024-02-06
Payer: COMMERCIAL

## 2024-02-06 VITALS
OXYGEN SATURATION: 96 % | TEMPERATURE: 99.4 F | HEIGHT: 61 IN | WEIGHT: 173.6 LBS | BODY MASS INDEX: 32.77 KG/M2 | RESPIRATION RATE: 20 BRPM | DIASTOLIC BLOOD PRESSURE: 84 MMHG | HEART RATE: 94 BPM | SYSTOLIC BLOOD PRESSURE: 128 MMHG

## 2024-02-06 DIAGNOSIS — R06.00 DYSPNEA, UNSPECIFIED TYPE: Primary | ICD-10-CM

## 2024-02-06 PROCEDURE — 99214 OFFICE O/P EST MOD 30 MIN: CPT | Performed by: INTERNAL MEDICINE

## 2024-02-06 RX ORDER — AZITHROMYCIN 250 MG/1
TABLET, FILM COATED ORAL
Qty: 6 TABLET | Refills: 0 | Status: SHIPPED | OUTPATIENT
Start: 2024-02-06 | End: 2024-02-10

## 2024-02-06 RX ORDER — METHYLPREDNISOLONE 4 MG/1
TABLET ORAL
Qty: 21 EACH | Refills: 0 | Status: SHIPPED | OUTPATIENT
Start: 2024-02-06 | End: 2024-02-14 | Stop reason: ALTCHOICE

## 2024-02-06 NOTE — PROGRESS NOTES
Assessment/Plan:     Unclear.  Testing in the ER so far does not suggest any heart failure.  But will order an echo and follow through with cardiology evaluation.  Her positive COVID test in the ER is probably lingering from her COVID infection after the new year.  Not a new COVID infection.  At this point she may have a subsequent bacterial infection.  Possibly sinuses.  Will treat her for that and then reassess.     Quality Measures:       Return in about 1 week (around 2/13/2024) for Recheck.    No problem-specific Assessment & Plan notes found for this encounter.       Diagnoses and all orders for this visit:    Dyspnea, unspecified type  -     azithromycin (ZITHROMAX) 250 mg tablet; 2 tabs today then 1 tab daily for 4 days  -     methylPREDNISolone 4 MG tablet therapy pack; Use as directed on package  -     Echo complete w/ contrast if indicated; Future          Subjective:      Patient ID: Paula Lubin is a 78 y.o. female.    Patient comes in today with her daughter for ER follow-up.  She had gone with shortness of breath.  Tested for PE, pneumonia, heart failure.  Although that looked normal.  But then they did a COVID test which was positive.  However, she had COVID at the beginning of the year a few weeks ago after her daughter caught it.  This shortness of breath has been in the last 2 weeks roughly.  No cough.  Maybe a slight headache.  She does not feel congested.  Her daughter notes no wheezing.  ER suggested cardiology eval so that appointment was made as well.        ALLERGIES:  Allergies   Allergen Reactions   • Ciprofloxacin Hives   • Sulfa Antibiotics Itching   • Other    • Penicillins Rash     Category: Allergy;   --  Pt received unasyn 1.5 mg IV 11-12-18 to 11-15-18   • Sulfacetamide Rash     Category: Allergy;        CURRENT MEDICATIONS:    Current Outpatient Medications:   •  amLODIPine (NORVASC) 2.5 mg tablet, TAKE 2 TABLETS BY MOUTH DAILY, Disp: 180 tablet, Rfl: 1  •  anastrozole (ARIMIDEX)  1 mg tablet, Take 1 tablet (1 mg total) by mouth daily, Disp: 90 tablet, Rfl: 0  •  aspirin 325 mg tablet, Take 325 mg by mouth daily , Disp: , Rfl:   •  azithromycin (ZITHROMAX) 250 mg tablet, 2 tabs today then 1 tab daily for 4 days, Disp: 6 tablet, Rfl: 0  •  cholestyramine (QUESTRAN) 4 g packet, TAKE 1 PACKET BY MOUTH DAILY., Disp: 90 packet, Rfl: 3  •  diphenhydrAMINE (BENADRYL) 25 mg tablet, Take 25 mg by mouth in the morning. 1/2 tablet with Paxil ., Disp: , Rfl:   •  fluticasone (FLONASE) 50 mcg/act nasal spray, INSTILL 1 SPRAY INTO EACH NOSTRIL AS NEEDED FOR RHINITIS OR ALLERGIES, Disp: 48 mL, Rfl: 1  •  furosemide (LASIX) 40 mg tablet, Take 1 tablet (40 mg total) by mouth daily for 2 days, Disp: 2 tablet, Rfl: 1  •  levothyroxine 125 mcg tablet, TAKE 1 TABLET BY MOUTH EVERY DAY, Disp: 90 tablet, Rfl: 1  •  loperamide (IMODIUM) 2 mg capsule, Take 1 capsule (2 mg total) by mouth 4 (four) times a day as needed for diarrhea, Disp: 12 capsule, Rfl: 0  •  meclizine (ANTIVERT) 25 mg tablet, Take 1 tablet (25 mg total) by mouth every 8 (eight) hours as needed for dizziness, Disp: 50 tablet, Rfl: 1  •  memantine (NAMENDA) 10 mg tablet, TAKE 1 TABLET BY MOUTH TWICE A DAY, Disp: 180 tablet, Rfl: 1  •  methylPREDNISolone 4 MG tablet therapy pack, Use as directed on package, Disp: 21 each, Rfl: 0  •  multivitamin (THERAGRAN) TABS, Take 1 tablet by mouth, Disp: , Rfl:   •  Myrbetriq 50 MG TB24, TAKE 1 TABLET BY MOUTH EVERY DAY, Disp: 90 tablet, Rfl: 3  •  omeprazole (PriLOSEC) 20 mg delayed release capsule, TAKE 1 CAPSULE BY MOUTH TWICE A DAY, Disp: 180 capsule, Rfl: 1  •  ondansetron (ZOFRAN-ODT) 4 mg disintegrating tablet, Take 1 tablet (4 mg total) by mouth every 8 (eight) hours as needed for nausea, Disp: 15 tablet, Rfl: 0  •  PARoxetine (PAXIL) 20 mg tablet, TAKE 1 TABLET BY MOUTH EVERY DAY, Disp: 90 tablet, Rfl: 3  •  PROAIR  (90 Base) MCG/ACT inhaler, as needed , Disp: , Rfl:   •  rivastigmine (EXELON) 9.5  mg/24 hr TD 24 hr patch, Place 1 patch on the skin over 24 hours daily, Disp: 90 patch, Rfl: 1  •  cholecalciferol (VITAMIN D3) 1,000 units tablet, Take 1 tablet (1,000 Units total) by mouth daily, Disp: 30 tablet, Rfl: 6  •  dicyclomine (BENTYL) 20 mg tablet, Take 1 tablet (20 mg total) by mouth 2 (two) times a day (Patient not taking: Reported on 1/25/2024), Disp: 20 tablet, Rfl: 0  •  potassium chloride (K-DUR,KLOR-CON) 10 mEq tablet, Take 1 tablet (10 mEq total) by mouth daily for 10 days, Disp: 10 tablet, Rfl: 0    ACTIVE PROBLEM LIST:  Patient Active Problem List   Diagnosis   • Depression   • Gastroesophageal reflux disease without esophagitis   • Mixed hyperlipidemia   • Essential hypertension   • Hypothyroid   • Impaired fasting glucose   • Lymphoma (HCC)   • Cerebral aneurysm, nonruptured   • Mild cognitive impairment   • Flexor tenosynovitis of finger   • Macrocytosis without anemia   • Paresthesias   • Non-intractable vomiting with nausea   • Hypokalemia   • Late onset Alzheimer's disease without behavioral disturbance (HCC)   • Chest pain   • Near syncope   • Immunocompromised (HCC)   • Malignant neoplasm of overlapping sites of right breast in female, estrogen receptor positive    • Use of anastrozole   • Recurrent major depressive disorder, remission status unspecified (HCC)   • Stage 3 chronic kidney disease, unspecified whether stage 3a or 3b CKD (HCC)   • History of lumpectomy of right breast       PAST MEDICAL HISTORY:  Past Medical History:   Diagnosis Date   • Anxiety disorder    • Aortic valve disorder    • BRCA1 negative    • BRCA2 negative    • Breast cancer (HCC) 03/2022    right   • Cancer (HCC)     breast   • Cellulitis of finger of left hand 11/20/2018   • Cellulitis of left hand 11/10/2018    Added automatically from request for surgery [id number]   • Depression    • Disease of thyroid gland    • GERD (gastroesophageal reflux disease)    • History of gastroesophageal reflux (GERD)    •  History of kidney cancer 03/06/2014   • History of transfusion    • Hyperlipidemia 03/06/2014   • Hypertension 03/06/2014   • Hypothyroidism 03/06/2014   • Lymphoma (HCC)    • Malignant neoplasm of overlapping sites of right breast in female, estrogen receptor positive  03/10/2022   • Shortness of breath    • Stroke (HCC) [id number]    Min stroke found wile going through chemo and radiation   • TIA (transient ischemic attack)    • UTI (urinary tract infection)        PAST SURGICAL HISTORY:  Past Surgical History:   Procedure Laterality Date   • BREAST LUMPECTOMY Right 3/29/2022    Procedure: ITZEL  DIRECTED LUMPECTOMY;  Surgeon: Denton Chambers MD;  Location: [facility];  Service: Surgical Oncology   • ESOPHAGOGASTRIC FUNDOPLASTY      Nissen Fundoplication   • HERNIA REPAIR     • LYMPH NODE BIOPSY Right 3/29/2022    Procedure: LYMPHATIC MAPPING WITH BLUE AND RADIOACTIVE DYES SENTINEL LYMPH NODE BIOPSY, INJECTION IN OR AT 0800 BY DR CHAMBERS;  Surgeon: Denton Chambers MD;  Location: [facility];  Service: Surgical Oncology   • MAMMO STEREOTACTIC BREAST BIOPSY RIGHT (ALL INC) Right 3/7/2022   • US BREAST ITZEL  NEEDLE LOC RIGHT Right 3/24/2022   • US GUIDED BREAST BIOPSY RIGHT COMPLETE Right 3/7/2022   • WRIST SURGERY Left        FAMILY HISTORY:  Family History   Problem Relation Age of Onset   • Stroke Father    • Leukemia Sister    • No Known Problems Brother    • Skin cancer Mother    • Stroke Maternal Grandmother    • No Known Problems Maternal Grandfather    • No Known Problems Paternal Grandmother    • No Known Problems Paternal Grandfather    • Breast cancer Sister    • Breast cancer Sister         Colon cancer   • Kidney cancer Brother    • Alcohol abuse Brother    • Kidney cancer Brother    • Stomach cancer Brother    • No Known Problems Daughter    • No Known Problems Daughter        SOCIAL HISTORY:  Social History     Socioeconomic History   • Marital status:      Spouse  "name: Not on file   • Number of children: 2   • Years of education: Not on file   • Highest education level: Not on file   Occupational History   • Not on file   Tobacco Use   • Smoking status: Never   • Smokeless tobacco: Never   • Tobacco comments:     None   Vaping Use   • Vaping status: Never Used   Substance and Sexual Activity   • Alcohol use: Yes     Comment: Social holidays   • Drug use: Never   • Sexual activity: Not Currently     Comment: [place] over   Other Topics Concern   • Not on file   Social History Narrative   • Not on file     Social Determinants of Health     Financial Resource Strain: Low Risk  (9/19/2023)    Overall Financial Resource Strain (CARDIA)    • Difficulty of Paying Living Expenses: Not hard at all   Food Insecurity: Not on file   Transportation Needs: No Transportation Needs (9/19/2023)    PRAPARE - Transportation    • Lack of Transportation (Medical): No    • Lack of Transportation (Non-Medical): No   Physical Activity: Not on file   Stress: Not on file   Social Connections: Not on file   Intimate Partner Violence: Not on file   Housing Stability: Not on file       Review of Systems   Respiratory:  Positive for shortness of breath. Negative for choking and wheezing.    Cardiovascular:  Negative for chest pain.   Gastrointestinal:  Negative for abdominal pain.         Objective:  Vitals:    02/06/24 1612   BP: 128/84   BP Location: Left arm   Patient Position: Sitting   Cuff Size: Standard   Pulse: 94   Resp: 20   Temp: 99.4 °F (37.4 °C)   TempSrc: Tympanic   SpO2: 96%   Weight: 78.7 kg (173 lb 9.6 oz)   Height: 5' 1\" (1.549 m)     Body mass index is 32.8 kg/m².     Physical Exam  Vitals and nursing note reviewed.   Constitutional:       Appearance: Normal appearance. She is well-developed.   Cardiovascular:      Rate and Rhythm: Normal rate and regular rhythm.      Heart sounds: Normal heart sounds.   Pulmonary:      Effort: Pulmonary effort is normal.      Breath sounds: Normal " breath sounds. No decreased breath sounds, wheezing, rhonchi or rales.   Musculoskeletal:      Right lower leg: No edema.      Left lower leg: No edema.   Neurological:      Mental Status: She is alert and oriented to person, place, and time.           RESULTS:  Hemoglobin A1C   Date/Time Value Ref Range Status   09/14/2021 02:20 PM 5.4 Normal 3.8-5.6%; PreDiabetic 5.7-6.4%; Diabetic >=6.5%; Glycemic control for adults with diabetes <7.0% % Final   02/15/2018 11:30 AM 5.7 (H) <5.7 % Final     Comment:     Reference Range  Non-diabetic                     <5.7  Pre-diabetic                     5.7-6.4  Diabetic                         >=6.5  ADA target for diabetic control  <=7     Cholesterol   Date/Time Value Ref Range Status   12/19/2023 02:05  (H) See Comment mg/dL Final     Comment:     Cholesterol:         Pediatric <18 Years        Desirable          <170 mg/dL      Borderline High    170-199 mg/dL      High               >=200 mg/dL        Adult >=18 Years            Desirable        <200 mg/dL      Borderline High  200-239 mg/dL      High             >239 mg/dL       Triglycerides   Date/Time Value Ref Range Status   12/19/2023 02:05 PM 68 See Comment mg/dL Final     Comment:     Triglyceride:     0-9Y            <75mg/dL     10Y-17Y         <90 mg/dL       >=18Y     Normal          <150 mg/dL     Borderline High 150-199 mg/dL     High            200-499 mg/dL        Very High       >499 mg/dL    Specimen collection should occur prior to Metamizole administration due to the potential for falsely depressed results.   06/08/2015 08:09 AM 98 mg/dL Final     Comment:     TRIGLYCERIDE:       Normal                 <150 mg/dl       Borderline High       150-199 mg/dl       High                  200-499 mg/dl       Very High             >499 mg/dl  _______________________________________       HDL   Date/Time Value Ref Range Status   06/08/2015 08:09 AM 84 mg/dL Final     Comment:     HDL:       High        >59 mg/dl       Low        <41 mg/dl  ______________________________       HDL, Direct   Date/Time Value Ref Range Status   12/19/2023 02:05 PM 88 >=50 mg/dL Final     LDL Calculated   Date/Time Value Ref Range Status   12/19/2023 02:05  (H) 0 - 100 mg/dL Final     Comment:     LDL Cholesterol:     Optimal           <100 mg/dl     Near Optimal      100-129 mg/dl     Above Optimal       Borderline High 130-159 mg/dl       High            160-189 mg/dl       Very High       >189 mg/dl         This screening LDL is a calculated result.   It does not have the accuracy of the Direct Measured LDL in the monitoring of patients with hyperlipidemia and/or statin therapy.   Direct Measure LDL (SUS776) must be ordered separately in these patients.   06/08/2015 08:09  (H) 0 - 100 mg/dL Final     Comment:     LDL, CALCULATED:     This screening LDL is a calculated result.  It does not have the accuracy of the Direct Measured LDL in the  monitoring of patients with hyperlipidemia and/or statin therapy.  Direct Measured LDL (Test Code 3126) must be ordered separately in these  patients.  ______________________________       Hemoglobin   Date/Time Value Ref Range Status   02/01/2024 04:33 PM 11.4 (L) 11.5 - 15.4 g/dL Final   07/17/2014 08:40 AM 14.2 11.0 - 15.7 g/dL Final     Hematocrit   Date/Time Value Ref Range Status   02/01/2024 04:33 PM 33.8 (L) 34.8 - 46.1 % Final   07/17/2014 08:40 AM 42.3 34.8 - 46.1 % Final     Platelets   Date/Time Value Ref Range Status   02/01/2024 04:33  149 - 390 Thousands/uL Final   07/17/2014 08:40  149 - 390 Thousand/uL Final     TSH 3RD GENERATON   Date/Time Value Ref Range Status   12/19/2023 02:05 PM 3.022 0.450 - 4.500 uIU/mL Final     Comment:     The recommended reference ranges for TSH during pregnancy are as follows:   First trimester 0.100 to 2.500 uIU/mL   Second trimester  0.200 to 3.000 uIU/mL   Third trimester 0.300 to 3.000 uIU/m    Note: Normal ranges may not  apply to patients who are transgender, non-binary, or whose legal sex, sex at birth, and gender identity differ.  Adult TSH (3rd generation) reference range follows the recommended guidelines of the American Thy[employer] Association, January, 2020.   06/08/2015 08:09 AM 6.086 (H) 0.36 - 3.74 uIU/ML Final     Comment:     The above 1 analytes were performed by [facility]   ([facility])  [address] [place] [id number]       Free T4   Date/Time Value Ref Range Status   12/19/2023 02:05 PM 1.16 (H) 0.61 - 1.12 ng/dL Final     Comment:     Specimens with biotin concentrations > 10 ng/mL can lead to significant (> 10%) positive bias in result.     Sodium   Date/Time Value Ref Range Status   02/01/2024 04:33  135 - 147 mmol/L Final   04/11/2018 12:07  135 - 145 mmol/L Final     BUN   Date/Time Value Ref Range Status   02/01/2024 04:33 PM 18 5 - 25 mg/dL Final   04/11/2018 12:07 PM 16 7 - 25 mg/dL Final     Creatinine   Date/Time Value Ref Range Status   02/01/2024 04:33 PM 0.84 0.60 - 1.30 mg/dL Final     Comment:     Standardized to IDMS reference method   04/11/2018 12:07 PM 0.76 0.40 - 1.10 mg/dL Final      In chart    This note was created with voice recognition software.  Phonic, grammatical and spelling errors may be present within the note as a result.

## 2024-02-08 ENCOUNTER — APPOINTMENT (OUTPATIENT)
Age: 79
End: 2024-02-08
Payer: COMMERCIAL

## 2024-02-12 DIAGNOSIS — G30.1 LATE ONSET ALZHEIMER'S DISEASE WITHOUT BEHAVIORAL DISTURBANCE (HCC): ICD-10-CM

## 2024-02-12 DIAGNOSIS — F02.80 LATE ONSET ALZHEIMER'S DISEASE WITHOUT BEHAVIORAL DISTURBANCE (HCC): ICD-10-CM

## 2024-02-12 RX ORDER — RIVASTIGMINE 9.5 MG/24H
1 PATCH, EXTENDED RELEASE TRANSDERMAL DAILY
Qty: 90 PATCH | Refills: 1 | Status: SHIPPED | OUTPATIENT
Start: 2024-02-12

## 2024-02-13 ENCOUNTER — APPOINTMENT (OUTPATIENT)
Age: 79
End: 2024-02-13
Payer: COMMERCIAL

## 2024-02-14 ENCOUNTER — OFFICE VISIT (OUTPATIENT)
Dept: INTERNAL MEDICINE CLINIC | Facility: CLINIC | Age: 79
End: 2024-02-14
Payer: COMMERCIAL

## 2024-02-14 VITALS
SYSTOLIC BLOOD PRESSURE: 124 MMHG | HEART RATE: 83 BPM | BODY MASS INDEX: 32.66 KG/M2 | TEMPERATURE: 98.3 F | WEIGHT: 173 LBS | OXYGEN SATURATION: 92 % | HEIGHT: 61 IN | DIASTOLIC BLOOD PRESSURE: 80 MMHG

## 2024-02-14 DIAGNOSIS — R06.00 DYSPNEA, UNSPECIFIED TYPE: Primary | ICD-10-CM

## 2024-02-14 PROCEDURE — 99213 OFFICE O/P EST LOW 20 MIN: CPT | Performed by: INTERNAL MEDICINE

## 2024-02-14 NOTE — PROGRESS NOTES
Assessment/Plan:     Appears to be doing slightly better.  May turn out to be just deconditioning.  Follow through with the echo and then proceed from there.  I have spent a total time of 20 minutes on 02/14/24 in caring for this patient including Instructions for management, Impressions, Counseling / Coordination of care, Documenting in the medical record, and Reviewing / ordering tests, medicine, procedures  .     Quality Measures:       No follow-ups on file.    No problem-specific Assessment & Plan notes found for this encounter.       Diagnoses and all orders for this visit:    Dyspnea, unspecified type          Subjective:      Patient ID: Paula Lubin is a 78 y.o. female.    Patient comes in today for follow-up.  She has finished the antibiotics.  She thinks she is a little better.  But still short of breath with exertion.  Her daughter agrees.  No new symptoms.  Definitely not worse.  Finished the steroids as well.        ALLERGIES:  Allergies   Allergen Reactions   • Ciprofloxacin Hives   • Sulfa Antibiotics Itching   • Other    • Penicillins Rash     Category: Allergy;   --  Pt received unasyn 1.5 mg IV 11-12-18 to 11-15-18   • Sulfacetamide Rash     Category: Allergy;        CURRENT MEDICATIONS:    Current Outpatient Medications:   •  amLODIPine (NORVASC) 2.5 mg tablet, TAKE 2 TABLETS BY MOUTH DAILY, Disp: 180 tablet, Rfl: 1  •  anastrozole (ARIMIDEX) 1 mg tablet, Take 1 tablet (1 mg total) by mouth daily, Disp: 90 tablet, Rfl: 0  •  aspirin 325 mg tablet, Take 325 mg by mouth daily , Disp: , Rfl:   •  cholecalciferol (VITAMIN D3) 1,000 units tablet, Take 1 tablet (1,000 Units total) by mouth daily, Disp: 30 tablet, Rfl: 6  •  cholestyramine (QUESTRAN) 4 g packet, TAKE 1 PACKET BY MOUTH DAILY., Disp: 90 packet, Rfl: 3  •  diphenhydrAMINE (BENADRYL) 25 mg tablet, Take 25 mg by mouth in the morning. 1/2 tablet with Paxil ., Disp: , Rfl:   •  fluticasone (FLONASE) 50 mcg/act nasal spray, INSTILL 1 SPRAY  INTO EACH NOSTRIL AS NEEDED FOR RHINITIS OR ALLERGIES, Disp: 48 mL, Rfl: 1  •  levothyroxine 125 mcg tablet, TAKE 1 TABLET BY MOUTH EVERY DAY, Disp: 90 tablet, Rfl: 1  •  loperamide (IMODIUM) 2 mg capsule, Take 1 capsule (2 mg total) by mouth 4 (four) times a day as needed for diarrhea, Disp: 12 capsule, Rfl: 0  •  meclizine (ANTIVERT) 25 mg tablet, Take 1 tablet (25 mg total) by mouth every 8 (eight) hours as needed for dizziness, Disp: 50 tablet, Rfl: 1  •  memantine (NAMENDA) 10 mg tablet, TAKE 1 TABLET BY MOUTH TWICE A DAY, Disp: 180 tablet, Rfl: 1  •  multivitamin (THERAGRAN) TABS, Take 1 tablet by mouth, Disp: , Rfl:   •  Myrbetriq 50 MG TB24, TAKE 1 TABLET BY MOUTH EVERY DAY, Disp: 90 tablet, Rfl: 3  •  omeprazole (PriLOSEC) 20 mg delayed release capsule, TAKE 1 CAPSULE BY MOUTH TWICE A DAY, Disp: 180 capsule, Rfl: 1  •  ondansetron (ZOFRAN-ODT) 4 mg disintegrating tablet, Take 1 tablet (4 mg total) by mouth every 8 (eight) hours as needed for nausea, Disp: 15 tablet, Rfl: 0  •  PARoxetine (PAXIL) 20 mg tablet, TAKE 1 TABLET BY MOUTH EVERY DAY, Disp: 90 tablet, Rfl: 3  •  potassium chloride (K-DUR,KLOR-CON) 10 mEq tablet, Take 1 tablet (10 mEq total) by mouth daily for 10 days, Disp: 10 tablet, Rfl: 0  •  rivastigmine (EXELON) 9.5 mg/24 hr TD 24 hr patch, PLACE 1 PATCH ON THE SKIN OVER 24 HOURS DAILY, Disp: 90 patch, Rfl: 1  •  dicyclomine (BENTYL) 20 mg tablet, Take 1 tablet (20 mg total) by mouth 2 (two) times a day (Patient not taking: Reported on 1/25/2024), Disp: 20 tablet, Rfl: 0  •  furosemide (LASIX) 40 mg tablet, Take 1 tablet (40 mg total) by mouth daily for 2 days (Patient not taking: Reported on 2/14/2024), Disp: 2 tablet, Rfl: 1  •  PROAIR  (90 Base) MCG/ACT inhaler, as needed  (Patient not taking: Reported on 2/14/2024), Disp: , Rfl:     ACTIVE PROBLEM LIST:  Patient Active Problem List   Diagnosis   • Depression   • Gastroesophageal reflux disease without esophagitis   • Mixed  hyperlipidemia   • Essential hypertension   • Hypothyroid   • Impaired fasting glucose   • Lymphoma (HCC)   • Cerebral aneurysm, nonruptured   • Mild cognitive impairment   • Flexor tenosynovitis of finger   • Macrocytosis without anemia   • Paresthesias   • Non-intractable vomiting with nausea   • Hypokalemia   • Late onset Alzheimer's disease without behavioral disturbance (HCC)   • Chest pain   • Near syncope   • Immunocompromised (HCC)   • Malignant neoplasm of overlapping sites of right breast in female, estrogen receptor positive    • Use of anastrozole   • Recurrent major depressive disorder, remission status unspecified (HCC)   • Stage 3 chronic kidney disease, unspecified whether stage 3a or 3b CKD (HCC)   • History of lumpectomy of right breast       PAST MEDICAL HISTORY:  Past Medical History:   Diagnosis Date   • Anxiety disorder    • Aortic valve disorder    • BRCA1 negative    • BRCA2 negative    • Breast cancer (HCC) 03/2022    right   • Cancer (HCC)     breast   • Cellulitis of finger of left hand 11/20/2018   • Cellulitis of left hand 11/10/2018    Added automatically from request for surgery [id number]   • Depression    • Disease of thyroid gland    • GERD (gastroesophageal reflux disease)    • History of gastroesophageal reflux (GERD)    • History of kidney cancer 03/06/2014   • History of transfusion    • Hyperlipidemia 03/06/2014   • Hypertension 03/06/2014   • Hypothyroidism 03/06/2014   • Lymphoma (HCC)    • Malignant neoplasm of overlapping sites of right breast in female, estrogen receptor positive  03/10/2022   • Shortness of breath    • Stroke (HCC) [id number]    Min stroke found wile going through chemo and radiation   • TIA (transient ischemic attack)    • UTI (urinary tract infection)        PAST SURGICAL HISTORY:  Past Surgical History:   Procedure Laterality Date   • BREAST LUMPECTOMY Right 3/29/2022    Procedure: ITZEL  DIRECTED LUMPECTOMY;  Surgeon: Denton Chambers MD;   Location: [facility];  Service: Surgical Oncology   • ESOPHAGOGASTRIC FUNDOPLASTY      Nissen Fundoplication   • HERNIA REPAIR     • LYMPH NODE BIOPSY Right 3/29/2022    Procedure: LYMPHATIC MAPPING WITH BLUE AND RADIOACTIVE DYES SENTINEL LYMPH NODE BIOPSY, INJECTION IN OR AT 0800 BY DR CHAMBERS;  Surgeon: Denton Chambers MD;  Location: [facility];  Service: Surgical Oncology   • MAMMO STEREOTACTIC BREAST BIOPSY RIGHT (ALL INC) Right 3/7/2022   • US BREAST ITZEL  NEEDLE LOC RIGHT Right 3/24/2022   • US GUIDED BREAST BIOPSY RIGHT COMPLETE Right 3/7/2022   • WRIST SURGERY Left        FAMILY HISTORY:  Family History   Problem Relation Age of Onset   • Stroke Father    • Leukemia Sister    • No Known Problems Brother    • Skin cancer Mother    • Stroke Maternal Grandmother    • No Known Problems Maternal Grandfather    • No Known Problems Paternal Grandmother    • No Known Problems Paternal Grandfather    • Breast cancer Sister    • Breast cancer Sister         Colon cancer   • Kidney cancer Brother    • Alcohol abuse Brother    • Kidney cancer Brother    • Stomach cancer Brother    • No Known Problems Daughter    • No Known Problems Daughter        SOCIAL HISTORY:  Social History     Socioeconomic History   • Marital status:      Spouse name: Not on file   • Number of children: 2   • Years of education: Not on file   • Highest education level: Not on file   Occupational History   • Not on file   Tobacco Use   • Smoking status: Never   • Smokeless tobacco: Never   • Tobacco comments:     None   Vaping Use   • Vaping status: Never Used   Substance and Sexual Activity   • Alcohol use: Yes     Comment: Social holidays   • Drug use: Never   • Sexual activity: Not Currently     Comment: [place] over   Other Topics Concern   • Not on file   Social History Narrative   • Not on file     Social Determinants of Health     Financial Resource Strain: Low Risk  (9/19/2023)    Overall Financial Resource Strain  "(CARDIA)    • Difficulty of Paying Living Expenses: Not hard at all   Food Insecurity: Not on file   Transportation Needs: No Transportation Needs (9/19/2023)    PRAPARE - Transportation    • Lack of Transportation (Medical): No    • Lack of Transportation (Non-Medical): No   Physical Activity: Not on file   Stress: Not on file   Social Connections: Not on file   Intimate Partner Violence: Not on file   Housing Stability: Not on file       Review of Systems   Respiratory:  Positive for shortness of breath.          Objective:  Vitals:    02/14/24 0913   BP: 124/80   BP Location: Left arm   Patient Position: Sitting   Cuff Size: Standard   Pulse: 83   Temp: 98.3 °F (36.8 °C)   SpO2: 92%   Weight: 78.5 kg (173 lb)   Height: 5' 1\" (1.549 m)     Body mass index is 32.69 kg/m².     Physical Exam  Vitals and nursing note reviewed.   Constitutional:       Appearance: Normal appearance.   Pulmonary:      Effort: Pulmonary effort is normal. No respiratory distress.      Breath sounds: No rhonchi.   Neurological:      Mental Status: She is alert.           RESULTS:  Hemoglobin A1C   Date/Time Value Ref Range Status   09/14/2021 02:20 PM 5.4 Normal 3.8-5.6%; PreDiabetic 5.7-6.4%; Diabetic >=6.5%; Glycemic control for adults with diabetes <7.0% % Final   02/15/2018 11:30 AM 5.7 (H) <5.7 % Final     Comment:     Reference Range  Non-diabetic                     <5.7  Pre-diabetic                     5.7-6.4  Diabetic                         >=6.5  ADA target for diabetic control  <=7     Cholesterol   Date/Time Value Ref Range Status   12/19/2023 02:05  (H) See Comment mg/dL Final     Comment:     Cholesterol:         Pediatric <18 Years        Desirable          <170 mg/dL      Borderline High    170-199 mg/dL      High               >=200 mg/dL        Adult >=18 Years            Desirable        <200 mg/dL      Borderline High  200-239 mg/dL      High             >239 mg/dL       Triglycerides   Date/Time Value Ref Range " Status   12/19/2023 02:05 PM 68 See Comment mg/dL Final     Comment:     Triglyceride:     0-9Y            <75mg/dL     10Y-17Y         <90 mg/dL       >=18Y     Normal          <150 mg/dL     Borderline High 150-199 mg/dL     High            200-499 mg/dL        Very High       >499 mg/dL    Specimen collection should occur prior to Metamizole administration due to the potential for falsely depressed results.   06/08/2015 08:09 AM 98 mg/dL Final     Comment:     TRIGLYCERIDE:       Normal                 <150 mg/dl       Borderline High       150-199 mg/dl       High                  200-499 mg/dl       Very High             >499 mg/dl  _______________________________________       HDL   Date/Time Value Ref Range Status   06/08/2015 08:09 AM 84 mg/dL Final     Comment:     HDL:       High       >59 mg/dl       Low        <41 mg/dl  ______________________________       HDL, Direct   Date/Time Value Ref Range Status   12/19/2023 02:05 PM 88 >=50 mg/dL Final     LDL Calculated   Date/Time Value Ref Range Status   12/19/2023 02:05  (H) 0 - 100 mg/dL Final     Comment:     LDL Cholesterol:     Optimal           <100 mg/dl     Near Optimal      100-129 mg/dl     Above Optimal       Borderline High 130-159 mg/dl       High            160-189 mg/dl       Very High       >189 mg/dl         This screening LDL is a calculated result.   It does not have the accuracy of the Direct Measured LDL in the monitoring of patients with hyperlipidemia and/or statin therapy.   Direct Measure LDL (BTI818) must be ordered separately in these patients.   06/08/2015 08:09  (H) 0 - 100 mg/dL Final     Comment:     LDL, CALCULATED:     This screening LDL is a calculated result.  It does not have the accuracy of the Direct Measured LDL in the  monitoring of patients with hyperlipidemia and/or statin therapy.  Direct Measured LDL (Test Code 3126) must be ordered separately in these  patients.  ______________________________        Hemoglobin   Date/Time Value Ref Range Status   02/01/2024 04:33 PM 11.4 (L) 11.5 - 15.4 g/dL Final   07/17/2014 08:40 AM 14.2 11.0 - 15.7 g/dL Final     Hematocrit   Date/Time Value Ref Range Status   02/01/2024 04:33 PM 33.8 (L) 34.8 - 46.1 % Final   07/17/2014 08:40 AM 42.3 34.8 - 46.1 % Final     Platelets   Date/Time Value Ref Range Status   02/01/2024 04:33  149 - 390 Thousands/uL Final   07/17/2014 08:40  149 - 390 Thousand/uL Final     TSH 3RD GENERATON   Date/Time Value Ref Range Status   12/19/2023 02:05 PM 3.022 0.450 - 4.500 uIU/mL Final     Comment:     The recommended reference ranges for TSH during pregnancy are as follows:   First trimester 0.100 to 2.500 uIU/mL   Second trimester  0.200 to 3.000 uIU/mL   Third trimester 0.300 to 3.000 uIU/m    Note: Normal ranges may not apply to patients who are transgender, non-binary, or whose legal sex, sex at birth, and gender identity differ.  Adult TSH (3rd generation) reference range follows the recommended guidelines of the [employer] Association, January, 2020.   06/08/2015 08:09 AM 6.086 (H) 0.36 - 3.74 uIU/ML Final     Comment:     The above 1 analytes were performed by [facility]   ([facility])  [address] [place] [address]       Free T4   Date/Time Value Ref Range Status   12/19/2023 02:05 PM 1.16 (H) 0.61 - 1.12 ng/dL Final     Comment:     Specimens with biotin concentrations > 10 ng/mL can lead to significant (> 10%) positive bias in result.     Sodium   Date/Time Value Ref Range Status   02/01/2024 04:33  135 - 147 mmol/L Final   04/11/2018 12:07  135 - 145 mmol/L Final     BUN   Date/Time Value Ref Range Status   02/01/2024 04:33 PM 18 5 - 25 mg/dL Final   04/11/2018 12:07 PM 16 7 - 25 mg/dL Final     Creatinine   Date/Time Value Ref Range Status   02/01/2024 04:33 PM 0.84 0.60 - 1.30 mg/dL Final     Comment:     Standardized to IDMS reference method   04/11/2018 12:07 PM 0.76 0.40 - 1.10 mg/dL Final       In chart    This note was created with voice recognition software.  Phonic, grammatical and spelling errors may be present within the note as a result.

## 2024-02-15 ENCOUNTER — OFFICE VISIT (OUTPATIENT)
Age: 79
End: 2024-02-15
Payer: COMMERCIAL

## 2024-02-15 DIAGNOSIS — G30.1 LATE ONSET ALZHEIMER'S DISEASE WITHOUT BEHAVIORAL DISTURBANCE (HCC): ICD-10-CM

## 2024-02-15 DIAGNOSIS — R48.8 OTHER SYMBOLIC DYSFUNCTIONS: Primary | ICD-10-CM

## 2024-02-15 DIAGNOSIS — F02.80 LATE ONSET ALZHEIMER'S DISEASE WITHOUT BEHAVIORAL DISTURBANCE (HCC): ICD-10-CM

## 2024-02-15 PROCEDURE — 92507 TX SP LANG VOICE COMM INDIV: CPT

## 2024-02-15 NOTE — PROGRESS NOTES
"Speech-Language Pathology Progress Update    Today's date: 2/15/2024   Patient’s name: Paula Lubin  : 1945  MRN: [id number]  Safety measures: HTN, memory  Referring provider: Liv Mc MD    Encounter Diagnosis     ICD-10-CM    1. Other symbolic dysfunctions  R48.8       2. Late onset Alzheimer's disease without behavioral disturbance (HCC)  G30.1     F02.80         Visit tracking:      POC expires Unit limit Auth Expiration date PT/OT + Visit Limit?   24 N/a 24 8                           Visit/Unit Tracking  AUTH Status:  Date   IE   RE 12/19 12/28 1/17 1/30 2/15  PU  RE    After 8 visits Used 1 2 3 4 5 6 5 1 2 3 4       Remaining  7 6 5 4 3 2 1 7 6 5 4          Subjective comments: Patient reports secondary to COVID-19, she experienced SOB secondary to physical activity. Patient reports she is f/u with cardiology. Denies SOB throughout the session. Patient reports memory journal helps events throughout the day.    Patient's goal(s): \"to help remember things\"    Assessments    No testing administered. Today serves as progress update.      Treatment  [place] categorization: patient was given a category (things with glass) and had to list 3 items from given category. Task completed in  opp (55% acc) independently, increasing to  opp (95% acc) from min cueing. Patient benefited from phonemic cues and semantic cues.    Immediate memory task: patient was given 4 words orally and had to answer a f/u question regarding word placement. Task completed in 7/10 opp (70% acc) independently, increasing to / opp (% acc) from min cueing. Patient benefited from repetition    Immediate memory task: patient was given 4 words orally and had to answer f/u question regarding attribute inclusion.Task completed in  opp (75% acc) independently, increasing to  opp (100% acc) from min cueing. Patient benefited from repetition.        Goals    Short-term " Insurance Authorization for admission:   Primary Payor is Medicare  Patient is not enrolled in a managed plan  Level of care: IP  Authorization # Not required  Goals:  Patient and caregiver will be educated on the use of internal and external memory aids/compensatory strategies to facilitate increased recall of routine, daily events, names, orientation to time, etc. (to be achieved in 1-2 weeks). -PARTIALLY MET/ONGOING     Patient will complete auditory immediate and short-term memory tasks with 80% accuracy to facilitate increased ability to retell narratives and recall information within functional living environment (to be achieved in 4-6 weeks). -PARTIALLY MET/ONGOING     Patient will complete complex auditory attention processing tasks (e.g., sentence unscrambles, ranking numbers/words, etc.) with 80% accuracy to facilitate increased processing, storage, and retrieval of functional information (to be achieved in 4-6 weeks). -PARTIALLY MET/ONGOING     Patient will complete thought organization tasks (e.g., sequencing, deduction puzzles, etc.) with 80% accuracy to facilitate increased executive functioning skills (to be achieved in 4-6 weeks). -PARTIALLY MET/ONGOING     Patient will be educated on word finding strategies (i.e., circumlocution) for improved generative naming and verbal expression skills (to be achieved in 1-2 weeks). -PARTIALLY MET/ONGOING     Patient will complete word generation tasks (e.g., verbal fluency, categorization, analogies, synonyms/antonyms, , etc.) with 80% accuracy using word finding strategies to facilitate improved word retrieval skills (to be achieved in 4-6 weeks). -PARTIALLY MET/ONGOING     Long-term goals:  Patient will demonstrate cognitive-linguistic skills consistent with age and education given use of compensatory strategies when needed to resume baseline activities and responsibilities in home and community settings (to be achieved by discharge). -PARTIALLY MET/ONGOING     Patient will demonstrate adequate verbal expression during conversation with fewer than 2 breakdowns or word finding deficits provided the use of compensatory  strategies (to be achieved by discharge). -PARTIALLY MET/ONGOING    Impressions/Recommendations    Impressions:   Patient continues to present with moderate-severe cognitive-linguistic deficits c/b decreased orientation, awareness of deficits, immediate memory, short-term memory, processing speed, direction following, attention, , thought organization, abstraction, and verbal expression skills. Patient is tolerating treatment well and is consistently completing HEP & memory journal tasks. From initiating memory journal tasks, patient has improved recall of events which at the initial evaluation was reported to be a deficit. Patient continues to be highly motivated for therapy  to facilitate safety and improve patient's QOL.  Patient would benefit from continued outpatient skilled Speech Therapy services to increased functional recall with external supports, improved orientation with external supports, successful completion of daily tasks with cues, follow directions within functional activities, support positive communication interactions with both familiar and unfamiliar listeners, verbal fluency, immediate/recent memory, word-finding, executive functioning,  promote safety, facilitate overall improved quality of life, reduce caregiver burden, and complete caregiver education/training.    Recommendations:  -Patient would benefit from outpatient skilled Speech Therapy services: Cognitive-linguistic therapy    -Frequency: 2x weekly  -Duration: 4-6 weeks    -Intervention certification from: 2/15/2024  -Intervention certification to: 3/28/24    -Intervention comments:   45 mins of speech language treatment

## 2024-02-16 ENCOUNTER — APPOINTMENT (OUTPATIENT)
Age: 79
End: 2024-02-16
Payer: COMMERCIAL

## 2024-02-16 NOTE — PROGRESS NOTES
Daily Speech Treatment Note    Today's date: 2024  Patient’s name: Paula Lubin  : 1945  MRN: [id number]  Safety measures: HTN, memory  Referring provider: Liv Mc MD    No diagnosis found.          POC expires Unit limit Auth Expiration date PT/OT + Visit Limit?   24 N/a 24 8   3/28/24  6/24/24 8                     Visit/Unit Tracking  AUTH Status:  Date   IE   RE 12/19 12/28 1/17 1/30 2/15  PU        After 8 visits Used 1 2 3 4 5 6 5 1 2 3 4         Remaining  7 6 5 4 3 2 1 7 6 5 4              Subjective/Behavioral:  -Patient reported no new concerns for today's session.      Objective/Assessment:  Short-term goals:  Patient and caregiver will be educated on the use of internal and external memory aids/compensatory strategies to facilitate increased recall of routine, daily events, names, orientation to time, etc. (to be achieved in 1-2 weeks).            Patient will complete auditory immediate and short-term memory tasks with 80% accuracy to facilitate increased ability to retell narratives and recall information within functional living environment (to be achieved in 4-6 weeks).          Patient will complete complex auditory attention processing tasks (e.g., sentence unscrambles, ranking numbers/words, etc.) with 80% accuracy to facilitate increased processing, storage, and retrieval of functional information (to be achieved in 4-6 weeks).     Patient will complete thought organization tasks (e.g., sequencing, deduction puzzles, etc.) with 80% accuracy to facilitate increased executive functioning skills (to be achieved in 4-6 weeks).              Patient will be educated on word finding strategies (i.e., circumlocution) for improved generative naming and verbal expression skills (to be achieved in 1-2 weeks).      Patient will complete word generation tasks (e.g., verbal fluency, categorization, analogies, synonyms/antonyms, , etc.) with 80%  accuracy using word finding strategies to facilitate improved word retrieval skills (to be achieved in 4-6 weeks).              Plan:  -Patient was provided with home exercises/activities to target goals in plan of care at the end of today's session.  -Continue with current plan of care.

## 2024-02-19 ENCOUNTER — HOSPITAL ENCOUNTER (OUTPATIENT)
Dept: MAMMOGRAPHY | Facility: CLINIC | Age: 79
Discharge: HOME | End: 2024-02-19
Payer: COMMERCIAL

## 2024-02-19 DIAGNOSIS — Z17.0 MALIGNANT NEOPLASM OF OVERLAPPING SITES OF RIGHT BREAST IN FEMALE, ESTROGEN RECEPTOR POSITIVE: ICD-10-CM

## 2024-02-19 DIAGNOSIS — C50.811 MALIGNANT NEOPLASM OF OVERLAPPING SITES OF RIGHT BREAST IN FEMALE, ESTROGEN RECEPTOR POSITIVE: ICD-10-CM

## 2024-02-19 PROCEDURE — G0279 TOMOSYNTHESIS, MAMMO: HCPCS

## 2024-02-19 PROCEDURE — 77066 DX MAMMO INCL CAD BI: CPT

## 2024-02-20 ENCOUNTER — HOSPITAL ENCOUNTER (OUTPATIENT)
Dept: NON INVASIVE DIAGNOSTICS | Facility: CLINIC | Age: 79
Discharge: HOME | End: 2024-02-20
Payer: COMMERCIAL

## 2024-02-20 ENCOUNTER — APPOINTMENT (OUTPATIENT)
Age: 79
End: 2024-02-20
Payer: COMMERCIAL

## 2024-02-20 VITALS
WEIGHT: 173 LBS | HEIGHT: 61 IN | DIASTOLIC BLOOD PRESSURE: 80 MMHG | BODY MASS INDEX: 32.66 KG/M2 | HEART RATE: 74 BPM | SYSTOLIC BLOOD PRESSURE: 124 MMHG

## 2024-02-20 DIAGNOSIS — R06.00 DYSPNEA, UNSPECIFIED TYPE: ICD-10-CM

## 2024-02-20 LAB
AORTIC ROOT: 2.6 CM
APICAL FOUR CHAMBER EJECTION FRACTION: 59 %
ASCENDING AORTA: 3 CM
BSA FOR ECHO PROCEDURE: 1.78 M2
E WAVE DECELERATION TIME: 202 MS
E/A RATIO: 0.85
FRACTIONAL SHORTENING: 32 (ref 28–44)
INTERVENTRICULAR SEPTUM IN DIASTOLE (PARASTERNAL SHORT AXIS VIEW): 0.7 CM
INTERVENTRICULAR SEPTUM: 0.7 CM (ref 0.6–1.1)
LAAS-AP2: 20.9 CM2
LAAS-AP4: 24.9 CM2
LEFT ATRIUM SIZE: 2.7 CM
LEFT ATRIUM VOLUME (MOD BIPLANE): 66 ML
LEFT ATRIUM VOLUME INDEX (MOD BIPLANE): 37.1 ML/M2
LEFT INTERNAL DIMENSION IN SYSTOLE: 2.8 CM (ref 2.1–4)
LEFT VENTRICULAR INTERNAL DIMENSION IN DIASTOLE: 4.1 CM (ref 3.5–6)
LEFT VENTRICULAR POSTERIOR WALL IN END DIASTOLE: 0.7 CM
LEFT VENTRICULAR STROKE VOLUME: 45 ML
LVSV (TEICH): 45 ML
MV E'TISSUE VEL-SEP: 7 CM/S
MV PEAK A VEL: 0.78 M/S
MV PEAK E VEL: 66 CM/S
MV STENOSIS PRESSURE HALF TIME: 59 MS
MV VALVE AREA P 1/2 METHOD: 3.73
RIGHT ATRIUM AREA SYSTOLE A4C: 12.8 CM2
RIGHT VENTRICLE ID DIMENSION: 3.8 CM
SL CV LEFT ATRIUM LENGTH A2C: 6.2 CM
SL CV LV EF: 55
SL CV PED ECHO LEFT VENTRICLE DIASTOLIC VOLUME (MOD BIPLANE) 2D: 74 ML
SL CV PED ECHO LEFT VENTRICLE SYSTOLIC VOLUME (MOD BIPLANE) 2D: 30 ML
TR MAX PG: 31 MMHG
TR PEAK VELOCITY: 2.8 M/S
TRICUSPID ANNULAR PLANE SYSTOLIC EXCURSION: 2.2 CM
TRICUSPID VALVE PEAK REGURGITATION VELOCITY: 2.8 M/S

## 2024-02-20 PROCEDURE — 93306 TTE W/DOPPLER COMPLETE: CPT | Performed by: INTERNAL MEDICINE

## 2024-02-20 PROCEDURE — 93306 TTE W/DOPPLER COMPLETE: CPT

## 2024-02-22 ENCOUNTER — OFFICE VISIT (OUTPATIENT)
Age: 79
End: 2024-02-22
Payer: COMMERCIAL

## 2024-02-22 DIAGNOSIS — F02.80 LATE ONSET ALZHEIMER'S DISEASE WITHOUT BEHAVIORAL DISTURBANCE (HCC): ICD-10-CM

## 2024-02-22 DIAGNOSIS — E03.9 HYPOTHYROIDISM, UNSPECIFIED TYPE: ICD-10-CM

## 2024-02-22 DIAGNOSIS — G30.1 LATE ONSET ALZHEIMER'S DISEASE WITHOUT BEHAVIORAL DISTURBANCE (HCC): ICD-10-CM

## 2024-02-22 DIAGNOSIS — R48.8 OTHER SYMBOLIC DYSFUNCTIONS: Primary | ICD-10-CM

## 2024-02-22 PROCEDURE — 92507 TX SP LANG VOICE COMM INDIV: CPT

## 2024-02-22 NOTE — PROGRESS NOTES
Daily Speech Treatment Note    Today's date: 2024  Patient’s name: Paula Lubin  : 1945  MRN: [id number]  Safety measures: HTN, memory  Referring provider: Liv Mc MD    Encounter Diagnosis     ICD-10-CM    1. Other symbolic dysfunctions  R48.8       2. Late onset Alzheimer's disease without behavioral disturbance (HCC)  G30.1     F02.80               POC expires Unit limit Auth Expiration date PT/OT + Visit Limit?   24 N/a 24 8   3/28/24  6/24/24 8                     Visit/Unit Tracking  AUTH Status:  Date   IE   RE 12/19 12/28 1/17 1/30 2/15  PU        After 8 visits Used 1 2 3 4 5 6 5 1 2 3 4 5        Remaining  7 6 5 4 3 2 1 7 6 5 4 5             Subjective/Behavioral:  -Patient reported no new concerns for today's session.      Objective/Assessment:  Short-term goals:  Patient and caregiver will be educated on the use of internal and external memory aids/compensatory strategies to facilitate increased recall of routine, daily events, names, orientation to time, etc. (to be achieved in 1-2 weeks).            Patient will complete auditory immediate and short-term memory tasks with 80% accuracy to facilitate increased ability to retell narratives and recall information within functional living environment (to be achieved in 4-6 weeks).      Functional memory task: narrative rentenion: patient was given a passage to read independently and review the main points. When patient was ready, removed passage from visual field and asked f/u questions. Task completed in 7/15 opp (46% acc) independently, increasing to 11/15 opp (73% acc) from max cueing. Patient benefited from verbal prompting to provide prompts from passage rather than the utilizing background knowledge.     Patient will complete complex auditory attention processing tasks (e.g., sentence unscrambles, ranking numbers/words, etc.) with 80% accuracy to facilitate increased processing,  storage, and retrieval of functional information (to be achieved in 4-6 weeks).     Patient will complete thought organization tasks (e.g., sequencing, deduction puzzles, etc.) with 80% accuracy to facilitate increased executive functioning skills (to be achieved in 4-6 weeks).      Targeting word-generation- patient was given a letter chain and had to generate words that connected through the chain. Patient was able to independently generate 5/10 words independently, increasing to 10 words from mod semantic and written cues.      Patient will be educated on word finding strategies (i.e., circumlocution) for improved generative naming and verbal expression skills (to be achieved in 1-2 weeks).      Patient will complete word generation tasks (e.g., verbal fluency, categorization, analogies, synonyms/antonyms, , etc.) with 80% accuracy using word finding strategies to facilitate improved word retrieval skills (to be achieved in 4-6 weeks).           Plan:  -Patient was provided with home exercises/activities to target goals in plan of care at the end of today's session.  -Continue with current plan of care.

## 2024-02-23 RX ORDER — LEVOTHYROXINE SODIUM 0.12 MG/1
125 TABLET ORAL DAILY
Qty: 90 TABLET | Refills: 1 | Status: SHIPPED | OUTPATIENT
Start: 2024-02-23

## 2024-02-26 ENCOUNTER — OFFICE VISIT (OUTPATIENT)
Dept: SURGICAL ONCOLOGY | Facility: CLINIC | Age: 79
End: 2024-02-26
Payer: COMMERCIAL

## 2024-02-26 VITALS
SYSTOLIC BLOOD PRESSURE: 138 MMHG | HEART RATE: 94 BPM | DIASTOLIC BLOOD PRESSURE: 78 MMHG | HEIGHT: 61 IN | BODY MASS INDEX: 32.1 KG/M2 | OXYGEN SATURATION: 94 % | TEMPERATURE: 97.9 F | WEIGHT: 170 LBS | RESPIRATION RATE: 16 BRPM

## 2024-02-26 DIAGNOSIS — Z85.3 ENCOUNTER FOR FOLLOW-UP SURVEILLANCE OF BREAST CANCER: ICD-10-CM

## 2024-02-26 DIAGNOSIS — Z51.81 ENCOUNTER FOR MONITORING ANASTROZOLE THERAPY: ICD-10-CM

## 2024-02-26 DIAGNOSIS — C50.811 MALIGNANT NEOPLASM OF OVERLAPPING SITES OF RIGHT BREAST IN FEMALE, ESTROGEN RECEPTOR POSITIVE: Primary | ICD-10-CM

## 2024-02-26 DIAGNOSIS — Z79.811 ENCOUNTER FOR MONITORING ANASTROZOLE THERAPY: ICD-10-CM

## 2024-02-26 DIAGNOSIS — Z08 ENCOUNTER FOR FOLLOW-UP SURVEILLANCE OF BREAST CANCER: ICD-10-CM

## 2024-02-26 DIAGNOSIS — Z98.890 HISTORY OF LUMPECTOMY OF RIGHT BREAST: ICD-10-CM

## 2024-02-26 DIAGNOSIS — Z17.0 MALIGNANT NEOPLASM OF OVERLAPPING SITES OF RIGHT BREAST IN FEMALE, ESTROGEN RECEPTOR POSITIVE: Primary | ICD-10-CM

## 2024-02-26 PROCEDURE — 99204 OFFICE O/P NEW MOD 45 MIN: CPT | Performed by: SURGERY

## 2024-02-26 NOTE — PROGRESS NOTES
Speech-Language Pathology Re- Evaluation    Today's date: 2024   Patient’s name: Paula Lubin  : 1945  MRN: [id number]  Safety measures: HTN, memory  Referring provider: Liv Mc MD    Encounter Diagnosis     ICD-10-CM    1. Other symbolic dysfunctions  R48.8       2. Late onset Alzheimer's disease without behavioral disturbance (HCC)  G30.1     F02.80           Assessment: Patientcontinues to present with moderate-severe cognitive-linguistic deficits c/b decreased orientation, awareness of deficits, immediate memory, short-term memory, processing speed, direction following, attention, , thought organization, abstraction, and verbal expression skills.Patient completed TOAL for today's testing. Previous re-evaluation, patient was administered BNT, however, test is for severe word-finding deficts. Patient was administered TOAL which is a more difficult word-finding assessment. Patient scored Average for Word Similarities. Patient scored Poor for Word Opposites and Analogies. Will continue to implement higher level language tasks to POC. Patient is tolerating treatment well and is consistently completing HEP & memory journal tasks. From initiating memory journal tasks, patient has improved recall of events which at the initial evaluation was reported to be a deficit. Patient continues to be highly motivated for therapy to facilitate safety and improve patient's QOL.  Patient would benefit from continued outpatient skilled Speech Therapy services to increased functional recall with external supports, improved orientation with external supports, successful completion of daily tasks with cues, follow directions within functional activities, support positive communication interactions with both familiar and unfamiliar listeners, verbal fluency, immediate/recent memory, word-finding, executive functioning,  promote safety, facilitate overall improved quality of life, reduce caregiver burden, and  complete caregiver education/training.      Short-term Goals:  Patient and caregiver will be educated on the use of internal and external memory aids/compensatory strategies to facilitate increased recall of routine, daily events, names, orientation to time, etc. (to be achieved in 1-2 weeks). -PARTIALLY MET/ONGOING     Patient will complete auditory immediate and short-term memory tasks with 80% accuracy to facilitate increased ability to retell narratives and recall information within functional living environment (to be achieved in 4-6 weeks). -PARTIALLY MET/ONGOING     Patient will complete complex auditory attention processing tasks (e.g., sentence unscrambles, ranking numbers/words, etc.) with 80% accuracy to facilitate increased processing, storage, and retrieval of functional information (to be achieved in 4-6 weeks). -PARTIALLY MET/ONGOING     Patient will complete thought organization tasks (e.g., sequencing, deduction puzzles, etc.) with 80% accuracy to facilitate increased executive functioning skills (to be achieved in 4-6 weeks). -PARTIALLY MET/ONGOING     Patient will be educated on word finding strategies (i.e., circumlocution) for improved generative naming and verbal expression skills (to be achieved in 1-2 weeks). -PARTIALLY MET/ONGOING     Patient will complete word generation tasks (e.g., verbal fluency, categorization, analogies, synonyms/antonyms, , etc.) with 80% accuracy using word finding strategies to facilitate improved word retrieval skills (to be achieved in 4-6 weeks). -PARTIALLY MET/ONGOING     Long-term goals:  Patient will demonstrate cognitive-linguistic skills consistent with age and education given use of compensatory strategies when needed to resume baseline activities and responsibilities in home and community settings (to be achieved by discharge). -PARTIALLY MET/ONGOING     Patient will demonstrate adequate verbal expression during conversation with fewer than 2 breakdowns or  "word finding deficits provided the use of compensatory strategies (to be achieved by discharge). -PARTIALLY MET/ONGOING        Plan  Patient would benefit from outpatient skilled Speech Therapy services: Cognitive-linguistic therapy    Frequency: 1-2x weekly  Duration: 6-8 weeks    Intervention certification from: 2024  Intervention certification to: 2024      Subjective  Patient's goal(s): \"Rodney improve\"    Since initiation of therapy, patient reports improvement in memory due to external memory aids. Patient reports cognitive-simulating tasks performed OP  services are \"good for me.\"    Objective (testing)  The Test of Adult Language - Fourth Edition (TOAL-4) is a standardized, norm-referenced assessment measuring important communicative abilities in spoken and written language. The test in normed on individuals 12:0-24:11. Due to these age restrictions, these standardized scores should not be compared to standard or percentile rank. Objective measures were taken to complete a diagnostic impression and prognosis for this patient. The TOAL-4 has 6 subtests; their results are reported as percentile ranks and scaled scores. The results of the TOAL-4 are generally used for three purposes; (a) to identify adolescents and adults who score significantly below their peers and therefore might need help improving their language proficiency, (b) to determine areas of relative strength and weakness among language abilities, and (c) to serve as a research tool in studies investigating language problems in adolescents and adults. Due to time constraints, only portions of the standardized assessment were administered on this date of service.    Subtest: Raw Score: Percentile:  Scaled Score: Descriptive Ratin. Word Opposites 13/34 5%tile 5 Poor   3. Spoken Analogies / 5%tile 5 Poor   4. Word Similarities 40 25%tile 8 Average          Treatment  Time word problems: clinician gave patient word problems " orally and patient to answer correctly. Task completed in 5/10 opp (50% acc) independently, increasing to 9/10 opp (90% acc) from min-mod cueing. Patient benefited from repetition and prompting to process the time values.        Visit tracking:    POC expires Unit limit Auth Expiration date PT/OT + Visit Limit?   2/8//24 N/a 6/24/24 8   3/28/24  6/24/24 8   4/23/24 N/a TBD 8               Visit/Unit Tracking  AUTH Status:  Date 11/20  IE 11/30 12/5 12/7 12/12 12/14  RE 12/19 12/28 1/17 1/30 2/15  PU 2/22 2/27  RE      After 8 visits Used 1 2 3 4 5 6 5 1 2 3 4 5 6       Remaining  7 6 5 4 3 2 1 7 6 5 4 3 2          Intervention comments:  35 mis of speech language production evaluation, 10 mins of speech language treatment

## 2024-02-26 NOTE — PROGRESS NOTES
Surgical Oncology Follow Up  [facility]  [facility] [place]  [address] [address]    Paula Lubin  1945  [id number]      No chief complaint on file.       Assessment & Plan:   This is a 78-year-old female follow-up for breast cancer on anastrozole after her mammogram.  Mammogram films were reviewed and discussed.  She is tolerating anastrozole well no major side effects.  She will continue her endocrine therapy and follow-up with me in 6 months time.  She will also have to go for annual diagnostic mammogram.    Cancer History:     Oncology History   Malignant neoplasm of overlapping sites of right breast in female, estrogen receptor positive    3/7/2022 Initial Diagnosis    Malignant neoplasm of right female breast (HCC)     3/7/2022 Biopsy    Right Breast Ultrasound guided biopsy  12 o'clock, 8 cm from nipple  Invasive mammary carcinoma of no special type with extensive Ductal Carcinoma in situ  Grade 1   ER 95  VT 75  HER2 1+  Lymphovascular invasion not identified    Concordant. Malingnacy appears unifocal. Right axilla clear. Left breast clear.     Breast, Right, Stereo bx right breast 10oclock, 4 cores with calcs:  - Fibroadenoma with focal coarse calcifications.  - Negative for atypia and in-situ or invasive carcinoma.      Breast, Right, Stereo bx right breast 10oclock, 1 core without calcs:  - Fibroadenoma with focal coarse calcifications.  - Negative for atypia and in-situ or invasive carcinoma.      3/14/2022 Genetic Testing    A stat panel of genes were evaluated, including: SYDNI, BRCA1, BRCA2, CDH1, CHEK2, PALB2, PTEN, STK11, TP53  Negative result. No pathogenic sequence variants or deletions/duplications identified     3/14/2022 Genomic Testing    MammaPrint   Low risk  Mammaprint Index: +0.537  Luminal type A     3/14/2022 -  Cancer Staged    Staging form: Breast, AJCC 8th Edition  - Clinical stage from 3/14/2022: cT1b, cN0, cM0,  GX, ER+, ME+, HER2- - Signed by Keely Crespo MD on 3/25/2022  Stage prefix: Initial diagnosis  Histologic grading system: 3 grade system       3/29/2022 Surgery    Right breast liyah  directed lumpectomy with sentinel lymph node biopsy  Invasive mammary carcinoma of no special type with associated ductal carcinoma in situ  Grade 1   1.3 cm  All margins negative for invasive carcinoma and ductal carcinoma in situ  0/3 Lymph node  Anatomic/prognostic stage: IA      5/23/2022 - 6/14/2022 Radiation    Treatments:  Course: C1  Plan ID Energy Fractions Dose per Fraction (cGy) Dose Correction (cGy) Total Dose Delivered (cGy) Elapsed Days   R Breast 6X 15 / 15 267 0 4,005 22    Treatment Dates:  5/23/2022 - 6/14/2022.              Interval History:   Follow-up with right breast cancer    Review of Systems:   Review of Systems   Constitutional:  Negative for chills and fever.   HENT:  Negative for ear pain and sore throat.    Eyes:  Negative for pain and visual disturbance.   Respiratory:  Negative for cough and shortness of breath.    Cardiovascular:  Negative for chest pain and palpitations.   Gastrointestinal:  Negative for abdominal pain and vomiting.   Genitourinary:  Negative for dysuria and hematuria.   Musculoskeletal:  Negative for arthralgias and back pain.   Skin:  Negative for color change and rash.   Neurological:  Negative for seizures and syncope.   All other systems reviewed and are negative.    Past Medical History     Patient Active Problem List   Diagnosis   • Depression   • Gastroesophageal reflux disease without esophagitis   • Mixed hyperlipidemia   • Essential hypertension   • Hypothyroid   • Impaired fasting glucose   • Lymphoma (HCC)   • Cerebral aneurysm, nonruptured   • Mild cognitive impairment   • Flexor tenosynovitis of finger   • Macrocytosis without anemia   • Paresthesias   • Non-intractable vomiting with nausea   • Hypokalemia   • Late onset Alzheimer's disease without behavioral  disturbance (HCC)   • Chest pain   • Near syncope   • Immunocompromised (HCC)   • Malignant neoplasm of overlapping sites of right breast in female, estrogen receptor positive    • Use of anastrozole   • Recurrent major depressive disorder, remission status unspecified (HCC)   • Stage 3 chronic kidney disease, unspecified whether stage 3a or 3b CKD (HCC)   • History of lumpectomy of right breast     Past Medical History:   Diagnosis Date   • Anxiety disorder    • Aortic valve disorder    • BRCA1 negative    • BRCA2 negative    • Breast cancer (HCC) 03/2022    right   • Cancer (HCC)     breast   • Cellulitis of finger of left hand 11/20/2018   • Cellulitis of left hand 11/10/2018    Added automatically from request for surgery [id number]   • Depression    • Disease of thyroid gland    • GERD (gastroesophageal reflux disease)    • History of gastroesophageal reflux (GERD)    • History of kidney cancer 03/06/2014   • History of transfusion    • Hyperlipidemia 03/06/2014   • Hypertension 03/06/2014   • Hypothyroidism 03/06/2014   • Lymphoma (HCC)    • Malignant neoplasm of overlapping sites of right breast in female, estrogen receptor positive  03/10/2022   • Shortness of breath    • Stroke (HCC) [id number]/[id number]    Min stroke found wile going through chemo and radiation   • TIA (transient ischemic attack)    • UTI (urinary tract infection)      Past Surgical History:   Procedure Laterality Date   • BREAST LUMPECTOMY Right 3/29/2022    Procedure: ITZEL  DIRECTED LUMPECTOMY;  Surgeon: Denton Chambers MD;  Location: [facility];  Service: Surgical Oncology   • ESOPHAGOGASTRIC FUNDOPLASTY      Nissen Fundoplication   • HERNIA REPAIR     • LYMPH NODE BIOPSY Right 3/29/2022    Procedure: LYMPHATIC MAPPING WITH BLUE AND RADIOACTIVE DYES SENTINEL LYMPH NODE BIOPSY, INJECTION IN OR AT 0800 BY DR CHAMBERS;  Surgeon: Denton Chambers MD;  Location: [facility];  Service: Surgical Oncology   • MAMMO STEREOTACTIC BREAST  BIOPSY RIGHT (ALL INC) Right 3/7/2022   • US BREAST ITZEL  NEEDLE LOC RIGHT Right 3/24/2022   • US GUIDED BREAST BIOPSY RIGHT COMPLETE Right 3/7/2022   • WRIST SURGERY Left      Family History   Problem Relation Age of Onset   • Stroke Father    • Leukemia Sister    • No Known Problems Brother    • Skin cancer Mother    • Stroke Maternal Grandmother    • No Known Problems Maternal Grandfather    • No Known Problems Paternal Grandmother    • No Known Problems Paternal Grandfather    • Breast cancer Sister    • Breast cancer Sister         Colon cancer   • Kidney cancer Brother    • Alcohol abuse Brother    • Kidney cancer Brother    • Stomach cancer Brother    • No Known Problems Daughter    • No Known Problems Daughter      Social History     Socioeconomic History   • Marital status:      Spouse name: Not on file   • Number of children: 2   • Years of education: Not on file   • Highest education level: Not on file   Occupational History   • Not on file   Tobacco Use   • Smoking status: Never   • Smokeless tobacco: Never   • Tobacco comments:     None   Vaping Use   • Vaping status: Never Used   Substance and Sexual Activity   • Alcohol use: Yes     Comment: Social holidays   • Drug use: Never   • Sexual activity: Not Currently     Comment: [place] over   Other Topics Concern   • Not on file   Social History Narrative   • Not on file     Social Determinants of Health     Financial Resource Strain: Low Risk  (9/19/2023)    Overall Financial Resource Strain (CARDIA)    • Difficulty of Paying Living Expenses: Not hard at all   Food Insecurity: Not on file   Transportation Needs: No Transportation Needs (9/19/2023)    PRAPARE - Transportation    • Lack of Transportation (Medical): No    • Lack of Transportation (Non-Medical): No   Physical Activity: Not on file   Stress: Not on file   Social Connections: Not on file   Intimate Partner Violence: Not on file   Housing Stability: Not on file       Current  Outpatient Medications:   •  amLODIPine (NORVASC) 2.5 mg tablet, TAKE 2 TABLETS BY MOUTH DAILY, Disp: 180 tablet, Rfl: 1  •  anastrozole (ARIMIDEX) 1 mg tablet, Take 1 tablet (1 mg total) by mouth daily, Disp: 90 tablet, Rfl: 0  •  aspirin 325 mg tablet, Take 325 mg by mouth daily , Disp: , Rfl:   •  cholestyramine (QUESTRAN) 4 g packet, TAKE 1 PACKET BY MOUTH DAILY., Disp: 90 packet, Rfl: 3  •  diphenhydrAMINE (BENADRYL) 25 mg tablet, Take 25 mg by mouth in the morning. 1/2 tablet with Paxil ., Disp: , Rfl:   •  fluticasone (FLONASE) 50 mcg/act nasal spray, INSTILL 1 SPRAY INTO EACH NOSTRIL AS NEEDED FOR RHINITIS OR ALLERGIES, Disp: 48 mL, Rfl: 1  •  levothyroxine 125 mcg tablet, TAKE 1 TABLET BY MOUTH EVERY DAY, Disp: 90 tablet, Rfl: 1  •  loperamide (IMODIUM) 2 mg capsule, Take 1 capsule (2 mg total) by mouth 4 (four) times a day as needed for diarrhea, Disp: 12 capsule, Rfl: 0  •  meclizine (ANTIVERT) 25 mg tablet, Take 1 tablet (25 mg total) by mouth every 8 (eight) hours as needed for dizziness, Disp: 50 tablet, Rfl: 1  •  memantine (NAMENDA) 10 mg tablet, TAKE 1 TABLET BY MOUTH TWICE A DAY, Disp: 180 tablet, Rfl: 1  •  multivitamin (THERAGRAN) TABS, Take 1 tablet by mouth, Disp: , Rfl:   •  Myrbetriq 50 MG TB24, TAKE 1 TABLET BY MOUTH EVERY DAY, Disp: 90 tablet, Rfl: 3  •  omeprazole (PriLOSEC) 20 mg delayed release capsule, TAKE 1 CAPSULE BY MOUTH TWICE A DAY, Disp: 180 capsule, Rfl: 1  •  ondansetron (ZOFRAN-ODT) 4 mg disintegrating tablet, Take 1 tablet (4 mg total) by mouth every 8 (eight) hours as needed for nausea, Disp: 15 tablet, Rfl: 0  •  PARoxetine (PAXIL) 20 mg tablet, TAKE 1 TABLET BY MOUTH EVERY DAY, Disp: 90 tablet, Rfl: 3  •  rivastigmine (EXELON) 9.5 mg/24 hr TD 24 hr patch, PLACE 1 PATCH ON THE SKIN OVER 24 HOURS DAILY, Disp: 90 patch, Rfl: 1  •  cholecalciferol (VITAMIN D3) 1,000 units tablet, Take 1 tablet (1,000 Units total) by mouth daily, Disp: 30 tablet, Rfl: 6  •  dicyclomine (BENTYL)  20 mg tablet, Take 1 tablet (20 mg total) by mouth 2 (two) times a day (Patient not taking: Reported on 1/25/2024), Disp: 20 tablet, Rfl: 0  •  furosemide (LASIX) 40 mg tablet, Take 1 tablet (40 mg total) by mouth daily for 2 days (Patient not taking: Reported on 2/14/2024), Disp: 2 tablet, Rfl: 1  •  potassium chloride (K-DUR,KLOR-CON) 10 mEq tablet, Take 1 tablet (10 mEq total) by mouth daily for 10 days, Disp: 10 tablet, Rfl: 0  •  PROAIR  (90 Base) MCG/ACT inhaler, as needed  (Patient not taking: Reported on 2/14/2024), Disp: , Rfl:   Allergies   Allergen Reactions   • Ciprofloxacin Hives   • Sulfa Antibiotics Itching   • Other    • Penicillins Rash     Category: Allergy;   --  Pt received unasyn 1.5 mg IV 11-12-18 to 11-15-18   • Sulfacetamide Rash     Category: Allergy;        Physical Exam:     Vitals:    02/26/24 1302   BP: 138/78   Pulse: 94   Resp: 16   Temp: 97.9 °F (36.6 °C)   SpO2: 94%     Physical Exam  Constitutional:       Appearance: Normal appearance.   HENT:      Head: Normocephalic and atraumatic.      Nose: Nose normal.      Mouth/Throat:      Mouth: Mucous membranes are moist.   Eyes:      Pupils: Pupils are equal, round, and reactive to light.   Cardiovascular:      Rate and Rhythm: Normal rate.      Pulses: Normal pulses.      Heart sounds: Normal heart sounds.   Pulmonary:      Effort: Pulmonary effort is normal.      Breath sounds: Normal breath sounds.   Chest:      Comments: Bilateral breast examination no palpable mass masses nipple discharge nipple retraction or skin changes except treatment related changes in the right breast and surgical scars.  Bilateral axillary and supra supraclavicular examination no palpable adenopathy.    Patient was examined seated as well as supine position.  Abdominal:      General: Bowel sounds are normal.      Palpations: Abdomen is soft.   Musculoskeletal:         General: Normal range of motion.      Cervical back: Normal range of motion and neck  supple.   Skin:     General: Skin is warm.   Neurological:      General: No focal deficit present.      Mental Status: She is alert and oriented to person, place, and time.   Psychiatric:         Mood and Affect: Mood normal.         Behavior: Behavior normal.         Thought Content: Thought content normal.         Judgment: Judgment normal.       Results & Discussion:   Mammogram:  Narrative & Impression   DIAGNOSIS: Malignant neoplasm of overlapping sites of right breast in female, estrogen receptor positive       TECHNIQUE:  Digital diagnostic mammography was performed. Computer Aided Detection (CAD) analyzed all applicable images.     COMPARISONS: Multiple prior exams dated between 2/14/2022 and 2/15/2023.     RELEVANT HISTORY:   Family Breast Cancer History: History of breast cancer in Sister, Sister.  Family Medical History: Family medical history includes breast cancer in 2 relatives (sister, sister).   Personal History: Hormone history includes other and other. Surgical history includes lumpectomy. Medical history includes breast cancer, BRCA 1 negative, and BRCA 2 negative.     RISK ASSESSMENT:   [facility] was suppressed due to the patient's history and/or demographic factors.     TISSUE DENSITY:   The breasts are extremely dense, which lowers the sensitivity of mammography.      INDICATION: Paula Lubin is a 78 y.o. female presenting for history of breast cancer.     FINDINGS:   RIGHT  1) POST-SURGICAL FINDING [C]: There are post-surgical findings from a previous lumpectomy with radiation seen in the right breast.      BILATERAL  There are no suspicious masses, grouped microcalcifications or areas of unexplained architectural distortion. The skin and nipple areolar complex are unremarkable.  Benign-appearing calcifications are noted in each breast.        IMPRESSION:   No evidence of malignancy.           ASSESSMENT/BI-RADS CATEGORY:  Left: 2 - Benign  Right: 2 -  Benign  Overall: 2 - Benign     RECOMMENDATION:       - Diagnostic mammogram in 1 year for both breasts.        I did review mammogram films and discussed with the patient.  She is tolerating well anastrozole no major side effects such as shortness of breath leg swelling or vaginal spotting.  She is some knee discomfort and shoulder discomfort otherwise unremarkable.  I have told her with regard to her knee pain to discuss with her primary care physician.  I did discussed in detail nature of breast cancer benefits of endocrine therapy alternative and possible complications.  She understands she needs self breast examination and annual mammogram diagnostic.  she understands and  agrees . All patient questions were answered.       Advance Care Planning/Advance Directives:  I Denton Chambers MD discussed the disease status with Paula Lubin  today 02/26/24  treatment plans and follow-up with the patient.

## 2024-02-27 ENCOUNTER — EVALUATION (OUTPATIENT)
Age: 79
End: 2024-02-27
Payer: COMMERCIAL

## 2024-02-27 DIAGNOSIS — R48.8 OTHER SYMBOLIC DYSFUNCTIONS: Primary | ICD-10-CM

## 2024-02-27 DIAGNOSIS — F02.80 LATE ONSET ALZHEIMER'S DISEASE WITHOUT BEHAVIORAL DISTURBANCE (HCC): ICD-10-CM

## 2024-02-27 DIAGNOSIS — G30.1 LATE ONSET ALZHEIMER'S DISEASE WITHOUT BEHAVIORAL DISTURBANCE (HCC): ICD-10-CM

## 2024-02-27 PROCEDURE — 92507 TX SP LANG VOICE COMM INDIV: CPT

## 2024-02-27 PROCEDURE — 92523 SPEECH SOUND LANG COMPREHEN: CPT

## 2024-02-29 ENCOUNTER — OFFICE VISIT (OUTPATIENT)
Age: 79
End: 2024-02-29
Payer: COMMERCIAL

## 2024-02-29 DIAGNOSIS — R48.8 OTHER SYMBOLIC DYSFUNCTIONS: Primary | ICD-10-CM

## 2024-02-29 DIAGNOSIS — G30.1 LATE ONSET ALZHEIMER'S DISEASE WITHOUT BEHAVIORAL DISTURBANCE (HCC): ICD-10-CM

## 2024-02-29 DIAGNOSIS — F02.80 LATE ONSET ALZHEIMER'S DISEASE WITHOUT BEHAVIORAL DISTURBANCE (HCC): ICD-10-CM

## 2024-02-29 PROCEDURE — 97129 THER IVNTJ 1ST 15 MIN: CPT

## 2024-02-29 PROCEDURE — 97130 THER IVNTJ EA ADDL 15 MIN: CPT

## 2024-02-29 NOTE — PROGRESS NOTES
Daily Speech Treatment Note    Today's date: 2024  Patient’s name: Paula Lubin  : 1945  MRN: [id number]  Safety measures: HTN, memory  Referring provider: Liv Mc MD    Encounter Diagnosis     ICD-10-CM    1. Other symbolic dysfunctions  R48.8       2. Late onset Alzheimer's disease without behavioral disturbance (HCC)  G30.1     F02.80         Visit tracking:  POC expires Unit limit Auth Expiration date PT/OT + Visit Limit?   24 N/a 24 8   3/28/24  6/24/24 8   24 N/a TBD 8               Visit/Unit Tracking  AUTH Status:  Date   IE   RE 12/19 12/28 1/17 1/30 2/15  PU   RE      After 8 visits Used 1 2 3 4 5 6 5 1 2 3 4 5 6 7      Remaining  7 6 5 4 3 2 1 7 6 5 4 3 2 1         Subjective/Behavioral:  -Patient brought memory journal for today's session!    Objective/Assessment:  -Reviewed testing results and goals in plan care with patient. Patient is in agreement at this time.    Short-term goals:    Patient and caregiver will be educated on the use of internal and external memory aids/compensatory strategies to facilitate increased recall of routine, daily events, names, orientation to time, etc. (to be achieved in 1-2 weeks). -PARTIALLY MET/ONGOING    Completed memory journal entry for today. Prompted patient to write the day of the week to aid with orientation to date.      Patient will complete auditory immediate and short-term memory tasks with 80% accuracy to facilitate increased ability to retell narratives and recall information within functional living environment (to be achieved in 4-6 weeks). -PARTIALLY MET/ONGOING     Patient will complete complex auditory attention processing tasks (e.g., sentence unscrambles, ranking numbers/words, etc.) with 80% accuracy to facilitate increased processing, storage, and retrieval of functional information (to be achieved in 4-6 weeks). -PARTIALLY MET/ONGOING    Following directions:  patient was given 1 step direction that target spatial and categorization concepts (put a check under the ones that are not associated with arm forces). Task completed in 7/10 opp (70% acc) independently, increasing to 10/10 opp (100% acc) from min cueing. Patient benefited from review of directions, prompting of to review the spatial concepts.       Patient will complete thought organization tasks (e.g., sequencing, deduction puzzles, etc.) with 80% accuracy to facilitate increased executive functioning skills (to be achieved in 4-6 weeks). -PARTIALLY MET/ONGOING     Patient will be educated on word finding strategies (i.e., circumlocution) for improved generative naming and verbal expression skills (to be achieved in 1-2 weeks). -PARTIALLY MET/ONGOING     Patient will complete word generation tasks (e.g., verbal fluency, categorization, analogies, synonyms/antonyms, , etc.) with 80% accuracy using word finding strategies to facilitate improved word retrieval skills (to be achieved in 4-6 weeks). -PARTIALLY MET/ONGOING    Higher level expressive language task: patient was given analogies and had to complete the second half. Task completed in 12/20 opp (60% acc) independently, increasing to 20/20 opp (100% acc) from min-mod cueing. Patient benefited from semantic cues, review of analogies as a concept,      Plan:  -Patient was provided with home exercises/activities to target goals in plan of care at the end of today's session.  -Continue with current plan of care.

## 2024-03-01 NOTE — PROGRESS NOTES
Daily Speech Treatment Note    Today's date: 3/4/2024  Patient’s name: Paula Lubin  : 1945  MRN: [id number]  Safety measures: HTN, memory  Referring provider: Liv Mc MD    Encounter Diagnosis     ICD-10-CM    1. Other symbolic dysfunctions  R48.8       2. Late onset Alzheimer's disease without behavioral disturbance (HCC)  G30.1     F02.80           Visit tracking:  POC expires Unit limit Auth Expiration date PT/OT + Visit Limit?   24 N/a 24 8   3/28/24  6/24/24 8   24 N/a TBD 8               Visit/Unit Tracking  AUTH Status:  Date   IE   RE 12/19 12/28 1/17 1/30 2/15  PU   RE 2/29 3/4  Needs auth    After 8 visits Used 1 2 3 4 5 6 5 1 2 3 4 5 6 7 8     Remaining  7 6 5 4 3 2 1 7 6 5 4 3 2 1 0        Subjective/Behavioral:  -Patient brought memory journal for today's session.    Objective/Assessment:    Short-term goals:    Patient and caregiver will be educated on the use of internal and external memory aids/compensatory strategies to facilitate increased recall of routine, daily events, names, orientation to time, etc. (to be achieved in 1-2 weeks). -PARTIALLY MET/ONGOING    Prompting patient to write the times of appointments in her to-do list to aid with recall. Provided patient a calendar of upcoming appointments. Patient was unable to identify the correct day of the week. Recommended to patient on her calendar to utilize a visual aid to aid with orientation to day (ex. Cross the days out as we go & utilize a sticky & change as we go).  Provided a handout of an orientation clock. Provided education an orientation clock can aid with orientation to time, day of the week, time of day, and date. Patient reported she was interested in clock. Provided education to keep the clock in a room that patient frequently attends. Provided this handout to daughter at the end of the session.        Patient will complete auditory immediate and  short-term memory tasks with 80% accuracy to facilitate increased ability to retell narratives and recall information within functional living environment (to be achieved in 4-6 weeks). -PARTIALLY MET/ONGOING    Immediate memory task: patient was given 3 words and had to recall the word in alphabetical order. Task completed in 5/10 opp (50% acc) independently, increasing to 10/10 opp (100% acc) from min-mod cueing. Patient benefited from repetition,  semantic cues, phonemic cues.       Patient will complete complex auditory attention processing tasks (e.g., sentence unscrambles, ranking numbers/words, etc.) with 80% accuracy to facilitate increased processing, storage, and retrieval of functional information (to be achieved in 4-6 weeks). -PARTIALLY MET/ONGOING    Alternating attention task: patient was given an array of different 2 digit math problems alternating from addition, substraction, & multiplication.  Patient complete problems accurately.    Trial 1: 13 math problems: Time: 4:15 mins  Correct: 11    Missed: 2 Patient had difficulties with multiplication tasks. Min prompted patient to recall incorrect math problem aloud to aid with correcting problem    Trial 2: 13 math problems: Time: 1:44 mins  Correct: 12    Missed: 1  Patient had difficulties with multiplication task. Min Prompted patient to recall incorrect math problem aloud to aid with correcting problem    Trial 3: 13 math problems: Time: 4:20  mins  Correct: 9    Missed: 4  Patient had difficulties with multiplication task. Min Prompted patient to recall incorrect math problem aloud to aid with correcting problem    Patient will complete thought organization tasks (e.g., sequencing, deduction puzzles, etc.) with 80% accuracy to facilitate increased executive functioning skills (to be achieved in 4-6 weeks). -PARTIALLY MET/ONGOING     Patient will be educated on word finding strategies (i.e., circumlocution) for improved generative naming and verbal  expression skills (to be achieved in 1-2 weeks). -PARTIALLY MET/ONGOING     Patient will complete word generation tasks (e.g., verbal fluency, categorization, analogies, synonyms/antonyms, , etc.) with 80% accuracy using word finding strategies to facilitate improved word retrieval skills (to be achieved in 4-6 weeks). -PARTIALLY MET/ONGOING        Plan:  -Patient was provided with home exercises/activities to target goals in plan of care at the end of today's session.  -Continue with current plan of care.

## 2024-03-04 ENCOUNTER — OFFICE VISIT (OUTPATIENT)
Age: 79
End: 2024-03-04
Payer: COMMERCIAL

## 2024-03-04 DIAGNOSIS — F02.80 LATE ONSET ALZHEIMER'S DISEASE WITHOUT BEHAVIORAL DISTURBANCE (HCC): ICD-10-CM

## 2024-03-04 DIAGNOSIS — G30.1 LATE ONSET ALZHEIMER'S DISEASE WITHOUT BEHAVIORAL DISTURBANCE (HCC): ICD-10-CM

## 2024-03-04 DIAGNOSIS — R48.8 OTHER SYMBOLIC DYSFUNCTIONS: Primary | ICD-10-CM

## 2024-03-04 PROCEDURE — 97129 THER IVNTJ 1ST 15 MIN: CPT

## 2024-03-04 PROCEDURE — 97130 THER IVNTJ EA ADDL 15 MIN: CPT

## 2024-03-05 ENCOUNTER — CONSULT (OUTPATIENT)
Dept: CARDIOLOGY CLINIC | Facility: CLINIC | Age: 79
End: 2024-03-05
Payer: COMMERCIAL

## 2024-03-05 VITALS
HEART RATE: 76 BPM | WEIGHT: 174 LBS | BODY MASS INDEX: 32.85 KG/M2 | SYSTOLIC BLOOD PRESSURE: 132 MMHG | HEIGHT: 61 IN | DIASTOLIC BLOOD PRESSURE: 86 MMHG

## 2024-03-05 DIAGNOSIS — R06.09 DYSPNEA ON EXERTION: Primary | ICD-10-CM

## 2024-03-05 DIAGNOSIS — I10 BENIGN ESSENTIAL HTN: ICD-10-CM

## 2024-03-05 PROCEDURE — 99204 OFFICE O/P NEW MOD 45 MIN: CPT | Performed by: INTERNAL MEDICINE

## 2024-03-05 NOTE — ASSESSMENT & PLAN NOTE
This seems stable if a bit persistent.  Ambulation is limited.  I note that with the recent ED visit for this problem BNP was only 46.  I do not believe there is heart failure to explain exertional dyspnea at this point.

## 2024-03-05 NOTE — PROGRESS NOTES
Patient ID: Paula Lubin is a 78 y.o. female.        Plan:      Dyspnea on exertion  This seems stable if a bit persistent.  Ambulation is limited.  I note that with the recent ED visit for this problem BNP was only 46.  I do not believe there is heart failure to explain exertional dyspnea at this point.    Benign essential HTN  Well-controlled at present.     Follow up Plan/Other summary comments:  Mainly I reassured the patient and her daughter today.  I note that recent echo revealed normal LV and RV systolic function.  I be happy to see the patient again should the need arise.    HPI: Patient is seen today regarding the above.  She was in the emergency room recently because of dyspnea.  Labs included a BNP of 46.  She was asked to follow-up with cardiology.  In the interim she had a echocardiogram which was essentially normal.  I note there is a prior history of breast cancer with chemotherapy and radiation.  Also prior TIA.  Walking is difficult and the patient needs assistance either with a walker or helper.              Most recent or relevant cardiac/vascular testing:    TTE 2/20/2024: Normal LV and RV systolic function.  No valvular heart disease.      Past Surgical History:   Procedure Laterality Date    BREAST LUMPECTOMY Right 3/29/2022    Procedure: ITZEL  DIRECTED LUMPECTOMY;  Surgeon: Denton Chambers MD;  Location: [facility];  Service: Surgical Oncology    ESOPHAGOGASTRIC FUNDOPLASTY      Nissen Fundoplication    HERNIA REPAIR      LYMPH NODE BIOPSY Right 3/29/2022    Procedure: LYMPHATIC MAPPING WITH BLUE AND RADIOACTIVE DYES SENTINEL LYMPH NODE BIOPSY, INJECTION IN OR AT 0800 BY DR CHAMBERS;  Surgeon: Denton Chambers MD;  Location: [facility];  Service: Surgical Oncology    MAMMO STEREOTACTIC BREAST BIOPSY RIGHT (ALL INC) Right 3/7/2022    US BREAST ITZEL  NEEDLE LOC RIGHT Right 3/24/2022    US GUIDED BREAST BIOPSY RIGHT COMPLETE Right 3/7/2022    WRIST SURGERY Left   "      Lipid Profile: Reviewed      Review of Systems   10  point ROS  was otherwise non pertinent or negative except as per HPI or as below.   Gait: Very slow.          Objective:     /86   Pulse 76   Ht 5' 1\" (1.549 m)   Wt 78.9 kg (174 lb)   BMI 32.88 kg/m²     PHYSICAL EXAM:    General:  Normal appearance in no distress.  Eyes:  Anicteric.  Oral mucosa:  Moist.  Neck:  No JVD. Carotid upstrokes are brisk without bruits.  No masses.  Chest:  Clear to auscultation.  Cardiac:  No palpable PMI.  Normal S1 and S2.  No murmur gallop or rub.  Abdomen:  Soft and nontender. No palpable organomegaly or aortic enlargement.  Extremities:  No peripheral edema.  Musculoskeletal:  Symmetric.   Vascular:  Femoral pulses are brisk without bruits.  Popliteal pulses are intact bilaterally.   Pedal pulses are intact.  Neuro:  Grossly symmetric.  Psych:  Alert and oriented x3.      Meds reviewed.    Past Medical History:   Diagnosis Date    Anxiety disorder     Aortic valve disorder     BRCA1 negative     BRCA2 negative     Breast cancer (HCC) 03/2022    right    Cancer (HCC)     breast    Cellulitis of finger of left hand 11/20/2018    Cellulitis of left hand 11/10/2018    Added automatically from request for surgery [id number]    Depression     Disease of thyroid gland     GERD (gastroesophageal reflux disease)     History of gastroesophageal reflux (GERD)     History of kidney cancer 03/06/2014    History of transfusion     Hyperlipidemia 03/06/2014    Hypertension 03/06/2014    Hypothyroidism 03/06/2014    Lymphoma (HCC)     Malignant neoplasm of overlapping sites of right breast in female, estrogen receptor positive  03/10/2022    Shortness of breath     Stroke (HCC) [id number]    Min stroke found wile going through chemo and radiation    TIA (transient ischemic attack)     UTI (urinary tract infection)            Social History     Tobacco Use   Smoking Status Never   Smokeless Tobacco Never   Tobacco Comments    None "

## 2024-03-09 DIAGNOSIS — K21.9 GASTROESOPHAGEAL REFLUX DISEASE WITHOUT ESOPHAGITIS: ICD-10-CM

## 2024-03-09 DIAGNOSIS — K44.9 HIATAL HERNIA: ICD-10-CM

## 2024-03-11 RX ORDER — OMEPRAZOLE 20 MG/1
20 CAPSULE, DELAYED RELEASE ORAL 2 TIMES DAILY
Qty: 180 CAPSULE | Refills: 0 | Status: SHIPPED | OUTPATIENT
Start: 2024-03-11

## 2024-03-19 ENCOUNTER — APPOINTMENT (OUTPATIENT)
Age: 79
End: 2024-03-19
Payer: COMMERCIAL

## 2024-03-21 ENCOUNTER — OFFICE VISIT (OUTPATIENT)
Age: 79
End: 2024-03-21
Payer: COMMERCIAL

## 2024-03-21 DIAGNOSIS — F02.80 LATE ONSET ALZHEIMER'S DISEASE WITHOUT BEHAVIORAL DISTURBANCE (HCC): ICD-10-CM

## 2024-03-21 DIAGNOSIS — G30.1 LATE ONSET ALZHEIMER'S DISEASE WITHOUT BEHAVIORAL DISTURBANCE (HCC): ICD-10-CM

## 2024-03-21 DIAGNOSIS — R48.8 OTHER SYMBOLIC DYSFUNCTIONS: Primary | ICD-10-CM

## 2024-03-21 PROCEDURE — 97130 THER IVNTJ EA ADDL 15 MIN: CPT

## 2024-03-21 PROCEDURE — 97129 THER IVNTJ 1ST 15 MIN: CPT

## 2024-03-21 NOTE — PROGRESS NOTES
Daily Speech Treatment Note    Today's date: 3/21/2024  Patient’s name: Paula Lubin  : 1945  MRN: [id number]  Safety measures: HTN, memory  Referring provider: Liv Mc MD    Encounter Diagnosis     ICD-10-CM    1. Other symbolic dysfunctions  R48.8       2. Late onset Alzheimer's disease without behavioral disturbance (HCC)  G30.1     F02.80             Visit tracking:  POC expires Unit limit Auth Expiration date PT/OT + Visit Limit?   24 N/a 24 8   3/28/24  6/24/24 8   24 N/a 24 8               Visit/Unit Tracking  AUTH Status:  Date   IE   RE 12/19 12/28 1/17 1/30 2/15  PU   RE 2/29 3/4  Needs auth 3/21     After 8 visits Used 1 2 3 4 5 6 5 1 2 3 4 5 6 7 8 1    Remaining  7 6 5 4 3 2 1 7 6 5 4 3 2 1 0 7       Subjective/Behavioral:  -Patient brought memory journal for today's session!    Objective/Assessment:    Short-term goals:    Patient and caregiver will be educated on the use of internal and external memory aids/compensatory strategies to facilitate increased recall of routine, daily events, names, orientation to time, etc. (to be achieved in 1-2 weeks). -PARTIALLY MET/ONGOING    Patient reported completion of memory journal entries and requested more template handouts to for memory journal. Provided patient a written external memory aid of to recall to ask clinician for more handouts at the end of today's session. From visual prompting, patient was able to recall at the end of today's session to ask the clinician for more handouts.     Patient will complete auditory immediate and short-term memory tasks with 80% accuracy to facilitate increased ability to retell narratives and recall information within functional living environment (to be achieved in 4-6 weeks). -PARTIALLY MET/ONGOING      Immediate memory task: patient was given 4-5 words orally by clinician and had to recall the words in the order verbalized. Task completed  "in 9/11 opp (81% acc) independently, increasing to 11/11 opp (100% acc) from min cueing. Patient benefited from repetition and internal memory aid strategy of verbal rehearsal.       Patient will complete complex auditory attention processing tasks (e.g., sentence unscrambles, ranking numbers/words, etc.) with 80% accuracy to facilitate increased processing, storage, and retrieval of functional information (to be achieved in 4-6 weeks). -PARTIALLY MET/ONGOING    Alternating attention: In 1 minute intervals, patient was given different math equations alternating subtraction, multiplication, division, addition.     Trial 1: Correct: 14 Errors:0   Trial 2: Correct: 13 Errors: 0  Trial 3: Correct: 13 Errors: 0    Patient will be educated on word finding strategies (i.e., circumlocution) for improved generative naming and verbal expression skills (to be achieved in 1-2 weeks). -PARTIALLY MET/ONGOING     Patient will complete word generation tasks (e.g., verbal fluency, categorization, analogies, synonyms/antonyms, , etc.) with 80% accuracy using word finding strategies to facilitate improved word retrieval skills (to be achieved in 4-6 weeks). -PARTIALLY MET/ONGOING    Word-generation task: patient participated \"quicktionary\" where patient was given a set of 2 predetermined rules (word must contained a j and be associated with computers) and had to identify a word. Task completed in 8/11 opp (72% acc) independently, increasing to 11/11 opp (100% acc) from min-mod cueing. Patient benefited from semantic cues.          Plan:  -Patient was provided with home exercises/activities to target goals in plan of care at the end of today's session.  -Continue with current plan of care.    "

## 2024-03-25 NOTE — PROGRESS NOTES
Daily Speech Treatment Note    Today's date: 3/26/2024  Patient’s name: Paula Lubin  : 1945  MRN: [id number]  Safety measures: HTN, memory  Referring provider: Liv Mc MD    Encounter Diagnosis     ICD-10-CM    1. Other symbolic dysfunctions  R48.8       2. Late onset Alzheimer's disease without behavioral disturbance (HCC)  G30.1     F02.80               Visit tracking:  POC expires Unit limit Auth Expiration date PT/OT + Visit Limit?   24 N/a 24 8   3/28/24  6/24/24 8   24 N/a 24 8               Visit/Unit Tracking  AUTH Status:  Date   IE   RE 12/19 12/28 1/17 1/30 2/15  PU   RE 2/29 3/4  Needs auth 3/21     After 8 visits Used 1 2 3 4 5 6 5 1 2 3 4 5 6 7 8 1    Remaining  7 6 5 4 3 2 1 7 6 5 4 3 2 1 0 7     3/26                    2                    6                      Subjective/Behavioral:  -Patient brought memory journal for today's session!    Objective/Assessment:    Short-term goals:    Patient and caregiver will be educated on the use of internal and external memory aids/compensatory strategies to facilitate increased recall of routine, daily events, names, orientation to time, etc. (to be achieved in 1-2 weeks). -PARTIALLY MET/ONGOING    Completed today's memory journal entry with min verbal prompting to include today session in To Do List with time.      Patient will complete auditory immediate and short-term memory tasks with 80% accuracy to facilitate increased ability to retell narratives and recall information within functional living environment (to be achieved in 4-6 weeks). -PARTIALLY MET/ONGOING      Immediate memory task: patient was given 5 words orall and had to recall the word by attribute inclusion. Task completed in 5/6 opp (83% acc) independently, increasing to 6/6 opp (100% acc) from min cueing. Patient benefited from internal memory aid strategy of verbal repetition and requesting repetition.           Patient will complete complex auditory attention processing tasks (e.g., sentence unscrambles, ranking numbers/words, etc.) with 80% accuracy to facilitate increased processing, storage, and retrieval of functional information (to be achieved in 4-6 weeks). -PARTIALLY MET/ONGOING        Patient will be educated on word finding strategies (i.e., circumlocution) for improved generative naming and verbal expression skills (to be achieved in 1-2 weeks). -PARTIALLY MET/ONGOING     Patient will complete word generation tasks (e.g., verbal fluency, categorization, analogies, synonyms/antonyms, , etc.) with 80% accuracy using word finding strategies to facilitate improved word retrieval skills (to be achieved in 4-6 weeks). -PARTIALLY MET/ONGOING    Anagrams: Task completed in 10/20 opp (50% acc) independently, increasing to 20/20 opp (100% acc) from moderate cueing. Patient benefited from letters tiles, first letter cues, and additional processing time. Initially, clinician prompted to attempt to solve anagram without (independent scoring is representative of tiles)            Plan:  -Patient was provided with home exercises/activities to target goals in plan of care at the end of today's session.  -Continue with current plan of care.

## 2024-03-26 ENCOUNTER — OFFICE VISIT (OUTPATIENT)
Age: 79
End: 2024-03-26
Payer: COMMERCIAL

## 2024-03-26 DIAGNOSIS — R48.8 OTHER SYMBOLIC DYSFUNCTIONS: Primary | ICD-10-CM

## 2024-03-26 DIAGNOSIS — F02.80 LATE ONSET ALZHEIMER'S DISEASE WITHOUT BEHAVIORAL DISTURBANCE (HCC): ICD-10-CM

## 2024-03-26 DIAGNOSIS — G30.1 LATE ONSET ALZHEIMER'S DISEASE WITHOUT BEHAVIORAL DISTURBANCE (HCC): ICD-10-CM

## 2024-03-26 PROCEDURE — 97130 THER IVNTJ EA ADDL 15 MIN: CPT

## 2024-03-26 PROCEDURE — 97129 THER IVNTJ 1ST 15 MIN: CPT

## 2024-03-28 ENCOUNTER — APPOINTMENT (OUTPATIENT)
Age: 79
End: 2024-03-28
Payer: COMMERCIAL

## 2024-04-02 ENCOUNTER — OFFICE VISIT (OUTPATIENT)
Age: 79
End: 2024-04-02
Payer: COMMERCIAL

## 2024-04-02 DIAGNOSIS — G30.1 LATE ONSET ALZHEIMER'S DISEASE WITHOUT BEHAVIORAL DISTURBANCE (HCC): ICD-10-CM

## 2024-04-02 DIAGNOSIS — F02.80 LATE ONSET ALZHEIMER'S DISEASE WITHOUT BEHAVIORAL DISTURBANCE (HCC): ICD-10-CM

## 2024-04-02 DIAGNOSIS — R48.8 OTHER SYMBOLIC DYSFUNCTIONS: Primary | ICD-10-CM

## 2024-04-02 PROCEDURE — 97129 THER IVNTJ 1ST 15 MIN: CPT

## 2024-04-02 PROCEDURE — 97130 THER IVNTJ EA ADDL 15 MIN: CPT

## 2024-04-02 NOTE — PROGRESS NOTES
Daily Speech Treatment Note    Today's date: 2024  Patient’s name: Paula Lubin  : 1945  MRN: [id number]  Safety measures: HTN, memory  Referring provider: Liv Mc MD    Encounter Diagnosis     ICD-10-CM    1. Other symbolic dysfunctions  R48.8       2. Late onset Alzheimer's disease without behavioral disturbance (HCC)  G30.1     F02.80                 Visit tracking:  POC expires Unit limit Auth Expiration date PT/OT + Visit Limit?   24 N/a 24 8   3/28/24  6/24/24 8   24 N/a 24 8               Visit/Unit Tracking  AUTH Status:  Date   IE   RE 12/19 12/28 1/17 1/30 2/15  PU   RE 2/29 3/4  Needs auth 3/21     After 8 visits Used 1 2 3 4 5 6 5 1 2 3 4 5 6 7 8 1    Remaining  7 6 5 4 3 2 1 7 6 5 4 3 2 1 0 7     3/26 4/2                   2 3                   6 5                     Subjective/Behavioral:  -Patient was able to recall all the ingredients of beet relish! Patient could not recall all the items she had for Easter.    Objective/Assessment:    Short-term goals:    Patient and caregiver will be educated on the use of internal and external memory aids/compensatory strategies to facilitate increased recall of routine, daily events, names, orientation to time, etc. (to be achieved in 1-2 weeks). -PARTIALLY MET/ONGOING       Patient will complete auditory immediate and short-term memory tasks with 80% accuracy to facilitate increased ability to retell narratives and recall information within functional living environment (to be achieved in 4-6 weeks). -PARTIALLY MET/ONGOING           Patient will complete complex auditory attention processing tasks (e.g., sentence unscrambles, ranking numbers/words, etc.) with 80% accuracy to facilitate increased processing, storage, and retrieval of functional information (to be achieved in 4-6 weeks). -PARTIALLY MET/ONGOING    Numerical sequence task: Patient was given 1 step sequences. Patient  had to identify the sequence and complete the pattern. Task completed in 13/20 opp (65% acc) independently, increasing to 20/20 opp (100% acc) from min cueing. Patient benefited from review of sequence.    Working memory: patient was given a wheel of letters and paragraph. For each letter of the paragraph, patient to write the assocciated number for each letter (ex. A=1, B=2). Patient completed the 2.5 rows of the paragraph with min faded verbal cues of the directions. Patient benefited from verbal demonstration of task. Patient given the rest to complete for home exercises.     Patient will be educated on word finding strategies (i.e., circumlocution) for improved generative naming and verbal expression skills (to be achieved in 1-2 weeks). -PARTIALLY MET/ONGOING     Patient will complete word generation tasks (e.g., verbal fluency, categorization, analogies, synonyms/antonyms, , etc.) with 80% accuracy using word finding strategies to facilitate improved word retrieval skills (to be achieved in 4-6 weeks). -PARTIALLY MET/ONGOING            Plan:  -Patient was provided with home exercises/activities to target goals in plan of care at the end of today's session.  -Continue with current plan of care.

## 2024-04-03 NOTE — PROGRESS NOTES
Daily Speech Treatment Note    Today's date: 2024  Patient’s name: Paula Lubin  : 1945  MRN: [id number]  Safety measures: HTN, memory  Referring provider: Liv Mc MD    Encounter Diagnosis     ICD-10-CM    1. Other symbolic dysfunctions  R48.8       2. Late onset Alzheimer's disease without behavioral disturbance (HCC)  G30.1     F02.80           Visit tracking:  POC expires Unit limit Auth Expiration date PT/OT + Visit Limit?   24 N/a 24 8   3/28/24  6/24/24 8   24 N/a 24 8               Visit/Unit Tracking  AUTH Status:  Date   IE   RE 12/19 12/28 1/17 1/30 2/15  PU   RE 2/29 3/4  Needs auth 3/21     After 8 visits Used 1 2 3 4 5 6 5 1 2 3 4 5 6 7 8 1    Remaining  7 6 5 4 3 2 1 7 6 5 4 3 2 1 0 7     3/26 4/2 4/4                  2 3 4                  6 5 4                    Subjective/Behavioral:  -Patient was able to recall all the ingredients of beet relish! Patient could not recall all the items she had for Easter.    Objective/Assessment:    Short-term goals:    Patient and caregiver will be educated on the use of internal and external memory aids/compensatory strategies to facilitate increased recall of routine, daily events, names, orientation to time, etc. (to be achieved in 1-2 weeks)     Patient completed daily memory entries since last session!    Patient will complete auditory immediate and short-term memory tasks with 80% accuracy to facilitate increased ability to retell narratives and recall information within functional living environment (to be achieved in 4-6 weeks).            Patient will complete complex auditory attention processing tasks (e.g., sentence unscrambles, ranking numbers/words, etc.) with 80% accuracy to facilitate increased processing, storage, and retrieval of functional information (to be achieved in 4-6 weeks).       Patient will be educated on word finding strategies (i.e., circumlocution)  for improved generative naming and verbal expression skills (to be achieved in 1-2 weeks).    Patient was educated on word-finding strategies such as the Semantic Feature Analysis (SFA). Provided patient a handout of SFA and provided verbal model of how to utilize SFA. Provided education of the purpose of SFA which is to aid with word-finding and decrease communication breakdowns. Provided education to utilize SFA strategy in instances of anomia. Reciprocal comprehension noted.     Patient will complete word generation tasks (e.g., verbal fluency, categorization, analogies, synonyms/antonyms, , etc.) with 80% accuracy using word finding strategies to facilitate improved word retrieval skills (to be achieved in 4-6 weeks)    Word generation task: patient was given 4 scrambled syllables and had to rearrange syllable to generate the word. Task completed in 22/30 opp (73% acc) independently, increasing to 30/30 opp (% acc) from min cueing. Patient benefited from semantic cues.          Plan:  -Patient was provided with home exercises/activities to target goals in plan of care at the end of today's session.  -Continue with current plan of care.    34 mins of speech language therapy.

## 2024-04-04 ENCOUNTER — OFFICE VISIT (OUTPATIENT)
Age: 79
End: 2024-04-04
Payer: COMMERCIAL

## 2024-04-04 DIAGNOSIS — G30.1 LATE ONSET ALZHEIMER'S DISEASE WITHOUT BEHAVIORAL DISTURBANCE (HCC): ICD-10-CM

## 2024-04-04 DIAGNOSIS — R48.8 OTHER SYMBOLIC DYSFUNCTIONS: Primary | ICD-10-CM

## 2024-04-04 DIAGNOSIS — F02.80 LATE ONSET ALZHEIMER'S DISEASE WITHOUT BEHAVIORAL DISTURBANCE (HCC): ICD-10-CM

## 2024-04-04 PROCEDURE — 92507 TX SP LANG VOICE COMM INDIV: CPT

## 2024-04-09 ENCOUNTER — APPOINTMENT (OUTPATIENT)
Age: 79
End: 2024-04-09
Payer: COMMERCIAL

## 2024-04-09 NOTE — PROGRESS NOTES
Daily Speech Treatment Note    Today's date: 2024  Patient’s name: Paula Lubin  : 1945  MRN: [id number]  Safety measures: HTN, memory  Referring provider: Liv Mc MD    No diagnosis found.      Visit tracking:  POC expires Unit limit Auth Expiration date PT/OT + Visit Limit?   24 N/a 24 8   3/28/24  6/24/24 8   24 N/a 24 8               Visit/Unit Tracking  AUTH Status:  Date   IE   RE 12/19 12/28 1/17 1/30 2/15  PU   RE 2/29 3/4  Needs auth 3/21     After 8 visits Used 1 2 3 4 5 6 5 1 2 3 4 5 6 7 8 1    Remaining  7 6 5 4 3 2 1 7 6 5 4 3 2 1 0 7     3/26 4/2 4/                  2 3 4                  6 5 4                    Subjective/Behavioral:  -Patient was able to recall all the ingredients of beet relish! Patient could not recall all the items she had for Easter.    Objective/Assessment:    Short-term goals:    Patient and caregiver will be educated on the use of internal and external memory aids/compensatory strategies to facilitate increased recall of routine, daily events, names, orientation to time, etc. (to be achieved in 1-2 weeks)     Patient completed daily memory entries since last session!    Patient will complete auditory immediate and short-term memory tasks with 80% accuracy to facilitate increased ability to retell narratives and recall information within functional living environment (to be achieved in 4-6 weeks).            Patient will complete complex auditory attention processing tasks (e.g., sentence unscrambles, ranking numbers/words, etc.) with 80% accuracy to facilitate increased processing, storage, and retrieval of functional information (to be achieved in 4-6 weeks).       Patient will be educated on word finding strategies (i.e., circumlocution) for improved generative naming and verbal expression skills (to be achieved in 1-2 weeks).    Patient was educated on word-finding strategies such as the  Semantic Feature Analysis (SFA). Provided patient a handout of SFA and provided verbal model of how to utilize SFA. Provided education of the purpose of SFA which is to aid with word-finding and decrease communication breakdowns. Provided education to utilize SFA strategy in instances of anomia. Reciprocal comprehension noted.     Patient will complete word generation tasks (e.g., verbal fluency, categorization, analogies, synonyms/antonyms, , etc.) with 80% accuracy using word finding strategies to facilitate improved word retrieval skills (to be achieved in 4-6 weeks)    Word generation task: patient was given 4 scrambled syllables and had to rearrange syllable to generate the word. Task completed in 22/30 opp (73% acc) independently, increasing to 30/30 opp (% acc) from min cueing. Patient benefited from semantic cues.          Plan:  -Patient was provided with home exercises/activities to target goals in plan of care at the end of today's session.  -Continue with current plan of care.    34 mins of speech language therapy.

## 2024-04-11 ENCOUNTER — OFFICE VISIT (OUTPATIENT)
Age: 79
End: 2024-04-11
Payer: COMMERCIAL

## 2024-04-11 DIAGNOSIS — G30.1 LATE ONSET ALZHEIMER'S DISEASE WITHOUT BEHAVIORAL DISTURBANCE (HCC): ICD-10-CM

## 2024-04-11 DIAGNOSIS — R48.8 OTHER SYMBOLIC DYSFUNCTIONS: Primary | ICD-10-CM

## 2024-04-11 DIAGNOSIS — F02.80 LATE ONSET ALZHEIMER'S DISEASE WITHOUT BEHAVIORAL DISTURBANCE (HCC): ICD-10-CM

## 2024-04-11 PROCEDURE — 92507 TX SP LANG VOICE COMM INDIV: CPT

## 2024-04-11 NOTE — TELEPHONE ENCOUNTER
Called patients daughter and she stated Fredo Nunez has a rash on her chest and left side  She thinks she has a fever because "she feels warm"  I asked if she had a thermometer to take her temperature and they said they did but no battery  The incision is clean dry and intact  I spoke to Dr Nikos Gates and was told to have patient apply hydrocortisone cream, take tylenol as needed if she feels she has a fever, and a prednisone taper was ordered  Called patient's daughter back to discuss  [facility] stated understanding  All questions answered at this time  Statement Selected

## 2024-04-11 NOTE — PROGRESS NOTES
Daily Speech Treatment Note    Today's date: 2024  Patient’s name: Paula Lubin  : 1945  MRN: [id number]  Safety measures: HTN, memory  Referring provider: Liv Mc MD    Encounter Diagnosis     ICD-10-CM    1. Other symbolic dysfunctions  R48.8       2. Late onset Alzheimer's disease without behavioral disturbance (HCC)  G30.1     F02.80             Visit tracking:  POC expires Unit limit Auth Expiration date PT/OT + Visit Limit?   24 N/a 24 8   3/28/24  6/24/24 8   24 N/a 24 8               Visit/Unit Tracking  AUTH Status:  Date   IE   RE 12/19 12/28 1/17 1/30 2/15  PU   RE 2/29 3/4  Needs auth 3/21     After 8 visits Used 1 2 3 4 5 6 5 1 2 3 4 5 6 7 8 1    Remaining  7 6 5 4 3 2 1 7 6 5 4 3 2 1 0 7     3/26 4/2 4                 2 3 4 5                 6 5 4 3                   Subjective/Behavioral:  -Patient arrived late to today's session and did not bring memory journal.     Objective/Assessment:    Short-term goals:    Patient and caregiver will be educated on the use of internal and external memory aids/compensatory strategies to facilitate increased recall of routine, daily events, names, orientation to time, etc. (to be achieved in 1-2 weeks)      Patient will complete auditory immediate and short-term memory tasks with 80% accuracy to facilitate increased ability to retell narratives and recall information within functional living environment (to be achieved in 4-6 weeks).     Visual Memory: patient was given picture stimuli and given 45 seconds to review the picture. Clinician to remove picture and ask f/u questions regarding items in the picture. Task completed in 10/18 opp (55% acc) independently, increasing to 15/18 opp (83% acc) from mod cueing. Patient benefited from multiple choice.          Patient will complete complex auditory attention processing tasks (e.g., sentence unscrambles, ranking numbers/words,  etc.) with 80% accuracy to facilitate increased processing, storage, and retrieval of functional information (to be achieved in 4-6 weeks).     Completed coding sections from [employer]. Time: 4:37 min/secs Correct:85 Incorrect: 4. Noted errors were symbols opposite of one another. (Ex < & >)      Patient will be educated on word finding strategies (i.e., circumlocution) for improved generative naming and verbal expression skills (to be achieved in 1-2 weeks).         Patient will complete word generation tasks (e.g., verbal fluency, categorization, analogies, synonyms/antonyms, , etc.) with 80% accuracy using word finding strategies to facilitate improved word retrieval skills (to be achieved in 4-6 weeks)            Plan:  -Patient was provided with home exercises/activities to target goals in plan of care at the end of today's session.  -Continue with current plan of care.    28 mins of  speech language therapy

## 2024-04-16 ENCOUNTER — OFFICE VISIT (OUTPATIENT)
Age: 79
End: 2024-04-16
Payer: COMMERCIAL

## 2024-04-16 DIAGNOSIS — G30.1 LATE ONSET ALZHEIMER'S DISEASE WITHOUT BEHAVIORAL DISTURBANCE (HCC): ICD-10-CM

## 2024-04-16 DIAGNOSIS — Z17.0 MALIGNANT NEOPLASM OF OVERLAPPING SITES OF RIGHT BREAST IN FEMALE, ESTROGEN RECEPTOR POSITIVE (HCC): ICD-10-CM

## 2024-04-16 DIAGNOSIS — F02.80 LATE ONSET ALZHEIMER'S DISEASE WITHOUT BEHAVIORAL DISTURBANCE (HCC): ICD-10-CM

## 2024-04-16 DIAGNOSIS — C50.811 MALIGNANT NEOPLASM OF OVERLAPPING SITES OF RIGHT BREAST IN FEMALE, ESTROGEN RECEPTOR POSITIVE (HCC): ICD-10-CM

## 2024-04-16 DIAGNOSIS — R48.8 OTHER SYMBOLIC DYSFUNCTIONS: Primary | ICD-10-CM

## 2024-04-16 PROCEDURE — 97130 THER IVNTJ EA ADDL 15 MIN: CPT

## 2024-04-16 PROCEDURE — 97129 THER IVNTJ 1ST 15 MIN: CPT

## 2024-04-16 RX ORDER — ANASTROZOLE 1 MG/1
1 TABLET ORAL DAILY
Qty: 90 TABLET | Refills: 1 | Status: SHIPPED | OUTPATIENT
Start: 2024-04-16

## 2024-04-16 NOTE — PROGRESS NOTES
Daily Speech Treatment Note    Today's date: 2024  Patient’s name: Paula Lubin  : 1945  MRN: [id number]  Safety measures: HTN, memory  Referring provider: Liv Mc MD    Encounter Diagnosis     ICD-10-CM    1. Other symbolic dysfunctions  R48.8       2. Late onset Alzheimer's disease without behavioral disturbance (HCC)  G30.1     F02.80           Visit tracking:  POC expires Unit limit Auth Expiration date PT/OT + Visit Limit?   24 N/a 24 8   3/28/24  6/24/24 8   24 N/a 24 8               Visit/Unit Tracking  AUTH Status:  Date   IE   RE 12/19 12/28 1/17 1/30 2/15  PU   RE 2/29 3/4  Needs auth 3/21     After 8 visits Used 1 2 3 4 5 6 5 1 2 3 4 5 6 7 8 1    Remaining  7 6 5 4 3 2 1 7 6 5 4 3 2 1 0 7     3/26 4/ 4/4                 2 3 4 5 6                6 5 4 3 2                  Subjective/Behavioral:  -Patient reported no new concerns for today's session.      Objective/Assessment:    Short-term goals:    Patient and caregiver will be educated on the use of internal and external memory aids/compensatory strategies to facilitate increased recall of routine, daily events, names, orientation to time, etc. (to be achieved in 1-2 weeks)    Patient adequately completed daily journal entries since last visit!      Patient will complete auditory immediate and short-term memory tasks with 80% accuracy to facilitate increased ability to retell narratives and recall information within functional living environment (to be achieved in 4-6 weeks).     Immediate memory- patient was given 10 color coding associations (ex. red-stop sign) and had to recall associations by 5x verbal repetition/rehearsal strategy with clinician. Patient to recall 10 pairs independently. Task completed in 7/10 opp (70% acc) independently, increasing to 10/10 opp (100% acc) from min cueing. Patient benefited from verbal repetition strategy & first word of  pair. Distraction task completed below.         Recent memory:  Patient to recall words from above with 20+ minutes in between and had to list 10 pairs.  Task completed in 6/10 opp (60% acc) independently, increasing to 10/10 opp (100% acc) from min cueing. Patient benefited from  first word cues of pairs & semantic cues.     Patient will complete complex auditory attention processing tasks (e.g., sentence unscrambles, ranking numbers/words, etc.) with 80% accuracy to facilitate increased processing, storage, and retrieval of functional information (to be achieved in 4-6 weeks).     Math decoding task: patient was given a chain of line segments comprised of symbols that correlated to a certain math value (ex. @= subtract 2). For each line segment patient to follow the direction of the symbol and by the end of the chain patient should achieve a target number. Task completed with 3 errors. Patient required min-mod verbal prompting for task. Patient benefited from verbalizing the steps (internal memory aid strategy) & clinician writing target numbers in certain spots to track if any errors were made.       Patient will be educated on word finding strategies (i.e., circumlocution) for improved generative naming and verbal expression skills (to be achieved in 1-2 weeks).         Patient will complete word generation tasks (e.g., verbal fluency, categorization, analogies, synonyms/antonyms, , etc.) with 80% accuracy using word finding strategies to facilitate improved word retrieval skills (to be achieved in 4-6 weeks)            Plan:  -Patient was provided with home exercises/activities to target goals in plan of care at the end of today's session.  -Continue with current plan of care.    15 mins of cog therapy, 30 mins of additional cog therapy

## 2024-04-17 NOTE — PROGRESS NOTES
Speech-Language Pathology Re-Evaluation    Today's date: 2024   Patient’s name: Paula Lubin  : 1945  MRN: [id number]  Safety measures: HTN, memory  Referring provider: Liv Mc MD    Encounter Diagnosis     ICD-10-CM    1. Other symbolic dysfunctions  R48.8       2. Late onset Alzheimer's disease without behavioral disturbance (HCC)  G30.1     F02.80           Assessment: Patient continues to present with moderate-severe cognitive-linguistic deficits c/b decreased orientation, awareness of diagnosis, short-term memory of words/stories/conversations, decreased processing speed, difficulties following multi-step directions, executive functioning, higher level language tasks, and verbal expression skills secondary to Late onset Alzheimer' disease. Patient was re-administered CLQT (IE: 23). Patient improved in Attention, Memory, Executive Functioning, and Visuospatial Skills sections. Patient maintained her scores for Language and Clock Drawing. Patient improved by only having 1 section this administration falling below the cut score (IE: 3 below cut scores which is indicative of lower scores). It is suspected patient has achieved maximum potential for this test. It is suspected that patient would benefit from continuation of outpatient skilled Speech Therapy services to target goals to maintain her current level of cognitive-linguistic functioning. Patient and daughter were in agreement with this plan. New goals were added to patient’s plan of care to reflect the shift from a restorative approach to a maintenance approach. Solely, an HEP is not appropriate at this time as he continues to require the guidance and clinician decision making from a skilled clinician. Cody Wooten (2013) is a [place] District Court decision that clarified that Medicare will in fact cover skilled therapy services to maintain a patient’s current condition or prevent/slow further deterioration.   Without the skills of a clinician providing these necessary services, patient’s status may decline (e.g., decreased functional recall, reduced attempts with verbal expression, decreased comprehension, socially withdrawn, reduced quality of life, decreased safety, increased frustration, caregiver burden).          Short-term Goals:  Patient and caregiver will be educated on the use of internal and external memory aids/compensatory strategies to facilitate increased recall of routine, daily events, names, orientation to time, etc. (to be achieved in 1-2 weeks). -MET     Patient will complete auditory immediate and short-term memory tasks with 80% accuracy to facilitate increased ability to retell narratives and recall information within functional living environment (to be achieved in 4-6 weeks). -PARTIALLY MET/MAX POTENTIAL MET DC     Patient will complete complex auditory attention processing tasks (e.g., sentence unscrambles, ranking numbers/words, etc.) with 80% accuracy to facilitate increased processing, storage, and retrieval of functional information (to be achieved in 4-6 weeks).-PARTIALLY MET/MAX POTENTIAL MET DC     Patient will complete thought organization tasks (e.g., sequencing, deduction puzzles, etc.) with 80% accuracy to facilitate increased executive functioning skills (to be achieved in 4-6 weeks). -PARTIALLY MET/MAX POTENTIAL MET DC     Patient will be educated on word finding strategies (i.e., circumlocution) for improved generative naming and verbal expression skills (to be achieved in 1-2 weeks). -MET     Patient will complete word generation tasks (e.g., verbal fluency, categorization, analogies, synonyms/antonyms, , etc.) with 80% accuracy using word finding strategies to facilitate improved word retrieval skills (to be achieved in 4-6 weeks).-PARTIALLY MET/MAX POTENTIAL MET DC    NEW STG:    Patient will complete cognitive-linguistic activities (e.g., verbal and visual problem-solving  "scenarios, sequencing, reasoning, attention, organization, etc.) at 90% accuracy with min cues from clinician as needed to target the maintenance of his cognitive-linguistic functioning, as well as promote safety and overall QOL.    Patient and caregiver will provide adequate comprehension and knowledge of internal/external memory aid strategies with min cues from clinician needed to target the maintenance of his cognitive-linguistic functioning, as well as promote safety and overall QOL.     Long-term goals:  Patient will demonstrate cognitive-linguistic skills consistent with age and education given use of compensatory strategies when needed to resume baseline activities and responsibilities in home and community settings (to be achieved by discharge). -PARTIALLY MET/MAX POTENTIAL MET DC     Patient will demonstrate adequate verbal expression during conversation with fewer than 2 breakdowns or word finding deficits provided the use of compensatory strategies (to be achieved by discharge). -MET    New LTG:   Patient will maintain her highest level of independence with cognitive-linguistic functioning provided the implementation of compensatory strategies and cues from caregivers to promote positive communication interactions and socialization, participation in meaningful activities, safety, and quality of life (to be achieved by discharge).     Patient and daughter will maintain knowledge of internal/external memory strategies and implement into ADLs to promote positive communication interactions and socialization, participation in meaningful activities, safety, and quality of life (to be achieved by discharge).       Plan  Patient would benefit from outpatient skilled Speech Therapy services: Maintenance therapy program    Frequency: 1x weekly  Duration: 2 months    Intervention certification from: 4/18/2024  Intervention certification to: 6/18/2024      Subjective  Patient's goal(s): \"to maintain memory\" "       Objective (testing)  The Cognitive Linguistic Quick Test (CLQT) is designed to measure an individual’s five cognitive domains (attention, memory, executive functions, language, and visuospatial skills). This norm-referenced tool has been standardized on adults ages 18 through 89 years old with neurological impairment as a result of CVA, TBI, or dementia. The following results were obtained during the administration of the assessment.    Cognitive Domain: Score: Range of Severity: IE: Status:   Attention 179/215  IMPROVEMENT   Memory 134/185 Mild 133 IMPROVEMENT 1 point increase   Executive Functions 25/40 WNL 18 IMPROVEMENT   Language  26/37 Mild 26 NO CHANGE   Visuospatial Skills  83/105 WNL 75 IMPROVEMENT          *Composite Severity Rating: 3/4.0  Mild 3.4 NO CHANGE                 Clock Drawin/13 Mild 11 NO CHANGE     “IE” indicates the scores from the initial evaluation (2023).      Patient scored below Criterion Cut Scores on the following subtests: Design Generation.      *Patient named 13 concrete category members (animals) in 60 sec (norm=15+). -- BELOW AVERAGE IMPROVEMENT FROM IE    *Patient named 6 abstract category members (words beginning with letter 'm') in 60 sec (norm=10+). -- BELOW AVERAGE IMPROVEMENT FROM IE    Visit tracking:  POC expires Unit limit Auth Expiration date PT/OT + Visit Limit?   24 N/a 24 8   3/28/24  6/24/24 8   24 N/a 24 8               Visit/Unit Tracking  AUTH Status:  Date   IE   RE 12/19 12/28 1/17 1/30 2/15  PU   RE 2/29 3/4  Needs auth 3/21     After 8 visits Used 1 2 3 4 5 6 5 1 2 3 4 5 6 7 8 1    Remaining  7 6 5 4 3 2 1 7 6 5 4 3 2 1 0 7     3/26 4/ 4/  RE               2 3 4 5 6 7               6 5 4 3 2 1                   Intervention comments:  45 mins of standard cog testing, 60 mins of scoring, write-up, modification of POC

## 2024-04-18 ENCOUNTER — EVALUATION (OUTPATIENT)
Age: 79
End: 2024-04-18
Payer: COMMERCIAL

## 2024-04-18 DIAGNOSIS — R48.8 OTHER SYMBOLIC DYSFUNCTIONS: Primary | ICD-10-CM

## 2024-04-18 DIAGNOSIS — G30.1 LATE ONSET ALZHEIMER'S DISEASE WITHOUT BEHAVIORAL DISTURBANCE (HCC): ICD-10-CM

## 2024-04-18 DIAGNOSIS — F02.80 LATE ONSET ALZHEIMER'S DISEASE WITHOUT BEHAVIORAL DISTURBANCE (HCC): ICD-10-CM

## 2024-04-18 PROCEDURE — 96125 COGNITIVE TEST BY HC PRO: CPT

## 2024-04-23 ENCOUNTER — OFFICE VISIT (OUTPATIENT)
Age: 79
End: 2024-04-23
Payer: COMMERCIAL

## 2024-04-23 DIAGNOSIS — F02.80 LATE ONSET ALZHEIMER'S DISEASE WITHOUT BEHAVIORAL DISTURBANCE (HCC): ICD-10-CM

## 2024-04-23 DIAGNOSIS — G30.1 LATE ONSET ALZHEIMER'S DISEASE WITHOUT BEHAVIORAL DISTURBANCE (HCC): ICD-10-CM

## 2024-04-23 DIAGNOSIS — R48.8 OTHER SYMBOLIC DYSFUNCTIONS: Primary | ICD-10-CM

## 2024-04-23 PROCEDURE — 97130 THER IVNTJ EA ADDL 15 MIN: CPT

## 2024-04-23 PROCEDURE — 97129 THER IVNTJ 1ST 15 MIN: CPT

## 2024-04-23 NOTE — PROGRESS NOTES
Daily Speech Treatment Note  (Skilled Maintenance Program)    Today's date: 2024   Patient’s name: Paula Lubin  : 1945  MRN: [id number]  Safety measures: HTN, memory  Referring provider: Liv Mc MD    Encounter Diagnosis     ICD-10-CM    1. Other symbolic dysfunctions  R48.8       2. Late onset Alzheimer's disease without behavioral disturbance (HCC)  G30.1     F02.80         Visit Tracking:  POC expires Unit limit Auth Expiration date PT/OT + Visit Limit?   24 N/a 24 8   3/28/24  6/24/24 8   24 N/a 24 8               Visit/Unit Tracking  AUTH Status:  Date   IE   RE 12/19 12/28 1/17 1/30 2/15  PU   RE 2/29 3/4  Needs auth 3/21     After 8 visits Used 1 2 3 4 5 6 5 1 2 3 4 5 6 7 8 1    Remaining  7 6 5 4 3 2 1 7 6 5 4 3 2 1 0 7     3/26 4/ 4/4   RE               2 3 4 5 6 7 8              6 5 4 3 2 1 0                Subjective/Behavioral:  -Patient reported no new concerns for today's session.      Objective/Assessment:  -Reviewed testing results and goals in plan care with patient. Patient is in agreement at this time. Reviewed with patient that POC is a maintenance program and will not be restorative anymore. Patient was in agreement.     Short-term goals:  Patient will complete cognitive-linguistic activities (e.g., verbal and visual problem-solving scenarios, sequencing, reasoning, attention, organization, etc.) at 90% accuracy with min cues from clinician as needed to target the maintenance of his cognitive-linguistic functioning, as well as promote safety and overall QOL.     Immediate Memory task: patient was given 3 words and had to rank the words in chronological order. Task completed in 9/ 10 opp ( 90% acc) independently, increasing to 10/10 opp (100% acc) from min cueing. Patient benefited from repetition and verbal rehearsal strategy.      Listing Location Items: patient was given a location (baked  "things in a restaurant and had to list 5 items that would be associated with that location. Task completed in 9/10 opp (90% acc) independently, increasing to 10/10 opp (% acc) from min cueing. Patient benefited from semantic cues.       Clock drawing: Reviewed with patient errors completed on the clock drawing task (not differentiating the difference between minute/hour hands, spacing issues with numbers on clock, and incorrect time). Provided education if patient does not distinguish the difference of size of minute and hour hand that others will be unable to interpret time. Provided patient an clock face and requested her to fill in the numbers. Once completed, clinician utilized gestures to indicate how her spacing of the numbers were incorrect. Clinician gave the patient the strategy to implement each quarter and then fill in the numbers in between the quarters to improve with spacing. Provided education that if spacing is off while reading a clock then time may be incorrect. Patient completed with 100% acc. Patient was then given task to set the time to \"10 minutes till 11.\" Patient incorrectly transcribed the numbers and hours and minute hand were the same size. Clinician reviewed what the number 10 minutes till 11 would be and utilizing verbal prompting how to determine what time that would be (10:50). With moderate verbal and visual prompting/gestures, patient completed the accurate time and size difference of hands. Patient was given different sets of times and to transcribe the times into the clock face with accurate min/hour hands sizes and correct time. Task completed in 3/5 opp (60% acc) independently, increasing to 5/5 opp (100% acc) from min-mod cueing. Patient benefited from verbal prompting to distinguish times & determine the time first then draw the hands. Verbally reminded patient hands originate from the center.      Patient and caregiver will provide adequate comprehension and knowledge of " internal/external memory aid strategies with min cues from clinician needed to target the maintenance of his cognitive-linguistic functioning, as well as promote safety and overall QOL.    Plan:  -Patient was provided with home exercises/activities to target goals in plan of care at the end of today's session.  -Continue with current plan of care.    Patient will benefit from continuation of outpatient skilled Speech Therapy services to target goals to maintain her current level of speech-language functioning, prevent functional decline due to progressive neurologic disorder, and minimize the risk of reduced attempts with verbal expression, decreased comprehension, social withdrawal, reduced quality of life, decreased safety, increased frustration, caregiver burden.     Intervention comments:  15 mins of cog tx, 30 mins of additional cog tx

## 2024-04-25 ENCOUNTER — APPOINTMENT (OUTPATIENT)
Age: 79
End: 2024-04-25
Payer: COMMERCIAL

## 2024-04-29 DIAGNOSIS — K21.9 GASTROESOPHAGEAL REFLUX DISEASE WITHOUT ESOPHAGITIS: ICD-10-CM

## 2024-04-29 DIAGNOSIS — K44.9 HIATAL HERNIA: ICD-10-CM

## 2024-04-30 ENCOUNTER — NURSE TRIAGE (OUTPATIENT)
Age: 79
End: 2024-04-30

## 2024-04-30 ENCOUNTER — LAB (OUTPATIENT)
Dept: LAB | Facility: HOSPITAL | Age: 79
End: 2024-04-30
Attending: INTERNAL MEDICINE
Payer: COMMERCIAL

## 2024-04-30 DIAGNOSIS — R39.9 UTI SYMPTOMS: ICD-10-CM

## 2024-04-30 DIAGNOSIS — R39.9 UTI SYMPTOMS: Primary | ICD-10-CM

## 2024-04-30 LAB
BACTERIA UR QL AUTO: ABNORMAL /HPF
BILIRUB UR QL STRIP: NEGATIVE
BUDDING YEAST: PRESENT
CLARITY UR: ABNORMAL
COLOR UR: YELLOW
GLUCOSE UR STRIP-MCNC: NEGATIVE MG/DL
HGB UR QL STRIP.AUTO: ABNORMAL
KETONES UR STRIP-MCNC: NEGATIVE MG/DL
LEUKOCYTE ESTERASE UR QL STRIP: ABNORMAL
MUCOUS THREADS UR QL AUTO: ABNORMAL
NITRITE UR QL STRIP: POSITIVE
NON-SQ EPI CELLS URNS QL MICRO: ABNORMAL /HPF
PH UR STRIP.AUTO: 7 [PH]
PROT UR STRIP-MCNC: ABNORMAL MG/DL
RBC #/AREA URNS AUTO: ABNORMAL /HPF
SP GR UR STRIP.AUTO: 1.02 (ref 1–1.03)
UROBILINOGEN UR STRIP-ACNC: <2 MG/DL
WBC #/AREA URNS AUTO: ABNORMAL /HPF
WBC CLUMPS # UR AUTO: PRESENT /UL

## 2024-04-30 PROCEDURE — 87086 URINE CULTURE/COLONY COUNT: CPT

## 2024-04-30 PROCEDURE — 81001 URINALYSIS AUTO W/SCOPE: CPT

## 2024-04-30 PROCEDURE — 87077 CULTURE AEROBIC IDENTIFY: CPT

## 2024-04-30 PROCEDURE — 87186 SC STD MICRODIL/AGAR DIL: CPT

## 2024-04-30 RX ORDER — OMEPRAZOLE 20 MG/1
20 CAPSULE, DELAYED RELEASE ORAL 2 TIMES DAILY
Qty: 180 CAPSULE | Refills: 0 | Status: SHIPPED | OUTPATIENT
Start: 2024-04-30

## 2024-04-30 RX ORDER — NITROFURANTOIN 25; 75 MG/1; MG/1
100 CAPSULE ORAL 2 TIMES DAILY
Qty: 14 CAPSULE | Refills: 0 | Status: SHIPPED | OUTPATIENT
Start: 2024-04-30 | End: 2024-05-07

## 2024-04-30 NOTE — TELEPHONE ENCOUNTER
"Pt states she has a h/o frequent UTI.  States she hasn't had one in awhile.  States she is experiencing burning and frequency since yesterday.  Pt offered appt with available provider and pt declined.  Pt would like to know if PCP could prescribe antibiotics to go to the [facility] in [place].  Please review and advise how pt should proceed.    Reason for Disposition   Urinating more frequently than usual (i.e., frequency)    Answer Assessment - Initial Assessment Questions  1. SYMPTOM: \"What's the main symptom you're concerned about?\" (e.g., frequency, incontinence)      Burning and frequency when urination    2. ONSET: \"When did the  symptoms  start?\"      Yesterday    3. PAIN: \"Is there any pain?\" If Yes, ask: \"How bad is it?\" (Scale: 1-10; mild, moderate, severe)      9/10    4. CAUSE: \"What do you think is causing the symptoms?\"      Unsure    5. OTHER SYMPTOMS: \"Do you have any other symptoms?\" (e.g., fever, flank pain, blood in urine, pain with urination)      Denies    Protocols used: Urinary Symptoms-ADULT-[place]    "

## 2024-04-30 NOTE — TELEPHONE ENCOUNTER
Patient's daughter Sarah is calling, requesting medication for UTI, stating she can't sleep at night  , burning. Wants to know if meds can be sent or would she needs to get labs done first.

## 2024-05-01 DIAGNOSIS — I10 ESSENTIAL HYPERTENSION: ICD-10-CM

## 2024-05-01 RX ORDER — AMLODIPINE BESYLATE 2.5 MG/1
5 TABLET ORAL DAILY
Qty: 180 TABLET | Refills: 1 | Status: SHIPPED | OUTPATIENT
Start: 2024-05-01

## 2024-05-02 LAB — BACTERIA UR CULT: ABNORMAL

## 2024-05-06 ENCOUNTER — TELEPHONE (OUTPATIENT)
Dept: NEUROLOGY | Facility: CLINIC | Age: 79
End: 2024-05-06

## 2024-05-10 NOTE — PROGRESS NOTES
Daily Speech Treatment Note  (Skilled Maintenance Program)    Today's date: 2024   Patient’s name: Paula Lubin  : 1945  MRN: [id number]  Safety measures: HTN, memory  Referring provider: Liv Mc MD    Encounter Diagnosis     ICD-10-CM    1. Other symbolic dysfunctions  R48.8       2. Late onset Alzheimer's disease without behavioral disturbance (HCC)  G30.1     F02.80           Visit Tracking:  POC expires Unit limit Auth Expiration date PT/OT + Visit Limit?   24 N/a 24 8   3/28/24  6/24/24 8   24 N/a 24 8    Auth pending           Visit/Unit Tracking  AUTH Status:  Date   IE   RE 12/19 12/28 1/17 1/30 2/15  PU   RE 2/29 3/4  Needs auth 3/21     After 8 visits Used 1 2 3 4 5 6 5 1 2 3 4 5 6 7 8 1    Remaining  7 6 5 4 3 2 1 7 6 5 4 3 2 1 0 7     3/26 4/ 4/4   RE              2 3 4 5 6 7 8 Auth pending 9             6 5 4 3 2 1 0                Subjective/Behavioral:  -Patient reported no new concerns for today's session.      Objective/Assessment:  Reviewed with patient that POC is a maintenance program and will not be restorative anymore. Patient was in agreement.     Short-term goals:  Patient will complete cognitive-linguistic activities (e.g., verbal and visual problem-solving scenarios, sequencing, reasoning, attention, organization, etc.) at 90% accuracy with min cues from clinician as needed to target the maintenance of his cognitive-linguistic functioning, as well as promote safety and overall QOL.    Word problem with picture stimuli task: patient was given a picture of different prices on a gasoline pump and given questions regarding the numerical values on the gasoline pump. Task completed in 1/5 opp (20% acc) independently, increasing to 3/5 opp (60% acc) from max cueing. Patient benefited from highlighting key terms in picture stimuli, review of math problems, and gestures/review of information  in picture.     Adding to concrete category: Task completed in 10/20 opp (50% acc) independently, increasing to 16/20 opp (80% acc) from moderate cueing. Patient benefited from semantic and category cues.      Patient and caregiver will provide adequate comprehension and knowledge of internal/external memory aid strategies with min cues from clinician needed to target the maintenance of his cognitive-linguistic functioning, as well as promote safety and overall QOL.    Reviewed external memory aid strategy of daily schedule. Patient required verbal prompting for today's date and include all the tasks she's completed thus far.    Plan:  -Patient was provided with home exercises/activities to target goals in plan of care at the end of today's session.  -Continue with current plan of care.    Patient will benefit from continuation of outpatient skilled Speech Therapy services to target goals to maintain her current level of speech-language functioning, prevent functional decline due to progressive neurologic disorder, and minimize the risk of reduced attempts with verbal expression, decreased comprehension, social withdrawal, reduced quality of life, decreased safety, increased frustration, caregiver burden.     Intervention comments:  15 mins of cog tx, 27 mins of additional cog tx

## 2024-05-13 ENCOUNTER — OFFICE VISIT (OUTPATIENT)
Age: 79
End: 2024-05-13
Payer: COMMERCIAL

## 2024-05-13 DIAGNOSIS — R48.8 OTHER SYMBOLIC DYSFUNCTIONS: Primary | ICD-10-CM

## 2024-05-13 DIAGNOSIS — F02.80 LATE ONSET ALZHEIMER'S DISEASE WITHOUT BEHAVIORAL DISTURBANCE (HCC): ICD-10-CM

## 2024-05-13 DIAGNOSIS — G30.1 LATE ONSET ALZHEIMER'S DISEASE WITHOUT BEHAVIORAL DISTURBANCE (HCC): ICD-10-CM

## 2024-05-13 PROCEDURE — 97130 THER IVNTJ EA ADDL 15 MIN: CPT

## 2024-05-13 PROCEDURE — 97129 THER IVNTJ 1ST 15 MIN: CPT

## 2024-05-15 ENCOUNTER — TELEPHONE (OUTPATIENT)
Dept: NEUROLOGY | Facility: CLINIC | Age: 79
End: 2024-05-15

## 2024-05-15 NOTE — TELEPHONE ENCOUNTER
LVM for patient to reschedule 6/5 appointment as the provider will not be in the office. Offered 6/3 and 6/4.

## 2024-05-21 ENCOUNTER — APPOINTMENT (EMERGENCY)
Dept: RADIOLOGY | Facility: HOSPITAL | Age: 79
End: 2024-05-21
Payer: COMMERCIAL

## 2024-05-21 ENCOUNTER — HOSPITAL ENCOUNTER (EMERGENCY)
Facility: HOSPITAL | Age: 79
Discharge: HOME | End: 2024-05-21
Attending: EMERGENCY MEDICINE
Payer: COMMERCIAL

## 2024-05-21 ENCOUNTER — APPOINTMENT (OUTPATIENT)
Age: 79
End: 2024-05-21
Payer: COMMERCIAL

## 2024-05-21 VITALS
SYSTOLIC BLOOD PRESSURE: 133 MMHG | RESPIRATION RATE: 17 BRPM | DIASTOLIC BLOOD PRESSURE: 65 MMHG | WEIGHT: 176.37 LBS | OXYGEN SATURATION: 93 % | HEIGHT: 61 IN | HEART RATE: 76 BPM | BODY MASS INDEX: 33.3 KG/M2 | TEMPERATURE: 98.4 F

## 2024-05-21 DIAGNOSIS — R06.09 DYSPNEA ON EXERTION: Primary | ICD-10-CM

## 2024-05-21 DIAGNOSIS — R06.02 SHORTNESS OF BREATH: ICD-10-CM

## 2024-05-21 DIAGNOSIS — J81.0 ACUTE PULMONARY EDEMA (HCC): ICD-10-CM

## 2024-05-21 LAB
ALBUMIN SERPL BCP-MCNC: 3.9 G/DL (ref 3.5–5)
ALP SERPL-CCNC: 70 U/L (ref 34–104)
ALT SERPL W P-5'-P-CCNC: 9 U/L (ref 7–52)
ANION GAP SERPL CALCULATED.3IONS-SCNC: 10 MMOL/L (ref 4–13)
AST SERPL W P-5'-P-CCNC: 16 U/L (ref 13–39)
BASOPHILS # BLD MANUAL: 0.05 THOUSAND/UL (ref 0–0.1)
BASOPHILS NFR MAR MANUAL: 1 % (ref 0–1)
BILIRUB SERPL-MCNC: 0.44 MG/DL (ref 0.2–1)
BNP SERPL-MCNC: 37 PG/ML (ref 0–100)
BUN SERPL-MCNC: 16 MG/DL (ref 5–25)
CALCIUM SERPL-MCNC: 9.1 MG/DL (ref 8.4–10.2)
CARDIAC TROPONIN I PNL SERPL HS: 4 NG/L
CHLORIDE SERPL-SCNC: 104 MMOL/L (ref 96–108)
CO2 SERPL-SCNC: 26 MMOL/L (ref 21–32)
CREAT SERPL-MCNC: 0.72 MG/DL (ref 0.6–1.3)
EOSINOPHIL # BLD MANUAL: 0.15 THOUSAND/UL (ref 0–0.4)
EOSINOPHIL NFR BLD MANUAL: 3 % (ref 0–6)
ERYTHROCYTE [DISTWIDTH] IN BLOOD BY AUTOMATED COUNT: 13.4 % (ref 11.6–15.1)
GFR SERPL CREATININE-BSD FRML MDRD: 79 ML/MIN/1.73SQ M
GLUCOSE SERPL-MCNC: 83 MG/DL (ref 65–140)
HCT VFR BLD AUTO: 35.4 % (ref 34.8–46.1)
HGB BLD-MCNC: 12.1 G/DL (ref 11.5–15.4)
LYMPHOCYTES # BLD AUTO: 0.79 THOUSAND/UL (ref 0.6–4.47)
LYMPHOCYTES # BLD AUTO: 16 % (ref 14–44)
MACROCYTES BLD QL AUTO: PRESENT
MCH RBC QN AUTO: 37.5 PG (ref 26.8–34.3)
MCHC RBC AUTO-ENTMCNC: 34.2 G/DL (ref 31.4–37.4)
MCV RBC AUTO: 110 FL (ref 82–98)
MONOCYTES # BLD AUTO: 0.64 THOUSAND/UL (ref 0–1.22)
MONOCYTES NFR BLD: 13 % (ref 4–12)
NEUTROPHILS # BLD MANUAL: 3.32 THOUSAND/UL (ref 1.85–7.62)
NEUTS SEG NFR BLD AUTO: 67 % (ref 43–75)
PLATELET # BLD AUTO: 255 THOUSANDS/UL (ref 149–390)
PLATELET BLD QL SMEAR: ADEQUATE
PMV BLD AUTO: 12 FL (ref 8.9–12.7)
POTASSIUM SERPL-SCNC: 3.5 MMOL/L (ref 3.5–5.3)
PROT SERPL-MCNC: 6.7 G/DL (ref 6.4–8.4)
RBC # BLD AUTO: 3.23 MILLION/UL (ref 3.81–5.12)
RBC MORPH BLD: PRESENT
SODIUM SERPL-SCNC: 140 MMOL/L (ref 135–147)
WBC # BLD AUTO: 4.96 THOUSAND/UL (ref 4.31–10.16)

## 2024-05-21 PROCEDURE — 84484 ASSAY OF TROPONIN QUANT: CPT | Performed by: EMERGENCY MEDICINE

## 2024-05-21 PROCEDURE — 80053 COMPREHEN METABOLIC PANEL: CPT | Performed by: EMERGENCY MEDICINE

## 2024-05-21 PROCEDURE — 85027 COMPLETE CBC AUTOMATED: CPT | Performed by: EMERGENCY MEDICINE

## 2024-05-21 PROCEDURE — 85007 BL SMEAR W/DIFF WBC COUNT: CPT | Performed by: EMERGENCY MEDICINE

## 2024-05-21 PROCEDURE — 99285 EMERGENCY DEPT VISIT HI MDM: CPT | Performed by: EMERGENCY MEDICINE

## 2024-05-21 PROCEDURE — 71046 X-RAY EXAM CHEST 2 VIEWS: CPT

## 2024-05-21 PROCEDURE — 93005 ELECTROCARDIOGRAM TRACING: CPT

## 2024-05-21 PROCEDURE — 96374 THER/PROPH/DIAG INJ IV PUSH: CPT

## 2024-05-21 PROCEDURE — 36415 COLL VENOUS BLD VENIPUNCTURE: CPT | Performed by: EMERGENCY MEDICINE

## 2024-05-21 PROCEDURE — 99285 EMERGENCY DEPT VISIT HI MDM: CPT

## 2024-05-21 PROCEDURE — 83880 ASSAY OF NATRIURETIC PEPTIDE: CPT | Performed by: EMERGENCY MEDICINE

## 2024-05-21 RX ORDER — FUROSEMIDE 40 MG/1
40 TABLET ORAL DAILY
Qty: 20 TABLET | Refills: 0 | Status: SHIPPED | OUTPATIENT
Start: 2024-05-21 | End: 2024-05-31

## 2024-05-21 RX ORDER — POTASSIUM CHLORIDE 20 MEQ/1
40 TABLET, EXTENDED RELEASE ORAL ONCE
Status: COMPLETED | OUTPATIENT
Start: 2024-05-21 | End: 2024-05-21

## 2024-05-21 RX ORDER — FUROSEMIDE 10 MG/ML
40 INJECTION INTRAMUSCULAR; INTRAVENOUS ONCE
Status: COMPLETED | OUTPATIENT
Start: 2024-05-21 | End: 2024-05-21

## 2024-05-21 RX ADMIN — POTASSIUM CHLORIDE 40 MEQ: 1500 TABLET, EXTENDED RELEASE ORAL at 19:47

## 2024-05-21 RX ADMIN — FUROSEMIDE 40 MG: 10 INJECTION, SOLUTION INTRAMUSCULAR; INTRAVENOUS at 19:48

## 2024-05-21 NOTE — PROGRESS NOTES
Speech-Language Pathology Progress Update  (Skilled Maintenance Program)    Today's date: 2024   Patient’s name: Paula Lubin  : 1945  MRN: [id number]  Safety measures: ***  Referring provider: Liv Mc MD    No diagnosis found.    Assessment:   Patient presents with ***      -***Copy & paste both short-term and long-term goals from from most recent evaluation report & make updates as necessary      Plan:  Patient would benefit from outpatient skilled Speech Therapy services: {BENEFIT FROM:82604}    Frequency: {FREQUENCY:90079}  Duration: {DURATION:56266}    Intervention certification from: 2024  Intervention certification to: ***      Subjective:  ***    Patient's goal(s): ***      Objective (testing):  No testing warranted for today's session due to serving as Progress Update.      Treatment:  ***      Visit Tracking:  Visit Tracking:  POC expires Unit limit Auth Expiration date PT/OT + Visit Limit?   24 N/a 24 8   3/28/24  6/24/24 8   24 N/a 24 8    Auth pending           Visit/Unit Tracking  AUTH Status:  Date   IE   RE 12/19 12/28 1/17 1/30 2/15  PU   RE  3/  Needs auth 3/21     After 8 visits Used 1 2 3 4 5 6 5 1 2 3 4 5 6 7 8 1    Remaining  7 6 5 4 3 2 1 7 6 5 4 3 2 1 0 7     3/26 4/2 4/4 /  RE              2 3 4 5 6 7 8 Auth pending 9             6 5 4 3 2 1 0                Intervention comments:  15 mins of cog tx, 27 mins of additional cog tx

## 2024-05-21 NOTE — DISCHARGE INSTRUCTIONS
Please follow up PCP and cardiology.  Recommend taking Lasix once daily for the next 3 to 4 days.  Can take additional for few days at a time as needed for increasing shortness of breath.  Recommend tylenol 650 mg and ibuprofen 600 mg every 6 hours as needed for pain. Please return for severe chest pain, significant shortness of breath, severely worsening symptoms, or any other concerning signs or symptoms. Please refer to the following documents for additional instructions and return precautions.

## 2024-05-21 NOTE — ED PROVIDER NOTES
History  Chief Complaint   Patient presents with    Shortness of Breath     Pt presents to ER from home for reports of shortness of breath with exertion x1 month, referred to ER by memory care specialist.      78-year-old female history of stroke, breast cancer, hypertension on aspirin presenting with dyspnea.  Patient reports progressively worsening dyspnea for the last several weeks.  Denies any orthopnea or PND.  Denies any chest pain.  Denies any abdominal pain nausea vomiting diarrhea.  Reports some bilateral lower extremity swelling worsening over the last few weeks.  Denies any known history of heart failure.  Denies any other complaints.  Chart reviewed.    Past Medical History:  No date: Anxiety disorder  No date: Aortic valve disorder  No date: BRCA1 negative  No date: BRCA2 negative  03/2022: Breast cancer (HCC)      Comment:  right  No date: Cancer (HCC)      Comment:  breast  11/20/2018: Cellulitis of finger of left hand  11/10/2018: Cellulitis of left hand      Comment:  Added automatically from request for surgery [id number]  No date: Depression  No date: Disease of thyroid gland  No date: GERD (gastroesophageal reflux disease)  No date: History of gastroesophageal reflux (GERD)  03/06/2014: History of kidney cancer  No date: History of transfusion  03/06/2014: Hyperlipidemia  03/06/2014: Hypertension  03/06/2014: Hypothyroidism  No date: Lymphoma (HCC)  03/10/2022: Malignant neoplasm of overlapping sites of right breast   in female, estrogen receptor positive (HCC)  No date: Shortness of breath  4/[id number]: Stroke (HCC)      Comment:  Min stroke found wile going through chemo and radiation  No date: TIA (transient ischemic attack)  No date: UTI (urinary tract infection)  Family History: non-contributory  Social History          Prior to Admission Medications   Prescriptions Last Dose Informant Patient Reported? Taking?   Myrbetriq 50 MG TB24  Self, Child No No   Sig: TAKE 1 TABLET BY MOUTH EVERY DAY    PARoxetine (PAXIL) 20 mg tablet  Self, Child No No   Sig: TAKE 1 TABLET BY MOUTH EVERY DAY   PROAIR  (90 Base) MCG/ACT inhaler  Self, Child Yes No   Sig: as needed   amLODIPine (NORVASC) 2.5 mg tablet   No No   Sig: Take 2 tablets (5 mg total) by mouth daily   anastrozole (ARIMIDEX) 1 mg tablet   No No   Sig: TAKE 1 TABLET BY MOUTH EVERY DAY   aspirin 325 mg tablet  Self, Child Yes No   Sig: Take 325 mg by mouth daily    cholecalciferol (VITAMIN D3) 1,000 units tablet  Self, Child No No   Sig: Take 1 tablet (1,000 Units total) by mouth daily   cholestyramine (QUESTRAN) 4 g packet  Self, Child No No   Sig: TAKE 1 PACKET BY MOUTH DAILY.   Patient not taking: Reported on 3/5/2024   dicyclomine (BENTYL) 20 mg tablet  Self, Child No No   Sig: Take 1 tablet (20 mg total) by mouth 2 (two) times a day   Patient not taking: Reported on 1/25/2024   diphenhydrAMINE (BENADRYL) 25 mg tablet  Self, Child Yes No   Sig: Take 25 mg by mouth in the morning. 1/2 tablet with Paxil .   fluticasone (FLONASE) 50 mcg/act nasal spray  Self, Child No No   Sig: INSTILL 1 SPRAY INTO EACH NOSTRIL AS NEEDED FOR RHINITIS OR ALLERGIES   furosemide (LASIX) 40 mg tablet  Self, Child No No   Sig: Take 1 tablet (40 mg total) by mouth daily for 2 days   Patient not taking: Reported on 2/14/2024   levothyroxine 125 mcg tablet  Self, Child No No   Sig: TAKE 1 TABLET BY MOUTH EVERY DAY   loperamide (IMODIUM) 2 mg capsule  Self, Child No No   Sig: Take 1 capsule (2 mg total) by mouth 4 (four) times a day as needed for diarrhea   meclizine (ANTIVERT) 25 mg tablet  Self, Child No No   Sig: Take 1 tablet (25 mg total) by mouth every 8 (eight) hours as needed for dizziness   memantine (NAMENDA) 10 mg tablet  Self, Child No No   Sig: TAKE 1 TABLET BY MOUTH TWICE A DAY   multivitamin (THERAGRAN) TABS  Self, Child Yes No   Sig: Take 1 tablet by mouth   omeprazole (PriLOSEC) 20 mg delayed release capsule   No No   Sig: TAKE 1 CAPSULE BY MOUTH TWICE A DAY    ondansetron (ZOFRAN-ODT) 4 mg disintegrating tablet  Self, Child No No   Sig: Take 1 tablet (4 mg total) by mouth every 8 (eight) hours as needed for nausea   potassium chloride (K-DUR,KLOR-CON) 10 mEq tablet  Self, Child No No   Sig: Take 1 tablet (10 mEq total) by mouth daily for 10 days   rivastigmine (EXELON) 9.5 mg/24 hr TD 24 hr patch  Self, Child No No   Sig: PLACE 1 PATCH ON THE SKIN OVER 24 HOURS DAILY      Facility-Administered Medications: None       Past Medical History:   Diagnosis Date    Anxiety disorder     Aortic valve disorder     BRCA1 negative     BRCA2 negative     Breast cancer (HCC) 03/2022    right    Cancer (HCC)     breast    Cellulitis of finger of left hand 11/20/2018    Cellulitis of left hand 11/10/2018    Added automatically from request for surgery [id number]    Depression     Disease of thyroid gland     GERD (gastroesophageal reflux disease)     History of gastroesophageal reflux (GERD)     History of kidney cancer 03/06/2014    History of transfusion     Hyperlipidemia 03/06/2014    Hypertension 03/06/2014    Hypothyroidism 03/06/2014    Lymphoma (HCC)     Malignant neoplasm of overlapping sites of right breast in female, estrogen receptor positive (HCC) 03/10/2022    Shortness of breath     Stroke ([facility]) [id number]    Min stroke found wile going through chemo and radiation    TIA (transient ischemic attack)     UTI (urinary tract infection)        Past Surgical History:   Procedure Laterality Date    BREAST LUMPECTOMY Right 3/29/2022    Procedure: ITZEL  DIRECTED LUMPECTOMY;  Surgeon: Denton Chambers MD;  Location: [facility];  Service: Surgical Oncology    ESOPHAGOGASTRIC FUNDOPLASTY      Nissen Fundoplication    HERNIA REPAIR      LYMPH NODE BIOPSY Right 3/29/2022    Procedure: LYMPHATIC MAPPING WITH BLUE AND RADIOACTIVE DYES SENTINEL LYMPH NODE BIOPSY, INJECTION IN OR AT 0800 BY DR CHAMBERS;  Surgeon: Denton Chambers MD;  Location: [facility];  Service:  Surgical Oncology    MAMMO STEREOTACTIC BREAST BIOPSY RIGHT (ALL INC) Right 3/7/2022    US BREAST CLIP NEEDLE LOC RIGHT Right 3/24/2022    US GUIDED BREAST BIOPSY RIGHT COMPLETE Right 3/7/2022    WRIST SURGERY Left        Family History   Problem Relation Age of Onset    Stroke Father     Leukemia Sister     No Known Problems Brother     Skin cancer Mother     Stroke Maternal Grandmother     No Known Problems Maternal Grandfather     No Known Problems Paternal Grandmother     No Known Problems Paternal Grandfather     Breast cancer Sister     Breast cancer Sister         Colon cancer    Kidney cancer Brother     Alcohol abuse Brother     Kidney cancer Brother     Stomach cancer Brother     No Known Problems Daughter     No Known Problems Daughter      I have reviewed and agree with the history as documented.    E-Cigarette/Vaping    E-Cigarette Use Never User      E-Cigarette/Vaping Substances    Nicotine No     THC No     CBD No     Flavoring No     Other No     Unknown No      Social History     Tobacco Use    Smoking status: Never    Smokeless tobacco: Never    Tobacco comments:     None   Vaping Use    Vaping status: Never Used   Substance Use Topics    Alcohol use: Yes     Comment: Social holidays    Drug use: Never       Review of Systems   Constitutional:  Negative for appetite change, chills, diaphoresis, fever and unexpected weight change.   HENT:  Negative for congestion and rhinorrhea.    Eyes:  Negative for photophobia and visual disturbance.   Respiratory:  Positive for shortness of breath. Negative for cough and chest tightness.    Cardiovascular:  Negative for chest pain, palpitations and leg swelling.   Gastrointestinal:  Negative for abdominal distention, abdominal pain, blood in stool, constipation, diarrhea, nausea and vomiting.   Genitourinary:  Negative for dysuria and hematuria.   Musculoskeletal:  Negative for back pain, joint swelling, neck pain and neck stiffness.   Skin:  Negative for color  change, pallor, rash and wound.   Neurological:  Negative for dizziness, syncope, weakness, light-headedness and headaches.   Psychiatric/Behavioral:  Negative for agitation.    All other systems reviewed and are negative.      Physical Exam  Physical Exam  Vitals and nursing note reviewed.   Constitutional:       General: She is not in acute distress.     Appearance: Normal appearance. She is well-developed. She is not ill-appearing, toxic-appearing or diaphoretic.   HENT:      Head: Normocephalic and atraumatic.      Nose: Nose normal. No congestion or rhinorrhea.      Mouth/Throat:      Mouth: Mucous membranes are moist.      Pharynx: Oropharynx is clear. No oropharyngeal exudate or posterior oropharyngeal erythema.   Eyes:      General: No scleral icterus.        Right eye: No discharge.         Left eye: No discharge.      Extraocular Movements: Extraocular movements intact.      Conjunctiva/sclera: Conjunctivae normal.      Pupils: Pupils are equal, round, and reactive to light.   Neck:      Vascular: No JVD.      Trachea: No tracheal deviation.      Comments: Supple. Normal range of motion.   Cardiovascular:      Rate and Rhythm: Normal rate and regular rhythm.      Heart sounds: Normal heart sounds. No murmur heard.     No friction rub. No gallop.      Comments: Normal rate and regular rhythm  Pulmonary:      Effort: Pulmonary effort is normal. No respiratory distress.      Breath sounds: No stridor. Examination of the right-lower field reveals rales. Examination of the left-lower field reveals rales. Rales present. No wheezing.      Comments: Bibasilar rales  Chest:      Chest wall: No tenderness.   Abdominal:      General: Bowel sounds are normal. There is no distension.      Palpations: Abdomen is soft.      Tenderness: There is no abdominal tenderness. There is no right CVA tenderness, left CVA tenderness, guarding or rebound.      Comments: Soft, nontender, nondistended.  Normal bowel sounds throughout    Musculoskeletal:         General: No swelling, tenderness, deformity or signs of injury. Normal range of motion.      Cervical back: Normal range of motion and neck supple. No rigidity. No muscular tenderness.      Right lower leg: No tenderness. Edema present.      Left lower leg: No tenderness. Edema present.      Comments: Bilateral 1-2+ pitting edema up to mid shins   Lymphadenopathy:      Cervical: No cervical adenopathy.   Skin:     General: Skin is warm and dry.      Coloration: Skin is not pale.      Findings: No erythema or rash.   Neurological:      General: No focal deficit present.      Mental Status: She is alert. Mental status is at baseline.      Sensory: No sensory deficit.      Motor: No weakness or abnormal muscle tone.      Coordination: Coordination normal.      Gait: Gait normal.      Comments: Alert.  Strength and sensation grossly intact.  Ambulatory without difficulty at baseline.    Psychiatric:         Behavior: Behavior normal.         Thought Content: Thought content normal.         Vital Signs  ED Triage Vitals [05/21/24 1559]   Temperature Pulse Respirations Blood Pressure SpO2   98.4 °F (36.9 °C) 87 22 140/78 94 %      Temp Source Heart Rate Source Patient Position - Orthostatic VS BP Location FiO2 (%)   Oral Monitor Sitting Left arm --      Pain Score       --           Vitals:    05/21/24 1559   BP: 140/78   Pulse: 87   Patient Position - Orthostatic VS: Sitting         Visual Acuity      ED Medications  Medications   potassium chloride (Klor-Con M20) CR tablet 40 mEq (has no administration in time range)   furosemide (LASIX) injection 40 mg (has no administration in time range)       Diagnostic Studies  Results Reviewed       Procedure Component Value Units Date/Time    CBC and differential [[id number]]  (Abnormal) Collected: 05/21/24 1832    Lab Status: Final result Specimen: Blood from Arm, Right Updated: 05/21/24 1911     WBC 4.96 Thousand/uL      RBC 3.23 Million/uL       Hemoglobin 12.1 g/dL      Hematocrit 35.4 %       fL      MCH 37.5 pg      MCHC 34.2 g/dL      RDW 13.4 %      MPV 12.0 fL      Platelets 255 Thousands/uL     Narrative:      This is an appended report.  These results have been appended to a previously verified report.    Manual Differential(PHLEBS Do Not Order) [[id number]]  (Abnormal) Collected: 05/21/24 1832    Lab Status: Final result Specimen: Blood from Arm, Right Updated: 05/21/24 1911     Segmented % 67 %      Lymphocytes % 16 %      Monocytes % 13 %      Eosinophils % 3 %      Basophils % 1 %      Absolute Neutrophils 3.32 Thousand/uL      Absolute Lymphocytes 0.79 Thousand/uL      Absolute Monocytes 0.64 Thousand/uL      Absolute Eosinophils 0.15 Thousand/uL      Absolute Basophils 0.05 Thousand/uL      Total Counted --     RBC Morphology Present     Platelet Estimate Adequate     Macrocytes Present    RBC Morphology Reflex Test [[id number]] Collected: 05/21/24 1832    Lab Status: In process Specimen: Blood from Arm, Right Updated: 05/21/24 1910    HS Troponin 0hr (reflex protocol) [[id number]]  (Normal) Collected: 05/21/24 1832    Lab Status: Final result Specimen: Blood from Arm, Right Updated: 05/21/24 1901     hs TnI 0hr 4 ng/L     B-Type Natriuretic Peptide(BNP) [[id number]]  (Normal) Collected: 05/21/24 1832    Lab Status: Final result Specimen: Blood from Arm, Right Updated: 05/21/24 1901     BNP 37 pg/mL     Comprehensive metabolic panel [[id number]] Collected: 05/21/24 1832    Lab Status: Final result Specimen: Blood from Arm, Right Updated: 05/21/24 1854     Sodium 140 mmol/L      Potassium 3.5 mmol/L      Chloride 104 mmol/L      CO2 26 mmol/L      ANION GAP 10 mmol/L      BUN 16 mg/dL      Creatinine 0.72 mg/dL      Glucose 83 mg/dL      Calcium 9.1 mg/dL      AST 16 U/L      ALT 9 U/L      Alkaline Phosphatase 70 U/L      Total Protein 6.7 g/dL      Albumin 3.9 g/dL      Total Bilirubin 0.44 mg/dL      eGFR 79 ml/min/1.73sq m     Narrative:       National Kidney Disease Foundation guidelines for Chronic Kidney Disease (CKD):     Stage 1 with normal or high GFR (GFR > 90 mL/min/1.73 square meters)    Stage 2 Mild CKD (GFR = 60-89 mL/min/1.73 square meters)    Stage 3A Moderate CKD (GFR = 45-59 mL/min/1.73 square meters)    Stage 3B Moderate CKD (GFR = 30-44 mL/min/1.73 square meters)    Stage 4 Severe CKD (GFR = 15-29 mL/min/1.73 square meters)    Stage 5 End Stage CKD (GFR <15 mL/min/1.73 square meters)  Note: GFR calculation is accurate only with a steady state creatinine                   XR chest 2 views    (Results Pending)              Procedures  Procedures         ED Course             HEART Risk Score      Flowsheet Row Most Recent Value   Heart Score Risk Calculator    History 0 Filed at: 05/21/2024 1912   ECG 0 Filed at: 05/21/2024 1912   Age 2 Filed at: 05/21/2024 1912   Risk Factors 2 Filed at: 05/21/2024 1912   Troponin 0 Filed at: 05/21/2024 1912   HEART Score 4 Filed at: 05/21/2024 1912          Identification of Seniors at Risk      Flowsheet Row Most Recent Value   (ISAR) Identification of Seniors at Risk    Before the illness or injury that brought you to the Emergency, did you need someone to help you on a regular basis? 0 Filed at: 05/21/2024 1602   In the last 24 hours, have you needed more help than usual? 0 Filed at: 05/21/2024 1602   Have you been hospitalized for one or more nights during the past 6 months? 1 Filed at: 05/21/2024 1602   In general, do you see well? 0 Filed at: 05/21/2024 1602   In general, do you have serious problems with your memory? 0 Filed at: 05/21/2024 1602   Do you take more than three different medications every day? 1 Filed at: 05/21/2024 1602   ISAR Score 2 Filed at: 05/21/2024 1602                        SBIRT 22yo+      Flowsheet Row Most Recent Value   Initial Alcohol Screen: US AUDIT-C     1. How often do you have a drink containing alcohol? 0 Filed at: 05/21/2024 1603   2. How many drinks  containing alcohol do you have on a typical day you are drinking?  0 Filed at: 05/21/2024 1603   3b. FEMALE Any Age, or MALE 65+: How often do you have 4 or more drinks on one occassion? 0 Filed at: 05/21/2024 1603   Audit-C Score 0 Filed at: 05/21/2024 1603   RENZO: How many times in the past year have you...    Used an illegal drug or used a prescription medication for non-medical reasons? Never Filed at: 05/21/2024 1603                      Medical Decision Making  78-year-old female history of stroke, breast cancer, hypertension on aspirin presenting with dyspnea.  Progressively worsening dyspnea with leg swelling and rales on exam suspicious for pulmonary edema.  Plan for cardiac evaluation including EKG troponin plus chest x-ray and BNP.  Reassess.    EKG interpreted by me with normal sinus rhythm and no acute ST abnormality.  Chest x-ray interpreted by me without significant acute cardiopulmonary process.  Labs interpreted with borderline low potassium.  Given initiation of Lasix will give dose of p.o. potassium.  Patient remains at baseline.  Prescription sent to pharmacy.  Referral cardiology. Discussed results and recommendations. Advised follow up PCP and cardiology. Medication recommendations. Given instructions and return precautions. Patient/family at bedside acknowledged understanding of all written and verbal instructions and return precautions. Discharged.     Amount and/or Complexity of Data Reviewed  Labs: ordered.  Radiology: ordered.    Risk  Prescription drug management.             Disposition  Final diagnoses:   Dyspnea on exertion   Shortness of breath   Acute pulmonary edema (HCC)     Time reflects when diagnosis was documented in both MDM as applicable and the Disposition within this note       Time User Action Codes Description Comment    5/21/2024  4:48 PM Pankaj Sellers Add [R06.09] Dyspnea on exertion     5/21/2024  4:48 PM Pankaj Sellers Add [R06.02] Shortness of breath     5/21/2024  7:13  PM Pankaj Sellers Add [J81.0] Acute pulmonary edema (HCC)           ED Disposition       ED Disposition   Discharge    Condition   Stable    Date/Time   Tue May 21, 2024 1905    Comment   Paula Lubin discharge to home/self care.                   Follow-up Information       Follow up With Specialties Details Why Contact Info Additional Information    Jose Enrique Elliott MD Internal Medicine Schedule an appointment as soon as possible for a visit in 1 week  [address]  [facility] [address] [phone]       [facility] Cardiology Schedule an appointment as soon as possible for a visit in 1 week  [address] [address] [phone] [facility], [facility] [address], [address] [phone]            Patient's Medications   Discharge Prescriptions    FUROSEMIDE (LASIX) 40 MG TABLET    Take 1 tablet (40 mg total) by mouth daily       Start Date: 5/21/2024 End Date: --       Order Dose: 40 mg       Quantity: 20 tablet    Refills: 0           PDMP Review         Value Time User    PDMP Reviewed  Yes 3/14/2022  1:24 PM Denton Chambers MD            ED Provider  Electronically Signed by             Pankaj Sellers MD  05/21/24 1913

## 2024-05-23 ENCOUNTER — VBI (OUTPATIENT)
Dept: INTERNAL MEDICINE CLINIC | Facility: CLINIC | Age: 79
End: 2024-05-23

## 2024-05-23 ENCOUNTER — APPOINTMENT (OUTPATIENT)
Age: 79
End: 2024-05-23
Payer: COMMERCIAL

## 2024-05-23 NOTE — PROGRESS NOTES
Speech-Language Pathology Progress Update  (Skilled Maintenance Program)    Today's date: 2024   Patient’s name: Paula Lubin  : 1945  MRN: [id number]  Safety measures: ***  Referring provider: Liv Mc MD    No diagnosis found.    Assessment:   Patient presents with ***      -***Copy & paste both short-term and long-term goals from from most recent evaluation report & make updates as necessary      Plan:  Patient would benefit from outpatient skilled Speech Therapy services: {BENEFIT FROM:31018}    Frequency: {FREQUENCY:85660}  Duration: {DURATION:18812}    Intervention certification from: 2024  Intervention certification to: ***      Subjective:  ***    Patient's goal(s): ***      Objective (testing):  No testing warranted for today's session due to serving as Progress Update.      Treatment:  ***      Visit Tracking:  POC expires Unit limit Auth Expiration date PT/OT + Visit Limit?   24 N/a 24 8   3/28/24  6/24/24 8   24 N/a 24 8    Auth pending           Visit/Unit Tracking  AUTH Status:  Date   IE   RE 12/19 12/28 1/17 1/30 2/15  PU   RE 2/29 3/  Needs auth 3/21     After 8 visits Used 1 2 3 4 5 6 5 1 2 3 4 5 6 7 8 1    Remaining  7 6 5 4 3 2 1 7 6 5 4 3 2 1 0 7     3/26 4/2 4/4 11 16   RE              2 3 4 5 6 7 8 Auth pending 9             6 5 4 3 2 1 0                Intervention comments:  15 mins of cog tx, 27 mins of additional cog tx

## 2024-05-23 NOTE — TELEPHONE ENCOUNTER
05/23/24 2:42 PM    Patient contacted post ED visit, [facility] department spoke with patient/caregiver and outreach was successful.    Thank you.  Agnes Swift  [facility]

## 2024-05-24 LAB
ATRIAL RATE: 78 BPM
ATRIAL RATE: 80 BPM
P AXIS: 53 DEGREES
P AXIS: 57 DEGREES
PR INTERVAL: 142 MS
PR INTERVAL: 152 MS
QRS AXIS: 30 DEGREES
QRS AXIS: 34 DEGREES
QRSD INTERVAL: 74 MS
QRSD INTERVAL: 76 MS
QT INTERVAL: 348 MS
QT INTERVAL: 384 MS
QTC INTERVAL: 401 MS
QTC INTERVAL: 437 MS
T WAVE AXIS: 46 DEGREES
T WAVE AXIS: 67 DEGREES
VENTRICULAR RATE: 78 BPM
VENTRICULAR RATE: 80 BPM

## 2024-05-24 PROCEDURE — 93010 ELECTROCARDIOGRAM REPORT: CPT | Performed by: INTERNAL MEDICINE

## 2024-05-28 ENCOUNTER — APPOINTMENT (OUTPATIENT)
Age: 79
End: 2024-05-28
Payer: COMMERCIAL

## 2024-05-29 ENCOUNTER — OFFICE VISIT (OUTPATIENT)
Dept: INTERNAL MEDICINE CLINIC | Facility: CLINIC | Age: 79
End: 2024-05-29
Payer: COMMERCIAL

## 2024-05-29 VITALS
DIASTOLIC BLOOD PRESSURE: 82 MMHG | BODY MASS INDEX: 32.85 KG/M2 | WEIGHT: 174 LBS | HEIGHT: 61 IN | HEART RATE: 89 BPM | RESPIRATION RATE: 17 BRPM | OXYGEN SATURATION: 94 % | SYSTOLIC BLOOD PRESSURE: 132 MMHG

## 2024-05-29 DIAGNOSIS — E03.9 HYPOTHYROIDISM, UNSPECIFIED TYPE: ICD-10-CM

## 2024-05-29 DIAGNOSIS — G30.1 LATE ONSET ALZHEIMER'S DISEASE WITHOUT BEHAVIORAL DISTURBANCE (HCC): ICD-10-CM

## 2024-05-29 DIAGNOSIS — I10 BENIGN ESSENTIAL HTN: Primary | ICD-10-CM

## 2024-05-29 DIAGNOSIS — F02.80 LATE ONSET ALZHEIMER'S DISEASE WITHOUT BEHAVIORAL DISTURBANCE (HCC): ICD-10-CM

## 2024-05-29 PROCEDURE — G2211 COMPLEX E/M VISIT ADD ON: HCPCS | Performed by: INTERNAL MEDICINE

## 2024-05-29 PROCEDURE — 99214 OFFICE O/P EST MOD 30 MIN: CPT | Performed by: INTERNAL MEDICINE

## 2024-05-29 NOTE — PROGRESS NOTES
Assessment/Plan:     Reviewed the ER records and since she is clinically improved, told her daughter to keep her on the 40 mg of Lasix daily.  Follow-up with cardiology on Friday as planned.  For her hypertension, continue the amlodipine at present dosing.  Encouraged her daughter to take her for her blood work so we can make sure the fluid overload is not from her thyroid.  Continue her levothyroxine 125 mcg daily for now.  Continue follow-up with neurology for her dementia.  Continue all other medications.    Continue followup with all specialists.   Continue diet and exercise.    Ordered labs for next visit.     Quality Measures:       Return in about 4 months (around 9/29/2024) for Regular visit and Medicare Wellness.    No problem-specific Assessment & Plan notes found for this encounter.       Diagnoses and all orders for this visit:    Benign essential HTN    Hypothyroidism, unspecified type    Late onset Alzheimer's disease without behavioral disturbance (HCC)          Subjective:      Patient ID: Paula Lubin is a 78 y.o. female.    Patient comes in today for follow-up with her daughter.  She was in the ER recently, sent over from neurology with shortness of breath.  She was started on Lasix again and she states she feels better on it.  Her weight is not down much.  Blood pressure is controlled.  Her dementia is stable.  She does have follow-up with cardiology on Friday.  Feels okay at this point.  No other complaints today.  No further additions to her history.  But she has not done her original blood work ordered for this visit from her last visit.        ALLERGIES:  Allergies   Allergen Reactions   • Ciprofloxacin Hives   • Sulfa Antibiotics Itching   • Other    • Penicillins Rash     Category: Allergy;   --  Pt received unasyn 1.5 mg IV 11-12-18 to 11-15-18   • Sulfacetamide Rash     Category: Allergy;        CURRENT MEDICATIONS:    Current Outpatient Medications:   •  amLODIPine (NORVASC) 2.5 mg  tablet, Take 2 tablets (5 mg total) by mouth daily, Disp: 180 tablet, Rfl: 1  •  anastrozole (ARIMIDEX) 1 mg tablet, TAKE 1 TABLET BY MOUTH EVERY DAY, Disp: 90 tablet, Rfl: 1  •  aspirin 325 mg tablet, Take 325 mg by mouth daily , Disp: , Rfl:   •  cholecalciferol (VITAMIN D3) 1,000 units tablet, Take 1 tablet (1,000 Units total) by mouth daily, Disp: 30 tablet, Rfl: 6  •  diphenhydrAMINE (BENADRYL) 25 mg tablet, Take 25 mg by mouth in the morning. 1/2 tablet with Paxil ., Disp: , Rfl:   •  fluticasone (FLONASE) 50 mcg/act nasal spray, INSTILL 1 SPRAY INTO EACH NOSTRIL AS NEEDED FOR RHINITIS OR ALLERGIES, Disp: 48 mL, Rfl: 1  •  furosemide (LASIX) 40 mg tablet, Take 1 tablet (40 mg total) by mouth daily, Disp: 20 tablet, Rfl: 0  •  levothyroxine 125 mcg tablet, TAKE 1 TABLET BY MOUTH EVERY DAY, Disp: 90 tablet, Rfl: 1  •  loperamide (IMODIUM) 2 mg capsule, Take 1 capsule (2 mg total) by mouth 4 (four) times a day as needed for diarrhea, Disp: 12 capsule, Rfl: 0  •  meclizine (ANTIVERT) 25 mg tablet, Take 1 tablet (25 mg total) by mouth every 8 (eight) hours as needed for dizziness, Disp: 50 tablet, Rfl: 1  •  memantine (NAMENDA) 10 mg tablet, TAKE 1 TABLET BY MOUTH TWICE A DAY, Disp: 180 tablet, Rfl: 1  •  multivitamin (THERAGRAN) TABS, Take 1 tablet by mouth, Disp: , Rfl:   •  Myrbetriq 50 MG TB24, TAKE 1 TABLET BY MOUTH EVERY DAY, Disp: 90 tablet, Rfl: 3  •  omeprazole (PriLOSEC) 20 mg delayed release capsule, TAKE 1 CAPSULE BY MOUTH TWICE A DAY, Disp: 180 capsule, Rfl: 0  •  ondansetron (ZOFRAN-ODT) 4 mg disintegrating tablet, Take 1 tablet (4 mg total) by mouth every 8 (eight) hours as needed for nausea, Disp: 15 tablet, Rfl: 0  •  PARoxetine (PAXIL) 20 mg tablet, TAKE 1 TABLET BY MOUTH EVERY DAY, Disp: 90 tablet, Rfl: 3  •  potassium chloride (K-DUR,KLOR-CON) 10 mEq tablet, Take 1 tablet (10 mEq total) by mouth daily for 10 days, Disp: 10 tablet, Rfl: 0  •  PROAIR  (90 Base) MCG/ACT inhaler, as needed,  Disp: , Rfl:   •  rivastigmine (EXELON) 9.5 mg/24 hr TD 24 hr patch, PLACE 1 PATCH ON THE SKIN OVER 24 HOURS DAILY, Disp: 90 patch, Rfl: 1  •  cholestyramine (QUESTRAN) 4 g packet, TAKE 1 PACKET BY MOUTH DAILY. (Patient not taking: Reported on 3/5/2024), Disp: 90 packet, Rfl: 3  •  dicyclomine (BENTYL) 20 mg tablet, Take 1 tablet (20 mg total) by mouth 2 (two) times a day (Patient not taking: Reported on 1/25/2024), Disp: 20 tablet, Rfl: 0    ACTIVE PROBLEM LIST:  Patient Active Problem List   Diagnosis   • Depression   • Gastroesophageal reflux disease without esophagitis   • Mixed hyperlipidemia   • Benign essential HTN   • Hypothyroid   • Impaired fasting glucose   • Lymphoma (HCC)   • Cerebral aneurysm, nonruptured   • Mild cognitive impairment   • Dyspnea on exertion   • Flexor tenosynovitis of finger   • Macrocytosis without anemia   • Paresthesias   • Non-intractable vomiting with nausea   • Hypokalemia   • Late onset Alzheimer's disease without behavioral disturbance (HCC)   • Chest pain   • Near syncope   • Immunocompromised (HCC)   • Malignant neoplasm of overlapping sites of right breast in female, estrogen receptor positive (HCC)   • Use of anastrozole   • Recurrent major depressive disorder, remission status unspecified (HCC)   • Stage 3 chronic kidney disease, unspecified whether stage 3a or 3b CKD (HCC)   • History of lumpectomy of right breast   • Encounter for follow-up surveillance of breast cancer   • Encounter for monitoring anastrozole therapy       PAST MEDICAL HISTORY:  Past Medical History:   Diagnosis Date   • Anxiety disorder    • Aortic valve disorder    • BRCA1 negative    • BRCA2 negative    • Breast cancer (HCC) 03/2022    right   • Cancer (HCC)     breast   • Cellulitis of finger of left hand 11/20/2018   • Cellulitis of left hand 11/10/2018    Added automatically from request for surgery [id number]   • Depression    • Disease of thyroid gland    • GERD (gastroesophageal reflux disease)     • History of gastroesophageal reflux (GERD)    • History of kidney cancer 03/06/2014   • History of transfusion    • Hyperlipidemia 03/06/2014   • Hypertension 03/06/2014   • Hypothyroidism 03/06/2014   • Lymphoma (HCC)    • Malignant neoplasm of overlapping sites of right breast in female, estrogen receptor positive (HCC) 03/10/2022   • Shortness of breath    • Stroke (HCC) [id number]    Min stroke found wile going through chemo and radiation   • TIA (transient ischemic attack)    • UTI (urinary tract infection)        PAST SURGICAL HISTORY:  Past Surgical History:   Procedure Laterality Date   • BREAST LUMPECTOMY Right 3/29/2022    Procedure: ITZEL  DIRECTED LUMPECTOMY;  Surgeon: Denton Chambers MD;  Location: [facility];  Service: Surgical Oncology   • ESOPHAGOGASTRIC FUNDOPLASTY      Nissen Fundoplication   • HERNIA REPAIR     • LYMPH NODE BIOPSY Right 3/29/2022    Procedure: LYMPHATIC MAPPING WITH BLUE AND RADIOACTIVE DYES SENTINEL LYMPH NODE BIOPSY, INJECTION IN OR AT 0800 BY DR CHAMBERS;  Surgeon: Denton Chambers MD;  Location: [facility];  Service: Surgical Oncology   • MAMMO STEREOTACTIC BREAST BIOPSY RIGHT (ALL INC) Right 3/7/2022   • US BREAST CLIP NEEDLE LOC RIGHT Right 3/24/2022   • US GUIDED BREAST BIOPSY RIGHT COMPLETE Right 3/7/2022   • WRIST SURGERY Left        FAMILY HISTORY:  Family History   Problem Relation Age of Onset   • Stroke Father    • Leukemia Sister    • No Known Problems Brother    • Skin cancer Mother    • Stroke Maternal Grandmother    • No Known Problems Maternal Grandfather    • No Known Problems Paternal Grandmother    • No Known Problems Paternal Grandfather    • Breast cancer Sister    • Breast cancer Sister         Colon cancer   • Kidney cancer Brother    • Alcohol abuse Brother    • Kidney cancer Brother    • Stomach cancer Brother    • No Known Problems Daughter    • No Known Problems Daughter        SOCIAL HISTORY:  Social History     Socioeconomic  "History   • Marital status:      Spouse name: Not on file   • Number of children: 2   • Years of education: Not on file   • Highest education level: Not on file   Occupational History   • Not on file   Tobacco Use   • Smoking status: Never   • Smokeless tobacco: Never   • Tobacco comments:     None   Vaping Use   • Vaping status: Never Used   Substance and Sexual Activity   • Alcohol use: Yes     Comment: Social holidays   • Drug use: Never   • Sexual activity: Not Currently     Comment: [place] over   Other Topics Concern   • Not on file   Social History Narrative   • Not on file     Social Determinants of Health     Financial Resource Strain: Low Risk  (9/19/2023)    Overall Financial Resource Strain (CARDIA)    • Difficulty of Paying Living Expenses: Not hard at all   Food Insecurity: Not on file   Transportation Needs: No Transportation Needs (9/19/2023)    PRAPARE - Transportation    • Lack of Transportation (Medical): No    • Lack of Transportation (Non-Medical): No   Physical Activity: Not on file   Stress: Not on file   Social Connections: Not on file   Intimate Partner Violence: Not on file   Housing Stability: Not on file       Review of Systems   Respiratory:  Negative for shortness of breath.    Cardiovascular:  Negative for chest pain.   Gastrointestinal:  Negative for abdominal pain.         Objective:  Vitals:    05/29/24 1524   BP: 132/82   BP Location: Left arm   Patient Position: Sitting   Cuff Size: Adult   Pulse: 89   Resp: 17   SpO2: 94%   Weight: 78.9 kg (174 lb)   Height: 5' 1\" (1.549 m)     Body mass index is 32.88 kg/m².     Physical Exam  Vitals and nursing note reviewed.   Constitutional:       Appearance: Normal appearance. She is well-developed.   Cardiovascular:      Rate and Rhythm: Normal rate and regular rhythm.      Heart sounds: Normal heart sounds.   Pulmonary:      Effort: Pulmonary effort is normal.      Breath sounds: Normal breath sounds.   Abdominal:      Palpations: " Abdomen is soft.      Tenderness: There is no abdominal tenderness.   Neurological:      Mental Status: She is alert and oriented to person, place, and time.   Psychiatric:         Mood and Affect: Mood normal.         Behavior: Behavior normal.           RESULTS:  Hemoglobin A1C   Date/Time Value Ref Range Status   09/14/2021 02:20 PM 5.4 Normal 3.8-5.6%; PreDiabetic 5.7-6.4%; Diabetic >=6.5%; Glycemic control for adults with diabetes <7.0% % Final   02/15/2018 11:30 AM 5.7 (H) <5.7 % Final     Comment:     Reference Range  Non-diabetic                     <5.7  Pre-diabetic                     5.7-6.4  Diabetic                         >=6.5  ADA target for diabetic control  <=7     Cholesterol   Date/Time Value Ref Range Status   12/19/2023 02:05  (H) See Comment mg/dL Final     Comment:     Cholesterol:         Pediatric <18 Years        Desirable          <170 mg/dL      Borderline High    170-199 mg/dL      High               >=200 mg/dL        Adult >=18 Years            Desirable        <200 mg/dL      Borderline High  200-239 mg/dL      High             >239 mg/dL       Triglycerides   Date/Time Value Ref Range Status   12/19/2023 02:05 PM 68 See Comment mg/dL Final     Comment:     Triglyceride:     0-9Y            <75mg/dL     10Y-17Y         <90 mg/dL       >=18Y     Normal          <150 mg/dL     Borderline High 150-199 mg/dL     High            200-499 mg/dL        Very High       >499 mg/dL    Specimen collection should occur prior to Metamizole administration due to the potential for falsely depressed results.   06/08/2015 08:09 AM 98 mg/dL Final     Comment:     TRIGLYCERIDE:       Normal                 <150 mg/dl       Borderline High       150-199 mg/dl       High                  200-499 mg/dl       Very High             >499 mg/dl  _______________________________________       HDL   Date/Time Value Ref Range Status   06/08/2015 08:09 AM 84 mg/dL Final     Comment:     HDL:       High        >59 mg/dl       Low        <41 mg/dl  ______________________________       HDL, Direct   Date/Time Value Ref Range Status   12/19/2023 02:05 PM 88 >=50 mg/dL Final     LDL Calculated   Date/Time Value Ref Range Status   12/19/2023 02:05  (H) 0 - 100 mg/dL Final     Comment:     LDL Cholesterol:     Optimal           <100 mg/dl     Near Optimal      100-129 mg/dl     Above Optimal       Borderline High 130-159 mg/dl       High            160-189 mg/dl       Very High       >189 mg/dl         This screening LDL is a calculated result.   It does not have the accuracy of the Direct Measured LDL in the monitoring of patients with hyperlipidemia and/or statin therapy.   Direct Measure LDL (DJU319) must be ordered separately in these patients.   06/08/2015 08:09  (H) 0 - 100 mg/dL Final     Comment:     LDL, CALCULATED:     This screening LDL is a calculated result.  It does not have the accuracy of the Direct Measured LDL in the  monitoring of patients with hyperlipidemia and/or statin therapy.  Direct Measured LDL (Test Code 3126) must be ordered separately in these  patients.  ______________________________       Hemoglobin   Date/Time Value Ref Range Status   05/21/2024 06:32 PM 12.1 11.5 - 15.4 g/dL Final   07/17/2014 08:40 AM 14.2 11.0 - 15.7 g/dL Final     Hematocrit   Date/Time Value Ref Range Status   05/21/2024 06:32 PM 35.4 34.8 - 46.1 % Final   07/17/2014 08:40 AM 42.3 34.8 - 46.1 % Final     Platelets   Date/Time Value Ref Range Status   05/21/2024 06:32  149 - 390 Thousands/uL Final   07/17/2014 08:40  149 - 390 Thousand/uL Final     TSH 3RD GENERATON   Date/Time Value Ref Range Status   12/19/2023 02:05 PM 3.022 0.450 - 4.500 uIU/mL Final     Comment:     The recommended reference ranges for TSH during pregnancy are as follows:   First trimester 0.100 to 2.500 uIU/mL   Second trimester  0.200 to 3.000 uIU/mL   Third trimester 0.300 to 3.000 uIU/m    Note: Normal ranges may not apply  to patients who are transgender, non-binary, or whose legal sex, sex at birth, and gender identity differ.  Adult TSH (3rd generation) reference range follows the recommended guidelines of the [employer] Association, January, 2020.   06/08/2015 08:09 AM 6.086 (H) 0.36 - 3.74 uIU/ML Final     Comment:     The above 1 analytes were performed by [facility]   ([facility])  [address] [place] [id number]       FREE T4   Date/Time Value Ref Range Status   05/14/2018 12:41 PM 1.21 0.76 - 1.46 ng/dL Final     Free T4   Date/Time Value Ref Range Status   12/19/2023 02:05 PM 1.16 (H) 0.61 - 1.12 ng/dL Final     Comment:     Specimens with biotin concentrations > 10 ng/mL can lead to significant (> 10%) positive bias in result.     Sodium   Date/Time Value Ref Range Status   05/21/2024 06:32  135 - 147 mmol/L Final   04/11/2018 12:07  135 - 145 mmol/L Final     BUN   Date/Time Value Ref Range Status   05/21/2024 06:32 PM 16 5 - 25 mg/dL Final   04/11/2018 12:07 PM 16 7 - 25 mg/dL Final     Creatinine   Date/Time Value Ref Range Status   05/21/2024 06:32 PM 0.72 0.60 - 1.30 mg/dL Final     Comment:     Standardized to IDMS reference method   04/11/2018 12:07 PM 0.76 0.40 - 1.10 mg/dL Final      In chart    This note was created with voice recognition software.  Phonic, grammatical and spelling errors may be present within the note as a result.

## 2024-05-30 ENCOUNTER — APPOINTMENT (OUTPATIENT)
Dept: LAB | Facility: HOSPITAL | Age: 79
End: 2024-05-30
Payer: COMMERCIAL

## 2024-05-30 DIAGNOSIS — E03.9 HYPOTHYROIDISM, UNSPECIFIED TYPE: ICD-10-CM

## 2024-05-30 DIAGNOSIS — I10 ESSENTIAL HYPERTENSION: ICD-10-CM

## 2024-05-30 LAB
ALBUMIN SERPL BCP-MCNC: 3.8 G/DL (ref 3.5–5)
ALP SERPL-CCNC: 74 U/L (ref 34–104)
ALT SERPL W P-5'-P-CCNC: 8 U/L (ref 7–52)
ANION GAP SERPL CALCULATED.3IONS-SCNC: 9 MMOL/L (ref 4–13)
AST SERPL W P-5'-P-CCNC: 17 U/L (ref 13–39)
BASOPHILS # BLD MANUAL: 0 THOUSAND/UL (ref 0–0.1)
BASOPHILS NFR MAR MANUAL: 0 % (ref 0–1)
BILIRUB SERPL-MCNC: 0.48 MG/DL (ref 0.2–1)
BUN SERPL-MCNC: 18 MG/DL (ref 5–25)
CALCIUM SERPL-MCNC: 10.4 MG/DL (ref 8.4–10.2)
CHLORIDE SERPL-SCNC: 102 MMOL/L (ref 96–108)
CHOLEST SERPL-MCNC: 197 MG/DL
CO2 SERPL-SCNC: 31 MMOL/L (ref 21–32)
CREAT SERPL-MCNC: 0.82 MG/DL (ref 0.6–1.3)
EOSINOPHIL # BLD MANUAL: 0.13 THOUSAND/UL (ref 0–0.4)
EOSINOPHIL NFR BLD MANUAL: 3 % (ref 0–6)
ERYTHROCYTE [DISTWIDTH] IN BLOOD BY AUTOMATED COUNT: 13.3 % (ref 11.6–15.1)
GFR SERPL CREATININE-BSD FRML MDRD: 68 ML/MIN/1.73SQ M
GLUCOSE P FAST SERPL-MCNC: 106 MG/DL (ref 65–99)
HCT VFR BLD AUTO: 37 % (ref 34.8–46.1)
HDLC SERPL-MCNC: 80 MG/DL
HGB BLD-MCNC: 12.4 G/DL (ref 11.5–15.4)
LDLC SERPL CALC-MCNC: 95 MG/DL (ref 0–100)
LYMPHOCYTES # BLD AUTO: 0.97 THOUSAND/UL (ref 0.6–4.47)
LYMPHOCYTES # BLD AUTO: 23 % (ref 14–44)
MACROCYTES BLD QL AUTO: PRESENT
MCH RBC QN AUTO: 37 PG (ref 26.8–34.3)
MCHC RBC AUTO-ENTMCNC: 33.5 G/DL (ref 31.4–37.4)
MCV RBC AUTO: 110 FL (ref 82–98)
MONOCYTES # BLD AUTO: 0.42 THOUSAND/UL (ref 0–1.22)
MONOCYTES NFR BLD: 10 % (ref 4–12)
NEUTROPHILS # BLD MANUAL: 2.71 THOUSAND/UL (ref 1.85–7.62)
NEUTS SEG NFR BLD AUTO: 64 % (ref 43–75)
NONHDLC SERPL-MCNC: 117 MG/DL
PLATELET # BLD AUTO: 267 THOUSANDS/UL (ref 149–390)
PLATELET BLD QL SMEAR: ADEQUATE
PMV BLD AUTO: 12.1 FL (ref 8.9–12.7)
POTASSIUM SERPL-SCNC: 3.1 MMOL/L (ref 3.5–5.3)
PROT SERPL-MCNC: 6.8 G/DL (ref 6.4–8.4)
RBC # BLD AUTO: 3.35 MILLION/UL (ref 3.81–5.12)
RBC MORPH BLD: PRESENT
SODIUM SERPL-SCNC: 142 MMOL/L (ref 135–147)
TRIGL SERPL-MCNC: 108 MG/DL
TSH SERPL DL<=0.05 MIU/L-ACNC: 2.91 UIU/ML (ref 0.45–4.5)
WBC # BLD AUTO: 4.23 THOUSAND/UL (ref 4.31–10.16)

## 2024-05-30 PROCEDURE — 80061 LIPID PANEL: CPT

## 2024-05-30 PROCEDURE — 84439 ASSAY OF FREE THYROXINE: CPT

## 2024-05-30 PROCEDURE — 84443 ASSAY THYROID STIM HORMONE: CPT

## 2024-05-30 PROCEDURE — 80053 COMPREHEN METABOLIC PANEL: CPT

## 2024-05-30 PROCEDURE — 85007 BL SMEAR W/DIFF WBC COUNT: CPT

## 2024-05-30 PROCEDURE — 36415 COLL VENOUS BLD VENIPUNCTURE: CPT

## 2024-05-30 PROCEDURE — 85027 COMPLETE CBC AUTOMATED: CPT

## 2024-05-31 ENCOUNTER — OFFICE VISIT (OUTPATIENT)
Dept: CARDIOLOGY CLINIC | Facility: CLINIC | Age: 79
End: 2024-05-31
Payer: COMMERCIAL

## 2024-05-31 VITALS
HEIGHT: 60 IN | SYSTOLIC BLOOD PRESSURE: 130 MMHG | WEIGHT: 179 LBS | BODY MASS INDEX: 35.14 KG/M2 | DIASTOLIC BLOOD PRESSURE: 75 MMHG | HEART RATE: 68 BPM | RESPIRATION RATE: 18 BRPM | OXYGEN SATURATION: 96 %

## 2024-05-31 DIAGNOSIS — R06.02 SHORTNESS OF BREATH: ICD-10-CM

## 2024-05-31 DIAGNOSIS — R06.09 DYSPNEA ON EXERTION: ICD-10-CM

## 2024-05-31 DIAGNOSIS — R60.0 BILATERAL LOWER EXTREMITY EDEMA: Primary | ICD-10-CM

## 2024-05-31 LAB — T4 FREE SERPL-MCNC: 1.03 NG/DL (ref 0.61–1.12)

## 2024-05-31 PROCEDURE — 99213 OFFICE O/P EST LOW 20 MIN: CPT | Performed by: INTERNAL MEDICINE

## 2024-05-31 RX ORDER — FUROSEMIDE 40 MG/1
40 TABLET ORAL DAILY PRN
Qty: 60 TABLET | Refills: 5 | Status: SHIPPED | OUTPATIENT
Start: 2024-05-31

## 2024-05-31 NOTE — ASSESSMENT & PLAN NOTE
None today but has been present last week in the ED.  Apparently resolved with Lasix.  I told patient and her daughter to back down on diuretic to only use as needed for weight gain or prominent morning edema.

## 2024-05-31 NOTE — ASSESSMENT & PLAN NOTE
Recent ED visit reviewed.  There was edema and dyspnea.  BNP was quite low.  No congestion by x-ray.  Recent echo normal.  I do not believe any sense of dyspnea is related to cardiac issues.

## 2024-05-31 NOTE — PROGRESS NOTES
Patient ID: Paula Lubin is a 78 y.o. female.        Plan:      Dyspnea on exertion  Recent ED visit reviewed.  There was edema and dyspnea.  BNP was quite low.  No congestion by x-ray.  Recent echo normal.  I do not believe any sense of dyspnea is related to cardiac issues.    Bilateral lower extremity edema  None today but has been present last week in the ED.  Apparently resolved with Lasix.  I told patient and her daughter to back down on diuretic to only use as needed for weight gain or prominent morning edema.       Follow up Plan/Other summary comments:  I remain available for future issues.    HPI: Patient is here for follow-up.  She was in the ED recently for shortness of breath and edema.  There was no particular orthopnea.  Edema has resolved with Lasix.  BNP was quite low.  There has been no chest pain.    I saw the patient earlier this year and I reviewed those notes.  I continue to believe that current symptoms are not cardiac related.        Most recent or relevant cardiac/vascular testing:     TTE 2/20/2024: Normal LV and RV systolic function.  No valvular heart disease.      Past Surgical History:   Procedure Laterality Date    BREAST LUMPECTOMY Right 3/29/2022    Procedure: ITZEL  DIRECTED LUMPECTOMY;  Surgeon: Denton Chambers MD;  Location: [facility];  Service: Surgical Oncology    ESOPHAGOGASTRIC FUNDOPLASTY      Nissen Fundoplication    HERNIA REPAIR      LYMPH NODE BIOPSY Right 3/29/2022    Procedure: LYMPHATIC MAPPING WITH BLUE AND RADIOACTIVE DYES SENTINEL LYMPH NODE BIOPSY, INJECTION IN OR AT 0800 BY DR CHAMBERS;  Surgeon: Denton Chambers MD;  Location: [facility];  Service: Surgical Oncology    MAMMO STEREOTACTIC BREAST BIOPSY RIGHT (ALL INC) Right 3/7/2022    US BREAST CLIP NEEDLE LOC RIGHT Right 3/24/2022    US GUIDED BREAST BIOPSY RIGHT COMPLETE Right 3/7/2022    WRIST SURGERY Left        Lipid Profile: Reviewed      Review of Systems   10  point ROS  was  "otherwise non pertinent or negative except as per HPI or as below.   Gait: Normal.        Objective:     /75 (BP Location: Left arm, Patient Position: Sitting, Cuff Size: Standard)   Pulse 68   Resp 18   Ht 4' 11.84\" (1.52 m)   Wt 81.2 kg (179 lb)   SpO2 96%   BMI 35.14 kg/m²     PHYSICAL EXAM:    General:  Normal appearance in no distress.  Eyes:  Anicteric.  Oral mucosa:  Moist.  Neck:  No JVD. Carotid upstrokes are brisk without bruits.  No masses.  Chest:  Clear to auscultation.  Cardiac:  No palpable PMI.  Normal S1 and S2.  No murmur gallop or rub.  Abdomen:  Soft and nontender. No palpable organomegaly or aortic enlargement.  Extremities:  No peripheral edema.  Musculoskeletal:  Symmetric.   Vascular:  Femoral pulses are brisk without bruits.  Popliteal pulses are intact bilaterally.   Pedal pulses are intact.  Neuro:  Grossly symmetric.  Psych:  Alert and oriented x3.      Meds reviewed.    Past Medical History:   Diagnosis Date    Anxiety disorder     Aortic valve disorder     BRCA1 negative     BRCA2 negative     Breast cancer (HCC) 03/2022    right    Cancer (HCC)     breast    Cellulitis of finger of left hand 11/20/2018    Cellulitis of left hand 11/10/2018    Added automatically from request for surgery [id number]    Depression     Disease of thyroid gland     GERD (gastroesophageal reflux disease)     History of gastroesophageal reflux (GERD)     History of kidney cancer 03/06/2014    History of transfusion     Hyperlipidemia 03/06/2014    Hypertension 03/06/2014    Hypothyroidism 03/06/2014    Lymphoma (HCC)     Malignant neoplasm of overlapping sites of right breast in female, estrogen receptor positive (HCC) 03/10/2022    Shortness of breath     Stroke (HCC) 4/[id number]    Min stroke found wile going through chemo and radiation    TIA (transient ischemic attack)     UTI (urinary tract infection)            Social History     Tobacco Use   Smoking Status Never   Smokeless Tobacco Never "   Tobacco Comments    None

## 2024-06-09 DIAGNOSIS — N39.0 RECURRENT UTI: ICD-10-CM

## 2024-06-10 RX ORDER — MIRABEGRON 50 MG/1
1 TABLET, FILM COATED, EXTENDED RELEASE ORAL DAILY
Qty: 90 TABLET | Refills: 1 | Status: SHIPPED | OUTPATIENT
Start: 2024-06-10

## 2024-06-17 ENCOUNTER — TELEPHONE (OUTPATIENT)
Dept: NEUROLOGY | Facility: CLINIC | Age: 79
End: 2024-06-17

## 2024-06-17 NOTE — TELEPHONE ENCOUNTER
Patient's daughter came to office requesting a new speech therapy referral to [facility].  She thought the therapist's name was Sharona.

## 2024-06-18 DIAGNOSIS — G30.1 LATE ONSET ALZHEIMER'S DISEASE WITHOUT BEHAVIORAL DISTURBANCE (HCC): Primary | ICD-10-CM

## 2024-06-18 DIAGNOSIS — F02.80 LATE ONSET ALZHEIMER'S DISEASE WITHOUT BEHAVIORAL DISTURBANCE (HCC): Primary | ICD-10-CM

## 2024-06-29 DIAGNOSIS — R06.09 DYSPNEA ON EXERTION: ICD-10-CM

## 2024-06-29 DIAGNOSIS — R06.02 SHORTNESS OF BREATH: ICD-10-CM

## 2024-06-30 RX ORDER — FUROSEMIDE 40 MG/1
TABLET ORAL
Qty: 180 TABLET | Refills: 1 | Status: SHIPPED | OUTPATIENT
Start: 2024-06-30

## 2024-07-30 ENCOUNTER — TELEPHONE (OUTPATIENT)
Dept: HEMATOLOGY ONCOLOGY | Facility: CLINIC | Age: 79
End: 2024-07-30

## 2024-07-30 NOTE — TELEPHONE ENCOUNTER
Attempted to call patient's number, was temporarily disconnected.  Spoke with daughter Sarah.  She is aware Paula's appointment is now rescheduled to 11/5/24 due to Provider being out of the office on 10/28/24.

## 2024-08-05 ENCOUNTER — OFFICE VISIT (OUTPATIENT)
Dept: RADIATION ONCOLOGY | Facility: CLINIC | Age: 79
End: 2024-08-05
Attending: RADIOLOGY
Payer: COMMERCIAL

## 2024-08-05 VITALS
DIASTOLIC BLOOD PRESSURE: 94 MMHG | SYSTOLIC BLOOD PRESSURE: 148 MMHG | WEIGHT: 177.5 LBS | TEMPERATURE: 97.3 F | HEART RATE: 94 BPM | BODY MASS INDEX: 34.85 KG/M2 | OXYGEN SATURATION: 97 %

## 2024-08-05 DIAGNOSIS — L98.9 SKIN LESION OF LEFT ARM: ICD-10-CM

## 2024-08-05 DIAGNOSIS — Z85.3 ENCOUNTER FOR FOLLOW-UP SURVEILLANCE OF BREAST CANCER: Primary | ICD-10-CM

## 2024-08-05 DIAGNOSIS — Z08 ENCOUNTER FOR FOLLOW-UP SURVEILLANCE OF BREAST CANCER: Primary | ICD-10-CM

## 2024-08-05 DIAGNOSIS — C50.811 MALIGNANT NEOPLASM OF OVERLAPPING SITES OF RIGHT BREAST IN FEMALE, ESTROGEN RECEPTOR POSITIVE (HCC): ICD-10-CM

## 2024-08-05 DIAGNOSIS — Z17.0 MALIGNANT NEOPLASM OF OVERLAPPING SITES OF RIGHT BREAST IN FEMALE, ESTROGEN RECEPTOR POSITIVE (HCC): ICD-10-CM

## 2024-08-05 PROCEDURE — G0463 HOSPITAL OUTPT CLINIC VISIT: HCPCS | Performed by: RADIOLOGY

## 2024-08-05 PROCEDURE — 99211 OFF/OP EST MAY X REQ PHY/QHP: CPT | Performed by: RADIOLOGY

## 2024-08-05 PROCEDURE — G2211 COMPLEX E/M VISIT ADD ON: HCPCS | Performed by: RADIOLOGY

## 2024-08-05 PROCEDURE — 99213 OFFICE O/P EST LOW 20 MIN: CPT | Performed by: RADIOLOGY

## 2024-08-05 NOTE — PROGRESS NOTES
Paula Lubin 1945 is a 78 y.o. 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  She underwent lumpectomy and sentinel lymph node biopsy completed on 3/29/22.  Tumor cells were ER/MS(+) and CDP3Oiw(-).  She completed a course of adjuvant radiation on 22.  She is maintained on anastrazole.  She was last seen 23 and returns today for follow up.       10/30/23 Dr. Ewing  Invasive ductal carcinoma of the right breast Stage IA (pT1c,pN0,cM0)ER positive, MS positive, Fph8grq negative. Date of diagnosis 3/7/22. S/p right breast lumpectomy, SLN biopsy. S/p adjuvant RT. On adjuvant Arimidex. Tolerating it well. Mammogram 2/15/23--no evidence of malignancy. F/u in 1 yr. Labs Q6m.        24 Diagnostic B/L mammogram  RIGHT  1) POST-SURGICAL FINDING [C]: There are post-surgical findings from a previous lumpectomy with radiation seen in the right breast.    BILATERAL  There are no suspicious masses, grouped microcalcifications or areas of unexplained architectural distortion. The skin and nipple areolar complex are unremarkable.  Benign-appearing calcifications are noted in each breast.  RECOMMENDATION:       - Diagnostic mammogram in 1 year for both breasts.      24 Dr. Chambers  on anastrozole   Mammogram films were reviewed and discussed   She will also have to go for annual diagnostic mammogram   Follow up 6 months      Upcomin24 Dr. Chambers  10/28/24 Dr. Avila    Follow up visit     Oncology History   Malignant neoplasm of overlapping sites of right breast in female, estrogen receptor positive (HCC)   3/7/2022 Initial Diagnosis    Malignant neoplasm of right female breast (HCC)     3/7/2022 Biopsy    Right Breast Ultrasound guided biopsy  12 o'clock, 8 cm from nipple  Invasive  mammary carcinoma of no special type with extensive Ductal Carcinoma in situ  Grade 1   ER 95  WY 75  HER2 1+  Lymphovascular invasion not identified    Concordant. Malingnacy appears unifocal. Right axilla clear. Left breast clear.     Breast, Right, Stereo bx right breast 10oclock, 4 cores with calcs:  - Fibroadenoma with focal coarse calcifications.  - Negative for atypia and in-situ or invasive carcinoma.      Breast, Right, Stereo bx right breast 10oclock, 1 core without calcs:  - Fibroadenoma with focal coarse calcifications.  - Negative for atypia and in-situ or invasive carcinoma.      3/14/2022 Genetic Testing    A stat panel of genes were evaluated, including: SYDNI, BRCA1, BRCA2, CDH1, CHEK2, PALB2, PTEN, STK11, TP53  Negative result. No pathogenic sequence variants or deletions/duplications identified     3/14/2022 Genomic Testing    MammaPrint   Low risk  Mammaprint Index: +0.537  Luminal type A     3/14/2022 -  Cancer Staged    Staging form: Breast, AJCC 8th Edition  - Clinical stage from 3/14/2022: cT1b, cN0, cM0, GX, ER+, WY+, HER2- - Signed by Keely Crespo MD on 3/25/2022  Stage prefix: Initial diagnosis  Histologic grading system: 3 grade system       3/29/2022 Surgery    Right breast liyah  directed lumpectomy with sentinel lymph node biopsy  Invasive mammary carcinoma of no special type with associated ductal carcinoma in situ  Grade 1   1.3 cm  All margins negative for invasive carcinoma and ductal carcinoma in situ  0/3 Lymph node  Anatomic/prognostic stage: IA      5/23/2022 - 6/14/2022 Radiation    Treatments:  Course: C1  Plan ID Energy Fractions Dose per Fraction (cGy) Dose Correction (cGy) Total Dose Delivered (cGy) Elapsed Days   R Breast 6X 15 / 15 267 0 4,005 22    Treatment Dates:  5/23/2022 - 6/14/2022.            Review of Systems:  Review of Systems   Endocrine: Positive for heat intolerance.   Musculoskeletal: Negative.    Skin:  Positive for rash.       Clinical Trial:  no    Pregnancy test needed:  no    Teaching Completed    Health Maintenance   Topic Date Due    Hepatitis C Screening  Never done    Zoster Vaccine (1 of 2) Never done    RSV Vaccine Age 60+ Years (1 - 1-dose 60+ series) Never done    Pneumococcal Vaccine: 65+ Years (3 of 3 - PCV) 11/11/2020    BMI: Followup Plan  01/05/2023    COVID-19 Vaccine (7 - 2023-24 season) 09/01/2023    [facility] PLAN OF CARE  05/18/2024    Influenza Vaccine (1) 09/01/2024    Medicare Annual Wellness Visit (AWV)  09/19/2024    Depression Screening  01/25/2025    Fall Risk  01/25/2025    Breast Cancer Screening: Mammogram  02/19/2025    Urinary Incontinence Screening  05/29/2025    BMI: Adult  05/31/2025    Osteoporosis Screening  Completed    RSV Vaccine age 0-20 Months  Aged Out    HIB Vaccine  Aged Out    IPV Vaccine  Aged Out    Hepatitis A Vaccine  Aged Out    Meningococcal ACWY Vaccine  Aged Out    HPV Vaccine  Aged Out    Colorectal Cancer Screening  Discontinued     Patient Active Problem List   Diagnosis    Depression    Gastroesophageal reflux disease without esophagitis    Mixed hyperlipidemia    Benign essential HTN    Hypothyroid    Impaired fasting glucose    Lymphoma (HCC)    Cerebral aneurysm, nonruptured    Mild cognitive impairment    Dyspnea on exertion    Flexor tenosynovitis of finger    Macrocytosis without anemia    Paresthesias    Non-intractable vomiting with nausea    Hypokalemia    Late onset Alzheimer's disease without behavioral disturbance (HCC)    Chest pain    Near syncope    Immunocompromised (HCC)    Malignant neoplasm of overlapping sites of right breast in female, estrogen receptor positive (HCC)    Use of anastrozole    Recurrent major depressive disorder, remission status unspecified (HCC)    Stage 3 chronic kidney disease, unspecified whether stage 3a or 3b CKD (HCC)    History of lumpectomy of right breast    Encounter for follow-up surveillance of breast cancer    Encounter for monitoring anastrozole  therapy    Bilateral lower extremity edema     Past Medical History:   Diagnosis Date    Anxiety disorder     Aortic valve disorder     BRCA1 negative     BRCA2 negative     Breast cancer (HCC) 03/2022    right    Cancer (HCC)     breast    Cellulitis of finger of left hand 11/20/2018    Cellulitis of left hand 11/10/2018    Added automatically from request for surgery [id number]    Depression     Disease of thyroid gland     GERD (gastroesophageal reflux disease)     History of gastroesophageal reflux (GERD)     History of kidney cancer 03/06/2014    History of transfusion     Hyperlipidemia 03/06/2014    Hypertension 03/06/2014    Hypothyroidism 03/06/2014    Lymphoma (HCC)     Malignant neoplasm of overlapping sites of right breast in female, estrogen receptor positive (HCC) 03/10/2022    Shortness of breath     Stroke ([facility]) [id number]    Min stroke found wile going through chemo and radiation    TIA (transient ischemic attack)     UTI (urinary tract infection)      Past Surgical History:   Procedure Laterality Date    BREAST LUMPECTOMY Right 3/29/2022    Procedure: ITZEL  DIRECTED LUMPECTOMY;  Surgeon: Denton Chambers MD;  Location: [facility];  Service: Surgical Oncology    ESOPHAGOGASTRIC FUNDOPLASTY      Nissen Fundoplication    HERNIA REPAIR      LYMPH NODE BIOPSY Right 3/29/2022    Procedure: LYMPHATIC MAPPING WITH BLUE AND RADIOACTIVE DYES SENTINEL LYMPH NODE BIOPSY, INJECTION IN OR AT 0800 BY DR CHAMBERS;  Surgeon: Denton Chambers MD;  Location: [facility];  Service: Surgical Oncology    MAMMO STEREOTACTIC BREAST BIOPSY RIGHT (ALL INC) Right 3/7/2022    US BREAST CLIP NEEDLE LOC RIGHT Right 3/24/2022    US GUIDED BREAST BIOPSY RIGHT COMPLETE Right 3/7/2022    WRIST SURGERY Left      Family History   Problem Relation Age of Onset    Stroke Father     Leukemia Sister     No Known Problems Brother     Skin cancer Mother     Stroke Maternal Grandmother     No Known Problems Maternal  Grandfather     No Known Problems Paternal Grandmother     No Known Problems Paternal Grandfather     Breast cancer Sister     Breast cancer Sister         Colon cancer    Kidney cancer Brother     Alcohol abuse Brother     Kidney cancer Brother     Stomach cancer Brother     No Known Problems Daughter     No Known Problems Daughter      Social History     Socioeconomic History    Marital status:      Spouse name: Not on file    Number of children: 2    Years of education: Not on file    Highest education level: Not on file   Occupational History    Not on file   Tobacco Use    Smoking status: Never    Smokeless tobacco: Never    Tobacco comments:     None   Vaping Use    Vaping status: Never Used   Substance and Sexual Activity    Alcohol use: Not Currently     Comment: Social holidays    Drug use: Never    Sexual activity: Not Currently     Comment: [place] over   Other Topics Concern    Not on file   Social History Narrative    Not on file     Social Determinants of Health     Financial Resource Strain: Low Risk  (9/19/2023)    Overall Financial Resource Strain (CARDIA)     Difficulty of Paying Living Expenses: Not hard at all   Food Insecurity: Not on file   Transportation Needs: No Transportation Needs (9/19/2023)    PRAPARE - Transportation     Lack of Transportation (Medical): No     Lack of Transportation (Non-Medical): No   Physical Activity: Not on file   Stress: Not on file   Social Connections: Unknown (6/18/2024)    Received from [employer]     How often do you feel lonely or isolated from those around you? (Adult - for ages 18 years and over): Not on file   Intimate Partner Violence: Not on file   Housing Stability: Not on file       Current Outpatient Medications:     amLODIPine (NORVASC) 2.5 mg tablet, Take 2 tablets (5 mg total) by mouth daily, Disp: 180 tablet, Rfl: 1    anastrozole (ARIMIDEX) 1 mg tablet, TAKE 1 TABLET BY MOUTH EVERY DAY, Disp: 90 tablet, Rfl: 1     aspirin 325 mg tablet, Take 325 mg by mouth daily , Disp: , Rfl:     cholecalciferol (VITAMIN D3) 1,000 units tablet, Take 1 tablet (1,000 Units total) by mouth daily, Disp: 30 tablet, Rfl: 6    cholestyramine (QUESTRAN) 4 g packet, TAKE 1 PACKET BY MOUTH DAILY. (Patient not taking: Reported on 3/5/2024), Disp: 90 packet, Rfl: 3    dicyclomine (BENTYL) 20 mg tablet, Take 1 tablet (20 mg total) by mouth 2 (two) times a day (Patient not taking: Reported on 1/25/2024), Disp: 20 tablet, Rfl: 0    diphenhydrAMINE (BENADRYL) 25 mg tablet, Take 25 mg by mouth in the morning. 1/2 tablet with Paxil ., Disp: , Rfl:     fluticasone (FLONASE) 50 mcg/act nasal spray, INSTILL 1 SPRAY INTO EACH NOSTRIL AS NEEDED FOR RHINITIS OR ALLERGIES, Disp: 48 mL, Rfl: 1    furosemide (LASIX) 40 mg tablet, TAKE 1 TABLET BY MOUTH DAILY AS NEEDED (FOR AM LEG SWELLING OR WEIGHT GAIN) ONLY TAKE IF LEGS SWOLLEN IN THE AM OR WEIGHT RISING., Disp: 180 tablet, Rfl: 1    levothyroxine 125 mcg tablet, TAKE 1 TABLET BY MOUTH EVERY DAY, Disp: 90 tablet, Rfl: 1    loperamide (IMODIUM) 2 mg capsule, Take 1 capsule (2 mg total) by mouth 4 (four) times a day as needed for diarrhea, Disp: 12 capsule, Rfl: 0    meclizine (ANTIVERT) 25 mg tablet, Take 1 tablet (25 mg total) by mouth every 8 (eight) hours as needed for dizziness, Disp: 50 tablet, Rfl: 1    memantine (NAMENDA) 10 mg tablet, TAKE 1 TABLET BY MOUTH TWICE A DAY, Disp: 180 tablet, Rfl: 1    Mirabegron ER (Myrbetriq) 50 MG TB24, TAKE 1 TABLET BY MOUTH EVERY DAY, Disp: 90 tablet, Rfl: 1    multivitamin (THERAGRAN) TABS, Take 1 tablet by mouth, Disp: , Rfl:     omeprazole (PriLOSEC) 20 mg delayed release capsule, TAKE 1 CAPSULE BY MOUTH TWICE A DAY, Disp: 180 capsule, Rfl: 0    ondansetron (ZOFRAN-ODT) 4 mg disintegrating tablet, Take 1 tablet (4 mg total) by mouth every 8 (eight) hours as needed for nausea, Disp: 15 tablet, Rfl: 0    PARoxetine (PAXIL) 20 mg tablet, TAKE 1 TABLET BY MOUTH EVERY DAY,  Disp: 90 tablet, Rfl: 3    potassium chloride (K-DUR,KLOR-CON) 10 mEq tablet, Take 1 tablet (10 mEq total) by mouth daily for 10 days, Disp: 10 tablet, Rfl: 0    PROAIR  (90 Base) MCG/ACT inhaler, as needed, Disp: , Rfl:     rivastigmine (EXELON) 9.5 mg/24 hr TD 24 hr patch, PLACE 1 PATCH ON THE SKIN OVER 24 HOURS DAILY, Disp: 90 patch, Rfl: 1  Allergies   Allergen Reactions    Ciprofloxacin Hives    Sulfa Antibiotics Itching    Other     Penicillins Rash     Category: Allergy;   --  Pt received unasyn 1.5 mg IV 11-12-18 to 11-15-18    Sulfacetamide Rash     Category: Allergy;      There were no vitals filed for this visit.

## 2024-08-05 NOTE — PROGRESS NOTES
Follow-up - Radiation Oncology   Paula Lubin 1945 78 y.o. female [id number]      History of Present Illness   Cancer Staging   Malignant neoplasm of overlapping sites of right breast in female, estrogen receptor positive (HCC)  Staging form: Breast, AJCC 8th Edition  - Clinical stage from 3/14/2022: cT1b, cN0, cM0, GX, ER+, SD+, HER2- - Signed by Keely Crespo MD on 3/25/2022  Stage prefix: Initial diagnosis  Histologic grading system: 3 grade system      Paula Lubin is a 78 y.o. 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  She underwent lumpectomy and sentinel lymph node biopsy completed on 3/29/22.  Tumor cells were ER/SD(+) and [id number](-).  She completed adjuvant radiation on 6/14/22.  She is maintained on anastrazole.  She was last seen 8/4/23 and returns today for follow up.        10/30/23 Dr. Ewing  Invasive ductal carcinoma of the right breast Stage IA (pT1c,pN0,cM0)ER positive, SD positive, [id number] negative. Date of diagnosis 3/7/22. S/p right breast lumpectomy, SLN biopsy. S/p adjuvant RT. On adjuvant Arimidex. Tolerating it well. Mammogram 2/15/23--no evidence of malignancy. F/u in 1 yr. Labs Q6m.       2/19/24 Diagnostic B/L mammogram  RIGHT  1) POST-SURGICAL FINDING [C]: There are post-surgical findings from a previous lumpectomy with radiation seen in the right breast.    BILATERAL  There are no suspicious masses, grouped microcalcifications or areas of unexplained architectural distortion. The skin and nipple areolar complex are unremarkable.  Benign-appearing calcifications are noted in each breast.  RECOMMENDATION:       - Diagnostic mammogram in 1 year for both breasts.     2/26/24 Dr. Chambers  on anastrozole   Mammogram films were reviewed and discussed   She will also  have to go for annual diagnostic mammogram   Follow up 6 months     The patient offers no breast complaints.  She denies significant breast tenderness or pain.  She denies new palpable nodules, suspicious skin changes, or nipple discharge of the breast bilaterally.  She denies any upper extremity edema or shoulder restriction.    The patient is tolerating anastrozole well.      Upcomin24 Dr. Chambers  10/28/24 Dr. Avila      Historical Information   Oncology History   Malignant neoplasm of overlapping sites of right breast in female, estrogen receptor positive (HCC)   3/7/2022 Initial Diagnosis    Malignant neoplasm of right female breast (HCC)     3/7/2022 Biopsy    Right Breast Ultrasound guided biopsy  12 o'clock, 8 cm from nipple  Invasive mammary carcinoma of no special type with extensive Ductal Carcinoma in situ  Grade 1   ER 95  CO 75  HER2 1+  Lymphovascular invasion not identified    Concordant. Malingnacy appears unifocal. Right axilla clear. Left breast clear.     Breast, Right, Stereo bx right breast 10oclock, 4 cores with calcs:  - Fibroadenoma with focal coarse calcifications.  - Negative for atypia and in-situ or invasive carcinoma.      Breast, Right, Stereo bx right breast 10oclock, 1 core without calcs:  - Fibroadenoma with focal coarse calcifications.  - Negative for atypia and in-situ or invasive carcinoma.      3/14/2022 Genetic Testing    A stat panel of genes were evaluated, including: SYDNI, BRCA1, BRCA2, CDH1, CHEK2, PALB2, PTEN, STK11, TP53  Negative result. No pathogenic sequence variants or deletions/duplications identified     3/14/2022 Genomic Testing    MammaPrint   Low risk  Mammaprint Index: +0.537  Luminal type A     3/14/2022 -  Cancer Staged    Staging form: Breast, AJCC 8th Edition  - Clinical stage from 3/14/2022: cT1b, cN0, cM0, GX, ER+, CO+, HER2- - Signed by Keely Crespo MD on 3/25/2022  Stage prefix: Initial diagnosis  Histologic grading system: 3 grade system        3/29/2022 Surgery    Right breast liyah  directed lumpectomy with sentinel lymph node biopsy  Invasive mammary carcinoma of no special type with associated ductal carcinoma in situ  Grade 1   1.3 cm  All margins negative for invasive carcinoma and ductal carcinoma in situ  0/3 Lymph node  Anatomic/prognostic stage: IA      5/23/2022 - 6/14/2022 Radiation    Treatments:  Course: C1  Plan ID Energy Fractions Dose per Fraction (cGy) Dose Correction (cGy) Total Dose Delivered (cGy) Elapsed Days   R Breast 6X 15 / 15 267 0 4,005 22    Treatment Dates:  5/23/2022 - 6/14/2022.            Past Medical History:   Diagnosis Date    Anxiety disorder     Aortic valve disorder     BRCA1 negative     BRCA2 negative     Breast cancer (HCC) 03/2022    right    Cancer (HCC)     breast    Cellulitis of finger of left hand 11/20/2018    Cellulitis of left hand 11/10/2018    Added automatically from request for surgery [id number]    Depression     Disease of thyroid gland     GERD (gastroesophageal reflux disease)     History of gastroesophageal reflux (GERD)     History of kidney cancer 03/06/2014    History of transfusion     Hyperlipidemia 03/06/2014    Hypertension 03/06/2014    Hypothyroidism 03/06/2014    Lymphoma (HCC)     Malignant neoplasm of overlapping sites of right breast in female, estrogen receptor positive (HCC) 03/10/2022    Shortness of breath     Stroke (HCC) [id number]    Min stroke found wile going through chemo and radiation    TIA (transient ischemic attack)     UTI (urinary tract infection)      Past Surgical History:   Procedure Laterality Date    BREAST LUMPECTOMY Right 3/29/2022    Procedure: LIYAH  DIRECTED LUMPECTOMY;  Surgeon: Denton Chambers MD;  Location: [facility];  Service: Surgical Oncology    ESOPHAGOGASTRIC FUNDOPLASTY      Nissen Fundoplication    HERNIA REPAIR      LYMPH NODE BIOPSY Right 3/29/2022    Procedure: LYMPHATIC MAPPING WITH BLUE AND RADIOACTIVE DYES SENTINEL LYMPH NODE  BIOPSY, INJECTION IN OR AT 0800 BY DR CHAMEBRS;  Surgeon: Denton Chambers MD;  Location: [facility];  Service: Surgical Oncology    MAMMO STEREOTACTIC BREAST BIOPSY RIGHT (ALL INC) Right 3/7/2022    US BREAST CLIP NEEDLE LOC RIGHT Right 3/24/2022    US GUIDED BREAST BIOPSY RIGHT COMPLETE Right 3/7/2022    WRIST SURGERY Left        Social History   Social History     Substance and Sexual Activity   Alcohol Use Not Currently    Comment: Social holidays     Social History     Substance and Sexual Activity   Drug Use Never     Social History     Tobacco Use   Smoking Status Never   Smokeless Tobacco Never   Tobacco Comments    None         Meds/Allergies     Current Outpatient Medications:     amLODIPine (NORVASC) 2.5 mg tablet, Take 2 tablets (5 mg total) by mouth daily, Disp: 180 tablet, Rfl: 1    anastrozole (ARIMIDEX) 1 mg tablet, TAKE 1 TABLET BY MOUTH EVERY DAY, Disp: 90 tablet, Rfl: 1    aspirin 325 mg tablet, Take 325 mg by mouth daily , Disp: , Rfl:     cholecalciferol (VITAMIN D3) 1,000 units tablet, Take 1 tablet (1,000 Units total) by mouth daily, Disp: 30 tablet, Rfl: 6    cholestyramine (QUESTRAN) 4 g packet, TAKE 1 PACKET BY MOUTH DAILY., Disp: 90 packet, Rfl: 3    dicyclomine (BENTYL) 20 mg tablet, Take 1 tablet (20 mg total) by mouth 2 (two) times a day, Disp: 20 tablet, Rfl: 0    fluticasone (FLONASE) 50 mcg/act nasal spray, INSTILL 1 SPRAY INTO EACH NOSTRIL AS NEEDED FOR RHINITIS OR ALLERGIES, Disp: 48 mL, Rfl: 1    furosemide (LASIX) 40 mg tablet, TAKE 1 TABLET BY MOUTH DAILY AS NEEDED (FOR AM LEG SWELLING OR WEIGHT GAIN) ONLY TAKE IF LEGS SWOLLEN IN THE AM OR WEIGHT RISING., Disp: 180 tablet, Rfl: 1    levothyroxine 125 mcg tablet, TAKE 1 TABLET BY MOUTH EVERY DAY, Disp: 90 tablet, Rfl: 1    loperamide (IMODIUM) 2 mg capsule, Take 1 capsule (2 mg total) by mouth 4 (four) times a day as needed for diarrhea, Disp: 12 capsule, Rfl: 0    meclizine (ANTIVERT) 25 mg tablet, Take 1 tablet (25 mg  total) by mouth every 8 (eight) hours as needed for dizziness, Disp: 50 tablet, Rfl: 1    memantine (NAMENDA) 10 mg tablet, TAKE 1 TABLET BY MOUTH TWICE A DAY, Disp: 180 tablet, Rfl: 1    Mirabegron ER (Myrbetriq) 50 MG TB24, TAKE 1 TABLET BY MOUTH EVERY DAY, Disp: 90 tablet, Rfl: 1    multivitamin (THERAGRAN) TABS, Take 1 tablet by mouth, Disp: , Rfl:     omeprazole (PriLOSEC) 20 mg delayed release capsule, TAKE 1 CAPSULE BY MOUTH TWICE A DAY, Disp: 180 capsule, Rfl: 0    ondansetron (ZOFRAN-ODT) 4 mg disintegrating tablet, Take 1 tablet (4 mg total) by mouth every 8 (eight) hours as needed for nausea, Disp: 15 tablet, Rfl: 0    PARoxetine (PAXIL) 20 mg tablet, TAKE 1 TABLET BY MOUTH EVERY DAY, Disp: 90 tablet, Rfl: 3    potassium chloride (K-DUR,KLOR-CON) 10 mEq tablet, Take 1 tablet (10 mEq total) by mouth daily for 10 days, Disp: 10 tablet, Rfl: 0    PROAIR  (90 Base) MCG/ACT inhaler, as needed, Disp: , Rfl:     rivastigmine (EXELON) 9.5 mg/24 hr TD 24 hr patch, PLACE 1 PATCH ON THE SKIN OVER 24 HOURS DAILY, Disp: 90 patch, Rfl: 1    diphenhydrAMINE (BENADRYL) 25 mg tablet, Take 25 mg by mouth in the morning. 1/2 tablet with Paxil . (Patient not taking: Reported on 8/5/2024), Disp: , Rfl:   Allergies   Allergen Reactions    Ciprofloxacin Hives    Sulfa Antibiotics Itching    Other     Penicillins Rash     Category: Allergy;   --  Pt received unasyn 1.5 mg IV 11-12-18 to 11-15-18    Sulfacetamide Rash     Category: Allergy;          Review of Systems   Endocrine: Positive for heat intolerance.   Musculoskeletal: Negative.    Skin:  Positive for rash.         OBJECTIVE:   /94   Pulse 94   Temp (!) 97.3 °F (36.3 °C)   Wt 80.5 kg (177 lb 8 oz)   SpO2 97%   BMI 34.85 kg/m²   Karnofsky: 90 - Able to carry on normal activity; minor signs or symptoms of disease     Physical Exam  Vitals and nursing note reviewed.   Constitutional:       General: She is not in acute distress.     Appearance: She is  well-developed.   Cardiovascular:      Rate and Rhythm: Normal rate and regular rhythm.   Pulmonary:      Breath sounds: No wheezing, rhonchi or rales.   Chest:      Comments: Breast examination demonstrates that the right breast is slightly smaller in size than the left.  Contours are symmetric.  There is a well-healed upper outer right lumpectomy scar there is mild diffuse fibrosis of the right breast more notable nipple and lumpectomy scar.  There is slight diffuse hyperpigmentation of the right breast and central peau d'orange.  There are no palpable nodules or suspicious skin changes of the breast bilaterally.  Abdominal:      Palpations: Abdomen is soft.      Tenderness: There is no abdominal tenderness.   Musculoskeletal:      Comments: No upper extremity edema or decreased range of motion bilaterally.   Lymphadenopathy:      Cervical: No cervical adenopathy.      Upper Body:      Right upper body: No supraclavicular or axillary adenopathy.      Left upper body: No supraclavicular or axillary adenopathy.   Neurological:      Mental Status: She is alert and oriented to person, place, and time.      Gait: Gait normal.            Assessment/Plan:  Paula Lubin is a 78 y.o. woman with multiple comorbidities diagnosed with clinical stage I, grade 1 invasive mammary carcinoma of the right breast status post resection achieving negative margins on 3/29/22.  Tumor cells were ER/ID(+) and [id number](-).  She had previous breast radiation for NHL of the left breast many years ago.  She completed a course of adjuvant radiation on 6/14/22. She is maintained on anastrazole that she is tolerating well.      She remains clinically ADDI.  -She is compliant with follow-up with Medical and Surgical Oncology.  -Next mammogram will be due February 2025.  Surgical Oncology will be scheduling imaging.   -I instructed her to practice sun protection to the irradiated skin and to use emollients as needed.      We discussed follow-up as  "needed or returning in 1 year.  She decided to return in 1 year.  We will see her sooner should the need arise.       Tere Hernandez MD  8/5/2024,1:30 PM    Portions of the record may have been created with voice recognition software.  Occasional wrong word or \"sound a like\" substitutions may have occurred due to the inherent limitations of voice recognition software.  Read the chart carefully and recognize, using context, where substitutions have occurred.        "

## 2024-08-06 DIAGNOSIS — G30.1 LATE ONSET ALZHEIMER'S DISEASE WITHOUT BEHAVIORAL DISTURBANCE (HCC): ICD-10-CM

## 2024-08-06 DIAGNOSIS — F02.80 LATE ONSET ALZHEIMER'S DISEASE WITHOUT BEHAVIORAL DISTURBANCE (HCC): ICD-10-CM

## 2024-08-06 RX ORDER — MEMANTINE HYDROCHLORIDE 10 MG/1
10 TABLET ORAL 2 TIMES DAILY
Qty: 180 TABLET | Refills: 1 | Status: SHIPPED | OUTPATIENT
Start: 2024-08-06

## 2024-08-12 ENCOUNTER — APPOINTMENT (EMERGENCY)
Dept: RADIOLOGY | Facility: HOSPITAL | Age: 79
End: 2024-08-12
Payer: COMMERCIAL

## 2024-08-12 ENCOUNTER — HOSPITAL ENCOUNTER (EMERGENCY)
Facility: HOSPITAL | Age: 79
Discharge: HOME | End: 2024-08-12
Attending: EMERGENCY MEDICINE
Payer: COMMERCIAL

## 2024-08-12 VITALS
TEMPERATURE: 98.1 F | SYSTOLIC BLOOD PRESSURE: 144 MMHG | HEART RATE: 73 BPM | DIASTOLIC BLOOD PRESSURE: 69 MMHG | RESPIRATION RATE: 20 BRPM | OXYGEN SATURATION: 92 %

## 2024-08-12 DIAGNOSIS — I50.9 CHF EXACERBATION (HCC): Primary | ICD-10-CM

## 2024-08-12 DIAGNOSIS — N39.0 UTI (URINARY TRACT INFECTION): ICD-10-CM

## 2024-08-12 LAB
2HR DELTA HS TROPONIN: 0 NG/L
ALBUMIN SERPL BCG-MCNC: 4.1 G/DL (ref 3.5–5)
ALP SERPL-CCNC: 74 U/L (ref 34–104)
ALT SERPL W P-5'-P-CCNC: 7 U/L (ref 7–52)
ANION GAP SERPL CALCULATED.3IONS-SCNC: 11 MMOL/L (ref 4–13)
AST SERPL W P-5'-P-CCNC: 20 U/L (ref 13–39)
ATRIAL RATE: 76 BPM
BACTERIA UR QL AUTO: ABNORMAL /HPF
BASOPHILS # BLD MANUAL: 0.2 THOUSAND/UL (ref 0–0.1)
BASOPHILS NFR MAR MANUAL: 4 % (ref 0–1)
BILIRUB SERPL-MCNC: 0.38 MG/DL (ref 0.2–1)
BILIRUB UR QL STRIP: NEGATIVE
BNP SERPL-MCNC: 50 PG/ML (ref 0–100)
BUN SERPL-MCNC: 16 MG/DL (ref 5–25)
CALCIUM SERPL-MCNC: 9 MG/DL (ref 8.4–10.2)
CARDIAC TROPONIN I PNL SERPL HS: 3 NG/L
CARDIAC TROPONIN I PNL SERPL HS: 3 NG/L
CHLORIDE SERPL-SCNC: 102 MMOL/L (ref 96–108)
CLARITY UR: ABNORMAL
CO2 SERPL-SCNC: 26 MMOL/L (ref 21–32)
COLOR UR: YELLOW
CREAT SERPL-MCNC: 0.9 MG/DL (ref 0.6–1.3)
EOSINOPHIL # BLD MANUAL: 0.05 THOUSAND/UL (ref 0–0.4)
EOSINOPHIL NFR BLD MANUAL: 1 % (ref 0–6)
ERYTHROCYTE [DISTWIDTH] IN BLOOD BY AUTOMATED COUNT: 14.2 % (ref 11.6–15.1)
FLUAV RNA RESP QL NAA+PROBE: NEGATIVE
FLUBV RNA RESP QL NAA+PROBE: NEGATIVE
GFR SERPL CREATININE-BSD FRML MDRD: 61 ML/MIN/1.73SQ M
GLUCOSE SERPL-MCNC: 115 MG/DL (ref 65–140)
GLUCOSE UR STRIP-MCNC: NEGATIVE MG/DL
HCT VFR BLD AUTO: 36.9 % (ref 34.8–46.1)
HGB BLD-MCNC: 12.1 G/DL (ref 11.5–15.4)
HGB UR QL STRIP.AUTO: NEGATIVE
HYALINE CASTS #/AREA URNS LPF: ABNORMAL /LPF
KETONES UR STRIP-MCNC: NEGATIVE MG/DL
LEUKOCYTE ESTERASE UR QL STRIP: ABNORMAL
LYMPHOCYTES # BLD AUTO: 1.03 THOUSAND/UL (ref 0.6–4.47)
LYMPHOCYTES # BLD AUTO: 14 % (ref 14–44)
MCH RBC QN AUTO: 37.3 PG (ref 26.8–34.3)
MCHC RBC AUTO-ENTMCNC: 32.8 G/DL (ref 31.4–37.4)
MCV RBC AUTO: 114 FL (ref 82–98)
MONOCYTES # BLD AUTO: 0.2 THOUSAND/UL (ref 0–1.22)
MONOCYTES NFR BLD: 4 % (ref 4–12)
MUCOUS THREADS UR QL AUTO: ABNORMAL
NEUTROPHILS # BLD MANUAL: 3.44 THOUSAND/UL (ref 1.85–7.62)
NEUTS SEG NFR BLD AUTO: 70 % (ref 43–75)
NITRITE UR QL STRIP: NEGATIVE
NON-SQ EPI CELLS URNS QL MICRO: ABNORMAL /HPF
P AXIS: 53 DEGREES
PH UR STRIP.AUTO: 5.5 [PH]
PLATELET # BLD AUTO: 282 THOUSANDS/UL (ref 149–390)
PLATELET BLD QL SMEAR: ADEQUATE
PMV BLD AUTO: 12 FL (ref 8.9–12.7)
POTASSIUM SERPL-SCNC: 3.4 MMOL/L (ref 3.5–5.3)
PR INTERVAL: 138 MS
PROT SERPL-MCNC: 7 G/DL (ref 6.4–8.4)
PROT UR STRIP-MCNC: ABNORMAL MG/DL
QRS AXIS: 39 DEGREES
QRSD INTERVAL: 82 MS
QT INTERVAL: 384 MS
QTC INTERVAL: 432 MS
RBC # BLD AUTO: 3.24 MILLION/UL (ref 3.81–5.12)
RBC #/AREA URNS AUTO: ABNORMAL /HPF
RBC MORPH BLD: NORMAL
RENAL EPI CELLS #/AREA URNS HPF: PRESENT /[HPF]
RSV RNA RESP QL NAA+PROBE: NEGATIVE
SARS-COV-2 RNA RESP QL NAA+PROBE: NEGATIVE
SODIUM SERPL-SCNC: 139 MMOL/L (ref 135–147)
SP GR UR STRIP.AUTO: 1.03 (ref 1–1.03)
T WAVE AXIS: 54 DEGREES
UROBILINOGEN UR STRIP-ACNC: 2 MG/DL
VARIANT LYMPHS # BLD AUTO: 7 %
VENTRICULAR RATE: 76 BPM
WBC # BLD AUTO: 4.91 THOUSAND/UL (ref 4.31–10.16)
WBC #/AREA URNS AUTO: ABNORMAL /HPF
WBC CLUMPS # UR AUTO: PRESENT /UL

## 2024-08-12 PROCEDURE — 36415 COLL VENOUS BLD VENIPUNCTURE: CPT

## 2024-08-12 PROCEDURE — 81001 URINALYSIS AUTO W/SCOPE: CPT | Performed by: EMERGENCY MEDICINE

## 2024-08-12 PROCEDURE — 80053 COMPREHEN METABOLIC PANEL: CPT | Performed by: EMERGENCY MEDICINE

## 2024-08-12 PROCEDURE — 84484 ASSAY OF TROPONIN QUANT: CPT | Performed by: EMERGENCY MEDICINE

## 2024-08-12 PROCEDURE — 99285 EMERGENCY DEPT VISIT HI MDM: CPT

## 2024-08-12 PROCEDURE — 87086 URINE CULTURE/COLONY COUNT: CPT | Performed by: EMERGENCY MEDICINE

## 2024-08-12 PROCEDURE — 93010 ELECTROCARDIOGRAM REPORT: CPT | Performed by: INTERNAL MEDICINE

## 2024-08-12 PROCEDURE — 85007 BL SMEAR W/DIFF WBC COUNT: CPT | Performed by: EMERGENCY MEDICINE

## 2024-08-12 PROCEDURE — 96374 THER/PROPH/DIAG INJ IV PUSH: CPT

## 2024-08-12 PROCEDURE — 87077 CULTURE AEROBIC IDENTIFY: CPT | Performed by: EMERGENCY MEDICINE

## 2024-08-12 PROCEDURE — 71045 X-RAY EXAM CHEST 1 VIEW: CPT

## 2024-08-12 PROCEDURE — 0241U HB NFCT DS VIR RESP RNA 4 TRGT: CPT | Performed by: EMERGENCY MEDICINE

## 2024-08-12 PROCEDURE — 99284 EMERGENCY DEPT VISIT MOD MDM: CPT | Performed by: EMERGENCY MEDICINE

## 2024-08-12 PROCEDURE — 87186 SC STD MICRODIL/AGAR DIL: CPT | Performed by: EMERGENCY MEDICINE

## 2024-08-12 PROCEDURE — 93005 ELECTROCARDIOGRAM TRACING: CPT

## 2024-08-12 PROCEDURE — 85027 COMPLETE CBC AUTOMATED: CPT | Performed by: EMERGENCY MEDICINE

## 2024-08-12 PROCEDURE — 83880 ASSAY OF NATRIURETIC PEPTIDE: CPT | Performed by: EMERGENCY MEDICINE

## 2024-08-12 RX ORDER — POTASSIUM CHLORIDE 1500 MG/1
40 TABLET, EXTENDED RELEASE ORAL ONCE
Status: COMPLETED | OUTPATIENT
Start: 2024-08-12 | End: 2024-08-12

## 2024-08-12 RX ORDER — FUROSEMIDE 10 MG/ML
40 INJECTION INTRAMUSCULAR; INTRAVENOUS ONCE
Status: COMPLETED | OUTPATIENT
Start: 2024-08-12 | End: 2024-08-12

## 2024-08-12 RX ORDER — CEPHALEXIN 500 MG/1
500 CAPSULE ORAL EVERY 8 HOURS SCHEDULED
Qty: 15 CAPSULE | Refills: 0 | Status: SHIPPED | OUTPATIENT
Start: 2024-08-12 | End: 2024-08-17

## 2024-08-12 RX ADMIN — CEPHALEXIN 500 MG: 250 CAPSULE ORAL at 18:34

## 2024-08-12 RX ADMIN — POTASSIUM CHLORIDE 40 MEQ: 1500 TABLET, EXTENDED RELEASE ORAL at 18:13

## 2024-08-12 RX ADMIN — FUROSEMIDE 40 MG: 10 INJECTION, SOLUTION INTRAMUSCULAR; INTRAVENOUS at 18:23

## 2024-08-13 ENCOUNTER — VBI (OUTPATIENT)
Dept: INTERNAL MEDICINE CLINIC | Facility: CLINIC | Age: 79
End: 2024-08-13

## 2024-08-13 NOTE — LETTER
[facility] INTERNAL MEDICINE [place]  [address]  [facility] [address]  [phone]    Date: 08/15/24    Paula Lubin  [facility] [facility] [address]    Dear Paula:                                                                                                                                Thank you for choosing [facility] emergency department for care.  Your primary care provider wants to make sure that your ongoing medical care is being addressed. If you require follow up care as a result of your emergency department visit, there are a few things the practice would like you to know.                As part of the [facility]'s continuing commitment to caring for our patients, we have added more same day appointments and have extended office hours to meet your medical needs. After hours, on-call physicians are available via your primary care provider's main office line.               We encourage you to contact our office prior to seeking treatment to discuss your symptoms with the medical staff.  Together, we can determine the correct course of action.  A majority of non-emergent conditions such as: common cold, flu-like symptoms, fevers, strains/sprains, dislocations, minor burns, cuts and animal bites can be treated at [facility] facilities. Diagnostic testing is available at some sites.               Of course, if you are experiencing a life threatening medical emergency call 911 or proceed directly to the nearest emergency room.    Your nearest [facility] facility is conveniently located at:    [address] [place] [address] [address]  [place] [address]  [phone]  SKIP THE WAIT  Conveniently offered at most [facility] locations  [place] your spot online at [email or web address] or on the [facility] [place]    Sincerely,    [facility] INTER[facility]IC[facility] [place]  Dept: [phone]

## 2024-08-13 NOTE — ED PROVIDER NOTES
"History  Chief Complaint   Patient presents with    Shortness of Breath     Patient reports shortness of breath with minimal exertion, bilateral leg \"heaviness\" and \"a little bit\" of swelling for the past several days. Patient also reports burning sensation with urination.      78-year-old female presented emergency department evaluation shortness of breath.  Patient describes exertional dyspnea bilateral leg heaviness as well as urinary frequency and burning consistent with previous UTIs.  She has had no chest pain palpitations lightheadedness or syncope.  She has had no fevers or chills she has had no productive cough.  She said no abdominal pain or flank pain.        Prior to Admission Medications   Prescriptions Last Dose Informant Patient Reported? Taking?   Mirabegron ER (Myrbetriq) 50 MG TB24   No No   Sig: TAKE 1 TABLET BY MOUTH EVERY DAY   PARoxetine (PAXIL) 20 mg tablet  Self, Child No No   Sig: TAKE 1 TABLET BY MOUTH EVERY DAY   PROAIR  (90 Base) MCG/ACT inhaler  Self, Child Yes No   Sig: as needed   amLODIPine (NORVASC) 2.5 mg tablet   No No   Sig: Take 2 tablets (5 mg total) by mouth daily   anastrozole (ARIMIDEX) 1 mg tablet   No No   Sig: TAKE 1 TABLET BY MOUTH EVERY DAY   aspirin 325 mg tablet  Self, Child Yes No   Sig: Take 325 mg by mouth daily    cholecalciferol (VITAMIN D3) 1,000 units tablet  Self, Child No No   Sig: Take 1 tablet (1,000 Units total) by mouth daily   cholestyramine (QUESTRAN) 4 g packet  Self, Child No No   Sig: TAKE 1 PACKET BY MOUTH DAILY.   dicyclomine (BENTYL) 20 mg tablet  Self, Child No No   Sig: Take 1 tablet (20 mg total) by mouth 2 (two) times a day   diphenhydrAMINE (BENADRYL) 25 mg tablet  Self, Child Yes No   Sig: Take 25 mg by mouth in the morning. 1/2 tablet with Paxil .   Patient not taking: Reported on 8/5/2024   fluticasone (FLONASE) 50 mcg/act nasal spray  Self, Child No No   Sig: INSTILL 1 SPRAY INTO EACH NOSTRIL AS NEEDED FOR RHINITIS OR ALLERGIES "   furosemide (LASIX) 40 mg tablet   No No   Sig: TAKE 1 TABLET BY MOUTH DAILY AS NEEDED (FOR AM LEG SWELLING OR WEIGHT GAIN) ONLY TAKE IF LEGS SWOLLEN IN THE AM OR WEIGHT RISING.   levothyroxine 125 mcg tablet  Self, Child No No   Sig: TAKE 1 TABLET BY MOUTH EVERY DAY   loperamide (IMODIUM) 2 mg capsule  Self, Child No No   Sig: Take 1 capsule (2 mg total) by mouth 4 (four) times a day as needed for diarrhea   meclizine (ANTIVERT) 25 mg tablet  Self, Child No No   Sig: Take 1 tablet (25 mg total) by mouth every 8 (eight) hours as needed for dizziness   memantine (NAMENDA) 10 mg tablet   No No   Sig: TAKE 1 TABLET BY MOUTH TWICE A DAY   multivitamin (THERAGRAN) TABS  Self, Child Yes No   Sig: Take 1 tablet by mouth   omeprazole (PriLOSEC) 20 mg delayed release capsule   No No   Sig: TAKE 1 CAPSULE BY MOUTH TWICE A DAY   ondansetron (ZOFRAN-ODT) 4 mg disintegrating tablet  Self, Child No No   Sig: Take 1 tablet (4 mg total) by mouth every 8 (eight) hours as needed for nausea   potassium chloride (K-DUR,KLOR-CON) 10 mEq tablet  Self, Child No No   Sig: Take 1 tablet (10 mEq total) by mouth daily for 10 days   rivastigmine (EXELON) 9.5 mg/24 hr TD 24 hr patch  Self, Child No No   Sig: PLACE 1 PATCH ON THE SKIN OVER 24 HOURS DAILY      Facility-Administered Medications: None       Past Medical History:   Diagnosis Date    Anxiety disorder     Aortic valve disorder     BRCA1 negative     BRCA2 negative     Breast cancer (HCC) 03/2022    right    Cancer (HCC)     breast    Cellulitis of finger of left hand 11/20/2018    Cellulitis of left hand 11/10/2018    Added automatically from request for surgery [id number]    Depression     Disease of thyroid gland     GERD (gastroesophageal reflux disease)     History of gastroesophageal reflux (GERD)     History of kidney cancer 03/06/2014    History of transfusion     Hyperlipidemia 03/06/2014    Hypertension 03/06/2014    Hypothyroidism 03/06/2014    Lymphoma (HCC)     Malignant  neoplasm of overlapping sites of right breast in female, estrogen receptor positive (HCC) 03/10/2022    Shortness of breath     Stroke (HCC) [id number]    Min stroke found wile going through chemo and radiation    TIA (transient ischemic attack)     UTI (urinary tract infection)        Past Surgical History:   Procedure Laterality Date    BREAST LUMPECTOMY Right 3/29/2022    Procedure: ITZEL  DIRECTED LUMPECTOMY;  Surgeon: Denton Chambers MD;  Location: [facility];  Service: Surgical Oncology    ESOPHAGOGASTRIC FUNDOPLASTY      Nissen Fundoplication    HERNIA REPAIR      LYMPH NODE BIOPSY Right 3/29/2022    Procedure: LYMPHATIC MAPPING WITH BLUE AND RADIOACTIVE DYES SENTINEL LYMPH NODE BIOPSY, INJECTION IN OR AT 0800 BY DR CHAMBERS;  Surgeon: Denton Chambers MD;  Location: [facility];  Service: Surgical Oncology    MAMMO STEREOTACTIC BREAST BIOPSY RIGHT (ALL INC) Right 3/7/2022    US BREAST CLIP NEEDLE LOC RIGHT Right 3/24/2022    US GUIDED BREAST BIOPSY RIGHT COMPLETE Right 3/7/2022    WRIST SURGERY Left        Family History   Problem Relation Age of Onset    Stroke Father     Leukemia Sister     No Known Problems Brother     Skin cancer Mother     Stroke Maternal Grandmother     No Known Problems Maternal Grandfather     No Known Problems Paternal Grandmother     No Known Problems Paternal Grandfather     Breast cancer Sister     Breast cancer Sister         Colon cancer    Kidney cancer Brother     Alcohol abuse Brother     Kidney cancer Brother     Stomach cancer Brother     No Known Problems Daughter     No Known Problems Daughter      I have reviewed and agree with the history as documented.    E-Cigarette/Vaping    E-Cigarette Use Never User      E-Cigarette/Vaping Substances    Nicotine No     THC No     CBD No     Flavoring No     Other No     Unknown No      Social History     Tobacco Use    Smoking status: Never    Smokeless tobacco: Never    Tobacco comments:     None   Vaping Use     Vaping status: Never Used   Substance Use Topics    Alcohol use: Not Currently     Comment: Social holidays    Drug use: Never       Review of Systems   Constitutional:  Negative for appetite change, chills, fatigue and fever.   HENT:  Negative for sneezing and sore throat.    Eyes:  Negative for visual disturbance.   Respiratory:  Positive for shortness of breath. Negative for cough, choking, chest tightness and wheezing.    Cardiovascular:  Positive for leg swelling. Negative for chest pain and palpitations.   Gastrointestinal:  Negative for abdominal pain, constipation, diarrhea, nausea and vomiting.   Genitourinary:  Positive for dysuria and frequency. Negative for difficulty urinating.   Neurological:  Negative for dizziness, weakness, light-headedness, numbness and headaches.   All other systems reviewed and are negative.      Physical Exam  Physical Exam  Vitals and nursing note reviewed.   Constitutional:       General: She is not in acute distress.     Appearance: She is well-developed. She is not diaphoretic.   HENT:      Head: Normocephalic and atraumatic.   Eyes:      Pupils: Pupils are equal, round, and reactive to light.   Neck:      Vascular: No JVD.      Trachea: No tracheal deviation.   Cardiovascular:      Rate and Rhythm: Normal rate and regular rhythm.      Heart sounds: Normal heart sounds. No murmur heard.     No friction rub. No gallop.   Pulmonary:      Effort: Pulmonary effort is normal. No respiratory distress.      Breath sounds: Normal breath sounds. No wheezing or rales.   Abdominal:      General: Bowel sounds are normal. There is no distension.      Palpations: Abdomen is soft.      Tenderness: There is no abdominal tenderness. There is no guarding or rebound.   Skin:     General: Skin is warm and dry.      Coloration: Skin is not pale.      Comments: Bilateral mild pedal edema.   Neurological:      Mental Status: She is alert and oriented to person, place, and time.      Cranial  Nerves: No cranial nerve deficit.      Motor: No abnormal muscle tone.   Psychiatric:         Behavior: Behavior normal.         Vital Signs  ED Triage Vitals   Temperature Pulse Respirations Blood Pressure SpO2   08/12/24 1455 08/12/24 1455 08/12/24 1455 08/12/24 1455 08/12/24 1455   98.1 °F (36.7 °C) 77 18 118/58 93 %      Temp Source Heart Rate Source Patient Position - Orthostatic VS BP Location FiO2 (%)   08/12/24 1455 -- -- 08/12/24 1455 --   Oral   Right arm       Pain Score       08/12/24 1828       No Pain           Vitals:    08/12/24 1455 08/12/24 1828   BP: 118/58 144/69   Pulse: 77 73         Visual Acuity      ED Medications  Medications   potassium chloride (Klor-Con M20) CR tablet 40 mEq (40 mEq Oral Given 8/12/24 1813)   furosemide (LASIX) injection 40 mg (40 mg Intravenous Given 8/12/24 1823)   cephalexin (KEFLEX) capsule 500 mg (500 mg Oral Given 8/12/24 1834)       Diagnostic Studies  Results Reviewed       Procedure Component Value Units Date/Time    Urine Microscopic [[id number]]  (Abnormal) Collected: 08/12/24 1724    Lab Status: Final result Specimen: Urine, Clean Catch Updated: 08/12/24 1753     RBC, UA 10-20 /hpf      WBC, UA Innumerable /hpf      Epithelial Cells Occasional /hpf      Bacteria, UA Moderate /hpf      MUCUS THREADS Moderate     Hyaline Casts, UA 10-25 /lpf      WBC Clumps Present     Renal Epithelial Cells Present    Urine culture [[id number]] Collected: 08/12/24 1724    Lab Status: In process Specimen: Urine, Clean Catch Updated: 08/12/24 1753    FLU/RSV/COVID - if FLU/RSV clinically relevant [[id number]]  (Normal) Collected: 08/12/24 1700    Lab Status: Final result Specimen: Nares from Nose Updated: 08/12/24 1749     SARS-CoV-2 Negative     INFLUENZA A PCR Negative     INFLUENZA B PCR Negative     RSV PCR Negative    Narrative:      FOR PEDIATRIC PATIENTS - copy/paste COVID Guidelines URL to browser:  [email or web address]    SARS-CoV-2 assay is a Nucleic Acid Amplification assay intended for the  qualitative detection of nucleic acid from SARS-CoV-2 in nasopharyngeal  swabs. Results are for the presumptive identification of SARS-CoV-2 RNA.    Positive results are indicative of infection with SARS-CoV-2, the virus  causing COVID-19, but do not rule out bacterial infection or co-infection  with other viruses. Laboratories within the [place] and its  territories are required to report all positive results to the appropriate  public health authorities. Negative results do not preclude SARS-CoV-2  infection and should not be used as the sole basis for treatment or other  patient management decisions. Negative results must be combined with  clinical observations, patient history, and epidemiological information.  This test has not been FDA cleared or approved.    This test has been authorized by FDA under an Emergency Use Authorization  (EUA). This test is only authorized for the duration of time the  declaration that circumstances exist justifying the authorization of the  emergency use of an in vitro diagnostic tests for detection of SARS-CoV-2  virus and/or diagnosis of COVID-19 infection under section 564(b)(1) of  the Act, 21 U.S.C. 360bbb-3(b)(1), unless the authorization is terminated  or revoked sooner. The test has been validated but independent review by FDA  and CLIA is pending.    Test performed using [employer] GeneXpert: This RT-PCR assay targets N2,  a region unique to SARS-CoV-2. A conserved region in the E-gene was chosen  for pan-Sarbecovirus detection which includes SARS-CoV-2.    According to CMS-2020-01-R, this platform meets the definition of high-throughput technology.    UA w Reflex to Microscopic w Reflex to Culture [[id number]]  (Abnormal) Collected: 08/12/24 7494    Lab Status: Final result Specimen: Urine, Clean Catch Updated: 08/12/24  1744     Color, UA Yellow     Clarity, UA Turbid     Specific Gravity, UA 1.026     pH, UA 5.5     Leukocytes, UA Large     Nitrite, UA Negative     Protein, UA 30 (1+) mg/dl      Glucose, UA Negative mg/dl      Ketones, UA Negative mg/dl      Urobilinogen, UA 2.0 mg/dl      Bilirubin, UA Negative     Occult Blood, UA Negative    HS Troponin I 2hr [[id number]]  (Normal) Collected: 08/12/24 1700    Lab Status: Final result Specimen: Blood from Arm, Right Updated: 08/12/24 1731     hs TnI 2hr 3 ng/L      Delta 2hr hsTnI 0 ng/L     B-Type Natriuretic Peptide(BNP) [[id number]]  (Normal) Collected: 08/12/24 1501    Lab Status: Final result Specimen: Blood from Arm, Right Updated: 08/12/24 1706     BNP 50 pg/mL     Manual Differential(PHLEBS Do Not Order) [[id number]]  (Abnormal) Collected: 08/12/24 1501    Lab Status: Final result Specimen: Blood from Arm, Right Updated: 08/12/24 1555     Segmented % 70 %      Lymphocytes % 14 %      Monocytes % 4 %      Eosinophils % 1 %      Basophils % 4 %      Atypical Lymphocytes % 7 %      Absolute Neutrophils 3.44 Thousand/uL      Absolute Lymphocytes 1.03 Thousand/uL      Absolute Monocytes 0.20 Thousand/uL      Absolute Eosinophils 0.05 Thousand/uL      Absolute Basophils 0.20 Thousand/uL      Total Counted --     RBC Morphology Normal     Platelet Estimate Adequate    HS Troponin 0hr (reflex protocol) [[id number]]  (Normal) Collected: 08/12/24 1501    Lab Status: Final result Specimen: Blood from Arm, Right Updated: 08/12/24 1540     hs TnI 0hr 3 ng/L     Comprehensive metabolic panel [[id number]]  (Abnormal) Collected: 08/12/24 1501    Lab Status: Final result Specimen: Blood from Arm, Right Updated: 08/12/24 1533     Sodium 139 mmol/L      Potassium 3.4 mmol/L      Chloride 102 mmol/L      CO2 26 mmol/L      ANION GAP 11 mmol/L      BUN 16 mg/dL      Creatinine 0.90 mg/dL      Glucose 115 mg/dL      Calcium 9.0 mg/dL      AST 20 U/L      ALT 7 U/L      Alkaline Phosphatase 74 U/L       Total Protein 7.0 g/dL      Albumin 4.1 g/dL      Total Bilirubin 0.38 mg/dL      eGFR 61 ml/min/1.73sq m     Narrative:      National Kidney Disease Foundation guidelines for Chronic Kidney Disease (CKD):     Stage 1 with normal or high GFR (GFR > 90 mL/min/1.73 square meters)    Stage 2 Mild CKD (GFR = 60-89 mL/min/1.73 square meters)    Stage 3A Moderate CKD (GFR = 45-59 mL/min/1.73 square meters)    Stage 3B Moderate CKD (GFR = 30-44 mL/min/1.73 square meters)    Stage 4 Severe CKD (GFR = 15-29 mL/min/1.73 square meters)    Stage 5 End Stage CKD (GFR <15 mL/min/1.73 square meters)  Note: GFR calculation is accurate only with a steady state creatinine    CBC and differential [[id number]]  (Abnormal) Collected: 08/12/24 1501    Lab Status: Final result Specimen: Blood from Arm, Right Updated: 08/12/24 1514     WBC 4.91 Thousand/uL      RBC 3.24 Million/uL      Hemoglobin 12.1 g/dL      Hematocrit 36.9 %       fL      MCH 37.3 pg      MCHC 32.8 g/dL      RDW 14.2 %      MPV 12.0 fL      Platelets 282 Thousands/uL                    XR chest 1 view portable    (Results Pending)              Procedures  Procedures         ED Course                                               Medical Decision Making  78-year-old female with dysuria, mild CHF exacerbation.  Not hypoxic at rest, will check labs EKG troponin, will check urinalysis, reassess.      Workup does suggest UTI, no evidence of sepsis or systemic infection here, patient feeling better after diuresis, recommend prompt follow-up with PCP, and a biotics for UTI, return if symptoms worsen despite treatment.    Amount and/or Complexity of Data Reviewed  Labs: ordered.  Radiology: ordered.    Risk  Prescription drug management.                 Disposition  Final diagnoses:   CHF exacerbation (HCC)   UTI (urinary tract infection)     Time reflects when diagnosis was documented in both MDM as applicable and the Disposition within this note       Time User  Action Codes Description Comment    8/12/2024  8:01 PM Corona Carty [I50.9] CHF exacerbation (HCC)     8/12/2024  8:01 PM Corona Carty [N39.0] UTI (urinary tract infection)           ED Disposition       ED Disposition   Discharge    Condition   Stable    Date/Time   Mon Aug 12, 2024  8:01 PM    Comment   Paula Lubin discharge to home/self care.                   Follow-up Information       Follow up With Specialties Details Why Contact Info    Jose Enrique Elliott MD Internal Medicine   [id number]  [facility] [address]  [phone]              Discharge Medication List as of 8/12/2024  8:02 PM        START taking these medications    Details   cephalexin (KEFLEX) 500 mg capsule Take 1 capsule (500 mg total) by mouth every 8 (eight) hours for 5 days, Starting Mon 8/12/2024, Until Sat 8/17/2024, Normal           CONTINUE these medications which have NOT CHANGED    Details   amLODIPine (NORVASC) 2.5 mg tablet Take 2 tablets (5 mg total) by mouth daily, Starting Wed 5/1/2024, Normal      anastrozole (ARIMIDEX) 1 mg tablet TAKE 1 TABLET BY MOUTH EVERY DAY, Starting Tue 4/16/2024, Normal      aspirin 325 mg tablet Take 325 mg by mouth daily , Starting Mon 3/19/2018, Historical Med      cholecalciferol (VITAMIN D3) 1,000 units tablet Take 1 tablet (1,000 Units total) by mouth daily, Starting Fri 4/22/2022, Normal      cholestyramine (QUESTRAN) 4 g packet TAKE 1 PACKET BY MOUTH DAILY., Normal      dicyclomine (BENTYL) 20 mg tablet Take 1 tablet (20 mg total) by mouth 2 (two) times a day, Starting Thu 6/1/2023, Normal      diphenhydrAMINE (BENADRYL) 25 mg tablet Take 25 mg by mouth in the morning. 1/2 tablet with Paxil ., Historical Med      fluticasone (FLONASE) 50 mcg/act nasal spray INSTILL 1 SPRAY INTO EACH NOSTRIL AS NEEDED FOR RHINITIS OR ALLERGIES, Normal      furosemide (LASIX) 40 mg tablet TAKE 1 TABLET BY MOUTH DAILY AS NEEDED (FOR AM LEG SWELLING OR WEIGHT GAIN) ONLY TAKE IF LEGS SWOLLEN IN THE AM OR WEIGHT  RISING., Normal      levothyroxine 125 mcg tablet TAKE 1 TABLET BY MOUTH EVERY DAY, Starting Fri 2/23/2024, Normal      loperamide (IMODIUM) 2 mg capsule Take 1 capsule (2 mg total) by mouth 4 (four) times a day as needed for diarrhea, Starting Thu 6/1/2023, Normal      meclizine (ANTIVERT) 25 mg tablet Take 1 tablet (25 mg total) by mouth every 8 (eight) hours as needed for dizziness, Starting Mon 5/15/2023, Normal      memantine (NAMENDA) 10 mg tablet TAKE 1 TABLET BY MOUTH TWICE A DAY, Starting Tue 8/6/2024, Normal      Mirabegron ER (Myrbetriq) 50 MG TB24 TAKE 1 TABLET BY MOUTH EVERY DAY, Starting Mon 6/10/2024, Normal      multivitamin (THERAGRAN) TABS Take 1 tablet by mouth, Historical Med      omeprazole (PriLOSEC) 20 mg delayed release capsule TAKE 1 CAPSULE BY MOUTH TWICE A DAY, Starting Tue 4/30/2024, Normal      ondansetron (ZOFRAN-ODT) 4 mg disintegrating tablet Take 1 tablet (4 mg total) by mouth every 8 (eight) hours as needed for nausea, Starting Thu 11/17/2022, Print      PARoxetine (PAXIL) 20 mg tablet TAKE 1 TABLET BY MOUTH EVERY DAY, Starting Thu 10/12/2023, Normal      potassium chloride (K-DUR,KLOR-CON) 10 mEq tablet Take 1 tablet (10 mEq total) by mouth daily for 10 days, Starting Tue 9/19/2023, Normal      PROAIR  (90 Base) MCG/ACT inhaler as needed, Starting Wed 8/7/2019, Historical Med      rivastigmine (EXELON) 9.5 mg/24 hr TD 24 hr patch PLACE 1 PATCH ON THE SKIN OVER 24 HOURS DAILY, Starting Mon 2/12/2024, Normal             No discharge procedures on file.    PDMP Review         Value Time User    PDMP Reviewed  Yes 3/14/2022  1:24 PM Denton Chambers MD            ED Provider  Electronically Signed by             Corona Carty MD  08/13/24 0157

## 2024-08-13 NOTE — TELEPHONE ENCOUNTER
08/13/24 9:15 AM    Patient contacted post ED visit, first outreach attempt made. Message was left for patient to return a call to the VBI Department at [facility]: Phone [phone].    Thank you.  MARY WHEELER MA  [facility]

## 2024-08-14 LAB — BACTERIA UR CULT: ABNORMAL

## 2024-08-14 NOTE — TELEPHONE ENCOUNTER
08/14/24 10:36 AM    Patient contacted post ED visit, second outreach attempt made. Message was left for patient to return a call to the VBI Department at [facility]: Phone [phone].    Thank you.  MARY WHEELER MA  [facility]

## 2024-08-15 ENCOUNTER — OFFICE VISIT (OUTPATIENT)
Dept: INTERNAL MEDICINE CLINIC | Facility: CLINIC | Age: 79
End: 2024-08-15
Payer: COMMERCIAL

## 2024-08-15 VITALS
WEIGHT: 175.8 LBS | HEIGHT: 59 IN | SYSTOLIC BLOOD PRESSURE: 122 MMHG | DIASTOLIC BLOOD PRESSURE: 78 MMHG | BODY MASS INDEX: 35.44 KG/M2 | RESPIRATION RATE: 18 BRPM | OXYGEN SATURATION: 94 % | TEMPERATURE: 99.5 F | HEART RATE: 90 BPM

## 2024-08-15 DIAGNOSIS — E87.70 HYPERVOLEMIA, UNSPECIFIED HYPERVOLEMIA TYPE: ICD-10-CM

## 2024-08-15 DIAGNOSIS — N39.0 ACUTE URINARY TRACT INFECTION: Primary | ICD-10-CM

## 2024-08-15 PROCEDURE — 99214 OFFICE O/P EST MOD 30 MIN: CPT | Performed by: INTERNAL MEDICINE

## 2024-08-15 PROCEDURE — G2211 COMPLEX E/M VISIT ADD ON: HCPCS | Performed by: INTERNAL MEDICINE

## 2024-08-15 NOTE — PROGRESS NOTES
Assessment/Plan:     UTI is resolved.  For the question of fluid overload, she again has a clear chest x-ray and normal BNP.  But she definitely feels better with diuretics.  Therefore, we will have her daughter start daily weights.  She may continue the Lasix every other day and if her weight goes up 4 pounds, she will give her another 20 mg and a potassium tablet.  If the daughter is not sure what to do, she will call.  Otherwise, resume follow-up as planned.     Quality Measures:       Return for Next scheduled follow up.    No problem-specific Assessment & Plan notes found for this encounter.       Diagnoses and all orders for this visit:    Acute urinary tract infection    Hypervolemia, unspecified hypervolemia type          Subjective:      Patient ID: Paula Lubin is a 78 y.o. female.    Patient comes in today with her daughter for ER follow-up.  She was complaining of dysuria to her daughter and was found to have a UTI.  Also complaining of fluid overload symptoms again.  Her chest x-ray was clear.  Her BNP was normal.  She has seen cardiology who felt there was no evidence for heart failure.  But she does respond to Lasix in terms of her symptoms.  Her daughter has been giving her the Lasix every other day as instructed.  Mom feels okay now.  Urinary symptoms have resolved.        ALLERGIES:  Allergies   Allergen Reactions   • Ciprofloxacin Hives   • Sulfa Antibiotics Itching   • Other    • Penicillins Rash     Category: Allergy;   --  Pt received unasyn 1.5 mg IV 11-12-18 to 11-15-18   • Sulfacetamide Rash     Category: Allergy;        CURRENT MEDICATIONS:    Current Outpatient Medications:   •  amLODIPine (NORVASC) 2.5 mg tablet, Take 2 tablets (5 mg total) by mouth daily, Disp: 180 tablet, Rfl: 1  •  anastrozole (ARIMIDEX) 1 mg tablet, TAKE 1 TABLET BY MOUTH EVERY DAY, Disp: 90 tablet, Rfl: 1  •  aspirin 325 mg tablet, Take 325 mg by mouth daily , Disp: , Rfl:   •  cephalexin (KEFLEX) 500 mg capsule,  Take 1 capsule (500 mg total) by mouth every 8 (eight) hours for 5 days, Disp: 15 capsule, Rfl: 0  •  cholecalciferol (VITAMIN D3) 1,000 units tablet, Take 1 tablet (1,000 Units total) by mouth daily, Disp: 30 tablet, Rfl: 6  •  cholestyramine (QUESTRAN) 4 g packet, TAKE 1 PACKET BY MOUTH DAILY., Disp: 90 packet, Rfl: 3  •  dicyclomine (BENTYL) 20 mg tablet, Take 1 tablet (20 mg total) by mouth 2 (two) times a day, Disp: 20 tablet, Rfl: 0  •  fluticasone (FLONASE) 50 mcg/act nasal spray, INSTILL 1 SPRAY INTO EACH NOSTRIL AS NEEDED FOR RHINITIS OR ALLERGIES, Disp: 48 mL, Rfl: 1  •  furosemide (LASIX) 40 mg tablet, TAKE 1 TABLET BY MOUTH DAILY AS NEEDED (FOR AM LEG SWELLING OR WEIGHT GAIN) ONLY TAKE IF LEGS SWOLLEN IN THE AM OR WEIGHT RISING., Disp: 180 tablet, Rfl: 1  •  levothyroxine 125 mcg tablet, TAKE 1 TABLET BY MOUTH EVERY DAY, Disp: 90 tablet, Rfl: 1  •  loperamide (IMODIUM) 2 mg capsule, Take 1 capsule (2 mg total) by mouth 4 (four) times a day as needed for diarrhea, Disp: 12 capsule, Rfl: 0  •  meclizine (ANTIVERT) 25 mg tablet, Take 1 tablet (25 mg total) by mouth every 8 (eight) hours as needed for dizziness, Disp: 50 tablet, Rfl: 1  •  memantine (NAMENDA) 10 mg tablet, TAKE 1 TABLET BY MOUTH TWICE A DAY, Disp: 180 tablet, Rfl: 1  •  Mirabegron ER (Myrbetriq) 50 MG TB24, TAKE 1 TABLET BY MOUTH EVERY DAY, Disp: 90 tablet, Rfl: 1  •  multivitamin (THERAGRAN) TABS, Take 1 tablet by mouth, Disp: , Rfl:   •  omeprazole (PriLOSEC) 20 mg delayed release capsule, TAKE 1 CAPSULE BY MOUTH TWICE A DAY, Disp: 180 capsule, Rfl: 0  •  ondansetron (ZOFRAN-ODT) 4 mg disintegrating tablet, Take 1 tablet (4 mg total) by mouth every 8 (eight) hours as needed for nausea, Disp: 15 tablet, Rfl: 0  •  PARoxetine (PAXIL) 20 mg tablet, TAKE 1 TABLET BY MOUTH EVERY DAY, Disp: 90 tablet, Rfl: 3  •  potassium chloride (K-DUR,KLOR-CON) 10 mEq tablet, Take 1 tablet (10 mEq total) by mouth daily for 10 days, Disp: 10 tablet, Rfl: 0  •   PROAIR  (90 Base) MCG/ACT inhaler, as needed, Disp: , Rfl:   •  rivastigmine (EXELON) 9.5 mg/24 hr TD 24 hr patch, PLACE 1 PATCH ON THE SKIN OVER 24 HOURS DAILY, Disp: 90 patch, Rfl: 1  •  diphenhydrAMINE (BENADRYL) 25 mg tablet, Take 25 mg by mouth in the morning. 1/2 tablet with Paxil . (Patient not taking: Reported on 8/5/2024), Disp: , Rfl:     ACTIVE PROBLEM LIST:  Patient Active Problem List   Diagnosis   • Depression   • Gastroesophageal reflux disease without esophagitis   • Mixed hyperlipidemia   • Benign essential HTN   • Hypothyroid   • Impaired fasting glucose   • Lymphoma (HCC)   • Cerebral aneurysm, nonruptured   • Mild cognitive impairment   • Dyspnea on exertion   • Flexor tenosynovitis of finger   • Macrocytosis without anemia   • Paresthesias   • Non-intractable vomiting with nausea   • Hypokalemia   • Late onset Alzheimer's disease without behavioral disturbance (HCC)   • Chest pain   • Near syncope   • Immunocompromised (HCC)   • Malignant neoplasm of overlapping sites of right breast in female, estrogen receptor positive (HCC)   • Use of anastrozole   • Recurrent major depressive disorder, remission status unspecified (HCC)   • Stage 3 chronic kidney disease, unspecified whether stage 3a or 3b CKD (HCC)   • History of lumpectomy of right breast   • Encounter for follow-up surveillance of breast cancer   • Encounter for monitoring anastrozole therapy   • Bilateral lower extremity edema       PAST MEDICAL HISTORY:  Past Medical History:   Diagnosis Date   • Anxiety disorder    • Aortic valve disorder    • BRCA1 negative    • BRCA2 negative    • Breast cancer (HCC) 03/2022    right   • Cancer (HCC)     breast   • Cellulitis of finger of left hand 11/20/2018   • Cellulitis of left hand 11/10/2018    Added automatically from request for surgery [id number]   • Depression    • Disease of thyroid gland    • GERD (gastroesophageal reflux disease)    • History of gastroesophageal reflux (GERD)    •  History of kidney cancer 03/06/2014   • History of transfusion    • Hyperlipidemia 03/06/2014   • Hypertension 03/06/2014   • Hypothyroidism 03/06/2014   • Lymphoma (HCC)    • Malignant neoplasm of overlapping sites of right breast in female, estrogen receptor positive (HCC) 03/10/2022   • Shortness of breath    • Stroke (HCC) [id number]    Min stroke found wile going through chemo and radiation   • TIA (transient ischemic attack)    • UTI (urinary tract infection)        PAST SURGICAL HISTORY:  Past Surgical History:   Procedure Laterality Date   • BREAST LUMPECTOMY Right 3/29/2022    Procedure: ITZEL  DIRECTED LUMPECTOMY;  Surgeon: Denton Chambers MD;  Location: [facility];  Service: Surgical Oncology   • ESOPHAGOGASTRIC FUNDOPLASTY      Nissen Fundoplication   • HERNIA REPAIR     • LYMPH NODE BIOPSY Right 3/29/2022    Procedure: LYMPHATIC MAPPING WITH BLUE AND RADIOACTIVE DYES SENTINEL LYMPH NODE BIOPSY, INJECTION IN OR AT 0800 BY DR CHAMBERS;  Surgeon: Denton Chambers MD;  Location: [facility];  Service: Surgical Oncology   • MAMMO STEREOTACTIC BREAST BIOPSY RIGHT (ALL INC) Right 3/7/2022   • US BREAST CLIP NEEDLE LOC RIGHT Right 3/24/2022   • US GUIDED BREAST BIOPSY RIGHT COMPLETE Right 3/7/2022   • WRIST SURGERY Left        FAMILY HISTORY:  Family History   Problem Relation Age of Onset   • Stroke Father    • Leukemia Sister    • No Known Problems Brother    • Skin cancer Mother    • Stroke Maternal Grandmother    • No Known Problems Maternal Grandfather    • No Known Problems Paternal Grandmother    • No Known Problems Paternal Grandfather    • Breast cancer Sister    • Breast cancer Sister         Colon cancer   • Kidney cancer Brother    • Alcohol abuse Brother    • Kidney cancer Brother    • Stomach cancer Brother    • No Known Problems Daughter    • No Known Problems Daughter        SOCIAL HISTORY:  Social History     Socioeconomic History   • Marital status:      Spouse  "name: Not on file   • Number of children: 2   • Years of education: Not on file   • Highest education level: Not on file   Occupational History   • Not on file   Tobacco Use   • Smoking status: Never   • Smokeless tobacco: Never   • Tobacco comments:     None   Vaping Use   • Vaping status: Never Used   Substance and Sexual Activity   • Alcohol use: Not Currently     Comment: Social holidays   • Drug use: Never   • Sexual activity: Not Currently     Comment: [place] over   Other Topics Concern   • Not on file   Social History Narrative   • Not on file     Social Determinants of Health     Financial Resource Strain: Low Risk  (9/19/2023)    Overall Financial Resource Strain (CARDIA)    • Difficulty of Paying Living Expenses: Not hard at all   Food Insecurity: Not on file   Transportation Needs: No Transportation Needs (9/19/2023)    PRAPARE - Transportation    • Lack of Transportation (Medical): No    • Lack of Transportation (Non-Medical): No   Physical Activity: Not on file   Stress: Not on file   Social Connections: Unknown (6/18/2024)    Received from [employer]    Social Connections    • How often do you feel lonely or isolated from those around you? (Adult - for ages 18 years and over): Not on file   Intimate Partner Violence: Not on file   Housing Stability: Not on file       Review of Systems   Respiratory:  Negative for shortness of breath.    Cardiovascular:  Negative for chest pain.   Gastrointestinal:  Negative for abdominal pain.         Objective:  Vitals:    08/15/24 1606   BP: 122/78   BP Location: Left arm   Patient Position: Sitting   Cuff Size: Standard   Pulse: 90   Resp: 18   Temp: 99.5 °F (37.5 °C)   TempSrc: Tympanic   SpO2: 94%   Weight: 79.7 kg (175 lb 12.8 oz)   Height: 4' 11\" (1.499 m)     Body mass index is 35.51 kg/m².     Physical Exam  Vitals and nursing note reviewed.   Constitutional:       Appearance: Normal appearance.   Pulmonary:      Effort: No respiratory distress.      Breath " sounds: No rales.   Neurological:      Mental Status: She is alert.           RESULTS:  Hemoglobin A1C   Date/Time Value Ref Range Status   09/14/2021 02:20 PM 5.4 Normal 3.8-5.6%; PreDiabetic 5.7-6.4%; Diabetic >=6.5%; Glycemic control for adults with diabetes <7.0% % Final   02/15/2018 11:30 AM 5.7 (H) <5.7 % Final     Comment:     Reference Range  Non-diabetic                     <5.7  Pre-diabetic                     5.7-6.4  Diabetic                         >=6.5  ADA target for diabetic control  <=7     Cholesterol   Date/Time Value Ref Range Status   05/30/2024 04:39  See Comment mg/dL Final     Comment:     Cholesterol:         Pediatric <18 Years        Desirable          <170 mg/dL      Borderline High    170-199 mg/dL      High               >=200 mg/dL        Adult >=18 Years            Desirable        <200 mg/dL      Borderline High  200-239 mg/dL      High             >239 mg/dL       Triglycerides   Date/Time Value Ref Range Status   05/30/2024 04:39  See Comment mg/dL Final     Comment:     Triglyceride:     0-9Y            <75mg/dL     10Y-17Y         <90 mg/dL       >=18Y     Normal          <150 mg/dL     Borderline High 150-199 mg/dL     High            200-499 mg/dL        Very High       >499 mg/dL    Specimen collection should occur prior to Metamizole administration due to the potential for falsely depressed results.   06/08/2015 08:09 AM 98 mg/dL Final     Comment:     TRIGLYCERIDE:       Normal                 <150 mg/dl       Borderline High       150-199 mg/dl       High                  200-499 mg/dl       Very High             >499 mg/dl  _______________________________________       HDL   Date/Time Value Ref Range Status   06/08/2015 08:09 AM 84 mg/dL Final     Comment:     HDL:       High       >59 mg/dl       Low        <41 mg/dl  ______________________________       HDL, Direct   Date/Time Value Ref Range Status   05/30/2024 04:39 PM 80 >=50 mg/dL Final     LDL  Calculated   Date/Time Value Ref Range Status   05/30/2024 04:39 PM 95 0 - 100 mg/dL Final     Comment:     LDL Cholesterol:     Optimal           <100 mg/dl     Near Optimal      100-129 mg/dl     Above Optimal       Borderline High 130-159 mg/dl       High            160-189 mg/dl       Very High       >189 mg/dl         This screening LDL is a calculated result.   It does not have the accuracy of the Direct Measured LDL in the monitoring of patients with hyperlipidemia and/or statin therapy.   Direct Measure LDL (DBD555) must be ordered separately in these patients.   06/08/2015 08:09  (H) 0 - 100 mg/dL Final     Comment:     LDL, CALCULATED:     This screening LDL is a calculated result.  It does not have the accuracy of the Direct Measured LDL in the  monitoring of patients with hyperlipidemia and/or statin therapy.  Direct Measured LDL (Test Code 3126) must be ordered separately in these  patients.  ______________________________       Hemoglobin   Date/Time Value Ref Range Status   08/12/2024 03:01 PM 12.1 11.5 - 15.4 g/dL Final   07/17/2014 08:40 AM 14.2 11.0 - 15.7 g/dL Final     Hematocrit   Date/Time Value Ref Range Status   08/12/2024 03:01 PM 36.9 34.8 - 46.1 % Final   07/17/2014 08:40 AM 42.3 34.8 - 46.1 % Final     Platelets   Date/Time Value Ref Range Status   08/12/2024 03:01  149 - 390 Thousands/uL Final   07/17/2014 08:40  149 - 390 Thousand/uL Final     TSH 3RD GENERATON   Date/Time Value Ref Range Status   05/30/2024 04:39 PM 2.910 0.450 - 4.500 uIU/mL Final     Comment:     The recommended reference ranges for TSH during pregnancy are as follows:   First trimester 0.100 to 2.500 uIU/mL   Second trimester  0.200 to 3.000 uIU/mL   Third trimester 0.300 to 3.000 uIU/m    Note: Normal ranges may not apply to patients who are transgender, non-binary, or whose legal sex, sex at birth, and gender identity differ.  Adult TSH (3rd generation) reference range follows the recommended  guidelines of the [employer], January, 2020.   06/08/2015 08:09 AM 6.086 (H) 0.36 - 3.74 uIU/ML Final     Comment:     The above 1 analytes were performed by [facility]   ([facility])  [address] [place] [id number]       FREE T4   Date/Time Value Ref Range Status   05/14/2018 12:41 PM 1.21 0.76 - 1.46 ng/dL Final     Free T4   Date/Time Value Ref Range Status   05/30/2024 04:39 PM 1.03 0.61 - 1.12 ng/dL Final     Comment:     Specimens with biotin concentrations > 10 ng/mL can lead to significant (> 10%) positive bias in result.     Sodium   Date/Time Value Ref Range Status   08/12/2024 03:01  135 - 147 mmol/L Final   04/11/2018 12:07  135 - 145 mmol/L Final     BUN   Date/Time Value Ref Range Status   08/12/2024 03:01 PM 16 5 - 25 mg/dL Final   04/11/2018 12:07 PM 16 7 - 25 mg/dL Final     Creatinine   Date/Time Value Ref Range Status   08/12/2024 03:01 PM 0.90 0.60 - 1.30 mg/dL Final     Comment:     Standardized to IDMS reference method   04/11/2018 12:07 PM 0.76 0.40 - 1.10 mg/dL Final      In chart    This note was created with voice recognition software.  Phonic, grammatical and spelling errors may be present within the note as a result.

## 2024-08-15 NOTE — TELEPHONE ENCOUNTER
08/15/24 9:17 AM    Patient contacted post ED visit, phone outreaches were unsuccessful and a MyChart letter has been sent to the patient as follow-up.    Thank you.  MARY WHEELER MA  [facility]

## 2024-08-20 ENCOUNTER — EVALUATION (OUTPATIENT)
Age: 79
End: 2024-08-20
Payer: COMMERCIAL

## 2024-08-20 DIAGNOSIS — F02.80 LATE ONSET ALZHEIMER'S DISEASE WITHOUT BEHAVIORAL DISTURBANCE (HCC): ICD-10-CM

## 2024-08-20 DIAGNOSIS — G30.1 LATE ONSET ALZHEIMER'S DISEASE WITHOUT BEHAVIORAL DISTURBANCE (HCC): ICD-10-CM

## 2024-08-20 DIAGNOSIS — R48.8 OTHER SYMBOLIC DYSFUNCTIONS: Primary | ICD-10-CM

## 2024-08-20 DIAGNOSIS — R41.841 COGNITIVE COMMUNICATION DEFICIT: ICD-10-CM

## 2024-08-20 PROCEDURE — 96125 COGNITIVE TEST BY HC PRO: CPT

## 2024-08-20 NOTE — PROGRESS NOTES
Speech-Language Pathology Initial Evaluation  Skilled Maintenance Program    Today's date: 2024   Patient’s name: Paula Lubin  : 1945  MRN: [id number]  Safety measures: HTN, memory  Referring provider: Liv Mc MD    Encounter Diagnosis     ICD-10-CM    1. Other symbolic dysfunctions  R48.8       2. Cognitive communication deficit  R41.841       3. Late onset Alzheimer's disease without behavioral disturbance (HCC)  G30.1     F02.80           Assessment:   Patient continues to present with moderate-severe cognitive-linguistic deficits c/b decreased orientation,  short-term/delayed memory of words/stories/conversations, decreased processing speed, difficulties following multi-step directions, executive functioning, higher level language tasks, and verbal expression skills secondary to Late onset Alzheimer' disease. Patient was administered the RBANs. Patient experienced the most deficits in immediate and delayed memory tasks. Overall patient scored low average in 70-79 age group. It is suspected that patient would benefit from  outpatient skilled Speech Therapy services to target goals to maintain her current level of cognitive-linguistic functioning due diagnosis. Patient and daughter were in agreement with this plan.  Solely, an HEP is not appropriate at this time as she continues to require the guidance and clinician decision making from a skilled clinician.  Without the skills of a clinician providing these necessary services, patient’s status may decline (e.g., decreased functional recall, reduced attempts with verbal expression, decreased comprehension, socially withdrawn, reduced quality of life, decreased safety, increased frustration, caregiver burden). OP ST services will target ctarget increased functional recall, executive functioning (processing, problem-solving, thought organization, etc.) auditory comprehension, increased verbal fluency, word-finding & memory aids/strategies  education/implementation, successful completion of daily tasks and structured tasks, follow directions within functional activities and unstructured tasks, support positive communication interactions with both familiar and unfamiliar listeners, promote safety and facilitate overall improved quality of life.       Short term goals  Patient will complete cognitive-linguistic activities (e.g., verbal and visual problem-solving scenarios, sequencing, reasoning, attention, organization, etc.) at 90% accuracy with min cues from clinician as needed to target the maintenance of his cognitive-linguistic functioning, as well as promote safety and overall QOL.     Patient and caregiver will provide adequate comprehension and knowledge of internal/external memory aid strategies with min cues from clinician needed to target the maintenance of his cognitive-linguistic functioning, as well as promote safety and overall QOL.     Long term goals    Patient will maintain her highest level of independence with cognitive-linguistic functioning provided the implementation of compensatory strategies and cues from caregivers to promote positive communication interactions and socialization, participation in meaningful activities, safety, and quality of life (to be achieved by discharge).      Patient and daughter will maintain knowledge of internal/external memory strategies and implement into ADLs to promote positive communication interactions and socialization, participation in meaningful activities, safety, and quality of life (to be achieved by discharge).       Plan:  Patient would benefit from outpatient skilled Speech Therapy services: Maintenance therapy program    Frequency: 1-2x weekly  Duration: 3 months    Intervention certification from: 8/20/2024  Intervention certification to: 11/20/2024      Subjective:  History of present illness: Patient is a 78 y.o. female who was referred to outpatient skilled Speech Therapy services  "for a cognitive-linguistic evaluation. Patient was referred from neurology. Patient reported to have moderate dementia with a score of 16/30 from MoCA and decrease in score from previous MoCA administration (21/30). PMH: anxiety, aortic valve disorder, R breast cancer (2022), depression, GERD, HTN, lumpectomy,  Stroke/TIA.     Patient's goal(s): \"to keep memory\"  Hearing: WFL for testing  Vision: WFL for testing (reading glasses)  Home environment/lifestyle: lives with daughter  Highest level of education: High school  Vocational status: retired  & /         Objective (testing):  The Repeatable Battery for the Assessment of Neuropsychological Status (RBANS) is a brief, individually-administered assessment which measures attention, language, visuospatial/constructional abilities, and immediate & delayed memory. The RBANS is intended for use with adolescents to adults, ages 12 to 89 years. The following results were obtained during the administration of the assessment.    Form: A    Cognitive Domain/Subtest: Index Score: Percentile Rank: Classification:   IMMEDIATE MEMORY 78 7%ile Borderline        1. List Learning (22/40)        2. Story Memory (11/24)       VISUOSPATIAL/  CONSTRUCTIONAL 109 73%ile Average        3. Figure Copy (20/20)        4. Line Orientation (16/20)       LANGUAGE 92 30%ile Average        5. Picture Naming (10/10)        6. Semantic Fluency (15/40)       ATTENTION 88 21%ile Low Average        7. Digit Span (9/16)        8. Coding (32/89)       DELAYED MEMORY 56 .2%ile Extremely Low        9. List Recall (1/10)        10. List Recognition (17/20)        11. Story Recall (3/12)        12. Figure Recall (0/20)         Sum of Index Scores:  423   Total Score:  80   Percentile: 9%ile   Classification: Low Average       *Patient named 15 concrete category members (fruits and vegetables) in 60 sec (norm=15+). -- AVERAGE    Visit Tracking:  POC expires Unit limit Auth Expiration " date PT/OT + Visit Limit?   11/20/24 N/a pending pending                           Visit/Unit Tracking  AUTH Status:  Date 8/20              yes Used 1               Remaining                  Intervention Comments:  45 mins of standard cog testing,55 mins of scoring, write-up

## 2024-08-26 ENCOUNTER — OFFICE VISIT (OUTPATIENT)
Dept: SURGICAL ONCOLOGY | Facility: CLINIC | Age: 79
End: 2024-08-26
Payer: COMMERCIAL

## 2024-08-26 ENCOUNTER — OFFICE VISIT (OUTPATIENT)
Age: 79
End: 2024-08-26
Payer: COMMERCIAL

## 2024-08-26 VITALS
DIASTOLIC BLOOD PRESSURE: 74 MMHG | OXYGEN SATURATION: 91 % | SYSTOLIC BLOOD PRESSURE: 110 MMHG | WEIGHT: 178 LBS | HEIGHT: 59 IN | BODY MASS INDEX: 35.88 KG/M2 | HEART RATE: 92 BPM

## 2024-08-26 DIAGNOSIS — Z08 ENCOUNTER FOR FOLLOW-UP SURVEILLANCE OF BREAST CANCER: ICD-10-CM

## 2024-08-26 DIAGNOSIS — Z98.890 HISTORY OF LUMPECTOMY OF RIGHT BREAST: ICD-10-CM

## 2024-08-26 DIAGNOSIS — Z85.3 ENCOUNTER FOR FOLLOW-UP SURVEILLANCE OF BREAST CANCER: ICD-10-CM

## 2024-08-26 DIAGNOSIS — G30.1 LATE ONSET ALZHEIMER'S DISEASE WITHOUT BEHAVIORAL DISTURBANCE (HCC): ICD-10-CM

## 2024-08-26 DIAGNOSIS — R48.8 OTHER SYMBOLIC DYSFUNCTIONS: Primary | ICD-10-CM

## 2024-08-26 DIAGNOSIS — Z79.811 USE OF ANASTROZOLE: ICD-10-CM

## 2024-08-26 DIAGNOSIS — Z17.0 MALIGNANT NEOPLASM OF OVERLAPPING SITES OF RIGHT BREAST IN FEMALE, ESTROGEN RECEPTOR POSITIVE (HCC): Primary | ICD-10-CM

## 2024-08-26 DIAGNOSIS — F02.80 LATE ONSET ALZHEIMER'S DISEASE WITHOUT BEHAVIORAL DISTURBANCE (HCC): ICD-10-CM

## 2024-08-26 DIAGNOSIS — C85.90 NON-HODGKIN'S LYMPHOMA, UNSPECIFIED BODY REGION, UNSPECIFIED NON-HODGKIN LYMPHOMA TYPE (HCC): ICD-10-CM

## 2024-08-26 DIAGNOSIS — R41.841 COGNITIVE COMMUNICATION DEFICIT: ICD-10-CM

## 2024-08-26 DIAGNOSIS — C50.811 MALIGNANT NEOPLASM OF OVERLAPPING SITES OF RIGHT BREAST IN FEMALE, ESTROGEN RECEPTOR POSITIVE (HCC): Primary | ICD-10-CM

## 2024-08-26 PROCEDURE — 92507 TX SP LANG VOICE COMM INDIV: CPT

## 2024-08-26 PROCEDURE — 99204 OFFICE O/P NEW MOD 45 MIN: CPT | Performed by: SURGERY

## 2024-08-26 NOTE — PROGRESS NOTES
"Daily Speech Treatment Note  Skilled Maintenance Program    Today's date: 2024   Patient’s name: Paula Lubin  : 1945  MRN: [id number]  Safety measures:   Referring provider: Liv Mc MD    Encounter Diagnosis     ICD-10-CM    1. Other symbolic dysfunctions  R48.8       2. Cognitive communication deficit  R41.841       3. Late onset Alzheimer's disease without behavioral disturbance (HCC)  G30.1     F02.80         Visit Tracking:  POC expires Unit limit Auth Expiration date PT/OT + Visit Limit?   24 N/a 2/15/2025 12                           Visit/Unit Tracking  AUTH Status:  Date              yes Used 1 2              Remaining  11 10                 Subjective/Behavioral:  -Patient reported no new concerns for today's session.      Objective/Assessment:  -Reviewed testing results and goals in plan care with patient. Patient is in agreement at this time.      Short term goals  Patient will complete cognitive-linguistic activities (e.g., verbal and visual problem-solving scenarios, sequencing, reasoning, attention, organization, etc.) at 90% accuracy with min cues from clinician as needed to target the maintenance of his cognitive-linguistic functioning, as well as promote safety and overall QOL.     Completing words tasks from given definition. Each word contains the letters \"mon.\" Task completed in  opp (85% acc) independently, increasing to  opp (95% acc) from moderate cueing. Patient benefited from semantic/phonemic cues and carrier phrases.      Patient was given the clocks and had to write the given time on the clock. Task completed in 9/15 opp (60% acc) independently, increasing to 15/15 opp (100% acc) from min-mod cueing. Patient benefited from visual models, review of the small/big hands.    Given empty clock face and patient was given task to write the numbers in. Patient demonstrated all the correct numbers into the clock in the correct order, however, " spacing of the numbers were uneven.        Patient and caregiver will provide adequate comprehension and knowledge of internal/external memory aid strategies with min cues from clinician needed to target the maintenance of his cognitive-linguistic functioning, as well as promote safety and overall QOL.    Patient reports she still completes memory journal daily entries. Recommended patient to bring next session for a review.    Plan:  -Patient was provided with home exercises/activities to target goals in plan of care at the end of today's session.  -Continue with current plan of care.

## 2024-08-26 NOTE — PROGRESS NOTES
Surgical Oncology Follow Up  [facility]  [facility] [place]  [address] [facility] [address]    Paula Lubin  1945  [id number]      Chief Complaint   Patient presents with   • Consult     6 Month Follow Up        Assessment & Plan:   78-year-old female with a history of right breast cancer s/p breast conservation surgery and radiation she is here for follow-up.  She is on endocrine therapy tolerating well no major side effects such as shortness of breath leg swelling or vaginal spotting.  She will continue her endocrine therapy and see us in 6 months.  She has been instructed to continue monthly self breast examination.    Cancer History:     Oncology History   Malignant neoplasm of overlapping sites of right breast in female, estrogen receptor positive (HCC)   3/7/2022 Initial Diagnosis    Malignant neoplasm of right female breast (HCC)     3/7/2022 Biopsy    Right Breast Ultrasound guided biopsy  12 o'clock, 8 cm from nipple  Invasive mammary carcinoma of no special type with extensive Ductal Carcinoma in situ  Grade 1   ER 95  RI 75  HER2 1+  Lymphovascular invasion not identified    Concordant. Malingnacy appears unifocal. Right axilla clear. Left breast clear.     Breast, Right, Stereo bx right breast 10oclock, 4 cores with calcs:  - Fibroadenoma with focal coarse calcifications.  - Negative for atypia and in-situ or invasive carcinoma.      Breast, Right, Stereo bx right breast 10oclock, 1 core without calcs:  - Fibroadenoma with focal coarse calcifications.  - Negative for atypia and in-situ or invasive carcinoma.      3/14/2022 Genetic Testing    A stat panel of genes were evaluated, including: SYDNI, BRCA1, BRCA2, CDH1, CHEK2, PALB2, PTEN, STK11, TP53  Negative result. No pathogenic sequence variants or deletions/duplications identified     3/14/2022 Genomic Testing    MammaPrint   Low risk  Mammaprint Index: +0.537  Luminal type A     3/14/2022 -   Cancer Staged    Staging form: Breast, AJCC 8th Edition  - Clinical stage from 3/14/2022: cT1b, cN0, cM0, GX, ER+, CA+, HER2- - Signed by Keely Crespo MD on 3/25/2022  Stage prefix: Initial diagnosis  Histologic grading system: 3 grade system       3/29/2022 Surgery    Right breast liyah  directed lumpectomy with sentinel lymph node biopsy  Invasive mammary carcinoma of no special type with associated ductal carcinoma in situ  Grade 1   1.3 cm  All margins negative for invasive carcinoma and ductal carcinoma in situ  0/3 Lymph node  Anatomic/prognostic stage: IA      5/23/2022 - 6/14/2022 Radiation    Treatments:  Course: C1  Plan ID Energy Fractions Dose per Fraction (cGy) Dose Correction (cGy) Total Dose Delivered (cGy) Elapsed Days   R Breast 6X 15 / 15 267 0 4,005 22    Treatment Dates:  5/23/2022 - 6/14/2022.              Interval History:   Follow-up with right breast cancer    Review of Systems:   Review of Systems   Constitutional:  Negative for chills and fever.   HENT:  Negative for ear pain and sore throat.    Eyes:  Negative for pain and visual disturbance.   Respiratory:  Negative for cough and shortness of breath.    Cardiovascular:  Negative for chest pain and palpitations.   Gastrointestinal:  Negative for abdominal pain and vomiting.   Genitourinary:  Negative for dysuria and hematuria.   Musculoskeletal:  Negative for arthralgias and back pain.   Skin:  Negative for color change and rash.   Neurological:  Negative for seizures and syncope.   All other systems reviewed and are negative.    Past Medical History     Patient Active Problem List   Diagnosis   • Depression   • Gastroesophageal reflux disease without esophagitis   • Mixed hyperlipidemia   • Benign essential HTN   • Hypothyroid   • Impaired fasting glucose   • Lymphoma (HCC)   • Cerebral aneurysm, nonruptured   • Mild cognitive impairment   • Dyspnea on exertion   • Flexor tenosynovitis of finger   • Macrocytosis without anemia   •  Paresthesias   • Non-intractable vomiting with nausea   • Hypokalemia   • Late onset Alzheimer's disease without behavioral disturbance (HCC)   • Chest pain   • Near syncope   • Immunocompromised (HCC)   • Malignant neoplasm of overlapping sites of right breast in female, estrogen receptor positive (HCC)   • Use of anastrozole   • Recurrent major depressive disorder, remission status unspecified (HCC)   • Stage 3 chronic kidney disease, unspecified whether stage 3a or 3b CKD (HCC)   • History of lumpectomy of right breast   • Encounter for follow-up surveillance of breast cancer   • Encounter for monitoring anastrozole therapy   • Bilateral lower extremity edema     Past Medical History:   Diagnosis Date   • Anxiety disorder    • Aortic valve disorder    • BRCA1 negative    • BRCA2 negative    • Breast cancer (HCC) 03/2022    right   • Cancer (HCC)     breast   • Cellulitis of finger of left hand 11/20/2018   • Cellulitis of left hand 11/10/2018    Added automatically from request for surgery [id number]   • Depression    • Disease of thyroid gland    • GERD (gastroesophageal reflux disease)    • History of gastroesophageal reflux (GERD)    • History of kidney cancer 03/06/2014   • History of transfusion    • Hyperlipidemia 03/06/2014   • Hypertension 03/06/2014   • Hypothyroidism 03/06/2014   • Lymphoma (HCC)    • Malignant neoplasm of overlapping sites of right breast in female, estrogen receptor positive (HCC) 03/10/2022   • Shortness of breath    • Stroke (HCC) [id number]    Min stroke found wile going through chemo and radiation   • TIA (transient ischemic attack)    • UTI (urinary tract infection)      Past Surgical History:   Procedure Laterality Date   • BREAST LUMPECTOMY Right 3/29/2022    Procedure: ITZEL  DIRECTED LUMPECTOMY;  Surgeon: Denton Chambers MD;  Location: [facility];  Service: Surgical Oncology   • ESOPHAGOGASTRIC FUNDOPLASTY      Nissen Fundoplication   • HERNIA REPAIR     • LYMPH  NODE BIOPSY Right 3/29/2022    Procedure: LYMPHATIC MAPPING WITH BLUE AND RADIOACTIVE DYES SENTINEL LYMPH NODE BIOPSY, INJECTION IN OR AT 0800 BY DR CHAMBERS;  Surgeon: Denton Chambers MD;  Location: [facility];  Service: Surgical Oncology   • MAMMO STEREOTACTIC BREAST BIOPSY RIGHT (ALL INC) Right 3/7/2022   • US BREAST CLIP NEEDLE LOC RIGHT Right 3/24/2022   • US GUIDED BREAST BIOPSY RIGHT COMPLETE Right 3/7/2022   • WRIST SURGERY Left      Family History   Problem Relation Age of Onset   • Stroke Father    • Leukemia Sister    • No Known Problems Brother    • Skin cancer Mother    • Stroke Maternal Grandmother    • No Known Problems Maternal Grandfather    • No Known Problems Paternal Grandmother    • No Known Problems Paternal Grandfather    • Breast cancer Sister    • Breast cancer Sister         Colon cancer   • Kidney cancer Brother    • Alcohol abuse Brother    • Kidney cancer Brother    • Stomach cancer Brother    • No Known Problems Daughter    • No Known Problems Daughter      Social History     Socioeconomic History   • Marital status:      Spouse name: Not on file   • Number of children: 2   • Years of education: Not on file   • Highest education level: Not on file   Occupational History   • Not on file   Tobacco Use   • Smoking status: Never   • Smokeless tobacco: Never   • Tobacco comments:     None   Vaping Use   • Vaping status: Never Used   Substance and Sexual Activity   • Alcohol use: Not Currently     Comment: Social holidays   • Drug use: Never   • Sexual activity: Not Currently     Comment: [place] over   Other Topics Concern   • Not on file   Social History Narrative   • Not on file     Social Determinants of Health     Financial Resource Strain: Low Risk  (9/19/2023)    Overall Financial Resource Strain (CARDIA)    • Difficulty of Paying Living Expenses: Not hard at all   Food Insecurity: Not on file   Transportation Needs: No Transportation Needs (9/19/2023)    PRAPARE -  Transportation    • Lack of Transportation (Medical): No    • Lack of Transportation (Non-Medical): No   Physical Activity: Not on file   Stress: Not on file   Social Connections: Unknown (6/18/2024)    Received from [employer]    Social Connections    • How often do you feel lonely or isolated from those around you? (Adult - for ages 18 years and over): Not on file   Intimate Partner Violence: Not on file   Housing Stability: Not on file       Current Outpatient Medications:   •  amLODIPine (NORVASC) 2.5 mg tablet, Take 2 tablets (5 mg total) by mouth daily, Disp: 180 tablet, Rfl: 1  •  anastrozole (ARIMIDEX) 1 mg tablet, TAKE 1 TABLET BY MOUTH EVERY DAY, Disp: 90 tablet, Rfl: 1  •  aspirin 325 mg tablet, Take 325 mg by mouth daily , Disp: , Rfl:   •  cholecalciferol (VITAMIN D3) 1,000 units tablet, Take 1 tablet (1,000 Units total) by mouth daily, Disp: 30 tablet, Rfl: 6  •  cholestyramine (QUESTRAN) 4 g packet, TAKE 1 PACKET BY MOUTH DAILY., Disp: 90 packet, Rfl: 3  •  dicyclomine (BENTYL) 20 mg tablet, Take 1 tablet (20 mg total) by mouth 2 (two) times a day, Disp: 20 tablet, Rfl: 0  •  fluticasone (FLONASE) 50 mcg/act nasal spray, INSTILL 1 SPRAY INTO EACH NOSTRIL AS NEEDED FOR RHINITIS OR ALLERGIES, Disp: 48 mL, Rfl: 1  •  furosemide (LASIX) 40 mg tablet, TAKE 1 TABLET BY MOUTH DAILY AS NEEDED (FOR AM LEG SWELLING OR WEIGHT GAIN) ONLY TAKE IF LEGS SWOLLEN IN THE AM OR WEIGHT RISING., Disp: 180 tablet, Rfl: 1  •  levothyroxine 125 mcg tablet, TAKE 1 TABLET BY MOUTH EVERY DAY, Disp: 90 tablet, Rfl: 1  •  loperamide (IMODIUM) 2 mg capsule, Take 1 capsule (2 mg total) by mouth 4 (four) times a day as needed for diarrhea, Disp: 12 capsule, Rfl: 0  •  meclizine (ANTIVERT) 25 mg tablet, Take 1 tablet (25 mg total) by mouth every 8 (eight) hours as needed for dizziness, Disp: 50 tablet, Rfl: 1  •  memantine (NAMENDA) 10 mg tablet, TAKE 1 TABLET BY MOUTH TWICE A DAY, Disp: 180 tablet, Rfl: 1  •  Mirabegron ER  (Myrbetriq) 50 MG TB24, TAKE 1 TABLET BY MOUTH EVERY DAY, Disp: 90 tablet, Rfl: 1  •  multivitamin (THERAGRAN) TABS, Take 1 tablet by mouth, Disp: , Rfl:   •  omeprazole (PriLOSEC) 20 mg delayed release capsule, TAKE 1 CAPSULE BY MOUTH TWICE A DAY, Disp: 180 capsule, Rfl: 0  •  ondansetron (ZOFRAN-ODT) 4 mg disintegrating tablet, Take 1 tablet (4 mg total) by mouth every 8 (eight) hours as needed for nausea, Disp: 15 tablet, Rfl: 0  •  PARoxetine (PAXIL) 20 mg tablet, TAKE 1 TABLET BY MOUTH EVERY DAY, Disp: 90 tablet, Rfl: 3  •  potassium chloride (K-DUR,KLOR-CON) 10 mEq tablet, Take 1 tablet (10 mEq total) by mouth daily for 10 days, Disp: 10 tablet, Rfl: 0  •  PROAIR  (90 Base) MCG/ACT inhaler, as needed, Disp: , Rfl:   •  rivastigmine (EXELON) 9.5 mg/24 hr TD 24 hr patch, PLACE 1 PATCH ON THE SKIN OVER 24 HOURS DAILY, Disp: 90 patch, Rfl: 1  •  diphenhydrAMINE (BENADRYL) 25 mg tablet, Take 25 mg by mouth in the morning. 1/2 tablet with Paxil . (Patient not taking: Reported on 8/5/2024), Disp: , Rfl:   Allergies   Allergen Reactions   • Ciprofloxacin Hives   • Sulfa Antibiotics Itching   • Other    • Penicillins Rash     Category: Allergy;   --  Pt received unasyn 1.5 mg IV 11-12-18 to 11-15-18   • Sulfacetamide Rash     Category: Allergy;        Physical Exam:     Vitals:    08/26/24 1308   BP: 110/74   Pulse: 92   SpO2: 91%     Physical Exam  Constitutional:       Appearance: Normal appearance.   HENT:      Head: Normocephalic and atraumatic.      Nose: Nose normal.      Mouth/Throat:      Mouth: Mucous membranes are moist.   Eyes:      Pupils: Pupils are equal, round, and reactive to light.   Cardiovascular:      Rate and Rhythm: Normal rate.      Pulses: Normal pulses.      Heart sounds: Normal heart sounds.   Pulmonary:      Effort: Pulmonary effort is normal.      Breath sounds: Normal breath sounds.   Chest:          Comments: Bilateral breast examination no palpable mass masses nipple discharge  nipple retraction or skin changes bilateral axilla and supraclavicular examination no palpable adenopathy.  Well-healed right breast and right axillary surgical scars.  Patient was examined seated as well as supine position.  Abdominal:      General: Bowel sounds are normal.      Palpations: Abdomen is soft.   Musculoskeletal:         General: Normal range of motion.      Cervical back: Normal range of motion and neck supple.   Skin:     General: Skin is warm.   Neurological:      General: No focal deficit present.      Mental Status: She is alert and oriented to person, place, and time.   Psychiatric:         Mood and Affect: Mood normal.         Behavior: Behavior normal.         Thought Content: Thought content normal.         Judgment: Judgment normal.       Results & Discussion:     Narrative & Impression   DIAGNOSIS: Malignant neoplasm of overlapping sites of right breast in female, estrogen receptor positive       TECHNIQUE:  Digital diagnostic mammography was performed. Computer Aided Detection (CAD) analyzed all applicable images.     COMPARISONS: Multiple prior exams dated between 2/14/2022 and 2/15/2023.     RELEVANT HISTORY:   Family Breast Cancer History: History of breast cancer in Sister, Sister.  Family Medical History: Family medical history includes breast cancer in 2 relatives (sister, sister).   Personal History: Hormone history includes other and other. Surgical history includes lumpectomy. Medical history includes breast cancer, BRCA 1 negative, and BRCA 2 negative.     RISK ASSESSMENT:   Tyrer-Cuzick risk assessment reporting was suppressed due to the patient's history and/or demographic factors.     TISSUE DENSITY:   The breasts are extremely dense, which lowers the sensitivity of mammography.      INDICATION: Paula Lubin is a 78 y.o. female presenting for history of breast cancer.     FINDINGS:   RIGHT  1) POST-SURGICAL FINDING [C]: There are post-surgical findings from a previous  lumpectomy with radiation seen in the right breast.      BILATERAL  There are no suspicious masses, grouped microcalcifications or areas of unexplained architectural distortion. The skin and nipple areolar complex are unremarkable.  Benign-appearing calcifications are noted in each breast.        IMPRESSION:   No evidence of malignancy.           ASSESSMENT/BI-RADS CATEGORY:  Left: 2 - Benign  Right: 2 - Benign  Overall: 2 - Benign     RECOMMENDATION:       - Diagnostic mammogram in 1 year for both breasts.       I did review mammogram from last.agree with the report.  She is tolerating her anastrozole well no major side effects.  I did discussed in detail nature of breast cancer and benefits of endocrine therapy for prevention ipsilateral as well as contralateral breast.  She needs a next mammogram which we will order and follow her after.  He was instructed to continue her annual mammogram and monthly self breast examination.  she understands and  agrees . All patient questions were answered.       Advance Care Planning/Advance Directives:  I Denton Chambers MD discussed the disease status with Paula Lubin  today 08/26/24  treatment plans and follow-up with the patient.

## 2024-08-28 DIAGNOSIS — G30.1 LATE ONSET ALZHEIMER'S DISEASE WITHOUT BEHAVIORAL DISTURBANCE (HCC): ICD-10-CM

## 2024-08-28 DIAGNOSIS — F02.80 LATE ONSET ALZHEIMER'S DISEASE WITHOUT BEHAVIORAL DISTURBANCE (HCC): ICD-10-CM

## 2024-08-29 RX ORDER — RIVASTIGMINE 9.5 MG/24H
1 PATCH, EXTENDED RELEASE TRANSDERMAL DAILY
Qty: 90 PATCH | Refills: 1 | Status: SHIPPED | OUTPATIENT
Start: 2024-08-29

## 2024-09-09 ENCOUNTER — APPOINTMENT (OUTPATIENT)
Age: 79
End: 2024-09-09
Payer: COMMERCIAL

## 2024-09-13 NOTE — PROGRESS NOTES
"Daily Speech Treatment Note  Skilled Maintenance Program    Today's date: 2024   Patient’s name: Paula Lubin  : 1945  MRN: [id number]  Safety measures:   Referring provider: Liv Mc MD    No diagnosis found.      Visit Tracking:  POC expires Unit limit Auth Expiration date PT/OT + Visit Limit?   24 N/a 2/15/2025 12                           Visit/Unit Tracking  AUTH Status:  Date              yes Used 1 2              Remaining  11 10                 Subjective/Behavioral:  -Patient reported no new concerns for today's session.      Objective/Assessment:  -Reviewed testing results and goals in plan care with patient. Patient is in agreement at this time.      Short term goals  Patient will complete cognitive-linguistic activities (e.g., verbal and visual problem-solving scenarios, sequencing, reasoning, attention, organization, etc.) at 90% accuracy with min cues from clinician as needed to target the maintenance of his cognitive-linguistic functioning, as well as promote safety and overall QOL.     Completing words tasks from given definition. Each word contains the letters \"mon.\" Task completed in  opp (85% acc) independently, increasing to  opp (95% acc) from moderate cueing. Patient benefited from semantic/phonemic cues and carrier phrases.      Patient was given the clocks and had to write the given time on the clock. Task completed in 9/15 opp (60% acc) independently, increasing to 15/15 opp (100% acc) from min-mod cueing. Patient benefited from visual models, review of the small/big hands.    Given empty clock face and patient was given task to write the numbers in. Patient demonstrated all the correct numbers into the clock in the correct order, however, spacing of the numbers were uneven.        Patient and caregiver will provide adequate comprehension and knowledge of internal/external memory aid strategies with min cues from clinician needed to target the " maintenance of his cognitive-linguistic functioning, as well as promote safety and overall QOL.    Patient reports she still completes memory journal daily entries. Recommended patient to bring next session for a review.    Plan:  -Patient was provided with home exercises/activities to target goals in plan of care at the end of today's session.  -Continue with current plan of care.

## 2024-09-16 ENCOUNTER — APPOINTMENT (OUTPATIENT)
Age: 79
End: 2024-09-16
Payer: COMMERCIAL

## 2024-09-17 ENCOUNTER — OFFICE VISIT (OUTPATIENT)
Age: 79
End: 2024-09-17
Payer: COMMERCIAL

## 2024-09-17 DIAGNOSIS — R41.841 COGNITIVE COMMUNICATION DEFICIT: ICD-10-CM

## 2024-09-17 DIAGNOSIS — R48.8 OTHER SYMBOLIC DYSFUNCTIONS: Primary | ICD-10-CM

## 2024-09-17 DIAGNOSIS — F02.80 LATE ONSET ALZHEIMER'S DISEASE WITHOUT BEHAVIORAL DISTURBANCE (HCC): ICD-10-CM

## 2024-09-17 DIAGNOSIS — G30.1 LATE ONSET ALZHEIMER'S DISEASE WITHOUT BEHAVIORAL DISTURBANCE (HCC): ICD-10-CM

## 2024-09-17 PROCEDURE — 92507 TX SP LANG VOICE COMM INDIV: CPT

## 2024-09-17 NOTE — PROGRESS NOTES
Speech-Language Pathology Progress Update    Today's date: 2024   Patient’s name: Paula Lubin  : 1945  MRN: [id number]  Safety measures:   Referring provider: Liv Mc MD    Encounter Diagnosis     ICD-10-CM    1. Other symbolic dysfunctions  R48.8       2. Cognitive communication deficit  R41.841       3. Late onset Alzheimer's disease without behavioral disturbance (HCC)  G30.1     F02.80           Assessment:     Patient continues to present with moderate-severe cognitive-linguistic deficits c/b decreased orientation,  short-term/delayed memory of words/stories/conversations, decreased processing speed, difficulties following multi-step directions, executive functioning, higher level language tasks, and verbal expression skills secondary to Late onset Alzheimer' disease. Patient is tolerating various cognitive-stimulating tasks and internal/external memory education well. Patient continues to be motivated for OP ST services. OP ST services will continue to target increased functional recall, executive functioning (processing, problem-solving, thought organization, etc.) auditory comprehension, increased verbal fluency, word-finding & memory aids/strategies education/implementation, successful completion of daily tasks and structured tasks, follow directions within functional activities and unstructured tasks, support positive communication interactions with both familiar and unfamiliar listeners, promote safety and facilitate overall improved quality of life.    Short term goals (to be achieved in 6-8 weeks)  Patient will complete cognitive-linguistic activities (e.g., verbal and visual problem-solving scenarios, sequencing, reasoning, attention, organization, etc.) at 90% accuracy with min cues from clinician as needed to target the maintenance of his cognitive-linguistic functioning, as well as promote safety and overall QOL. ongoing     Patient and caregiver will provide adequate  "comprehension and knowledge of internal/external memory aid strategies with min cues from clinician needed to target the maintenance of his cognitive-linguistic functioning, as well as promote safety and overall QOL. ongoing     Long term goals    Patient will maintain her highest level of independence with cognitive-linguistic functioning provided the implementation of compensatory strategies and cues from caregivers to promote positive communication interactions and socialization, participation in meaningful activities, safety, and quality of life (to be achieved by discharge). ongoing     Patient and daughter will maintain knowledge of internal/external memory strategies and implement into ADLs to promote positive communication interactions and socialization, participation in meaningful activities, safety, and quality of life (to be achieved by discharge). ongoing      Plan:  Patient would benefit from outpatient skilled Speech Therapy services: Maintenance therapy program    Frequency: 1-2x weekly  Duration: 3 months    Intervention certification from: 8/20/2024  Intervention certification to: 11/20/2024      Subjective:    Patient's goal(s): \"to stay where Im at\"      Objective (testing):  No testing serves as progress update.       Treatment:    Paitent was given a target word (Encyclopedia) and from target word had to extract letters to create more words. Task completed in 15/25 opp (60% acc) independently, increasing to 25/25 opp (100% acc) from min-mod cueing. Patient benefited from semantic/phonemic cues to generate more words.      Patient was given an array of picture stimuli representing each letter of the alphabet. Patient to rearrange the picture stimuli into alphabetical order. Task completed in 17/26 opp (65% acc) independently, increasing to 26/26 opp (100% acc) from min-mod cueing. Patient benefited from verbal promoting to scan each row and verbalize the alphabet.            Visit Tracking:  POC " expires Unit limit Auth Expiration date PT/OT + Visit Limit?   11/20/24 N/a 2/15/2025 12                           Visit/Unit Tracking  AUTH Status:  Date 8/20  IE 8/26 9/17  PU            yes Used 1 2 3             Remaining  11 10 9                  Intervention comments:  45 mins of speech language tx

## 2024-09-19 DIAGNOSIS — K21.9 GASTROESOPHAGEAL REFLUX DISEASE WITHOUT ESOPHAGITIS: ICD-10-CM

## 2024-09-19 DIAGNOSIS — K44.9 HIATAL HERNIA: ICD-10-CM

## 2024-09-23 ENCOUNTER — OFFICE VISIT (OUTPATIENT)
Age: 79
End: 2024-09-23
Payer: COMMERCIAL

## 2024-09-23 DIAGNOSIS — G30.1 LATE ONSET ALZHEIMER'S DISEASE WITHOUT BEHAVIORAL DISTURBANCE (HCC): ICD-10-CM

## 2024-09-23 DIAGNOSIS — F02.80 LATE ONSET ALZHEIMER'S DISEASE WITHOUT BEHAVIORAL DISTURBANCE (HCC): ICD-10-CM

## 2024-09-23 DIAGNOSIS — R41.841 COGNITIVE COMMUNICATION DEFICIT: ICD-10-CM

## 2024-09-23 DIAGNOSIS — R48.8 OTHER SYMBOLIC DYSFUNCTIONS: Primary | ICD-10-CM

## 2024-09-23 PROCEDURE — 92507 TX SP LANG VOICE COMM INDIV: CPT

## 2024-09-23 NOTE — PROGRESS NOTES
Daily Speech Treatment Note    Today's date: 2024   Patient’s name: Paula Lubin  : 1945  MRN: [id number]  Safety measures:   Referring provider: Liv Mc MD    Encounter Diagnosis     ICD-10-CM    1. Other symbolic dysfunctions  R48.8       2. Cognitive communication deficit  R41.841       3. Late onset Alzheimer's disease without behavioral disturbance (HCC)  G30.1     F02.80           Visit Tracking:  POC expires Unit limit Auth Expiration date PT/OT + Visit Limit?   24 N/a 2/15/2025 12                                           Visit/Unit Tracking  AUTH Status:  Date   IE   PU    yes Used 1 2 3 4     Remaining  11 10 9 8       Subjective/Behavioral:  -Patient reported no new concerns for today's session.      Objective/Assessment:  -Provided a visual aid for patient to bring memory journal for next session.       Short term goals (to be achieved in 6-8 weeks)  Patient will complete cognitive-linguistic activities (e.g., verbal and visual problem-solving scenarios, sequencing, reasoning, attention, organization, etc.) at 90% accuracy with min cues from clinician as needed to target the maintenance of his cognitive-linguistic functioning, as well as promote safety and overall QOL. Ongoing    Patient was given an acrostic with 20 empty spaces for 20 different words. Acrostic did not have definitions and grid had 4 letters in correct places to aid with completion of acrostic. Patient required max verbal/visual prompting to look to see what possibilities could fit in each grid to successfully complete the grid with 100% accuracy.       Patient was given 2 words and had to identify how they are similar and different. Similarities: Task completed in /15 opp (73% acc) independently, increasing to 15/15 opp (100% acc) from min-mod cueing. Patient benefited from semantic/phonemic cues. Differences: Task completed in 7/15 opp (46% acc) independently, increasing to 13/15 opp  (86% acc) from min-mod cueing. Patient benefited from semantic/phonemic cues.       Patient and caregiver will provide adequate comprehension and knowledge of internal/external memory aid strategies with min cues from clinician needed to target the maintenance of his cognitive-linguistic functioning, as well as promote safety and overall QOL. ongoing    Plan:  -Patient was provided with home exercises/activities to target goals in plan of care at the end of today's session.  -Continue with current plan of care.

## 2024-09-25 ENCOUNTER — RA CDI HCC (OUTPATIENT)
Dept: OTHER | Facility: HOSPITAL | Age: 79
End: 2024-09-25

## 2024-10-01 ENCOUNTER — OFFICE VISIT (OUTPATIENT)
Dept: INTERNAL MEDICINE CLINIC | Facility: CLINIC | Age: 79
End: 2024-10-01
Payer: COMMERCIAL

## 2024-10-01 VITALS
BODY MASS INDEX: 35.88 KG/M2 | HEIGHT: 59 IN | RESPIRATION RATE: 17 BRPM | OXYGEN SATURATION: 95 % | DIASTOLIC BLOOD PRESSURE: 78 MMHG | SYSTOLIC BLOOD PRESSURE: 132 MMHG | WEIGHT: 178 LBS | HEART RATE: 74 BPM

## 2024-10-01 DIAGNOSIS — R32 URINARY INCONTINENCE IN FEMALE: ICD-10-CM

## 2024-10-01 DIAGNOSIS — G30.1 LATE ONSET ALZHEIMER'S DISEASE WITHOUT BEHAVIORAL DISTURBANCE (HCC): ICD-10-CM

## 2024-10-01 DIAGNOSIS — Z00.00 MEDICARE ANNUAL WELLNESS VISIT, SUBSEQUENT: Primary | ICD-10-CM

## 2024-10-01 DIAGNOSIS — I10 BENIGN ESSENTIAL HTN: ICD-10-CM

## 2024-10-01 DIAGNOSIS — R06.00 DYSPNEA, UNSPECIFIED TYPE: ICD-10-CM

## 2024-10-01 DIAGNOSIS — Z23 NEED FOR COVID-19 VACCINE: ICD-10-CM

## 2024-10-01 DIAGNOSIS — E03.9 HYPOTHYROIDISM, UNSPECIFIED TYPE: ICD-10-CM

## 2024-10-01 DIAGNOSIS — F02.80 LATE ONSET ALZHEIMER'S DISEASE WITHOUT BEHAVIORAL DISTURBANCE (HCC): ICD-10-CM

## 2024-10-01 PROCEDURE — 90480 ADMN SARSCOV2 VAC 1/ONLY CMP: CPT | Performed by: INTERNAL MEDICINE

## 2024-10-01 PROCEDURE — 99214 OFFICE O/P EST MOD 30 MIN: CPT | Performed by: INTERNAL MEDICINE

## 2024-10-01 PROCEDURE — 91320 SARSCV2 VAC 30MCG TRS-SUC IM: CPT | Performed by: INTERNAL MEDICINE

## 2024-10-01 PROCEDURE — G0439 PPPS, SUBSEQ VISIT: HCPCS | Performed by: INTERNAL MEDICINE

## 2024-10-01 NOTE — PROGRESS NOTES
Ambulatory Visit  Name: Paula Lubin      : 1945      MRN: [id number]  Encounter Provider: Jose Enrique Elliott MD  Encounter Date: 10/1/2024   Encounter department: [facility] INTERNAL MEDICINE [place]    Assessment & Plan       Preventive health issues were discussed with patient, and age appropriate screening tests were ordered as noted in patient's After Visit Summary. Personalized health advice and appropriate referrals for health education or preventive services given if needed, as noted in patient's After Visit Summary.    History of Present Illness   {Disappearing Hyperlinks I Encounters * My Last Note * Since Last Visit * History :72798}  HPI   Patient Care Team:  Jose Enrique Elliott MD as PCP - General  Denton Chambers MD as Surgeon (Surgical Oncology)  Keely Crespo MD (Hematology and Oncology)  Tere Hernandez MD (Radiation Oncology)    Review of Systems  Medical History Reviewed by provider this encounter:       Annual Wellness Visit Questionnaire       Depression Screening:   PHQ-9 Score: 0      PREVENTIVE SCREENINGS      Cardiovascular Screening:    General: Screening Not Indicated and History Lipid Disorder      Diabetes Screening:     General: Screening Current      Breast Cancer Screening:     General: History Breast Cancer      Cervical Cancer Screening:    General: Screening Not Indicated      Lung Cancer Screening:     General: Screening Not Indicated    Screening, Brief Intervention, and Referral to Treatment (SBIRT)    Screening      AUDIT-C Screenin) How often did you have a drink containing alcohol in the past year? never  2) How many drinks did you have on a typical day when you were drinking in the past year? 0  3) How often did you have 6 or more drinks on one occasion in the past year? never    AUDIT-C Score: 0  Interpretation: Score 0-2 (female): Negative screen for alcohol misuse    Social Determinants of Health     Financial Resource Strain: Low Risk  (2023)     "Overall Financial Resource Strain (CARDIA)     Difficulty of Paying Living Expenses: Not hard at all   Food Insecurity: No Food Insecurity (10/1/2024)    Hunger Vital Sign     Worried About Running Out of Food in the Last Year: Never true     Ran Out of Food in the Last Year: Never true   Transportation Needs: No Transportation Needs (10/1/2024)    PRAPARE - Transportation     Lack of Transportation (Medical): No     Lack of Transportation (Non-Medical): No   Housing Stability: Unknown (10/1/2024)    Housing Stability Vital Sign     Unable to Pay for Housing in the Last Year: No   Utilities: Not At Risk (10/1/2024)    [facility] Utilities     Threatened with loss of utilities: No     No results found.    Objective   {Disappearing Hyperlinks   Review Vitals * Enter New Vitals * Results Review * Labs * Imaging * Cardiology * Procedures * Lung Cancer Screening * Surgical eConsent :13524}  /78 (BP Location: Left arm, Patient Position: Sitting, Cuff Size: Adult)   Pulse 74   Resp 17   Ht 4' 11\" (1.499 m)   Wt 80.7 kg (178 lb)   SpO2 95%   BMI 35.95 kg/m²     Physical Exam  {Administrative / Billing Section (Optional):85527}  "

## 2024-10-01 NOTE — ASSESSMENT & PLAN NOTE
Stable.  Continue current medications.  Recheck levels.  Orders:    T4, free; Future    TSH, 3rd generation; Future

## 2024-10-01 NOTE — PATIENT INSTRUCTIONS
Medicare Preventive Visit Patient Instructions  Thank you for completing your Welcome to Medicare Visit or Medicare Annual Wellness Visit today. Your next wellness visit will be due in one year (10/2/2025).  The screening/preventive services that you may require over the next 5-10 years are detailed below. Some tests may not apply to you based off risk factors and/or age. Screening tests ordered at today's visit but not completed yet may show as past due. Also, please note that scanned in results may not display below.  Preventive Screenings:  Service Recommendations Previous Testing/Comments   Colorectal Cancer Screening  * Colonoscopy    * Fecal Occult Blood Test (FOBT)/Fecal Immunochemical Test (FIT)  * Fecal DNA/Cologuard Test  * Flexible Sigmoidoscopy Age: 45-75 years old   Colonoscopy: every 10 years (may be performed more frequently if at higher risk)  OR  FOBT/FIT: every 1 year  OR  Cologuard: every 3 years  OR  Sigmoidoscopy: every 5 years  Screening may be recommended earlier than age 45 if at higher risk for colorectal cancer. Also, an individualized decision between you and your healthcare provider will decide whether screening between the ages of 76-85 would be appropriate. Colonoscopy: 08/28/2023  FOBT/FIT: Not on file  Cologuard: Not on file  Sigmoidoscopy: Not on file          Breast Cancer Screening Age: 40+ years old  Frequency: every 1-2 years  Not required if history of left and right mastectomy Mammogram: 02/19/2024    History Breast Cancer   Cervical Cancer Screening Between the ages of 21-29, pap smear recommended once every 3 years.   Between the ages of 30-65, can perform pap smear with HPV co-testing every 5 years.   Recommendations may differ for women with a history of total hysterectomy, cervical cancer, or abnormal pap smears in past. Pap Smear: Not on file    Screening Not Indicated   Hepatitis C Screening Once for adults born between 1945 and 1965  More frequently in patients at high  risk for Hepatitis C Hep C Antibody: Not on file        Diabetes Screening 1-2 times per year if you're at risk for diabetes or have pre-diabetes Fasting glucose: 106 mg/dL (5/30/2024)  A1C: 5.4 % (9/14/2021)  Screening Current   Cholesterol Screening Once every 5 years if you don't have a lipid disorder. May order more often based on risk factors. Lipid panel: 05/30/2024    Screening Not Indicated  History Lipid Disorder     Other Preventive Screenings Covered by Medicare:  Abdominal Aortic Aneurysm (AAA) Screening: covered once if your at risk. You're considered to be at risk if you have a family history of AAA.  Lung Cancer Screening: covers low dose CT scan once per year if you meet all of the following conditions: (1) Age 55-77; (2) No signs or symptoms of lung cancer; (3) Current smoker or have quit smoking within the last 15 years; (4) You have a tobacco smoking history of at least 20 pack years (packs per day multiplied by number of years you smoked); (5) You get a written order from a healthcare provider.  Glaucoma Screening: covered annually if you're considered high risk: (1) You have diabetes OR (2) Family history of glaucoma OR (3)  aged 50 and older OR (4)  American aged 65 and older  Osteoporosis Screening: covered every 2 years if you meet one of the following conditions: (1) You're estrogen deficient and at risk for osteoporosis based off medical history and other findings; (2) Have a vertebral abnormality; (3) On glucocorticoid therapy for more than 3 months; (4) Have primary hyperparathyroidism; (5) On osteoporosis medications and need to assess response to drug therapy.   Last bone density test (DXA Scan): 08/02/2022.  HIV Screening: covered annually if you're between the age of 15-65. Also covered annually if you are younger than 15 and older than 65 with risk factors for HIV infection. For pregnant patients, it is covered up to 3 times per  pregnancy.    Immunizations:  Immunization Recommendations   Influenza Vaccine Annual influenza vaccination during flu season is recommended for all persons aged >= 6 months who do not have contraindications   Pneumococcal Vaccine   * Pneumococcal conjugate vaccine = PCV13 (Prevnar 13), PCV15 (Vaxneuvance), PCV20 (Prevnar 20)  * Pneumococcal polysaccharide vaccine = PPSV23 (Pneumovax) Adults 19-65 yo with certain risk factors or if 65+ yo  If never received any pneumonia vaccine: recommend Prevnar 20 (PCV20)  Give PCV20 if previously received 1 dose of PCV13 or PPSV23   Hepatitis B Vaccine 3 dose series if at intermediate or high risk (ex: diabetes, end stage renal disease, liver disease)   Respiratory syncytial virus (RSV) Vaccine - COVERED BY MEDICARE PART D  * RSVPreF3 (Arexvy) CDC recommends that adults 60 years of age and older may receive a single dose of RSV vaccine using shared clinical decision-making (SCDM)   Tetanus (Td) Vaccine - COST NOT COVERED BY MEDICARE PART B Following completion of primary series, a booster dose should be given every 10 years to maintain immunity against tetanus. Td may also be given as tetanus wound prophylaxis.   Tdap Vaccine - COST NOT COVERED BY MEDICARE PART B Recommended at least once for all adults. For pregnant patients, recommended with each pregnancy.   Shingles Vaccine (Shingrix) - COST NOT COVERED BY MEDICARE PART B  2 shot series recommended in those 19 years and older who have or will have weakened immune systems or those 50 years and older     Health Maintenance Due:      Topic Date Due   • Hepatitis C Screening  Never done   • Breast Cancer Screening: Mammogram  02/19/2025   • Colorectal Cancer Screening  Discontinued     Immunizations Due:      Topic Date Due   • Pneumococcal Vaccine: 65+ Years (3 of 3 - PCV) 11/11/2020   • Influenza Vaccine (1) 09/01/2024   • COVID-19 Vaccine (7 - 2023-24 season) 09/01/2024     Advance Directives   What are advance directives?   Advance directives are legal documents that state your wishes and plans for medical care. These plans are made ahead of time in case you lose your ability to make decisions for yourself. Advance directives can apply to any medical decision, such as the treatments you want, and if you want to donate organs.   What are the types of advance directives?  There are many types of advance directives, and each state has rules about how to use them. You may choose a combination of any of the following:  Living will:  This is a written record of the treatment you want. You can also choose which treatments you do not want, which to limit, and which to stop at a certain time. This includes surgery, medicine, IV fluid, and tube feedings.   Durable power of  for healthcare (DPAHC):  This is a written record that states who you want to make healthcare choices for you when you are unable to make them for yourself. This person, called a proxy, is usually a family member or a friend. You may choose more than 1 proxy.  Do not resuscitate (DNR) order:  A DNR order is used in case your heart stops beating or you stop breathing. It is a request not to have certain forms of treatment, such as CPR. A DNR order may be included in other types of advance directives.  Medical directive:  This covers the care that you want if you are in a coma, near death, or unable to make decisions for yourself. You can list the treatments you want for each condition. Treatment may include pain medicine, surgery, blood transfusions, dialysis, IV or tube feedings, and a ventilator (breathing machine).  Values history:  This document has questions about your views, beliefs, and how you feel and think about life. This information can help others choose the care that you would choose.  Why are advance directives important?  An advance directive helps you control your care. Although spoken wishes may be used, it is better to have your wishes written down.  Spoken wishes can be misunderstood, or not followed. Treatments may be given even if you do not want them. An advance directive may make it easier for your family to make difficult choices about your care.   Urinary Incontinence   Urinary incontinence (UI)  is when you lose control of your bladder. UI develops because your bladder cannot store or empty urine properly. The 3 most common types of UI are stress incontinence, urge incontinence, or both.  Medicines:   May be given to help strengthen your bladder control. Report any side effects of medication to your healthcare provider.  Do pelvic muscle exercises often:  Your pelvic muscles help you stop urinating. Squeeze these muscles tight for 5 seconds, then relax for 5 seconds. Gradually work up to squeezing for 10 seconds. Do 3 sets of 15 repetitions a day, or as directed. This will help strengthen your pelvic muscles and improve bladder control.  Train your bladder:  Go to the bathroom at set times, such as every 2 hours, even if you do not feel the urge to go. You can also try to hold your urine when you feel the urge to go. For example, hold your urine for 5 minutes when you feel the urge to go. As that becomes easier, hold your urine for 10 minutes.   Self-care:   Keep a UI record.  Write down how often you leak urine and how much you leak. Make a note of what you were doing when you leaked urine.  Drink liquids as directed. You may need to limit the amount of liquid you drink to help control your urine leakage. Do not drink any liquid right before you go to bed. Limit or do not have drinks that contain caffeine or alcohol.   Prevent constipation.  Eat a variety of high-fiber foods. Good examples are high-fiber cereals, beans, vegetables, and whole-grain breads. Walking is the best way to trigger your intestines to have a bowel movement.  Exercise regularly and maintain a healthy weight.  Weight loss and exercise will decrease pressure on your bladder and help  you control your leakage.   Use a catheter as directed  to help empty your bladder. A catheter is a tiny, plastic tube that is put into your bladder to drain your urine.   Go to behavior therapy as directed.  Behavior therapy may be used to help you learn to control your urge to urinate.    Weight Management   Why it is important to manage your weight:  Being overweight increases your risk of health conditions such as heart disease, high blood pressure, type 2 diabetes, and certain types of cancer. It can also increase your risk for osteoarthritis, sleep apnea, and other respiratory problems. Aim for a slow, steady weight loss. Even a small amount of weight loss can lower your risk of health problems.  How to lose weight safely:  A safe and healthy way to lose weight is to eat fewer calories and get regular exercise. You can lose up about 1 pound a week by decreasing the number of calories you eat by 500 calories each day.   Healthy meal plan for weight management:  A healthy meal plan includes a variety of foods, contains fewer calories, and helps you stay healthy. A healthy meal plan includes the following:  Eat whole-grain foods more often.  A healthy meal plan should contain fiber. Fiber is the part of grains, fruits, and vegetables that is not broken down by your body. Whole-grain foods are healthy and provide extra fiber in your diet. Some examples of whole-grain foods are whole-wheat breads and pastas, oatmeal, brown rice, and bulgur.  Eat a variety of vegetables every day.  Include dark, leafy greens such as spinach, kale, neil greens, and mustard greens. Eat yellow and orange vegetables such as carrots, sweet potatoes, and winter squash.   Eat a variety of fruits every day.  Choose fresh or canned fruit (canned in its own juice or light syrup) instead of juice. Fruit juice has very little or no fiber.  Eat low-fat dairy foods.  Drink fat-free (skim) milk or 1% milk. Eat fat-free yogurt and low-fat  cottage cheese. Try low-fat cheeses such as mozzarella and other reduced-fat cheeses.  Choose meat and other protein foods that are low in fat.  Choose beans or other legumes such as split peas or lentils. Choose fish, skinless poultry (chicken or turkey), or lean cuts of red meat (beef or pork). Before you cook meat or poultry, cut off any visible fat.   Use less fat and oil.  Try baking foods instead of frying them. Add less fat, such as margarine, sour cream, regular salad dressing and mayonnaise to foods. Eat fewer high-fat foods. Some examples of high-fat foods include french fries, doughnuts, ice cream, and cakes.  Eat fewer sweets.  Limit foods and drinks that are high in sugar. This includes candy, cookies, regular soda, and sweetened drinks.  Exercise:  Exercise at least 30 minutes per day on most days of the week. Some examples of exercise include walking, biking, dancing, and swimming. You can also fit in more physical activity by taking the stairs instead of the elevator or parking farther away from stores. Ask your healthcare provider about the best exercise plan for you.      © Copyright [employer] 2018 Information is for End User's use only and may not be sold, redistributed or otherwise used for commercial purposes. All illustrations and images included in CareNotes® are the copyrighted property of [employer] Inc[employer] or [employer]

## 2024-10-01 NOTE — PROGRESS NOTES
Ambulatory Visit  Name: Paula Lubin      : 1945      MRN: [id number]  Encounter Provider: Jose Enrique Elliott MD  Encounter Date: 10/1/2024   Encounter department: [facility] INTERNAL MEDICINE [place]    Assessment & Plan  Medicare annual wellness visit, subsequent         Need for COVID-19 vaccine         Benign essential HTN  Controlled.  Continue current medications.       Late onset Alzheimer's disease without behavioral disturbance (HCC)  Stable.  Continue current medications.       Hypothyroidism, unspecified type  Stable.  Continue current medications.  Recheck levels.  Orders:    T4, free; Future    TSH, 3rd generation; Future    Dyspnea, unspecified type  Still having trouble with this off and on.  Has already seen cardiology.  Will order PFTs as the next step.  Orders:    Complete PFT with post bronchodilator; Future    Urinary incontinence in female  Feels symptoms are mild.  Does not want further treatment.         Depression Screening and Follow-up Plan: Patient was screened for depression during today's encounter. They screened negative with a PHQ-9 score of 0.    Urinary Incontinence Plan of Care: counseling topics discussed: limiting fluid intake 3-4 hours before bed.       Preventive health issues were discussed with patient, and age appropriate screening tests were ordered as noted in patient's After Visit Summary. Personalized health advice and appropriate referrals for health education or preventive services given if needed, as noted in patient's After Visit Summary.    History of Present Illness     Patient comes in today for routine follow-up and Medicare wellness.  Her blood pressure is controlled.  Her thyroid levels have been controlled but she is due for blood work.  Still complaining of episodic shortness of breath, mainly with exertion.  There was a question of heart failure at 1 point.  She did see cardiology and it was felt that was not a problem.  Taking her meds as directed.   Dementia remains stable.  Remains calm.  No other complaints today.  No further additions to her history.       Patient Care Team:  Jose Enrique Elliott MD as PCP - General  Denton Chambers MD as Surgeon (Surgical Oncology)  Keely Crespo MD (Hematology and Oncology)  Tere Hernandez MD (Radiation Oncology)    Review of Systems   Respiratory:  Negative for shortness of breath.    Cardiovascular:  Negative for chest pain.   Gastrointestinal:  Negative for abdominal pain.     Medical History Reviewed by provider this encounter:       Annual Wellness Visit Questionnaire   Paula is here for her Subsequent Wellness visit.     Health Risk Assessment:   Patient rates overall health as good. Patient feels that their physical health rating is same. Patient is satisfied with their life. Eyesight was rated as same. Hearing was rated as same. Patient feels that their emotional and mental health rating is same. Patients states they are never, rarely angry. Patient states they are sometimes unusually tired/fatigued. Pain experienced in the last 7 days has been some. Patient's pain rating has been 6/10. Patient states that she has experienced no weight loss or gain in last 6 months.     Depression Screening:   PHQ-9 Score: 0      Fall Risk Screening:   In the past year, patient has experienced: no history of falling in past year      Urinary Incontinence Screening:   Patient has leaked urine accidently in the last six months.     Home Safety:  Patient does not have trouble with stairs inside or outside of their home. Patient has working smoke alarms and has working carbon monoxide detector. Home safety hazards include: none.     Nutrition:   Current diet is Regular.     Medications:   Patient is currently taking over-the-counter supplements. OTC medications include: see medication list. Patient is able to manage medications.     Activities of Daily Living (ADLs)/Instrumental Activities of Daily Living (IADLs):   Walk and transfer  into and out of bed and chair?: Yes  Dress and groom yourself?: Yes    Bathe or shower yourself?: Yes    Feed yourself? Yes  Do your laundry/housekeeping?: Yes  Manage your money, pay your bills and track your expenses?: Yes  Make your own meals?: Yes    Do your own shopping?: Yes    Previous Hospitalizations:   Any hospitalizations or ED visits within the last 12 months?: Yes    How many hospitalizations have you had in the last year?: 1-2    Advance Care Planning:   Living will: No    Durable POA for healthcare: No    Advanced directive counseling given: Yes      Cognitive Screening:   Provider or family/friend/caregiver concerned regarding cognition?: No    PREVENTIVE SCREENINGS      Cardiovascular Screening:    General: Screening Not Indicated and History Lipid Disorder      Diabetes Screening:     General: Screening Current      Colorectal Cancer Screening:     General: Screening Not Indicated      Breast Cancer Screening:     General: History Breast Cancer      Cervical Cancer Screening:    General: Screening Not Indicated      Osteoporosis Screening:    General: Risks and Benefits Discussed    Due for: Bone Density Ultrasound      Abdominal Aortic Aneurysm (AAA) Screening:        General: Screening Not Indicated      Lung Cancer Screening:     General: Screening Not Indicated      Hepatitis C Screening:    General: Screening Not Indicated    Screening, Brief Intervention, and Referral to Treatment (SBIRT)    Screening  Typical number of drinks in a day: 0  Typical number of drinks in a week: 0  Interpretation: Low risk drinking behavior.    AUDIT-C Screenin) How often did you have a drink containing alcohol in the past year? never  2) How many drinks did you have on a typical day when you were drinking in the past year? 0  3) How often did you have 6 or more drinks on one occasion in the past year? never    AUDIT-C Score: 0  Interpretation: Score 0-2 (female): Negative screen for alcohol misuse    Single  Item Drug Screening:  How often have you used an illegal drug (including marijuana) or a prescription medication for non-medical reasons in the past year? never    Single Item Drug Screen Score: 0  Interpretation: Negative screen for possible drug use disorder    Social Determinants of Health     Financial Resource Strain: Low Risk  (9/19/2023)    Overall Financial Resource Strain (CARDIA)     Difficulty of Paying Living Expenses: Not hard at all   Transportation Needs: No Transportation Needs (9/19/2023)    PRAPARE - Transportation     Lack of Transportation (Medical): No     Lack of Transportation (Non-Medical): No    Received from [facility]     No results found.    Objective     There were no vitals taken for this visit.    Physical Exam  Vitals and nursing note reviewed.   Constitutional:       Appearance: She is well-developed.   Cardiovascular:      Rate and Rhythm: Normal rate and regular rhythm.   Pulmonary:      Effort: Pulmonary effort is normal.      Breath sounds: Normal breath sounds.   Abdominal:      General: Abdomen is flat.      Tenderness: There is no abdominal tenderness.   Musculoskeletal:      Right lower leg: No edema.      Left lower leg: No edema.   Neurological:      Mental Status: She is alert and oriented to person, place, and time.   Psychiatric:         Behavior: Behavior normal.         Thought Content: Thought content normal.         Judgment: Judgment normal.

## 2024-10-30 DIAGNOSIS — C50.811 MALIGNANT NEOPLASM OF OVERLAPPING SITES OF RIGHT BREAST IN FEMALE, ESTROGEN RECEPTOR POSITIVE (HCC): Primary | ICD-10-CM

## 2024-10-30 DIAGNOSIS — Z17.0 MALIGNANT NEOPLASM OF OVERLAPPING SITES OF RIGHT BREAST IN FEMALE, ESTROGEN RECEPTOR POSITIVE (HCC): Primary | ICD-10-CM

## 2024-10-31 ENCOUNTER — TELEPHONE (OUTPATIENT)
Dept: HEMATOLOGY ONCOLOGY | Facility: CLINIC | Age: 79
End: 2024-10-31

## 2024-10-31 NOTE — TELEPHONE ENCOUNTER
LMOM for PATIENT regarding labs that need to be completed for upcoming appt. Provided hope line # in case pt may need to r/s.   Will send my chart msg as patient has been active

## 2024-11-05 ENCOUNTER — TELEPHONE (OUTPATIENT)
Dept: HEMATOLOGY ONCOLOGY | Facility: CLINIC | Age: 79
End: 2024-11-05

## 2024-11-16 DIAGNOSIS — C50.811 MALIGNANT NEOPLASM OF OVERLAPPING SITES OF RIGHT BREAST IN FEMALE, ESTROGEN RECEPTOR POSITIVE (HCC): ICD-10-CM

## 2024-11-16 DIAGNOSIS — F32.A DEPRESSION, UNSPECIFIED DEPRESSION TYPE: ICD-10-CM

## 2024-11-16 DIAGNOSIS — N39.0 RECURRENT UTI: ICD-10-CM

## 2024-11-16 DIAGNOSIS — Z17.0 MALIGNANT NEOPLASM OF OVERLAPPING SITES OF RIGHT BREAST IN FEMALE, ESTROGEN RECEPTOR POSITIVE (HCC): ICD-10-CM

## 2024-11-18 ENCOUNTER — TELEPHONE (OUTPATIENT)
Dept: HEMATOLOGY ONCOLOGY | Facility: CLINIC | Age: 79
End: 2024-11-18

## 2024-11-18 RX ORDER — PAROXETINE 20 MG/1
20 TABLET, FILM COATED ORAL DAILY
Qty: 90 TABLET | Refills: 1 | Status: SHIPPED | OUTPATIENT
Start: 2024-11-18

## 2024-11-18 RX ORDER — MIRABEGRON 50 MG/1
1 TABLET, FILM COATED, EXTENDED RELEASE ORAL DAILY
Qty: 90 TABLET | Refills: 1 | Status: SHIPPED | OUTPATIENT
Start: 2024-11-18

## 2024-11-18 RX ORDER — ANASTROZOLE 1 MG/1
1 TABLET ORAL DAILY
Qty: 90 TABLET | Refills: 1 | Status: SHIPPED | OUTPATIENT
Start: 2024-11-18

## 2024-11-18 NOTE — TELEPHONE ENCOUNTER
Called patient in regards of scheduling derrek appt . Appt scheduled , left message with all info and with hope line tel number

## 2024-12-03 DIAGNOSIS — C50.811 MALIGNANT NEOPLASM OF OVERLAPPING SITES OF RIGHT BREAST IN FEMALE, ESTROGEN RECEPTOR POSITIVE (HCC): ICD-10-CM

## 2024-12-03 DIAGNOSIS — E03.9 HYPOTHYROIDISM, UNSPECIFIED TYPE: ICD-10-CM

## 2024-12-03 DIAGNOSIS — I10 ESSENTIAL HYPERTENSION: ICD-10-CM

## 2024-12-03 DIAGNOSIS — F32.A DEPRESSION, UNSPECIFIED DEPRESSION TYPE: ICD-10-CM

## 2024-12-03 DIAGNOSIS — F02.80 LATE ONSET ALZHEIMER'S DISEASE WITHOUT BEHAVIORAL DISTURBANCE (HCC): ICD-10-CM

## 2024-12-03 DIAGNOSIS — G30.1 LATE ONSET ALZHEIMER'S DISEASE WITHOUT BEHAVIORAL DISTURBANCE (HCC): ICD-10-CM

## 2024-12-03 DIAGNOSIS — R42 VERTIGO: ICD-10-CM

## 2024-12-03 DIAGNOSIS — Z17.0 MALIGNANT NEOPLASM OF OVERLAPPING SITES OF RIGHT BREAST IN FEMALE, ESTROGEN RECEPTOR POSITIVE (HCC): ICD-10-CM

## 2024-12-03 DIAGNOSIS — R06.02 SHORTNESS OF BREATH: ICD-10-CM

## 2024-12-03 DIAGNOSIS — N39.0 RECURRENT UTI: ICD-10-CM

## 2024-12-03 DIAGNOSIS — R06.09 DYSPNEA ON EXERTION: ICD-10-CM

## 2024-12-03 DIAGNOSIS — K21.9 GASTROESOPHAGEAL REFLUX DISEASE WITHOUT ESOPHAGITIS: ICD-10-CM

## 2024-12-03 DIAGNOSIS — K44.9 HIATAL HERNIA: ICD-10-CM

## 2024-12-03 DIAGNOSIS — E87.6 HYPOKALEMIA: ICD-10-CM

## 2024-12-03 DIAGNOSIS — N39.0 UTI (URINARY TRACT INFECTION): ICD-10-CM

## 2024-12-04 RX ORDER — ANASTROZOLE 1 MG/1
1 TABLET ORAL DAILY
Qty: 90 TABLET | Refills: 0 | Status: SHIPPED | OUTPATIENT
Start: 2024-12-04

## 2024-12-04 RX ORDER — FUROSEMIDE 40 MG/1
40 TABLET ORAL DAILY
Qty: 180 TABLET | Refills: 0 | Status: SHIPPED | OUTPATIENT
Start: 2024-12-04

## 2024-12-05 RX ORDER — PAROXETINE 20 MG/1
20 TABLET, FILM COATED ORAL DAILY
Qty: 90 TABLET | Refills: 1 | Status: SHIPPED | OUTPATIENT
Start: 2024-12-05

## 2024-12-05 RX ORDER — POTASSIUM CHLORIDE 750 MG/1
10 TABLET, EXTENDED RELEASE ORAL DAILY
Qty: 10 TABLET | Refills: 0 | Status: SHIPPED | OUTPATIENT
Start: 2024-12-05 | End: 2024-12-15

## 2024-12-05 RX ORDER — RIVASTIGMINE 9.5 MG/24H
1 PATCH, EXTENDED RELEASE TRANSDERMAL DAILY
Qty: 90 PATCH | Refills: 1 | Status: SHIPPED | OUTPATIENT
Start: 2024-12-05

## 2024-12-05 RX ORDER — MECLIZINE HYDROCHLORIDE 25 MG/1
25 TABLET ORAL EVERY 8 HOURS PRN
Qty: 50 TABLET | Refills: 1 | Status: SHIPPED | OUTPATIENT
Start: 2024-12-05

## 2024-12-05 RX ORDER — MIRABEGRON 50 MG/1
1 TABLET, FILM COATED, EXTENDED RELEASE ORAL DAILY
Qty: 90 TABLET | Refills: 1 | Status: SHIPPED | OUTPATIENT
Start: 2024-12-05

## 2024-12-05 RX ORDER — LEVOTHYROXINE SODIUM 125 UG/1
125 TABLET ORAL DAILY
Qty: 90 TABLET | Refills: 1 | Status: SHIPPED | OUTPATIENT
Start: 2024-12-05

## 2024-12-05 RX ORDER — AMLODIPINE BESYLATE 2.5 MG/1
5 TABLET ORAL DAILY
Qty: 180 TABLET | Refills: 1 | Status: SHIPPED | OUTPATIENT
Start: 2024-12-05

## 2024-12-05 RX ORDER — ONDANSETRON 4 MG/1
4 TABLET, ORALLY DISINTEGRATING ORAL EVERY 8 HOURS PRN
Qty: 15 TABLET | Refills: 0 | Status: SHIPPED | OUTPATIENT
Start: 2024-12-05

## 2024-12-05 RX ORDER — MEMANTINE HYDROCHLORIDE 10 MG/1
10 TABLET ORAL 2 TIMES DAILY
Qty: 180 TABLET | Refills: 1 | Status: SHIPPED | OUTPATIENT
Start: 2024-12-05

## 2024-12-13 ENCOUNTER — TELEPHONE (OUTPATIENT)
Dept: HEMATOLOGY ONCOLOGY | Facility: CLINIC | Age: 79
End: 2024-12-13

## 2024-12-13 NOTE — TELEPHONE ENCOUNTER
Called and spoke with Paula regarding lab work needed for upcoming appt. She agreed to go and get them done

## 2024-12-17 ENCOUNTER — TELEPHONE (OUTPATIENT)
Dept: HEMATOLOGY ONCOLOGY | Facility: CLINIC | Age: 79
End: 2024-12-17

## 2024-12-17 NOTE — TELEPHONE ENCOUNTER
Spoke w Paula regarding r/s of 12/20 appt with Dr Deluna, pt accepted new appt date and time w Dr Pablo.

## 2025-01-13 ENCOUNTER — HOSPITAL ENCOUNTER (EMERGENCY)
Facility: HOSPITAL | Age: 80
Discharge: HOME | End: 2025-01-13
Attending: EMERGENCY MEDICINE | Admitting: EMERGENCY MEDICINE
Payer: COMMERCIAL

## 2025-01-13 ENCOUNTER — APPOINTMENT (EMERGENCY)
Dept: CT IMAGING | Facility: HOSPITAL | Age: 80
End: 2025-01-13
Payer: COMMERCIAL

## 2025-01-13 ENCOUNTER — APPOINTMENT (EMERGENCY)
Dept: RADIOLOGY | Facility: HOSPITAL | Age: 80
End: 2025-01-13
Payer: COMMERCIAL

## 2025-01-13 VITALS
RESPIRATION RATE: 18 BRPM | DIASTOLIC BLOOD PRESSURE: 52 MMHG | TEMPERATURE: 98 F | SYSTOLIC BLOOD PRESSURE: 99 MMHG | OXYGEN SATURATION: 94 % | HEART RATE: 79 BPM

## 2025-01-13 DIAGNOSIS — R06.09 DYSPNEA ON EXERTION: Primary | ICD-10-CM

## 2025-01-13 LAB
2HR DELTA HS TROPONIN: 1 NG/L
ALBUMIN SERPL BCG-MCNC: 3.9 G/DL (ref 3.5–5)
ALP SERPL-CCNC: 69 U/L (ref 34–104)
ALT SERPL W P-5'-P-CCNC: 9 U/L (ref 7–52)
ANION GAP SERPL CALCULATED.3IONS-SCNC: 10 MMOL/L (ref 4–13)
ANISOCYTOSIS BLD QL SMEAR: PRESENT
AST SERPL W P-5'-P-CCNC: 16 U/L (ref 13–39)
BASOPHILS # BLD MANUAL: 0.13 THOUSAND/UL (ref 0–0.1)
BASOPHILS NFR MAR MANUAL: 2 % (ref 0–1)
BILIRUB SERPL-MCNC: 0.43 MG/DL (ref 0.2–1)
BNP SERPL-MCNC: 58 PG/ML (ref 0–100)
BUN SERPL-MCNC: 14 MG/DL (ref 5–25)
CALCIUM SERPL-MCNC: 9.1 MG/DL (ref 8.4–10.2)
CARDIAC TROPONIN I PNL SERPL HS: 7 NG/L (ref ?–50)
CARDIAC TROPONIN I PNL SERPL HS: 8 NG/L (ref ?–50)
CHLORIDE SERPL-SCNC: 102 MMOL/L (ref 96–108)
CO2 SERPL-SCNC: 28 MMOL/L (ref 21–32)
CREAT SERPL-MCNC: 0.81 MG/DL (ref 0.6–1.3)
D DIMER PPP FEU-MCNC: 1.3 UG/ML FEU
EOSINOPHIL # BLD MANUAL: 0.27 THOUSAND/UL (ref 0–0.4)
EOSINOPHIL NFR BLD MANUAL: 4 % (ref 0–6)
ERYTHROCYTE [DISTWIDTH] IN BLOOD BY AUTOMATED COUNT: 14.1 % (ref 11.6–15.1)
GFR SERPL CREATININE-BSD FRML MDRD: 68 ML/MIN/1.73SQ M
GLUCOSE SERPL-MCNC: 108 MG/DL (ref 65–140)
HCT VFR BLD AUTO: 34.3 % (ref 34.8–46.1)
HGB BLD-MCNC: 11.4 G/DL (ref 11.5–15.4)
LYMPHOCYTES # BLD AUTO: 1.01 THOUSAND/UL (ref 0.6–4.47)
LYMPHOCYTES # BLD AUTO: 15 % (ref 14–44)
MACROCYTES BLD QL AUTO: PRESENT
MCH RBC QN AUTO: 38 PG (ref 26.8–34.3)
MCHC RBC AUTO-ENTMCNC: 33.2 G/DL (ref 31.4–37.4)
MCV RBC AUTO: 114 FL (ref 82–98)
MONOCYTES # BLD AUTO: 0.34 THOUSAND/UL (ref 0–1.22)
MONOCYTES NFR BLD: 5 % (ref 4–12)
NEUTROPHILS # BLD MANUAL: 4.97 THOUSAND/UL (ref 1.85–7.62)
NEUTS BAND NFR BLD MANUAL: 6 % (ref 0–8)
NEUTS SEG NFR BLD AUTO: 68 % (ref 43–75)
PLATELET # BLD AUTO: 260 THOUSANDS/UL (ref 149–390)
PLATELET BLD QL SMEAR: ADEQUATE
PMV BLD AUTO: 12.1 FL (ref 8.9–12.7)
POLYCHROMASIA BLD QL SMEAR: PRESENT
POTASSIUM SERPL-SCNC: 2.9 MMOL/L (ref 3.5–5.3)
PROT SERPL-MCNC: 7.1 G/DL (ref 6.4–8.4)
RBC # BLD AUTO: 3 MILLION/UL (ref 3.81–5.12)
RBC MORPH BLD: PRESENT
SODIUM SERPL-SCNC: 140 MMOL/L (ref 135–147)
WBC # BLD AUTO: 6.71 THOUSAND/UL (ref 4.31–10.16)

## 2025-01-13 PROCEDURE — 99285 EMERGENCY DEPT VISIT HI MDM: CPT

## 2025-01-13 PROCEDURE — 85379 FIBRIN DEGRADATION QUANT: CPT | Performed by: EMERGENCY MEDICINE

## 2025-01-13 PROCEDURE — 36415 COLL VENOUS BLD VENIPUNCTURE: CPT | Performed by: EMERGENCY MEDICINE

## 2025-01-13 PROCEDURE — 93005 ELECTROCARDIOGRAM TRACING: CPT

## 2025-01-13 PROCEDURE — 80053 COMPREHEN METABOLIC PANEL: CPT | Performed by: EMERGENCY MEDICINE

## 2025-01-13 PROCEDURE — 84484 ASSAY OF TROPONIN QUANT: CPT | Performed by: EMERGENCY MEDICINE

## 2025-01-13 PROCEDURE — 71046 X-RAY EXAM CHEST 2 VIEWS: CPT

## 2025-01-13 PROCEDURE — 83880 ASSAY OF NATRIURETIC PEPTIDE: CPT | Performed by: EMERGENCY MEDICINE

## 2025-01-13 PROCEDURE — 71275 CT ANGIOGRAPHY CHEST: CPT

## 2025-01-13 PROCEDURE — 99285 EMERGENCY DEPT VISIT HI MDM: CPT | Performed by: EMERGENCY MEDICINE

## 2025-01-13 PROCEDURE — 85007 BL SMEAR W/DIFF WBC COUNT: CPT | Performed by: EMERGENCY MEDICINE

## 2025-01-13 PROCEDURE — 85027 COMPLETE CBC AUTOMATED: CPT | Performed by: EMERGENCY MEDICINE

## 2025-01-13 RX ORDER — POTASSIUM CHLORIDE 1500 MG/1
40 TABLET, EXTENDED RELEASE ORAL ONCE
Status: COMPLETED | OUTPATIENT
Start: 2025-01-13 | End: 2025-01-13

## 2025-01-13 RX ADMIN — POTASSIUM CHLORIDE 40 MEQ: 1500 TABLET, EXTENDED RELEASE ORAL at 17:06

## 2025-01-13 RX ADMIN — IOHEXOL 85 ML: 350 INJECTION, SOLUTION INTRAVENOUS at 16:56

## 2025-01-13 NOTE — ED PROVIDER NOTES
Time reflects when diagnosis was documented in both MDM as applicable and the Disposition within this note       Time User Action Codes Description Comment    1/13/2025  7:55 PM Gus Gallagher Add [R06.09] Dyspnea on exertion           ED Disposition       ED Disposition   Discharge    Condition   Stable    Date/Time   Mon Jan 13, 2025  7:55 PM    Comment   Paula Lubin discharge to home/self care.                   Assessment & Plan       Medical Decision Making  D-dimer is elevated but there is no pulmonary embolism on CAT scan.  No pneumonia.  No pneumothorax.  No wheezing to suggest COPD or asthma.  There is no congestive heart failure.  Patient ruled out for acute myocardial infarction.  EKG was nonischemic.  This could, however, be an anginal equivalent.  Heart score is 6.  Appropriate for discharge and close follow-up with cardiology.    Amount and/or Complexity of Data Reviewed  Labs: ordered. Decision-making details documented in ED Course.  Radiology: ordered and independent interpretation performed. Decision-making details documented in ED Course.  ECG/medicine tests: ordered and independent interpretation performed. Decision-making details documented in ED Course.    Risk  Prescription drug management.  Decision regarding hospitalization.             Medications   potassium chloride (Klor-Con M20) CR tablet 40 mEq (40 mEq Oral Given 1/13/25 1706)   iohexol (OMNIPAQUE) 350 MG/ML injection (MULTI-DOSE) 85 mL (85 mL Intravenous Given 1/13/25 1656)       ED Risk Strat Scores   HEART Risk Score      Flowsheet Row Most Recent Value   Heart Score Risk Calculator    History 1 Filed at: 01/13/2025 1846   ECG 1 Filed at: 01/13/2025 1846   Age 2 Filed at: 01/13/2025 1846   Risk Factors 2 Filed at: 01/13/2025 1846   Troponin 0 Filed at: 01/13/2025 1846   HEART Score 6 Filed at: 01/13/2025 1846          HEART Risk Score      Flowsheet Row Most Recent Value   Heart Score Risk Calculator    History 1 Filed at:  01/13/2025 1846   ECG 1 Filed at: 01/13/2025 1846   Age 2 Filed at: 01/13/2025 1846   Risk Factors 2 Filed at: 01/13/2025 1846   Troponin 0 Filed at: 01/13/2025 1846   HEART Score 6 Filed at: 01/13/2025 1846                    Identification of Seniors at Risk      Flowsheet Row Most Recent Value   (ISAR) Identification of Seniors at Risk    Before the illness or injury that brought you to the Emergency, did you need someone to help you on a regular basis? 0 Filed at: 01/13/2025 1409   In the last 24 hours, have you needed more help than usual? 0 Filed at: 01/13/2025 1409   Have you been hospitalized for one or more nights during the past 6 months? 0 Filed at: 01/13/2025 1409   In general, do you see well? 0 Filed at: 01/13/2025 1409   In general, do you have serious problems with your memory? 0 Filed at: 01/13/2025 1409   Do you take more than three different medications every day? 1 Filed at: 01/13/2025 1409   ISAR Score 1 Filed at: 01/13/2025 1409                SBIRT 22yo+      Flowsheet Row Most Recent Value   Initial Alcohol Screen: US AUDIT-C     1. How often do you have a drink containing alcohol? 0 Filed at: 01/13/2025 1409   2. How many drinks containing alcohol do you have on a typical day you are drinking?  0 Filed at: 01/13/2025 1409   3b. FEMALE Any Age, or MALE 65+: How often do you have 4 or more drinks on one occassion? 0 Filed at: 01/13/2025 1409   Audit-C Score 0 Filed at: 01/13/2025 1409   RENZO: How many times in the past year have you...    Used an illegal drug or used a prescription medication for non-medical reasons? Never Filed at: 01/13/2025 1409                            History of Present Illness       Chief Complaint   Patient presents with    Shortness of Breath     C/o shortness of breath that progressively started getting worse yesterday.        Past Medical History:   Diagnosis Date    Anxiety disorder     Aortic valve disorder     BRCA1 negative     BRCA2 negative     Breast cancer  (HCC) 03/2022    right    Cancer (HCC)     breast    Cellulitis of finger of left hand 11/20/2018    Cellulitis of left hand 11/10/2018    Added automatically from request for surgery [id number]    Depression     Disease of thyroid gland     GERD (gastroesophageal reflux disease)     History of gastroesophageal reflux (GERD)     History of kidney cancer 03/06/2014    History of transfusion     Hyperlipidemia 03/06/2014    Hypertension 03/06/2014    Hypothyroidism 03/06/2014    Lymphoma (HCC)     Malignant neoplasm of overlapping sites of right breast in female, estrogen receptor positive (HCC) 03/10/2022    Shortness of breath     Stroke (HCC) [id number]    Min stroke found wile going through chemo and radiation    TIA (transient ischemic attack)     UTI (urinary tract infection)       Past Surgical History:   Procedure Laterality Date    BREAST LUMPECTOMY Right 3/29/2022    Procedure: ITZEL  DIRECTED LUMPECTOMY;  Surgeon: Denton Chambers MD;  Location: [facility];  Service: Surgical Oncology    ESOPHAGOGASTRIC FUNDOPLASTY      Nissen Fundoplication    HERNIA REPAIR      LYMPH NODE BIOPSY Right 3/29/2022    Procedure: LYMPHATIC MAPPING WITH BLUE AND RADIOACTIVE DYES SENTINEL LYMPH NODE BIOPSY, INJECTION IN OR AT 0800 BY DR CHAMBERS;  Surgeon: Denton Chambers MD;  Location: [facility];  Service: Surgical Oncology    MAMMO STEREOTACTIC BREAST BIOPSY RIGHT (ALL INC) Right 3/7/2022    US BREAST CLIP NEEDLE LOC RIGHT Right 3/24/2022    US GUIDED BREAST BIOPSY RIGHT COMPLETE Right 3/7/2022    WRIST SURGERY Left       Family History   Problem Relation Age of Onset    Stroke Father     Leukemia Sister     No Known Problems Brother     Skin cancer Mother     Stroke Maternal Grandmother     No Known Problems Maternal Grandfather     No Known Problems Paternal Grandmother     No Known Problems Paternal Grandfather     Breast cancer Sister     Breast cancer Sister         Colon cancer    Kidney cancer Brother      Alcohol abuse Brother     Kidney cancer Brother     Stomach cancer Brother     No Known Problems Daughter     No Known Problems Daughter       Social History     Tobacco Use    Smoking status: Never    Smokeless tobacco: Never    Tobacco comments:     None   Vaping Use    Vaping status: Never Used   Substance Use Topics    Alcohol use: Yes     Comment: Social holidays    Drug use: Never      E-Cigarette/Vaping    E-Cigarette Use Never User       E-Cigarette/Vaping Substances    Nicotine No     THC No     CBD No     Flavoring No     Other No     Unknown No       I have reviewed and agree with the history as documented.     Is an 79-year-old female.  History of of hyperlipidemia, hypothyroidism, aortic valve disorder, breast cancer, lymphoma, hypertension, TIA, CVA, and dementia.  Her cancers are distant and not currently undergoing treatment.  She presents to the emergency room with a several day history of exertional shortness of breath.  Occasionally she has palpitations.  No chest pain.  She has not been sick with cough or congestion.  No fever.  No peripheral edema.  No calf pain unilateral leg swelling.  No hemoptysis.  Patient denies any cardiac or pulmonary history.        Review of Systems   Constitutional:  Negative for chills and fever.   HENT:  Negative for rhinorrhea and sore throat.    Eyes:  Negative for pain, redness and visual disturbance.   Respiratory:  Positive for shortness of breath. Negative for cough.    Cardiovascular:  Positive for palpitations. Negative for chest pain and leg swelling.   Gastrointestinal:  Negative for abdominal pain, diarrhea and vomiting.   Endocrine: Negative for polydipsia and polyuria.   Genitourinary:  Negative for dysuria, frequency, hematuria, vaginal bleeding and vaginal discharge.   Musculoskeletal:  Negative for back pain and neck pain.   Skin:  Negative for rash and wound.   Allergic/Immunologic: Negative for immunocompromised state.   Neurological:  Negative  for weakness, numbness and headaches.   Hematological:  Does not bruise/bleed easily.   Psychiatric/Behavioral:  Negative for hallucinations and suicidal ideas.    All other systems reviewed and are negative.          Objective       ED Triage Vitals [01/13/25 1407]   Temperature Pulse Blood Pressure Respirations SpO2 Patient Position - Orthostatic VS   98 °F (36.7 °C) 96 112/62 19 94 % Sitting      Temp Source Heart Rate Source BP Location FiO2 (%) Pain Score    Temporal Monitor Left arm -- --      Vitals      Date and Time Temp Pulse SpO2 Resp BP Pain Score FACES Pain Rating User   01/13/25 1844 -- 79 94 % 18 99/52 -- -- URIEL   01/13/25 1407 98 °F (36.7 °C) 96 94 % 19 112/62 -- -- GP            Physical Exam  Vitals reviewed.   Constitutional:       General: She is not in acute distress.  HENT:      Head: Normocephalic and atraumatic.      Nose: Nose normal.      Mouth/Throat:      Mouth: Mucous membranes are moist.   Eyes:      General:         Right eye: No discharge.         Left eye: No discharge.      Conjunctiva/sclera: Conjunctivae normal.   Cardiovascular:      Rate and Rhythm: Normal rate and regular rhythm.      Pulses: Normal pulses.      Heart sounds: Normal heart sounds. No murmur heard.     No friction rub. No gallop.   Pulmonary:      Effort: Pulmonary effort is normal. No respiratory distress.      Breath sounds: Normal breath sounds. No stridor. No decreased breath sounds, wheezing, rhonchi or rales.   Abdominal:      General: Bowel sounds are normal. There is no distension.      Palpations: Abdomen is soft.      Tenderness: There is no abdominal tenderness. There is no right CVA tenderness, left CVA tenderness, guarding or rebound.   Musculoskeletal:         General: No swelling, tenderness, deformity or signs of injury. Normal range of motion.      Cervical back: Normal range of motion and neck supple. No rigidity.      Right lower leg: No edema.      Left lower leg: No edema.      Comments: No  calf tenderness or unilateral leg swelling.   Skin:     General: Skin is warm and dry.      Coloration: Skin is not jaundiced.      Findings: No rash.   Neurological:      General: No focal deficit present.      Mental Status: She is alert and oriented to person, place, and time.      Sensory: No sensory deficit.      Motor: Motor function is intact.   Psychiatric:         Mood and Affect: Mood normal.         Behavior: Behavior normal.         Results Reviewed       Procedure Component Value Units Date/Time    HS Troponin I 2hr [[id number]]  (Normal) Collected: 01/13/25 1839    Lab Status: Final result Specimen: Blood from Arm, Left Updated: 01/13/25 1934     hs TnI 2hr 8 ng/L      Delta 2hr hsTnI 1 ng/L     B-Type Natriuretic Peptide(BNP) [[id number]]  (Normal) Collected: 01/13/25 1512    Lab Status: Final result Specimen: Blood from Arm, Left Updated: 01/13/25 1725     BNP 58 pg/mL     RBC Morphology Reflex Test [[id number]] Collected: 01/13/25 1512    Lab Status: Final result Specimen: Blood from Arm, Left Updated: 01/13/25 1701    CBC and differential [[id number]]  (Abnormal) Collected: 01/13/25 1512    Lab Status: Final result Specimen: Blood from Arm, Left Updated: 01/13/25 1621     WBC 6.71 Thousand/uL      RBC 3.00 Million/uL      Hemoglobin 11.4 g/dL      Hematocrit 34.3 %       fL      MCH 38.0 pg      MCHC 33.2 g/dL      RDW 14.1 %      MPV 12.1 fL      Platelets 260 Thousands/uL     Narrative:      This is an appended report.  These results have been appended to a previously verified report.    Manual Differential(PHLEBS Do Not Order) [[id number]]  (Abnormal) Collected: 01/13/25 1512    Lab Status: Final result Specimen: Blood from Arm, Left Updated: 01/13/25 1621     Segmented % 68 %      Bands % 6 %      Lymphocytes % 15 %      Monocytes % 5 %      Eosinophils % 4 %      Basophils % 2 %      Absolute Neutrophils 4.97 Thousand/uL      Absolute Lymphocytes 1.01 Thousand/uL      Absolute Monocytes 0.34  Thousand/uL      Absolute Eosinophils 0.27 Thousand/uL      Absolute Basophils 0.13 Thousand/uL      Total Counted --     RBC Morphology Present     Platelet Estimate Adequate     Anisocytosis Present     Macrocytes Present     Polychromasia Present    HS Troponin I 4hr [[id number]]     Lab Status: No result Specimen: Blood     HS Troponin 0hr (reflex protocol) [[id number]]  (Normal) Collected: 01/13/25 1512    Lab Status: Final result Specimen: Blood from Arm, Left Updated: 01/13/25 1545     hs TnI 0hr 7 ng/L     Comprehensive metabolic panel [[id number]]  (Abnormal) Collected: 01/13/25 1512    Lab Status: Final result Specimen: Blood from Arm, Left Updated: 01/13/25 1540     Sodium 140 mmol/L      Potassium 2.9 mmol/L      Chloride 102 mmol/L      CO2 28 mmol/L      ANION GAP 10 mmol/L      BUN 14 mg/dL      Creatinine 0.81 mg/dL      Glucose 108 mg/dL      Calcium 9.1 mg/dL      AST 16 U/L      ALT 9 U/L      Alkaline Phosphatase 69 U/L      Total Protein 7.1 g/dL      Albumin 3.9 g/dL      Total Bilirubin 0.43 mg/dL      eGFR 68 ml/min/1.73sq m     Narrative:      National Kid[employer] Foundation guidelines for Chronic Kidney Disease (CKD):     Stage 1 with normal or high GFR (GFR > 90 mL/min/1.73 square meters)    Stage 2 Mild CKD (GFR = 60-89 mL/min/1.73 square meters)    Stage 3A Moderate CKD (GFR = 45-59 mL/min/1.73 square meters)    Stage 3B Moderate CKD (GFR = 30-44 mL/min/1.73 square meters)    Stage 4 Severe CKD (GFR = 15-29 mL/min/1.73 square meters)    Stage 5 End Stage CKD (GFR <15 mL/min/1.73 square meters)  Note: GFR calculation is accurate only with a steady state creatinine    D-Dimer [[id number]]  (Abnormal) Collected: 01/13/25 1512    Lab Status: Final result Specimen: Blood from Arm, Left Updated: 01/13/25 1540     D-Dimer, Quant 1.30 ug/ml FEU     Narrative:      In the evaluation for possible pulmonary embolism, in the appropriate (Well's Score of 4 or less) patient, the age adjusted d-dimer  cutoff for this patient can be calculated as:    Age x 0.01 (in ug/mL) for Age-adjusted D-dimer exclusion threshold for a patient over 50 years.            CTA chest pe study   Final Interpretation by Guicho Rizo MD (01/13 9494)      No evidence of pulmonary embolus      Large hiatal hernia.            Workstation performed: [id number]         XR chest 2 views   ED Interpretation by Gus Gallagher MD (01/13 1088)   No infiltrates.  No CHF.      Final Interpretation by Cuba Valdivia DO (01/13 1819)      No acute cardiopulmonary disease.            Workstation performed: [id number]             ECG 12 Lead Documentation Only    Date/Time: 1/13/2025 3:36 PM    Performed by: Gus Gallagher MD  Authorized by: Gus Gallagher MD    ECG reviewed by me, the ED Provider: yes    Patient location:  ED  Comments:      Normal sinus rhythm with premature atrial contractions.  Nonspecific T wave abnormality.  Prolonged QT.  Abnormal EKG.      ED Medication and Procedure Management   Prior to Admission Medications   Prescriptions Last Dose Informant Patient Reported? Taking?   Mirabegron ER (Myrbetriq) 50 MG TB24   No No   Sig: Take 1 tablet (50 mg total) by mouth daily   PARoxetine (PAXIL) 20 mg tablet   No No   Sig: Take 1 tablet (20 mg total) by mouth daily   PROAIR  (90 Base) MCG/ACT inhaler  Self, Child Yes No   Sig: as needed   amLODIPine (NORVASC) 2.5 mg tablet   No No   Sig: Take 2 tablets (5 mg total) by mouth daily   anastrozole (ARIMIDEX) 1 mg tablet   No No   Sig: Take 1 tablet (1 mg total) by mouth daily   aspirin 325 mg tablet  Self, Child Yes No   Sig: Take 325 mg by mouth daily    cholecalciferol (VITAMIN D3) 1,000 units tablet  Self, Child No No   Sig: Take 1 tablet (1,000 Units total) by mouth daily   cholestyramine (QUESTRAN) 4 g packet  Self, Child No No   Sig: TAKE 1 PACKET BY MOUTH DAILY.   dicyclomine (BENTYL) 20 mg tablet  Self, Child No No   Sig: Take 1 tablet (20 mg total) by mouth  2 (two) times a day   diphenhydrAMINE (BENADRYL) 25 mg tablet  Self, Child Yes No   Sig: Take 25 mg by mouth in the morning. 1/2 tablet with Paxil .   Patient not taking: Reported on 8/5/2024   fluticasone (FLONASE) 50 mcg/act nasal spray  Self, Child No No   Sig: INSTILL 1 SPRAY INTO EACH NOSTRIL AS NEEDED FOR RHINITIS OR ALLERGIES   furosemide (LASIX) 40 mg tablet   No No   Sig: Take 1 tablet (40 mg total) by mouth daily   levothyroxine 125 mcg tablet   No No   Sig: Take 1 tablet (125 mcg total) by mouth daily   loperamide (IMODIUM) 2 mg capsule  Self, Child No No   Sig: Take 1 capsule (2 mg total) by mouth 4 (four) times a day as needed for diarrhea   meclizine (ANTIVERT) 25 mg tablet   No No   Sig: Take 1 tablet (25 mg total) by mouth every 8 (eight) hours as needed for dizziness   memantine (NAMENDA) 10 mg tablet   No No   Sig: Take 1 tablet (10 mg total) by mouth 2 (two) times a day   multivitamin (THERAGRAN) TABS  Self, Child Yes No   Sig: Take 1 tablet by mouth   omeprazole (PriLOSEC) 20 mg delayed release capsule   No No   Sig: Take 1 capsule (20 mg total) by mouth 2 (two) times a day   ondansetron (ZOFRAN-ODT) 4 mg disintegrating tablet   No No   Sig: Take 1 tablet (4 mg total) by mouth every 8 (eight) hours as needed for nausea   potassium chloride (Klor-Con M10) 10 mEq tablet   No No   Sig: Take 1 tablet (10 mEq total) by mouth daily for 10 days   rivastigmine (EXELON) 9.5 mg/24 hr TD 24 hr patch   No No   Sig: Place 1 patch on the skin over 24 hours daily      Facility-Administered Medications: None     Patient's Medications   Discharge Prescriptions    No medications on file       ED SEPSIS DOCUMENTATION   Time reflects when diagnosis was documented in both MDM as applicable and the Disposition within this note       Time User Action Codes Description Comment    1/13/2025  7:55 PM Gus Gallagher Add [R06.09] Dyspnea on exertion                  Gus Gallagher MD  01/13/25 1956

## 2025-01-14 LAB
ATRIAL RATE: 71 BPM
ATRIAL RATE: 71 BPM
ATRIAL RATE: 75 BPM
P AXIS: 40 DEGREES
P AXIS: 44 DEGREES
P AXIS: 50 DEGREES
PR INTERVAL: 148 MS
PR INTERVAL: 148 MS
PR INTERVAL: 152 MS
QRS AXIS: 13 DEGREES
QRS AXIS: 13 DEGREES
QRS AXIS: 16 DEGREES
QRSD INTERVAL: 84 MS
QRSD INTERVAL: 86 MS
QRSD INTERVAL: 86 MS
QT INTERVAL: 416 MS
QT INTERVAL: 418 MS
QT INTERVAL: 430 MS
QTC INTERVAL: 452 MS
QTC INTERVAL: 454 MS
QTC INTERVAL: 480 MS
T WAVE AXIS: -14 DEGREES
T WAVE AXIS: 15 DEGREES
T WAVE AXIS: 22 DEGREES
VENTRICULAR RATE: 71 BPM
VENTRICULAR RATE: 71 BPM
VENTRICULAR RATE: 75 BPM

## 2025-01-14 PROCEDURE — 93010 ELECTROCARDIOGRAM REPORT: CPT | Performed by: INTERNAL MEDICINE

## 2025-01-23 ENCOUNTER — OFFICE VISIT (OUTPATIENT)
Dept: INTERNAL MEDICINE CLINIC | Facility: CLINIC | Age: 80
End: 2025-01-23
Payer: COMMERCIAL

## 2025-01-23 ENCOUNTER — APPOINTMENT (OUTPATIENT)
Dept: LAB | Facility: HOSPITAL | Age: 80
End: 2025-01-23
Payer: COMMERCIAL

## 2025-01-23 ENCOUNTER — RESULTS FOLLOW-UP (OUTPATIENT)
Dept: INTERNAL MEDICINE CLINIC | Facility: CLINIC | Age: 80
End: 2025-01-23

## 2025-01-23 VITALS
HEART RATE: 84 BPM | OXYGEN SATURATION: 94 % | HEIGHT: 59 IN | DIASTOLIC BLOOD PRESSURE: 76 MMHG | BODY MASS INDEX: 36.08 KG/M2 | SYSTOLIC BLOOD PRESSURE: 116 MMHG | WEIGHT: 179 LBS | TEMPERATURE: 97.4 F

## 2025-01-23 DIAGNOSIS — C50.811 MALIGNANT NEOPLASM OF OVERLAPPING SITES OF RIGHT BREAST IN FEMALE, ESTROGEN RECEPTOR POSITIVE (HCC): ICD-10-CM

## 2025-01-23 DIAGNOSIS — E87.6 HYPOKALEMIA: ICD-10-CM

## 2025-01-23 DIAGNOSIS — Z17.0 MALIGNANT NEOPLASM OF OVERLAPPING SITES OF RIGHT BREAST IN FEMALE, ESTROGEN RECEPTOR POSITIVE (HCC): ICD-10-CM

## 2025-01-23 DIAGNOSIS — I10 BENIGN ESSENTIAL HTN: Primary | ICD-10-CM

## 2025-01-23 DIAGNOSIS — E03.9 HYPOTHYROIDISM, UNSPECIFIED TYPE: ICD-10-CM

## 2025-01-23 DIAGNOSIS — I49.49 EXTRASYSTOLES: ICD-10-CM

## 2025-01-23 DIAGNOSIS — I10 BENIGN ESSENTIAL HTN: ICD-10-CM

## 2025-01-23 LAB
T4 FREE SERPL-MCNC: 1.24 NG/DL (ref 0.61–1.12)
TSH SERPL DL<=0.05 MIU/L-ACNC: 3.35 UIU/ML (ref 0.45–4.5)

## 2025-01-23 PROCEDURE — 84443 ASSAY THYROID STIM HORMONE: CPT

## 2025-01-23 PROCEDURE — 99214 OFFICE O/P EST MOD 30 MIN: CPT | Performed by: PHYSICIAN ASSISTANT

## 2025-01-23 PROCEDURE — 84439 ASSAY OF FREE THYROXINE: CPT

## 2025-01-23 PROCEDURE — G2211 COMPLEX E/M VISIT ADD ON: HCPCS | Performed by: PHYSICIAN ASSISTANT

## 2025-01-23 NOTE — TELEPHONE ENCOUNTER
A. Relayed results to (patient/patient representative as listed on communication consent form) as per provider message. Patient/Patient Representative expressed understanding and did not have any further questions.                                                              chary Smith's daughter called back, understood results, will be at follow up next week.

## 2025-01-23 NOTE — PATIENT INSTRUCTIONS
Have lab work done this morning and I will communicate results to you by later today.  Will have Holter monitor placed for 48 hours due to multiple extrasystoles.  Start furosemide 40 mg daily until seen in 1 week.  Follow-up 1 week.

## 2025-01-23 NOTE — PROGRESS NOTES
Name: Paula Lubin      : 1945      MRN: [id number]  Encounter Provider: Sumit Lara PA-C  Encounter Date: 2025   Encounter department: [facility]IC[facility] [place]  :  Assessment & Plan  Benign essential HTN    Orders:    CBC and differential; Future    Hypokalemia    Orders:    Basic metabolic panel; Future    Extrasystoles    Orders:    Holter monitor; Future          Depression Screening and Follow-up Plan: Patient was screened for depression during today's encounter. They screened negative with a PHQ-9 score of 0.      History of Present Illness   ER follow-up    Patient present with her daughter.  Patient states she has been experiencing exertional shortness of breath for several months.  It occurs even when walking short distances.  Because of this she became concerned and went to the emergency room on 2025.  ER workup was negative for acute respiratory problems.  CTA was negative, chest x-ray was negative.  Labs showed hypokalemia and a drop in her H&H.  Otherwise unremarkable.  She was given supplemental potassium in the ER but not rechecked.  We will do so.  Since being in the ER her shortness of breath has not really changed.  She is not describing any PND symptoms.  Minimal ankle edema as the day progresses.  Daughter only giving her the furosemide every other day and maybe not that often.  No coughing.  No rest shortness of breath.  No chest pain or palpitations when walking or exerting.  No fever or chills, no upper respiratory symptoms.  No melena noted.  PACs noted on EKG.      Review of Systems   Constitutional:  Negative for activity change, chills, fatigue and fever.   HENT:  Negative for congestion.    Eyes:  Negative for discharge.   Respiratory:  Positive for shortness of breath. Negative for cough, chest tightness and wheezing.    Cardiovascular:  Positive for leg swelling. Negative for chest pain and palpitations.   Gastrointestinal:  Negative for  "abdominal pain, blood in stool, constipation, diarrhea, nausea and vomiting.   Endocrine: Negative for polydipsia, polyphagia and polyuria.   Genitourinary:  Negative for difficulty urinating.   Musculoskeletal:  Negative for arthralgias and myalgias.   Skin:  Negative for rash.   Allergic/Immunologic: Negative for immunocompromised state.   Neurological:  Negative for dizziness, syncope, weakness, light-headedness and headaches.   Hematological:  Negative for adenopathy. Does not bruise/bleed easily.   Psychiatric/Behavioral:  Negative for dysphoric mood, sleep disturbance and suicidal ideas. The patient is not nervous/anxious.        Objective   /76 (BP Location: Left arm, Patient Position: Sitting, Cuff Size: Large)   Pulse 84   Temp (!) 97.4 °F (36.3 °C) (Temporal)   Ht 4' 11\" (1.499 m)   Wt 81.2 kg (179 lb)   SpO2 94%   BMI 36.15 kg/m²      Physical Exam  Constitutional:       General: She is not in acute distress.     Appearance: Normal appearance. She is not ill-appearing.      Comments: Appears comfortable and in no acute distress.  No conversational dyspnea.   HENT:      Head: Normocephalic.   Eyes:      Pupils: Pupils are equal, round, and reactive to light.   Neck:      Thyroid: No thyromegaly.      Vascular: No carotid bruit or JVD.      Trachea: Trachea normal.   Cardiovascular:      Rate and Rhythm: Normal rate and regular rhythm. Extrasystoles are present.     Heart sounds: No murmur heard.  Pulmonary:      Effort: Pulmonary effort is normal. No respiratory distress.      Breath sounds: Examination of the right-lower field reveals rales. Examination of the left-lower field reveals rales. Rales present. No wheezing.   Abdominal:      Palpations: Abdomen is soft. There is no hepatomegaly or splenomegaly.   Musculoskeletal:         General: No swelling.      Cervical back: Neck supple.      Right lower leg: Edema (Trace to 1+ edema) present.      Left lower leg: Edema (Trace to 1+ edema) " present.   Lymphadenopathy:      Cervical: No cervical adenopathy.   Skin:     General: Skin is warm and dry.      Findings: No rash.   Neurological:      General: No focal deficit present.      Mental Status: She is alert and oriented to person, place, and time. Mental status is at baseline.   Psychiatric:         Mood and Affect: Mood normal.         Behavior: Behavior normal.

## 2025-01-28 ENCOUNTER — HOSPITAL ENCOUNTER (OUTPATIENT)
Dept: NON INVASIVE DIAGNOSTICS | Facility: HOSPITAL | Age: 80
Discharge: HOME | End: 2025-01-28
Payer: COMMERCIAL

## 2025-01-28 DIAGNOSIS — I49.49 EXTRASYSTOLES: ICD-10-CM

## 2025-01-28 PROCEDURE — 93225 XTRNL ECG REC<48 HRS REC: CPT

## 2025-01-28 PROCEDURE — 93226 XTRNL ECG REC<48 HR SCAN A/R: CPT

## 2025-01-29 ENCOUNTER — TELEPHONE (OUTPATIENT)
Dept: INTERNAL MEDICINE CLINIC | Facility: CLINIC | Age: 80
End: 2025-01-29

## 2025-01-30 ENCOUNTER — OFFICE VISIT (OUTPATIENT)
Dept: INTERNAL MEDICINE CLINIC | Facility: CLINIC | Age: 80
End: 2025-01-30
Payer: COMMERCIAL

## 2025-01-30 VITALS
HEIGHT: 59 IN | SYSTOLIC BLOOD PRESSURE: 124 MMHG | WEIGHT: 179 LBS | HEART RATE: 88 BPM | BODY MASS INDEX: 36.08 KG/M2 | DIASTOLIC BLOOD PRESSURE: 80 MMHG | OXYGEN SATURATION: 95 %

## 2025-01-30 DIAGNOSIS — R06.09 DYSPNEA ON EXERTION: Primary | ICD-10-CM

## 2025-01-30 PROCEDURE — G2211 COMPLEX E/M VISIT ADD ON: HCPCS | Performed by: PHYSICIAN ASSISTANT

## 2025-01-30 PROCEDURE — 99213 OFFICE O/P EST LOW 20 MIN: CPT | Performed by: PHYSICIAN ASSISTANT

## 2025-01-30 NOTE — PROGRESS NOTES
"Name: Paula Lubin      : 1945      MRN: [id number]  Encounter Provider: Sumit Lara PA-C  Encounter Date: 2025   Encounter department: [facility] [place]  :  Assessment & Plan  Dyspnea on exertion                History of Present Illness   Follow-up, patient present with her daughter    Last week patient was in because of an ER visit with shortness of breath on exertion.  I had learned at that time patient was not taking her furosemide daily but every other day.  I had her start back on the furosemide daily, checked her labs, and she is reporting that she is feeling better today.  Less shortness of breath on walking than she had been having.  No leg edema.  No PND, no orthopnea.  No coughing.      Review of Systems   Constitutional:  Negative for activity change, chills, fatigue and fever.   HENT:  Negative for congestion.    Eyes:  Negative for discharge.   Respiratory:  Negative for cough, chest tightness and shortness of breath.    Cardiovascular:  Negative for chest pain, palpitations and leg swelling.   Gastrointestinal:  Negative for abdominal pain, constipation, diarrhea, nausea and vomiting.   Endocrine: Negative for polydipsia, polyphagia and polyuria.   Genitourinary:  Negative for difficulty urinating.   Musculoskeletal:  Negative for arthralgias and myalgias.   Skin:  Negative for rash.   Allergic/Immunologic: Negative for immunocompromised state.   Neurological:  Negative for dizziness, syncope, weakness, light-headedness and headaches.   Hematological:  Negative for adenopathy. Does not bruise/bleed easily.   Psychiatric/Behavioral:  Negative for dysphoric mood, sleep disturbance and suicidal ideas. The patient is not nervous/anxious.        Objective   /80 (BP Location: Left arm, Patient Position: Sitting, Cuff Size: Standard)   Pulse (!) 114   Ht 4' 11\" (1.499 m)   Wt 81.2 kg (179 lb)   SpO2 95%   BMI 36.15 kg/m²      Physical " Exam  Constitutional:       General: She is not in acute distress.     Appearance: Normal appearance. She is not ill-appearing.   HENT:      Head: Normocephalic.   Eyes:      Pupils: Pupils are equal, round, and reactive to light.   Neck:      Thyroid: No thyromegaly.      Vascular: No carotid bruit or JVD.      Trachea: Trachea normal.   Cardiovascular:      Rate and Rhythm: Normal rate and regular rhythm.      Heart sounds: No murmur heard.  Pulmonary:      Effort: Pulmonary effort is normal. No respiratory distress.      Comments: Few crackles right base, decreased breath sounds left base.  Musculoskeletal:         General: No swelling.      Cervical back: Neck supple.      Right lower leg: No edema.      Left lower leg: No edema.   Lymphadenopathy:      Cervical: No cervical adenopathy.   Skin:     General: Skin is warm and dry.      Findings: No rash.   Neurological:      General: No focal deficit present.      Mental Status: She is alert and oriented to person, place, and time. Mental status is at baseline.   Psychiatric:         Mood and Affect: Mood normal.         Behavior: Behavior normal.

## 2025-01-31 PROCEDURE — 93227 XTRNL ECG REC<48 HR R&I: CPT | Performed by: INTERNAL MEDICINE

## 2025-01-31 NOTE — TELEPHONE ENCOUNTER
PA for Mirabegron ER (Myrbetriq) 50 MG TB24  DENIED    Reason:(Screenshot if applicable)        Message sent to office clinical pool Yes    Denial letter scanned into Media Yes    Appeal started No (Provider will need to decide if appeal is warranted and send clinical documentation to Prior Authorization Team for initiation.)    **Please follow up with your patient regarding denial and next steps**

## 2025-01-31 NOTE — TELEPHONE ENCOUNTER
PA for Mirabegron ER (Myrbetriq) 50 MG TB24 SUBMITTED to     via    [x]M-KEY: [id number]  []Surescripts-Case ID #   []Availity-Auth ID # NDC #   []Faxed to plan   []Other website   []Phone call Case ID #     [x]PA sent as URGENT    All office notes, labs and other pertaining documents and studies sent. Clinical questions answered. Awaiting determination from insurance company.     Turnaround time for your insurance to make a decision on your Prior Authorization can take 7-21 business days.

## 2025-02-14 ENCOUNTER — OFFICE VISIT (OUTPATIENT)
Dept: CARDIOLOGY CLINIC | Facility: CLINIC | Age: 80
End: 2025-02-14
Payer: COMMERCIAL

## 2025-02-14 VITALS
HEIGHT: 59 IN | WEIGHT: 177 LBS | RESPIRATION RATE: 16 BRPM | DIASTOLIC BLOOD PRESSURE: 79 MMHG | OXYGEN SATURATION: 95 % | HEART RATE: 95 BPM | SYSTOLIC BLOOD PRESSURE: 116 MMHG | BODY MASS INDEX: 35.68 KG/M2

## 2025-02-14 DIAGNOSIS — I10 BENIGN ESSENTIAL HTN: ICD-10-CM

## 2025-02-14 DIAGNOSIS — R06.02 SHORTNESS OF BREATH: Primary | ICD-10-CM

## 2025-02-14 DIAGNOSIS — N18.30 STAGE 3 CHRONIC KIDNEY DISEASE, UNSPECIFIED WHETHER STAGE 3A OR 3B CKD (HCC): ICD-10-CM

## 2025-02-14 DIAGNOSIS — I49.1 PREMATURE ATRIAL CONTRACTIONS: ICD-10-CM

## 2025-02-14 PROCEDURE — 99214 OFFICE O/P EST MOD 30 MIN: CPT

## 2025-02-14 NOTE — ASSESSMENT & PLAN NOTE
Lab Results   Component Value Date    EGFR 43 01/23/2025    EGFR 68 01/13/2025    EGFR 61 08/12/2024    CREATININE 1.18 01/23/2025    CREATININE 0.81 01/13/2025    CREATININE 0.90 08/12/2024   Management per PCP.

## 2025-02-14 NOTE — PATIENT INSTRUCTIONS
Please follow instructions as recommended during visit.  Recommend low salt DASH diet  Strongly encourage compliance with your medications.   Please reach out to us if you have any questions   Recommend you present to the emergency room or call our office if you have chest pain, chest pressure, shortness of breath, any new, persistent or progressive symptoms.     Please call [phone] to schedule pulmonary function tests.

## 2025-02-14 NOTE — ASSESSMENT & PLAN NOTE
BP is well controlled.  Continue amlodipine and Lasix.  Encourage ambulatory monitoring and low-sodium diet.

## 2025-02-14 NOTE — PROGRESS NOTES
[facility] Cardiology   Office Visit    Paula Lubin 79 y.o. female MRN: [id number]    02/14/25      Assessment & Plan  Shortness of breath  Recently evaluated in ED for such.  EKG was without acute ischemic abnormalities, troponins were found to be negative, BNP was WNL, and CXR revealed no acute cardiopulmonary disease.  PFTs previously ordered and pending completion.  Plan for TTE to assess LVEF, wall motion, and PASP.  Advised to notify our office for any new/worsening symptoms.  Premature atrial contractions  48-hour Holter monitor reviewed with 6.2% SVE burden.  Patient denies associated palpitations.  Recommend avoidance of excessive caffeine/stimulants.  Benign essential HTN  BP is well controlled.  Continue amlodipine and Lasix.  Encourage ambulatory monitoring and low-sodium diet.  Stage 3 chronic kidney disease, unspecified whether stage 3a or 3b CKD (HCC)  Lab Results   Component Value Date    EGFR 43 01/23/2025    EGFR 68 01/13/2025    EGFR 61 08/12/2024    CREATININE 1.18 01/23/2025    CREATININE 0.81 01/13/2025    CREATININE 0.90 08/12/2024   Management per PCP.        Interval history: Paula Lubin is a very pleasant 79 y.o. year old female with history of hypertension and PACs who presents for office visit.  Patient was recently evaluated in the ED for shortness of breath.  She endorses chronic shortness of breath with recent worsening of symptoms.  SOB occurs at rest without associated chest pain.  Patient was previously ordered PFTs by PCP, however this has not yet been completed.  Denies weight gain, LE edema, or any additional complaints at this time.  Endorses strict compliance to all medications.  Denies adverse effects to current medications.  Follows with Dr. Melchor as primary cardiologist.      Review of Systems:  Review of Systems   Constitutional:  Negative for chills and fever.   HENT:  Negative for ear pain and sore throat.    Eyes:  Negative for pain and visual disturbance.  "  Respiratory:  Positive for shortness of breath. Negative for cough.    Cardiovascular:  Negative for chest pain, palpitations and leg swelling.   Gastrointestinal:  Negative for abdominal pain and vomiting.   Genitourinary:  Negative for dysuria and hematuria.   Musculoskeletal:  Negative for arthralgias and back pain.   Skin:  Negative for color change and rash.   Neurological:  Negative for seizures and syncope.   All other systems reviewed and are negative.      PHYSICAL EXAM:  Vitals:   Vitals:    02/14/25 0958   BP: 116/79   BP Location: Left arm   Patient Position: Sitting   Cuff Size: Standard   Pulse: 95   Resp: 16   SpO2: 95%   Weight: 80.3 kg (177 lb)   Height: 4' 11\" (1.499 m)        Physical Exam:  GEN: Alert and oriented x 3, in no acute distress.  Well appearing and well nourished.   HEENT: Sclera anicteric, conjunctivae pink, mucous membranes moist. Oropharynx clear.   NECK: Supple, no carotid bruits, no significant JVD. Trachea midline, no thyromegaly.   HEART: Regular rhythm, normal S1 and S2, no murmurs, clicks, gallops or rubs. PMI nondisplaced, no thrills.   LUNGS: Clear to auscultation bilaterally; no wheezes, rales, or rhonchi. No increased work of breathing or signs of respiratory distress.   ABDOMEN: Soft, nontender, nondistended, normoactive bowel sounds.   EXTREMITIES: Skin warm and well perfused, no clubbing or cyanosis, no edema.  NEURO: No focal findings. Normal speech. Mood and affect normal.   SKIN: Normal without suspicious lesions on exposed skin.    Follow up: 3 months or sooner as needed    Allergies   Allergen Reactions    Ciprofloxacin Hives    Sulfa Antibiotics Itching    Other     Penicillins Rash     Category: Allergy;   --  Pt received unasyn 1.5 mg IV 11-12-18 to 11-15-18    Sulfacetamide Rash     Category: Allergy;          Current Outpatient Medications:     amLODIPine (NORVASC) 2.5 mg tablet, Take 2 tablets (5 mg total) by mouth daily, Disp: 180 tablet, Rfl: 1    " anastrozole (ARIMIDEX) 1 mg tablet, Take 1 tablet (1 mg total) by mouth daily, Disp: 90 tablet, Rfl: 0    aspirin 325 mg tablet, Take 325 mg by mouth daily , Disp: , Rfl:     cholecalciferol (VITAMIN D3) 1,000 units tablet, Take 1 tablet (1,000 Units total) by mouth daily, Disp: 30 tablet, Rfl: 6    cholestyramine (QUESTRAN) 4 g packet, TAKE 1 PACKET BY MOUTH DAILY., Disp: 90 packet, Rfl: 3    dicyclomine (BENTYL) 20 mg tablet, Take 1 tablet (20 mg total) by mouth 2 (two) times a day, Disp: 20 tablet, Rfl: 0    fluticasone (FLONASE) 50 mcg/act nasal spray, INSTILL 1 SPRAY INTO EACH NOSTRIL AS NEEDED FOR RHINITIS OR ALLERGIES, Disp: 48 mL, Rfl: 1    furosemide (LASIX) 40 mg tablet, Take 1 tablet (40 mg total) by mouth daily, Disp: 180 tablet, Rfl: 0    levothyroxine 125 mcg tablet, Take 1 tablet (125 mcg total) by mouth daily, Disp: 90 tablet, Rfl: 1    loperamide (IMODIUM) 2 mg capsule, Take 1 capsule (2 mg total) by mouth 4 (four) times a day as needed for diarrhea, Disp: 12 capsule, Rfl: 0    meclizine (ANTIVERT) 25 mg tablet, Take 1 tablet (25 mg total) by mouth every 8 (eight) hours as needed for dizziness, Disp: 50 tablet, Rfl: 1    memantine (NAMENDA) 10 mg tablet, Take 1 tablet (10 mg total) by mouth 2 (two) times a day, Disp: 180 tablet, Rfl: 1    Mirabegron ER (Myrbetriq) 50 MG TB24, Take 1 tablet (50 mg total) by mouth daily, Disp: 90 tablet, Rfl: 1    multivitamin (THERAGRAN) TABS, Take 1 tablet by mouth, Disp: , Rfl:     omeprazole (PriLOSEC) 20 mg delayed release capsule, Take 1 capsule (20 mg total) by mouth 2 (two) times a day, Disp: 180 capsule, Rfl: 1    ondansetron (ZOFRAN-ODT) 4 mg disintegrating tablet, Take 1 tablet (4 mg total) by mouth every 8 (eight) hours as needed for nausea, Disp: 15 tablet, Rfl: 0    PARoxetine (PAXIL) 20 mg tablet, Take 1 tablet (20 mg total) by mouth daily, Disp: 90 tablet, Rfl: 1    potassium chloride (Klor-Con M10) 10 mEq tablet, Take 1 tablet (10 mEq total) by mouth  daily for 10 days, Disp: 10 tablet, Rfl: 0    rivastigmine (EXELON) 9.5 mg/24 hr TD 24 hr patch, Place 1 patch on the skin over 24 hours daily, Disp: 90 patch, Rfl: 1    Past Medical History:   Diagnosis Date    Anxiety disorder     Aortic valve disorder     BRCA1 negative     BRCA2 negative     Breast cancer (HCC) 03/2022    right    Cancer (HCC)     breast    Cellulitis of finger of left hand 11/20/2018    Cellulitis of left hand 11/10/2018    Added automatically from request for surgery [id number]    Depression     Disease of thyroid gland     GERD (gastroesophageal reflux disease)     History of gastroesophageal reflux (GERD)     History of kidney cancer 03/06/2014    History of transfusion     Hyperlipidemia 03/06/2014    Hypertension 03/06/2014    Hypothyroidism 03/06/2014    Lymphoma (HCC)     Malignant neoplasm of overlapping sites of right breast in female, estrogen receptor positive (HCC) 03/10/2022    Shortness of breath     Stroke (HCC) 4/20122215-5520    Min stroke found wile going through chemo and radiation    TIA (transient ischemic attack)     UTI (urinary tract infection)        Family History   Problem Relation Age of Onset    Stroke Father     Leukemia Sister     No Known Problems Brother     Skin cancer Mother     Stroke Maternal Grandmother     No Known Problems Maternal Grandfather     No Known Problems Paternal Grandmother     No Known Problems Paternal Grandfather     Breast cancer Sister     Breast cancer Sister         Colon cancer    Kidney cancer Brother     Alcohol abuse Brother     Kidney cancer Brother     Stomach cancer Brother     No Known Problems Daughter     No Known Problems Daughter        Past Medical History:   Diagnosis Date    Anxiety disorder     Aortic valve disorder     BRCA1 negative     BRCA2 negative     Breast cancer (HCC) 03/2022    right    Cancer (HCC)     breast    Cellulitis of finger of left hand 11/20/2018    Cellulitis of left hand 11/10/2018    Added automatically  from request for surgery [id number]    Depression     Disease of thyroid gland     GERD (gastroesophageal reflux disease)     History of gastroesophageal reflux (GERD)     History of kidney cancer 03/06/2014    History of transfusion     Hyperlipidemia 03/06/2014    Hypertension 03/06/2014    Hypothyroidism 03/06/2014    Lymphoma (HCC)     Malignant neoplasm of overlapping sites of right breast in female, estrogen receptor positive (HCC) 03/10/2022    Shortness of breath     Stroke (HCC) 4/20127060-9327    Min stroke found wile going through chemo and radiation    TIA (transient ischemic attack)     UTI (urinary tract infection)        Past Surgical History:   Procedure Laterality Date    BREAST LUMPECTOMY Right 3/29/2022    Procedure: ITZEL  DIRECTED LUMPECTOMY;  Surgeon: Denton Chambers MD;  Location: [facility];  Service: Surgical Oncology    ESOPHAGOGASTRIC FUNDOPLASTY      Nissen Fundoplication    HERNIA REPAIR      LYMPH NODE BIOPSY Right 3/29/2022    Procedure: LYMPHATIC MAPPING WITH BLUE AND RADIOACTIVE DYES SENTINEL LYMPH NODE BIOPSY, INJECTION IN OR AT 0800 BY DR CHAMBERS;  Surgeon: Denton Chambers MD;  Location: [facility];  Service: Surgical Oncology    MAMMO STEREOTACTIC BREAST BIOPSY RIGHT (ALL INC) Right 3/7/2022    US BREAST CLIP NEEDLE LOC RIGHT Right 3/24/2022    US GUIDED BREAST BIOPSY RIGHT COMPLETE Right 3/7/2022    WRIST SURGERY Left        Social History     Socioeconomic History    Marital status:      Spouse name: Not on file    Number of children: 2    Years of education: Not on file    Highest education level: Not on file   Occupational History    Not on file   Tobacco Use    Smoking status: Never    Smokeless tobacco: Never    Tobacco comments:     None   Vaping Use    Vaping status: Never Used   Substance and Sexual Activity    Alcohol use: Yes     Comment: Social holidays    Drug use: Never    Sexual activity: Not Currently     Comment: [place] over   Other Topics  Concern    Not on file   Social History Narrative    Not on file     Social Drivers of Health     Financial Resource Strain: Low Risk  (9/19/2023)    Overall Financial Resource Strain (CARDIA)     Difficulty of Paying Living Expenses: Not hard at all   Food Insecurity: No Food Insecurity (10/1/2024)    Nursing - Inadequate Food Risk Classification     Worried About Running Out of Food in the Last Year: Never true     Ran Out of Food in the Last Year: Never true     Ran Out of Food in the Last Year: Not on file   Transportation Needs: No Transportation Needs (10/1/2024)    PRAPARE - Transportation     Lack of Transportation (Medical): No     Lack of Transportation (Non-Medical): No   Physical Activity: Not on file   Stress: Not on file   Social Connections: Unknown (6/18/2024)    Received from [employer]    Social [place]     How often do you feel lonely or isolated from those around you? (Adult - for ages 18 years and over): Not on file   Intimate Partner Violence: Not on file   Housing Stability: Low Risk  (10/1/2024)    Housing Stability Vital Sign     Unable to Pay for Housing in the Last Year: No     Number of Times Moved in the Last Year: 1     Homeless in the Last Year: No         LABORATORY RESULTS:    Lab Results   Component Value Date    WBC 8.35 01/23/2025    HGB 11.9 01/23/2025    HCT 35.7 01/23/2025     (H) 01/23/2025     01/23/2025     Lab Results   Component Value Date    GLUCOSE 85 07/17/2014    CALCIUM 9.5 01/23/2025     07/17/2014    K 3.6 01/23/2025    CO2 29 01/23/2025     01/23/2025    BUN 21 01/23/2025    CREATININE 1.18 01/23/2025     Lab Results   Component Value Date    HGBA1C 5.4 09/14/2021       Lipid Profile:   Lab Results   Component Value Date    CHOL 242 06/08/2015    CHOL 219 07/17/2014     Lab Results   Component Value Date    HDL 80 05/30/2024    HDL 88 12/19/2023    HDL 86 09/14/2021     Lab Results   Component Value Date    LDLCALC 95 05/30/2024     LDLCALC 102 (H) 12/19/2023    LDLCALC 99 09/14/2021     Lab Results   Component Value Date    TRIG 108 05/30/2024    TRIG 68 12/19/2023    TRIG 75 09/14/2021       The ASCVD Risk score (Adan RAMOS, et al., 2019) failed to calculate for the following reasons:    The 2019 ASCVD risk score is only valid for ages 40 to 79    1. Shortness of breath  Echo complete w/ contrast if indicated      2. Premature atrial contractions        3. Benign essential HTN        4. Stage 3 chronic kidney disease, unspecified whether stage 3a or 3b CKD (HCC)            Imaging: I have reviewed all pertinent imaging studies.    Recommend aggressive risk factor modification and therapeutic lifestyle changes.  Low-salt, low-calorie, low-fat, low-cholesterol diet with regular exercise and to optimize weight.    Discussed concepts of atherosclerosis, including signs and symptoms of cardiac disease.    Medications reviewed and possible side effects discussed.  Previous studies were reviewed.    Safety measures were reviewed.  All questions and concerns addressed.  Patient was advised to report any problems requiring medical attention.    Follow-up with PCP and appropriate specialist and lab work as discussed.    Return for follow up visit as scheduled or earlier, if needed.  Thank you for allowing me to participate in the care and evaluation of your patient.  Should you have any questions, please feel free to contact me.    Adelso Snow PA-C  2/14/2025,4:40 PM

## 2025-02-21 ENCOUNTER — RA CDI HCC (OUTPATIENT)
Dept: OTHER | Facility: HOSPITAL | Age: 80
End: 2025-02-21

## 2025-02-21 DIAGNOSIS — C50.811 MALIGNANT NEOPLASM OF OVERLAPPING SITES OF RIGHT BREAST IN FEMALE, ESTROGEN RECEPTOR POSITIVE (HCC): ICD-10-CM

## 2025-02-21 DIAGNOSIS — Z17.0 MALIGNANT NEOPLASM OF OVERLAPPING SITES OF RIGHT BREAST IN FEMALE, ESTROGEN RECEPTOR POSITIVE (HCC): ICD-10-CM

## 2025-02-21 PROBLEM — R11.2 NON-INTRACTABLE VOMITING WITH NAUSEA: Status: RESOLVED | Noted: 2020-03-05 | Resolved: 2025-02-21

## 2025-02-21 NOTE — PROGRESS NOTES
HCC coding opportunities          Chart Reviewed number of suggestions sent to Provider: 2     N18.31  C50.811      Patients Insurance     Medicare Insurance: [employer]

## 2025-02-21 NOTE — TELEPHONE ENCOUNTER
Reason for call:   [x] Refill   [] Prior Auth  [] Other:     Office:   [] PCP/Provider -   [x] Specialty/Provider - HEM ONC     Medication: anastrozole (ARIMIDEX) 1 mg tablet     Dose/Frequency:     1 mg, Oral, Daily       Quantity: 90    Pharmacy: [facility]/pharmacy #[id number] - [place] - [address]     Does the patient have enough for 3 days?   [] Yes   [x] No - Send as HP to POD

## 2025-02-26 ENCOUNTER — OFFICE VISIT (OUTPATIENT)
Dept: INTERNAL MEDICINE CLINIC | Facility: CLINIC | Age: 80
End: 2025-02-26
Payer: COMMERCIAL

## 2025-02-26 VITALS
SYSTOLIC BLOOD PRESSURE: 116 MMHG | HEIGHT: 59 IN | DIASTOLIC BLOOD PRESSURE: 80 MMHG | OXYGEN SATURATION: 93 % | HEART RATE: 91 BPM | RESPIRATION RATE: 16 BRPM | WEIGHT: 175 LBS | BODY MASS INDEX: 35.28 KG/M2

## 2025-02-26 DIAGNOSIS — F02.80 LATE ONSET ALZHEIMER'S DISEASE WITHOUT BEHAVIORAL DISTURBANCE (HCC): ICD-10-CM

## 2025-02-26 DIAGNOSIS — G30.1 LATE ONSET ALZHEIMER'S DISEASE WITHOUT BEHAVIORAL DISTURBANCE (HCC): ICD-10-CM

## 2025-02-26 DIAGNOSIS — R73.01 IMPAIRED FASTING GLUCOSE: ICD-10-CM

## 2025-02-26 DIAGNOSIS — I10 BENIGN ESSENTIAL HTN: Primary | ICD-10-CM

## 2025-02-26 DIAGNOSIS — R06.09 DYSPNEA ON EXERTION: ICD-10-CM

## 2025-02-26 DIAGNOSIS — E03.9 HYPOTHYROIDISM, UNSPECIFIED TYPE: ICD-10-CM

## 2025-02-26 PROCEDURE — 99214 OFFICE O/P EST MOD 30 MIN: CPT | Performed by: INTERNAL MEDICINE

## 2025-02-26 PROCEDURE — G2211 COMPLEX E/M VISIT ADD ON: HCPCS | Performed by: INTERNAL MEDICINE

## 2025-02-26 NOTE — PROGRESS NOTES
"Name: Paula Lubin      : 1945      MRN: [id number]  Encounter Provider: Jose Enrique Elliott MD  Encounter Date: 2025   Encounter department: [facility] INTERNAL MEDICINE [place]  :  Assessment & Plan  Benign essential HTN  Controlled.  No change in medication.  Orders:  •  Comprehensive metabolic panel; Future  •  Lipid panel; Future    Hypothyroidism, unspecified type  Labs are stable.  Orders:  •  T4, free; Future  •  TSH, 3rd generation; Future    Dyspnea on exertion  Continue follow-up with cardiology with echo tomorrow but reordered the PFTs.  Orders:  •  Complete PFT with post bronchodilator; Future    Late onset Alzheimer's disease without behavioral disturbance (HCC)  Stable.  Daughter continues to do well with her.       Impaired fasting glucose  Continue to monitor.  Orders:  •  Hemoglobin A1C; Future           History of Present Illness   Patient comes in today for routine follow-up with her daughter.  She states that things are about the same.  Blood pressure is controlled.  Blood work shows thyroid levels are controlled.  Taking her medicines as directed.  Still has the shortness of breath with exertion.  No obvious cause so far.  Cardiac workup so far has not shown definite heart failure.  But she is taking the diuretic.  I had ordered PFTs in October but they have not done them as of yet.  Her dementia is stable.  No other complaints today.  No further additions to her history.      Review of Systems   Respiratory:  Negative for shortness of breath.    Cardiovascular:  Negative for chest pain.   Gastrointestinal:  Negative for abdominal pain.       Objective   /80 (BP Location: Left arm, Patient Position: Sitting, Cuff Size: Adult)   Pulse 91   Resp 16   Ht 4' 11\" (1.499 m)   Wt 79.4 kg (175 lb)   SpO2 93%   BMI 35.35 kg/m²      Physical Exam  Vitals and nursing note reviewed.   Constitutional:       Appearance: Normal appearance. She is well-developed.   Cardiovascular:     "  Rate and Rhythm: Normal rate and regular rhythm.      Heart sounds: Normal heart sounds.   Pulmonary:      Effort: Pulmonary effort is normal.      Breath sounds: Normal breath sounds.   Abdominal:      Palpations: Abdomen is soft.      Tenderness: There is no abdominal tenderness.   Neurological:      Mental Status: She is alert and oriented to person, place, and time.

## 2025-02-26 NOTE — ASSESSMENT & PLAN NOTE
Controlled.  No change in medication.  Orders:  •  Comprehensive metabolic panel; Future  •  Lipid panel; Future

## 2025-02-26 NOTE — ASSESSMENT & PLAN NOTE
Continue follow-up with cardiology with echo tomorrow but reordered the PFTs.  Orders:  •  Complete PFT with post bronchodilator; Future

## 2025-02-27 ENCOUNTER — TELEPHONE (OUTPATIENT)
Dept: HEMATOLOGY ONCOLOGY | Facility: CLINIC | Age: 80
End: 2025-02-27

## 2025-02-27 ENCOUNTER — HOSPITAL ENCOUNTER (OUTPATIENT)
Dept: NON INVASIVE DIAGNOSTICS | Facility: CLINIC | Age: 80
Discharge: HOME | End: 2025-02-27
Payer: COMMERCIAL

## 2025-02-27 VITALS
BODY MASS INDEX: 35.28 KG/M2 | WEIGHT: 175 LBS | HEIGHT: 59 IN | SYSTOLIC BLOOD PRESSURE: 116 MMHG | DIASTOLIC BLOOD PRESSURE: 80 MMHG | HEART RATE: 81 BPM

## 2025-02-27 DIAGNOSIS — R06.02 SHORTNESS OF BREATH: ICD-10-CM

## 2025-02-27 LAB
AORTIC ROOT: 3.1 CM
ASCENDING AORTA: 2.6 CM
BSA FOR ECHO PROCEDURE: 1.74 M2
E WAVE DECELERATION TIME: 167 MS
E/A RATIO: 1.1
FRACTIONAL SHORTENING: 34 (ref 28–44)
INTERVENTRICULAR SEPTUM IN DIASTOLE (PARASTERNAL SHORT AXIS VIEW): 0.9 CM
INTERVENTRICULAR SEPTUM: 0.9 CM (ref 0.6–1.1)
LAAS-AP2: 19.1 CM2
LAAS-AP4: 25.1 CM2
LEFT ATRIUM SIZE: 2.5 CM
LEFT ATRIUM VOLUME (MOD BIPLANE): 62 ML
LEFT ATRIUM VOLUME INDEX (MOD BIPLANE): 35.6 ML/M2
LEFT INTERNAL DIMENSION IN SYSTOLE: 2.7 CM (ref 2.1–4)
LEFT VENTRICULAR INTERNAL DIMENSION IN DIASTOLE: 4.1 CM (ref 3.5–6)
LEFT VENTRICULAR POSTERIOR WALL IN END DIASTOLE: 0.8 CM
LEFT VENTRICULAR STROKE VOLUME: 46 ML
LV EF US.2D.A4C+ESTIMATED: 66 %
LVSV (TEICH): 46 ML
MV E'TISSUE VEL-LAT: 13 CM/S
MV E'TISSUE VEL-SEP: 6 CM/S
MV PEAK A VEL: 0.67 M/S
MV PEAK E VEL: 74 CM/S
MV STENOSIS PRESSURE HALF TIME: 48 MS
MV VALVE AREA P 1/2 METHOD: 4.58
RIGHT ATRIUM AREA SYSTOLE A4C: 10.3 CM2
RIGHT VENTRICLE ID DIMENSION: 3.3 CM
SL CV LEFT ATRIUM LENGTH A2C: 6.3 CM
SL CV LV EF: 65
SL CV PED ECHO LEFT VENTRICLE DIASTOLIC VOLUME (MOD BIPLANE) 2D: 73 ML
SL CV PED ECHO LEFT VENTRICLE SYSTOLIC VOLUME (MOD BIPLANE) 2D: 27 ML
TR MAX PG: 26 MMHG
TR PEAK VELOCITY: 2.5 M/S
TRICUSPID ANNULAR PLANE SYSTOLIC EXCURSION: 1.9 CM
TRICUSPID VALVE PEAK REGURGITATION VELOCITY: 2.54 M/S

## 2025-02-27 PROCEDURE — 93306 TTE W/DOPPLER COMPLETE: CPT | Performed by: INTERNAL MEDICINE

## 2025-02-27 PROCEDURE — 93306 TTE W/DOPPLER COMPLETE: CPT

## 2025-02-27 RX ORDER — ANASTROZOLE 1 MG/1
1 TABLET ORAL DAILY
Qty: 90 TABLET | Refills: 1 | Status: SHIPPED | OUTPATIENT
Start: 2025-02-27

## 2025-02-27 NOTE — TELEPHONE ENCOUNTER
Called patient and left a message to call the office back to reschedule her March 3rd follow up appointment with Dr. Chambers.  Patient did not get her mammogram and needs to be reschedule her mammo and follow up.

## 2025-02-27 NOTE — TELEPHONE ENCOUNTER
----- Message from Lili GIPSON sent at 2/27/2025  9:15 AM EST -----  Looks like this patient no showed her mammo. She is coming to the office on Monday. Can you call her to move her appointment until after she schedules her mammo. Ill reach out to the breast center to have them call her to schedule it.

## 2025-02-28 ENCOUNTER — TELEPHONE (OUTPATIENT)
Dept: MAMMOGRAPHY | Facility: CLINIC | Age: 80
End: 2025-02-28

## 2025-02-28 NOTE — TELEPHONE ENCOUNTER
Pts daughter called in regarding rescheduling appt with Dr Chambers. Pt is now scheduled for her mammo on 5/22/25. Pt has been rescheduled for appt with Dr Chambers on 5/29/25. Pts daughter aware to call if appt for mammo gets moved sooner and this appt can try to be moved sooner as well.

## 2025-02-28 NOTE — TELEPHONE ENCOUNTER
Reached out to patient at request of Dr. Chambers's office to r/s patients b/l diagnostic mammogram that was no show'd on 2/24/25. Left voicemail with name/#/office hours requesting call back to reschedule breast imaging. Informed providers office this is the first attempt and patient did not answer.

## 2025-03-02 DIAGNOSIS — C50.811 MALIGNANT NEOPLASM OF OVERLAPPING SITES OF RIGHT BREAST IN FEMALE, ESTROGEN RECEPTOR POSITIVE (HCC): ICD-10-CM

## 2025-03-02 DIAGNOSIS — Z17.0 MALIGNANT NEOPLASM OF OVERLAPPING SITES OF RIGHT BREAST IN FEMALE, ESTROGEN RECEPTOR POSITIVE (HCC): ICD-10-CM

## 2025-03-02 NOTE — TELEPHONE ENCOUNTER
Medication Refill Request       Medication: anastrozole (ARIMIDEX) 1 mg tablet     Dose/Frequency:     Take 1 tablet (1 mg total) by mouth daily       Quantity: 90    Pharmacy: [facility]/pharmacy #[id number] - [place] - [address]     Office:   [] PCP/Provider -   [x] Specialty/Provider -     Does the patient have enough for 3 days?   [] Yes   [x] No - Send as HP to POD    Is the patient completely out of the medication or does not have enough until the next business day?  [] Yes - send to Call Hub  [x] No - Send as HP to POD

## 2025-03-03 ENCOUNTER — RESULTS FOLLOW-UP (OUTPATIENT)
Dept: CARDIOLOGY CLINIC | Facility: CLINIC | Age: 80
End: 2025-03-03

## 2025-03-03 ENCOUNTER — TELEPHONE (OUTPATIENT)
Dept: HEMATOLOGY ONCOLOGY | Facility: CLINIC | Age: 80
End: 2025-03-03

## 2025-03-03 RX ORDER — ANASTROZOLE 1 MG/1
1 TABLET ORAL DAILY
Qty: 90 TABLET | Refills: 0 | OUTPATIENT
Start: 2025-03-03

## 2025-03-04 DIAGNOSIS — C50.811 MALIGNANT NEOPLASM OF OVERLAPPING SITES OF RIGHT BREAST IN FEMALE, ESTROGEN RECEPTOR POSITIVE (HCC): ICD-10-CM

## 2025-03-04 DIAGNOSIS — Z17.0 MALIGNANT NEOPLASM OF OVERLAPPING SITES OF RIGHT BREAST IN FEMALE, ESTROGEN RECEPTOR POSITIVE (HCC): ICD-10-CM

## 2025-03-04 NOTE — TELEPHONE ENCOUNTER
----- Message from Adelso Snow PA-C sent at 3/3/2025 12:59 PM EST -----  Please call patient to advise echocardiogram overall looks okay.  Heart pump function is within normal limits.  There is very minimal valvular leakiness which we will continue to monitor periodically.  These results can be discussed in more detail at next office visit.  Thank you and have a wonderful evening.

## 2025-03-05 RX ORDER — ANASTROZOLE 1 MG/1
1 TABLET ORAL DAILY
Qty: 90 TABLET | Refills: 1 | OUTPATIENT
Start: 2025-03-05

## 2025-04-07 ENCOUNTER — OFFICE VISIT (OUTPATIENT)
Dept: HEMATOLOGY ONCOLOGY | Facility: CLINIC | Age: 80
End: 2025-04-07
Payer: COMMERCIAL

## 2025-04-07 VITALS
WEIGHT: 178 LBS | BODY MASS INDEX: 35.88 KG/M2 | DIASTOLIC BLOOD PRESSURE: 86 MMHG | OXYGEN SATURATION: 90 % | RESPIRATION RATE: 16 BRPM | HEIGHT: 59 IN | SYSTOLIC BLOOD PRESSURE: 130 MMHG | TEMPERATURE: 97.5 F | HEART RATE: 74 BPM

## 2025-04-07 DIAGNOSIS — C50.811 MALIGNANT NEOPLASM OF OVERLAPPING SITES OF RIGHT BREAST IN FEMALE, ESTROGEN RECEPTOR POSITIVE (HCC): Primary | ICD-10-CM

## 2025-04-07 DIAGNOSIS — Z79.811 USE OF ANASTROZOLE: ICD-10-CM

## 2025-04-07 DIAGNOSIS — D75.89 MACROCYTOSIS: ICD-10-CM

## 2025-04-07 DIAGNOSIS — Z17.0 MALIGNANT NEOPLASM OF OVERLAPPING SITES OF RIGHT BREAST IN FEMALE, ESTROGEN RECEPTOR POSITIVE (HCC): Primary | ICD-10-CM

## 2025-04-07 DIAGNOSIS — M85.80 OSTEOPENIA, UNSPECIFIED LOCATION: ICD-10-CM

## 2025-04-07 PROCEDURE — 99215 OFFICE O/P EST HI 40 MIN: CPT | Performed by: INTERNAL MEDICINE

## 2025-04-07 RX ORDER — ANASTROZOLE 1 MG/1
1 TABLET ORAL DAILY
Qty: 90 TABLET | Refills: 1 | Status: SHIPPED | OUTPATIENT
Start: 2025-04-07

## 2025-04-07 NOTE — ASSESSMENT & PLAN NOTE
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 daily.  Overall tolerating it well.  Last mammo February 2024.  Benign.  Currently scheduled for May 2025.  No obvious breast complaints.  Continues to be on calcium and vitamin D supplementation.  Counseled to take 120/800-1000 international units.  Last bone density August 2022.  Osteopenia.  Recommend weight training exercises.  Discussed other lifestyle modification.  Continue with anastrozole till June 2027 at least.  Orders:    anastrozole (ARIMIDEX) 1 mg tablet; Take 1 tablet (1 mg total) by mouth daily

## 2025-04-07 NOTE — PROGRESS NOTES
Name: Paula Lubin      : 1945      MRN: [id number]  Encounter Provider: Suzanna García  Encounter Date: 2025   Encounter department: [facility] [place]  :  Assessment & Plan  Malignant neoplasm of overlapping sites of right breast in female, estrogen receptor positive (HCC)  Diagnosed 3/20/2022 right breast invasive mammary carcinoma with extensive DCIS ER/AR positive HER2 1+ s/p lumpectomy with sentinel lymph node biopsy grade one 1.3 cm 0/3 lymph nodes followed by adjuvant radiation finishing in 2022 and since then has been on anastrozole 1 mg p.o. daily.  Overall tolerating it well.  Last mammo 2024.  Benign.  Currently scheduled for May 2025.  No obvious breast complaints.  Continues to be on calcium and vitamin D supplementation.  Counseled to take 120/800-1000 international units.  Last bone density 2022.  Osteopenia.  Recommend weight training exercises.  Discussed other lifestyle modification.  Continue with anastrozole till 2027 at least.  Orders:    anastrozole (ARIMIDEX) 1 mg tablet; Take 1 tablet (1 mg total) by mouth daily    Use of anastrozole  Discussed side effects and other supportive care.  Patient is tolerating it overall quite well.  Orders:    DXA bone density spine hip and pelvis; Future    Osteopenia, unspecified location  Calcium and vitamin D supplementation.  Repeat bone density  Orders:    DXA bone density spine hip and pelvis; Future    Macrocytosis  Noted to have macrocytosis which has been increasing over the last several years.  Last B12 was in 2020 which was normal/elevated.  Discussed with her that macrocytosis may be related to either nutrient deficiencies, hemolysis, endocrinopathies, primary bone marrow issues.  She is not anemic and the finding has been chronic so we can continue to monitor.  At next blood work we can repeat a B12 folate.  Most recent thyroid was okay.  Will do SPEP, LDH reticulocyte count  if she develops any anemia and the blood work is unremarkable otherwise then we may have to pursue bone marrow studies.  Patient does have history of non-Hodgkin lymphoma in the past.  Treated with chemotherapy most likely Adriamycin based.  Orders:    CBC and differential; Future    Comprehensive metabolic panel; Future    Vitamin B12/Folate, Serum Panel; Future    Copper Level; Future    Protein electrophoresis, serum; Future    LD,Blood; Future    Retic Count; Future        Return in about 6 months (around 10/7/2025).    History of Present Illness   Chief Complaint   Patient presents with    Follow-up   Patient is a transfer of care.    Patient is an 79-year-old female with past medical history of hypertension hypothyroidism also history of right breast invasive mammary carcinoma with extensive DCIS ER/WV positive HER2 1+ s/p lumpectomy with sentinel lymph node biopsy grade one 1.3 cm 0/3 lymph nodes followed by adjuvant radiation finishing in June 2022 and since then has been on anastrozole 1 mg p.o. daily.  Along with calcium and vitamin D.  She does have osteopenia.  Last DEXA scan was in 2022.  Patient otherwise has been okay.  She also has history of non-Hodgkin lymphoma diagnosed probably in 2012.  I do not have any records available from that she does report getting what looked like doxorubicin based chemotherapy.  Oncology History   Cancer Staging   Malignant neoplasm of overlapping sites of right breast in female, estrogen receptor positive (HCC)  Staging form: Breast, AJCC 8th Edition  - Clinical stage from 3/14/2022: cT1b, cN0, cM0, GX, ER+, WV+, HER2- - Signed by Keely Crespo MD on 3/25/2022  Stage prefix: Initial diagnosis  Histologic grading system: 3 grade system  Oncology History   Malignant neoplasm of overlapping sites of right breast in female, estrogen receptor positive (HCC)   3/7/2022 Initial Diagnosis    Malignant neoplasm of right female breast (HCC)     3/7/2022 Biopsy    Right Breast  Ultrasound guided biopsy  12 o'clock, 8 cm from nipple  Invasive mammary carcinoma of no special type with extensive Ductal Carcinoma in situ  Grade 1   ER 95  KS 75  HER2 1+  Lymphovascular invasion not identified    Concordant. Malingnacy appears unifocal. Right axilla clear. Left breast clear.     Breast, Right, Stereo bx right breast 10oclock, 4 cores with calcs:  - Fibroadenoma with focal coarse calcifications.  - Negative for atypia and in-situ or invasive carcinoma.      Breast, Right, Stereo bx right breast 10oclock, 1 core without calcs:  - Fibroadenoma with focal coarse calcifications.  - Negative for atypia and in-situ or invasive carcinoma.      3/14/2022 Genetic Testing    A stat panel of genes were evaluated, including: SYDNI, BRCA1, BRCA2, CDH1, CHEK2, PALB2, PTEN, STK11, TP53  Negative result. No pathogenic sequence variants or deletions/duplications identified     3/14/2022 Genomic Testing    MammaPrint   Low risk  Mammaprint Index: +0.537  Luminal type A     3/14/2022 -  Cancer Staged    Staging form: Breast, AJCC 8th Edition  - Clinical stage from 3/14/2022: cT1b, cN0, cM0, GX, ER+, KS+, HER2- - Signed by Keely Crespo MD on 3/25/2022  Stage prefix: Initial diagnosis  Histologic grading system: 3 grade system       3/29/2022 Surgery    Right breast liyah  directed lumpectomy with sentinel lymph node biopsy  Invasive mammary carcinoma of no special type with associated ductal carcinoma in situ  Grade 1   1.3 cm  All margins negative for invasive carcinoma and ductal carcinoma in situ  0/3 Lymph node  Anatomic/prognostic stage: IA      5/23/2022 - 6/14/2022 Radiation    Treatments:  Course: C1  Plan ID Energy Fractions Dose per Fraction (cGy) Dose Correction (cGy) Total Dose Delivered (cGy) Elapsed Days   R Breast 6X 15 / 15 267 0 4,005 22    Treatment Dates:  5/23/2022 - 6/14/2022.        6/2022 -  Hormone Therapy    Anastrozole 1mg  Dr. Deluna        Pertinent Medical History     04/07/25:  "Patient comes for follow-up accompanied by her daughter.  Overall reports to be doing well.  Denies any known breast symptoms like new lump, breast discharge.  She does get time to time pain around the scar site.  No other pain anywhere else.     Review of Systems   Constitutional:  Negative for chills and fever.   HENT:  Negative for ear pain and sore throat.    Eyes:  Negative for pain and visual disturbance.   Respiratory:  Negative for cough and shortness of breath.    Cardiovascular:  Negative for chest pain and palpitations.   Gastrointestinal:  Negative for abdominal pain and vomiting.   Genitourinary:  Negative for dysuria and hematuria.   Musculoskeletal:  Negative for arthralgias and back pain.   Skin:  Negative for color change and rash.   Neurological:  Negative for seizures and syncope.   All other systems reviewed and are negative.    Medical History Reviewed by provider this encounter:     .      Objective   /86 (BP Location: Left arm, Patient Position: Sitting, Cuff Size: Adult)   Pulse 74   Temp 97.5 °F (36.4 °C) (Temporal)   Resp 16   Ht 4' 11\" (1.499 m)   Wt 80.7 kg (178 lb)   SpO2 90%   BMI 35.95 kg/m²     Pain Screening:  Pain Score: 0-No pain  ECOG   1  Physical Exam  Constitutional:       Appearance: Normal appearance. She is normal weight.   HENT:      Head: Normocephalic.      Nose: Nose normal.      Mouth/Throat:      Mouth: Mucous membranes are moist.   Eyes:      Extraocular Movements: Extraocular movements intact.      Pupils: Pupils are equal, round, and reactive to light.   Cardiovascular:      Rate and Rhythm: Normal rate.      Pulses: Normal pulses.   Pulmonary:      Effort: Pulmonary effort is normal.      Breath sounds: Normal breath sounds.   Chest:   Breasts:     Left: Normal.          Comments: Some mitul-incisional scar otherwise well-healed nontender no obvious lumps  Abdominal:      General: Bowel sounds are normal.      Palpations: Abdomen is soft.      " Tenderness: There is no abdominal tenderness.   Musculoskeletal:         General: Normal range of motion.      Cervical back: Normal range of motion.   Lymphadenopathy:      Cervical: No cervical adenopathy.   Skin:     General: Skin is warm.   Neurological:      General: No focal deficit present.      Mental Status: She is alert and oriented to person, place, and time. Mental status is at baseline.       Physical Exam      Results    Labs: I have reviewed the following labs:  Lab Results   Component Value Date/Time    WBC 8.35 01/23/2025 09:54 AM    RBC 3.10 (L) 01/23/2025 09:54 AM    Hemoglobin 11.9 01/23/2025 09:54 AM    Hematocrit 35.7 01/23/2025 09:54 AM     (H) 01/23/2025 09:54 AM    MCH 38.4 (H) 01/23/2025 09:54 AM    RDW 14.4 01/23/2025 09:54 AM    Platelets 277 01/23/2025 09:54 AM    Lymphocytes % 7 (L) 01/23/2025 09:54 AM    Monocytes % 8 01/23/2025 09:54 AM    Eosinophils % 3 01/23/2025 09:54 AM    Basophils % 0 01/23/2025 09:54 AM     Lab Results   Component Value Date/Time    Potassium 3.6 01/23/2025 09:54 AM    Chloride 101 01/23/2025 09:54 AM    CO2 29 01/23/2025 09:54 AM    BUN 21 01/23/2025 09:54 AM    Creatinine 1.18 01/23/2025 09:54 AM    Glucose, Fasting 119 (H) 01/23/2025 09:54 AM    Calcium 9.5 01/23/2025 09:54 AM    AST 16 01/23/2025 09:54 AM    ALT 9 01/23/2025 09:54 AM    Alkaline Phosphatase 73 01/23/2025 09:54 AM    Total Protein 7.4 01/23/2025 09:54 AM    Albumin 4.2 01/23/2025 09:54 AM    Total Bilirubin 0.48 01/23/2025 09:54 AM    eGFR 43 01/23/2025 09:54 AM       Radiology Results Review: I have reviewed radiology reports from prior scans including: Mammogram DEXA scan.

## 2025-04-07 NOTE — ASSESSMENT & PLAN NOTE
Discussed side effects and other supportive care.  Patient is tolerating it overall quite well.  Orders:    DXA bone density spine hip and pelvis; Future

## 2025-04-22 ENCOUNTER — HOSPITAL ENCOUNTER (OUTPATIENT)
Age: 80
Discharge: HOME | End: 2025-04-22
Payer: COMMERCIAL

## 2025-04-22 VITALS — WEIGHT: 178 LBS | BODY MASS INDEX: 35.88 KG/M2 | HEIGHT: 59 IN

## 2025-04-22 DIAGNOSIS — Z79.811 USE OF ANASTROZOLE: ICD-10-CM

## 2025-04-22 DIAGNOSIS — M85.80 OSTEOPENIA, UNSPECIFIED LOCATION: ICD-10-CM

## 2025-04-22 PROCEDURE — 77080 DXA BONE DENSITY AXIAL: CPT

## 2025-04-28 ENCOUNTER — NURSE TRIAGE (OUTPATIENT)
Age: 80
End: 2025-04-28

## 2025-04-28 DIAGNOSIS — N39.0 ACUTE URINARY TRACT INFECTION: Primary | ICD-10-CM

## 2025-04-28 RX ORDER — NITROFURANTOIN 25; 75 MG/1; MG/1
100 CAPSULE ORAL 2 TIMES DAILY
Qty: 10 CAPSULE | Refills: 0 | Status: SHIPPED | OUTPATIENT
Start: 2025-04-28 | End: 2025-05-03

## 2025-04-28 NOTE — TELEPHONE ENCOUNTER
"FOLLOW UP: patient and patient's daughter asking if a URINALYSIS can be order as they did an otc urinary tract infection kit and it came back + for urinary tract infection     REASON FOR CONVERSATION: Urinary Symptoms    SYMPTOMS: patient states that 3 days ago she started with burning w/ urination, dark urine and increase frequency. Denies fever, flank pain, or blood in the urine.     OTHER: patient states that she probably doesn't take enough water. There was no available appointments for today and an appointment was schedule for tomorrow morning w/ Dr. Elliott. Please call patient to let her know when the URINALYSIS is order so they go provide the sample.     DISPOSITION: See Today or Tomorrow in Office (overriding See Today in Office)          Reason for Disposition   Urinating more frequently than usual (i.e., frequency) OR new-onset of the feeling of an urgent need to urinate (i.e., urgency)    Additional Information   Pain or burning with passing urine (urination) and female    Answer Assessment - Initial Assessment Questions  1. SYMPTOM: \"What's the main symptom you're concerned about?\" (e.g., frequency, incontinence)      Burning w/ pee and increase frequency   2. ONSET: \"When did the  symptoms   start?\"      3 days ago   3. PAIN: \"Is there any pain?\" If Yes, ask: \"How bad is it?\" (Scale: 1-10; mild, moderate, severe)      Moderate   4. CAUSE: \"What do you think is causing the symptoms?\"      Urinary tract infection   5. OTHER SYMPTOMS: \"Do you have any other symptoms?\" (e.g., blood in urine, fever, flank pain, pain with urination)          6. PREGNANCY: \"Is there any chance you are pregnant?\" \"When was your last menstrual period?\"      *No Answer*    Protocols used: Urinary Symptoms-Adult-[place]    "

## 2025-04-28 NOTE — TELEPHONE ENCOUNTER
Spoke with patient's daughter, flex Smith per consent forms. Advised of the medication being sent. She verbalized understanding.

## 2025-04-30 ENCOUNTER — VBI (OUTPATIENT)
Dept: ADMINISTRATIVE | Facility: OTHER | Age: 80
End: 2025-04-30

## 2025-05-02 ENCOUNTER — OFFICE VISIT (OUTPATIENT)
Dept: INTERNAL MEDICINE CLINIC | Facility: CLINIC | Age: 80
End: 2025-05-02
Payer: COMMERCIAL

## 2025-05-02 VITALS
SYSTOLIC BLOOD PRESSURE: 116 MMHG | HEIGHT: 59 IN | BODY MASS INDEX: 35.95 KG/M2 | OXYGEN SATURATION: 92 % | DIASTOLIC BLOOD PRESSURE: 62 MMHG | HEART RATE: 90 BPM

## 2025-05-02 DIAGNOSIS — N39.0 ACUTE URINARY TRACT INFECTION: Primary | ICD-10-CM

## 2025-05-02 PROCEDURE — 99213 OFFICE O/P EST LOW 20 MIN: CPT | Performed by: INTERNAL MEDICINE

## 2025-05-02 PROCEDURE — G2211 COMPLEX E/M VISIT ADD ON: HCPCS | Performed by: INTERNAL MEDICINE

## 2025-05-02 NOTE — PROGRESS NOTES
"Name: Paula Lubin      : 1945      MRN: [id number]  Encounter Provider: Jose Enrique Elliott MD  Encounter Date: 2025   Encounter department: [facility] [place]  :  Assessment & Plan  Acute urinary tract infection  Resolved.  For any recurrence, her daughter will think her for the urine testing.  Also suggested just routinely taking cranberry juice capsules as prevention.  Orders:  •  Urinalysis with microscopic; Future  •  Urine culture; Future           History of Present Illness   Patient comes in today because she had called last week because of urinary symptoms.  She was treated with Macrobid and this has worked before.  She states she is back to normal.  No back pain.  No urinary complaints at this point.  She does not get them frequently but she is asking is there anything she can take preventative.      Review of Systems   Constitutional:  Negative for fever.   Genitourinary:  Negative for dysuria.       Objective   /62 (BP Location: Left arm, Patient Position: Sitting, Cuff Size: Standard)   Pulse 90   Ht 4' 11\" (1.499 m)   SpO2 92%   BMI 35.95 kg/m²      Physical Exam  Vitals and nursing note reviewed.   Constitutional:       Appearance: Normal appearance. She is not ill-appearing.   Abdominal:      Tenderness: There is no right CVA tenderness or left CVA tenderness.   Neurological:      Mental Status: She is alert. Mental status is at baseline.         "

## 2025-05-10 ENCOUNTER — NURSE TRIAGE (OUTPATIENT)
Dept: OTHER | Facility: OTHER | Age: 80
End: 2025-05-10

## 2025-05-10 NOTE — TELEPHONE ENCOUNTER
"Regarding: UTI/med request  ----- Message from Asia MICHAEL sent at 5/10/2025  4:05 PM EDT -----  \"I have a really bad UTI and I need medication\"    "

## 2025-05-10 NOTE — TELEPHONE ENCOUNTER
Reason for Disposition  • Second attempt to contact caller AND no contact made. Phone number verified.    Protocols used: No Contact or Duplicate Contact Call-Adult-     Arthritis    Arthritis    Damian esophagus    Damian esophagus    CAD (coronary artery disease)    Dementia    GERD (gastroesophageal reflux disease)    GERD (gastroesophageal reflux disease)    High cholesterol    HTN - Hypertension    Hypercholesterolemia    Hypertension    Hypothyroid    Low sodium levels    TIA (transient ischemic attack)    TIA (transient ischemic attack)

## 2025-05-13 ENCOUNTER — HOSPITAL ENCOUNTER (EMERGENCY)
Facility: HOSPITAL | Age: 80
Discharge: HOME | End: 2025-05-13
Attending: EMERGENCY MEDICINE
Payer: COMMERCIAL

## 2025-05-13 VITALS
WEIGHT: 173 LBS | RESPIRATION RATE: 18 BRPM | BODY MASS INDEX: 31.83 KG/M2 | HEART RATE: 95 BPM | SYSTOLIC BLOOD PRESSURE: 130 MMHG | DIASTOLIC BLOOD PRESSURE: 69 MMHG | TEMPERATURE: 97.7 F | OXYGEN SATURATION: 92 % | HEIGHT: 62 IN

## 2025-05-13 DIAGNOSIS — N39.0 UTI (URINARY TRACT INFECTION): Primary | ICD-10-CM

## 2025-05-13 LAB
BACTERIA UR QL AUTO: ABNORMAL /HPF
BILIRUB UR QL STRIP: NEGATIVE
CAOX CRY URNS QL MICRO: ABNORMAL /HPF
CLARITY UR: ABNORMAL
COLOR UR: YELLOW
GLUCOSE UR STRIP-MCNC: NEGATIVE MG/DL
HGB UR QL STRIP.AUTO: ABNORMAL
KETONES UR STRIP-MCNC: NEGATIVE MG/DL
LEUKOCYTE ESTERASE UR QL STRIP: ABNORMAL
MUCOUS THREADS UR QL AUTO: ABNORMAL
NITRITE UR QL STRIP: NEGATIVE
NON-SQ EPI CELLS URNS QL MICRO: ABNORMAL /HPF
PH UR STRIP.AUTO: 6 [PH]
PROT UR STRIP-MCNC: ABNORMAL MG/DL
RBC #/AREA URNS AUTO: ABNORMAL /HPF
SP GR UR STRIP.AUTO: 1.02 (ref 1–1.03)
UROBILINOGEN UR STRIP-ACNC: <2 MG/DL
WBC #/AREA URNS AUTO: ABNORMAL /HPF
WBC CLUMPS # UR AUTO: PRESENT /UL

## 2025-05-13 PROCEDURE — 99283 EMERGENCY DEPT VISIT LOW MDM: CPT | Performed by: EMERGENCY MEDICINE

## 2025-05-13 PROCEDURE — 87086 URINE CULTURE/COLONY COUNT: CPT | Performed by: EMERGENCY MEDICINE

## 2025-05-13 PROCEDURE — 81001 URINALYSIS AUTO W/SCOPE: CPT | Performed by: EMERGENCY MEDICINE

## 2025-05-13 PROCEDURE — 87077 CULTURE AEROBIC IDENTIFY: CPT | Performed by: EMERGENCY MEDICINE

## 2025-05-13 PROCEDURE — 87186 SC STD MICRODIL/AGAR DIL: CPT | Performed by: EMERGENCY MEDICINE

## 2025-05-13 PROCEDURE — 99283 EMERGENCY DEPT VISIT LOW MDM: CPT

## 2025-05-13 RX ORDER — PHENAZOPYRIDINE HYDROCHLORIDE 100 MG/1
200 TABLET, FILM COATED ORAL ONCE
Status: COMPLETED | OUTPATIENT
Start: 2025-05-13 | End: 2025-05-13

## 2025-05-13 RX ORDER — CEPHALEXIN 500 MG/1
500 CAPSULE ORAL EVERY 6 HOURS SCHEDULED
Qty: 28 CAPSULE | Refills: 0 | Status: SHIPPED | OUTPATIENT
Start: 2025-05-13 | End: 2025-05-20

## 2025-05-13 RX ORDER — PHENAZOPYRIDINE HYDROCHLORIDE 200 MG/1
200 TABLET, FILM COATED ORAL 3 TIMES DAILY
Qty: 6 TABLET | Refills: 0 | Status: SHIPPED | OUTPATIENT
Start: 2025-05-13

## 2025-05-13 RX ADMIN — PHENAZOPYRIDINE 200 MG: 100 TABLET ORAL at 19:32

## 2025-05-13 RX ADMIN — CEPHALEXIN 500 MG: 250 CAPSULE ORAL at 19:32

## 2025-05-13 NOTE — ED PROVIDER NOTES
ED Disposition       None          Assessment & Plan       Medical Decision Making  Urinalysis consistent with UTI.  No evidence of urosepsis.  Although there are calcium oxalate crystals, nothing to suggest kidney stone.  No flank pain.  No abdominal pain.  No nausea or vomiting.  No CVA tenderness to suggest pyelonephritis.  Appropriate for discharge and outpatient management.    Amount and/or Complexity of Data Reviewed  Labs: ordered. Decision-making details documented in ED Course.    Risk  Prescription drug management.  Decision regarding hospitalization.             Medications - No data to display    ED Risk Strat Scores                    No data recorded                            History of Present Illness       Chief Complaint   Patient presents with    Possible UTI     Pt arrives c/o urinary frequency and burning. Denies fevers       Past Medical History:   Diagnosis Date    Anxiety disorder     Aortic valve disorder     BRCA1 negative     BRCA2 negative     Breast cancer (HCC) 03/2022    right    Cancer (HCC)     breast    Cellulitis of finger of left hand 11/20/2018    Cellulitis of left hand 11/10/2018    Added automatically from request for surgery [id number]    Depression     Disease of thyroid gland     GERD (gastroesophageal reflux disease)     History of gastroesophageal reflux (GERD)     History of kidney cancer 03/06/2014    History of transfusion     Hyperlipidemia 03/06/2014    Hypertension 03/06/2014    Hypothyroidism 03/06/2014    Lymphoma (HCC)     Malignant neoplasm of overlapping sites of right breast in female, estrogen receptor positive (HCC) 03/10/2022    Non-intractable vomiting with nausea 03/05/2020    Shortness of breath     Stroke ([facility]) [id number]    Min stroke found wile going through chemo and radiation    TIA (transient ischemic attack)     UTI (urinary tract infection)       Past Surgical History:   Procedure Laterality Date    BREAST LUMPECTOMY Right 3/29/2022    Procedure:  ITZEL  DIRECTED LUMPECTOMY;  Surgeon: Denton Chambers MD;  Location: [facility];  Service: Surgical Oncology    ESOPHAGOGASTRIC FUNDOPLASTY      Nissen Fundoplication    HERNIA REPAIR      LYMPH NODE BIOPSY Right 3/29/2022    Procedure: LYMPHATIC MAPPING WITH BLUE AND RADIOACTIVE DYES SENTINEL LYMPH NODE BIOPSY, INJECTION IN OR AT 0800 BY DR CHAMBERS;  Surgeon: Denton Chambers MD;  Location: [facility];  Service: Surgical Oncology    MAMMO STEREOTACTIC BREAST BIOPSY RIGHT (ALL INC) Right 3/7/2022    US BREAST CLIP NEEDLE LOC RIGHT Right 3/24/2022    US GUIDED BREAST BIOPSY RIGHT COMPLETE Right 3/7/2022    WRIST SURGERY Left       Family History   Problem Relation Age of Onset    Stroke Father     Leukemia Sister     No Known Problems Brother     Skin cancer Mother     Stroke Maternal Grandmother     No Known Problems Maternal Grandfather     No Known Problems Paternal Grandmother     No Known Problems Paternal Grandfather     Breast cancer Sister     Breast cancer Sister         Colon cancer    Kidney cancer Brother     Alcohol abuse Brother     Kidney cancer Brother     Stomach cancer Brother     No Known Problems Daughter     No Known Problems Daughter       Social History     Tobacco Use    Smoking status: Never    Smokeless tobacco: Never    Tobacco comments:     None   Vaping Use    Vaping status: Never Used   Substance Use Topics    Alcohol use: Yes     Comment: Social holidays    Drug use: Never      E-Cigarette/Vaping    E-Cigarette Use Never User       E-Cigarette/Vaping Substances    Nicotine No     THC No     CBD No     Flavoring No     Other No     Unknown No       I have reviewed and agree with the history as documented.     Patient is an 79-year-old female.  She has had prior UTIs.  She presents to the emergency room with several days of dysuria.  No frequency.  No hematuria.  No vaginal discharge or bleeding.  She does report subjective fevers.  No flank pain or abdominal pain.   No nausea or vomiting.  Patient reports that symptoms are similar to prior UTIs.        Review of Systems   Constitutional:  Negative for chills and fever.   HENT:  Negative for rhinorrhea and sore throat.    Eyes:  Negative for pain, redness and visual disturbance.   Respiratory:  Negative for cough and shortness of breath.    Cardiovascular:  Negative for chest pain and leg swelling.   Gastrointestinal:  Negative for abdominal pain, diarrhea and vomiting.   Endocrine: Negative for polydipsia and polyuria.   Genitourinary:  Positive for dysuria. Negative for frequency, hematuria, vaginal bleeding and vaginal discharge.   Musculoskeletal:  Negative for back pain and neck pain.   Skin:  Negative for rash and wound.   Allergic/Immunologic: Negative for immunocompromised state.   Neurological:  Negative for weakness, numbness and headaches.   Hematological:  Does not bruise/bleed easily.   Psychiatric/Behavioral:  Negative for hallucinations and suicidal ideas.    All other systems reviewed and are negative.          Objective       ED Triage Vitals [05/13/25 1657]   Temperature Pulse Blood Pressure Respirations SpO2 Patient Position - Orthostatic VS   97.7 °F (36.5 °C) 95 130/69 18 92 % Sitting      Temp Source Heart Rate Source BP Location FiO2 (%) Pain Score    Temporal Monitor Left arm -- --      Vitals      Date and Time Temp Pulse SpO2 Resp BP Pain Score FACES Pain Rating User   05/13/25 1657 97.7 °F (36.5 °C) 95 92 % 18 130/69 -- -- EP            Physical Exam  Vitals reviewed.   Constitutional:       General: She is not in acute distress.     Appearance: She is obese.   HENT:      Head: Normocephalic and atraumatic.      Nose: Nose normal.      Mouth/Throat:      Mouth: Mucous membranes are moist.   Eyes:      General:         Right eye: No discharge.         Left eye: No discharge.      Conjunctiva/sclera: Conjunctivae normal.   Cardiovascular:      Rate and Rhythm: Normal rate and regular rhythm.      Pulses:  Normal pulses.      Heart sounds: Normal heart sounds. No murmur heard.     No friction rub. No gallop.   Pulmonary:      Effort: Pulmonary effort is normal. No respiratory distress.      Breath sounds: Normal breath sounds. No stridor. No wheezing, rhonchi or rales.   Abdominal:      General: Bowel sounds are normal. There is no distension.      Palpations: Abdomen is soft.      Tenderness: There is no abdominal tenderness. There is no right CVA tenderness, left CVA tenderness, guarding or rebound.   Musculoskeletal:         General: No swelling, tenderness, deformity or signs of injury. Normal range of motion.      Cervical back: Normal range of motion and neck supple. No rigidity.      Right lower leg: No edema.      Left lower leg: No edema.      Comments: No calf tenderness or unilateral leg swelling.   Skin:     General: Skin is warm and dry.      Coloration: Skin is not jaundiced.      Findings: No rash.   Neurological:      General: No focal deficit present.      Mental Status: She is alert and oriented to person, place, and time.      Sensory: No sensory deficit.      Motor: Motor function is intact.   Psychiatric:         Mood and Affect: Mood normal.         Behavior: Behavior normal.         Results Reviewed       None            No orders to display       Procedures    ED Medication and Procedure Management   Prior to Admission Medications   Prescriptions Last Dose Informant Patient Reported? Taking?   Mirabegron ER (Myrbetriq) 50 MG TB24  Self, Child No No   Sig: Take 1 tablet (50 mg total) by mouth daily   PARoxetine (PAXIL) 20 mg tablet  Self, Child No No   Sig: Take 1 tablet (20 mg total) by mouth daily   amLODIPine (NORVASC) 2.5 mg tablet  Self, Child No No   Sig: Take 2 tablets (5 mg total) by mouth daily   anastrozole (ARIMIDEX) 1 mg tablet   No No   Sig: Take 1 tablet (1 mg total) by mouth daily   aspirin 325 mg tablet  Self, Child Yes No   Sig: Take 325 mg by mouth daily    cholecalciferol  (VITAMIN D3) 1,000 units tablet  Self, Child No No   Sig: Take 1 tablet (1,000 Units total) by mouth daily   cholestyramine (QUESTRAN) 4 g packet  Self, Child No No   Sig: TAKE 1 PACKET BY MOUTH DAILY.   dicyclomine (BENTYL) 20 mg tablet  Self, Child No No   Sig: Take 1 tablet (20 mg total) by mouth 2 (two) times a day   fluticasone (FLONASE) 50 mcg/act nasal spray  Self, Child No No   Sig: INSTILL 1 SPRAY INTO EACH NOSTRIL AS NEEDED FOR RHINITIS OR ALLERGIES   furosemide (LASIX) 40 mg tablet  Self, Child No No   Sig: Take 1 tablet (40 mg total) by mouth daily   levothyroxine 125 mcg tablet  Self, Child No No   Sig: Take 1 tablet (125 mcg total) by mouth daily   loperamide (IMODIUM) 2 mg capsule  Self, Child No No   Sig: Take 1 capsule (2 mg total) by mouth 4 (four) times a day as needed for diarrhea   meclizine (ANTIVERT) 25 mg tablet  Self, Child No No   Sig: Take 1 tablet (25 mg total) by mouth every 8 (eight) hours as needed for dizziness   memantine (NAMENDA) 10 mg tablet  Self, Child No No   Sig: Take 1 tablet (10 mg total) by mouth 2 (two) times a day   multivitamin (THERAGRAN) TABS  Self, Child Yes No   Sig: Take 1 tablet by mouth   omeprazole (PriLOSEC) 20 mg delayed release capsule  Self, Child No No   Sig: Take 1 capsule (20 mg total) by mouth 2 (two) times a day   ondansetron (ZOFRAN-ODT) 4 mg disintegrating tablet  Self, Child No No   Sig: Take 1 tablet (4 mg total) by mouth every 8 (eight) hours as needed for nausea   potassium chloride (Klor-Con M10) 10 mEq tablet   No No   Sig: Take 1 tablet (10 mEq total) by mouth daily for 10 days   rivastigmine (EXELON) 9.5 mg/24 hr TD 24 hr patch  Self, Child No No   Sig: Place 1 patch on the skin over 24 hours daily      Facility-Administered Medications: None     Patient's Medications   Discharge Prescriptions    No medications on file     No discharge procedures on file.  ED SEPSIS DOCUMENTATION            Gus Gallagher MD  05/13/25 7166

## 2025-05-13 NOTE — ED NOTES
Patient ambulated to the restroom independently and with a steady gait using her cane. Patient provided sample and ambulated independently to her assigned bed. Provider at [facility].     Cyndi Herman RN  05/13/25 5033

## 2025-05-15 ENCOUNTER — RESULTS FOLLOW-UP (OUTPATIENT)
Dept: EMERGENCY DEPT | Facility: HOSPITAL | Age: 80
End: 2025-05-15

## 2025-05-15 ENCOUNTER — VBI (OUTPATIENT)
Dept: INTERNAL MEDICINE CLINIC | Facility: CLINIC | Age: 80
End: 2025-05-15

## 2025-05-15 LAB
BACTERIA UR CULT: ABNORMAL
BACTERIA UR CULT: ABNORMAL

## 2025-05-15 NOTE — TELEPHONE ENCOUNTER
05/15/25 9:26 AM    Patient contacted post ED visit, first outreach attempt made. Message was left for patient to return a call to the [facility] Department at [facility]: Phone [phone].    Thank you.  [facility] Loving MA  [facility]

## 2025-05-15 NOTE — LETTER
[facility] INTERNAL MEDICINE [place]  [address]  [facility] [address]  [phone]    Date: 05/19/25    Paula Lubin  [address] [facility] [facility] [address]    Dear Paula:                                                                                                                                Thank you for choosing [facility] emergency department for care.  Your primary care provider wants to make sure that your ongoing medical care is being addressed. If you require follow up care as a result of your emergency department visit, there are a few things the practice would like you to know.                As part of the network's continuing commitment to caring for our patients, we have added more same day appointments and have extended office hours to meet your medical needs. After hours, on-call physicians are available via your primary care provider's main office line.               We encourage you to contact our office prior to seeking treatment to discuss your symptoms with the medical staff.  Together, we can determine the correct course of action.  A majority of non-emergent conditions such as: common cold, flu-like symptoms, fevers, strains/sprains, dislocations, minor burns, cuts and animal bites can be treated at [facility] facilities. Diagnostic testing is available at some sites.               Of course, if you are experiencing a life threatening medical emergency call 911 or proceed directly to the nearest emergency room.    Your nearest [facility] facility is conveniently located at:    [address]  [place] [address]  [phone]  SKIP THE WAIT  Conveniently offered at most [facility] locations  Norman your spot online at [email or web address]/locations or on the [facility] Vinny    Sincerely,    [facility] [place]  Dept: [phone]

## 2025-05-16 NOTE — TELEPHONE ENCOUNTER
05/16/25 11:14 AM    Patient contacted post ED visit, second outreach attempt made. Message was left for patient to return a call to the [facility] Department at [facility]: Phone [phone].    Thank you.  [facility] Loving MA  [facility]

## 2025-05-19 NOTE — TELEPHONE ENCOUNTER
05/19/25 1:57 PM    Patient contacted post ED visit, phone outreaches were unsuccessful and a MyChart letter has been sent to the patient as follow-up.    Thank you.  [facility] Loving MA  [facility]

## 2025-05-22 ENCOUNTER — HOSPITAL ENCOUNTER (EMERGENCY)
Facility: HOSPITAL | Age: 80
Discharge: HOME | End: 2025-05-23
Attending: EMERGENCY MEDICINE
Payer: COMMERCIAL

## 2025-05-22 ENCOUNTER — APPOINTMENT (EMERGENCY)
Dept: CT IMAGING | Facility: HOSPITAL | Age: 80
End: 2025-05-22
Payer: COMMERCIAL

## 2025-05-22 DIAGNOSIS — N39.0 UTI (URINARY TRACT INFECTION): Primary | ICD-10-CM

## 2025-05-22 DIAGNOSIS — N12 PYELONEPHRITIS: ICD-10-CM

## 2025-05-22 LAB
ALBUMIN SERPL BCG-MCNC: 3.8 G/DL (ref 3.5–5)
ALP SERPL-CCNC: 81 U/L (ref 34–104)
ALT SERPL W P-5'-P-CCNC: 8 U/L (ref 7–52)
ANION GAP SERPL CALCULATED.3IONS-SCNC: 13 MMOL/L (ref 4–13)
ANISOCYTOSIS BLD QL SMEAR: PRESENT
AST SERPL W P-5'-P-CCNC: 20 U/L (ref 13–39)
BACTERIA UR QL AUTO: ABNORMAL /HPF
BASOPHILS # BLD MANUAL: 0 THOUSAND/UL (ref 0–0.1)
BASOPHILS NFR MAR MANUAL: 0 % (ref 0–1)
BILIRUB SERPL-MCNC: 0.47 MG/DL (ref 0.2–1)
BILIRUB UR QL STRIP: ABNORMAL
BUN SERPL-MCNC: 15 MG/DL (ref 5–25)
CALCIUM SERPL-MCNC: 8.8 MG/DL (ref 8.4–10.2)
CAOX CRY URNS QL MICRO: ABNORMAL /HPF
CHLORIDE SERPL-SCNC: 101 MMOL/L (ref 96–108)
CLARITY UR: ABNORMAL
CO2 SERPL-SCNC: 26 MMOL/L (ref 21–32)
COLOR UR: YELLOW
CREAT SERPL-MCNC: 1.08 MG/DL (ref 0.6–1.3)
EOSINOPHIL # BLD MANUAL: 0.22 THOUSAND/UL (ref 0–0.4)
EOSINOPHIL NFR BLD MANUAL: 3 % (ref 0–6)
ERYTHROCYTE [DISTWIDTH] IN BLOOD BY AUTOMATED COUNT: 14.6 % (ref 11.6–15.1)
GFR SERPL CREATININE-BSD FRML MDRD: 48 ML/MIN/1.73SQ M
GLUCOSE SERPL-MCNC: 131 MG/DL (ref 65–140)
GLUCOSE UR STRIP-MCNC: NEGATIVE MG/DL
HCT VFR BLD AUTO: 31.9 % (ref 34.8–46.1)
HGB BLD-MCNC: 10.6 G/DL (ref 11.5–15.4)
HGB UR QL STRIP.AUTO: ABNORMAL
KETONES UR STRIP-MCNC: ABNORMAL MG/DL
LEUKOCYTE ESTERASE UR QL STRIP: ABNORMAL
LYMPHOCYTES # BLD AUTO: 1.08 THOUSAND/UL (ref 0.6–4.47)
LYMPHOCYTES # BLD AUTO: 15 % (ref 14–44)
MACROCYTES BLD QL AUTO: PRESENT
MCH RBC QN AUTO: 38.5 PG (ref 26.8–34.3)
MCHC RBC AUTO-ENTMCNC: 33.2 G/DL (ref 31.4–37.4)
MCV RBC AUTO: 116 FL (ref 82–98)
MONOCYTES # BLD AUTO: 0.5 THOUSAND/UL (ref 0–1.22)
MONOCYTES NFR BLD: 7 % (ref 4–12)
NEUTROPHILS # BLD MANUAL: 5.39 THOUSAND/UL (ref 1.85–7.62)
NEUTS BAND NFR BLD MANUAL: 2 % (ref 0–8)
NEUTS SEG NFR BLD AUTO: 73 % (ref 43–75)
NITRITE UR QL STRIP: NEGATIVE
NON-SQ EPI CELLS URNS QL MICRO: ABNORMAL /HPF
PH UR STRIP.AUTO: 6 [PH]
PLATELET # BLD AUTO: 272 THOUSANDS/UL (ref 149–390)
PLATELET BLD QL SMEAR: ADEQUATE
PMV BLD AUTO: 12.3 FL (ref 8.9–12.7)
POLYCHROMASIA BLD QL SMEAR: PRESENT
POTASSIUM SERPL-SCNC: 2.8 MMOL/L (ref 3.5–5.3)
PROT SERPL-MCNC: 6.6 G/DL (ref 6.4–8.4)
PROT UR STRIP-MCNC: ABNORMAL MG/DL
RBC # BLD AUTO: 2.75 MILLION/UL (ref 3.81–5.12)
RBC #/AREA URNS AUTO: ABNORMAL /HPF
RBC MORPH BLD: PRESENT
SODIUM SERPL-SCNC: 140 MMOL/L (ref 135–147)
SP GR UR STRIP.AUTO: 1.02 (ref 1–1.03)
UROBILINOGEN UR QL STRIP.AUTO: 1 E.U./DL
WBC # BLD AUTO: 7.19 THOUSAND/UL (ref 4.31–10.16)
WBC #/AREA URNS AUTO: ABNORMAL /HPF

## 2025-05-22 PROCEDURE — 81001 URINALYSIS AUTO W/SCOPE: CPT | Performed by: EMERGENCY MEDICINE

## 2025-05-22 PROCEDURE — 99283 EMERGENCY DEPT VISIT LOW MDM: CPT

## 2025-05-22 PROCEDURE — 87077 CULTURE AEROBIC IDENTIFY: CPT | Performed by: EMERGENCY MEDICINE

## 2025-05-22 PROCEDURE — 85027 COMPLETE CBC AUTOMATED: CPT | Performed by: EMERGENCY MEDICINE

## 2025-05-22 PROCEDURE — 87086 URINE CULTURE/COLONY COUNT: CPT | Performed by: EMERGENCY MEDICINE

## 2025-05-22 PROCEDURE — 74176 CT ABD & PELVIS W/O CONTRAST: CPT

## 2025-05-22 PROCEDURE — 36415 COLL VENOUS BLD VENIPUNCTURE: CPT | Performed by: EMERGENCY MEDICINE

## 2025-05-22 PROCEDURE — 85007 BL SMEAR W/DIFF WBC COUNT: CPT | Performed by: EMERGENCY MEDICINE

## 2025-05-22 PROCEDURE — 80053 COMPREHEN METABOLIC PANEL: CPT | Performed by: EMERGENCY MEDICINE

## 2025-05-22 PROCEDURE — 87186 SC STD MICRODIL/AGAR DIL: CPT | Performed by: EMERGENCY MEDICINE

## 2025-05-22 PROCEDURE — 99284 EMERGENCY DEPT VISIT MOD MDM: CPT | Performed by: EMERGENCY MEDICINE

## 2025-05-23 VITALS
SYSTOLIC BLOOD PRESSURE: 139 MMHG | TEMPERATURE: 97.7 F | DIASTOLIC BLOOD PRESSURE: 62 MMHG | RESPIRATION RATE: 20 BRPM | HEART RATE: 81 BPM | OXYGEN SATURATION: 92 %

## 2025-05-23 DIAGNOSIS — F02.80 LATE ONSET ALZHEIMER'S DISEASE WITHOUT BEHAVIORAL DISTURBANCE (HCC): ICD-10-CM

## 2025-05-23 DIAGNOSIS — G30.1 LATE ONSET ALZHEIMER'S DISEASE WITHOUT BEHAVIORAL DISTURBANCE (HCC): ICD-10-CM

## 2025-05-23 RX ORDER — RIVASTIGMINE 9.5 MG/24H
1 PATCH, EXTENDED RELEASE TRANSDERMAL DAILY
Qty: 90 PATCH | Refills: 1 | Status: SHIPPED | OUTPATIENT
Start: 2025-05-23

## 2025-05-23 RX ORDER — CEFDINIR 300 MG/1
300 CAPSULE ORAL EVERY 12 HOURS SCHEDULED
Qty: 20 CAPSULE | Refills: 0 | Status: SHIPPED | OUTPATIENT
Start: 2025-05-23 | End: 2025-06-02

## 2025-05-23 RX ORDER — CEFDINIR 300 MG/1
300 CAPSULE ORAL ONCE
Status: COMPLETED | OUTPATIENT
Start: 2025-05-23 | End: 2025-05-23

## 2025-05-23 RX ADMIN — CEFDINIR 300 MG: 300 CAPSULE ORAL at 00:18

## 2025-05-23 NOTE — ED PROVIDER NOTES
Time reflects when diagnosis was documented in both MDM as applicable and the Disposition within this note       Time User Action Codes Description Comment    5/23/2025 12:06 AM Lexi Villarreal Add [N39.0] UTI (urinary tract infection)     5/23/2025 12:08 AM Lexi Villarreal Add [N12] Pyelonephritis           ED Disposition       ED Disposition   Discharge    Condition   Stable    Date/Time   Fri May 23, 2025 12:06 AM    Comment   Paula Lubin discharge to home/self care.                   Assessment & Plan       Medical Decision Making  Is a very pleasant 79-year-old female who presents to the emergency department with persistent UTI symptoms.  She states that she completed a course of treatment after being evaluated in the emergency department approximately 10 days ago.  Symptoms never completely resolved.  She grew multiple strains of pansensitive E. coli.  Due to presence of crystals and recurrent symptoms concern for superimposed infection and possible stone.  CT scan ordered to evaluate further for possible ureterolithiasis, renal pathology that would require hospitalization or intervention.    Amount and/or Complexity of Data Reviewed  Labs: ordered. Decision-making details documented in ED Course.  Radiology: ordered.    Risk  Prescription drug management.        ED Course as of 05/23/25 0018   u May 22, 2025   2241 UA (URINE) with reflex to Scope(!)   2241 Urine Microscopic(!)       Medications   cefdinir (OMNICEF) capsule 300 mg (has no administration in time range)       ED Risk Strat Scores                    No data recorded        SBIRT 22yo+      Flowsheet Row Most Recent Value   Initial Alcohol Screen: US AUDIT-C     1. How often do you have a drink containing alcohol? 0 Filed at: 05/22/2025 2056   2. How many drinks containing alcohol do you have on a typical day you are drinking?  0 Filed at: 05/22/2025 2056   3b. FEMALE Any Age, or MALE 65+: How often do you have 4 or more drinks on  one occassion? 0 Filed at: 05/22/2025 2056   Audit-C Score 0 Filed at: 05/22/2025 2056   RENZO: How many times in the past year have you...    Used an illegal drug or used a prescription medication for non-medical reasons? Never Filed at: 05/22/2025 2056                            History of Present Illness       Chief Complaint   Patient presents with    Possible UTI     Dysuria, urinary frequency x2 weeks back pain xfew days. Was seen and diagnosed w/ UTI x2 weeks ago. Finished treatment. Symptoms still present. Denies hematuria.       Past Medical History[1]   Past Surgical History[2]   Family History[3]   Social History[4]   E-Cigarette/Vaping    E-Cigarette Use Never User       E-Cigarette/Vaping Substances    Nicotine No     THC No     CBD No     Flavoring No     Other No     Unknown No       I have reviewed and agree with the history as documented.     Patient is an 79-year-old female who presents with persistent dysuria and discomfort despite being treated for UTI.  She denies unusual vaginal bleeding, discharge, redness, erythema.          Review of Systems   Gastrointestinal:  Positive for abdominal pain.   Genitourinary:  Positive for dysuria and pelvic pain. Negative for vaginal bleeding and vaginal discharge.           Objective       ED Triage Vitals [05/22/25 2052]   Temperature Pulse Blood Pressure Respirations SpO2 Patient Position - Orthostatic VS   97.7 °F (36.5 °C) 94 115/60 18 93 % Sitting      Temp Source Heart Rate Source BP Location FiO2 (%) Pain Score    Oral Monitor Left arm -- 7      Vitals      Date and Time Temp Pulse SpO2 Resp BP Pain Score FACES Pain Rating User   05/22/25 2052 97.7 °F (36.5 °C) 94 93 % 18 115/60 7 -- SG            Physical Exam  Vitals and nursing note reviewed.   Constitutional:       General: She is not in acute distress.     Appearance: Normal appearance.   HENT:      Head: Normocephalic and atraumatic.      Right Ear: External ear normal.      Left Ear: External  ear normal.      Nose: Nose normal.     Cardiovascular:      Rate and Rhythm: Normal rate and regular rhythm.   Pulmonary:      Effort: Pulmonary effort is normal.      Breath sounds: Normal breath sounds.   Abdominal:      General: There is no distension.      Palpations: Abdomen is soft.      Tenderness: There is no abdominal tenderness.     Musculoskeletal:      Right lower leg: No edema.      Left lower leg: No edema.     Skin:     General: Skin is warm and dry.     Neurological:      General: No focal deficit present.      Mental Status: She is alert and oriented to person, place, and time. Mental status is at baseline.     Psychiatric:         Behavior: Behavior normal.         Results Reviewed       Procedure Component Value Units Date/Time    Manual Differential(PHLEBS Do Not Order) [[id number]] Collected: 05/22/25 2143    Lab Status: Final result Specimen: Blood from Arm, Left Updated: 05/22/25 2304     Segmented % 73 %      Bands % 2 %      Lymphocytes % 15 %      Monocytes % 7 %      Eosinophils % 3 %      Basophils % 0 %      Absolute Neutrophils 5.39 Thousand/uL      Absolute Lymphocytes 1.08 Thousand/uL      Absolute Monocytes 0.50 Thousand/uL      Absolute Eosinophils 0.22 Thousand/uL      Absolute Basophils 0.00 Thousand/uL      Total Counted --     RBC Morphology Present     Platelet Estimate Adequate     Anisocytosis Present     Macrocytes Present     Polychromasia Present    Comprehensive metabolic panel [[id number]]  (Abnormal) Collected: 05/22/25 2143    Lab Status: Final result Specimen: Blood from Arm, Left Updated: 05/22/25 2225     Sodium 140 mmol/L      Potassium 2.8 mmol/L      Chloride 101 mmol/L      CO2 26 mmol/L      ANION GAP 13 mmol/L      BUN 15 mg/dL      Creatinine 1.08 mg/dL      Glucose 131 mg/dL      Calcium 8.8 mg/dL      AST 20 U/L      ALT 8 U/L      Alkaline Phosphatase 81 U/L      Total Protein 6.6 g/dL      Albumin 3.8 g/dL      Total Bilirubin 0.47 mg/dL      eGFR 48  ml/min/1.73sq m     Narrative:      National Kid[employer] Foundation guidelines for Chronic Kidney Disease (CKD):     Stage 1 with normal or high GFR (GFR > 90 mL/min/1.73 square meters)    Stage 2 Mild CKD (GFR = 60-89 mL/min/1.73 square meters)    Stage 3A Moderate CKD (GFR = 45-59 mL/min/1.73 square meters)    Stage 3B Moderate CKD (GFR = 30-44 mL/min/1.73 square meters)    Stage 4 Severe CKD (GFR = 15-29 mL/min/1.73 square meters)    Stage 5 End Stage CKD (GFR <15 mL/min/1.73 square meters)  Note: GFR calculation is accurate only with a steady state creatinine    Urine Microscopic [[id number]]  (Abnormal) Collected: 05/22/25 2143    Lab Status: Final result Specimen: Urine, Clean Catch Updated: 05/22/25 2220     RBC, UA 0-1 /hpf      WBC, UA Innumerable /hpf      Epithelial Cells Occasional /hpf      Bacteria, UA Occasional /hpf      Ca Oxalate Shirley, UA Occasional /hpf     Narrative:      Microscopic done on low volume urine    CBC and differential [[id number]]  (Abnormal) Collected: 05/22/25 2143    Lab Status: Final result Specimen: Blood from Arm, Left Updated: 05/22/25 2220     WBC 7.19 Thousand/uL      RBC 2.75 Million/uL      Hemoglobin 10.6 g/dL      Hematocrit 31.9 %       fL      MCH 38.5 pg      MCHC 33.2 g/dL      RDW 14.6 %      MPV 12.3 fL      Platelets 272 Thousands/uL     UA (URINE) with reflex to Scope [[id number]]  (Abnormal) Collected: 05/22/25 2143    Lab Status: Final result Specimen: Urine, Clean Catch Updated: 05/22/25 2200     Color, UA Yellow     Clarity, UA Slightly Cloudy     Specific Gravity, UA 1.025     pH, UA 6.0     Leukocytes, UA Moderate     Nitrite, UA Negative     Protein,  (2+) mg/dl      Glucose, UA Negative mg/dl      Ketones, UA Trace mg/dl      Bilirubin, UA Small     Occult Blood, UA Trace-Intact     Urobilinogen, UA 1.0 E.U./dl     Urine culture [[id number]] Collected: 05/22/25 2143    Lab Status: In process Specimen: Urine, Clean Catch Updated: 05/22/25 2156             CT abdomen pelvis wo contrast   Final Interpretation by Seun Clay MD (05/23 0002)      Findings above suspicious for cystitis although limited by underdistention. Correlation with urinalysis recommended.      Right-sided nonobstructive nephrolithiasis. No acute obstructive uropathy or hydronephrosis.      Stable large hiatal hernia.      Additional chronic/nonemergent findings as above.      Workstation performed: [id number]             Procedures    ED Medication and Procedure Management   Prior to Admission Medications   Prescriptions Last Dose Informant Patient Reported? Taking?   Mirabegron ER (Myrbetriq) 50 MG TB24  Self, Child No No   Sig: Take 1 tablet (50 mg total) by mouth daily   PARoxetine (PAXIL) 20 mg tablet  Self, Child No No   Sig: Take 1 tablet (20 mg total) by mouth daily   amLODIPine (NORVASC) 2.5 mg tablet  Self, Child No No   Sig: Take 2 tablets (5 mg total) by mouth daily   anastrozole (ARIMIDEX) 1 mg tablet   No No   Sig: Take 1 tablet (1 mg total) by mouth daily   aspirin 325 mg tablet  Self, Child Yes No   Sig: Take 325 mg by mouth daily    cholecalciferol (VITAMIN D3) 1,000 units tablet  Self, Child No No   Sig: Take 1 tablet (1,000 Units total) by mouth daily   cholestyramine (QUESTRAN) 4 g packet  Self, Child No No   Sig: TAKE 1 PACKET BY MOUTH DAILY.   dicyclomine (BENTYL) 20 mg tablet  Self, Child No No   Sig: Take 1 tablet (20 mg total) by mouth 2 (two) times a day   fluticasone (FLONASE) 50 mcg/act nasal spray  Self, Child No No   Sig: INSTILL 1 SPRAY INTO EACH NOSTRIL AS NEEDED FOR RHINITIS OR ALLERGIES   furosemide (LASIX) 40 mg tablet  Self, Child No No   Sig: Take 1 tablet (40 mg total) by mouth daily   levothyroxine 125 mcg tablet  Self, Child No No   Sig: Take 1 tablet (125 mcg total) by mouth daily   loperamide (IMODIUM) 2 mg capsule  Self, Child No No   Sig: Take 1 capsule (2 mg total) by mouth 4 (four) times a day as needed for diarrhea   meclizine (ANTIVERT) 25  mg tablet  Self, Child No No   Sig: Take 1 tablet (25 mg total) by mouth every 8 (eight) hours as needed for dizziness   memantine (NAMENDA) 10 mg tablet  Self, Child No No   Sig: Take 1 tablet (10 mg total) by mouth 2 (two) times a day   multivitamin (THERAGRAN) TABS  Self, Child Yes No   Sig: Take 1 tablet by mouth   omeprazole (PriLOSEC) 20 mg delayed release capsule  Self, Child No No   Sig: Take 1 capsule (20 mg total) by mouth 2 (two) times a day   ondansetron (ZOFRAN-ODT) 4 mg disintegrating tablet  Self, Child No No   Sig: Take 1 tablet (4 mg total) by mouth every 8 (eight) hours as needed for nausea   phenazopyridine (PYRIDIUM) 200 mg tablet   No No   Sig: Take 1 tablet (200 mg total) by mouth 3 (three) times a day   potassium chloride (Klor-Con M10) 10 mEq tablet   No No   Sig: Take 1 tablet (10 mEq total) by mouth daily for 10 days   rivastigmine (EXELON) 9.5 mg/24 hr TD 24 hr patch  Self, Child No No   Sig: Place 1 patch on the skin over 24 hours daily      Facility-Administered Medications: None     Patient's Medications   Discharge Prescriptions    CEFDINIR (OMNICEF) 300 MG CAPSULE    Take 1 capsule (300 mg total) by mouth every 12 (twelve) hours for 10 days       Start Date: 5/23/2025 End Date: 6/2/2025       Order Dose: 300 mg       Quantity: 20 capsule    Refills: 0       ED SEPSIS DOCUMENTATION   Time reflects when diagnosis was documented in both MDM as applicable and the Disposition within this note       Time User Action Codes Description Comment    5/23/2025 12:06 AM Lexi Villarreal Add [N39.0] UTI (urinary tract infection)     5/23/2025 12:08 AM Lexi Villarreal [N12] Pyelonephritis                    [1]   Past Medical History:  Diagnosis Date    Anxiety disorder     Aortic valve disorder     BRCA1 negative     BRCA2 negative     Breast cancer (HCC) 03/2022    right    Cancer (HCC)     breast    Cellulitis of finger of left hand 11/20/2018    Cellulitis of left hand 11/10/2018     Added automatically from request for surgery [id number]    Depression     Disease of thyroid gland     GERD (gastroesophageal reflux disease)     History of gastroesophageal reflux (GERD)     History of kidney cancer 03/06/2014    History of transfusion     Hyperlipidemia 03/06/2014    Hypertension 03/06/2014    Hypothyroidism 03/06/2014    Lymphoma (HCC)     Malignant neoplasm of overlapping sites of right breast in female, estrogen receptor positive (HCC) 03/10/2022    Non-intractable vomiting with nausea 03/05/2020    Shortness of breath     Stroke (HCC) [id number]    Min stroke found wile going through chemo and radiation    TIA (transient ischemic attack)     UTI (urinary tract infection)    [2]   Past Surgical History:  Procedure Laterality Date    BREAST LUMPECTOMY Right 3/29/2022    Procedure: ITZEL  DIRECTED LUMPECTOMY;  Surgeon: Denton Chambers MD;  Location: [facility];  Service: Surgical Oncology    ESOPHAGOGASTRIC FUNDOPLASTY      Nissen Fundoplication    HERNIA REPAIR      LYMPH NODE BIOPSY Right 3/29/2022    Procedure: LYMPHATIC MAPPING WITH BLUE AND RADIOACTIVE DYES SENTINEL LYMPH NODE BIOPSY, INJECTION IN OR AT 0800 BY DR CHAMBERS;  Surgeon: Denton Chambesr MD;  Location: [facility];  Service: Surgical Oncology    MAMMO STEREOTACTIC BREAST BIOPSY RIGHT (ALL INC) Right 3/7/2022    US BREAST CLIP NEEDLE LOC RIGHT Right 3/24/2022    US GUIDED BREAST BIOPSY RIGHT COMPLETE Right 3/7/2022    WRIST SURGERY Left    [3]   Family History  Problem Relation Name Age of Onset    Stroke Father      Leukemia Sister      No Known Problems Brother      Skin cancer Mother      Stroke Maternal Grandmother      No Known Problems Maternal Grandfather      No Known Problems Paternal Grandmother      No Known Problems Paternal Grandfather      Breast cancer Sister Ivania Nevil     Breast cancer Sister Manjula Melendez         Colon cancer    Kidney cancer Brother      Alcohol abuse Brother      Kidney  cancer Brother      Stomach cancer Brother      No Known Problems Daughter      No Known Problems Daughter     [4]   Social History  Tobacco Use    Smoking status: Never    Smokeless tobacco: Never    Tobacco comments:     None   Vaping Use    Vaping status: Never Used   Substance Use Topics    Alcohol use: Yes     Comment: Social holidays    Drug use: Never        Lexi Villarreal MD  05/23/25 0018

## 2025-05-24 ENCOUNTER — RESULTS FOLLOW-UP (OUTPATIENT)
Dept: EMERGENCY DEPT | Facility: HOSPITAL | Age: 80
End: 2025-05-24

## 2025-05-24 DIAGNOSIS — K21.9 GASTROESOPHAGEAL REFLUX DISEASE WITHOUT ESOPHAGITIS: ICD-10-CM

## 2025-05-24 DIAGNOSIS — K44.9 HIATAL HERNIA: ICD-10-CM

## 2025-05-25 LAB — BACTERIA UR CULT: ABNORMAL

## 2025-05-25 RX ORDER — OMEPRAZOLE 20 MG/1
20 CAPSULE, DELAYED RELEASE ORAL 2 TIMES DAILY
Qty: 180 CAPSULE | Refills: 1 | Status: SHIPPED | OUTPATIENT
Start: 2025-05-25

## 2025-05-27 ENCOUNTER — TELEPHONE (OUTPATIENT)
Age: 80
End: 2025-05-27

## 2025-05-27 NOTE — TELEPHONE ENCOUNTER
New Patient   Insurance   Current Insurance? [facility]  Insurance E-verified? Yes    History   Reason for appointment/active symptoms?  Recurring UTI     Has the patient had any previous Urologist(s)? [facility] 2021    Was the patient seen in the ED? 5/22/25    Labs/Imaging(Including Out Of Network)?  Urine testing & CT abdomen pelvis 5/2025    Records Requested?  Records Visible in EPIC? Yes    Personal history of cancer? Yes    Appointment   Office location preference: [facility]    Appointment Details   Date: 6/6/25  Time: 9:40am  Location: [facility]  Provider: Keisha Rudolph  Does the appointment need further review? No

## 2025-05-28 ENCOUNTER — OFFICE VISIT (OUTPATIENT)
Dept: INTERNAL MEDICINE CLINIC | Facility: CLINIC | Age: 80
End: 2025-05-28
Payer: COMMERCIAL

## 2025-05-28 VITALS
BODY MASS INDEX: 31.65 KG/M2 | DIASTOLIC BLOOD PRESSURE: 68 MMHG | HEART RATE: 99 BPM | HEIGHT: 62 IN | WEIGHT: 172 LBS | OXYGEN SATURATION: 93 % | SYSTOLIC BLOOD PRESSURE: 124 MMHG | RESPIRATION RATE: 20 BRPM

## 2025-05-28 DIAGNOSIS — R25.1 TREMOR: ICD-10-CM

## 2025-05-28 DIAGNOSIS — N39.0 RECURRENT UTI: Primary | ICD-10-CM

## 2025-05-28 PROCEDURE — 99214 OFFICE O/P EST MOD 30 MIN: CPT | Performed by: INTERNAL MEDICINE

## 2025-05-28 PROCEDURE — G2211 COMPLEX E/M VISIT ADD ON: HCPCS | Performed by: INTERNAL MEDICINE

## 2025-05-28 NOTE — PROGRESS NOTES
"Name: Paula Lubin      : 1945      MRN: [id number]  Encounter Provider: Jose Enrique Elliott MD  Encounter Date: 2025   Encounter department: [facility] INTERNAL MEDICINE [place]  :  Assessment & Plan  Recurrent UTI  Follow-up with urology as planned.  Finish out the antibiotics.  If she has any recurrence of symptoms, she will do repeat urine testing.  Orders:  •  Urinalysis with microscopic; Future  •  Urine culture; Future    Tremor  Her daughter will observe after she finishes the antibiotics.  If persists, would refer to neurology.              History of Present Illness   Patient comes in today for ER follow-up with her daughter.  She had another UTI and the antibiotic was adjusted.  She seems to be tolerating this 1 better and her symptoms have pretty much gone away.  But since she keeps getting these infections, she was referred to urology and has an appointment with them next week.  Presently she is asymptomatic.  Her daughter also brought up that she has noticed tremors in her mom.  Seems to have worsened since the antibiotics.      Review of Systems   Constitutional:  Negative for fever.   Genitourinary:  Negative for dysuria.       Objective   /68 (BP Location: Left arm, Patient Position: Sitting, Cuff Size: Adult)   Pulse 99   Resp 20   Ht 5' 2\" (1.575 m)   Wt 78 kg (172 lb)   SpO2 93%   BMI 31.46 kg/m²      Physical Exam  Vitals and nursing note reviewed.   Constitutional:       Appearance: Normal appearance. She is not ill-appearing.     Neurological:      Mental Status: She is alert.         "

## 2025-06-05 ENCOUNTER — OFFICE VISIT (OUTPATIENT)
Dept: INTERNAL MEDICINE CLINIC | Facility: CLINIC | Age: 80
End: 2025-06-05
Payer: COMMERCIAL

## 2025-06-05 VITALS
OXYGEN SATURATION: 91 % | HEIGHT: 62 IN | RESPIRATION RATE: 20 BRPM | BODY MASS INDEX: 31.69 KG/M2 | DIASTOLIC BLOOD PRESSURE: 70 MMHG | TEMPERATURE: 99.7 F | HEART RATE: 105 BPM | WEIGHT: 172.2 LBS | SYSTOLIC BLOOD PRESSURE: 132 MMHG

## 2025-06-05 DIAGNOSIS — J20.9 ACUTE BRONCHITIS, UNSPECIFIED ORGANISM: Primary | ICD-10-CM

## 2025-06-05 DIAGNOSIS — Z99.89 DEPENDENCE ON CANE: ICD-10-CM

## 2025-06-05 PROCEDURE — G2211 COMPLEX E/M VISIT ADD ON: HCPCS | Performed by: INTERNAL MEDICINE

## 2025-06-05 PROCEDURE — 99213 OFFICE O/P EST LOW 20 MIN: CPT | Performed by: INTERNAL MEDICINE

## 2025-06-05 RX ORDER — CEFUROXIME AXETIL 250 MG/1
250 TABLET ORAL EVERY 12 HOURS SCHEDULED
Qty: 14 TABLET | Refills: 0 | Status: SHIPPED | OUTPATIENT
Start: 2025-06-05 | End: 2025-06-12

## 2025-06-05 NOTE — PROGRESS NOTES
"Name: Paula Lubin      : 1945      MRN: [id number]  Encounter Provider: Jose Enrique Elliott MD  Encounter Date: 2025   Encounter department: [facility] INTERNAL MEDICINE [place]  :  Assessment & Plan  Acute bronchitis, unspecified organism  Of cough.  Recommended Mucinex, Robitussin over-the-counter along with the Coricidin.  Orders:  •  cefuroxime (CEFTIN) 250 mg tablet; Take 1 tablet (250 mg total) by mouth every 12 (twelve) hours for 7 days    Dependence on cane                History of Present Illness   Patient comes in today complaining of productive cough for a few days.  Came on all of a sudden.  No fever.  Tried a few doses of Coricidin.  Not improving.  Cough worsening.  Achy.  No one else is sick at home.  Not around any young relatives.      Review of Systems   Constitutional:  Negative for fever.   HENT:  Positive for congestion.    Respiratory:  Positive for cough.        Objective   /70 (BP Location: Left arm, Patient Position: Sitting, Cuff Size: Adult)   Pulse 105   Temp 99.7 °F (37.6 °C) (Tympanic)   Resp 20   Ht 5' 2\" (1.575 m)   Wt 78.1 kg (172 lb 3.2 oz)   SpO2 91%   BMI 31.50 kg/m²      Physical Exam  Vitals and nursing note reviewed.   Constitutional:       Appearance: Normal appearance.   Pulmonary:      Breath sounds: No wheezing, rhonchi or rales.      Comments: Coarse breath sounds, moist cough    Neurological:      Mental Status: She is alert.         "

## 2025-06-22 DIAGNOSIS — F32.A DEPRESSION, UNSPECIFIED DEPRESSION TYPE: ICD-10-CM

## 2025-06-22 DIAGNOSIS — E03.9 HYPOTHYROIDISM, UNSPECIFIED TYPE: ICD-10-CM

## 2025-06-22 DIAGNOSIS — R06.02 SHORTNESS OF BREATH: ICD-10-CM

## 2025-06-22 DIAGNOSIS — F02.80 LATE ONSET ALZHEIMER'S DISEASE WITHOUT BEHAVIORAL DISTURBANCE (HCC): ICD-10-CM

## 2025-06-22 DIAGNOSIS — R06.09 DYSPNEA ON EXERTION: ICD-10-CM

## 2025-06-22 DIAGNOSIS — G30.1 LATE ONSET ALZHEIMER'S DISEASE WITHOUT BEHAVIORAL DISTURBANCE (HCC): ICD-10-CM

## 2025-06-23 RX ORDER — PAROXETINE 20 MG/1
20 TABLET, FILM COATED ORAL DAILY
Qty: 90 TABLET | Refills: 1 | Status: SHIPPED | OUTPATIENT
Start: 2025-06-23

## 2025-06-23 RX ORDER — MEMANTINE HYDROCHLORIDE 10 MG/1
10 TABLET ORAL 2 TIMES DAILY
Qty: 180 TABLET | Refills: 1 | Status: SHIPPED | OUTPATIENT
Start: 2025-06-23

## 2025-06-23 RX ORDER — FUROSEMIDE 40 MG/1
40 TABLET ORAL DAILY
Qty: 90 TABLET | Refills: 1 | Status: SHIPPED | OUTPATIENT
Start: 2025-06-23

## 2025-06-23 RX ORDER — LEVOTHYROXINE SODIUM 125 UG/1
125 TABLET ORAL DAILY
Qty: 90 TABLET | Refills: 1 | Status: SHIPPED | OUTPATIENT
Start: 2025-06-23

## 2025-06-24 DIAGNOSIS — I10 ESSENTIAL HYPERTENSION: ICD-10-CM

## 2025-06-25 RX ORDER — AMLODIPINE BESYLATE 2.5 MG/1
5 TABLET ORAL DAILY
Qty: 180 TABLET | Refills: 1 | Status: SHIPPED | OUTPATIENT
Start: 2025-06-25

## 2025-07-18 ENCOUNTER — APPOINTMENT (EMERGENCY)
Dept: RADIOLOGY | Facility: HOSPITAL | Age: 80
End: 2025-07-18
Payer: COMMERCIAL

## 2025-07-18 ENCOUNTER — HOSPITAL ENCOUNTER (EMERGENCY)
Facility: HOSPITAL | Age: 80
Discharge: HOME | End: 2025-07-18
Attending: EMERGENCY MEDICINE
Payer: COMMERCIAL

## 2025-07-18 VITALS
HEART RATE: 79 BPM | RESPIRATION RATE: 20 BRPM | DIASTOLIC BLOOD PRESSURE: 57 MMHG | SYSTOLIC BLOOD PRESSURE: 118 MMHG | TEMPERATURE: 98.2 F | OXYGEN SATURATION: 92 %

## 2025-07-18 DIAGNOSIS — R06.00 DYSPNEA: Primary | ICD-10-CM

## 2025-07-18 DIAGNOSIS — E87.6 HYPOKALEMIA: ICD-10-CM

## 2025-07-18 LAB
ALBUMIN SERPL BCG-MCNC: 4 G/DL (ref 3.5–5)
ALP SERPL-CCNC: 85 U/L (ref 34–104)
ALT SERPL W P-5'-P-CCNC: 11 U/L (ref 7–52)
ANION GAP SERPL CALCULATED.3IONS-SCNC: 13 MMOL/L (ref 4–13)
ANISOCYTOSIS BLD QL SMEAR: PRESENT
AST SERPL W P-5'-P-CCNC: 20 U/L (ref 13–39)
BASOPHILS # BLD MANUAL: 0.14 THOUSAND/UL (ref 0–0.1)
BASOPHILS NFR MAR MANUAL: 2 % (ref 0–1)
BILIRUB DIRECT SERPL-MCNC: 0.04 MG/DL (ref 0–0.2)
BILIRUB SERPL-MCNC: 0.4 MG/DL (ref 0.2–1)
BNP SERPL-MCNC: 39 PG/ML (ref 0–100)
BUN SERPL-MCNC: 16 MG/DL (ref 5–25)
CALCIUM SERPL-MCNC: 9.5 MG/DL (ref 8.4–10.2)
CARDIAC TROPONIN I PNL SERPL HS: 4 NG/L (ref ?–50)
CHLORIDE SERPL-SCNC: 99 MMOL/L (ref 96–108)
CO2 SERPL-SCNC: 29 MMOL/L (ref 21–32)
CREAT SERPL-MCNC: 1 MG/DL (ref 0.6–1.3)
EOSINOPHIL # BLD MANUAL: 0.14 THOUSAND/UL (ref 0–0.4)
EOSINOPHIL NFR BLD MANUAL: 2 % (ref 0–6)
ERYTHROCYTE [DISTWIDTH] IN BLOOD BY AUTOMATED COUNT: 15 % (ref 11.6–15.1)
GFR SERPL CREATININE-BSD FRML MDRD: 53 ML/MIN/1.73SQ M
GLUCOSE SERPL-MCNC: 109 MG/DL (ref 65–140)
HCT VFR BLD AUTO: 33.4 % (ref 34.8–46.1)
HGB BLD-MCNC: 11.3 G/DL (ref 11.5–15.4)
LYMPHOCYTES # BLD AUTO: 0.84 THOUSAND/UL (ref 0.6–4.47)
LYMPHOCYTES # BLD AUTO: 12 % (ref 14–44)
MACROCYTES BLD QL AUTO: PRESENT
MCH RBC QN AUTO: 39.1 PG (ref 26.8–34.3)
MCHC RBC AUTO-ENTMCNC: 33.8 G/DL (ref 31.4–37.4)
MCV RBC AUTO: 116 FL (ref 82–98)
MONOCYTES # BLD AUTO: 0.35 THOUSAND/UL (ref 0–1.22)
MONOCYTES NFR BLD: 5 % (ref 4–12)
NEUTROPHILS # BLD MANUAL: 5.52 THOUSAND/UL (ref 1.85–7.62)
NEUTS BAND NFR BLD MANUAL: 2 % (ref 0–8)
NEUTS SEG NFR BLD AUTO: 77 % (ref 43–75)
PLATELET # BLD AUTO: 286 THOUSANDS/UL (ref 149–390)
PLATELET BLD QL SMEAR: ADEQUATE
PMV BLD AUTO: 12.3 FL (ref 8.9–12.7)
POLYCHROMASIA BLD QL SMEAR: PRESENT
POTASSIUM SERPL-SCNC: 2.9 MMOL/L (ref 3.5–5.3)
PROT SERPL-MCNC: 7.5 G/DL (ref 6.4–8.4)
RBC # BLD AUTO: 2.89 MILLION/UL (ref 3.81–5.12)
RBC MORPH BLD: PRESENT
SODIUM SERPL-SCNC: 141 MMOL/L (ref 135–147)
WBC # BLD AUTO: 6.99 THOUSAND/UL (ref 4.31–10.16)

## 2025-07-18 PROCEDURE — 85027 COMPLETE CBC AUTOMATED: CPT | Performed by: EMERGENCY MEDICINE

## 2025-07-18 PROCEDURE — 85007 BL SMEAR W/DIFF WBC COUNT: CPT | Performed by: EMERGENCY MEDICINE

## 2025-07-18 PROCEDURE — 99285 EMERGENCY DEPT VISIT HI MDM: CPT

## 2025-07-18 PROCEDURE — 83880 ASSAY OF NATRIURETIC PEPTIDE: CPT | Performed by: EMERGENCY MEDICINE

## 2025-07-18 PROCEDURE — 99284 EMERGENCY DEPT VISIT MOD MDM: CPT | Performed by: EMERGENCY MEDICINE

## 2025-07-18 PROCEDURE — 71045 X-RAY EXAM CHEST 1 VIEW: CPT

## 2025-07-18 PROCEDURE — 36415 COLL VENOUS BLD VENIPUNCTURE: CPT | Performed by: EMERGENCY MEDICINE

## 2025-07-18 PROCEDURE — 84484 ASSAY OF TROPONIN QUANT: CPT | Performed by: EMERGENCY MEDICINE

## 2025-07-18 PROCEDURE — 93005 ELECTROCARDIOGRAM TRACING: CPT

## 2025-07-18 PROCEDURE — 80048 BASIC METABOLIC PNL TOTAL CA: CPT | Performed by: EMERGENCY MEDICINE

## 2025-07-18 PROCEDURE — 80076 HEPATIC FUNCTION PANEL: CPT | Performed by: EMERGENCY MEDICINE

## 2025-07-18 RX ORDER — POTASSIUM CHLORIDE 750 MG/1
10 TABLET, EXTENDED RELEASE ORAL DAILY
Qty: 30 TABLET | Refills: 0 | Status: SHIPPED | OUTPATIENT
Start: 2025-07-18 | End: 2025-07-31

## 2025-07-19 LAB
ATRIAL RATE: 79 BPM
ATRIAL RATE: 84 BPM
P AXIS: 36 DEGREES
P AXIS: 39 DEGREES
PR INTERVAL: 148 MS
PR INTERVAL: 154 MS
QRS AXIS: 22 DEGREES
QRS AXIS: 27 DEGREES
QRSD INTERVAL: 76 MS
QRSD INTERVAL: 82 MS
QT INTERVAL: 382 MS
QT INTERVAL: 410 MS
QTC INTERVAL: 451 MS
QTC INTERVAL: 470 MS
T WAVE AXIS: 34 DEGREES
T WAVE AXIS: 42 DEGREES
VENTRICULAR RATE: 79 BPM
VENTRICULAR RATE: 84 BPM

## 2025-07-19 PROCEDURE — 93010 ELECTROCARDIOGRAM REPORT: CPT | Performed by: INTERNAL MEDICINE

## 2025-07-19 NOTE — ED PROVIDER NOTES
Time reflects when diagnosis was documented in both MDM as applicable and the Disposition within this note       Time User Action Codes Description Comment    7/18/2025 10:00 PM Ray Chavez Add [R06.00] Dyspnea     7/18/2025 10:00 PM Ray Chavez Add [E87.6] Hypokalemia           ED Disposition       ED Disposition   Discharge    Condition   Stable    Date/Time   Fri Jul 18, 2025 10:00 PM    Comment   Paula Lubin discharge to home/self care.                   Assessment & Plan       Medical Decision Making  Problems Addressed:  Dyspnea: complicated acute illness or injury     Details: Ddx includes CHF, pneumonia, pleural effusion, anemia, less likely PE.   Hypokalemia:     Details: Previously on potassium which had been discontinued, likely related to lasix use, will resume.   Aware of need for repeat labs to recheck K as well as abnormal CBC which I have d/w pt and daughter.     Amount and/or Complexity of Data Reviewed  Labs: ordered.  Radiology: ordered.    Risk  Prescription drug management.             Medications - No data to display    ED Risk Strat Scores                    No data recorded        SBIRT 20yo+      Flowsheet Row Most Recent Value   Initial Alcohol Screen: US AUDIT-C     1. How often do you have a drink containing alcohol? 0 Filed at: 07/18/2025 2040   2. How many drinks containing alcohol do you have on a typical day you are drinking?  0 Filed at: 07/18/2025 2040   3a. Male UNDER 65: How often do you have five or more drinks on one occasion? 0 Filed at: 07/18/2025 2040   3b. FEMALE Any Age, or MALE 65+: How often do you have 4 or more drinks on one occassion? 0 Filed at: 07/18/2025 2040   Audit-C Score 0 Filed at: 07/18/2025 2040   RENZO: How many times in the past year have you...    Used an illegal drug or used a prescription medication for non-medical reasons? Never Filed at: 07/18/2025 2040                            History of Present Illness       Chief Complaint   Patient presents  with    Shortness of Breath     Pt states feeling intermittent sob for about 1-2 days, denies any respiratory and cardiac hx        Past Medical History[1]   Past Surgical History[2]   Family History[3]   Social History[4]   E-Cigarette/Vaping    E-Cigarette Use Never User       E-Cigarette/Vaping Substances    Nicotine No     THC No     CBD No     Flavoring No     Other No     Unknown No       I have reviewed and agree with the history as documented.     80 yo female with exertional fatigue and dyspnea and b/l leg pain. Ongoing but worse in the last week. No cp, fever, cough, hemoptysis, syncope. Associated b/l leg swelling, h/o same, maybe worse this week. Additional history from daughter at bedside who reports pt has had similar sxs in past with prior negative workups.         Review of Systems   Respiratory:  Positive for shortness of breath.    Cardiovascular:  Positive for leg swelling.           Objective       ED Triage Vitals   Temperature Pulse Blood Pressure Respirations SpO2 Patient Position - Orthostatic VS   07/18/25 1857 07/18/25 1856 07/18/25 1856 07/18/25 1856 07/18/25 1856 07/18/25 1856   98.2 °F (36.8 °C) 95 112/75 18 95 % Sitting      Temp Source Heart Rate Source BP Location FiO2 (%) Pain Score    07/18/25 1857 07/18/25 1856 07/18/25 1856 -- --    Temporal Monitor Left arm        Vitals      Date and Time Temp Pulse SpO2 Resp BP Pain Score FACES Pain Rating User   07/18/25 2200 -- 79 92 % 20 118/57 -- -- [facility]   07/18/25 2100 -- 78 93 % 21 119/68 -- -- [facility]   07/18/25 2023 -- 76 95 % 22 124/77 -- -- [facility]   07/18/25 1857 98.2 °F (36.8 °C) -- -- -- -- -- -- TE   07/18/25 1856 -- 95 95 % 18 112/75 -- -- TE            Physical Exam  Vitals and nursing note reviewed.   Constitutional:       General: She is not in acute distress.     Appearance: She is well-developed. She is not ill-appearing, toxic-appearing or diaphoretic.   HENT:      Head: Normocephalic and atraumatic.      Mouth/Throat:      Mouth:  Mucous membranes are moist.      Pharynx: Oropharynx is clear.     Eyes:      Conjunctiva/sclera: Conjunctivae normal.      Pupils: Pupils are equal, round, and reactive to light.     Neck:      Vascular: No JVD.     Cardiovascular:      Rate and Rhythm: Normal rate and regular rhythm.      Pulses: Normal pulses.      Heart sounds: Normal heart sounds.   Pulmonary:      Effort: Pulmonary effort is normal. No respiratory distress.      Breath sounds: Normal breath sounds. No stridor.   Abdominal:      General: There is no distension.      Palpations: Abdomen is soft.      Tenderness: There is no abdominal tenderness. There is no guarding or rebound.     Musculoskeletal:         General: No tenderness or deformity. Normal range of motion.      Cervical back: Normal range of motion and neck supple. No rigidity.      Right lower leg: Edema present.      Left lower leg: Edema present.     Skin:     General: Skin is warm and dry.      Capillary Refill: Capillary refill takes less than 2 seconds.      Coloration: Skin is not jaundiced or pale.      Findings: No bruising, erythema or rash.     Neurological:      General: No focal deficit present.      Mental Status: She is alert and oriented to person, place, and time.      Cranial Nerves: No cranial nerve deficit.      Sensory: No sensory deficit.      Motor: No weakness or abnormal muscle tone.      Coordination: Coordination normal.      Gait: Gait normal.         Results Reviewed       Procedure Component Value Units Date/Time    RBC Morphology Reflex Test [[id number]] Collected: 07/18/25 2023    Lab Status: Final result Specimen: Blood from Arm, Left Updated: 07/18/25 2201    B-Type Natriuretic Peptide(BNP) [[id number]]  (Normal) Collected: 07/18/25 2023    Lab Status: Final result Specimen: Blood from Arm, Left Updated: 07/18/25 2159     BNP 39 pg/mL     Manual Differential(PHLEBS Do Not Order) [[id number]]  (Abnormal) Collected: 07/18/25 2023    Lab Status: Final result  Specimen: Blood from Arm, Left Updated: 07/18/25 2104     Segmented % 77 %      Bands % 2 %      Lymphocytes % 12 %      Monocytes % 5 %      Eosinophils % 2 %      Basophils % 2 %      Absolute Neutrophils 5.52 Thousand/uL      Absolute Lymphocytes 0.84 Thousand/uL      Absolute Monocytes 0.35 Thousand/uL      Absolute Eosinophils 0.14 Thousand/uL      Absolute Basophils 0.14 Thousand/uL      Total Counted --     RBC Morphology Present     Platelet Estimate Adequate     Anisocytosis Present     Macrocytes Present     Polychromasia Present    CBC and differential [[id number]]  (Abnormal) Collected: 07/18/25 2023    Lab Status: Final result Specimen: Blood from Arm, Left Updated: 07/18/25 2104     WBC 6.99 Thousand/uL      RBC 2.89 Million/uL      Hemoglobin 11.3 g/dL      Hematocrit 33.4 %       fL      MCH 39.1 pg      MCHC 33.8 g/dL      RDW 15.0 %      MPV 12.3 fL      Platelets 286 Thousands/uL     Narrative:      This is an appended report.  These results have been appended to a previously verified report.    HS Troponin 0hr (reflex protocol) [[id number]]  (Normal) Collected: 07/18/25 2023    Lab Status: Final result Specimen: Blood from Arm, Left Updated: 07/18/25 2053     hs TnI 0hr 4 ng/L     HS Troponin I 2hr [[id number]]     Lab Status: No result Specimen: Blood     Basic metabolic panel [[id number]]  (Abnormal) Collected: 07/18/25 2023    Lab Status: Final result Specimen: Blood from Arm, Left Updated: 07/18/25 2045     Sodium 141 mmol/L      Potassium 2.9 mmol/L      Chloride 99 mmol/L      CO2 29 mmol/L      ANION GAP 13 mmol/L      BUN 16 mg/dL      Creatinine 1.00 mg/dL      Glucose 109 mg/dL      Calcium 9.5 mg/dL      eGFR 53 ml/min/1.73sq m     Narrative:      National Kidney Disease Foundation guidelines for Chronic Kidney Disease (CKD):     Stage 1 with normal or high GFR (GFR > 90 mL/min/1.73 square meters)    Stage 2 Mild CKD (GFR = 60-89 mL/min/1.73 square meters)    Stage 3A Moderate CKD  (GFR = 45-59 mL/min/1.73 square meters)    Stage 3B Moderate CKD (GFR = 30-44 mL/min/1.73 square meters)    Stage 4 Severe CKD (GFR = 15-29 mL/min/1.73 square meters)    Stage 5 End Stage CKD (GFR <15 mL/min/1.73 square meters)  Note: GFR calculation is accurate only with a steady state creatinine    Hepatic function panel [[id number]]  (Normal) Collected: 07/18/25 2023    Lab Status: Final result Specimen: Blood from Arm, Left Updated: 07/18/25 2045     Total Bilirubin 0.40 mg/dL      Bilirubin, Direct 0.04 mg/dL      Alkaline Phosphatase 85 U/L      AST 20 U/L      ALT 11 U/L      Total Protein 7.5 g/dL      Albumin 4.0 g/dL             XR chest 1 view portable    (Results Pending)       Procedures    ED Medication and Procedure Management   Prior to Admission Medications   Prescriptions Last Dose Informant Patient Reported? Taking?   Mirabegron ER (Myrbetriq) 50 MG TB24  Self, Child No No   Sig: Take 1 tablet (50 mg total) by mouth daily   PARoxetine (PAXIL) 20 mg tablet   No No   Sig: TAKE 1 TABLET BY MOUTH EVERY DAY   amLODIPine (NORVASC) 2.5 mg tablet   No No   Sig: Take 2 tablets (5 mg total) by mouth daily   anastrozole (ARIMIDEX) 1 mg tablet   No No   Sig: Take 1 tablet (1 mg total) by mouth daily   aspirin 325 mg tablet  Self, Child Yes No   Sig: Take 325 mg by mouth in the morning.   cholecalciferol (VITAMIN D3) 1,000 units tablet  Self, Child No No   Sig: Take 1 tablet (1,000 Units total) by mouth daily   cholestyramine (QUESTRAN) 4 g packet  Self, Child No No   Sig: TAKE 1 PACKET BY MOUTH DAILY.   dicyclomine (BENTYL) 20 mg tablet  Self, Child No No   Sig: Take 1 tablet (20 mg total) by mouth 2 (two) times a day   fluticasone (FLONASE) 50 mcg/act nasal spray  Self, Child No No   Sig: INSTILL 1 SPRAY INTO EACH NOSTRIL AS NEEDED FOR RHINITIS OR ALLERGIES   furosemide (LASIX) 40 mg tablet   No No   Sig: TAKE 1 TABLET BY MOUTH EVERY DAY   levothyroxine 125 mcg tablet   No No   Sig: TAKE 1 TABLET BY MOUTH  EVERY DAY   loperamide (IMODIUM) 2 mg capsule  Self, Child No No   Sig: Take 1 capsule (2 mg total) by mouth 4 (four) times a day as needed for diarrhea   meclizine (ANTIVERT) 25 mg tablet  Self, Child No No   Sig: Take 1 tablet (25 mg total) by mouth every 8 (eight) hours as needed for dizziness   memantine (NAMENDA) 10 mg tablet   No No   Sig: TAKE 1 TABLET BY MOUTH TWICE A DAY   multivitamin (THERAGRAN) TABS  Self, Child Yes No   Sig: Take 1 tablet by mouth   omeprazole (PriLOSEC) 20 mg delayed release capsule   No No   Sig: TAKE 1 CAPSULE BY MOUTH TWICE A DAY   ondansetron (ZOFRAN-ODT) 4 mg disintegrating tablet  Self, Child No No   Sig: Take 1 tablet (4 mg total) by mouth every 8 (eight) hours as needed for nausea   phenazopyridine (PYRIDIUM) 200 mg tablet   No No   Sig: Take 1 tablet (200 mg total) by mouth 3 (three) times a day   potassium chloride (Klor-Con M10) 10 mEq tablet   No No   Sig: Take 1 tablet (10 mEq total) by mouth daily for 10 days   rivastigmine (EXELON) 9.5 mg/24 hr TD 24 hr patch   No No   Sig: PLACE 1 PATCH ON THE SKIN OVER 24 HOURS DAILY      Facility-Administered Medications: None     Patient's Medications   Discharge Prescriptions    POTASSIUM CHLORIDE (KLOR-CON M10) 10 MEQ TABLET    Take 1 tablet (10 mEq total) by mouth daily       Start Date: 7/18/2025 End Date: --       Order Dose: 10 mEq       Quantity: 30 tablet    Refills: 0     No discharge procedures on file.  ED SEPSIS DOCUMENTATION   Time reflects when diagnosis was documented in both MDM as applicable and the Disposition within this note       Time User Action Codes Description Comment    7/18/2025 10:00 PM Chavez, Ray Add [R06.00] Dyspnea     7/18/2025 10:00 PM Chavez, Ray Add [E87.6] Hypokalemia                      [1]   Past Medical History:  Diagnosis Date    Anxiety disorder     Aortic valve disorder     BRCA1 negative     BRCA2 negative     Breast cancer (HCC) 03/2022    right    Cancer (HCC)     breast    Cellulitis  of finger of left hand 11/20/2018    Cellulitis of left hand 11/10/2018    Added automatically from request for surgery [id number]    Depression     Disease of thyroid gland     GERD (gastroesophageal reflux disease)     History of gastroesophageal reflux (GERD)     History of kidney cancer 03/06/2014    History of transfusion     Hyperlipidemia 03/06/2014    Hypertension 03/06/2014    Hypothyroidism 03/06/2014    Lymphoma (HCC)     Malignant neoplasm of overlapping sites of right breast in female, estrogen receptor positive (HCC) 03/10/2022    Non-intractable vomiting with nausea 03/05/2020    Shortness of breath     Stroke (HCC) [id number]    Min stroke found wile going through chemo and radiation    TIA (transient ischemic attack)     UTI (urinary tract infection)    [2]   Past Surgical History:  Procedure Laterality Date    BREAST LUMPECTOMY Right 3/29/2022    Procedure: ITZEL  DIRECTED LUMPECTOMY;  Surgeon: Denton Chambers MD;  Location: [facility];  Service: Surgical Oncology    ESOPHAGOGASTRIC FUNDOPLASTY      Nissen Fundoplication    HERNIA REPAIR      LYMPH NODE BIOPSY Right 3/29/2022    Procedure: LYMPHATIC MAPPING WITH BLUE AND RADIOACTIVE DYES SENTINEL LYMPH NODE BIOPSY, INJECTION IN OR AT 0800 BY DR CHAMBERS;  Surgeon: Denton Chambers MD;  Location: [facility];  Service: Surgical Oncology    MAMMO STEREOTACTIC BREAST BIOPSY RIGHT (ALL INC) Right 3/7/2022    US BREAST CLIP NEEDLE LOC RIGHT Right 3/24/2022    US GUIDED BREAST BIOPSY RIGHT COMPLETE Right 3/7/2022    WRIST SURGERY Left    [3]   Family History  Problem Relation Name Age of Onset    Stroke Father      Leukemia Sister      No Known Problems Brother      Skin cancer Mother      Stroke Maternal Grandmother      No Known Problems Maternal Grandfather      No Known Problems Paternal Grandmother      No Known Problems Paternal Grandfather      Breast cancer Sister Ivania Mendoza     Breast cancer Sister Manjula Plata  cancer    Kidney cancer Brother      Alcohol abuse Brother      Kidney cancer Brother      Stomach cancer Brother      No Known Problems Daughter      No Known Problems Daughter     [4]   Social History  Tobacco Use    Smoking status: Never    Smokeless tobacco: Never    Tobacco comments:     None   Vaping Use    Vaping status: Never Used   Substance Use Topics    Alcohol use: Yes     Comment: Social holidays    Drug use: Never        Ray Chavez MD  07/18/25 [id number]

## 2025-07-19 NOTE — DISCHARGE INSTRUCTIONS
"Patient Education     Hypokalemia   The Basics   Written by the doctors and editors at [facility]   What is hypokalemia? -- This means having too little potassium in the blood.  Potassium is a mineral found in certain foods. The body needs potassium to work normally. It keeps the heart beating and helps the nerves and muscles work.  In people with hypokalemia, the body loses too much potassium. This can cause problems.  What causes hypokalemia? -- Hypokalemia most often happens in people who have been vomiting or had diarrhea for a while. It can also happen in people taking medicines called \"diuretics,\" such as hydrochlorothiazide and furosemide (brand name: Lasix).  Vomiting, diarrhea, and these medicines can cause the body to lose too much potassium.  What are the symptoms of hypokalemia? -- The main symptom is muscle weakness. The weakness usually starts in the legs and then spreads to the middle of the body and the arms. It can get so bad that you cannot move parts or all of your body. It can also damage muscles. In fact, it can even affect the muscles that control breathing. If this happens, it can make you stop breathing.  Hypokalemia can also:   Cause an irregular heartbeat   Disrupt the electrical signals that control the heart   Affect how the kidneys work  Are there tests for hypokalemia? -- Yes. If your doctor or nurse suspects that you have hypokalemia, they will do:   Blood tests - These check how much potassium is in your blood.   Urine tests - These can show much potassium you are losing in your urine.  You might also need other tests depending on your age, other symptoms, and individual situation.  How is hypokalemia treated? -- Treatment usually involves taking potassium in pill form or through a thin tube that goes into a vein, called an \"IV.\" Hypokalemia cannot usually be treated by getting extra potassium through your diet alone.  If your hypokalemia was caused by a medicine you take, your doctor " PER Dr. Yancey's sched change flores 10/19/20 to 11/19/20  covid nasal swab 11/17/20 [facility]  Per im holland is aware  New date letter created   or nurse might change your dose or switch you to a different medicine.  When should I call the doctor? -- Call your doctor or nurse for advice if you have:   Vomiting   Diarrhea   Irregular heartbeat   Muscle cramping or twitching   Weakness   Trouble moving part of your body  All topics are updated as new evidence becomes available and our peer review process is complete.  This topic retrieved from [employer] on: Mar 22, 2024.  Topic 20861 Version 9.0  Release: 32.2.4 - C32.80  © 2024 UpToDate, Inc. and/or its affiliates. All rights reserved.  Consumer Information Use and Disclaimer   Disclaimer: This generalized information is a limited summary of diagnosis, treatment, and/or medication information. It is not meant to be comprehensive and should be used as a tool to help the user understand and/or assess potential diagnostic and treatment options. It does NOT include all information about conditions, treatments, medications, side effects, or risks that may apply to a specific patient. It is not intended to be medical advice or a substitute for the medical advice, diagnosis, or treatment of a health care provider based on the health care provider's examination and assessment of a patient's specific and unique circumstances. Patients must speak with a health care provider for complete information about their health, medical questions, and treatment options, including any risks or benefits regarding use of medications. This information does not endorse any treatments or medications as safe, effective, or approved for treating a specific patient. UpToDate, Inc. and its affiliates disclaim any warranty or liability relating to this information or the use thereof.The use of this information is governed by the Terms of Use, available at https://www.wolterskluwer.com/en/know/clinical-effectiveness-terms. 2024© UpToDate, Inc. and its affiliates and/or licensors. All rights reserved.  Copyright   © 2024 UpToDate, Inc. and/or  its affiliates. All rights reserved.

## 2025-07-21 ENCOUNTER — VBI (OUTPATIENT)
Dept: ADMINISTRATIVE | Facility: OTHER | Age: 80
End: 2025-07-21

## 2025-07-21 NOTE — TELEPHONE ENCOUNTER
07/21/25 10:14 AM    Patient contacted post ED visit, first outreach attempt made. Message was left for patient to return a call to the [facility] Department at [facility]: Phone [phone].    Thank you.  [facility] Cortez  [facility]

## 2025-07-22 NOTE — TELEPHONE ENCOUNTER
07/22/25 12:59 PM    Patient contacted post ED visit, [facility] department spoke with patient/caregiver and outreach was successful.    Thank you.  [facility] Cortez  [facility]

## 2025-07-30 PROBLEM — K44.9 HIATAL HERNIA: Status: ACTIVE | Noted: 2025-07-30

## 2025-07-31 ENCOUNTER — OFFICE VISIT (OUTPATIENT)
Dept: INTERNAL MEDICINE CLINIC | Facility: CLINIC | Age: 80
End: 2025-07-31
Payer: COMMERCIAL

## 2025-07-31 VITALS
HEART RATE: 93 BPM | RESPIRATION RATE: 20 BRPM | DIASTOLIC BLOOD PRESSURE: 80 MMHG | BODY MASS INDEX: 31.32 KG/M2 | HEIGHT: 62 IN | WEIGHT: 170.2 LBS | OXYGEN SATURATION: 92 % | SYSTOLIC BLOOD PRESSURE: 138 MMHG

## 2025-07-31 DIAGNOSIS — D53.9 MACROCYTIC ANEMIA: Primary | ICD-10-CM

## 2025-07-31 DIAGNOSIS — E87.6 HYPOKALEMIA: ICD-10-CM

## 2025-07-31 PROBLEM — D75.89 MACROCYTOSIS WITHOUT ANEMIA: Status: RESOLVED | Noted: 2019-10-03 | Resolved: 2025-07-31

## 2025-07-31 PROCEDURE — G2211 COMPLEX E/M VISIT ADD ON: HCPCS | Performed by: INTERNAL MEDICINE

## 2025-07-31 PROCEDURE — 99214 OFFICE O/P EST MOD 30 MIN: CPT | Performed by: INTERNAL MEDICINE

## 2025-07-31 RX ORDER — POTASSIUM CHLORIDE 750 MG/1
10 TABLET, EXTENDED RELEASE ORAL DAILY
Qty: 30 TABLET | Refills: 3 | Status: SHIPPED | OUTPATIENT
Start: 2025-07-31

## 2025-08-07 ENCOUNTER — VBI (OUTPATIENT)
Dept: ADMINISTRATIVE | Facility: OTHER | Age: 80
End: 2025-08-07

## 2025-08-14 ENCOUNTER — VBI (OUTPATIENT)
Dept: ADMINISTRATIVE | Facility: OTHER | Age: 80
End: 2025-08-14

## 2025-08-19 ENCOUNTER — APPOINTMENT (OUTPATIENT)
Dept: LAB | Facility: HOSPITAL | Age: 80
End: 2025-08-19
Payer: COMMERCIAL

## 2025-08-19 DIAGNOSIS — E87.6 HYPOKALEMIA: ICD-10-CM

## 2025-08-19 DIAGNOSIS — D53.9 MACROCYTIC ANEMIA: ICD-10-CM

## 2025-08-19 LAB
ANION GAP SERPL CALCULATED.3IONS-SCNC: 12 MMOL/L (ref 4–13)
ANISOCYTOSIS BLD QL SMEAR: PRESENT
BASOPHILS # BLD MANUAL: 0.19 THOUSAND/UL (ref 0–0.1)
BASOPHILS NFR MAR MANUAL: 3 % (ref 0–1)
BUN SERPL-MCNC: 16 MG/DL (ref 5–25)
CALCIUM SERPL-MCNC: 9.1 MG/DL (ref 8.4–10.2)
CHLORIDE SERPL-SCNC: 102 MMOL/L (ref 96–108)
CO2 SERPL-SCNC: 25 MMOL/L (ref 21–32)
CREAT SERPL-MCNC: 0.93 MG/DL (ref 0.6–1.3)
EOSINOPHIL # BLD MANUAL: 0.13 THOUSAND/UL (ref 0–0.4)
EOSINOPHIL NFR BLD MANUAL: 2 % (ref 0–6)
ERYTHROCYTE [DISTWIDTH] IN BLOOD BY AUTOMATED COUNT: 14.7 % (ref 11.6–15.1)
FOLATE SERPL-MCNC: >22.3 NG/ML
GFR SERPL CREATININE-BSD FRML MDRD: 58 ML/MIN/1.73SQ M
GLUCOSE P FAST SERPL-MCNC: 148 MG/DL (ref 65–99)
HCT VFR BLD AUTO: 33 % (ref 34.8–46.1)
HGB BLD-MCNC: 11.4 G/DL (ref 11.5–15.4)
LYMPHOCYTES # BLD AUTO: 0.77 THOUSAND/UL (ref 0.6–4.47)
LYMPHOCYTES # BLD AUTO: 12 % (ref 14–44)
MACROCYTES BLD QL AUTO: PRESENT
MCH RBC QN AUTO: 39.9 PG (ref 26.8–34.3)
MCHC RBC AUTO-ENTMCNC: 34.5 G/DL (ref 31.4–37.4)
MCV RBC AUTO: 115 FL (ref 82–98)
METAMYELOCYTE ABSOLUTE CT: 0.32 THOUSAND/UL (ref 0–0.1)
METAMYELOCYTES NFR BLD MANUAL: 5 % (ref 0–1)
MONOCYTES # BLD AUTO: 0.51 THOUSAND/UL (ref 0–1.22)
MONOCYTES NFR BLD: 8 % (ref 4–12)
NEUTROPHILS # BLD MANUAL: 4.47 THOUSAND/UL (ref 1.85–7.62)
NEUTS SEG NFR BLD AUTO: 70 % (ref 43–75)
PLATELET # BLD AUTO: 322 THOUSANDS/UL (ref 149–390)
PLATELET BLD QL SMEAR: ADEQUATE
PMV BLD AUTO: 11.8 FL (ref 8.9–12.7)
POLYCHROMASIA BLD QL SMEAR: PRESENT
POTASSIUM SERPL-SCNC: 3.2 MMOL/L (ref 3.5–5.3)
RBC # BLD AUTO: 2.86 MILLION/UL (ref 3.81–5.12)
RBC MORPH BLD: PRESENT
RETICS # AUTO: ABNORMAL 10*3/UL (ref 14097–95744)
RETICS # CALC: 2.85 % (ref 0.37–1.87)
SODIUM SERPL-SCNC: 139 MMOL/L (ref 135–147)
TSH SERPL DL<=0.05 MIU/L-ACNC: 1.85 UIU/ML (ref 0.45–4.5)
VIT B12 SERPL-MCNC: 660 PG/ML (ref 180–914)
WBC # BLD AUTO: 6.39 THOUSAND/UL (ref 4.31–10.16)

## 2025-08-19 PROCEDURE — 82746 ASSAY OF FOLIC ACID SERUM: CPT

## 2025-08-19 PROCEDURE — 80048 BASIC METABOLIC PNL TOTAL CA: CPT

## 2025-08-19 PROCEDURE — 84165 PROTEIN E-PHORESIS SERUM: CPT

## 2025-08-19 PROCEDURE — 82607 VITAMIN B-12: CPT

## 2025-08-19 PROCEDURE — 86334 IMMUNOFIX E-PHORESIS SERUM: CPT

## 2025-08-19 PROCEDURE — 36415 COLL VENOUS BLD VENIPUNCTURE: CPT

## 2025-08-19 PROCEDURE — 84443 ASSAY THYROID STIM HORMONE: CPT

## 2025-08-19 PROCEDURE — 85007 BL SMEAR W/DIFF WBC COUNT: CPT

## 2025-08-19 PROCEDURE — 83918 ORGANIC ACIDS TOTAL QUANT: CPT

## 2025-08-19 PROCEDURE — 85045 AUTOMATED RETICULOCYTE COUNT: CPT

## 2025-08-19 PROCEDURE — 85027 COMPLETE CBC AUTOMATED: CPT

## 2025-08-22 ENCOUNTER — NURSE TRIAGE (OUTPATIENT)
Age: 80
End: 2025-08-22

## 2025-08-24 PROBLEM — J96.00 ACUTE RESPIRATORY FAILURE (HCC): Status: ACTIVE | Noted: 2025-08-24

## 2025-08-25 LAB — METHYLMALONATE SERPL-SCNC: 212 NMOL/L (ref 0–378)

## 2025-08-26 LAB
ALBUMIN SERPL ELPH-MCNC: 3.79 G/DL (ref 3.2–5.1)
ALBUMIN SERPL ELPH-MCNC: 54.9 % (ref 48–70)
ALPHA1 GLOB SERPL ELPH-MCNC: 0.28 G/DL (ref 0.15–0.47)
ALPHA1 GLOB SERPL ELPH-MCNC: 4 % (ref 1.8–7)
ALPHA2 GLOB SERPL ELPH-MCNC: 0.95 G/DL (ref 0.42–1.04)
ALPHA2 GLOB SERPL ELPH-MCNC: 13.8 % (ref 5.9–14.9)
BETA GLOB ABNORMAL SERPL ELPH-MCNC: 0.47 G/DL (ref 0.31–0.57)
BETA1 GLOB SERPL ELPH-MCNC: 6.8 % (ref 4.7–7.7)
BETA2 GLOB SERPL ELPH-MCNC: 6.7 % (ref 3.1–7.9)
BETA2+GAMMA GLOB SERPL ELPH-MCNC: 0.46 G/DL (ref 0.2–0.58)
GAMMA GLOB ABNORMAL SERPL ELPH-MCNC: 0.95 G/DL (ref 0.4–1.66)
GAMMA GLOB SERPL ELPH-MCNC: 13.8 % (ref 6.9–22.3)
IGG/ALB SER: 1.22 {RATIO} (ref 1.1–1.8)
PROT SERPL-MCNC: 6.9 G/DL (ref 6.4–8.4)

## 2025-08-26 PROCEDURE — 86334 IMMUNOFIX E-PHORESIS SERUM: CPT | Performed by: STUDENT IN AN ORGANIZED HEALTH CARE EDUCATION/TRAINING PROGRAM

## 2025-08-26 PROCEDURE — 84165 PROTEIN E-PHORESIS SERUM: CPT | Performed by: STUDENT IN AN ORGANIZED HEALTH CARE EDUCATION/TRAINING PROGRAM

## (undated) DEVICE — MEDI-VAC YANK SUCT HNDL W/TPRD BULBOUS TIP: Brand: CARDINAL HEALTH

## (undated) DEVICE — UTILITY MARKER,BLACK WITH LABELS: Brand: DEVON

## (undated) DEVICE — SMOKE EVACUATION TUBING WITH 8 IN INTEGRAL WAND AND SPONGE GUARD: Brand: BUFFALO FILTER

## (undated) DEVICE — PENCIL ELECTROSURG E-Z CLEAN -0035H

## (undated) DEVICE — SUT SILK 2-0 SH 30 IN K833H

## (undated) DEVICE — 3M™ STERI-STRIP™ REINFORCED ADHESIVE SKIN CLOSURES, R1547, 1/2 IN X 4 IN (12 MM X 100 MM), 6 STRIPS/ENVELOPE: Brand: 3M™ STERI-STRIP™

## (undated) DEVICE — TUBING SUCTION 5MM X 12 FT

## (undated) DEVICE — GLOVE SRG BIOGEL ORTHOPEDIC 7.5

## (undated) DEVICE — DRAPE SHEET THREE QUARTER

## (undated) DEVICE — PAD GROUNDING ADULT

## (undated) DEVICE — COBAN 4 IN STERILE

## (undated) DEVICE — SHEATH, GUIDE, SAVI SCOUT®: Brand: SAVI SCOUT®

## (undated) DEVICE — CHEST/BREAST DRAPE: Brand: CONVERTORS

## (undated) DEVICE — SUT VICRYL 2-0 REEL 54 IN J286G

## (undated) DEVICE — BETHLEHEM UNIVERSAL MINOR GEN: Brand: CARDINAL HEALTH

## (undated) DEVICE — MARGIN MARKER SET

## (undated) DEVICE — SUT VICRYL 3-0 SH 27 IN J416H

## (undated) DEVICE — SUT MONOCRYL 4-0 PS-2 18 IN Y496G

## (undated) DEVICE — INTENDED FOR TISSUE SEPARATION, AND OTHER PROCEDURES THAT REQUIRE A SHARP SURGICAL BLADE TO PUNCTURE OR CUT.: Brand: BARD-PARKER SAFETY BLADES SIZE 15, STERILE

## (undated) DEVICE — DRAPE EQUIPMENT RF WAND

## (undated) DEVICE — SURGICEL 2 X 3

## (undated) DEVICE — HEMOSTAT POWDER ADSORB SURGICEL 3GM

## (undated) DEVICE — TELFA NON-ADHERENT ABSORBENT DRESSING: Brand: TELFA

## (undated) DEVICE — MAYO STAND COVER: Brand: CONVERTORS

## (undated) DEVICE — ELECTRODE BLADE MOD E-Z CLEAN  2.75IN 7CM -0012AM

## (undated) DEVICE — LIGHT HANDLE COVER SLEEVE DISP BLUE STELLAR

## (undated) DEVICE — PLUMEPEN PRO 10FT

## (undated) DEVICE — GLOVE SRG BIOGEL 7

## (undated) DEVICE — CHLORAPREP HI-LITE 26ML ORANGE

## (undated) DEVICE — GAUZE SPONGES,USP TYPE VII GAUZE, 12 PLY: Brand: CURITY

## (undated) DEVICE — ADHESIVE SKIN CLOSR DERMABOND PRINEO

## (undated) DEVICE — IMPERVIOUS STOCKINETTE: Brand: DEROYAL

## (undated) DEVICE — LIGACLIP MCA MULTIPLE CLIP APPLIERS, 20 MEDIUM CLIPS: Brand: LIGACLIP

## (undated) DEVICE — SUT VICRYL 2-0 SH 27 IN UNDYED J417H

## (undated) DEVICE — PACK UNIVERSAL DRAPES SUB-Q ICD